# Patient Record
Sex: FEMALE | Race: WHITE | NOT HISPANIC OR LATINO | Employment: OTHER | URBAN - METROPOLITAN AREA
[De-identification: names, ages, dates, MRNs, and addresses within clinical notes are randomized per-mention and may not be internally consistent; named-entity substitution may affect disease eponyms.]

---

## 2017-03-02 ENCOUNTER — TRANSCRIBE ORDERS (OUTPATIENT)
Dept: ADMINISTRATIVE | Age: 67
End: 2017-03-02

## 2017-03-02 ENCOUNTER — HOSPITAL ENCOUNTER (OUTPATIENT)
Dept: RADIOLOGY | Age: 67
Discharge: HOME | End: 2017-03-02
Payer: COMMERCIAL

## 2017-03-02 DIAGNOSIS — N20.0 CALCULUS OF KIDNEY: ICD-10-CM

## 2017-03-02 PROCEDURE — 74000 HB X-RAY EXAM OF ABDOMEN (SINGLE ANTEROPOSTERIOR VIEW): CPT

## 2017-04-11 ENCOUNTER — ALLSCRIPTS OFFICE VISIT (OUTPATIENT)
Dept: OTHER | Facility: OTHER | Age: 67
End: 2017-04-11

## 2017-04-25 ENCOUNTER — ALLSCRIPTS OFFICE VISIT (OUTPATIENT)
Dept: OTHER | Facility: OTHER | Age: 67
End: 2017-04-25

## 2017-05-02 ENCOUNTER — GENERIC CONVERSION - ENCOUNTER (OUTPATIENT)
Dept: OTHER | Facility: OTHER | Age: 67
End: 2017-05-02

## 2017-06-08 ENCOUNTER — ALLSCRIPTS OFFICE VISIT (OUTPATIENT)
Dept: OTHER | Facility: OTHER | Age: 67
End: 2017-06-08

## 2017-06-08 ENCOUNTER — TRANSCRIBE ORDERS (OUTPATIENT)
Dept: ADMINISTRATIVE | Facility: HOSPITAL | Age: 67
End: 2017-06-08

## 2017-06-08 DIAGNOSIS — Z12.31 ENCOUNTER FOR SCREENING MAMMOGRAM FOR MALIGNANT NEOPLASM OF BREAST: Primary | ICD-10-CM

## 2017-07-17 ENCOUNTER — OFFICE VISIT (OUTPATIENT)
Dept: URGENT CARE | Age: 67
End: 2017-07-17
Payer: COMMERCIAL

## 2017-07-17 ENCOUNTER — APPOINTMENT (OUTPATIENT)
Dept: RADIOLOGY | Age: 67
End: 2017-07-17
Attending: FAMILY MEDICINE
Payer: COMMERCIAL

## 2017-07-17 ENCOUNTER — TRANSCRIBE ORDERS (OUTPATIENT)
Dept: URGENT CARE | Age: 67
End: 2017-07-17

## 2017-07-17 DIAGNOSIS — S89.91XA INJURY OF RIGHT LOWER LEG: ICD-10-CM

## 2017-07-17 DIAGNOSIS — S89.92XA INJURY OF LEFT LOWER LEG: ICD-10-CM

## 2017-07-17 DIAGNOSIS — M79.662 PAIN OF LEFT LOWER LEG: ICD-10-CM

## 2017-07-17 DIAGNOSIS — E87.6 HYPOKALEMIA: ICD-10-CM

## 2017-07-17 DIAGNOSIS — S86.912A STRAIN OF UNSPECIFIED MUSCLE(S) AND TENDON(S) AT LOWER LEG LEVEL, LEFT LEG, INITIAL ENCOUNTER: ICD-10-CM

## 2017-07-17 PROCEDURE — 73630 X-RAY EXAM OF FOOT: CPT

## 2017-07-17 PROCEDURE — 73610 X-RAY EXAM OF ANKLE: CPT

## 2017-07-17 PROCEDURE — 99203 OFFICE O/P NEW LOW 30 MIN: CPT | Performed by: FAMILY MEDICINE

## 2017-07-17 PROCEDURE — 73564 X-RAY EXAM KNEE 4 OR MORE: CPT

## 2017-07-17 PROCEDURE — 73590 X-RAY EXAM OF LOWER LEG: CPT

## 2017-07-25 ENCOUNTER — ALLSCRIPTS OFFICE VISIT (OUTPATIENT)
Dept: OTHER | Facility: OTHER | Age: 67
End: 2017-07-25

## 2017-07-25 ENCOUNTER — TRANSCRIBE ORDERS (OUTPATIENT)
Dept: ADMINISTRATIVE | Facility: HOSPITAL | Age: 67
End: 2017-07-25

## 2017-07-25 DIAGNOSIS — M25.562 LEFT KNEE PAIN, UNSPECIFIED CHRONICITY: Primary | ICD-10-CM

## 2017-07-25 DIAGNOSIS — M25.572 LEFT ANKLE PAIN, UNSPECIFIED CHRONICITY: ICD-10-CM

## 2017-07-26 ENCOUNTER — HOSPITAL ENCOUNTER (OUTPATIENT)
Dept: ULTRASOUND IMAGING | Facility: HOSPITAL | Age: 67
Discharge: HOME | End: 2017-07-26
Payer: COMMERCIAL

## 2017-07-26 DIAGNOSIS — M79.662 PAIN OF LEFT LOWER LEG: ICD-10-CM

## 2017-07-26 PROCEDURE — 93971 EXTREMITY STUDY: CPT

## 2017-07-29 ENCOUNTER — GENERIC CONVERSION - ENCOUNTER (OUTPATIENT)
Dept: OTHER | Facility: OTHER | Age: 67
End: 2017-07-29

## 2017-08-02 ENCOUNTER — ALLSCRIPTS OFFICE VISIT (OUTPATIENT)
Dept: OTHER | Facility: OTHER | Age: 67
End: 2017-08-02

## 2017-08-05 ENCOUNTER — APPOINTMENT (OUTPATIENT)
Dept: LAB | Age: 67
End: 2017-08-05
Payer: COMMERCIAL

## 2017-08-05 ENCOUNTER — TRANSCRIBE ORDERS (OUTPATIENT)
Dept: ADMINISTRATIVE | Age: 67
End: 2017-08-05

## 2017-08-05 DIAGNOSIS — L40.3 SAPHO SYNDROME (HCC): ICD-10-CM

## 2017-08-05 DIAGNOSIS — L70.9 SAPHO SYNDROME (HCC): Primary | ICD-10-CM

## 2017-08-05 DIAGNOSIS — M86.9 SAPHO SYNDROME (HCC): Primary | ICD-10-CM

## 2017-08-05 DIAGNOSIS — L40.3 SAPHO SYNDROME (HCC): Primary | ICD-10-CM

## 2017-08-05 DIAGNOSIS — M65.9 SAPHO SYNDROME (HCC): ICD-10-CM

## 2017-08-05 DIAGNOSIS — M85.80 SAPHO SYNDROME (HCC): ICD-10-CM

## 2017-08-05 DIAGNOSIS — M65.9 SAPHO SYNDROME (HCC): Primary | ICD-10-CM

## 2017-08-05 DIAGNOSIS — M86.9 SAPHO SYNDROME (HCC): ICD-10-CM

## 2017-08-05 DIAGNOSIS — M85.80 SAPHO SYNDROME (HCC): Primary | ICD-10-CM

## 2017-08-05 DIAGNOSIS — L70.9 SAPHO SYNDROME (HCC): ICD-10-CM

## 2017-08-05 LAB
25(OH)D3 SERPL-MCNC: 44.4 NG/ML (ref 30–100)
ALBUMIN SERPL BCP-MCNC: 3.7 G/DL (ref 3.5–5)
ALP SERPL-CCNC: 84 U/L (ref 46–116)
ALT SERPL W P-5'-P-CCNC: 31 U/L (ref 12–78)
ANION GAP SERPL CALCULATED.3IONS-SCNC: 5 MMOL/L (ref 4–13)
AST SERPL W P-5'-P-CCNC: 15 U/L (ref 5–45)
BASOPHILS # BLD AUTO: 0.05 THOUSANDS/ΜL (ref 0–0.1)
BASOPHILS NFR BLD AUTO: 1 % (ref 0–1)
BILIRUB SERPL-MCNC: 0.47 MG/DL (ref 0.2–1)
BUN SERPL-MCNC: 14 MG/DL (ref 5–25)
CALCIUM SERPL-MCNC: 8.9 MG/DL (ref 8.3–10.1)
CHLORIDE SERPL-SCNC: 110 MMOL/L (ref 100–108)
CO2 SERPL-SCNC: 25 MMOL/L (ref 21–32)
CREAT SERPL-MCNC: 0.89 MG/DL (ref 0.6–1.3)
EOSINOPHIL # BLD AUTO: 0.21 THOUSAND/ΜL (ref 0–0.61)
EOSINOPHIL NFR BLD AUTO: 4 % (ref 0–6)
ERYTHROCYTE [DISTWIDTH] IN BLOOD BY AUTOMATED COUNT: 13 % (ref 11.6–15.1)
GFR SERPL CREATININE-BSD FRML MDRD: 67 ML/MIN/1.73SQ M
GLUCOSE P FAST SERPL-MCNC: 84 MG/DL (ref 65–99)
HCT VFR BLD AUTO: 39.7 % (ref 34.8–46.1)
HGB BLD-MCNC: 12.9 G/DL (ref 11.5–15.4)
LYMPHOCYTES # BLD AUTO: 1.28 THOUSANDS/ΜL (ref 0.6–4.47)
LYMPHOCYTES NFR BLD AUTO: 23 % (ref 14–44)
MCH RBC QN AUTO: 30.4 PG (ref 26.8–34.3)
MCHC RBC AUTO-ENTMCNC: 32.5 G/DL (ref 31.4–37.4)
MCV RBC AUTO: 93 FL (ref 82–98)
MONOCYTES # BLD AUTO: 0.45 THOUSAND/ΜL (ref 0.17–1.22)
MONOCYTES NFR BLD AUTO: 8 % (ref 4–12)
NEUTROPHILS # BLD AUTO: 3.52 THOUSANDS/ΜL (ref 1.85–7.62)
NEUTS SEG NFR BLD AUTO: 64 % (ref 43–75)
NRBC BLD AUTO-RTO: 0 /100 WBCS
PLATELET # BLD AUTO: 240 THOUSANDS/UL (ref 149–390)
PMV BLD AUTO: 10.1 FL (ref 8.9–12.7)
POTASSIUM SERPL-SCNC: 4.1 MMOL/L (ref 3.5–5.3)
PROT SERPL-MCNC: 7.7 G/DL (ref 6.4–8.2)
RBC # BLD AUTO: 4.25 MILLION/UL (ref 3.81–5.12)
SODIUM SERPL-SCNC: 140 MMOL/L (ref 136–145)
WBC # BLD AUTO: 5.52 THOUSAND/UL (ref 4.31–10.16)

## 2017-08-05 PROCEDURE — 36415 COLL VENOUS BLD VENIPUNCTURE: CPT

## 2017-08-05 PROCEDURE — 82306 VITAMIN D 25 HYDROXY: CPT

## 2017-08-05 PROCEDURE — 85025 COMPLETE CBC W/AUTO DIFF WBC: CPT

## 2017-08-05 PROCEDURE — 80053 COMPREHEN METABOLIC PANEL: CPT

## 2017-08-08 ENCOUNTER — APPOINTMENT (OUTPATIENT)
Dept: PHYSICAL THERAPY | Facility: CLINIC | Age: 67
End: 2017-08-08
Payer: COMMERCIAL

## 2017-08-08 ENCOUNTER — HOSPITAL ENCOUNTER (OUTPATIENT)
Dept: MRI IMAGING | Facility: HOSPITAL | Age: 67
Discharge: HOME | End: 2017-08-08
Attending: FAMILY MEDICINE
Payer: COMMERCIAL

## 2017-08-08 DIAGNOSIS — M25.572 LEFT ANKLE PAIN, UNSPECIFIED CHRONICITY: ICD-10-CM

## 2017-08-08 DIAGNOSIS — S86.912A STRAIN OF UNSPECIFIED MUSCLE(S) AND TENDON(S) AT LOWER LEG LEVEL, LEFT LEG, INITIAL ENCOUNTER: ICD-10-CM

## 2017-08-08 DIAGNOSIS — E87.6 HYPOKALEMIA: ICD-10-CM

## 2017-08-08 DIAGNOSIS — M25.562 LEFT KNEE PAIN, UNSPECIFIED CHRONICITY: ICD-10-CM

## 2017-08-08 PROCEDURE — 73721 MRI JNT OF LWR EXTRE W/O DYE: CPT

## 2017-08-08 PROCEDURE — G8991 OTHER PT/OT GOAL STATUS: HCPCS

## 2017-08-08 PROCEDURE — 97110 THERAPEUTIC EXERCISES: CPT

## 2017-08-08 PROCEDURE — G8990 OTHER PT/OT CURRENT STATUS: HCPCS

## 2017-08-08 PROCEDURE — 97162 PT EVAL MOD COMPLEX 30 MIN: CPT

## 2017-08-09 ENCOUNTER — ALLSCRIPTS OFFICE VISIT (OUTPATIENT)
Dept: OTHER | Facility: OTHER | Age: 67
End: 2017-08-09

## 2017-08-10 ENCOUNTER — GENERIC CONVERSION - ENCOUNTER (OUTPATIENT)
Dept: OTHER | Facility: OTHER | Age: 67
End: 2017-08-10

## 2017-08-10 ENCOUNTER — APPOINTMENT (OUTPATIENT)
Dept: PHYSICAL THERAPY | Facility: CLINIC | Age: 67
End: 2017-08-10
Payer: COMMERCIAL

## 2017-08-10 PROCEDURE — 97010 HOT OR COLD PACKS THERAPY: CPT

## 2017-08-10 PROCEDURE — 97140 MANUAL THERAPY 1/> REGIONS: CPT

## 2017-08-10 PROCEDURE — 97110 THERAPEUTIC EXERCISES: CPT

## 2017-08-15 ENCOUNTER — APPOINTMENT (OUTPATIENT)
Dept: PHYSICAL THERAPY | Facility: CLINIC | Age: 67
End: 2017-08-15
Payer: COMMERCIAL

## 2017-08-15 PROCEDURE — 97110 THERAPEUTIC EXERCISES: CPT

## 2017-08-15 PROCEDURE — 97140 MANUAL THERAPY 1/> REGIONS: CPT

## 2017-08-17 ENCOUNTER — APPOINTMENT (OUTPATIENT)
Dept: PHYSICAL THERAPY | Facility: CLINIC | Age: 67
End: 2017-08-17
Payer: COMMERCIAL

## 2017-08-17 PROCEDURE — 97110 THERAPEUTIC EXERCISES: CPT

## 2017-08-17 PROCEDURE — 97140 MANUAL THERAPY 1/> REGIONS: CPT

## 2017-08-22 ENCOUNTER — APPOINTMENT (OUTPATIENT)
Dept: PHYSICAL THERAPY | Facility: CLINIC | Age: 67
End: 2017-08-22
Payer: COMMERCIAL

## 2017-08-22 PROCEDURE — 97140 MANUAL THERAPY 1/> REGIONS: CPT

## 2017-08-22 PROCEDURE — 97110 THERAPEUTIC EXERCISES: CPT

## 2017-08-24 ENCOUNTER — APPOINTMENT (OUTPATIENT)
Dept: PHYSICAL THERAPY | Facility: CLINIC | Age: 67
End: 2017-08-24
Payer: COMMERCIAL

## 2017-08-24 PROCEDURE — 97110 THERAPEUTIC EXERCISES: CPT

## 2017-08-24 PROCEDURE — 97140 MANUAL THERAPY 1/> REGIONS: CPT

## 2017-09-01 ENCOUNTER — APPOINTMENT (OUTPATIENT)
Dept: PHYSICAL THERAPY | Facility: CLINIC | Age: 67
End: 2017-09-01
Payer: COMMERCIAL

## 2017-09-01 PROCEDURE — 97110 THERAPEUTIC EXERCISES: CPT

## 2017-09-01 PROCEDURE — 97140 MANUAL THERAPY 1/> REGIONS: CPT

## 2017-10-03 ENCOUNTER — GENERIC CONVERSION - ENCOUNTER (OUTPATIENT)
Dept: OTHER | Facility: OTHER | Age: 67
End: 2017-10-03

## 2017-11-16 ENCOUNTER — HOSPITAL ENCOUNTER (OUTPATIENT)
Dept: MAMMOGRAPHY | Facility: HOSPITAL | Age: 67
Discharge: HOME | End: 2017-11-16
Attending: SURGERY
Payer: COMMERCIAL

## 2017-11-16 DIAGNOSIS — Z12.31 ENCOUNTER FOR SCREENING MAMMOGRAM FOR MALIGNANT NEOPLASM OF BREAST: ICD-10-CM

## 2017-11-16 PROCEDURE — G0202 SCR MAMMO BI INCL CAD: HCPCS

## 2017-12-01 ENCOUNTER — ALLSCRIPTS OFFICE VISIT (OUTPATIENT)
Dept: OTHER | Facility: OTHER | Age: 67
End: 2017-12-01

## 2017-12-01 LAB
CLARITY UR: NORMAL
COLOR UR: CLEAR
GLUCOSE (HISTORICAL): NORMAL
HGB UR QL STRIP.AUTO: NORMAL
KETONES UR STRIP-MCNC: NORMAL MG/DL
LEUKOCYTE ESTERASE UR QL STRIP: 1
NITRITE UR QL STRIP: NORMAL
PH UR STRIP.AUTO: 7.5 [PH]
PROT UR STRIP-MCNC: NORMAL MG/DL
SP GR UR STRIP.AUTO: 1

## 2017-12-04 ENCOUNTER — TRANSCRIBE ORDERS (OUTPATIENT)
Dept: LAB | Facility: CLINIC | Age: 67
End: 2017-12-04

## 2017-12-04 ENCOUNTER — APPOINTMENT (OUTPATIENT)
Dept: LAB | Facility: CLINIC | Age: 67
End: 2017-12-04
Payer: COMMERCIAL

## 2017-12-04 DIAGNOSIS — G35 MULTIPLE SCLEROSIS (HCC): ICD-10-CM

## 2017-12-04 DIAGNOSIS — M81.0 SENILE OSTEOPOROSIS: Primary | ICD-10-CM

## 2017-12-04 DIAGNOSIS — M81.0 SENILE OSTEOPOROSIS: ICD-10-CM

## 2017-12-04 DIAGNOSIS — G35 MULTIPLE SCLEROSIS (HCC): Primary | ICD-10-CM

## 2017-12-04 LAB
25(OH)D3 SERPL-MCNC: 52.7 NG/ML (ref 30–100)
ALBUMIN SERPL BCP-MCNC: 3.8 G/DL (ref 3.5–5)
ALP SERPL-CCNC: 73 U/L (ref 46–116)
ALT SERPL W P-5'-P-CCNC: 30 U/L (ref 12–78)
AST SERPL W P-5'-P-CCNC: 15 U/L (ref 5–45)
BASOPHILS # BLD AUTO: 0.04 THOUSANDS/ΜL (ref 0–0.1)
BASOPHILS NFR BLD AUTO: 1 % (ref 0–1)
BILIRUB DIRECT SERPL-MCNC: 0.13 MG/DL (ref 0–0.2)
BILIRUB SERPL-MCNC: 0.39 MG/DL (ref 0.2–1)
CALCIUM SERPL-MCNC: 8.7 MG/DL (ref 8.3–10.1)
CREAT SERPL-MCNC: 0.88 MG/DL (ref 0.6–1.3)
EOSINOPHIL # BLD AUTO: 0.25 THOUSAND/ΜL (ref 0–0.61)
EOSINOPHIL NFR BLD AUTO: 5 % (ref 0–6)
ERYTHROCYTE [DISTWIDTH] IN BLOOD BY AUTOMATED COUNT: 12.6 % (ref 11.6–15.1)
GFR SERPL CREATININE-BSD FRML MDRD: 68 ML/MIN/1.73SQ M
HCT VFR BLD AUTO: 40.5 % (ref 34.8–46.1)
HGB BLD-MCNC: 13.3 G/DL (ref 11.5–15.4)
LYMPHOCYTES # BLD AUTO: 1.9 THOUSANDS/ΜL (ref 0.6–4.47)
LYMPHOCYTES NFR BLD AUTO: 39 % (ref 14–44)
MCH RBC QN AUTO: 30.2 PG (ref 26.8–34.3)
MCHC RBC AUTO-ENTMCNC: 32.8 G/DL (ref 31.4–37.4)
MCV RBC AUTO: 92 FL (ref 82–98)
MONOCYTES # BLD AUTO: 0.3 THOUSAND/ΜL (ref 0.17–1.22)
MONOCYTES NFR BLD AUTO: 6 % (ref 4–12)
NEUTROPHILS # BLD AUTO: 2.41 THOUSANDS/ΜL (ref 1.85–7.62)
NEUTS SEG NFR BLD AUTO: 49 % (ref 43–75)
NRBC BLD AUTO-RTO: 0 /100 WBCS
PLATELET # BLD AUTO: 225 THOUSANDS/UL (ref 149–390)
PMV BLD AUTO: 9.8 FL (ref 8.9–12.7)
PROT SERPL-MCNC: 8.2 G/DL (ref 6.4–8.2)
RBC # BLD AUTO: 4.41 MILLION/UL (ref 3.81–5.12)
WBC # BLD AUTO: 4.91 THOUSAND/UL (ref 4.31–10.16)

## 2017-12-04 PROCEDURE — 85025 COMPLETE CBC W/AUTO DIFF WBC: CPT

## 2017-12-04 PROCEDURE — 36415 COLL VENOUS BLD VENIPUNCTURE: CPT

## 2017-12-04 PROCEDURE — 80076 HEPATIC FUNCTION PANEL: CPT

## 2017-12-04 PROCEDURE — 82565 ASSAY OF CREATININE: CPT

## 2017-12-04 PROCEDURE — 82306 VITAMIN D 25 HYDROXY: CPT

## 2017-12-04 PROCEDURE — 82310 ASSAY OF CALCIUM: CPT

## 2017-12-13 ENCOUNTER — GENERIC CONVERSION - ENCOUNTER (OUTPATIENT)
Dept: OTHER | Facility: OTHER | Age: 67
End: 2017-12-13

## 2017-12-14 ENCOUNTER — ALLSCRIPTS OFFICE VISIT (OUTPATIENT)
Dept: OTHER | Facility: OTHER | Age: 67
End: 2017-12-14

## 2017-12-15 NOTE — PROGRESS NOTES
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)   2  Encounter for screening mammogram for malignant neoplasm of breast (V76 12) (Z12 31)    Plan  Carcinoma in situ of left breast    · Follow-up visit in 1 year Evaluation and Treatment  Follow-up  Status: Hold For -Scheduling  Requested for: 82NBF0767   Ordered; For: Carcinoma in situ of left breast; Ordered By: Esteban Washington Performed:  Due: 80BPE5160  Encounter for screening mammogram for malignant neoplasm of breast    · MAMMO SCREENING RIGHT W 3D & CAD; Status:Hold For - Scheduling; Requestedfor:26Ffn0691;    Perform:[facility]; Due:11Frf1132; Ordered;For:Encounter for screening mammogram for malignant neoplasm of breast; Ordered By:Omayra Huynh; Discussion/Summary  76 yo female s/p left mastectomy and reconstruction for DCIS  ANGEL based on exam today or contralateral mammogram  She is now > 5 years out from her surgery  I will therefore see her again in one year after her mammogram or sooner should the need arise  Chief Complaint  Chief Complaint Free Text Note Form: Pt is here for her 6 month breast follow-up no new complaints at this time  breast imagin17 right scrn mammo (B1)  provider:Dr Will Mccauley      History of Present Illness  Diagnosis and Staging: DCIS left breast ER/FL +   Treatment History: 12 left mastectomy, SLNBexpander/implant-Mark; implant revision and right mastopexy-Ally   Current Therapy: none   Disease Status: angel   Interval History: reports progression of her MS      Review of Systems  Complete Female ROS SurgOnc:  Constitutional: The patient denies new or recent history of general fatigue, no recent weight loss, no change in appetite  Eyes: No complaints of visual problems, no scleral icterus  ENT: no complaints of ear pain, no hoarseness, no difficulty swallowing,-- no tinnitus-- and-- no new masses in head, oral cavity, or neck    Cardiovascular: No complaints of chest pain, no palpitations, no ankle edema  Respiratory: No complaints of shortness of breath, no cough  Gastrointestinal: No complaints of jaundice, no bloody stools, no pale stools  Genitourinary: No complaints of dysuria, no hematuria, no nocturia, no frequent urination, no urethral discharge  Musculoskeletal: arthralgias,-- myalgias-- and-- weakness  Integumentary: No complaints of rash, no new lesions  Neurological: numbness  Hematologic/Lymphatic: No complaints of easy bruising  Active Problems  1  Aftercare involving the use of plastic surgery (V51 8) (Z09)   2  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)   3  Depression (311) (F32 9)   4  Encounter for breast reconstruction following mastectomy (V51 0) (Z42 1)   5  Encounter for screening mammogram for malignant neoplasm of breast (V76 12) (Z12 31)   6  Fatigue (780 79) (R53 83)   7  Gait disturbance (781 2) (R26 9)   8  Headache (784 0) (R51)   9  Hip pain, unspecified laterality   10  Hypokalemia (276 8) (E87 6)   11  Injury of right lower extremity, initial encounter (959 7) (S89 91XA)   12  Insomnia (780 52) (G47 00)   13  Laceration of finger of right hand (883 0) (S61 219A)   14  Left ankle pain (719 47) (M25 572)   15  Left knee pain (719 46) (M25 562)   16  Multiple sclerosis (340) (G35)   17  Muscle strain of left lower leg (844 9) (S86 912A)   18  Nephrolithiasis (592 0) (N20 0)   19  Osteopenia (733 90) (M85 80)   20  Pain of left calf (729 5) (M79 662)   21  Pain of left lower leg (729 5) (M79 662)   22  Rash (782 1) (R21)   23  Screen for colon cancer (V76 51) (Z12 11)   24  Screening for viral disease (V73 99) (Z11 59)   25  Solitary pulmonary nodule (793 11) (R91 1)   26  Tingling (782 0) (R20 2)   27  Urgency of urination (788 63) (R39 15)   28  Urticaria (708 9) (L50 9)    Past Medical History  1  History of Age At First Period 14 Years Old (Menarche)   2  History of Age At First Pregnancy 24 Years Old   3  History of Bone Pain   4   History of Breast ptosis (611 81) (N64 81)   5  History of Hip Fracture   6  History Of 3  Previous Pregnancies (V61 5)   7  History of migraine (V12 49) (Z86 69)   8  Left ankle pain (719 47) (M25 572)   9  Left knee pain (719 46) (M25 562)   10  History of Nephrolithiasis (V13 01)   11  Pain of left calf (729 5) (M79 662)   12  Pain of left lower leg (729 5) (M79 662)   13  History of Previous Pregnancies Resulted In 2  Live Birth(S)   14  History of Supraventricular tachycardia (427 89) (I47 1)   15  History of Wrist fracture, left (814 00) (S62 102A)    Surgical History  1  History of Ankle Surgery   2  History of Biopsy Skin   3  History of Bladder Cystotomy With Basket Extraction Of Calculus   · x2   4  History of Breast Reconstruction With Implant Prosthesis   5  History of Breast Surgery Enlargement Procedure With Prosthetic Implant (V43 82)   6  History of Breast Surgery Mastectomy   7  History of Breast Surgery Reduction Procedure Right Breast   8  History of Exploratory Laparoscopy   9  History of Foot Surgery   10  History of Hip Surgery   11  History of Laparoscopic Appendectomy   · : Dr Dooley Labor   12  History of Leg Repair   13  History of [place] Lymph Node Biopsy   14  History of Tonsillectomy   15  History of Treatment Of Hip Fracture   · Sub-capital   16  History of Tubal Ligation   17  History of Wrist Surgery  Surgical History Reviewed: The surgical history was reviewed and updated today  Family History  Mother    1  Family history of Coronary Artery Disease (V17 49)   2  Family history of Mother  At Age 80  Father    3  Family history of Acute Myocardial Infarction (V17 3)   4  Family history of Father  At Age 39  Maternal Grandfather    5  Family history of Hodgkin Disease  Maternal Aunt    6  Family history of Bladder Cancer (V16 52)  Maternal Uncle    7  Family history of Colon Cancer (V16 0)   8  Family history of Hodgkin Disease  Family History Reviewed:    The family history was reviewed and updated today  Social History   · Denied: History of Alcohol Use (History)   · Denied: History of Caffeine Use   · Educational Level - Completed Bachelors Degree   · Former smoker (V15 82) (G42 975)   · Marital History - Currently    · Never a smoker   · Occupation: Retired  Social History Reviewed: The social history was reviewed and updated today  The social history was reviewed and is unchanged  Current Meds   1  Ampyra 10 MG Oral Tablet Extended Release 12 Hour; TAKE 1 TABLET BID  Requested for: 62VPL8907; Last Rx:26Apr2017 Ordered   2  Baclofen 10 MG Oral Tablet; TAKE 1 TABLET EVERY        MORNING, 1 TABLET EVERY    EVENING, AND TAKE 2 TABLETSAT BEDTIME; Therapy: 11MXV5606 to (Evaluate:30Jun2018)  Requested for: 69MSS5486; Last Rx:75Ysp6822 Ordered   3  Copaxone 40 MG/ML Subcutaneous Solution Prefilled Syringe; INJECT 40MG SUB-Q THREE TIMES WEEKLY; Therapy: 46Hik7435 to (Evaluate:27Jul2018)  Requested for: 64Yer4371; Last Rx:40Rpe8314 Ordered   4  Cranberry CAPS; Therapy: () to Recorded   5  Fish Oil CAPS; Therapy: () to Recorded   6  Gabapentin 300 MG Oral Capsule; TAKE 1 CAPSULE AT BEDTIME; Therapy: 60FDP3314 to (Evaluate:27Jan2018)  Requested for: 92Jrm3571; Last Rx:30Lpt5156 Ordered   7  Gabapentin 600 MG Oral Tablet; TAKE 2 TABLETS 3 TIMES A   DAY; Therapy: 25HUD4560 to (Evaluate:30Jun2018)  Requested for: 50CYD9010; Last Rx:66Whz9713 Ordered   8  HM Magnesium TABS; 500 mg qd; Therapy: () to Recorded   9  LORazepam 1 MG Oral Tablet; sig 1 hs; Therapy: 32AHE6277 to (Last Rx:02Jun2017) Ordered   10  Prolia 60 MG/ML Subcutaneous Solution; Therapy: (Recorded:02Mar2016) to Recorded   11  VESIcare 10 MG Oral Tablet; TAKE 1 TABLET DAILY; Therapy: 23IMI1547 to (Mayra Sibley)  Requested for: 93Ubu2168; Last  Rx:92Jdo8762; Status: ACTIVE - Retrospective By Protocol Authorization Ordered   12   Vitamin B-12 1000 MCG Oral Tablet; TAKE 1 TABLET DAILY AS DIRECTED; Therapy: 85BKS0167 to (Evaluate:03Nov2015) Recorded   13  Vitamin C 500 MG Oral Capsule; Therapy: ([phone]) to Recorded   14  Vitamin D3 CAPS; Therapy: ([phone]) to Recorded   15  Zonisamide 100 MG Oral Capsule; take 4 capsules at bedtime; Therapy: 84LDO1669 to (Evaluate:30Jun2018)  Requested for: 01ZXY2270; Last  Rx:00Eew9590 Ordered  Medication List Reviewed: The medication list was reviewed and updated today  Allergies  1  Contrast Media Ready-Box MISC   2  Cymbalta   3  Morphine Sulfate SOLN   4  Tylenol Extra Strength TABS   5  ZyrTEC Allergy TABS    Vitals  Vital Signs    Recorded: 85DEF3495 09:19AM   Temperature 97 8 F   Heart Rate 70   Respiration 15   Systolic 621   Diastolic 74   Height 5 ft 3 5 in   Weight 134 lb    BMI Calculated 23 36   BSA Calculated 1 64   O2 Saturation 99     Physical Exam   Constitutional: General appearance: The Patient is well-developed, well-nourished female who appears her stated age in no acute distress  She is pleasant and talkative  Chest: The lungs are clear to auscultation  Cardiac: Heart is regular rate  Abdomen: There is no evidence of hepatosplenomegaly  Neuro: Orientation to person, place and time: Normal  -- Mood and affect: Normal    Lymphatic: no evidence of cervical adenopathy bilaterally  -- no evidence of axillary adenopathy bilaterally  Skin: Examination of Breast: Abnormal   Breast: Examination of both breasts revealed implants  Examination of the right breast revealed a breast scar, but-- no erythema,-- no swelling-- and-- no tenderness  Examination of the left breast revealed a total mastectomy,-- a mastectomy scar-- and-- breast reconstruction, but-- no erythema,-- no swelling-- and-- no tenderness  The right nipple was not inverted  No discharge was noted from the right nipple  No breast masses were palpable  Axillary nodes: no enlarged nodes  Results/Data  Diagnostic Studies Reviewed Surg Onc:  X-ray Review 11/16/17 right screening mammogram benign  Health Management  Screen for colon cancer   COLONOSCOPY; every 10 years; Next Due: 40Jfu0604; Overdue  Health Maintenance   Medicare Annual Wellness Visit; every 1 year; Next Due: 14Apr2015; Overdue    Future Appointments    Date/Time Provider Specialty Site   12/19/2017 12:30 PM Jany Macdonald [facility] Neurology [facility]  [place]   10/12/2018 10:30 AM ROGER Frances  Plastic Surgery BODY EVOLUTION PLASTIC RECON RIVERS   03/07/2018 01:30 PM Yuri Adkins MD Obstetrics/Gynecology [facility] Sensing  [facility]   04/25/2018 10:15 AM Madhav Salinas MD Urology [facility] [place]     End of Encounter Meds  1  Ampyra 10 MG Oral Tablet Extended Release 12 Hour; TAKE 1 TABLET BID  Requested for: 61XBD1654; Last Rx:26Apr2017 Ordered  2  Cranberry CAPS; Therapy: (Recorded:89Xrq9769) to Recorded   3  Fish Oil CAPS; Therapy: () to Recorded   4  HM Magnesium TABS; 500 mg qd; Therapy: () to Recorded   5  Vitamin C 500 MG Oral Capsule; Therapy: () to Recorded   6  Vitamin D3 CAPS; Therapy: () to Recorded  7  LORazepam 1 MG Oral Tablet (Ativan); sig 1 hs; Therapy: 31NNF6927 to (Last Rx:02Jun2017) Ordered  8  Baclofen 10 MG Oral Tablet; TAKE 1 TABLET EVERY        MORNING, 1 TABLET EVERY    EVENING, AND TAKE 2 TABLETSAT BEDTIME; Therapy: 54YSS9126 to (Evaluate:30Jun2018)  Requested for: 89RQU6141; Last Rx:89Vjx1243 Ordered   9  Copaxone 40 MG/ML Subcutaneous Solution Prefilled Syringe (Glatiramer Acetate); INJECT 40MG SUB-Q THREE TIMES WEEKLY; Therapy: 62Cct7227 to (Evaluate:45Fov5609)  Requested for: 68Xby5712; Last Rx:31Ymz6959 Ordered   10  Vitamin B-12 1000 MCG Oral Tablet; TAKE 1 TABLET DAILY AS DIRECTED; Therapy: 31HXY6317 to (Evaluate:03Nov2015) Recorded   11   Zonisamide 100 MG Oral Capsule; take 4 capsules at bedtime; Therapy: 31XAP1798 to (Evaluate:30Jun2018)  Requested for: 16UHY6659; Last  Rx:42Cgo9402 Ordered  12  Gabapentin 300 MG Oral Capsule; TAKE 1 CAPSULE AT BEDTIME; Therapy: 77VFQ7636 to (Evaluate:27Jan2018)  Requested for: 32RJJ0156; Last  Rx:87Uov6711 Ordered   13  Gabapentin 600 MG Oral Tablet; TAKE 2 TABLETS 3 TIMES A   DAY; Therapy: 46ZTS0556 to (Evaluate:30Jun2018)  Requested for: 00YWV4855; Last  Rx:51Qjk8074 Ordered  14  VESIcare 10 MG Oral Tablet; TAKE 1 TABLET DAILY; Therapy: 75RRM2160 to (Kathia Lugo)  Requested for: 77Eed5900; Last  Rx:50Gbk7091; Status: ACTIVE - Retrospective By Protocol Authorization Ordered  15  Prolia 60 MG/ML Subcutaneous Solution;   Therapy: (Salomón Flores to Recorded    Signatures   Electronically signed by : ROGER Vu ; Dec 14 2017  9:38AM EST                       (Author)

## 2017-12-19 ENCOUNTER — ALLSCRIPTS OFFICE VISIT (OUTPATIENT)
Dept: OTHER | Facility: OTHER | Age: 67
End: 2017-12-19

## 2017-12-19 ENCOUNTER — TRANSCRIBE ORDERS (OUTPATIENT)
Dept: ADMINISTRATIVE | Facility: HOSPITAL | Age: 67
End: 2017-12-19

## 2017-12-19 DIAGNOSIS — G35 MULTIPLE SCLEROSIS (HCC): ICD-10-CM

## 2017-12-19 DIAGNOSIS — G35 MULTIPLE SCLEROSIS (HCC): Primary | ICD-10-CM

## 2017-12-21 NOTE — PROGRESS NOTES
Assessment   1  Multiple sclerosis (340) (G35)   2  Pain of left lower leg (729 5) (M79 662)   3  Gait disturbance (781 2) (R26 9)    Plan   Multiple sclerosis    · * MRI THORACIC SPINE W WO CONTRAST; Status:Need Information - Financial    Authorization; Requested for:[id number];    Perform:[facility] Radiology; Due:98Lxe3411; Last Updated By:Edwin Anne; 12/19/2017 1:36:52 PM;Ordered; For:Multiple sclerosis; Ordered By:Karla Rebolledo;   · Follow-up visit in 4 Months Evaluation and Treatment  Follow-up  Status: Complete     Done: 71GMW5687   Ordered; For: Multiple sclerosis; Ordered By: Lilly Hernandez Performed:  Due: 36EKO7952; Last Updated By: Christen Coffman; 12/19/2017 1:36:52 PM   · Follow-up Visit in 8 Months Evaluation and Treatment  Follow-up with Dr Pawan Jasso     Status: Complete  Done: 29UBY5434   Ordered; For: Multiple sclerosis; Ordered By: Lilly Hernandez Performed:  Due: 22PDW6587; Last Updated By: Christen Coffman; 12/19/2017 1:36:52 PM    Discussion/Summary   Discussion Summary:    Patient here for MS follow up remains on Copaxone and tolerating well  Also remains on Ampyra which has been beneficial for her gait is having increased neuropathic pain in the right leg, as well as pain over the left proximal tibia which started last night  She is getting a quick sharp, focal pain in the left lower leg over the tibia that subsides in seconds  Can happen at any time update MRI t-spine due to increased symptoms in the legs am also concerned about the left leg pain being ortho-related  As noted at last visit, she had a major fall prior to her last visit here and was seen by ortho  It looks like she had a MRI that showed a left patellar fracture  Patient was never notified of these results  Last week she had a minor fall to her knees  She did not have any pain at the time, but now having pain in the left leg  I offered to task PCP/ortho or order xray, but she wants to see how she feels in a few days   She feels this is nerve-related cont same dose of gabapentin 1200mg TID plus extra 300mg QHS also add alpha lipoic acid to her B12 to see if that would be helpful for neuropathy  She has not tolerated Lyrica or Cymbalta and is maxed out on gabapentin is stable today up in 4 months for any new symptoms  Counseling Documentation With Imm: The patient was counseled regarding diagnostic results,-- instructions for management,-- risk factor reductions,-- prognosis,-- patient and family education,-- impressions,-- risks and benefits of treatment options,-- importance of compliance with treatment  total time of encounter was 35 minutes-- and-- >50% minutes was spent counseling  Chief Complaint   Chief Complaint Free Text Note Form: Patient presents for f/u for Multiple sclerosis   History of Present Illness   HPI: 76 y/o female presents for MS follow up, last seen in August 2017  Patient with history of MS dating back to 2008, but likely with symptoms even in 1998  Patient also with PMH of breast cancer s/p left mastectomy in August 2012  Pt has been on Copaxone since 2008 and has now changed to the 40mg TIW dosing  She reports improvement in her injection sites with the dosing change; reports improvement in thigh atrophy  Patient has never had IV steroid therapy for her MS in the past  She has been on Neurontin for many years  Patient notes significant benefit for her walking and endurance with the use of Ampyra  On 9/29/14 she underwent open reduction internal fixation of the left hip and on 10/2/14 she underwent an open reduction internal fixation of a left radial fracture  Patient had updated imaging in April 2016  MRIs of brain, c-spine and t-spine were all stable, no new or enhancing lesions  At visit in Nov 2016, patient admitted to trouble sleeping, more depression  She did not feel the Zoloft has been doing anything for her   She had been on the med for close to 15 years and it has been increased over the years without much relief  Patient reports she does not want to see a counselor because she knows the reason she is depressed  Zoloft was stopped and she was started on Cymbalta  Unfortunately, she had side effects to Cymbalta and stopped the med after calling our office  We suggested she could either go back on Zoloft, or try a different med, but she declined  I suggest referral to psych, but also declined that referral  She actually admits to improved mood and energy levels since stopping the Zoloft  patient reports she has been having some increased neuropathy symptoms in the right leg, as well as pain in the left proximal tibia, which started just last night  At last visit, she reported she had a significant fall while at an [employer]  She had with ecchymosis down the majority of the left leg  This fall occurred related to rippling in carpet  Patient with extended recovery including physical therapy, working on mobility and range of motion   She was followed by ortho and her PCP  I reviewed MRIs she had done in August and it appears she had a patellar fracture  Patient reports she was not informed of this result and told that her MRI was normal  Patient reports she had very minor fall to her knees last week  She did not have any significant pain or swelling after  She did have a little bit of bruising  The pain she is experiencing on her left proximal tibia is intermittent  She says it will be fine and then all of a sudden, on very strong like a sharp pain in a focal area  This just started last night with no aggravating factor at that time  She brought a cane today  She denies any weakness  Her neuropathic pain seems to be worse in the right leg  She denies any bowel or bladder changes, just had VESIcare increased by Urology  Denies any bowel changes  Denies any change in vision  Denies any trouble with speech or swallowing  Patient recently had labs done 12/4/17 with normal CBC and LFTs  Vit D 52  Review of Systems   Neurological ROS:      Constitutional: recent weight gain-- and-- fatigue  HEENT: sinus problems-- and-- feeling congested  Cardiovascular:  no chest pain or pressure, no palpitations present, the heart rate was not rapid or irregular, no swelling in the arms or legs, no poor circulation  Respiratory:  no unusual or persistant cough, no shortness of breath with or without exertion  Gastrointestinal:  no nausea, no vomiting, no diarrhea, no abdominal pain, no changes in bowel habits, no melena, no loss of bowel control  Genitourinary: feelings of urinary urgency-- and-- increased frequency  Musculoskeletal: arthralgias,-- myalgias-- and-- pain while walking  Integumentary  no masses, no rash, no skin lesions, no livedo reticularis  Psychiatric: anxiety  Endocrine  no unusual weight loss or gain, no excessive urination, no excessive thirst, no hair loss or gain, no hot or cold intolerance, no menstrual period change or irregularity, no loss of sexual ability or drive, no erection difficulty, no nipple discharge  Hematologic/Lymphatic:  no unusual bleeding, no tendency for easy bruising, no clotting skin or lumps  Neurological General: snoring-- and-- waking up at night  Neurological Mental Status: memory problems  Neurological Cranial Nerves: slurred speech  Neurological Motor findings include: cramping(pre/post exercise)  Neurological Coordination: balance difficulties-- and-- clumsiness  Neurological Sensory: numbness-- and-- tingling  Neurological Gait: difficulty walking-- and-- has had falls  ROS Reviewed:    ROS reviewed  Active Problems   1  Aftercare involving the use of plastic surgery (V51 8) (Z09)   2  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)   3  Depression (311) (F32 9)   4  Encounter for breast reconstruction following mastectomy (V51 0) (Z42 1)   5   Encounter for screening mammogram for malignant neoplasm of breast (V76 12)     (Z12 31)   6  Fatigue (780 79) (R53 83)   7  Gait disturbance (781 2) (R26 9)   8  Headache (784 0) (R51)   9  Hip pain, unspecified laterality   10  Hypokalemia (276 8) (E87 6)   11  Injury of right lower extremity, initial encounter (959 7) (S89 91XA)   12  Insomnia (780 52) (G47 00)   13  Laceration of finger of right hand (883 0) (S61 219A)   14  Left ankle pain (719 47) (M25 572)   15  Left knee pain (719 46) (M25 562)   16  Multiple sclerosis (340) (G35)   17  Muscle strain of left lower leg (844 9) (S86 912A)   18  Nephrolithiasis (592 0) (N20 0)   19  Osteopenia (733 90) (M85 80)   20  Pain of left calf (729 5) (M79 662)   21  Pain of left lower leg (729 5) (M79 662)   22  Rash (782 1) (R21)   23  Screen for colon cancer (V76 51) (Z12 11)   24  Screening for viral disease (V73 99) (Z11 59)   25  Solitary pulmonary nodule (793 11) (R91 1)   26  Tingling (782 0) (R20 2)   27  Urgency of urination (788 63) (R39 15)   28  Urticaria (708 9) (L50 9)    Past Medical History   1  History of Age At First Period 14 Years Old (Menarche)   2  History of Age At First Pregnancy 24 Years Old   3  History of Bone Pain   4  History of Breast ptosis (611 81) (N64 81)   5  History of Hip Fracture   6  History Of 3  Previous Pregnancies (V61 5)   7  History of migraine (V12 49) (Z86 69)   8  Left ankle pain (719 47) (M25 572)   9  Left knee pain (719 46) (M25 562)   10  History of Nephrolithiasis (V13 01)   11  Pain of left calf (729 5) (M79 662)   12  Pain of left lower leg (729 5) (M79 662)   13  History of Previous Pregnancies Resulted In 2  Live Birth(S)   14  History of Supraventricular tachycardia (427 89) (I47 1)   15  History of Wrist fracture, left (814 00) (S60 737A)  Active Problems And Past Medical History Reviewed: The active problems and past medical history were reviewed and updated today  Surgical History   1  History of Ankle Surgery   2   History of Biopsy Skin   3  History of Bladder Cystotomy With Basket Extraction Of Calculus   4  History of Breast Reconstruction With Implant Prosthesis   5  History of Breast Surgery Enlargement Procedure With Prosthetic Implant (V43 82)   6  History of Breast Surgery Mastectomy   7  History of Breast Surgery Reduction Procedure Right Breast   8  History of Exploratory Laparoscopy   9  History of Foot Surgery   10  History of Hip Surgery   11  History of Laparoscopic Appendectomy   12  History of Leg Repair   13  History of [place] Lymph Node Biopsy   14  History of Tonsillectomy   15  History of Treatment Of Hip Fracture   16  History of Tubal Ligation   17  History of Wrist Surgery  Surgical History Reviewed: The surgical history was reviewed and updated today  Family History   Mother    1  Family history of Coronary Artery Disease (V17 49)   2  Family history of Mother  At Age 80  Father    3  Family history of Acute Myocardial Infarction (V17 3)   4  Family history of Father  At Age 39  Maternal Grandfather    5  Family history of Hodgkin Disease  Maternal Aunt    6  Family history of Bladder Cancer (V16 52)  Maternal Uncle    7  Family history of Colon Cancer (V16 0)   8  Family history of Hodgkin Disease  Family History Reviewed: The family history was reviewed and updated today  Social History    · Denied: History of Alcohol Use (History)   · Denied: History of Caffeine Use   · Educational Level - Completed Bachelors Degree   · Former smoker (V15 82) (L35 183)   · Marital History - Currently    · Never a smoker   · Occupation: Retired  Social History Reviewed: The social history was reviewed and updated today  Current Meds    1  Ampyra 10 MG Oral Tablet Extended Release 12 Hour; TAKE 1 TABLET BID  Requested     for: 48ADD0815; Last Rx:2017 Ordered   2   Baclofen 10 MG Oral Tablet; TAKE 1 TABLET EVERY        MORNING, 1 TABLET EVERY        EVENING, AND TAKE 2 TABLETSAT BEDTIME; Therapy: 72DSI9509 to (Evaluate:30Jun2018)  Requested for: 30ZHS4590; Last     Rx:15Oog2286 Ordered   3  Copaxone 40 MG/ML Subcutaneous Solution Prefilled Syringe; INJECT 40MG SUB-Q     THREE TIMES WEEKLY; Therapy: 62Qbh2795 to (Evaluate:27Jul2018)  Requested for: 48Wji9191; Last     Rx:57Cje1458 Ordered   4  Cranberry CAPS; Therapy: () to Recorded   5  Fish Oil CAPS; Therapy: () to Recorded   6  Gabapentin 300 MG Oral Capsule; TAKE 1 CAPSULE AT BEDTIME; Therapy: 70ESV6337 to (Evaluate:27Jan2018)  Requested for: 48Ngj8983; Last     Rx:[id number] Ordered   7  Gabapentin 600 MG Oral Tablet; TAKE 2 TABLETS 3 TIMES A   DAY; Therapy: 88VGT7099 to (Evaluate:30Jun2018)  Requested for: 00XTG8423; Last     Rx:04Eup4176 Ordered   8  HM Magnesium TABS; 500 mg qd; Therapy: () to Recorded   9  LORazepam 1 MG Oral Tablet; sig 1 hs;     Therapy: 18SUZ0671 to (Last Rx:02Jun2017) Ordered   10  Prolia 60 MG/ML Subcutaneous Solution; Therapy: (Recorded:02Mar2016) to Recorded   11  VESIcare 10 MG Oral Tablet; TAKE 1 TABLET DAILY; Therapy: 53PNA8448 to (Louann Hatfield)  Requested for: 66ADT9644; Last      Rx:[id number] Ordered   12  Vitamin B-12 1000 MCG Oral Tablet; TAKE 1 TABLET DAILY AS DIRECTED; Therapy: 90NZH7920 to (Evaluate:03Nov2015) Recorded   13  Vitamin C 500 MG Oral Capsule; Therapy: () to Recorded   14  Vitamin D3 CAPS; Therapy: () to Recorded   15  Zonisamide 100 MG Oral Capsule; take 4 capsules at bedtime; Therapy: 18DCD8115 to (Evaluate:30Jun2018)  Requested for: 36TDM2242; Last      Rx:28Tjm1174 Ordered  Medication List Reviewed: The medication list was reviewed and updated today  Allergies   1  Contrast Media Ready-Box MISC   2  Cymbalta   3  Morphine Sulfate SOLN   4   Tylenol Extra Strength TABS   5  ZyrTEC Allergy TABS    Vitals   Signs Recorded: 68Nzv4057 12:37PM   Heart Rate: 74  Systolic: 832, RUE, Sitting  Diastolic: 64, RUE, Sitting  Height: 5 ft 3 5 in  Weight: 135 lb 6 oz  BMI Calculated: 23 6  BSA Calculated: 1 65  O2 Saturation: 98    Physical Exam        Constitutional      General Appearance: Appears appropriate for age, healthy, well developed, appropriately groomed and appropriately dressed       Eyes      Ophthalmoscopic examination: Vision is grossly normal  Gross visual field testing by confrontation shows no abnormalities  EOMI in both eyes  Conjunctivae clear  Eyelids normal palpebral fissures equal  Orbits exhibit normal position  No discharge from the eyes  PERRL  Musculoskeletal      Gait and station: Abnormal  -- wide based, increased scissoring gait, occ dragging of the left leg  Muscle strength: Abnormal        Strength examination:      Biceps strength was 5/5 on the right side-- and-- 4+/5 on the left side  Triceps strength was 5/5 on the right side-- and-- 4+/5 on the left side  Shoulder abduction was 5/5 on the right side-- and-- 4+/5 on the left side  Foot Dorsiflexion: 5/5 on the right side and 4/5 on the left side  Knee Extension: 5/5 on the right side and 4+/5 on the left side  Hip Flexion: 5/5 on the right side and 4+/5 on the left side  Muscle tone: No atrophy, abnormal movements, flaccidity, cogwheeling or spasticity  Involuntary movements: None observed  Neurologic      Mental Status: Mood is normal  Affect is normal  Memory is intact  (Concentration) No apparent agnosia present  Thought process appears clear and appropriate  Reflexes: Abnormal  -- increased throughout  Coordination: Abnormal  -- slower PRANAY on left  Sensation: Abnormal   Sensory exam: pain and temperature sensation was not intact-- and-- vibration sense was not intact  Cranial Nerve Exam      II: Normal with no deficit         III,IV, VI:Normal with no deficit VTedi Yousif with no deficitl  VII: Normal with no deficit  VIII: Normal with no deficit  IV: Normal with no deficit  XI: Normal with no deficit  XII: Normal with no deficit  Constitutional      General appearance: No acute distress, well appearing and well nourished  Eyes      Conjunctiva and lids: No erythema, swelling or discharge  Recent and remote memory: Intact  Mood and affect: Normal        Results/Data   Diagnostic Studies Reviewed: I personally reviewed the films/images/results in the office today  My interpretation follows  (1) CBC/PLT/DIFF 70QCO2382 11:04AM Timothy Hernandez      Test Name Result Flag Reference   WBC COUNT 4 91 Thousand/uL  4 31-10 16   RBC COUNT 4 41 Million/uL  3 81-5 12   HEMOGLOBIN 13 3 g/dL  11 5-15 4   HEMATOCRIT 40 5 %  34 8-46  1   MCV 92 fL  82-98   MCH 30 2 pg  26 8-34 3   MCHC 32 8 g/dL  31 4-37 4   RDW 12 6 %  11 6-15 1   MPV 9 8 fL  8 9-12 7   PLATELET COUNT 129 Thousands/uL  149-390   nRBC AUTOMATED 0 /100 WBCs     NEUTROPHILS RELATIVE PERCENT 49 %  43-75   LYMPHOCYTES RELATIVE PERCENT 39 %  14-44   MONOCYTES RELATIVE PERCENT 6 %  4-12   EOSINOPHILS RELATIVE PERCENT 5 %  0-6   BASOPHILS RELATIVE PERCENT 1 %  0-1   NEUTROPHILS ABSOLUTE COUNT 2 41 Thousands/? ??L  1 85-7 62   LYMPHOCYTES ABSOLUTE COUNT 1 90 Thousands/? ??L  0 60-4 47   MONOCYTES ABSOLUTE COUNT 0 30 Thousand/? ??L  0 17-1 22   EOSINOPHILS ABSOLUTE COUNT 0 25 Thousand/? ??L  0 00-0 61   BASOPHILS ABSOLUTE COUNT 0 04 Thousands/? ??L  0 00-0 10   This is a patient instruction: This test is non-fasting  Please drink two glasses of water morning of bloodwork  (1) HEPATIC FUNCTION PANEL 53MAM2579 11:04AM Timothy Hernandez      Test Name Result Flag Reference   ALBUMIN 3 8 g/dL  3 5-5 0   This is a patient instruction: This test is non-fasting  Please drink two glasses of water morning of bloodwork     ALK PHOSPHATAS 73 U/L     ALT (SGPT) 30 U/L  12-78   Specimen collection should occur prior to Sulfasalazine and/or Sulfapyridine administration due to the potential for falsely depressed results  AST(SGOT) 15 U/L  5-45   Specimen collection should occur prior to Sulfasalazine and/or Sulfapyridine administration due to the potential for falsely depressed results  BILI, DIRECT 0 13 mg/dL  0 00-0 20   BILI, TOTAL 0 39 mg/dL  0 20-1 00   TOTAL PROTEIN 8 2 g/dL  6 4-8 2      Attending Note   Collaborating Physician Note: Collaborating Note: I agree with the Advanced Practitioner note  Future Appointments      Date/Time Provider Specialty Site   12/13/2018 10:00 AM ROGER Valentino   Surgical Oncology [facility] ONCOL[facility]   10/12/2018 10:30 AM ROGER Jaramillo  Plastic Surgery BODY EVOLUTION PLASTIC RECON RIVERS   03/07/2018 01:30 PM Isela Pike MD Obstetrics/Gynecology [facility] SURGICAL [facility]   04/25/2018 10:15 AM Ghanshyam Joyce MD Urology [address]     Signatures    Electronically signed by : Doreen Vargas, [facility]; Dec 19 2017  4:01PM EST                       (Author)     Electronically signed by : Levi Eisenmenger, M D ; Dec 20 2017  6:04AM EST                       (Co-author)

## 2017-12-27 ENCOUNTER — GENERIC CONVERSION - ENCOUNTER (OUTPATIENT)
Dept: FAMILY MEDICINE CLINIC | Facility: OTHER | Age: 67
End: 2017-12-27

## 2018-01-08 ENCOUNTER — HOSPITAL ENCOUNTER (OUTPATIENT)
Dept: MRI IMAGING | Facility: HOSPITAL | Age: 68
Discharge: HOME | End: 2018-01-08
Payer: COMMERCIAL

## 2018-01-08 DIAGNOSIS — G35 MULTIPLE SCLEROSIS (HCC): ICD-10-CM

## 2018-01-08 PROCEDURE — 72157 MRI CHEST SPINE W/O & W/DYE: CPT

## 2018-01-08 PROCEDURE — A9585 GADOBUTROL INJECTION: HCPCS | Performed by: RADIOLOGY

## 2018-01-08 RX ADMIN — GADOBUTROL 6 ML: 604.72 INJECTION INTRAVENOUS at 21:39

## 2018-01-09 NOTE — RESULT NOTES
Verified Results  * CT CHEST WO CONTRAST 28Apr2016 09:50AM Renetta Carrizales     Test Name Result Flag Reference   CT CHEST WO CONTRAST (Report)     CT CHEST WITHOUT IV CONTRAST     INDICATION: Pulmonary nodule follow-up  COMPARISON: 12/3/2015     TECHNIQUE: CT examination of the chest was performed without intravenous contrast  Axial, sagittal and coronal reformatted images were submitted for interpretation  Coronal thick section MIP (maximal intensity projection) images were also created  This examination, like all CT scans performed in the [facility], was performed utilizing techniques to minimize radiation dose exposure, including the use of iterative reconstruction and automated exposure control  FINDINGS:     LUNGS: Biapical pleural parenchymal scarring and pleural calcification redemonstrated  Mild centrilobular emphysematous changes noted mostly involving the lung apices  Previously characterized right lower lobe 4 mm nodule not seen on the current exam,   possibly due to plane of section  Region of right lower lobe reticulonodular densities again noted series 3 images 39 through 42 and similar to prior study  Findings likely reflect the sequelae of previous infectious or inflammatory process  Lung    parenchyma otherwise clear  No endobronchial lesion  No pneumothorax  PLEURA: As above  HEART/GREAT VESSELS: Unremarkable for patient's age  MEDIASTINUM AND MICH: Unremarkable  CHEST WALL AND LOWER NECK: Unremarkable  VISUALIZED STRUCTURES IN THE UPPER ABDOMEN: Punctate nonobstructive renal calculi noted  OSSEOUS STRUCTURES: Old rib fractures noted bilaterally  IMPRESSION:   1  Right lower lobe 4 mm nodule identified on previous study is not seen on the current exam, possibly due to plane of section  Again noted is a right lower lobe region of reticulonodular density concerning for sequelae of previous infectious or    inflammatory process, stable  2  Stable biapical pleural parenchymal scarring and emphysematous change  3  Punctate nonobstructive renal calculi noted bilaterally         Workstation performed: [id number]     Signed by:   Mary Kate Chery MD   4/28/16

## 2018-01-10 NOTE — RESULT NOTES
Verified Results  (Q) VARICELLA ZOSTER VIRUS AB (IGG) 93Dkd7426 10:25AM Wilmer Hager     Test Name Result Flag Reference   VARICELLA ZOSTER VIRUS$AB (IGG) 1 44 index     Index       Explanation  of Results       ---------     ----------------------      < or = 0 90    Negative - No VZV IgG Antibody detected      0 91 - 1 09    Equivocal      > or = 1 10    Positive - VZV IgG Antibody detected         A positive result indicates that the patient      has antibody to VZV but does not differentiate      between infection (active or past) and vaccination  The clinical diagnosis must be interpreted in       conjunction with the clinical signs and symptoms of       the patient  This assay reliably measures immunity      due to previous infection but may not always be       sensitive enough to detect antibodies induced by      vaccination  Thus, a negative result in a vaccinated      individual does not necessarily indicate      susceptibility to VZV infection  Discussion/Summary   Some time in the past you had exposure to chickenpox, so the shot you would need is the Zostavax  Check with your insurance to see where they want you to get it (pharmacy versus office)   Dr Pastor Matute

## 2018-01-12 VITALS
OXYGEN SATURATION: 94 % | RESPIRATION RATE: 14 BRPM | WEIGHT: 127 LBS | TEMPERATURE: 98.1 F | SYSTOLIC BLOOD PRESSURE: 138 MMHG | BODY MASS INDEX: 22.5 KG/M2 | DIASTOLIC BLOOD PRESSURE: 78 MMHG | HEART RATE: 64 BPM | HEIGHT: 63 IN

## 2018-01-12 NOTE — MISCELLANEOUS
Message   Recorded as Task   Date: 08/18/2016 02:18 PM, Created By: Ming De Anda   Task Name: Follow Up   Assigned To: Crow Schroeder   Regarding Patient: Kayley Tapia, Status: Active   Comment:    Ming De Anda - 18 Aug 2016 2:18 PM     TASK CREATED  Caller: Self; [phone] (Home)  Patient has been around kids that have or had the chicken pox  She never had it and wants to know if she should get the shingles vaccine  She can be reached at [phone]  thank you rufino Echevarria - 18 Aug 2016 4:12 PM     TASK EDITED            She needs to check with insurance- would need Varivax x 2 doses a month apart  She hasn't been around anyone with chickenpox  She doesn't think she ever had chickenpox  Will send her a [facility] slip to check varicella titer to determine if she needs Varivax or Zostavax  Plan  Screening for viral disease    · (Q) VARICELLA ZOSTER VIRUS AB (IGG); Status:Active;  Requested for:26Ciy0437;     Signatures   Electronically signed by : ROGER Ramirez ; Aug 18 2016  4:16PM EST                       (Author)

## 2018-01-12 NOTE — MISCELLANEOUS
Message   Recorded as Task   Date: 06/14/2016 11:40 AM, Created By: Jenifer Grey   Task Name: Follow Up   Assigned To: Az Seek   Regarding Patient: Danilo Schilling, Status: Active   CommentWorthy Hair - 14 Jun 2016 11:40 AM     TASK CREATED  Caller: Self; [phone] (Home)  Pt has isolated the cause of her rash    the new puppy likes to lick and she breaks out in a rash where he licks  She plans to finish the prednisone but would like a recommendation for a steroid cream to keep on hand  Fleta Seek - 14 Jun 2016 1:02 PM     TASK EDITED  The prednisone cleared up the rash but comes back when he licks  She can try Triamcinolone  Rx sent     Fleta Seek - 14 Jun 2016 1:04 PM     TASK EDITED        Plan  Rash    · Triamcinolone Acetonide 0 1 % External Cream; APPLY 2-3 TIMES DAILY TO  AFFECTED AREA(S) AS NEEDED    Signatures   Electronically signed by : ROGER Shields ; Jun 14 2016  1:04PM EST                       (Author)

## 2018-01-13 VITALS
BODY MASS INDEX: 22.86 KG/M2 | HEART RATE: 75 BPM | DIASTOLIC BLOOD PRESSURE: 73 MMHG | SYSTOLIC BLOOD PRESSURE: 150 MMHG | WEIGHT: 129 LBS | HEIGHT: 63 IN

## 2018-01-13 VITALS
HEIGHT: 63 IN | DIASTOLIC BLOOD PRESSURE: 60 MMHG | WEIGHT: 129 LBS | RESPIRATION RATE: 15 BRPM | BODY MASS INDEX: 22.86 KG/M2 | HEART RATE: 60 BPM | SYSTOLIC BLOOD PRESSURE: 112 MMHG

## 2018-01-13 VITALS
DIASTOLIC BLOOD PRESSURE: 70 MMHG | HEART RATE: 77 BPM | RESPIRATION RATE: 16 BRPM | WEIGHT: 128.75 LBS | SYSTOLIC BLOOD PRESSURE: 141 MMHG | BODY MASS INDEX: 22.81 KG/M2 | HEIGHT: 63 IN

## 2018-01-14 VITALS
HEIGHT: 63 IN | BODY MASS INDEX: 22.87 KG/M2 | WEIGHT: 129.06 LBS | TEMPERATURE: 97.9 F | OXYGEN SATURATION: 99 % | DIASTOLIC BLOOD PRESSURE: 80 MMHG | RESPIRATION RATE: 17 BRPM | HEART RATE: 84 BPM | SYSTOLIC BLOOD PRESSURE: 132 MMHG

## 2018-01-14 VITALS
WEIGHT: 130 LBS | BODY MASS INDEX: 23.04 KG/M2 | DIASTOLIC BLOOD PRESSURE: 78 MMHG | HEART RATE: 60 BPM | HEIGHT: 63 IN | SYSTOLIC BLOOD PRESSURE: 142 MMHG

## 2018-01-16 NOTE — MISCELLANEOUS
Message   Recorded as Task   Date: 06/01/2017 11:20 AM, Created By: Hiwot Portillo   Task Name: Med Renewal Request   Assigned To: Dylan Hernandez   Regarding Patient: Cruz Marie, Status: In Progress   Armin Macario - 01 Jun 2017 11:20 AM     TASK CREATED  Caller: Self; [phone] (Home)  pt needs Ativan 1 mg, ok to refill? 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)   2  Depression (311) (F32 9)   3  Encounter for breast reconstruction following mastectomy (V51 0) (Z42 1)   4  Encounter for screening mammogram for malignant neoplasm of breast (V76 12)   (Z12 31)   5  Fatigue (780 79) (R53 83)   6  Gait disturbance (781 2) (R26 9)   7  Headache (784 0) (R51)   8  Hip pain, unspecified laterality   9  Hypokalemia (276 8) (E87 6)   10  Insomnia (780 52) (G47 00)   11  Laceration of finger of right hand (883 0) (S61 219A)   12  Multiple sclerosis (340) (G35)   13  Nephrolithiasis (592 0) (N20 0)   14  Osteopenia (733 90) (M85 80)   15  Rash (782 1) (R21)   16  Screen for colon cancer (V76 51) (Z12 11)   17  Screening for viral disease (V73 99) (Z11 59)   18  Solitary pulmonary nodule (793 11) (R91 1)   19  Tingling (782 0) (R20 2)   20  Urgency of urination (788 63) (R39 15)   21  Urticaria (708 9) (L50 9)    Current Meds   1  Ampyra 10 MG Oral Tablet Extended Release 12 Hour; TAKE 1 TABLET BID  Requested   for: 86QLW5047; Last Rx:26Apr2017 Ordered   2  Baclofen 10 MG Oral Tablet; 1 TAB PO QAM, I TAB PO QPM AND 2 TAB PO HS;    Therapy: 13POK2172 to (Evaluate:10Nov2017)  Requested for: 97CHY7046; Last   Rx:15Nov2016 Ordered   3  Copaxone 40 MG/ML Subcutaneous Solution Prefilled Syringe; INJECT 40MG SUB-Q   THREE TIMES WEEKLY; Therapy: 04Uww1429 to (Evaluate:17Oct2017)  Requested for: 67NDR2914; Last   Rx:15Nov2016 Ordered   4  Cranberry CAPS; Therapy: (0487 23 46 71) to Recorded   5  Fish Oil CAPS; Therapy: (0487 23 46 71) to Recorded   6  Gabapentin 300 MG Oral Capsule; TAKE 1 CAPSULE AT BEDTIME; Therapy: 84ZFH8574 to [phone])  Requested for: 84WSN1667; Last   Rx:02May2017 Ordered   7  Gabapentin 600 MG Oral Tablet; Take 2 tablets three times a day; Therapy: 79YHM9962 to (Evaluate:10Nov2017)  Requested for: 68MRE4964; Last   Rx:15Nov2016 Ordered   8  HM Magnesium TABS; 500 mg qd; Therapy: (0487 23 46 71) to Recorded   9  Prolia 60 MG/ML Subcutaneous Solution; Therapy: (Recorded:02Mar2016) to Recorded   10  VESIcare 5 MG Oral Tablet; Take 1 tablet daily; Therapy: 24FAM4735 to (Evaluate:20Apr2018)  Requested for: 03Qkq1883; Last    Rx:25Apr2017 Ordered   11  Vitamin B-12 1000 MCG Oral Tablet; TAKE 1 TABLET DAILY AS DIRECTED; Therapy: 84GNG5863 to (Evaluate:03Nov2015) Recorded   12  Vitamin C 500 MG Oral Capsule; Therapy: (0487 23 46 71) to Recorded   13  Vitamin D3 CAPS; Therapy: (0487 23 46 71) to Recorded   14  Zonisamide 100 MG Oral Capsule; TAKE 4 CAPSULES AT BEDTIME; Therapy: 79KTV8438 to (Evaluate:10Nov2017)  Requested for: 47STV6771; Last    Rx:15Nov2016 Ordered    Allergies    1  Contrast Media Ready-Box MISC   2  Cymbalta   3  Morphine Sulfate SOLN   4   Tylenol Extra Strength TABS   5  ZyrTEC Allergy TABS    Plan  Insomnia    · LORazepam 1 MG Oral Tablet (Ativan); sig 1 hs    Signatures   Electronically signed by : Celina Chow, ; Jun 2 2017 10:46AM EST                       (Author)

## 2018-01-22 VITALS
TEMPERATURE: 97.8 F | BODY MASS INDEX: 22.88 KG/M2 | HEART RATE: 70 BPM | HEIGHT: 64 IN | SYSTOLIC BLOOD PRESSURE: 122 MMHG | OXYGEN SATURATION: 99 % | WEIGHT: 134 LBS | DIASTOLIC BLOOD PRESSURE: 74 MMHG | RESPIRATION RATE: 15 BRPM

## 2018-01-22 VITALS — BODY MASS INDEX: 22.81 KG/M2 | WEIGHT: 128.75 LBS | HEIGHT: 63 IN

## 2018-01-23 VITALS
SYSTOLIC BLOOD PRESSURE: 110 MMHG | HEIGHT: 64 IN | HEART RATE: 74 BPM | DIASTOLIC BLOOD PRESSURE: 64 MMHG | BODY MASS INDEX: 23.11 KG/M2 | OXYGEN SATURATION: 98 % | WEIGHT: 135.38 LBS

## 2018-01-23 VITALS
BODY MASS INDEX: 23.57 KG/M2 | HEIGHT: 63 IN | SYSTOLIC BLOOD PRESSURE: 120 MMHG | WEIGHT: 133 LBS | DIASTOLIC BLOOD PRESSURE: 70 MMHG

## 2018-01-23 NOTE — MISCELLANEOUS
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)   3  Depression (311) (F32 9)   4  Encounter for breast reconstruction following mastectomy (V51 0) (Z42 1)   5  Encounter for screening mammogram for malignant neoplasm of breast (V76 12)   (Z12 31)   6  Fatigue (780 79) (R53 83)   7  Gait disturbance (781 2) (R26 9)   8  Headache (784 0) (R51)   9  Hip pain, unspecified laterality   10  Hypokalemia (276 8) (E87 6)   11  Injury of right lower extremity, initial encounter (959 7) (S89 91XA)   12  Insomnia (780 52) (G47 00)   13  Laceration of finger of right hand (883 0) (S61 219A)   14  Left ankle pain (719 47) (M25 572)   15  Left knee pain (719 46) (M25 562)   16  Multiple sclerosis (340) (G35)   17  Muscle strain of left lower leg (844 9) (S86 912A)   18  Nephrolithiasis (592 0) (N20 0)   19  Osteopenia (733 90) (M85 80)   20  Pain of left calf (729 5) (M79 662)   21  Pain of left lower leg (729 5) (M79 662)   22  Rash (782 1) (R21)   23  Screen for colon cancer (V76 51) (Z12 11)   24  Screening for viral disease (V73 99) (Z11 59)   25  Solitary pulmonary nodule (793 11) (R91 1)   26  Tingling (782 0) (R20 2)   27  Urgency of urination (788 63) (R39 15)   28  Urticaria (708 9) (L50 9)    Current Meds   1  Ampyra 10 MG Oral Tablet Extended Release 12 Hour; TAKE 1 TABLET BID  Requested   for: 37NLO1367; Last Rx:99Dlw7686 Ordered   2  Baclofen 10 MG Oral Tablet; TAKE 1 TABLET EVERY        MORNING, 1 TABLET EVERY      EVENING, AND TAKE 2 TABLETSAT BEDTIME; Therapy: 86HJU0316 to (Evaluate:79Hqt1711)  Requested for: 00JRE1463; Last   Rx:63Jml8102 Ordered   3  Copaxone 40 MG/ML Subcutaneous Solution Prefilled Syringe (Glatiramer Acetate); INJECT 40MG SUB-Q THREE TIMES WEEKLY;    Therapy: 81Hrx8125 to (JXWGSOQF:12FCO7515)  Requested for: 42Sbw6398; Last   Rx:64Rvn9140 Ordered   4  Cranberry CAPS; Therapy: () to Recorded   5  Fish Oil CAPS; Therapy: () to Recorded   6  Gabapentin 300 MG Oral Capsule; TAKE 1 CAPSULE AT BEDTIME; Therapy: 87LSX0300 to (Evaluate:27Jan2018)  Requested for: 00Uzy2054; Last   Rx:34Peq6533 Ordered   7  Gabapentin 600 MG Oral Tablet; TAKE 2 TABLETS 3 TIMES A   DAY; Therapy: 40UUP1776 to (Evaluate:30Jun2018)  Requested for: 57PHN4387; Last   Rx:30Kgj2236 Ordered   8  HM Magnesium TABS; 500 mg qd; Therapy: () to Recorded   9  LORazepam 1 MG Oral Tablet (Ativan); sig 1 hs;   Therapy: 19PPB5208 to (Last Rx:02Jun2017) Ordered   10  Prolia 60 MG/ML Subcutaneous Solution; Therapy: (Recorded:02Mar2016) to Recorded   11  Vitamin B-12 1000 MCG Oral Tablet; TAKE 1 TABLET DAILY AS DIRECTED; Therapy: 06OLK0941 to (Evaluate:03Nov2015) Recorded   12  Vitamin C 500 MG Oral Capsule; Therapy: () to Recorded   13  Vitamin D3 CAPS; Therapy: () to Recorded   14  Zonisamide 100 MG Oral Capsule; take 4 capsules at bedtime; Therapy: 63BSK9213 to (Evaluate:30Jun2018)  Requested for: 53THJ5662; Last    Rx:56Zqo3316 Ordered    Allergies    1  Contrast Media Ready-Box MISC   2  Cymbalta   3  Morphine Sulfate SOLN   4  Tylenol Extra Strength TABS   5  ZyrTEC Allergy TABS    Plan  Urgency of urination    · VESIcare 10 MG Oral Tablet; TAKE 1 TABLET DAILY    Signatures   Electronically signed by :  Fannie Hodgson, ; Dec 13 2017  9:31AM EST                       (Author)

## 2018-01-31 DIAGNOSIS — R20.2 PARESTHESIA: Primary | ICD-10-CM

## 2018-01-31 RX ORDER — GABAPENTIN 300 MG/1
300 CAPSULE ORAL
Qty: 90 CAPSULE | Refills: 1 | Status: SHIPPED | OUTPATIENT
Start: 2018-01-31 | End: 2018-07-09 | Stop reason: SDUPTHER

## 2018-01-31 RX ORDER — GABAPENTIN 300 MG/1
1 CAPSULE ORAL
COMMUNITY
Start: 2015-05-12 | End: 2018-01-31 | Stop reason: SDUPTHER

## 2018-03-26 DIAGNOSIS — R26.9 GAIT DISTURBANCE: Primary | ICD-10-CM

## 2018-03-26 DIAGNOSIS — G35 MULTIPLE SCLEROSIS (HCC): ICD-10-CM

## 2018-03-26 RX ORDER — DALFAMPRIDINE 10 MG/1
1 TABLET, FILM COATED, EXTENDED RELEASE ORAL 2 TIMES DAILY
COMMUNITY
End: 2018-03-26 | Stop reason: SDUPTHER

## 2018-03-26 NOTE — TELEPHONE ENCOUNTER
Returned pt call  Pt requesting refill Ampyra 10 mg Er, 1 tab bid sent to [address]  Rx request entered  Pt has an appt in April 18, she wants to know if you want to order labs prior to her appt? Requesting another ENT referral  She states that she went to Dr Stormy Haji and had a bad experience       Thank you

## 2018-03-26 NOTE — TELEPHONE ENCOUNTER
----- Message from Rico Hernandez MA sent at 3/26/2018  2:54 PM EDT -----  Regarding: Saray Og a call back from an RN  Contact: [address] [facility],    Please call Jonasagus Watts  She said she was "on hold for 15 minutes" and she would like a nurse to call her back "on the Home number ASAP"  No more info      Thanks,  Darell Calvin - scheduling

## 2018-03-27 RX ORDER — DALFAMPRIDINE 10 MG/1
TABLET, FILM COATED, EXTENDED RELEASE ORAL
Qty: 180 TABLET | Refills: 3 | Status: SHIPPED | OUTPATIENT
Start: 2018-03-27 | End: 2019-03-08 | Stop reason: SDUPTHER

## 2018-03-27 NOTE — TELEPHONE ENCOUNTER
Called and advised pt of all of the below  She verbalized clear understanding of all instructions  Lab scripts mailed as requested

## 2018-03-27 NOTE — TELEPHONE ENCOUNTER
She could try [facility]  Usually [facility] is who we typically refer to, or [facility]    She could also ask her PCP who probably does a lot more referrals to ENT than we do here

## 2018-03-27 NOTE — TELEPHONE ENCOUNTER
Refilled Ampyra  Entered labs (CBC and CMP)    I do not see where we ever referred her to ENT? Looked back through the last few years of notes    She should ask her PCP for a referral

## 2018-03-27 NOTE — TELEPHONE ENCOUNTER
Spoke to pt who states you gave her a "verbal referral" to that MD  States her insurance does not require a referral  She is looking for other ENT's that you recommend  Pt will get labs done prior to appt

## 2018-04-03 ENCOUNTER — OFFICE VISIT (OUTPATIENT)
Dept: SURGICAL ONCOLOGY | Facility: CLINIC | Age: 68
End: 2018-04-03
Payer: COMMERCIAL

## 2018-04-03 ENCOUNTER — TELEPHONE (OUTPATIENT)
Dept: NEUROLOGY | Facility: CLINIC | Age: 68
End: 2018-04-03

## 2018-04-03 VITALS
HEART RATE: 61 BPM | SYSTOLIC BLOOD PRESSURE: 120 MMHG | TEMPERATURE: 99.3 F | RESPIRATION RATE: 16 BRPM | BODY MASS INDEX: 24.1 KG/M2 | HEIGHT: 63 IN | DIASTOLIC BLOOD PRESSURE: 80 MMHG | WEIGHT: 136 LBS

## 2018-04-03 DIAGNOSIS — N64.4 BREAST PAIN, RIGHT: ICD-10-CM

## 2018-04-03 DIAGNOSIS — D05.12 INTRADUCTAL CARCINOMA IN SITU OF LEFT BREAST: Primary | ICD-10-CM

## 2018-04-03 PROCEDURE — 99213 OFFICE O/P EST LOW 20 MIN: CPT | Performed by: SURGERY

## 2018-04-03 RX ORDER — BACLOFEN 10 MG/1
TABLET ORAL
COMMUNITY
Start: 2018-01-26 | End: 2018-07-15 | Stop reason: SDUPTHER

## 2018-04-03 RX ORDER — BIOTIN 1 MG
5000 TABLET ORAL
COMMUNITY

## 2018-04-03 RX ORDER — ZONISAMIDE 100 MG/1
CAPSULE ORAL
COMMUNITY
Start: 2011-01-24 | End: 2018-06-20 | Stop reason: SDUPTHER

## 2018-04-03 RX ORDER — GLATIRAMER 40 MG/ML
INJECTION, SOLUTION SUBCUTANEOUS
COMMUNITY
Start: 2015-02-21 | End: 2018-10-05 | Stop reason: SDUPTHER

## 2018-04-03 RX ORDER — SOLIFENACIN SUCCINATE 10 MG/1
TABLET, FILM COATED ORAL
COMMUNITY
Start: 2018-02-20 | End: 2018-11-30 | Stop reason: SDUPTHER

## 2018-04-03 NOTE — TELEPHONE ENCOUNTER
Pt states [facility] home delivery pharmacy states they have no refills on file    [employer] mail order @[phone]  Spoke w/basilio    Pt was using incorrect rx number for gabapentin 300mg  600mg tablets, in dispensing mode currently    Pt has been renewing baclofen [id number], last nathaniel was jan 30th, pt has no refills left for this rx but should have more than enough meds left unless taking inappropriately    All prescription issues have been clarified w/patient

## 2018-04-03 NOTE — PROGRESS NOTES
Surgical Oncology Follow Up       [facility]  [facility] [place]  [address] [address]    Mikayla Quiñonez  1950  [id number]  [address]  [facility] [place]  [address] [place] [id number]    Chief Complaint   Patient presents with    Breast Pain     Pt is here for pain in right breast       Assessment/Plan   Diagnoses and all orders for this visit:    Intraductal carcinoma in situ of left breast    Breast pain, right  -     Breast Ultrasound  -     Evening Primrose Oil 1000 MG CAPS; Take 1 capsule (1,000 mg total) by mouth 3 (three) times a day          Advance Care Planning/Advance Directives:  Did not discuss  with the patient  Oncology History:       Intraductal carcinoma in situ of left breast     Initial Diagnosis     Intraductal carcinoma in situ of left breast       8/16/2012 Surgery     Left breast mastectomy, SLNB  Left breast reconstruction with          4/7/2014 Surgery     Left breast implant replaced (X 3) right mastopexy  History of Present Illness: h/o left dcis    -Interval History:right breast pain    Review of Systems:  Review of Systems   Constitutional: Negative  Negative for appetite change and fever  Eyes: Negative  Respiratory: Negative for shortness of breath  Cardiovascular: Negative  Gastrointestinal: Negative  Endocrine: Negative  Genitourinary: Negative  Musculoskeletal: Positive for myalgias  Negative for arthralgias  Skin: Negative  Allergic/Immunologic: Negative  Neurological: Negative  Hematological: Negative  Negative for adenopathy  Does not bruise/bleed easily  Psychiatric/Behavioral: Negative          Patient Active Problem List   Diagnosis    Intraductal carcinoma in situ of left breast    Breast pain, right     Past Medical History:   Diagnosis Date    Bone pain     Breast ptosis     Depression     Fatigue     Gait disturbance     Headache     Hip fracture (HCC)     Hip pain     Hypokalemia     Insomnia     Laceration of finger     right hand    Left ankle pain     Left knee pain     Multiple sclerosis (HCC)     Muscle strain     left lower leg    Nephrolithiasis     Osteopenia     Pain of left calf     Rash     Solitary pulmonary nodule     SVT (supraventricular tachycardia) (HCC)     Tingling     Urgency of urination     Urticaria      Past Surgical History:   Procedure Laterality Date    ANKLE SURGERY      APPENDECTOMY  11/2012    BREAST RECONSTRUCTION      implant placement, implant revision, right mastopexy    CYSTOSTOMY      with basket extraction of calculus    EXPLORATORY LAPAROTOMY      FOOT SURGERY      HIP SURGERY      LEG SURGERY      MASTECTOMY Left 08/16/2012    SENTINEL LYMPH NODE BIOPSY Left 08/16/2012    SKIN BIOPSY      TONSILLECTOMY      TUBAL LIGATION      WRIST SURGERY       Family History   Problem Relation Age of Onset    Hodgkin's lymphoma Maternal Grandfather      age at dx unk    Cancer Maternal Aunt 79     bladder    Colon cancer Maternal Uncle 72    Hodgkin's lymphoma Maternal Uncle 79     Social History     Social History    Marital status: /Civil Union     Spouse name: N/A    Number of children: N/A    Years of education: N/A     Occupational History    Not on file       Social History Main Topics    Smoking status: Former Smoker    Smokeless tobacco: Never Used    Alcohol use No    Drug use: Unknown    Sexual activity: Not on file     Other Topics Concern    Not on file     Social History Narrative    No narrative on file       Current Outpatient Prescriptions:     AMPYRA 10 MG TB12, Ampyra 10 mg Er, Take 1 tab bid, Disp: 180 tablet, Rfl: 3    baclofen 10 mg tablet, , Disp: , Rfl:     Cholecalciferol (VITAMIN D3) 1000 units CAPS, Take by mouth, Disp: , Rfl:     Cranberry 1000 MG CAPS, Take by mouth, Disp: , Rfl:     denosumab (PROLIA) 60 mg/mL, Inject under the skin, Disp: , Rfl:     gabapentin (NEURONTIN) 300 mg capsule, Take 1 capsule (300 mg total) by mouth daily at bedtime, Disp: 90 capsule, Rfl: 1    Glatiramer Acetate (COPAXONE) 40 MG/ML SOSY, Inject under the skin, Disp: , Rfl:     Omega-3 Fatty Acids (FISH OIL) 645 MG CAPS, Take by mouth, Disp: , Rfl:     VESICARE 10 MG tablet, , Disp: , Rfl:     zonisamide (ZONEGRAN) 100 mg capsule, Take by mouth, Disp: , Rfl:   Allergies   Allergen Reactions    Acetaminophen     Cetirizine     Contrast Dye [Iodinated Diagnostic Agents]     Duloxetine Hcl     Morphine        The following portions of the patient's history were reviewed and updated as appropriate: allergies, current medications, past family history, past medical history, past social history, past surgical history and problem list         Vitals:    04/03/18 1349   BP: 120/80   Pulse: 61   Resp: 16   Temp: 99 3 °F (37 4 °C)       Physical Exam   Constitutional: She is oriented to person, place, and time  She appears well-developed and well-nourished  Pulmonary/Chest: Right breast exhibits tenderness  Right breast exhibits no inverted nipple, no mass, no nipple discharge and no skin change  Lymphadenopathy:        Right axillary: No pectoral and no lateral adenopathy present  Right: No supraclavicular adenopathy present  Neurological: She is alert and oriented to person, place, and time  Psychiatric: She has a normal mood and affect  Results:      Imaging       Breast Ultrasound     Date/Time 4/3/2018 2:13 PM     Performed by  Rosaura Willis by Maykel Boyce      Procedure Details   Procedure Notes: Right breast ultrasound was performed today in the office with attention to the upper outer aspect in the area of her focal pain  There is a band of dense glandular tissue in this location  There is an intact implant with several folds  There was one duct seen periareolar    There are no solid masses or areas of distortion  Discussion/Summary:  26-year-old female with a history of ductal carcinoma in situ of the left breast status post mastectomy and reconstruction  She also has an implant on the right side  She has had right-sided breast pain for approximately two weeks  She cannot report any inciting factors  She has been using Naprosyn for her hips but has not taken note if this has relieved the breast pain  There are no concerns on examination today or office ultrasound  I did make supportive recommendations to her  I also talked about the role of evening primrose oil for her discomfort; however, I advised her to get clearance from her neurologist before she would take this  She is otherwise scheduled for a mammogram in November and a follow-up exam with me at that time  I will see her as scheduled or sooner should the need arise

## 2018-04-03 NOTE — TELEPHONE ENCOUNTER
Received message from Arpit Souza at the  stating pt is asking for refills on gabapentin 300mg, 600mg and Baclofen 10mg  I see that pt was refilled gabapentin 300mg 1/31/18 90 days with 1 refill and both gabapentin 600mg and Baclofen 10mg refilled in July 2017 with 1 year of refills  She should have refills on file       Called and left a message for pt to call the office back on home and cell numbers to clarify

## 2018-04-03 NOTE — LETTER
April 3, 2018     [address]    Patient: Jhony Higgins   YOB: 1950   Date of Visit: 4/3/2018       Dear Dr Evangelist Martinez:    Thank you for referring Cary Madison to me for evaluation  Below are my notes for this consultation  If you have questions, please do not hesitate to call me  I look forward to following your patient along with you  Sincerely,        Jasmyne Churchill MD        CC: No Recipients  Jasmyne Churchill MD  4/3/2018  2:25 PM  Sign at close encounter     Surgical Oncology Follow Up       [address]  1950  [id number]  [facility]  [facility] [address] [address]    Chief Complaint   Patient presents with    Breast Pain     Pt is here for pain in right breast       Assessment/Plan   Diagnoses and all orders for this visit:    Intraductal carcinoma in situ of left breast    Breast pain, right  -     Breast Ultrasound  -     Evening Primrose Oil 1000 MG CAPS; Take 1 capsule (1,000 mg total) by mouth 3 (three) times a day          Advance Care Planning/Advance Directives:  Did not discuss  with the patient  Oncology History:       Intraductal carcinoma in situ of left breast     Initial Diagnosis     Intraductal carcinoma in situ of left breast       8/16/2012 Surgery     Left breast mastectomy, SLNB  Left breast reconstruction with          4/7/2014 Surgery     Left breast implant replaced (X 3) right mastopexy  History of Present Illness: h/o left dcis    -Interval History:right breast pain    Review of Systems:  Review of Systems   Constitutional: Negative  Negative for appetite change and fever  Eyes: Negative  Respiratory: Negative for shortness of breath  Cardiovascular: Negative  Gastrointestinal: Negative  Endocrine: Negative  Genitourinary: Negative  Musculoskeletal: Positive for myalgias  Negative for arthralgias  Skin: Negative  Allergic/Immunologic: Negative  Neurological: Negative  Hematological: Negative  Negative for adenopathy  Does not bruise/bleed easily  Psychiatric/Behavioral: Negative  Patient Active Problem List   Diagnosis    Intraductal carcinoma in situ of left breast    Breast pain, right     Past Medical History:   Diagnosis Date    Bone pain     Breast ptosis     Depression     Fatigue     Gait disturbance     Headache     Hip fracture (HCC)     Hip pain     Hypokalemia     Insomnia     Laceration of finger     right hand    Left ankle pain     Left knee pain     Multiple sclerosis (HCC)     Muscle strain     left lower leg    Nephrolithiasis     Osteopenia     Pain of left calf     Rash     Solitary pulmonary nodule     SVT (supraventricular tachycardia) (HCC)     Tingling     Urgency of urination     Urticaria      Past Surgical History:   Procedure Laterality Date    ANKLE SURGERY      APPENDECTOMY  11/2012    BREAST RECONSTRUCTION      implant placement, implant revision, right mastopexy    CYSTOSTOMY      with basket extraction of calculus    EXPLORATORY LAPAROTOMY      FOOT SURGERY      HIP SURGERY      LEG SURGERY      MASTECTOMY Left 08/16/2012    SENTINEL LYMPH NODE BIOPSY Left 08/16/2012    SKIN BIOPSY      TONSILLECTOMY      TUBAL LIGATION      WRIST SURGERY       Family History   Problem Relation Age of Onset    Hodgkin's lymphoma Maternal Grandfather      age at dx unk    Cancer Maternal Aunt 79     bladder    Colon cancer Maternal Uncle 72    Hodgkin's lymphoma Maternal Uncle 79     Social History     Social History    Marital status: /Civil Union     Spouse name: N/A    Number of children: N/A    Years of education: N/A     Occupational History    Not on file       Social History Main Topics    Smoking status: Former Smoker  Smokeless tobacco: Never Used    Alcohol use No    Drug use: Unknown    Sexual activity: Not on file     Other Topics Concern    Not on file     Social History Narrative    No narrative on file       Current Outpatient Prescriptions:     AMPYRA 10 MG TB12, Ampyra 10 mg Er, Take 1 tab bid, Disp: 180 tablet, Rfl: 3    baclofen 10 mg tablet, , Disp: , Rfl:     Cholecalciferol (VITAMIN D3) 1000 units CAPS, Take by mouth, Disp: , Rfl:     Cranberry 1000 MG CAPS, Take by mouth, Disp: , Rfl:     denosumab (PROLIA) 60 mg/mL, Inject under the skin, Disp: , Rfl:     gabapentin (NEURONTIN) 300 mg capsule, Take 1 capsule (300 mg total) by mouth daily at bedtime, Disp: 90 capsule, Rfl: 1    Glatiramer Acetate (COPAXONE) 40 MG/ML SOSY, Inject under the skin, Disp: , Rfl:     Omega-3 Fatty Acids (FISH OIL) 645 MG CAPS, Take by mouth, Disp: , Rfl:     VESICARE 10 MG tablet, , Disp: , Rfl:     zonisamide (ZONEGRAN) 100 mg capsule, Take by mouth, Disp: , Rfl:   Allergies   Allergen Reactions    Acetaminophen     Cetirizine     Contrast Dye [Iodinated Diagnostic Agents]     Duloxetine Hcl     Morphine        The following portions of the patient's history were reviewed and updated as appropriate: allergies, current medications, past family history, past medical history, past social history, past surgical history and problem list         Vitals:    04/03/18 1349   BP: 120/80   Pulse: 61   Resp: 16   Temp: 99 3 °F (37 4 °C)       Physical Exam   Constitutional: She is oriented to person, place, and time  She appears well-developed and well-nourished  Pulmonary/Chest: Right breast exhibits tenderness  Right breast exhibits no inverted nipple, no mass, no nipple discharge and no skin change  Lymphadenopathy:        Right axillary: No pectoral and no lateral adenopathy present  Right: No supraclavicular adenopathy present  Neurological: She is alert and oriented to person, place, and time  Psychiatric: She has a normal mood and affect  Results:      Imaging       Breast Ultrasound     Date/Time 4/3/2018 2:13 PM     Performed by  Barbara Marcos by Melissa Fatima      Procedure Details   Procedure Notes: Right breast ultrasound was performed today in the office with attention to the upper outer aspect in the area of her focal pain  There is a band of dense glandular tissue in this location  There is an intact implant with several folds  There was one duct seen periareolar  There are no solid masses or areas of distortion  Discussion/Summary:  65-year-old female with a history of ductal carcinoma in situ of the left breast status post mastectomy and reconstruction  She also has an implant on the right side  She has had right-sided breast pain for approximately two weeks  She cannot report any inciting factors  She has been using Naprosyn for her hips but has not taken note if this has relieved the breast pain  There are no concerns on examination today or office ultrasound  I did make supportive recommendations to her  I also talked about the role of evening primrose oil for her discomfort; however, I advised her to get clearance from her neurologist before she would take this  She is otherwise scheduled for a mammogram in November and a follow-up exam with me at that time  I will see her as scheduled or sooner should the need arise

## 2018-04-18 ENCOUNTER — OFFICE VISIT (OUTPATIENT)
Dept: NEUROLOGY | Facility: CLINIC | Age: 68
End: 2018-04-18
Payer: COMMERCIAL

## 2018-04-18 VITALS
DIASTOLIC BLOOD PRESSURE: 70 MMHG | SYSTOLIC BLOOD PRESSURE: 122 MMHG | HEART RATE: 67 BPM | HEIGHT: 63 IN | BODY MASS INDEX: 24.1 KG/M2 | RESPIRATION RATE: 14 BRPM | WEIGHT: 136 LBS

## 2018-04-18 DIAGNOSIS — G35 MULTIPLE SCLEROSIS (HCC): Primary | ICD-10-CM

## 2018-04-18 DIAGNOSIS — G62.9 PERIPHERAL POLYNEUROPATHY: ICD-10-CM

## 2018-04-18 PROCEDURE — 99214 OFFICE O/P EST MOD 30 MIN: CPT | Performed by: PHYSICIAN ASSISTANT

## 2018-04-18 RX ORDER — THIAMINE HCL 100 MG
500 TABLET ORAL ONCE
COMMUNITY

## 2018-04-18 RX ORDER — LIDOCAINE 50 MG/G
1 PATCH TOPICAL DAILY
Qty: 30 PATCH | Refills: 3 | Status: SHIPPED | OUTPATIENT
Start: 2018-04-18 | End: 2018-07-20 | Stop reason: ALTCHOICE

## 2018-04-18 RX ORDER — LANOLIN ALCOHOL/MO/W.PET/CERES
CREAM (GRAM) TOPICAL DAILY
COMMUNITY
End: 2021-04-27

## 2018-04-18 RX ORDER — FLUTICASONE PROPIONATE 50 MCG
1 SPRAY, SUSPENSION (ML) NASAL DAILY
COMMUNITY
End: 2018-07-20 | Stop reason: SINTOL

## 2018-04-18 RX ORDER — ASCORBIC ACID 500 MG
500 TABLET ORAL DAILY
COMMUNITY

## 2018-04-18 NOTE — PATIENT INSTRUCTIONS
Will try lidoderm patches for your hip  Continue copaxone and ampyra  Will have our  call you about any other berhane options  I entered a physical therapy referral for you as well  Follow up in August with Dr Kerman Fothergill as already scheduled with labs before that visit  Call with any questions or concerns, or any new symptoms

## 2018-04-18 NOTE — ASSESSMENT & PLAN NOTE
Remains on gabapentin 1200mg TID with an extra 300mg at bedtime  I will prescribe her a lidoderm patch to use on her right hip at night    I am also referring her to physical therapy to work on balance and strengthening

## 2018-04-18 NOTE — PROGRESS NOTES
Patient ID: Zeb Lowe is a 70 y o  female  Assessment/Plan:    Multiple sclerosis (HCC)  Currently on Copaxone TIW and tolerating well  Last labs are from Dec 2017, normal CBC, CMP, vit D  She had updated MRI t-spine in Jan 2018 due to increased neuropathic pain and 2 falls  MRI was stable, no new lesions  She continues on Ampyra for her walking, which she feels has been very helpful  She is now paying for the drug herself due to it went generic and unable to get patient assistance  Will have Angelo Berry reach out to her to see if there are any other berhane options  Exam is stable today  She will follow up in 4 months or sooner if needed with updated labs before that visit  Peripheral polyneuropathy  Remains on gabapentin 1200mg TID with an extra 300mg at bedtime  I will prescribe her a lidoderm patch to use on her right hip at night  I am also referring her to physical therapy to work on balance and strengthening        Diagnoses and all orders for this visit:    Multiple sclerosis [facility])    Peripheral polyneuropathy    Other orders           Subjective:    HPI    69 y/o female presents for MS follow up, last seen in December 2017  Patient with history of MS dating back to 2008, but likely with symptoms even in 1998  Patient also with PMH of breast cancer s/p left mastectomy in August 2012  Pt has been on Copaxone since 2008 and has now changed to the 40mg TIW dosing  She reports improvement in her injection sites with the dosing change; reports improvement in thigh atrophy  Patient has never had IV steroid therapy for her MS in the past  She has been on Neurontin for many years  Patient notes significant benefit for her walking and endurance with the use of Ampyra  On 9/29/14 she underwent open reduction internal fixation of the left hip and on 10/2/14 she underwent an open reduction internal fixation of a left radial fracture  Patient had updated imaging in April 2016   MRIs of brain, c-spine and t-spine were all stable, no new or enhancing lesions  At visit in Nov 2016, patient admitted to trouble sleeping, more depression  She did not feel the Zoloft has been doing anything for her  She had been on the med for close to 15 years and it has been increased over the years without much relief  Patient reports she does not want to see a counselor because she knows the reason she is depressed  Zoloft was stopped and she was started on Cymbalta  Unfortunately, she had side effects to Cymbalta and stopped the med after calling our office  We suggested she could either go back on Zoloft, or try a different med, but she declined  I suggest referral to psych, but also declined that referral  She actually admits to improved mood and energy levels since stopping the Zoloft  Patient had a significant fall in July 2017 while at a conference, injuring her left leg  She followed with orthopedics for this and completed PT  Today, patient reports she is overall the same as last visit  She remains on Copaxone 40mg TIW  She had updated MRI t-spine 1/8/18 due to increased neuropathic pain in the legs that was mentioned at her last visit  MRI was stable compared to 4/2016  Image quality was degraded by patient motion artifact  She notes she is not sleeping well  She thinks it is due to her mattress as well as residual hip pain from 2 right hip surgeries  She says there is a very tender spot on her hip near the incision  She is still taking Ampyra, but because it went generic she is no longer receiving the berhane money and paying for the drug herself  She did not want to change to generic, and even if she did, there was not much of a price difference  She denies any bladder changes, follows with Urology  Denies any bowel changes  Denies any change in vision  Denies any trouble with speech or swallowing       The following portions of the patient's history were reviewed and updated as appropriate: current medications, past family history, past medical history, past social history, past surgical history and problem list          Objective:    Blood pressure 122/70, pulse 67, resp  rate 14, height 5' 3" (1 6 m), weight 61 7 kg (136 lb)  Physical Exam   Constitutional: She appears well-developed and well-nourished  HENT:   Head: Normocephalic and atraumatic  Eyes: EOM are normal  Pupils are equal, round, and reactive to light  Cardiovascular: Intact distal pulses  Neurological: Coordination normal    Reflex Scores:       Patellar reflexes are 3+ on the right side and 3+ on the left side  Skin: Skin is warm and dry  Psychiatric: She has a normal mood and affect  Her speech is normal        Neurological Exam    Mental Status  The patient is alert and oriented to person, place, time, and situation  Her recent and remote memory are normal  Her speech is normal  Her language is fluent with no aphasia  She has normal attention span and concentration  She has a normal fund of knowledge  Cranial Nerves    CN II: The patient's visual acuity and visual fields are normal   CN III, IV, VI: The patient's pupils are equally round and reactive to light and ocular movements are normal   CN V: The patient has normal facial sensation  CN VII:  The patient has symmetric facial movement  CN VIII:  The patient's hearing is normal   CN IX, X: The patient has symmetric palate movement and normal gag reflex  CN XI: The patient's shoulder shrug strength is normal   CN XII: The patient's tongue is midline without atrophy or fasciculations  Motor  The patient has normal muscle bulk throughout  Her overall muscle tone is normal throughout                                                Right                   Left   Shoulder abduction                   5                         5-  Elbow flexion                            5                         5  Elbow extension                       5                         5  Hip flexion 5                         5-  Dorsiflexion                               5                         4  Slowed PRANAY on the left     Sensory    Decreased vibratory sensation in lower extremities bilaterally       Reflexes  Deep tendon reflexes are 2+ and symmetric except as noted  Right                     Left  Patellar                                 3+                         3+    Gait and Coordination   She has normal coordination bilaterally  Wide based, scissoring gait, occasionally dragging the left leg         ROS:    Review of Systems   Constitutional: Negative  HENT: Positive for sinus pain  Eyes: Negative  Respiratory: Negative  Cardiovascular: Negative  Gastrointestinal: Negative  Endocrine: Negative  Genitourinary: Negative  Musculoskeletal: Positive for gait problem  Joint pain  Muscle pain  Pain while walking   Skin:        Skin lesions   Allergic/Immunologic: Negative  Neurological: Positive for numbness  Waking up at night  Difficulty waling  Cramping  Tingling  Waking up at night  Trouble falling asleep   Psychiatric/Behavioral: The patient is nervous/anxious

## 2018-04-18 NOTE — ASSESSMENT & PLAN NOTE
Currently on Copaxone TIW and tolerating well  Last labs are from Dec 2017, normal CBC, CMP, vit D  She had updated MRI t-spine in Jan 2018 due to increased neuropathic pain and 2 falls  MRI was stable, no new lesions  She continues on Ampyra for her walking, which she feels has been very helpful  She is now paying for the drug herself due to it went generic and unable to get patient assistance  Will have Angelo Berry reach out to her to see if there are any other berhane options  Exam is stable today  She will follow up in 4 months or sooner if needed with updated labs before that visit

## 2018-04-24 ENCOUNTER — EVALUATION (OUTPATIENT)
Dept: PHYSICAL THERAPY | Facility: CLINIC | Age: 68
End: 2018-04-24
Payer: COMMERCIAL

## 2018-04-24 DIAGNOSIS — G35 MULTIPLE SCLEROSIS (HCC): ICD-10-CM

## 2018-04-24 PROCEDURE — 97110 THERAPEUTIC EXERCISES: CPT | Performed by: PHYSICAL THERAPIST

## 2018-04-24 PROCEDURE — 97162 PT EVAL MOD COMPLEX 30 MIN: CPT | Performed by: PHYSICAL THERAPIST

## 2018-04-24 PROCEDURE — G8978 MOBILITY CURRENT STATUS: HCPCS | Performed by: PHYSICAL THERAPIST

## 2018-04-24 PROCEDURE — G8979 MOBILITY GOAL STATUS: HCPCS | Performed by: PHYSICAL THERAPIST

## 2018-04-24 NOTE — PROGRESS NOTES
PT Evaluation     Today's date: 2018  Patient name: Lencho Espinoza  : 1950  MRN: [id number]  Referring provider: Mari Pena PA-C  Dx:   Encounter Diagnosis     ICD-10-CM    1  Multiple sclerosis [facility]) Marylen Confer Ambulatory referral to Physical Therapy                  Assessment    Assessment details: Lencho Espinoza is a pleasant 70 y o  presenting to physical therapy with MD referral for Multiple sclerosis ([facility] )  Problem list:  Decreased hip/core strength, poor balance, high fall risk with five time sit to stand testing, SLS, and TUG times  Treatment to include: Lower extremity/core strengthening, neuromuscular control exercises, balance/proprioception training,  instruction in a comprehensive HEP, and modalities as needed  This pt would benefit from skilled PT services to address their impairments and functional limitations to maximize functional outcome  Barriers to therapy: chronicity of symptoms, progressive disease, hx of hip replacement on R hip, matthew placement in L hip  Understanding of Dx/Px/POC: good   Prognosis: fair    Goals  ST  Pt will improve TUG time to average 18 seconds in 3 weeks  2  Pt will be able to perform 5 sit to stand repetitions without use of upper extremity assistance in 3 weeks  LT  Pt will be able to negotiate a flight of stairs with no more than mild fatigue in 6weeks  2  Pt will be independent in a comprehensive HEP in 6 weeks  Plan  Patient would benefit from: skilled PT  Frequency: 2x week  Duration in weeks: 6  Treatment plan discussed with: patient        Subjective Evaluation    History of Present Illness  Mechanism of injury: Patient reports she was diagnosed with relapse-remitting MS in   Pt states she returns to baseline between each episode  Patient reports since January, her legs " feel like bricks " Pt denies any changes in her medication  Pt reports recently her right side feels more week   Pt states in the past year she has experienced at least 3 falls with one resulting with a non-displaced patellar fracture  Pt reports her most prominent MS symptoms are tingling in her legs and fatigue  Pt states tingling has now progressed from knees and below bilaterally  Pt reports she has been experiencing increased "nerve shocks" at night and cramping  Pt state she is currently at the highest dose of Neurontin that is approved through her insurance      Premorbid status: (2 yrs ago)  - ADLs: Independent with no difficulty  - Work: Not a working individual- since age 53  - Recreation:none    Current status:  - ADLs/Functional activities:   - Stairs Step to pattern with severe fatigue and use of BUE support   - Sit to stand with moderate difficulty with use of arms    - Walking < 5 minutes with SC with moderate leg fatigue and shortness of breath   - Sleeping with 1 nightly sleep disturbances causing her to be awake for hours due to pain   - Transferring in/out of car with mild difficulty (SUV), moderate difficulty (car)   - Severe difficulty negotiating up bleachers to watch grandchildren's sporting events  - Work: Not a working individual  - Recreation: none  Pain  Current pain ratin  At best pain ratin  At worst pain rating: 10  Location: R leg only  Quality: "nerve shock that causes leg to jump"  Progression: worsening    Patient Goals  Patient goals for therapy: increased strength, independence with ADLs/IADLs and improved balance          Objective     Neurological Testing     Sensation     Lumbar   Left   Diminished: light touch    Right   Intact: light touch    Comments   Left light touch: Decreased sensation distal to knee on L    Reflexes   Left   Patellar (L4): trace (1+)  Achilles (S1): trace (1+)    Right   Patellar (L4): trace (1+)  Achilles (S1): trace (1+)    Additional Neurological Details  No spacticity presenting- pt state she just took her medication prior to appointment    Strength/Myotome Testing     Left Hip Planes of Motion   Flexion: 3+  External rotation: 3+    Right Hip   Planes of Motion   Flexion: 3+  External rotation: 3    Left Knee   Flexion: 4-  Extension: 4-    Right Knee   Flexion: 3+  Extension: 3+    Left Ankle/Foot   Dorsiflexion: 4-  Plantar flexion: 4+  Great toe extension: 4    Right Ankle/Foot   Dorsiflexion: 4+  Plantar flexion: 4+  Great toe extension: 5    Additional Strength Details  Gait: Pt ambulates over level surface with a scissoring pattern  Mild flexed trunk posture, with decreased arm swing on LUE  Balance: (goal: SLS 5 seconds to indicate no increase in fall risk)  SLS L: 1 second  SLS R: 2 seconds  Feet together eyes open: 30 seconds mild sway  Feet together eyes closed: 30 seconds, moderate sway    TUG: no AD (goal: < 14 seconds to indicate no increased fall risk)  23 "  25"  22"    Five time sit to stand: (goal < 13 6 seconds to indicate no increase in disability/morbidity)  - 2 repetitions in 44 seconds (unable to perform more than two reps with multiple attempts)              Precautions: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, decreased sensation in distal LLE, high fall risk, osteoporosis    Daily Treatment Diary     Manual  4-24 (IE)                                          Exercise Diary  4-24 (IE)            Recumbent bike 5 mins NV                         Standing:             - heel/toe raises 20x ea NV            - hip abduction             - hip extension             - HS curls             - mini squats             - lateral step up and overs             - front step ups             - SLS             - lateral cone walks             - rockerboard A/P, M/L                          TB              - rows             - pulldowns             - no money                                           Modalities  4-24 (IE)                             * Pt arrived late to initial evaluation, but was accommodated   Educated pt on exercise rationale and to utilize a SC due to being a high fall risk and with her history of multiple falls

## 2018-04-25 ENCOUNTER — OFFICE VISIT (OUTPATIENT)
Dept: PHYSICAL THERAPY | Facility: CLINIC | Age: 68
End: 2018-04-25
Payer: COMMERCIAL

## 2018-04-25 DIAGNOSIS — G35 MULTIPLE SCLEROSIS (HCC): Primary | ICD-10-CM

## 2018-04-25 PROCEDURE — 97112 NEUROMUSCULAR REEDUCATION: CPT | Performed by: PHYSICAL THERAPIST

## 2018-04-25 PROCEDURE — 97110 THERAPEUTIC EXERCISES: CPT | Performed by: PHYSICAL THERAPIST

## 2018-04-25 NOTE — PROGRESS NOTES
Daily Note     Today's date: 2018  Patient name: Tracy Berkowitz  : 1950  MRN: [id number]  Referring provider: Little San PA-C  Dx:   Encounter Diagnosis     ICD-10-CM    1  Multiple sclerosis ([facility] ) G35                   Subjective: Patient reports she felt fatigued following initial evaluation  Objective: See treatment diary below      Assessment: Initiated exercises this date to address impairments noted during initial evaluation  Pt tolerated treatment well  Patient demonstrated fatigue post treatment  Pt required moderate verbal cues to avoid knees moving anterior to toes during squats this date  Plan: Progress treatment as tolerated        Precautions: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, decreased sensation in distal LLE, high fall risk, osteoporosis     Daily Treatment Diary      Manual   (IE)                                                                           Exercise Diary   (IE)                     Recumbent bike 5 mins NV  5 mins                                           Standing:                       - heel/toe raises 20x ea NV  20 x ea                   - hip abduction (L leg on green foam)    2 x 10 ea                   - hip extension (L leg on green foam)    2 x 10 ea                   - HS curls                       - mini squats    2 x 10                   - lateral step up and overs                       - front step ups                       - SLS    3 x 15" ea                   - lateral cone walks    2 laps                   - rockerboard A/P, M/L                                               TB                        - rows    2 x 10 red                   - pulldowns    2 x 10 red                   - no money    2 x 10 red                                                                         Modalities   (IE)

## 2018-04-30 ENCOUNTER — OFFICE VISIT (OUTPATIENT)
Dept: PHYSICAL THERAPY | Facility: CLINIC | Age: 68
End: 2018-04-30
Payer: COMMERCIAL

## 2018-04-30 DIAGNOSIS — G35 MULTIPLE SCLEROSIS (HCC): Primary | ICD-10-CM

## 2018-04-30 PROCEDURE — 97110 THERAPEUTIC EXERCISES: CPT

## 2018-04-30 PROCEDURE — 97112 NEUROMUSCULAR REEDUCATION: CPT

## 2018-04-30 NOTE — PROGRESS NOTES
Daily Note     Today's date: 2018  Patient name: Lencho Espinoza  : 1950  MRN: [id number]  Referring provider: Mari Pena PA-C  Dx:   Encounter Diagnosis     ICD-10-CM    1  Multiple sclerosis ([facility] [address] ) G35                   Subjective: Pt states she has been suffering from leg cramps lately  Objective: See treatment diary below  Precautions: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, decreased sensation in distal LLE, high fall risk, osteoporosis     Daily Treatment Diary      Manual   (IE)                                                                           Exercise Diary   (IE)                   Recumbent bike 5 mins NV  5 mins  5'                                         Standing:                       - heel/toe raises 20x ea NV  20 x ea  20x ea                 - hip abduction (L leg on green foam)    2 x 10 ea  2x10 ea                 - hip extension (L leg on green foam)    2 x 10 ea  2x10 ea                 - HS curls                       - mini squats    2 x 10  np                 - lateral step up and overs                       - front step ups                       - SLS    3 x 15" ea np                 - lateral cone walks    2 laps  2 laps 6 cones                 - rockerboard A/P, M/L     20 ea                                         TB                        - rows    2 x 10 red  red 2x10 green nv               - pulldowns    2 x 10 red  red 2x10 green nv               - no money    2 x 10 red  red 2x10                                                                       Modalities   (IE)                                                   Assessment: Tolerated treatment well  Patient would benefit from continued PT  Pt needs close supervision lateral cones walks  Increase resistance w/ rows and lat pull downs nv  Plan: Progress treatment as tolerated

## 2018-05-02 ENCOUNTER — OFFICE VISIT (OUTPATIENT)
Dept: PHYSICAL THERAPY | Facility: CLINIC | Age: 68
End: 2018-05-02
Payer: COMMERCIAL

## 2018-05-02 DIAGNOSIS — G35 MULTIPLE SCLEROSIS (HCC): Primary | ICD-10-CM

## 2018-05-02 PROCEDURE — 97112 NEUROMUSCULAR REEDUCATION: CPT

## 2018-05-02 PROCEDURE — G8979 MOBILITY GOAL STATUS: HCPCS

## 2018-05-02 PROCEDURE — 97110 THERAPEUTIC EXERCISES: CPT

## 2018-05-02 PROCEDURE — G8978 MOBILITY CURRENT STATUS: HCPCS

## 2018-05-02 NOTE — PROGRESS NOTES
Daily Note     Today's date: 2018  Patient name: Kadie Garcia  : 1950  MRN: [id number]  Referring provider: Ossie Peabody, PA-C  Dx:   Encounter Diagnosis     ICD-10-CM    1  Multiple sclerosis ([facility] [address] ) G35                   Subjective: Pt states her gait has been challenged lately; suggested pt utilize [facility] when anticipating prolonged walking, standing  Objective: See treatment diary below  Precautions: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, decreased sensation in distal LLE, high fall risk, osteoporosis     Daily Treatment Diary      Manual   (IE)                                                                           Exercise Diary   (IE)                 Recumbent bike 5 mins NV  5 mins  5'  6'                                       Standing:                       - heel/toe raises 20x ea NV  20 x ea  20x ea  20 ea               - hip abduction (L leg on green foam)    2 x 10 ea  2x10 ea  2x10 ea               - hip extension (L leg on green foam)    2 x 10 ea  2x10 ea  2x10 ea               - HS curls                       - mini squats    2 x 10  np  2x10               - lateral step up and overs        NV               - front step ups        NV               - SLS    3 x 15" ea np                 - lateral cone walks    2 laps  2 laps 6 cones 2 laps, 6 cones               - rockerboard A/P, M/L     20 ea  20 ea                                       TB                        - rows    2 x 10 red  red 2x10 green 2x10               - pulldowns    2 x 10 red  red 2x10 green 2x10               - no money    2 x 10 red  red 2x10 Red 2x10                                                                     Modalities   (IE)                                                  Assessment: Tolerated treatment well  Patient exhibited good technique with therapeutic exercises  Pt complains of LE fatigue following tx today        Plan: Progress treatment as tolerated

## 2018-05-07 ENCOUNTER — OFFICE VISIT (OUTPATIENT)
Dept: PHYSICAL THERAPY | Facility: CLINIC | Age: 68
End: 2018-05-07
Payer: COMMERCIAL

## 2018-05-07 DIAGNOSIS — G35 MULTIPLE SCLEROSIS (HCC): Primary | ICD-10-CM

## 2018-05-07 PROCEDURE — 97110 THERAPEUTIC EXERCISES: CPT

## 2018-05-07 PROCEDURE — 97112 NEUROMUSCULAR REEDUCATION: CPT

## 2018-05-07 NOTE — PROGRESS NOTES
Daily Note     Today's date: 2018  Patient name: Richelle Robles  : 1950  MRN: [id number]  Referring provider: Bo Marti PA-C  Dx:   Encounter Diagnosis     ICD-10-CM    1  Multiple sclerosis ([facility] [address] ) G35                   Subjective: Pt states her "neuropathy is bad today"  Objective: See treatment diary below  Precautions: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, decreased sensation in distal LLE, high fall risk, osteoporosis     Daily Treatment Diary      Manual   (IE)                                                                           Exercise Diary   (IE)               Recumbent bike 5 mins NV  5 mins  5'  6'  5'                                     Standing:                       - heel/toe raises 20x ea NV  20 x ea  20x ea  20 ea  20 ea             - hip abduction (L leg on green foam)    2 x 10 ea  2x10 ea  2x10 ea  2x10 ea             - hip extension (L leg on green foam)    2 x 10 ea  2x10 ea  2x10 ea  2x10 ea             - HS curls          2x10 ea             - mini squats    2 x 10  np  2x10 2x10             - lateral step up and overs        NV 4" x10             - front step ups        NV 4"  nv             - SLS    3 x 15" ea np                 - lateral cone walks    2 laps  2 laps 6 cones 2 laps, 6 cones 2 laps, 6 cones             - rockerboard A/P, M/L     20 ea  20 ea  20 ea                                     TB                        - rows    2 x 10 red  red 2x10 green 2x10 Green 2x10             - pulldowns    2 x 10 red  red 2x10 green 2x10 Green  2x10             - no money    2 x 10 red  red 2x10 Red 2x10 Green  2x10                                                                    Modalities   (IE)                                                   Assessment: Tolerated treatment well  Patient would benefit from continued PT    Pt challenged by lateral cone walks today; note improved performance on george board today  Added standing HS curls and lateral step ups to program today w/ min difficulty  Plan: Progress treatment as tolerated

## 2018-05-09 ENCOUNTER — OFFICE VISIT (OUTPATIENT)
Dept: PHYSICAL THERAPY | Facility: CLINIC | Age: 68
End: 2018-05-09
Payer: COMMERCIAL

## 2018-05-09 DIAGNOSIS — G35 MULTIPLE SCLEROSIS (HCC): Primary | ICD-10-CM

## 2018-05-09 PROCEDURE — 97112 NEUROMUSCULAR REEDUCATION: CPT

## 2018-05-09 PROCEDURE — 97110 THERAPEUTIC EXERCISES: CPT

## 2018-05-09 NOTE — PROGRESS NOTES
Daily Note     Today's date: 2018  Patient name: Jesica Dennison  : 1950  MRN: [id number]  Referring provider: Sharlene Epps PA-C  Dx:   Encounter Diagnosis     ICD-10-CM    1  Multiple sclerosis ([facility] [address] ) G35                   Subjective: Pt states no sig change in sx  Objective: See treatment diary below  Precautions: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, decreased sensation in distal LLE, high fall risk, osteoporosis     Daily Treatment Diary      Manual   (IE)                                                                           Exercise Diary   (IE)    5           Recumbent bike 5 mins NV  5 mins  5'  6'  5'  5'                                   Standing:                       - heel/toe raises 20x ea NV  20 x ea  20x ea  20 ea  20 ea  20 ea           - hip abduction (L leg on green foam)    2 x 10 ea  2x10 ea  2x10 ea  2x10 ea  2x10 ea           - hip extension (L leg on green foam)    2 x 10 ea  2x10 ea  2x10 ea  2x10 ea  2x10 ea           - HS curls          2x10 ea  2x10 ea           - mini squats    2 x 10  np  2x10 2x10  np           - lateral step up and overs        NV 4" x10  4" x10           - front step ups        NV 4"  nv  4" x10 ea           - SLS    3 x 15" ea np                 - lateral cone walks    2 laps  2 laps 6 cones 2 laps, 6 cones 2 laps, 6 cones  np           - rockerboard A/P, M/L     20 ea  20 ea  20 ea  20 ea                                   TB                        - rows    2 x 10 red  red 2x10 green 2x10 Green 2x10 Green x25           - pulldowns    2 x 10 red  red 2x10 green 2x10 Green  2x10 Green x25           - no money    2 x 10 red  red 2x10 Red 2x10 Green  2x10  Green x25                                                                 Modalities   (IE)                                                    Assessment: Tolerated treatment well  Patient would benefit from continued PT    Note increased fatigue in L LE w/ lateral step ups  Pt challenged by addition of L LE step ups  Plan: Progress treatment as tolerated

## 2018-05-14 ENCOUNTER — OFFICE VISIT (OUTPATIENT)
Dept: PHYSICAL THERAPY | Facility: CLINIC | Age: 68
End: 2018-05-14
Payer: COMMERCIAL

## 2018-05-14 DIAGNOSIS — G35 MULTIPLE SCLEROSIS (HCC): Primary | ICD-10-CM

## 2018-05-14 PROCEDURE — 97110 THERAPEUTIC EXERCISES: CPT | Performed by: PHYSICAL THERAPIST

## 2018-05-14 PROCEDURE — 97112 NEUROMUSCULAR REEDUCATION: CPT | Performed by: PHYSICAL THERAPIST

## 2018-05-14 NOTE — PROGRESS NOTES
Daily Note     Today's date: 2018  Patient name: Jesica Dennison  : 1950  MRN: [id number]  Referring provider: Sharlene Epps PA-C  Dx:   Encounter Diagnosis     ICD-10-CM    1  Multiple sclerosis ([facility] [address] ) G35                   Subjective: Patient reports she was in a flare last week; however, this week she is feeling much better  Objective: See treatment diary below      Assessment: Tolerated treatment well  Patient demonstrated fatigue post treatment, exhibited good technique with therapeutic exercises and would benefit from continued PT  Pt requires rest breaks due to lower extremity fatigue  Pt demonstrated difficulty controlling knee hyperextension on LLE during lateral step up and overs this date  Plan: Progress treatment as tolerated        Precautions: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, decreased sensation in distal LLE, high fall risk, osteoporosis     Daily Treatment Diary         Exercise Diary   (IE)    5-14         Recumbent bike 5 mins NV  5 mins  5'  6'  5'  5'  5'                                 Standing:                       - heel/toe raises 20x ea NV  20 x ea  20x ea  20 ea  20 ea  20 ea  20 ea         - hip abduction (L leg on green foam)    2 x 10 ea  2x10 ea  2x10 ea  2x10 ea  2x10 ea  2x10 ea         - hip extension (L leg on green foam)    2 x 10 ea  2x10 ea  2x10 ea  2x10 ea  2x10 ea  2x10 ea         - HS curls          2x10 ea  2x10 ea  2x10 ea         - mini squats    2 x 10  np  2x10 2x10  np  np         - lateral step up and overs        NV 4" x10  4" x10   4" x10 ea         - front step ups        NV 4"  nv  4" x10 ea   4" x10 ea         - SLS    3 x 15" ea np                 - lateral cone walks    2 laps  2 laps 6 cones 2 laps, 6 cones 2 laps, 6 cones  np  2 laps, 6 cones         - rockerboard A/P, M/L     20 ea  20 ea  20 ea  20 ea  20 ea                                 TB                        - rows    2 x 10 red  red 2x10 green 2x10 Green 2x10 Green x25 Green 3 x 10         - pulldowns    2 x 10 red  red 2x10 green 2x10 Green  2x10 Green x25  Green 3 x 10         - no money    2 x 10 red  red 2x10 Red 2x10 Green  2x10  Green x25  Green 3 x 10          - TKE             NV

## 2018-05-16 ENCOUNTER — ANNUAL EXAM (OUTPATIENT)
Dept: OBGYN CLINIC | Facility: MEDICAL CENTER | Age: 68
End: 2018-05-16
Payer: COMMERCIAL

## 2018-05-16 VITALS
DIASTOLIC BLOOD PRESSURE: 64 MMHG | SYSTOLIC BLOOD PRESSURE: 122 MMHG | HEIGHT: 64 IN | WEIGHT: 136 LBS | BODY MASS INDEX: 23.22 KG/M2

## 2018-05-16 DIAGNOSIS — Z01.419 ENCOUNTER FOR GYNECOLOGICAL EXAMINATION (GENERAL) (ROUTINE) WITHOUT ABNORMAL FINDINGS: Primary | ICD-10-CM

## 2018-05-16 DIAGNOSIS — Z12.31 ENCOUNTER FOR SCREENING MAMMOGRAM FOR MALIGNANT NEOPLASM OF BREAST: ICD-10-CM

## 2018-05-16 PROCEDURE — G0145 SCR C/V CYTO,THINLAYER,RESCR: HCPCS | Performed by: OBSTETRICS & GYNECOLOGY

## 2018-05-16 PROCEDURE — 99386 PREV VISIT NEW AGE 40-64: CPT | Performed by: OBSTETRICS & GYNECOLOGY

## 2018-05-16 NOTE — PATIENT INSTRUCTIONS
The this 76-year-old patient was given a prescription for Ativan 1 mg to take 1 at bedtime number 90 with 1 refill for sleep  She will call if she sees any vaginal bleeding or episodes of vaginitis over the next 2 years  She will return in 2 years

## 2018-05-16 NOTE — PROGRESS NOTES
Assessment/Plan:  65-year-old patient is seen today for routine gyn evaluation  Diagnoses and all orders for this visit:    Encounter for gynecological examination (general) (routine) without abnormal findings  -     Liquid-based pap, screening    Encounter for screening mammogram for malignant neoplasm of breast  -     Mammo screening right w 3d & cad; Future          Subjective:     Patient ID: Marlene Todd is a 70 y o  female  HPI she has had no vaginal bleeding over the past year or episodes of vaginitis  She has had problems with vision and balance had fatigue due to her multiple sclerosis  She is treated with injectable an oral medication at this time  She does take Ativan mg at bedtime for sleep as needed  Review of Systems      Objective:     Physical Exam  pelvic exam reveals uterus to be anterior mobile normal size and well supported  No adnexal masses are identified  There is no blood or discharge in the vagina  The vulva is normal  Rectal exam shows no bladder masses in the rectum and no nodularity in the cul-de-sac

## 2018-05-17 ENCOUNTER — APPOINTMENT (OUTPATIENT)
Dept: PHYSICAL THERAPY | Facility: CLINIC | Age: 68
End: 2018-05-17
Payer: COMMERCIAL

## 2018-05-21 ENCOUNTER — OFFICE VISIT (OUTPATIENT)
Dept: PHYSICAL THERAPY | Facility: CLINIC | Age: 68
End: 2018-05-21
Payer: COMMERCIAL

## 2018-05-21 DIAGNOSIS — G35 MULTIPLE SCLEROSIS (HCC): Primary | ICD-10-CM

## 2018-05-21 LAB
LAB AP GYN PRIMARY INTERPRETATION: NORMAL
Lab: NORMAL

## 2018-05-21 PROCEDURE — 97112 NEUROMUSCULAR REEDUCATION: CPT

## 2018-05-21 PROCEDURE — 97110 THERAPEUTIC EXERCISES: CPT

## 2018-05-21 NOTE — PROGRESS NOTES
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, decreased sensation in distal LLE, high fall risk, osteoporosis     Daily Treatment Diary         Exercise Diary   (IE)  -       Recumbent bike 5 mins NV  5 mins  5'  6'  5'  5'  5'  6'                               Standing:                       - heel/toe raises 20x ea NV  20 x ea  20x ea  20 ea  20 ea  20 ea  20 ea  20 ea       - hip abduction (L leg on green foam)    2 x 10 ea  2x10 ea  2x10 ea  2x10 ea  2x10 ea  2x10 ea  2x15 ea       - hip extension (L leg on green foam)    2 x 10 ea  2x10 ea  2x10 ea  2x10 ea  2x10 ea  2x10 ea  2x15 ea       - HS curls          2x10 ea  2x10 ea  2x10 ea  2x15 ea       - mini squats    2 x 10  np  2x10 2x10  np  np         - lateral step up and overs        NV 4" x10  4" x10   4" x10 ea  4" x10 ea       - front step ups        NV 4"  nv  4" x10 ea   4" x10 ea  4" x10 ea       - SLS    3 x 15" ea np                 - lateral cone walks    2 laps  2 laps 6 cones 2 laps, 6 cones 2 laps, 6 cones  np  2 laps, 6 cones  2 laps,6 cones       - rockerboard A/P, M/L     20 ea  20 ea  20 ea  20 ea  20 ea  20 ea                               TB                        - rows    2 x 10 red  red 2x10 green 2x10 Green 2x10 Green x25 Green 3 x 10  blue 2x10       - pulldowns    2 x 10 red  red 2x10 green 2x10 Green  2x10 Green x25  Green 3 x 10  blue 2x10       - no money    2 x 10 red  red 2x10 Red 2x10 Green  2x10  Green x25  Green 3 x 10  green x30        - TKE             NV  NV                                   Assessment: Tolerated treatment well  Patient exhibited good technique with therapeutic exercises  Progressed to 30 reps ea w/ hip abd and ext and HS curls  Pt continues to be challenged by lateral cones walks  Group : [id number]      Plan: Progress treatment as tolerated

## 2018-05-23 ENCOUNTER — EVALUATION (OUTPATIENT)
Dept: PHYSICAL THERAPY | Facility: CLINIC | Age: 68
End: 2018-05-23
Payer: COMMERCIAL

## 2018-05-23 DIAGNOSIS — G35 MULTIPLE SCLEROSIS (HCC): Primary | ICD-10-CM

## 2018-05-23 PROCEDURE — G8978 MOBILITY CURRENT STATUS: HCPCS | Performed by: PHYSICAL THERAPIST

## 2018-05-23 PROCEDURE — G8979 MOBILITY GOAL STATUS: HCPCS | Performed by: PHYSICAL THERAPIST

## 2018-05-23 PROCEDURE — 97110 THERAPEUTIC EXERCISES: CPT | Performed by: PHYSICAL THERAPIST

## 2018-05-23 NOTE — PROGRESS NOTES
PT RE-EVALUATION    Today's date: 2018  Patient name: Lloyd Woods  : 1950  MRN: [id number]  Referring provider: Chidi Gaytan PA-C  Dx:   Encounter Diagnosis     ICD-10-CM    1  Multiple sclerosis [facility]) G35                   Assessment    Assessment details: Lloyd Woods is a pleasant 70 y o  presenting to physical therapy with MD referral for Multiple sclerosis ([facility] )  Since time of initial evaluation, pt has shown improvement in right lower extremity strength, walking speed, and balance  Although improvements have been made, pt continues to be at a high fall risk and presents with significant lower extremity weakness  Problem list:  Decreased hip/core strength, poor balance, high fall risk with five time sit to stand testing, SLS, and TUG times  Treatment to include: Lower extremity/core strengthening, neuromuscular control exercises, balance/proprioception training,  instruction in a comprehensive HEP, and modalities as needed  This pt would benefit from skilled PT services to address their impairments and functional limitations to maximize functional outcome  Barriers to therapy: chronicity of symptoms, progressive disease, hx of hip replacement on R hip, matthew placement in L hip  Understanding of Dx/Px/POC: good   Prognosis: fair    Goals  ST  Pt will improve TUG time to average 18 seconds in 2 weeks  PARTIALLY MET  2  Pt will be able to perform 5 sit to stand repetitions without use of upper extremity assistance in 2 weeks  NOT MET    LT  Pt will be able to negotiate a flight of stairs with no more than mild fatigue in 4 weeks  MET (first thing in the morning)  2  Pt will be independent in a comprehensive HEP in 4 weeks  3  Pt will be able to grocery shop for 45 minutes with no more than moderate leg fatigue after in 4 weeks      Plan  Patient would benefit from: skilled PT  Frequency: 2x week  Duration in weeks: 4  Treatment plan discussed with: patient        Subjective Evaluation    History of Present Illness  Mechanism of injury: Current status:  - ADLs/Functional activities:   - Stairs Step to pattern with mild fatigue and use of BUE support (Improved)   - Sit to stand with mild/moderate difficulty with use of arms  (improved)   - Walking < 5 minutes with SC with moderate leg fatigue and shortness of breath   - Sleeping with 1 nightly sleep disturbances causing her to be awake for hours due to pain   - Transferring in/out of car with mild difficulty (SUV), moderate difficulty (car)- same   - Severe difficulty negotiating up bleachers to watch grandchildren's sporting events (same)  - Work: Not a working individual  - Recreation: none    Since time of initial evaluation, functionally, pt reports she can now negotiate the stairs with mild fatigue and experiences less difficulty transferring sit to stand  Pt's pain ranges have also decreased to a 1-6/10 range  Although improvements have been made, this pt would continue to benefit from skilled PT services to maximize functional outcome    Pain  Current pain ratin  At best pain ratin  At worst pain ratin  Location: R leg only  Quality: "nerve shock that causes leg to jump"  Progression: improved    Patient Goals  Patient goals for therapy: increased strength, independence with ADLs/IADLs and improved balance          Objective     Neurological Testing     Sensation     Lumbar   Left   Diminished: light touch    Right   Intact: light touch    Comments   Left light touch: Decreased sensation distal to knee on L    Reflexes   Left   Patellar (L4): trace (1+)  Achilles (S1): trace (1+)    Right   Patellar (L4): trace (1+)  Achilles (S1): trace (1+)    Additional Neurological Details  No spacticity presenting- pt state she just took her medication prior to appointment    Strength/Myotome Testing     Left Hip   Planes of Motion   Flexion: 3+  External rotation: 3+    Right Hip   Planes of Motion Flexion: 4  External rotation: 3+    Left Knee   Flexion: 4-  Extension: 4-    Right Knee   Flexion: 4  Extension: 4+    Left Ankle/Foot   Dorsiflexion: 4+  Plantar flexion: 4+  Great toe extension: 4    Right Ankle/Foot   Dorsiflexion: 4+  Plantar flexion: 4+  Great toe extension: 5    Additional Strength Details  Gait: Pt ambulates over level surface with a scissoring pattern  Mild flexed trunk posture, with decreased arm swing on LUE       Balance: (goal: SLS 5 seconds to indicate no increase in fall risk)  SLS L: 2 second  SLS R: 4 seconds  Feet together eyes open: 30 seconds mild sway  Feet together eyes closed: 30 seconds, moderate sway    TUG: no AD (goal: < 14 seconds to indicate no increased fall risk)  23 "  25"  22"  Av"    Re-evaluation (18)  18"  19"  22"  Av 67"    Five time sit to stand: (goal < 13 6 seconds to indicate no increase in disability/morbidity)  - 2 repetitions in 44 seconds (unable to perform more than two reps with multiple attempts)- on initial evaluation  - 1 repetition in 5 seconds and multiple attempts for 33 seconds prior to giving up on RE           Precautions: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, decreased sensation in distal LLE, high fall risk, osteoporosis     Daily Treatment Diary         Exercise Diary   (IE)  -  5/  5  5-14    5-     Recumbent bike 5 mins NV  5 mins  5'  6'  5'  5'  5'  6'  6'                             Standing:                       - heel/toe raises 20x ea NV  20 x ea  20x ea  20 ea  20 ea  20 ea  20 ea  20 ea  30 x ea     - hip abduction (L leg on green foam)    2 x 10 ea  2x10 ea  2x10 ea  2x10 ea  2x10 ea  2x10 ea  2x15 ea  2x15 ea     - hip extension (L leg on green foam)    2 x 10 ea  2x10 ea  2x10 ea  2x10 ea  2x10 ea  2x10 ea  2x15 ea  2x15 ea     - HS curls          2x10 ea  2x10 ea  2x10 ea  2x15 ea  2x15 ea     - mini squats    2 x 10  np  2x10 2x10  np  np         - lateral step up and overs        NV 4" x10  4" x10   4" x10 ea  4" x10 ea  NV     - front step ups        NV 4"  nv  4" x10 ea   4" x10 ea  4" x10 ea  NV     - SLS    3 x 15" ea np                 - lateral cone walks    2 laps  2 laps 6 cones 2 laps, 6 cones 2 laps, 6 cones  np  2 laps, 6 cones  2 laps,6 cones  NV     - rockerboard A/P, M/L     20 ea  20 ea  20 ea  20 ea  20 ea  20 ea  NV                             TB                        - rows    2 x 10 red  red 2x10 green 2x10 Green 2x10 Green x25 Green 3 x 10  blue 2x10 Blue 3 x10     - pulldowns    2 x 10 red  red 2x10 green 2x10 Green  2x10 Green x25  Green 3 x 10  blue 2x10 Blue 3 x 10     - no money    2 x 10 red  red 2x10 Red 2x10 Green  2x10  Green x25  Green 3 x 10  green x30 Green 3 x 10      - TKE             NV  NV                                 * Only 50 minutes of time 1:1 this date  Pt declined performing all exercises stating she was too tired

## 2018-05-29 ENCOUNTER — TELEPHONE (OUTPATIENT)
Dept: NEUROLOGY | Facility: CLINIC | Age: 68
End: 2018-05-29

## 2018-05-29 DIAGNOSIS — G62.9 POLYNEUROPATHY: Primary | ICD-10-CM

## 2018-05-29 NOTE — TELEPHONE ENCOUNTER
Looks like she has been getting PT and had eval last week  Looks like from their notes she has been progressing, but still has some deficits to work on  Recommend continuing with PT since she has been making progress  We can increase her baclofen a little if she would like to see if that would help  We can increase the nighttime dose to start with  Can try 20mg at bedtime and keep 10mg the rest of the doses  Is she sleeping ok?   She told me last month she was not sleeping well, which can lead to increased fatigue

## 2018-05-29 NOTE — TELEPHONE ENCOUNTER
Pt states arms/legs feels very heavy, has muscle cramps  Issues w/balance & weakness are increasing as well as fatigue, "I can't do shit"  Pt is very aggravated that she is feeling this way  Pt did get some relief in a hot tub last night    Current meds:  Gabapentin 1200mg TID, extra 300mg Qhs  Copaxone: 3 x week 40mg SQ  ampyra 10mg ER, bid  Baclofen 10mg QID    States lido patches have not been approved, I do not see a PA on CMM, I did speak w/[facility] &was told medicare part D dose not cover lido patches  I did call [facility] [phone], transferred to pharmacy line  Lido patches are only FDA approved for cancer, diabetic, or post herpetic neuropathy per pharmacist Alcira Allred; PA is denied  Pt recommended to get OTC patches    ID# [id number]  GRP# [id number]  BIN# [id number]  PCN# [id number]    Please advise

## 2018-05-30 ENCOUNTER — OFFICE VISIT (OUTPATIENT)
Dept: PHYSICAL THERAPY | Facility: CLINIC | Age: 68
End: 2018-05-30
Payer: COMMERCIAL

## 2018-05-30 DIAGNOSIS — G35 MULTIPLE SCLEROSIS (HCC): Primary | ICD-10-CM

## 2018-05-30 PROCEDURE — 97110 THERAPEUTIC EXERCISES: CPT

## 2018-05-30 PROCEDURE — 97112 NEUROMUSCULAR REEDUCATION: CPT

## 2018-05-30 NOTE — TELEPHONE ENCOUNTER
She is already maxed out on gabapentin  We could try adding something at night  She has already tried Cymbalta, but we could try Amitriptyline at bedtime to see if that will help with her nerve pain and sleep

## 2018-05-30 NOTE — TELEPHONE ENCOUNTER
Called and advised pt of all of the below  She  verbalized clear understanding  Pt currently taking   Baclofen 10 mg, 1 tab tid (0700, 1400) 2 tabs at bedtime  Agreeable increasing the dose  Complaining poor sleep, sleeps < 4 hours d/t nerve pain  She states that Lidoderm patch not covered by her insurance  Pt states that her pain has gotten worse, "so much harder to do PT  I don't know what's going on with my legs, it's like a brick"    Lidoderm patch not covered by her insurance  Pt requesting alt med                [phone]

## 2018-05-30 NOTE — PROGRESS NOTES
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, decreased sensation in distal LLE, high fall risk, osteoporosis     Daily Treatment Diary         Exercise Diary   (IE)  -   Recumbent bike 5 mins NV  5 mins  5'  6'  5'  5'  5'  6'  6'  6'   Standing:                       - hip flexion (march)                    2x10 ea   - heel/toe raises 20x ea NV  20 x ea  20x ea  20 ea  20 ea  20 ea  20 ea  20 ea  30 x ea  30x ea   - hip abduction (L leg on green foam)    2 x 10 ea  2x10 ea  2x10 ea  2x10 ea  2x10 ea  2x10 ea  2x15 ea  2x15 ea  2x15 ea   - hip extension (L leg on green foam)    2 x 10 ea  2x10 ea  2x10 ea  2x10 ea  2x10 ea  2x10 ea  2x15 ea  2x15 ea  2x15 ea   - HS curls          2x10 ea  2x10 ea  2x10 ea  2x15 ea  2x15 ea  np   - mini squats    2 x 10  np  2x10 2x10  np  np         - lateral step up and overs        NV 4" x10  4" x10   4" x10 ea  4" x10 ea  NV  6" x10   - front step ups        NV 4"  nv  4" x10 ea   4" x10 ea  4" x10 ea  NV  6" x10 ea   - SLS    3 x 15" ea np                 - lateral cone walks    2 laps  2 laps 6 cones 2 laps, 6 cones 2 laps, 6 cones  np  2 laps, 6 cones  2 laps,6 cones  NV  NV   - rockerboard A/P, M/L     20 ea  20 ea  20 ea  20 ea  20 ea  20 ea  NV  20 ea                           TB                        - rows    2 x 10 red  red 2x10 green 2x10 Green 2x10 Green x25 Green 3 x 10  blue 2x10 Blue 3 x10  blue 3x10   - pulldowns    2 x 10 red  red 2x10 green 2x10 Green  2x10 Green x25  Green 3 x 10  blue 2x10 Blue 3 x 10 Blue 3x10   - no money    2 x 10 red  red 2x10 Red 2x10 Green  2x10  Green x25  Green 3 x 10  green x30 Green 3 x 10 Green 3x10    - TKE             NV  NV                                   Assessment: Tolerated treatment fair  Patient would benefit from continued PT  Pt notes increased fatigue by the end of tx, jesus in L LE   Plan: Progress treatment as tolerated

## 2018-05-31 RX ORDER — AMITRIPTYLINE HYDROCHLORIDE 25 MG/1
25 TABLET, FILM COATED ORAL
Qty: 30 TABLET | Refills: 2 | Status: SHIPPED | OUTPATIENT
Start: 2018-05-31 | End: 2018-07-20 | Stop reason: ALTCHOICE

## 2018-06-01 ENCOUNTER — OFFICE VISIT (OUTPATIENT)
Dept: PHYSICAL THERAPY | Facility: CLINIC | Age: 68
End: 2018-06-01
Payer: COMMERCIAL

## 2018-06-01 DIAGNOSIS — G35 MULTIPLE SCLEROSIS (HCC): Primary | ICD-10-CM

## 2018-06-01 PROCEDURE — G8979 MOBILITY GOAL STATUS: HCPCS

## 2018-06-01 PROCEDURE — 97110 THERAPEUTIC EXERCISES: CPT

## 2018-06-01 PROCEDURE — G8978 MOBILITY CURRENT STATUS: HCPCS

## 2018-06-01 PROCEDURE — 97112 NEUROMUSCULAR REEDUCATION: CPT

## 2018-06-01 NOTE — PROGRESS NOTES
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, decreased sensation in distal LLE, high fall risk, osteoporosis     Daily Treatment Diary         Exercise Diary              Recumbent bike 6'            Standing:                       - hip flexion (march) 20 ea                    - heel/toe raises 30 ea            - hip abduction (L leg on green foam) 2x15 ea            - hip extension (L leg on green foam) 2x15 ea            - HS curls                 - mini squats                 - lateral step up and overs 6" 10 x              - front step ups 6" 10 ea              - SLS                     - lateral cone walks 4 cones, 2 laps            - rockerboard A/P, M/L 20 ea                                     TB                        - rows Blue x30            - pulldowns Blue x30            - no money Green x30             - TKE                                                   Assessment: Tolerated treatment fair  Patient would benefit from continued PT  Pt most challenged by L LE march as well as step ups, lateral step ups leading w/ the L  Group: [id number]      Plan: Progress treatment as tolerated

## 2018-06-04 ENCOUNTER — OFFICE VISIT (OUTPATIENT)
Dept: PHYSICAL THERAPY | Facility: CLINIC | Age: 68
End: 2018-06-04
Payer: COMMERCIAL

## 2018-06-04 DIAGNOSIS — G35 MULTIPLE SCLEROSIS (HCC): Primary | ICD-10-CM

## 2018-06-04 PROCEDURE — 97112 NEUROMUSCULAR REEDUCATION: CPT

## 2018-06-04 PROCEDURE — 97110 THERAPEUTIC EXERCISES: CPT

## 2018-06-04 NOTE — PROGRESS NOTES
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, decreased sensation in distal LLE, high fall risk, osteoporosis     Daily Treatment Diary         Exercise Diary                     Recumbent bike '  6'                   Standing:                       - hip flexion (march) 20 ea  20 ea                   - heel/toe raises 30 ea 30 ea                   - hip abduction (L leg on green foam) 2x15 ea 2x15 ea                   - hip extension (L leg on green foam) 2x15 ea 2x15 ea                   - HS curls                       - mini squats                       - lateral step up and overs 6" 10 x 6" x15                   - front step ups 6" 10 ea 6" x10 ea                   - SLS                       - lateral cone walks 4 cones, 2 laps 6 cones, 2 laps                   - rockerboard A/P, M/L 20 ea  20 ea                                           TB                        - rows Blue x30 Blue x30                   - pulldowns Blue x30 Blue x30                   - no money Green x30 Blue x30                    - TKE                                                    Assessment: Tolerated treatment well  Patient exhibited good technique with therapeutic exercises  Note greater ease w/ L LE hip abduction and extension today  Stepping over cones continues to be challenging for pt  Plan: Progress treatment as tolerated

## 2018-06-07 ENCOUNTER — OFFICE VISIT (OUTPATIENT)
Dept: PHYSICAL THERAPY | Facility: CLINIC | Age: 68
End: 2018-06-07
Payer: COMMERCIAL

## 2018-06-07 DIAGNOSIS — G35 MULTIPLE SCLEROSIS (HCC): Primary | ICD-10-CM

## 2018-06-07 PROCEDURE — 97112 NEUROMUSCULAR REEDUCATION: CPT

## 2018-06-07 PROCEDURE — 97110 THERAPEUTIC EXERCISES: CPT

## 2018-06-07 NOTE — PROGRESS NOTES
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, decreased sensation in distal LLE, high fall risk, osteoporosis     Daily Treatment Diary         Exercise Diary                   Recumbent bike                  Standing:                       - hip flexion (march) 20 ea  20 ea  np                 - heel/toe raises 30 ea 30 ea  30 ea                 - hip abduction (L leg on green foam) 2x15 ea 2x15 ea  2x15 ea                 - hip extension (L leg on green foam) 2x15 ea 2x15 ea  2x15 ea                 - HS curls                       - mini squats                       - lateral step up and overs 6" 10 x 6" x15 6" x15                 - front step ups 6" 10 ea 6" x10 ea 6" x15                 - SLS                       - lateral cone walks 4 cones, 2 laps 6 cones, 2 laps  6 cone, 2 laps                 - rockerboard A/P, M/L 20 ea  20 ea  20 ea                                         TB                        - rows Blue x30 Blue x30 Blue x30                 - pulldowns Blue x30 Blue x30 Blue x30                 - no money Green x30 Blue x30 Blue x30                  - TKE                                                     Assessment: Tolerated treatment well  Patient exhibited good technique with therapeutic exercises  Pt notes increased discomfort w/ L WB today as well as decreased sensation in L foot  Plan: Progress treatment as tolerated

## 2018-06-11 ENCOUNTER — OFFICE VISIT (OUTPATIENT)
Dept: PHYSICAL THERAPY | Facility: CLINIC | Age: 68
End: 2018-06-11
Payer: COMMERCIAL

## 2018-06-11 DIAGNOSIS — G35 MULTIPLE SCLEROSIS (HCC): Primary | ICD-10-CM

## 2018-06-11 PROCEDURE — 97110 THERAPEUTIC EXERCISES: CPT

## 2018-06-11 PROCEDURE — 97112 NEUROMUSCULAR REEDUCATION: CPT

## 2018-06-11 NOTE — PROGRESS NOTES
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, decreased sensation in distal LLE, high fall risk, osteoporosis     Daily Treatment Diary         Exercise Diary                 Recumbent bike 6'  6'  6' 6'               Standing:                       - hip flexion (march) 20 ea  20 ea  np  np               - heel/toe raises 30 ea 30 ea  30 ea  30 ea               - hip abduction (L leg on green foam) 2x15 ea 2x15 ea  2x15 ea 1# 2x15 ea               - hip extension (L leg on green foam) 2x15 ea 2x15 ea  2x15 ea 1# 2x15 ea               - HS curls                       - mini squats                       - lateral step up and overs 6" 10 x 6" x15 6" x15 6" x15               - front step ups 6" 10 ea 6" x10 ea 6" x15 6" x20               - SLS        10"x3               - lateral cone walks 4 cones, 2 laps 6 cones, 2 laps  6 cone, 2 laps  6 cones, 2 laps               - rockerboard A/P, M/L 20 ea  20 ea  20 ea  20 ea                                       TB                        - rows Blue x30 Blue x30 Blue x30 Blue x30               - pulldowns Blue x30 Blue x30 Blue x30 Blue x30               - no money Green x30 Blue x30 Blue x30 Blue x30                - TKE                                                      Assessment: Tolerated treatment well  Patient exhibited good technique with therapeutic exercises  Pt challenged by addition of SLS; perform in the beginning of session nv  Pt tolerates addition of 1lb to hip exercises well  Plan: Progress treatment as tolerated

## 2018-06-14 ENCOUNTER — TRANSCRIBE ORDERS (OUTPATIENT)
Dept: LAB | Facility: CLINIC | Age: 68
End: 2018-06-14

## 2018-06-14 ENCOUNTER — OFFICE VISIT (OUTPATIENT)
Dept: PHYSICAL THERAPY | Facility: CLINIC | Age: 68
End: 2018-06-14
Payer: COMMERCIAL

## 2018-06-14 ENCOUNTER — APPOINTMENT (OUTPATIENT)
Dept: LAB | Facility: CLINIC | Age: 68
End: 2018-06-14
Payer: COMMERCIAL

## 2018-06-14 DIAGNOSIS — G35 MULTIPLE SCLEROSIS (HCC): Primary | ICD-10-CM

## 2018-06-14 DIAGNOSIS — Z79.83 LONG TERM (CURRENT) USE OF BISPHOSPHONATES: ICD-10-CM

## 2018-06-14 DIAGNOSIS — M81.0 SENILE OSTEOPOROSIS: ICD-10-CM

## 2018-06-14 DIAGNOSIS — M81.0 SENILE OSTEOPOROSIS: Primary | ICD-10-CM

## 2018-06-14 LAB
25(OH)D3 SERPL-MCNC: 64.5 NG/ML (ref 30–100)
CALCIUM SERPL-MCNC: 9 MG/DL (ref 8.3–10.1)
CREAT SERPL-MCNC: 1.05 MG/DL (ref 0.6–1.3)
GFR SERPL CREATININE-BSD FRML MDRD: 54 ML/MIN/1.73SQ M

## 2018-06-14 PROCEDURE — 97110 THERAPEUTIC EXERCISES: CPT

## 2018-06-14 PROCEDURE — 82310 ASSAY OF CALCIUM: CPT

## 2018-06-14 PROCEDURE — 97112 NEUROMUSCULAR REEDUCATION: CPT

## 2018-06-14 PROCEDURE — 36415 COLL VENOUS BLD VENIPUNCTURE: CPT

## 2018-06-14 PROCEDURE — 82306 VITAMIN D 25 HYDROXY: CPT

## 2018-06-14 PROCEDURE — 82565 ASSAY OF CREATININE: CPT

## 2018-06-14 NOTE — PROGRESS NOTES
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, decreased sensation in distal LLE, high fall risk, osteoporosis     Daily Treatment Diary         Exercise Diary               Recumbent bike 6'  6'  6' 6'  7'             Standing:                       - hip flexion (march) 20 ea  20 ea  np  np 8" alt tap step 2x10             - heel/toe raises 30 ea 30 ea  30 ea  30 ea  30 ea             - hip abduction (L leg on green foam) 2x15 ea 2x15 ea  2x15 ea 1# 2x15 ea  2x15 ea  1#           - hip extension (L leg on green foam) 2x15 ea 2x15 ea  2x15 ea 1# 2x15 ea  2x15 ea  1#           - HS curls                       - mini squats                       - lateral step up and overs 6" 10 x 6" x15 6" x15 6" x15 6" 10x             - front step ups 6" 10 ea 6" x10 ea 6" x15 6" x20 6" x17 L,x20 R             - SLS        10"x3  10"x3 ea             - lateral cone walks 4 cones, 2 laps 6 cones, 2 laps  6 cone, 2 laps  6 cones, 2 laps resume nv             - rockerboard A/P, M/L 20 ea  20 ea  20 ea  20 ea  20 ea                                     TB                        - rows Blue x30 Blue x30 Blue x30 Blue x30 Blue x30             - pulldowns Blue x30 Blue x30 Blue x30 Blue x30 Blue x30             - no money Green x30 Blue x30 Blue x30 Blue x30 Blue x30              - TKE                                                      Assessment: Tolerated treatment well  Patient would benefit from continued PT  Pt challenged by step ups and lateral steps today due to L LE weakness  Pt notes increased fatigue by the end of tx  Pt most challenged by SLS  Plan: Progress treatment as tolerated

## 2018-06-18 ENCOUNTER — OFFICE VISIT (OUTPATIENT)
Dept: PHYSICAL THERAPY | Facility: CLINIC | Age: 68
End: 2018-06-18
Payer: COMMERCIAL

## 2018-06-18 DIAGNOSIS — G35 MULTIPLE SCLEROSIS (HCC): Primary | ICD-10-CM

## 2018-06-18 PROCEDURE — 97110 THERAPEUTIC EXERCISES: CPT

## 2018-06-18 PROCEDURE — 97112 NEUROMUSCULAR REEDUCATION: CPT

## 2018-06-18 NOTE — PROGRESS NOTES
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, decreased sensation in distal LLE, high fall risk, osteoporosis     Daily Treatment Diary         Exercise Diary             Recumbent bike 6'  6'  6' 6'  7'  7'           Standing:                       - hip flexion (march) 20 ea  20 ea  np  np 8" alt tap step 2x10  8" alt tap step           - heel/toe raises 30 ea 30 ea  30 ea  30 ea  30 ea  30 ea           - hip abduction (L leg on green foam) 2x15 ea 2x15 ea  2x15 ea 1# 2x15 ea  2x15 ea  1# 2x15 ea           - hip extension (L leg on green foam) 2x15 ea 2x15 ea  2x15 ea 1# 2x15 ea  2x15 ea  1# 2x15 ea           - HS curls                       - mini squats                       - lateral step up and overs 6" 10 x 6" x15 6" x15 6" x15 6" 10x 6"  NV           - front step ups 6" 10 ea 6" x10 ea 6" x15 6" x20 6" x17 L,x20 R 6" 2x10 ea           - SLS        10"x3  10"x3 ea  NV           - lateral cone walks 4 cones, 2 laps 6 cones, 2 laps  6 cone, 2 laps  6 cones, 2 laps resume nv  6 cones, 2 laps           - rockerboard A/P, M/L 20 ea  20 ea  20 ea  20 ea  20 ea  20 ea                                   TB                        - rows Blue x30 Blue x30 Blue x30 Blue x30 Blue x30 Black  x30            - pulldowns Blue x30 Blue x30 Blue x30 Blue x30 Blue x30 Black x20           - no money Green x30 Blue x30 Blue x30 Blue x30 Blue x30 Blue (loop) x20            - TKE                                                     Assessment: Tolerated treatment well   Patient exhibited good technique with therapeutic exercises  Pt defers lateral step ups, alt heel taps and SLS due to LE fatigue  Pt challenged by progression to black TB w/ pull downs  Plan: Progress treatment as tolerated

## 2018-06-20 ENCOUNTER — TELEPHONE (OUTPATIENT)
Dept: NEUROLOGY | Facility: HOSPITAL | Age: 68
End: 2018-06-20

## 2018-06-20 DIAGNOSIS — G35 MULTIPLE SCLEROSIS (HCC): Primary | ICD-10-CM

## 2018-06-21 ENCOUNTER — OFFICE VISIT (OUTPATIENT)
Dept: PHYSICAL THERAPY | Facility: CLINIC | Age: 68
End: 2018-06-21
Payer: COMMERCIAL

## 2018-06-21 DIAGNOSIS — G35 MULTIPLE SCLEROSIS (HCC): Primary | ICD-10-CM

## 2018-06-21 PROCEDURE — 97112 NEUROMUSCULAR REEDUCATION: CPT

## 2018-06-21 PROCEDURE — 97110 THERAPEUTIC EXERCISES: CPT

## 2018-06-21 RX ORDER — ZONISAMIDE 100 MG/1
CAPSULE ORAL
Qty: 360 CAPSULE | Refills: 3 | Status: SHIPPED | OUTPATIENT
Start: 2018-06-21 | End: 2019-04-15 | Stop reason: SDUPTHER

## 2018-06-21 NOTE — PROGRESS NOTES
Daily Note     Today's date: 2018  Patient name: Kirill Tran  : 1950  MRN: [id number]  Referring provider: Jany Macdonald PA-C  Dx:   Encounter Diagnosis     ICD-10-CM    1  Multiple sclerosis ([facility] [address] ) G35                   Subjective: Patient reports she has been having more pain in her R hip this week as a result changing medication  Objective: See treatment diary below      Assessment: Despite significant fatigue as patient has only slept 2/3 hours a night this week, patient was able to push through exercises well but did require breaks throughout  No complaints other then fatigue  Ongoing hip pain that patient reports has not going away  Plan: Continue per plan of care       Precautions: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, decreased sensation in distal LLE, high fall risk, osteoporosis     Daily Treatment Diary         Exercise Diary           Recumbent bike 6'  6'  6' 6'  7'  7'  np         Standing:                       - hip flexion (march) 20 ea  20 ea  np  np 8" alt tap step 2x10  8" alt tap step  8" alt tap step         - heel/toe raises 30 ea 30 ea  30 ea  30 ea  30 ea  30 ea  30 ea         - hip abduction (L leg on green foam) 2x15 ea 2x15 ea  2x15 ea 1# 2x15 ea  2x15 ea  1# 2x15 ea  2x15 ea         - hip extension (L leg on green foam) 2x15 ea 2x15 ea  2x15 ea 1# 2x15 ea  2x15 ea  1# 2x15 ea  2x15 ea         - HS curls                       - mini squats                       - lateral step up and overs 6" 10 x 6" x15 6" x15 6" x15 6" 10x 6"  NV  6" 15x ea         - front step ups 6" 10 ea 6" x10 ea 6" x15 6" x20 6" x17 L,x20 R 6" 2x10 ea  6" 2x10         - SLS        10"x3  10"x3 ea  NV           - lateral cone walks 4 cones, 2 laps 6 cones, 2 laps  6 cone, 2 laps  6 cones, 2 laps resume nv  6 cones, 2 laps  6 cones, 2 laps         - rockerboard A/P, M/L 20 ea  20 ea  20 ea  20 ea  20 ea  20 ea  20x ea                                 TB                        - rows Blue x30 Blue x30 Blue x30 Blue x30 Blue x30 Black  x30   Black  x30          - pulldowns Blue x30 Blue x30 Blue x30 Blue x30 Blue x30 Black x20 Christohper x20         - no money Green x30 Blue x30 Blue x30 Blue x30 Blue x30 Blue (loop) x20  Blue (loop) x20          - TKE

## 2018-06-25 ENCOUNTER — TELEPHONE (OUTPATIENT)
Dept: NEUROLOGY | Facility: CLINIC | Age: 68
End: 2018-06-25

## 2018-06-25 ENCOUNTER — EVALUATION (OUTPATIENT)
Dept: PHYSICAL THERAPY | Facility: CLINIC | Age: 68
End: 2018-06-25
Payer: COMMERCIAL

## 2018-06-25 DIAGNOSIS — G35 MULTIPLE SCLEROSIS (HCC): Primary | ICD-10-CM

## 2018-06-25 PROCEDURE — 97112 NEUROMUSCULAR REEDUCATION: CPT | Performed by: PHYSICAL THERAPIST

## 2018-06-25 PROCEDURE — G8978 MOBILITY CURRENT STATUS: HCPCS | Performed by: PHYSICAL THERAPIST

## 2018-06-25 PROCEDURE — 97110 THERAPEUTIC EXERCISES: CPT | Performed by: PHYSICAL THERAPIST

## 2018-06-25 PROCEDURE — G8979 MOBILITY GOAL STATUS: HCPCS | Performed by: PHYSICAL THERAPIST

## 2018-06-25 NOTE — TELEPHONE ENCOUNTER
Pt states that she was taking amitriptyline for 5 days, she was getting no sleep and her nerve shocks were worse, states she was taking 25mg at bedtime, so she decreased this to 1/2 tablet for the last 2 days and she plans to stop

## 2018-06-25 NOTE — PROGRESS NOTES
PT RE-EVALUATION    Today's date: 2018  Patient name: Mara Barreto  : 1950  MRN: [id number]  Referring provider: Sylvie Melton PA-C  Dx:   Encounter Diagnosis     ICD-10-CM    1  Multiple sclerosis [facility]) G35                   Assessment    Assessment details: Mara Barreto is a pleasant 70 y o  presenting to physical therapy with MD referral for Multiple sclerosis ([facility] )  Since time of previous re- evaluation, pt has shown improvement in bilateral lower extremity strength and walking speed  Pt continues to demonstrate decreased functional balance and strength and would continue to benefit from skilled PT services  Although improvements have been made, pt continues to be at a high fall risk and presents with significant lower extremity weakness  Problem list:  Decreased hip/core strength, poor balance, high fall risk with five time sit to stand testing, SLS, and TUG times  Treatment to include: Lower extremity/core strengthening, neuromuscular control exercises, balance/proprioception training,  instruction in a comprehensive HEP, and modalities as needed  This pt would benefit from skilled PT services to address their impairments and functional limitations to maximize functional outcome  Barriers to therapy: chronicity of symptoms, progressive disease, hx of hip replacement on R hip, matthew placement in L hip  Understanding of Dx/Px/POC: good   Prognosis: fair    Goals  ST  Pt will improve TUG time to average 18 seconds in 2 weeks  MET  2  Pt will be able to perform 5 sit to stand repetitions without use of upper extremity assistance in 2 weeks  NOT MET  3  Pt will improve SLS to at least 5 seconds in 2 weeks  LT  Pt will be able to negotiate a flight of stairs with no more than mild fatigue in 4 weeks  PARTIALLY MET  2  Pt will be independent in a comprehensive HEP in 4 weeks  PARTIALLY MET  3   Pt will be able to grocery shop for 45 minutes with no more than moderate leg fatigue after in 4 weeks  NOT MET    Plan  Patient would benefit from: skilled PT  Frequency: 2x week  Duration in weeks: 4  Treatment plan discussed with: patient  Plan details: Discussed transitioning to [address] [place] [address] after 4 weeks  Subjective Evaluation    History of Present Illness  Mechanism of injury: Current status:  - ADLs/Functional activities:   - Stairs Step to pattern with moderate fatigue and use of BUE support (worse)   - Sit to stand with mild/moderate difficulty with use of arms  (same)   - Walking < 5 minutes with SC with moderate leg fatigue and shortness of breath (same)   - Sleeping with severe difficulty since medication change (worse)   - Transferring in/out of vehicle with mild difficulty (SUV), moderate difficulty (car)- same   - Severe difficulty negotiating up bleachers to watch grandchildren's sporting events (same)   - Transferring in/out of shower with moderate difficulty setting over lip of shower  - Mild difficulty with bed mobility (improved)  - Work: Not a working individual  - Recreation: none    Since time of previous re-evaluation, pt reports increased leg shocks which MD changed medications to control  Pt states she is experiencing significant difficulty sleeping and is self weaning from medication because it is ineffective for nerve shocks and causing her increased difficulty sleeping  Pt plans to call MD today to discuss  Functionally, pt reports improvement in bed mobility skills; however, otherwise no significant change which she attributes to the increased leg shocks  This pt would continue to benefit from skilled PT services to maximize functional outcome    Pain  Current pain ratin  At best pain ratin  At worst pain ratin  Location: R leg only  Quality: "nerve shock that causes leg to jump"  Progression: improved    Patient Goals  Patient goals for therapy: increased strength, independence with ADLs/IADLs and improved balance  Patient goal: Pt will be able to walk outside without total fear of falling        Objective     Neurological Testing     Sensation     Lumbar   Left   Diminished: light touch    Right   Intact: light touch    Comments   Left light touch: Decreased sensation distal to knee on L    Reflexes   Left   Patellar (L4): trace (1+)  Achilles (S1): trace (1+)    Right   Patellar (L4): trace (1+)  Achilles (S1): trace (1+)    Additional Neurological Details  No spacticity presenting- pt state she just took her medication prior to appointment    Strength/Myotome Testing     Left Hip   Planes of Motion   Flexion: 4  External rotation: 3+    Right Hip   Planes of Motion   Flexion: 4+  External rotation: 3+    Left Knee   Flexion: 4  Extension: 4    Right Knee   Flexion: 4+  Extension: 4+    Left Ankle/Foot   Dorsiflexion: 4+  Plantar flexion: 4+  Great toe extension: 4    Right Ankle/Foot   Dorsiflexion: 4+  Plantar flexion: 4+  Great toe extension: 5    Additional Strength Details  Gait: Pt ambulates over level surface with a scissoring pattern  Mild flexed trunk posture, with decreased arm swing on LUE       Balance: (goal: SLS 5 seconds to indicate no increase in fall risk)  SLS L: 2 second  SLS R: 2 seconds  Feet together eyes open: 30 seconds mild sway  Feet together eyes closed: 30 seconds, moderate sway    TUG: no AD (goal: < 14 seconds to indicate no increased fall risk)  23 "  25"  22"  Av"    Re-evaluation (18)  18"  19"  22"  Av 67"    Re-evaluation (18)  16"  16"  16"  Average: 16"    Five time sit to stand: (goal < 13 6 seconds to indicate no increase in disability/morbidity)  - 2 repetitions in 44 seconds (unable to perform more than two reps with multiple attempts)- on initial evaluation  - 1 repetition in 5 seconds and multiple attempts for 33 seconds prior to giving up on RE  - 1 rep in 6 seconds and multiple attempts for 27 seconds prior to give up on RE # 2          Precautions: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, decreased sensation in distal LLE, high fall risk, osteoporosis     Daily Treatment Diary         Exercise Diary  6/1 6/4 6/7 6/11 6/14 6/18 6/21 6-25 (RE)       Recumbent bike 6'  6'  6' 6'  7'  7'  np  7'       Standing:                       - hip flexion (march) 20 ea  20 ea  np  np 8" alt tap step 2x10  8" alt tap step  8" alt tap step  NV       - heel/toe raises 30 ea 30 ea  30 ea  30 ea  30 ea  30 ea  30 ea   30 ea       - hip abduction (L leg on green foam) 2x15 ea 2x15 ea  2x15 ea 1# 2x15 ea  2x15 ea  1# 2x15 ea  2x15 ea  1# 2x15 ea       - hip extension (L leg on green foam) 2x15 ea 2x15 ea  2x15 ea 1# 2x15 ea  2x15 ea  1# 2x15 ea  2x15 ea  1# 2x15 ea       - HS curls                       - mini squats from 145 degrees knee flexion               2 x 5       - lateral step up and overs 6" 10 x 6" x15 6" x15 6" x15 6" 10x 6"  NV  6" 15x ea 6" x15        - front step ups 6" 10 ea 6" x10 ea 6" x15 6" x20 6" x17 L,x20 R 6" 2x10 ea  6" 2x10 6" 2x10 ea       - SLS        10"x3  10"x3 ea  NV    NV       - lateral cone walks 4 cones, 2 laps 6 cones, 2 laps  6 cone, 2 laps  6 cones, 2 laps resume nv  6 cones, 2 laps  6 cones, 2 laps  NV       - rockerboard A/P, M/L 20 ea  20 ea  20 ea  20 ea  20 ea  20 ea  20x ea  20x ea                               TB                        - rows Blue x30 Blue x30 Blue x30 Blue x30 Blue x30 Black  x30   Black  x30   Black 30x       - pulldowns Blue x30 Blue x30 Blue x30 Blue x30 Blue x30 Black x20  Black x20  Black 30x       - no money Green x30 Blue x30 Blue x30 Blue x30 Blue x30 Blue (loop) x20  Blue (loop) x20  Blue x 30        - TKE

## 2018-06-26 NOTE — TELEPHONE ENCOUNTER
I do not see on the Rx that it says "do not substitute"  Generic is ok if patient has been taking generic  Ok to give verbal for generic    Again, I do not see anything on the script that was sent that it says not to substitute or EITAN

## 2018-06-26 NOTE — TELEPHONE ENCOUNTER
[facility] called to clarify zonisamide script, written for generic but states do not substitute  she states that pt has been receiving generic since 12/30/16    please advise  ref # [id number]  [phone]

## 2018-06-28 ENCOUNTER — OFFICE VISIT (OUTPATIENT)
Dept: PHYSICAL THERAPY | Facility: CLINIC | Age: 68
End: 2018-06-28
Payer: COMMERCIAL

## 2018-06-28 DIAGNOSIS — G35 MULTIPLE SCLEROSIS (HCC): Primary | ICD-10-CM

## 2018-06-28 PROCEDURE — 97112 NEUROMUSCULAR REEDUCATION: CPT

## 2018-06-28 PROCEDURE — 97110 THERAPEUTIC EXERCISES: CPT

## 2018-06-28 NOTE — PROGRESS NOTES
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, decreased sensation in distal LLE, high fall risk, osteoporosis     Daily Treatment Diary         Exercise Diary   (RE)       Recumbent bike 6'  6'  6' 6'  7'  7'  np  7'  8'     Standing:                       - hip flexion (march) 20 ea  20 ea  np  np 8" alt tap step 2x10  8" alt tap step  8" alt tap step  NV       - heel/toe raises 30 ea 30 ea  30 ea  30 ea  30 ea  30 ea  30 ea   30 ea  30 ea     - hip abduction (L leg on green foam) 2x15 ea 2x15 ea  2x15 ea 1# 2x15 ea  2x15 ea  1# 2x15 ea  2x15 ea  1# 2x15 ea 1# 2x15 ea     - hip extension (L leg on green foam) 2x15 ea 2x15 ea  2x15 ea 1# 2x15 ea  2x15 ea  1# 2x15 ea  2x15 ea  1# 2x15 ea 1# 2x15  ea     - HS curls                       - mini squats from 145 degrees knee flexion               2 x 5  5x5 (adjustable table, 5 levels)     - lateral step up and overs 6" 10 x 6" x15 6" x15 6" x15 6" 10x 6"  NV  6" 15x ea 6" x15  6" x15     - front step ups 6" 10 ea 6" x10 ea 6" x15 6" x20 6" x17 L,x20 R 6" 2x10 ea  6" 2x10 6" 2x10 ea 6"  2x10 ea     - SLS        10"x3  10"x3 ea  NV    NV  NV     - lateral cone walks 4 cones, 2 laps 6 cones, 2 laps  6 cone, 2 laps  6 cones, 2 laps resume nv  6 cones, 2 laps  6 cones, 2 laps  NV  6 cones, 2 laps     - rockerboard A/P, M/L 20 ea  20 ea  20 ea  20 ea  20 ea  20 ea  20x ea  20x ea  20x ea                             TB                        - rows Blue x30 Blue x30 Blue x30 Blue x30 Blue x30 Black  x30   Black  x30   Black 30x  np     - pulldowns Blue x30 Blue x30 Blue x30 Blue x30 Blue x30 Black x20  Black x20  Black 30x  np     - no money Green x30 Blue x30 Blue x30 Blue x30 Blue x30 Blue (loop) x20  Blue (loop) x20  Blue x 30 Blue x30      - TKE                                                    Assessment: Tolerated treatment well  Patient would benefit from continued PT  Pt able to perform sit to stands from adjustable table at 5 different levels, 5 ea; however, pt notes increased LE fatigue following and as a result defers TB rows, pull downs  Pt continues to be challenged by L step ups  Group : [id number]      Plan: Progress treatment as tolerated

## 2018-07-02 ENCOUNTER — OFFICE VISIT (OUTPATIENT)
Dept: PHYSICAL THERAPY | Facility: CLINIC | Age: 68
End: 2018-07-02
Payer: COMMERCIAL

## 2018-07-02 DIAGNOSIS — G35 MULTIPLE SCLEROSIS (HCC): Primary | ICD-10-CM

## 2018-07-02 PROCEDURE — 97110 THERAPEUTIC EXERCISES: CPT

## 2018-07-02 PROCEDURE — 97112 NEUROMUSCULAR REEDUCATION: CPT

## 2018-07-02 NOTE — PROGRESS NOTES
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, decreased sensation in distal LLE, high fall risk, osteoporosis     Daily Treatment Diary         Exercise Diary   (RE)     Recumbent bike 6'  6'  6' 6'  7'  7'  np  7'  8'  8'   Standing:                       - hip flexion (march) 20 ea  20 ea  np  np 8" alt tap step 2x10  8" alt tap step  8" alt tap step  NV       - heel/toe raises 30 ea 30 ea  30 ea  30 ea  30 ea  30 ea  30 ea   30 ea  30 ea 30 ea   - hip abduction (L leg on green foam) 2x15 ea 2x15 ea  2x15 ea 1# 2x15 ea  2x15 ea  1# 2x15 ea  2x15 ea  1# 2x15 ea 1# 2x15 ea 1# 2x15 ea   - hip extension (L leg on green foam) 2x15 ea 2x15 ea  2x15 ea 1# 2x15 ea  2x15 ea  1# 2x15 ea  2x15 ea  1# 2x15 ea 1# 2x15  ea 1# 2x15 ea   - HS curls                       - mini squats from 145 degrees knee flexion               2 x 5  5x5 (adjustable table, 5 levels)  3x5   - lateral step up and overs 6" 10 x 6" x15 6" x15 6" x15 6" 10x 6"  NV  6" 15x ea 6" x15  6" x15 6" x10   - front step ups 6" 10 ea 6" x10 ea 6" x15 6" x20 6" x17 L,x20 R 6" 2x10 ea  6" 2x10 6" 2x10 ea 6"  2x10 ea 6" 2x10 ea   - SLS        10"x3  10"x3 ea  NV    NV  NV  NV                - front cone walks          6 cones, 2 laps   - lateral cone walks 4 cones, 2 laps 6 cones, 2 laps  6 cone, 2 laps  6 cones, 2 laps resume nv  6 cones, 2 laps  6 cones, 2 laps  NV  6 cones, 2 laps 6 cones, 2 laps   - rockerboard A/P, M/L 20 ea  20 ea  20 ea  20 ea  20 ea  20 ea  20x ea  20x ea  20x ea  20 ea                           TB                        - rows Blue x30 Blue x30 Blue x30 Blue x30 Blue x30 Black  x30   Black  x30   Black 30x  np Black x30    - pulldowns Blue x30 Blue x30 Blue x30 Blue x30 Blue x30 Black x20  Black x20  Black 30x  np Black x30   - no money Green x30 Blue x30 Blue x30 Blue x30 Blue x30 Blue (loop) x20  Blue (loop) x20  Blue x 30 Blue x30 Blue loop x30    - TKE                                                    Assessment: Tolerated treatment well  Patient would benefit from continued PT  Pt continues to have difficulty w/ sit to stand from a soft chair w/out UE assist  Added front cone walks to encourage proper gait , L hip flexion  Plan: Progress treatment as tolerated

## 2018-07-05 ENCOUNTER — OFFICE VISIT (OUTPATIENT)
Dept: PHYSICAL THERAPY | Facility: CLINIC | Age: 68
End: 2018-07-05
Payer: COMMERCIAL

## 2018-07-05 DIAGNOSIS — G35 MULTIPLE SCLEROSIS (HCC): Primary | ICD-10-CM

## 2018-07-05 PROCEDURE — 97112 NEUROMUSCULAR REEDUCATION: CPT

## 2018-07-05 PROCEDURE — 97110 THERAPEUTIC EXERCISES: CPT

## 2018-07-05 NOTE — PROGRESS NOTES
Daily Note     Today's date: 2018  Patient name: Magaly Chery  : 1950  MRN: [id number]  Referring provider: Tanisha Danielle PA-C  Dx:   Encounter Diagnosis     ICD-10-CM    1  Multiple sclerosis ([address] ) G35                   Subjective: Patient reports she slept through the night which is a first in a while  Objective: See treatment diary below      Assessment: Tolerated treatment well  Patient exhibited good technique with therapeutic exercises  Patient is improving strength and mobility overall as she is able to progress with repetitions and is able to lift her LLE much easier when walking  Per patient she is feeling the best she has been however was fatigued after treatment  Plan: Continue per plan of care       Precautions: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, decreased sensation in distal LLE, high fall risk, osteoporosis     Daily Treatment Diary         Exercise Diary    (RE)     Recumbent bike  6'  6' 6'  7'  7'  np  7'  8'  8' 8'   Standing:                      - hip flexion (march)  20 ea  np  np 8" alt tap step 2x10  8" alt tap step  8" alt tap step  NV        - heel/toe raises 30 ea  30 ea  30 ea  30 ea  30 ea  30 ea   30 ea  30 ea 30 ea 30 ea   - hip abduction (L leg on green foam) 2x15 ea  2x15 ea 1# 2x15 ea  2x15 ea  1# 2x15 ea  2x15 ea  1# 2x15 ea 1# 2x15 ea 1# 2x15 ea 2# 2x15   - hip extension (L leg on green foam) 2x15 ea  2x15 ea 1# 2x15 ea  2x15 ea  1# 2x15 ea  2x15 ea  1# 2x15 ea 1# 2x15  ea 1# 2x15 ea 2# 2x15   - HS curls                      - mini squats from 145 degrees knee flexion             2 x 5  5x5 (adjustable table, 5 levels)  3x5 2x10   - lateral step up and overs 6" x15 6" x15 6" x15 6" 10x 6"  NV  6" 15x ea 6" x15  6" x15 6" x10 6" 2x10   - front step ups 6" x10 ea 6" x15 6" x20 6" x17 L,x20 R 6" 2x10 ea  6" 2x10 6" 2x10 ea 6"  2x10 ea 6" 2x10 ea 6" 2x10   - SLS      10"x3  10"x3 ea  NV    NV  NV  NV                           - front cone walks                 6 cones, 2 laps 6 cones, 2 laps   - lateral cone walks 6 cones, 2 laps  6 cone, 2 laps  6 cones, 2 laps resume nv  6 cones, 2 laps  6 cones, 2 laps  NV  6 cones, 2 laps 6 cones, 2 laps 6 cones, 2 laps   - rockerboard A/P, M/L  20 ea  20 ea  20 ea  20 ea  20 ea  20x ea  20x ea  20x ea  20 ea 20 ea                          TB                       - rows Blue x30 Blue x30 Blue x30 Blue x30 Black  x30   Black  x30   Black 30x  np Black x30  Black x30    - pulldowns Blue x30 Blue x30 Blue x30 Blue x30 Black x20  Black x20  Black 30x  np Black x30 Black x30    - no money Blue x30 Blue x30 Blue x30 Blue x30 Blue (loop) x20  Blue (loop) x20  Blue x 30 Blue x30 Blue loop x30 Blue loop x30    - TKE                       Tricep Ext                   Green 20x

## 2018-07-09 ENCOUNTER — OFFICE VISIT (OUTPATIENT)
Dept: PHYSICAL THERAPY | Facility: CLINIC | Age: 68
End: 2018-07-09
Payer: COMMERCIAL

## 2018-07-09 DIAGNOSIS — R20.2 PARESTHESIA: ICD-10-CM

## 2018-07-09 DIAGNOSIS — G35 MULTIPLE SCLEROSIS (HCC): Primary | ICD-10-CM

## 2018-07-09 PROCEDURE — 97110 THERAPEUTIC EXERCISES: CPT

## 2018-07-09 PROCEDURE — 97112 NEUROMUSCULAR REEDUCATION: CPT

## 2018-07-09 PROCEDURE — G8979 MOBILITY GOAL STATUS: HCPCS

## 2018-07-09 PROCEDURE — G8978 MOBILITY CURRENT STATUS: HCPCS

## 2018-07-09 NOTE — PROGRESS NOTES
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, decreased sensation in distal LLE, high fall risk, osteoporosis     Daily Treatment Diary         Exercise Diary               Recumbent bike  8'            Standing:                       - hip flexion (march)  8" alt tap- np                            - hip abduction (L leg on green foam) 2# 2x15 ea            - hip extension (L leg on green foam) 2# 2x15 ea            - HS curls                       - mini squats from 145 degrees knee flexion (sit to stands) adjustable table 3x5 (3 lowest levels)                 - lateral step up and overs 6" x15            - front step ups 6" 2x10 ea            - SLS                                        - front cone walks 6 cones, 2 laps            - lateral cone walks 6 cones, 2 laps            - rockerboard A/P, M/L  20 ea                                    TB                        - rows Black x30            - pulldowns Black x30            - no money Blue loop x30             - TKE  nv                      Tricep Ext  nv                         Assessment: Tolerated treatment well  Patient exhibited good technique with therapeutic exercises  Pt fatigued by the end of tx session  Continues to be most challenged by L step ups and standing hip abd/ext w/ 2lbs  Plan: Progress treatment as tolerated

## 2018-07-10 RX ORDER — GABAPENTIN 300 MG/1
300 CAPSULE ORAL
Qty: 90 CAPSULE | Refills: 3 | Status: SHIPPED | OUTPATIENT
Start: 2018-07-10 | End: 2018-10-29 | Stop reason: DRUGHIGH

## 2018-07-10 NOTE — TELEPHONE ENCOUNTER
From: Flor Guaman  Sent: 7/9/2018 6:57 PM EDT  Subject: Medication Renewal Request    Flor Guaman would like a refill of the following medications:     gabapentin (NEURONTIN) 300 mg capsule Valentino Keyes PA-C]    Preferred pharmacy: [address]

## 2018-07-15 DIAGNOSIS — G35 MULTIPLE SCLEROSIS (HCC): Primary | ICD-10-CM

## 2018-07-16 ENCOUNTER — OFFICE VISIT (OUTPATIENT)
Dept: PHYSICAL THERAPY | Facility: CLINIC | Age: 68
End: 2018-07-16
Payer: COMMERCIAL

## 2018-07-16 DIAGNOSIS — G35 MULTIPLE SCLEROSIS (HCC): Primary | ICD-10-CM

## 2018-07-16 PROCEDURE — 97110 THERAPEUTIC EXERCISES: CPT

## 2018-07-16 PROCEDURE — 97112 NEUROMUSCULAR REEDUCATION: CPT

## 2018-07-16 RX ORDER — BACLOFEN 10 MG/1
TABLET ORAL
Qty: 360 TABLET | Refills: 3 | Status: SHIPPED | OUTPATIENT
Start: 2018-07-16 | End: 2019-04-15 | Stop reason: SDUPTHER

## 2018-07-16 NOTE — PROGRESS NOTES
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, decreased sensation in distal LLE, high fall risk, osteoporosis     Daily Treatment Diary         Exercise Diary                      Recumbent bike-seat 13  8'  8'                   Standing:                       - hip flexion (march)  8" alt tap- np  np                                           - hip abduction (L leg on green foam) 2# 2x15 ea 2# 2x15 ea                   - hip extension (L leg on green foam) 2# 2x15 ea 2# 2x15 ea                   - HS curls                       - mini squats from 145 degrees knee flexion (sit to stands) adjustable table 3x5 (3 lowest levels) lowest setting x15                   - lateral step up and overs 6" x15 6" x15                   - front step ups 6" 2x10 ea 6" 2x10 ea                   - SLS                                               - front cone walks 6 cones, 2 laps 6 cones, 2 laps                   - lateral cone walks 6 cones, 2 laps 6 cones, 2 laps                   - rockerboard A/P, M/L  20 ea  20 ea                                           TB                        - rows Black x30 blk x30                   - pulldowns Black x30 blk x30                   - no money Blue loop x30 Blue loop x30                    - TKE  nv  NV                    Tricep Ext  nv  NV                          Assessment: Tolerated treatment well  Patient exhibited good technique with therapeutic exercises  Pt continues to be challenged by step ups and cone walks  Plan: Progress treatment as tolerated

## 2018-07-19 ENCOUNTER — OFFICE VISIT (OUTPATIENT)
Dept: PHYSICAL THERAPY | Facility: CLINIC | Age: 68
End: 2018-07-19
Payer: COMMERCIAL

## 2018-07-19 DIAGNOSIS — G35 MULTIPLE SCLEROSIS (HCC): Primary | ICD-10-CM

## 2018-07-19 PROCEDURE — 97112 NEUROMUSCULAR REEDUCATION: CPT

## 2018-07-19 PROCEDURE — 97110 THERAPEUTIC EXERCISES: CPT

## 2018-07-19 NOTE — PROGRESS NOTES
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, decreased sensation in distal LLE, high fall risk, osteoporosis     Daily Treatment Diary         Exercise Diary                    Recumbent bike-seat 13  8'  8'  8'                 Standing:                       - hip flexion (march)  8" alt tap- np  np  np                                         - hip abduction (L leg on green foam) 2# 2x15 ea 2# 2x15 ea 2# 2x15 ea                 - hip extension (L leg on green foam) 2# 2x15 ea 2# 2x15 ea 2# 2x15 ea                 - HS curls                       - mini squats from 145 degrees knee flexion (sit to stands) adjustable table 3x5 (3 lowest levels) lowest setting x15  15x (no UE assist)                 - lateral step up and overs 6" x15 6" x15 6" x10                 - front step ups 6" 2x10 ea 6" 2x10 ea 6" 2x10                 - SLS                                               - front cone walks 6 cones, 2 laps 6 cones, 2 laps  np                 - lateral cone walks 6 cones, 2 laps 6 cones, 2 laps 6 cones, 1 laps                 - rockerboard A/P, M/L  20 ea  20 ea  20 ea                                         TB                        - rows Black x30 blk x30 blk x30                 - pulldowns Black x30 blk x30 blk x30                 - no money Blue loop x30 Blue loop x30 Blue loop x30                  - TKE  nv  NV                    Tricep Ext  nv  NV Blue x30                      Assessment: Tolerated treatment well  Patient would benefit from continued PT    Pt complains of dizziness while looking down to perform lateral cone walks; only one lap performed  Pt able to perform 2 sit to stands from regular clinic chair w/out UE assist       Plan: Progress treatment as tolerated

## 2018-07-20 ENCOUNTER — OFFICE VISIT (OUTPATIENT)
Dept: FAMILY MEDICINE CLINIC | Facility: OTHER | Age: 68
End: 2018-07-20
Payer: COMMERCIAL

## 2018-07-20 VITALS
WEIGHT: 135.4 LBS | SYSTOLIC BLOOD PRESSURE: 128 MMHG | DIASTOLIC BLOOD PRESSURE: 72 MMHG | BODY MASS INDEX: 23.61 KG/M2 | HEART RATE: 76 BPM | OXYGEN SATURATION: 98 % | TEMPERATURE: 98.8 F

## 2018-07-20 DIAGNOSIS — R04.0 EPISTAXIS: Primary | ICD-10-CM

## 2018-07-20 PROBLEM — M79.662 PAIN OF LEFT LOWER LEG: Status: ACTIVE | Noted: 2017-07-25

## 2018-07-20 PROBLEM — M25.562 LEFT KNEE PAIN: Status: ACTIVE | Noted: 2017-07-25

## 2018-07-20 PROBLEM — M79.662 PAIN OF LEFT CALF: Status: ACTIVE | Noted: 2017-07-25

## 2018-07-20 PROBLEM — S89.91XA INJURY OF RIGHT LEG: Status: ACTIVE | Noted: 2017-07-17

## 2018-07-20 PROBLEM — S86.912A MUSCLE STRAIN OF LEFT LOWER LEG: Status: ACTIVE | Noted: 2017-07-17

## 2018-07-20 PROBLEM — M25.572 LEFT ANKLE PAIN: Status: ACTIVE | Noted: 2017-07-25

## 2018-07-20 PROCEDURE — 99214 OFFICE O/P EST MOD 30 MIN: CPT | Performed by: FAMILY MEDICINE

## 2018-07-20 RX ORDER — AZELASTINE 1 MG/ML
1 SPRAY, METERED NASAL 2 TIMES DAILY
Qty: 30 ML | Refills: 0 | Status: SHIPPED | OUTPATIENT
Start: 2018-07-20 | End: 2018-11-15

## 2018-07-20 RX ORDER — LORAZEPAM 1 MG/1
TABLET ORAL
COMMUNITY
Start: 2018-05-19 | End: 2018-10-10 | Stop reason: SDUPTHER

## 2018-07-20 NOTE — PROGRESS NOTES
Assessment/Plan:  Advised to use the new nasal spray instead of the fluticasone for allergies  Use saline spray of the nose to keep it moist   Use humidifier at home   Referral to ENT if still persists despite the above therapy recommendations  Problem List Items Addressed This Visit     None      Visit Diagnoses     Epistaxis    -  Primary    Relevant Medications    azelastine (ASTELIN) 0 1 % nasal spray    Other Relevant Orders    Ambulatory Referral to Otolaryngology            Subjective:      Patient ID: Marlene Todd is a 70 y o  female  Patient is here for eval of nose bleed few time in the past week  Last Wednesday she was shopping and noted a warm sensation by the nasal skin and when she reached out for it was fresh blood that kept on running from the nose and stained her shirt and the floor  She applied pressure and it stopped and she didn't have any aggravating factor and it was completely asymptomatic  Nose Bleed    The bleeding has been from both nares  This is a new problem  The current episode started more than 1 month ago  Episode frequency: twice it happened over the past 4 months  both times a significant amount of bleeding from the nose with no aggravation  The problem has been waxing and waning  Associated with: no trauma or blood thinners or recent URI or sinus congestion  She has tried pressure for the symptoms  The treatment provided moderate relief  Her past medical history is significant for allergies  There is no history of a bleeding disorder or frequent nosebleeds  The following portions of the patient's history were reviewed and updated as appropriate: allergies, current medications, past family history, past medical history, past social history, past surgical history and problem list     Review of Systems   Constitutional: Negative for appetite change, chills, fatigue, fever and unexpected weight change  HENT: Positive for nosebleeds   Negative for congestion, ear pain, rhinorrhea and sore throat  Eyes: Negative for visual disturbance  Respiratory: Negative for cough, chest tightness and shortness of breath  Cardiovascular: Negative for chest pain, palpitations and leg swelling  Gastrointestinal: Negative for abdominal pain, blood in stool, constipation and diarrhea  Endocrine: Negative for cold intolerance, heat intolerance, polydipsia, polyphagia and polyuria  Genitourinary: Negative for dysuria, flank pain, menstrual problem and vaginal discharge  Musculoskeletal: Positive for gait problem and myalgias  Negative for arthralgias and back pain  Skin: Negative for rash  Allergic/Immunologic: Negative for environmental allergies  Neurological: Positive for tremors and weakness  Negative for dizziness and headaches  Hematological: Negative for adenopathy  Psychiatric/Behavioral: Negative for behavioral problems, decreased concentration and sleep disturbance  The patient is not nervous/anxious and is not hyperactive  Objective:      /72 (BP Location: Right arm, Patient Position: Sitting, Cuff Size: Standard)   Pulse 76   Temp 98 8 °F (37 1 °C) (Tympanic)   Wt 61 4 kg (135 lb 6 4 oz)   SpO2 98%   BMI 23 61 kg/m²          Physical Exam   Constitutional: She is oriented to person, place, and time  She appears well-developed and well-nourished  No distress  HENT:   Head: Normocephalic and atraumatic  Right Ear: External ear normal    Left Ear: External ear normal    Nose: Nose normal    Mouth/Throat: Oropharynx is clear and moist  No oropharyngeal exudate  No active or old bleeding noted in the nose  Eyes: Conjunctivae are normal  Right eye exhibits no discharge  Left eye exhibits no discharge  No scleral icterus  Neck: Normal range of motion  Neck supple  No thyromegaly present  Cardiovascular: Normal rate, regular rhythm and normal heart sounds  No murmur heard    Pulmonary/Chest: Effort normal and breath sounds normal  No respiratory distress  She has no wheezes  Abdominal: Soft  Bowel sounds are normal  She exhibits no distension  There is no tenderness  Lymphadenopathy:     She has no cervical adenopathy  Neurological: She is alert and oriented to person, place, and time  No cranial nerve deficit  Skin: Skin is warm and dry  No rash noted  She is not diaphoretic  Psychiatric: She has a normal mood and affect  Her behavior is normal    Nursing note and vitals reviewed        Lab Results   Component Value Date    WBC 4 91 12/04/2017    HGB 13 3 12/04/2017    HCT 40 5 12/04/2017     12/04/2017    CHOL 232 (H) 09/05/2013    TRIG 82 09/05/2013    HDL 70 09/05/2013    ALT 30 12/04/2017    AST 15 12/04/2017     08/05/2017    K 4 1 08/05/2017     (H) 08/05/2017    CREATININE 1 05 06/14/2018    BUN 14 08/05/2017    CO2 25 08/05/2017    INR 1 17 (H) 10/01/2014    GLUF 84 08/05/2017

## 2018-07-20 NOTE — PATIENT INSTRUCTIONS
Epistaxis   WHAT YOU SHOULD KNOW:   Epistaxis is a nosebleed  A nosebleed occurs when the blood vessels near the surface of the nasal cavity are injured or damaged  AFTER YOU LEAVE:   Medicines:   · Nasal sprays:  Vasoconstrictor nasal spray is a medicine that helps make nasal blood vessels narrower  This limits the blood flow and stops the bleeding  This medicine also decreases the swelling inside your nose and helps you breathe easier  You may also be directed to use saline or other nasal sprays to add moisture to your nose  · Antibiotics: This medicine is given to help treat or prevent an infection caused by bacteria  · Take your medicine as directed  Call your healthcare provider if you think your medicine is not helping or if you have side effects  Tell him if you are allergic to any medicine  Keep a list of the medicines, vitamins, and herbs you take  Include the amounts, and when and why you take them  Bring the list or the pill bottles to follow-up visits  Carry your medicine list with you in case of an emergency  Follow up with your primary healthcare provider or otolaryngologist within 2 to 3 days or as directed: Any packing in your nose should be removed within 2 to 3 days  Write down your questions so you remember to ask them during your visits  First aid:   · Sit up and lean forward: This will help prevent you from swallowing blood  Spit blood and saliva into a bowl  · Apply pressure to your nose:  Use 2 fingers to pinch your nose shut for 10 minutes  This will help stop the bleeding  Breathe through your mouth  · Apply ice:  Use a cold pack or put crushed ice in a bag, cover with a towel, and place on the bridge of your nose  · Nasal packing:  Pack your nose with a cotton ball, tissue, tampon, or gauze bandage to stop the bleeding  Prevent epistaxis:  · Avoid nose picking and blowing your nose too hard: You can irritate or damage your nose if you pick it   Blowing your nose too hard may cause the bleeding to start again  · Avoid irritants:  Substances that can irritate your nose should be avoided  These include tobacco smoke and chemical sprays such as  that contain ammonia  · Use a cool mist humidifier in your home: This will add the moisture to the air and help keep your nose moist      · Put a small amount of petroleum jelly inside your nostrils: You may apply a small amount of petroleum jelly if you do not have a nasal packing  This will help keep your nose from drying out or getting irritated  Do not put anything else inside your nose unless your primary healthcare provider tells you to do so  Contact your primary healthcare provider or otolaryngologist if:   · You have a fever and are vomiting  · You have pain in and around your nose that is getting worse even after you take pain medicines  · Your nasal pack is loose  · You have questions or concerns about your condition or care  Seek care immediately if:   · Your nasal packing is soaked with blood  · Your nose is still bleeding after 20 minutes, even after you pinch it  · You have a foul-smelling discharge coming out of your nose  · You feel so weak and dizzy that you have trouble standing up  · You have trouble breathing or talking  © 2014 [employer] is for End User's use only and may not be sold, redistributed or otherwise used for commercial purposes  All illustrations and images included in CareNotes® are the copyrighted property of [employer]  or [employer] Oter[employer]  The above information is an  only  It is not intended as medical advice for individual conditions or treatments  Talk to your doctor, nurse or pharmacist before following any medical regimen to see if it is safe and effective for you

## 2018-07-23 ENCOUNTER — APPOINTMENT (OUTPATIENT)
Dept: PHYSICAL THERAPY | Facility: CLINIC | Age: 68
End: 2018-07-23
Payer: COMMERCIAL

## 2018-07-24 ENCOUNTER — OFFICE VISIT (OUTPATIENT)
Dept: PHYSICAL THERAPY | Facility: CLINIC | Age: 68
End: 2018-07-24
Payer: COMMERCIAL

## 2018-07-24 DIAGNOSIS — G35 MULTIPLE SCLEROSIS (HCC): Primary | ICD-10-CM

## 2018-07-24 PROCEDURE — G8980 MOBILITY D/C STATUS: HCPCS | Performed by: PHYSICAL THERAPIST

## 2018-07-24 PROCEDURE — G8979 MOBILITY GOAL STATUS: HCPCS | Performed by: PHYSICAL THERAPIST

## 2018-07-24 PROCEDURE — 97112 NEUROMUSCULAR REEDUCATION: CPT | Performed by: PHYSICAL THERAPIST

## 2018-07-24 PROCEDURE — 97110 THERAPEUTIC EXERCISES: CPT | Performed by: PHYSICAL THERAPIST

## 2018-07-24 NOTE — PROGRESS NOTES
PT RE-EVALUATION and Discharge    Today's date: 2018  Patient name: Kojo Arzate  : 1950  MRN: [id number]  Referring provider: Jillian Bueno PA-C  Dx:   Encounter Diagnosis     ICD-10-CM    1  Multiple sclerosis [facility]) G35                   Assessment    Assessment details: Patient has regressed slightly since last progress note  She continues to complain of "zapping" pain down right leg and into right arm  She has decreased balance and instability, decreased LE strength and decreased function  Patient has plateaued with  Physical therapy  And will be discharged at this time to fitness and wellness program    Barriers to therapy: chronicity of symptoms, progressive disease, hx of hip replacement on R hip, matthew placement in L hip  Understanding of Dx/Px/POC: good   Prognosis: fair    Goals  ST  Pt will improve TUG time to average 18 seconds in 2 weeks  MET  2  Pt will be able to perform 5 sit to stand repetitions without use of upper extremity assistance in 2 weeks  NOT MET  3  Pt will improve SLS to at least 5 seconds in 2 weeks  -partially met     LT  Pt will be able to negotiate a flight of stairs with no more than mild fatigue in 4 weeks  PARTIALLY MET  2  Pt will be independent in a comprehensive HEP in 4 weeks  PARTIALLY MET  3  Pt will be able to grocery shop for 45 minutes with no more than moderate leg fatigue after in 4 weeks  NOT MET    Plan  Patient would benefit from: skilled PT  Treatment plan discussed with: patient  Plan details: Patient will be transitioning to Wellness Program  With patient with d c at this time           Subjective Evaluation    History of Present Illness  Mechanism of injury: Patient reports she gets pain in hips L>R with occasional buckling leading to near falls  Patient repots nerve shocks occasionally down right leg and for the first time last night got them into her right arm      Current status:  - ADLs/Functional activities:   - Stairs Step to pattern with moderate fatigue and use of BUE support (worse)   - Sit to stand with mild/moderate difficulty with use of arms  (same)   - Walking < 5 minutes with SC with moderate leg fatigue and shortness of breath (same)   - Sleeping with severe difficulty since medication change (worse)- improved since she has gotten off medication though still at baseline with difficulty    - Transferring in/out of vehicle with mild difficulty (SUV), moderate difficulty (car)- same needs UE assistance with legs    - Severe difficulty negotiating up bleachers to watch grandchildren's sporting events (same)-   - Transferring in/out of shower with moderate difficulty setting over lip of shower   (same)   - Mild difficulty with bed mobility (improved due to increased strength of UE use)   - Work: Not a working individual  - Recreation: none      Pain  Current pain ratin  At best pain ratin  At worst pain ratin  Location: R leg only, left arm/hand   Quality: "nerve shock that causes leg to jump"  Progression: improved    Patient Goals  Patient goals for therapy: increased strength, independence with ADLs/IADLs and improved balance  Patient goal: Pt will be able to walk outside without total fear of falling        Objective     Neurological Testing     Sensation     Lumbar   Left   Diminished: light touch    Right   Intact: light touch    Comments   Left light touch: Decreased sensation distal to knee on L    Reflexes   Left   Patellar (L4): trace (1+)  Achilles (S1): trace (1+)    Right   Patellar (L4): trace (1+)  Achilles (S1): trace (1+)    Additional Neurological Details  No spacticity presenting- pt state she just took her medication prior to appointment    Strength/Myotome Testing     Left Hip   Planes of Motion   Flexion: 2+  External rotation: 3-    Right Hip   Planes of Motion   Flexion: 4-  External rotation: 3-    Left Knee   Flexion: 4-  Extension: 4    Right Knee   Flexion: 4  Extension: 4+    Left Ankle/Foot   Dorsiflexion: 4  Plantar flexion: 4+  Great toe extension: 4    Right Ankle/Foot   Dorsiflexion: 4+  Plantar flexion: 4+  Great toe extension: 5    Additional Strength Details  Gait: Pt ambulates over level surface with a scissoring pattern  Mild flexed trunk posture, with decreased arm swing on LUE       Balance: (goal: SLS 5 seconds to indicate no increase in fall risk)  SLS L: 2 second  5 02 18  SLS R: 2 seconds 4 61 R 18  Feet together eyes open: 30 seconds mild sway 18-same  Feet together eyes closed: 30 seconds, moderate sway 18 -same     TUG: no AD (goal: < 14 seconds to indicate no increased fall risk)  23 "  25"  22"  Av"    Re-evaluation (18)  18"  19"  22"  Av 67"    Re-evaluation (18)  16"  16"  16"  Average: 16"    Five time sit to stand: (goal < 13 6 seconds to indicate no increase in disability/morbidity) -    5TSTS- unable to perform without use of UE today 18 15 05sec    TUG 18 18 90sec    - 2 repetitions in 44 seconds (unable to perform more than two reps with multiple attempts)- on initial evaluation  - 1 repetition in 5 seconds and multiple attempts for 33 seconds prior to giving up on RE  - 1 rep in 6 seconds and multiple attempts for 27 seconds prior to give up on RE # 2          Precautions: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, decreased sensation in distal LLE, high fall risk, osteoporosis     Daily Treatment Diary       Exercise Diary                  Recumbent bike-seat 13  8'  8'  8'  np               Standing:                       - hip flexion (march)  8" alt tap- np  np  np                                         - hip abduction (L leg on green foam) 2# 2x15 ea 2# 2x15 ea 2# 2x15 ea  2# 2x10               - hip extension (L leg on green foam) 2# 2x15 ea 2# 2x15 ea 2# 2x15 ea 2# 2x10               - HS curls                       - mini squats from 145 degrees knee flexion (sit to stands) adjustable table 3x5 (3 lowest levels) lowest setting x15  15x (no UE assist)  NP               - lateral step up and overs 6" x15 6" x15 6" x10  np               - front step ups 6" 2x10 ea 6" 2x10 ea 6" 2x10  np               - SLS                                               - front cone walks 6 cones, 2 laps 6 cones, 2 laps  np  6 cones x 2 laps                - lateral cone walks 6 cones, 2 laps 6 cones, 2 laps 6 cones, 1 laps  6 cones x 1 lap  6               - rockerboard A/P, M/L  20 ea  20 ea  20 ea  NP                                        TB                        - rows Black x30 blk x30 blk x30  np               - pulldowns Black x30 blk x30 blk x30 np               - no money Blue loop x30 Blue loop x30 Blue loop x30  np                - TKE  nv  NV                    Tricep Ext  nv  NV Blue x30  np

## 2018-08-08 ENCOUNTER — APPOINTMENT (OUTPATIENT)
Dept: LAB | Facility: CLINIC | Age: 68
End: 2018-08-08
Payer: COMMERCIAL

## 2018-08-08 DIAGNOSIS — G35 MULTIPLE SCLEROSIS (HCC): ICD-10-CM

## 2018-08-08 LAB
ALBUMIN SERPL BCP-MCNC: 4 G/DL (ref 3.5–5)
ALP SERPL-CCNC: 74 U/L (ref 46–116)
ALT SERPL W P-5'-P-CCNC: 40 U/L (ref 12–78)
ANION GAP SERPL CALCULATED.3IONS-SCNC: 7 MMOL/L (ref 4–13)
AST SERPL W P-5'-P-CCNC: 20 U/L (ref 5–45)
BASOPHILS # BLD AUTO: 0.06 THOUSANDS/ΜL (ref 0–0.1)
BASOPHILS NFR BLD AUTO: 1 % (ref 0–1)
BILIRUB SERPL-MCNC: 0.43 MG/DL (ref 0.2–1)
BUN SERPL-MCNC: 19 MG/DL (ref 5–25)
CALCIUM SERPL-MCNC: 9.3 MG/DL (ref 8.3–10.1)
CHLORIDE SERPL-SCNC: 107 MMOL/L (ref 100–108)
CO2 SERPL-SCNC: 28 MMOL/L (ref 21–32)
CREAT SERPL-MCNC: 1.02 MG/DL (ref 0.6–1.3)
EOSINOPHIL # BLD AUTO: 0.34 THOUSAND/ΜL (ref 0–0.61)
EOSINOPHIL NFR BLD AUTO: 7 % (ref 0–6)
ERYTHROCYTE [DISTWIDTH] IN BLOOD BY AUTOMATED COUNT: 12.7 % (ref 11.6–15.1)
GFR SERPL CREATININE-BSD FRML MDRD: 56 ML/MIN/1.73SQ M
GLUCOSE P FAST SERPL-MCNC: 88 MG/DL (ref 65–99)
HCT VFR BLD AUTO: 42.1 % (ref 34.8–46.1)
HGB BLD-MCNC: 13.2 G/DL (ref 11.5–15.4)
IMM GRANULOCYTES # BLD AUTO: 0.01 THOUSAND/UL (ref 0–0.2)
IMM GRANULOCYTES NFR BLD AUTO: 0 % (ref 0–2)
LYMPHOCYTES # BLD AUTO: 1.72 THOUSANDS/ΜL (ref 0.6–4.47)
LYMPHOCYTES NFR BLD AUTO: 37 % (ref 14–44)
MCH RBC QN AUTO: 30.6 PG (ref 26.8–34.3)
MCHC RBC AUTO-ENTMCNC: 31.4 G/DL (ref 31.4–37.4)
MCV RBC AUTO: 98 FL (ref 82–98)
MONOCYTES # BLD AUTO: 0.38 THOUSAND/ΜL (ref 0.17–1.22)
MONOCYTES NFR BLD AUTO: 8 % (ref 4–12)
NEUTROPHILS # BLD AUTO: 2.11 THOUSANDS/ΜL (ref 1.85–7.62)
NEUTS SEG NFR BLD AUTO: 47 % (ref 43–75)
NRBC BLD AUTO-RTO: 0 /100 WBCS
PLATELET # BLD AUTO: 225 THOUSANDS/UL (ref 149–390)
PMV BLD AUTO: 10.4 FL (ref 8.9–12.7)
POTASSIUM SERPL-SCNC: 4.2 MMOL/L (ref 3.5–5.3)
PROT SERPL-MCNC: 7.6 G/DL (ref 6.4–8.2)
RBC # BLD AUTO: 4.32 MILLION/UL (ref 3.81–5.12)
SODIUM SERPL-SCNC: 142 MMOL/L (ref 136–145)
WBC # BLD AUTO: 4.62 THOUSAND/UL (ref 4.31–10.16)

## 2018-08-08 PROCEDURE — 85025 COMPLETE CBC W/AUTO DIFF WBC: CPT

## 2018-08-08 PROCEDURE — 36415 COLL VENOUS BLD VENIPUNCTURE: CPT

## 2018-08-08 PROCEDURE — 80053 COMPREHEN METABOLIC PANEL: CPT

## 2018-08-16 ENCOUNTER — TELEPHONE (OUTPATIENT)
Dept: FAMILY MEDICINE CLINIC | Facility: OTHER | Age: 68
End: 2018-08-16

## 2018-08-16 NOTE — TELEPHONE ENCOUNTER
Please advise      ----- Message from Wendy Hull sent at 8/16/2018 12:11 PM EDT -----  Regarding: Prescription Question  Contact: [phone]  Dear Doctor,  I have tried the new nasal gtts  that you ordered and they have not been effective at all  They do not keep my nasal passages open so that I can breathe to sleep and so I am up all night because I can't breathe  Once I wake up I can't get back to sleep  It is wreaking havoc with my MS  I have again lost my sense of smell and they make me nauseous  I did go back on Flonase for a week and my symptoms resolved, but, I had another nosebleed  Can we try another Rx or should I just stay on saline spray till my f/u? Please advise    I'm exhausted!!  Thank You  Jennifer Chacko

## 2018-08-16 NOTE — TELEPHONE ENCOUNTER
Please advise her that the new nasal spray was a trial to see if it will help her with her allergy and congestion while low risk for nose bleed  If its not working then we don't need to continue  For now I suggest take the allergy pill like claritin and use the saline spray  There is not many options for the nasal spray and the other option is the steroid nasal spray that causes nose bleed for you  Thanks

## 2018-08-20 ENCOUNTER — OFFICE VISIT (OUTPATIENT)
Dept: NEUROLOGY | Facility: CLINIC | Age: 68
End: 2018-08-20
Payer: COMMERCIAL

## 2018-08-20 VITALS
WEIGHT: 136 LBS | SYSTOLIC BLOOD PRESSURE: 149 MMHG | RESPIRATION RATE: 15 BRPM | HEART RATE: 70 BPM | BODY MASS INDEX: 23.71 KG/M2 | DIASTOLIC BLOOD PRESSURE: 67 MMHG

## 2018-08-20 DIAGNOSIS — G35 MULTIPLE SCLEROSIS (HCC): Primary | ICD-10-CM

## 2018-08-20 PROCEDURE — 99214 OFFICE O/P EST MOD 30 MIN: CPT | Performed by: PSYCHIATRY & NEUROLOGY

## 2018-08-20 NOTE — PATIENT INSTRUCTIONS
Multiple sclerosis ([facility] [address] )  Pt here for neuro follow up  Pt did have flare of her sxs in may and into June with increased fatigue  And increased diff with walking  Pt still going to PT, now on fitness program at [facility]  Pt was also had diff sleeping and a paradoxical effect with elavil and pt tapered off after being on about 5 days  Pt is already maxed on her neurontin at 3900mg  Pt with lower gfr of 54 in June and 56 in aug  rec pt to discuss with her pcp  Will reduce the gabapentin to 3600 mg and may cont to decrease but pt had been having active nerve shocks down the right arm   Pt had in the past in the legs  Pt remains on brand copaxone and padmini med well  rec updated mri c and t spine without contrast to comp to prior imaging

## 2018-08-20 NOTE — PROGRESS NOTES
Patient ID: Jhony Higgins is a 70 y o  female  Assessment/Plan:    Multiple sclerosis (HCC)  Pt here for neuro follow up  Pt did have flare of her sxs in may and into June with increased fatigue  And increased diff with walking  Pt still going to PT, now on fitness program at [facility]  Pt was also had diff sleeping and a paradoxical effect with elavil and pt tapered off after being on about 5 days  Pt is already maxed on her neurontin at 3900mg  Pt with lower gfr of 54 in June and 56 in aug  rec pt to discuss with her pcp  Will reduce the gabapentin to 3600 mg and may cont to decrease but pt had been having active nerve shocks down the right arm  Pt had in the past in the legs  Pt remains on brand copaxone and padmini med well  rec updated mri c and t spine without contrast to comp to prior imaging         Diagnoses and all orders for this visit:    Multiple sclerosis ([facility] [address] )  -     MRI cervical spine wo contrast; Future  -     MRI thoracic spine wo contrast; Future           Subjective:    HPI    71 y/o female presents for MS follow up, last seen in April 2018 by onelia jean  Per onelia last note from April 18 2018"Patient with history of MS dating back to 2008, but likely with symptoms even in 1998  Patient also with PMH of breast cancer s/p left mastectomy in August 2012  Pt has been on Copaxone since 2008 and changed to the 40mg TIW dosing  "  Since last visit ,pt did have flare of her sxs in may and into June with increased fatigue  Pt has also noted some increased diff with walking  Pt still going to PT, now on fitness program at [facility]  Pt feels she has currently plateaued  Pt was also had diff sleeping and a paradoxical effect with elavil and pt tapered off after being on about 5 days  Pt is not on med presently  Pt notes no recent infections  No fever or chills/  Pt notes she is using her 3 prong cane more often    Pt notes no zingling down the spine but has has had nerve shocks down her extremeties  Pt is already maxed on her neurontin at 3900mg  Pt with lower gfr of 54 in June and 56 in aug   rec pt to discuss with her pcp  Will reduce the gabapentin to 3600 mg and may cont to decrease but pt had been having active nerve shocks down the right arm  No dm history  Pt has pcc shocks in her legs in the past as well as present  Pt remains on brand copaxone and padmini med well  Pt notes bowel issues new with constipation  Pt notes this is new since July  Pt notes she changed the brand of her magnesium but not likely a big factor  Pt also with recent urology appts cancelled about 3 times  Pt upset about change in her appt  Will ask sw to see if they can help with move up appt  Pt now unable to use flonase as well due to recurrent nose bleeds  Patient still notes benefit for her walking and endurance with the use of Ampyra  No change in speech or swallowing  No vertigo or n or v   No ha  No loc  No sz  No recent falls  No los of vision; No diplopia  No pain with eye movement    Total time spent today 30  minutes  Greater than 50% of total time was spent with the patient and / or family counseling and / or coordination of care         The following portions of the patient's history were reviewed and updated as appropriate: allergies, current medications, past family history, past medical history, past social history, past surgical history and problem list          Objective:    Blood pressure 149/67, pulse 70, resp  rate 15, weight 61 7 kg (136 lb)  Physical Exam   Constitutional: She appears well-developed and well-nourished  Eyes: EOM are normal  Pupils are equal, round, and reactive to light  Cardiovascular: Intact distal pulses  Neurological:   Reflex Scores:       Tricep reflexes are 2+ on the right side and 2+ on the left side  Bicep reflexes are 2+ on the right side and 2+ on the left side         Brachioradialis reflexes are 2+ on the right side and 2+ on the left side        Patellar reflexes are 3+ on the right side and 3+ on the left side  Achilles reflexes are 2+ on the right side and 2+ on the left side  Psychiatric: Her speech is normal        Neurological Exam    Mental Status  The patient is alert and oriented to person, place, time, and situation  Her recent and remote memory are normal  Her speech is normal  Her language is fluent with no aphasia  She has normal attention span and concentration  She follows multi-step commands  She has a normal fund of knowledge  Cranial Nerves    CN II: The patient's visual acuity and visual fields are normal   CN III, IV, VI: The patient's pupils are equally round and reactive to light and ocular movements are normal   CN V: The patient has normal facial sensation  CN VII:  The patient has symmetric facial movement  CN VIII:  The patient's hearing is normal   CN IX, X: The patient has symmetric palate movement and normal gag reflex  CN XI: The patient's shoulder shrug strength is normal   CN XII: The patient's tongue is midline without atrophy or fasciculations  Motor  The patient has normal muscle bulk throughout  Her overall muscle tone is normal throughout  Her strength is 5/5 in all four extremities except as noted  Mild left hemiparesis, diff with fine motor left greater than right     Sensory    Dec vib chanelle lowers     Reflexes  Deep tendon reflexes are 2+ and symmetric except as noted                                               Right                     Left  Brachioradialis                      2+                         2+  Biceps                                   2+                         2+  Triceps                                  2+                         2+  Finger flex                             2+                         2+  Hamstring                             2+                         2+  Patellar                                 3+                         3+  Achilles 2+                         2+  Plantar                               Downgoing            Downgoing    Gait and Coordination   She has a wide stance  She has a shortened and shuffling stride  She has normal right finger to nose and normal left finger to nose coordination  ROS:    Review of Systems   Constitutional: Positive for fatigue and unexpected weight change (weight gain)  Negative for appetite change and fever  HENT: Positive for congestion and trouble swallowing  Negative for hearing loss, tinnitus and voice change  Eyes: Positive for visual disturbance (blurred vision)  Negative for photophobia and pain  Respiratory: Negative  Negative for shortness of breath  Cardiovascular: Negative  Negative for palpitations  Gastrointestinal: Negative  Negative for nausea and vomiting  Endocrine: Negative  Negative for cold intolerance and heat intolerance  Genitourinary: Negative  Negative for dysuria, frequency and urgency  Musculoskeletal: Positive for arthralgias, gait problem (pain while walking, difficulty walking, balance problems, and falls) and myalgias  Negative for neck pain  Skin: Negative  Negative for rash  Neurological: Positive for dizziness, speech difficulty (slurred speech), light-headedness and numbness (cramping and tingling)  Negative for tremors, seizures, syncope, facial asymmetry, weakness and headaches  Hematological: Negative  Does not bruise/bleed easily  Psychiatric/Behavioral: Negative for confusion (memory problems), hallucinations and sleep disturbance (trouble falling asleep and waking up at night)  The patient is nervous/anxious (depression)

## 2018-08-20 NOTE — ASSESSMENT & PLAN NOTE
Pt here for neuro follow up  Pt did have flare of her sxs in may and into June with increased fatigue  And increased diff with walking  Pt still going to PT, now on fitness program at [facility]  Pt was also had diff sleeping and a paradoxical effect with elavil and pt tapered off after being on about 5 days  Pt is already maxed on her neurontin at 3900mg  Pt with lower gfr of 54 in June and 56 in aug  rec pt to discuss with her pcp  Will reduce the gabapentin to 3600 mg and may cont to decrease but pt had been having active nerve shocks down the right arm   Pt had in the past in the legs  Pt remains on brand copaxone and padmini med well  rec updated mri c and t spine without contrast to comp to prior imaging

## 2018-08-23 ENCOUNTER — TELEPHONE (OUTPATIENT)
Dept: UROLOGY | Facility: CLINIC | Age: 68
End: 2018-08-23

## 2018-08-23 DIAGNOSIS — N20.0 NEPHROLITHIASIS: Primary | ICD-10-CM

## 2018-08-23 NOTE — TELEPHONE ENCOUNTER
Call to patient and informed her of the instructions given and she verbalized understanding to me  Patient requested repeat KUB order be mailed to her home address

## 2018-08-23 NOTE — TELEPHONE ENCOUNTER
Reviewed recent labwork, Mesha Romano for patient to keep upcoming appointment with Dr Julianne Finley on 1/9/19  Instruct patient to call if she experiences any symptoms of stone recurrence

## 2018-08-23 NOTE — TELEPHONE ENCOUNTER
Patient managed by Dr Dayton Alvarado, seen in [place] office  Patient last seen in April 2017 for nephrolithiasis and urgency  At that time patient was to follow up in 1 year, but has not been seen since  Repeat KUB was planned for 2 years  Please advise on any necessary orders

## 2018-08-23 NOTE — TELEPHONE ENCOUNTER
----- Message from Hailee Silva sent at 8/23/2018  1:09 PM EDT -----  Regarding: Test Results Question  Contact: [phone]  After being seen by Dr Naila De La Rosa, Neurology, she wanted me to make sure that you were aware that my GFR was low at 54 and 56  I am aware that I really get busy during the day and do not drink enough  I am trying to make sure that I increase my hydration level  I do not drink carbonated or caffeinated beverages, but, unfortunately hate water  I'll try, but, at 68 not able to promise!     Thanks

## 2018-08-24 ENCOUNTER — TELEPHONE (OUTPATIENT)
Dept: FAMILY MEDICINE CLINIC | Facility: OTHER | Age: 68
End: 2018-08-24

## 2018-08-24 DIAGNOSIS — G35 MS (MULTIPLE SCLEROSIS) (HCC): Primary | ICD-10-CM

## 2018-08-24 NOTE — TELEPHONE ENCOUNTER
Pt notified  Physical made and she will get b/ w done one week before her next appointment     ----- Message from Sabiha Ramirez MD sent at 8/24/2018 10:14 AM EDT -----  Regarding: FW: Test Results Question  Contact: [phone]  Please call patient and inform that I have noted the change in her renal function and will monitor it routine every 3 months  She can keep hydrated with several options besides water like coconut water , sports drinks and even add some fruit juice mixed with water to give it flavor  I will order labs for her and will see her routine in the next 3-6 months  Thanks  She is also due to have a physical       Thanks     ----- Message -----  From: Alma Birch MA  Sent: 8/23/2018   1:30 PM  To: Sabiha Ramirez MD  Subject: FW: Test Results Question                            ----- Message -----  From: Aggie Sanders  Sent: 8/23/2018   1:09 PM  To: [facility]  Subject: Test Results Question                            After being seen by Dr Molly Rodas, Neurology, she wanted me to make sure that you were aware that my GFR was low at 54 and 56  I am aware that I really get busy during the day and do not drink enough  I am trying to make sure that I increase my hydration level  I do not drink carbonated or caffeinated beverages, but, unfortunately hate water  I'll try, but, at 68 not able to promise!     Thanks

## 2018-08-29 ENCOUNTER — TELEPHONE (OUTPATIENT)
Dept: NEUROLOGY | Facility: CLINIC | Age: 68
End: 2018-08-29

## 2018-08-29 NOTE — TELEPHONE ENCOUNTER
I am assuming this is in regards to what Dr Cirilo Brar told her to do at last OV  If she feels better on 3900mg daily and PCP not concerned and GFR remains stable, would not see an issue

## 2018-08-29 NOTE — TELEPHONE ENCOUNTER
Spoke with patient  States she had cut out 300mg of Gabapentin at night for 1 week  Was up at 3am every morning with "nerve shocks "  Increased her hydration  Starting yesterday went back to the extra 300mg at night- 3900mg total daily  She spoke with her urologist and PCP and states they were not concerned with her GFR as long as she increases her hydration which she has been doing  Current Gabapentin dose:  1200mg am  1200afternoon  1500 at night      ----- Message from Sara Saeed sent at 8/29/2018  2:05 PM EDT -----  Regarding: [address]: [facility],    Please call "Margo Camp" when you get a chance  She is a [facility] pt  She has NOT spoken to you previously today, and the reason for the call is "too confusing"      Thanks,  Amado Wills - scheduling

## 2018-09-07 ENCOUNTER — HOSPITAL ENCOUNTER (OUTPATIENT)
Dept: MRI IMAGING | Facility: HOSPITAL | Age: 68
Discharge: HOME | End: 2018-09-07
Attending: PSYCHIATRY & NEUROLOGY
Payer: COMMERCIAL

## 2018-09-07 DIAGNOSIS — G35 MULTIPLE SCLEROSIS (HCC): ICD-10-CM

## 2018-09-07 PROCEDURE — 72146 MRI CHEST SPINE W/O DYE: CPT

## 2018-09-07 PROCEDURE — 72141 MRI NECK SPINE W/O DYE: CPT

## 2018-10-02 ENCOUNTER — TELEPHONE (OUTPATIENT)
Dept: FAMILY MEDICINE CLINIC | Facility: OTHER | Age: 68
End: 2018-10-02

## 2018-10-02 NOTE — TELEPHONE ENCOUNTER
I spoke to the patient  She said that she is better now but still has headache daily  He neurologist only is for MS  She said she does not have any other symptoms of concussion  She just wanted to let you know what was happening  She feels she can wait a week and see what happens  She will call back in 1 week with an update  Did you want to see her?

## 2018-10-02 NOTE — TELEPHONE ENCOUNTER
I left message for pt to call back   ----- Message from Arvin Wolf MD sent at 10/2/2018 11:15 AM EDT -----  Regarding: FW: Non-Urgent Medical Question  Contact: [phone]  Please advise the patient she probably has been hypotensive from not eating and drinking well when she was sick to have the fall episodes along with possible side effect of certain medication she may be on like gabapentin  But having the fall and impacting head against hard surface can result in concussion or head injury that can result in having headaches as a result  I would recommend to see your neurologist if he covers concussion and head trauma issues  If not then I can refer you to concussion doctor  Please let me know  Thanks  For now please stay hydrated and take fall precautions  If you still don't feel good let me know and I can see you in office  Thank You!    ----- Message -----  From: Jena Salazar  Sent: 10/2/2018   8:51 AM  To: Arvin Wolf MD  Subject: FW: Non-Urgent Medical Question                      ----- Message -----  From: Jg Sims  Sent: 10/2/2018   8:49 AM  To: [facility]  Subject: Non-Urgent Medical Question                      Doctor, I had a viral infection that came on suddenly on 9/16/18 with nausea, weakness,  extreme fatigue, and balance issues  Although I had a lack of appetite, I never had any vomiting or bowel   I passes out with short LOC after getting up during the night to go to the bathroom and fell and had a large contusion with hematoma on my right hip and had some back pain from the fall  A week later on 9/25/18 I was still not eating much and fell again with no LOC and this time had a contusion with large approx  4 inch hematoma on left hip and hit my head in the parietal area  Since then I have been having daily headaches that come on suddenly and are very localized to the area that I hit on the ceramic floor    The pain is searing and even with APC's it takes 30-40 minutes to have them take effect  I have no vision problem with the headaches nor fatigue  Do I need to be seen or have any other testing done? ? Thank You  Didn't want to bother to take your time unnecessarily if okay      Inocencio Alan    [phone]

## 2018-10-05 DIAGNOSIS — G35 MULTIPLE SCLEROSIS (HCC): Primary | ICD-10-CM

## 2018-10-05 RX ORDER — GLATIRAMER ACETATE 40 MG/ML
INJECTION, SOLUTION SUBCUTANEOUS
Qty: 36 ML | Refills: 3 | Status: SHIPPED | OUTPATIENT
Start: 2018-10-05 | End: 2019-03-08 | Stop reason: SDUPTHER

## 2018-10-05 NOTE — TELEPHONE ENCOUNTER
Patient on brand Copaxone     ----- Message -----   From: Pedro Pablo Kramer   Sent: 10/5/2018   9:10 AM   To: [facility]   Subject: Prescription Question                             Dr Tyrel Negron, I just became aware that I have no further refills on my Copaxone 40mg  Erickson Coronel never notified me or I would have asked for a refill on my last visit  Aneudy Pedroza call in refills at [phone] or fax at [phone]  Kareen Berg would really appreciate your assistance with this   This med wasn't listed on the RX list to request there  Thank you so much     Sarina Limon   [phone]

## 2018-10-10 ENCOUNTER — TELEPHONE (OUTPATIENT)
Dept: OBGYN CLINIC | Facility: CLINIC | Age: 68
End: 2018-10-10

## 2018-10-10 DIAGNOSIS — F51.01 PRIMARY INSOMNIA: Primary | ICD-10-CM

## 2018-10-10 RX ORDER — LORAZEPAM 1 MG/1
1 TABLET ORAL
Qty: 90 TABLET | Refills: 3 | Status: SHIPPED | OUTPATIENT
Start: 2018-10-10 | End: 2019-04-15 | Stop reason: SDUPTHER

## 2018-10-10 NOTE — TELEPHONE ENCOUNTER
----- Message from Briana Melton sent at 10/10/2018 10:30 AM EDT -----  Regarding: Prescription Question  Contact: [phone]  I had only filled one rx of the lorazepam 1mg after being seen  on my last visit  There was still another refill available, but, only 4 month time frame was given for a refill which ended on 9/15/18  Can you please refill the lorazepam at [address] at [phone]    Thank you, Ramírez Granger

## 2018-10-28 DIAGNOSIS — G35 MULTIPLE SCLEROSIS (HCC): Primary | ICD-10-CM

## 2018-10-29 RX ORDER — GABAPENTIN 600 MG/1
TABLET ORAL
Qty: 540 TABLET | Refills: 3 | Status: SHIPPED | OUTPATIENT
Start: 2018-10-29 | End: 2019-11-06 | Stop reason: SDUPTHER

## 2018-10-31 DIAGNOSIS — G35 MULTIPLE SCLEROSIS (HCC): ICD-10-CM

## 2018-10-31 RX ORDER — GABAPENTIN 600 MG/1
1200 TABLET ORAL 3 TIMES DAILY
Qty: 540 TABLET | Refills: 0 | Status: CANCELLED | OUTPATIENT
Start: 2018-10-31

## 2018-10-31 NOTE — TELEPHONE ENCOUNTER
Pt sent a [employer] message requesting a refill of her gabapentin to be sent to mail order, this was already taken care of 10/28/18 by Dr Toby Nathan  I responded to pt's request via my chart

## 2018-11-02 ENCOUNTER — OFFICE VISIT (OUTPATIENT)
Dept: PLASTIC SURGERY | Facility: CLINIC | Age: 68
End: 2018-11-02
Payer: COMMERCIAL

## 2018-11-02 DIAGNOSIS — D05.12 INTRADUCTAL CARCINOMA IN SITU OF LEFT BREAST: Primary | ICD-10-CM

## 2018-11-02 PROCEDURE — 99213 OFFICE O/P EST LOW 20 MIN: CPT | Performed by: PHYSICIAN ASSISTANT

## 2018-11-02 NOTE — PROGRESS NOTES
Assessment/Plan:   Bryon Holstein is a pleasant 70year old female who had a left mastectomy for breast cancer with reconstruction with another plastic surgeon in 2012  She also had a right mastopexy and has bilateral silicone implants  Please see HPI  She is a patient of Dr Akash Barrett  We will obtain a bilateral breast MRI and then see her in follow up to discuss options for possible further reconstruction  We discussed possible elevation of the right fold and possible fat grafting however, we need to assess the implants first  She understands and agrees to this plan  Sen in conjunction with Dr Jerry Brooks  Diagnoses and all orders for this visit:    Intraductal carcinoma in situ of left breast  -     MRI breast bilateral w wo contrast; Future          Subjective:     Patient ID: Jg Sims is a 70 y o  female  HPI   Bryon Holstein is a pleasant 70year old female who had a left mastectomy for breast cancer with reconstruction with another plastic surgeon in 2012  She also had a right mastopexy and has bilateral silicone implants  She now reports pain under her implants that feels like tightness in her chest      Review of Systems   HENT: Negative for hearing loss  Eyes: Negative for visual disturbance  Respiratory: Negative for shortness of breath  Cardiovascular: Negative for chest pain  Gastrointestinal: Negative for abdominal pain, blood in stool, constipation, diarrhea, nausea and vomiting  Genitourinary: Negative for hematuria  Musculoskeletal: Negative for gait problem  Skin:        As per HPI  Neurological: Negative for seizures and headaches  Hematological: Does not bruise/bleed easily  Psychiatric/Behavioral: The patient is not nervous/anxious  Objective:     Physical Exam   Skin:   Breasts are soft bilaterally  Capsular contracture of the left side noted  Right fold lower than left  Photos taken

## 2018-11-02 NOTE — LETTER
November 2, 2018     [facility], [address]    Patient: Don Bey   YOB: 1950   Date of Visit: 11/2/2018       Dear Dr Mary Craft: Thank you for referring Estuardo Ramirez to me for evaluation  Below are my notes for this consultation  If you have questions, please do not hesitate to call me  I look forward to following your patient along with you  Sincerely,        Malini Alonzo PA-C        CC: MD Malini Moser PA-C  11/2/2018  2:22 PM  Sign at close encounter  Assessment/Plan:   Jessica Rodarte is a pleasant 70year old female who had a left mastectomy for breast cancer with reconstruction with another plastic surgeon in 2012  She also had a right mastopexy and has bilateral silicone implants  Please see HPI  She is a patient of Dr [facility]  We will obtain a bilateral breast MRI and then see her in follow up to discuss options for possible further reconstruction  We discussed possible elevation of the right fold and possible fat grafting however, we need to assess the implants first  She understands and agrees to this plan  Sen in conjunction with Dr Adrian Kerr  Diagnoses and all orders for this visit:    Intraductal carcinoma in situ of left breast  -     MRI breast bilateral w wo contrast; Future          Subjective:     Patient ID: Don Bey is a 70 y o  female  HPI   Jessica Rodarte is a pleasant 70year old female who had a left mastectomy for breast cancer with reconstruction with another plastic surgeon in 2012  She also had a right mastopexy and has bilateral silicone implants  She now reports pain under her implants that feels like tightness in her chest      Review of Systems   HENT: Negative for hearing loss  Eyes: Negative for visual disturbance  Respiratory: Negative for shortness of breath  Cardiovascular: Negative for chest pain     Gastrointestinal: Negative for abdominal pain, blood in stool, constipation, diarrhea, nausea and vomiting  Genitourinary: Negative for hematuria  Musculoskeletal: Negative for gait problem  Skin:        As per HPI  Neurological: Negative for seizures and headaches  Hematological: Does not bruise/bleed easily  Psychiatric/Behavioral: The patient is not nervous/anxious  Objective:     Physical Exam   Skin:   Breasts are soft bilaterally  Capsular contracture of the left side noted  Right fold lower than left  Photos taken

## 2018-11-05 ENCOUNTER — TELEPHONE (OUTPATIENT)
Dept: NEUROLOGY | Facility: CLINIC | Age: 68
End: 2018-11-05

## 2018-11-05 NOTE — TELEPHONE ENCOUNTER
Patient is asking if she needs any bloodwork prior to her appointment with you on Nov  15, nothing noted in her last office visit with you from, has outstanding labs ordered by PCP  Patient on Copaxone     ----- Message from Valentino Pry sent at 11/5/2018  7:49 AM EST -----  Regarding: FW: Non-Urgent Medical Question  Contact: [phone]      ----- Message -----  From: Bong Schwarz  Sent: 11/2/2018   4:36 PM  To: [facility]  Subject: Non-Urgent Medical Question                      I have a follow up appointment on 11/15/18  No lab work has been ordered  Do you want any lab work done prior to the visit?

## 2018-11-07 ENCOUNTER — APPOINTMENT (OUTPATIENT)
Dept: LAB | Facility: CLINIC | Age: 68
End: 2018-11-07
Payer: COMMERCIAL

## 2018-11-07 DIAGNOSIS — G35 MS (MULTIPLE SCLEROSIS) (HCC): ICD-10-CM

## 2018-11-07 LAB
ALBUMIN SERPL BCP-MCNC: 3.6 G/DL (ref 3.5–5)
ALP SERPL-CCNC: 72 U/L (ref 46–116)
ALT SERPL W P-5'-P-CCNC: 33 U/L (ref 12–78)
ANION GAP SERPL CALCULATED.3IONS-SCNC: 4 MMOL/L (ref 4–13)
AST SERPL W P-5'-P-CCNC: 15 U/L (ref 5–45)
BASOPHILS # BLD AUTO: 0.07 THOUSANDS/ΜL (ref 0–0.1)
BASOPHILS NFR BLD AUTO: 2 % (ref 0–1)
BILIRUB SERPL-MCNC: 0.41 MG/DL (ref 0.2–1)
BUN SERPL-MCNC: 15 MG/DL (ref 5–25)
CALCIUM SERPL-MCNC: 8.8 MG/DL (ref 8.3–10.1)
CHLORIDE SERPL-SCNC: 108 MMOL/L (ref 100–108)
CO2 SERPL-SCNC: 29 MMOL/L (ref 21–32)
CREAT SERPL-MCNC: 0.96 MG/DL (ref 0.6–1.3)
EOSINOPHIL # BLD AUTO: 0.35 THOUSAND/ΜL (ref 0–0.61)
EOSINOPHIL NFR BLD AUTO: 7 % (ref 0–6)
ERYTHROCYTE [DISTWIDTH] IN BLOOD BY AUTOMATED COUNT: 12.7 % (ref 11.6–15.1)
GFR SERPL CREATININE-BSD FRML MDRD: 61 ML/MIN/1.73SQ M
GLUCOSE P FAST SERPL-MCNC: 82 MG/DL (ref 65–99)
HCT VFR BLD AUTO: 41.3 % (ref 34.8–46.1)
HGB BLD-MCNC: 12.9 G/DL (ref 11.5–15.4)
IMM GRANULOCYTES # BLD AUTO: 0.02 THOUSAND/UL (ref 0–0.2)
IMM GRANULOCYTES NFR BLD AUTO: 0 % (ref 0–2)
LYMPHOCYTES # BLD AUTO: 1.57 THOUSANDS/ΜL (ref 0.6–4.47)
LYMPHOCYTES NFR BLD AUTO: 33 % (ref 14–44)
MCH RBC QN AUTO: 30.5 PG (ref 26.8–34.3)
MCHC RBC AUTO-ENTMCNC: 31.2 G/DL (ref 31.4–37.4)
MCV RBC AUTO: 98 FL (ref 82–98)
MONOCYTES # BLD AUTO: 0.38 THOUSAND/ΜL (ref 0.17–1.22)
MONOCYTES NFR BLD AUTO: 8 % (ref 4–12)
NEUTROPHILS # BLD AUTO: 2.41 THOUSANDS/ΜL (ref 1.85–7.62)
NEUTS SEG NFR BLD AUTO: 50 % (ref 43–75)
NRBC BLD AUTO-RTO: 0 /100 WBCS
PLATELET # BLD AUTO: 243 THOUSANDS/UL (ref 149–390)
PMV BLD AUTO: 9.6 FL (ref 8.9–12.7)
POTASSIUM SERPL-SCNC: 3.7 MMOL/L (ref 3.5–5.3)
PROT SERPL-MCNC: 7.1 G/DL (ref 6.4–8.2)
RBC # BLD AUTO: 4.23 MILLION/UL (ref 3.81–5.12)
SODIUM SERPL-SCNC: 141 MMOL/L (ref 136–145)
WBC # BLD AUTO: 4.8 THOUSAND/UL (ref 4.31–10.16)

## 2018-11-07 PROCEDURE — 85025 COMPLETE CBC W/AUTO DIFF WBC: CPT

## 2018-11-07 PROCEDURE — 80053 COMPREHEN METABOLIC PANEL: CPT

## 2018-11-07 PROCEDURE — 36415 COLL VENOUS BLD VENIPUNCTURE: CPT

## 2018-11-09 ENCOUNTER — TELEPHONE (OUTPATIENT)
Dept: SURGICAL ONCOLOGY | Facility: CLINIC | Age: 68
End: 2018-11-09

## 2018-11-09 NOTE — TELEPHONE ENCOUNTER
----- Message from Norman Buckley sent at 11/9/2018 11:40 AM EST -----  Regarding: Non-Urgent Medical Question  Contact: [phone]  I have a MRI of the Breast scheduled for 11/12/18 at [address]  Do you still want me to have the 3 D mammogram done on 11/19/18?

## 2018-11-09 NOTE — TELEPHONE ENCOUNTER
Spoke with Dr Maaem Gardner regarding patient's question about having mammogram  Dr Maame Gardner would like pt to have mammogram as scheduled  Attempted calling pt, no answer via phone, left detailed message for pt  Encouraged her to call office with any questions

## 2018-11-12 ENCOUNTER — HOSPITAL ENCOUNTER (OUTPATIENT)
Dept: RADIOLOGY | Facility: HOSPITAL | Age: 68
Discharge: HOME | End: 2018-11-12
Payer: COMMERCIAL

## 2018-11-12 DIAGNOSIS — D05.12 INTRADUCTAL CARCINOMA IN SITU OF LEFT BREAST: ICD-10-CM

## 2018-11-12 PROCEDURE — C8908 MRI W/O FOL W/CONT, BREAST,: HCPCS

## 2018-11-12 PROCEDURE — 0159T HB CAD BREAST MRI: CPT

## 2018-11-15 ENCOUNTER — OFFICE VISIT (OUTPATIENT)
Dept: NEUROLOGY | Facility: CLINIC | Age: 68
End: 2018-11-15
Payer: COMMERCIAL

## 2018-11-15 VITALS
RESPIRATION RATE: 15 BRPM | SYSTOLIC BLOOD PRESSURE: 132 MMHG | HEART RATE: 80 BPM | WEIGHT: 136.2 LBS | BODY MASS INDEX: 23.38 KG/M2 | DIASTOLIC BLOOD PRESSURE: 76 MMHG

## 2018-11-15 DIAGNOSIS — G35 MULTIPLE SCLEROSIS (HCC): Primary | ICD-10-CM

## 2018-11-15 PROCEDURE — 99214 OFFICE O/P EST MOD 30 MIN: CPT | Performed by: PSYCHIATRY & NEUROLOGY

## 2018-11-15 NOTE — ASSESSMENT & PLAN NOTE
Pt here for neuro follow up  Last seen in aug 20 2018 by me  Overall pt doing well  Pt did have a significant virus in sept with illness for about 10 days  Pt had contacted her pcp over that interval of time  Pt had nausea, decreased appetite and extreme fatigue  Pt has subsequent weakness jesus of the lower ext using walker at time  Pt also had 2 syncopal episodes and one hitting head  Pt thinks she had a concussion at that time  Pt notes sxs were 2 months ago and pt was back to baseline after illness cleared  No ha or n or v today  No other falls or trips  Pt able to provide all history  No sz history  Pt on neurontin and zonegran for neuropathic pain  Pt did not have success with lyrica or cymbalta in past  Pt remains on her brand copaxone tiw and padmini med well  Pt remains on brand ampyra as well  Pt had udpated mri c and t spine on sept 7 2018 and both cord imaging stable  Pt also had updated labs on nov 7 2018 and cmp normal including gfr and cbc diff ok as well  Pt to call for any new sxs

## 2018-11-15 NOTE — PROGRESS NOTES
Patient ID: Nain Reddy is a 70 y o  female  Assessment/Plan:    Multiple sclerosis (HCC)  Pt here for neuro follow up  Last seen in aug 20 2018 by me  Overall pt doing well  Pt did have a significant virus in sept with illness for about 10 days  Pt had contacted her pcp over that interval of time  Pt had nausea, decreased appetite and extreme fatigue  Pt has subsequent weakness jesus of the lower ext using walker at time  Pt also had 2 syncopal episodes and one hitting head  Pt thinks she had a concussion at that time  Pt notes sxs were 2 months ago and pt was back to baseline after illness cleared  No ha or n or v today  No other falls or trips  Pt able to provide all history  No sz history  Pt on neurontin and zonegran for neuropathic pain  Pt did not have success with lyrica or cymbalta in past  Pt remains on her brand copaxone tiw and padmini med well  Pt remains on brand ampyra as well  Pt had udpated mri c and t spine on sept 7 2018 and both cord imaging stable  Pt also had updated labs on nov 7 2018 and cmp normal including gfr and cbc diff ok as well  Pt to call for any new sxs       Diagnoses and all orders for this visit:    Multiple sclerosis ([address] )  -     CBC and differential; Future  -     Hepatic function panel; Future           Subjective:    HPI    69 y/o female presents for MS follow up, last seen in April 2018 by onelia jean  Per onelia last note from Aug 20 2018  Per last note by me"Patient with history of MS dating back to 2008, but likely with symptoms even in 1998  Patient also with PMH of breast cancer s/p left mastectomy in August 2012  Pt has been on Copaxone since 2008 and changed to the 40mg TIW dosing  "    Overall pt doing ok since last visit  Pt did have a significant virus in sept with illness for about 10 days  Pt was not seen during her acute phase of illness  No er visit  No urgi center eval  Pt had contacted her pcp over that interval of time    Pt had nausea, decreased appetite and extreme fatigue  Pt had subsequent weakness jesus of the lower ext using walker at that time  Pt also had 2 syncopal episodes and one hitting head  Pt notes her spouse was home with her  Pt thinks she had a concussion at that time  Pt notes sxs were 2 months ago and pt was back to baseline after illness cleared  Pt only had head soreness at site of lump  No persistent ha or n o v   No sz  No confusion  Pt notes sxs cleared very quickly  I offered ct head today and pt adamant that she has been back to her baseline ever since it happened 2 months ago  No ha or n or v today  No other falls or trips  Pt able to provide all history  No sz history  Pt on neurontin and zonegran for neuropathic pain  Pt did not have success with lyrica or cymbalta in past for pain  Pt remains on her brand copaxone tiw and padmini med well  Pt remains on brand ampyra as well  Pt had udpated mri c and t spine on sept 7 2018 and both cord imaging stable  Pt also had updated labs on nov 7 2018 and cmp normal including gfr and cbc diff ok as well  Pt notes she was only eating soup and scrambled eggs when she was ill  Pt notes this was for about 1 5 weeks and then back to her normal diet ever since  Pt will be making turkey for her family for thanksgiving  Pt looking forward to being with her grandchildren over the holidays  No fever or chills  No current changes in bowel or bladder  No changes in speech or swallowing  No vertigo  Pt  Notes no new issues with her copaxone  padmini med and injections well  Total time spent today 30  minutes   Greater than 50% of total time was spent with the patient and / or family counseling and / or coordination of care      The following portions of the patient's history were reviewed and updated as appropriate: allergies, current medications, past family history, past medical history, past social history, past surgical history and problem list          Objective:    Blood pressure 132/76, pulse 80, resp  rate 15, weight 61 8 kg (136 lb 3 2 oz)  Physical Exam   Constitutional: She appears well-developed and well-nourished  Eyes: Pupils are equal, round, and reactive to light  Lids are normal    Cardiovascular: Intact distal pulses  Neurological: She has normal reflexes  Coordination normal    Psychiatric: Her speech is normal        Neurological Exam  Mental Status  Awake, alert and oriented to person, place and time  Speech is normal  Language is fluent with no aphasia  Attention and concentration are normal  Fund of knowledge is appropriate for level of education  Cranial Nerves  CN II: Visual acuity is normal  Visual fields full to confrontation  CN III, IV, VI: Extraocular movements intact bilaterally  Normal lids and orbits bilaterally  Pupils equal round and reactive to light bilaterally  CN V: Facial sensation is normal   CN VII: Full and symmetric facial movement  CN VIII: Hearing is normal   CN IX, X: Palate elevates symmetrically  Normal gag reflex  CN XI: Shoulder shrug strength is normal   CN XII: Tongue midline without atrophy or fasciculations  Motor  Normal muscle bulk throughout  Increased muscle tone  Strength is 5/5 in all four extremities except as noted  Left pronator drift  Left hemiparesis  Increased tone left lower ext   Left hemiparesis with diff with fine motor on the left  Sensory  Dec vib chanelle lowers  Reflexes  Deep tendon reflexes are 2+ and symmetric in all four extremities with downgoing toes bilaterally  Coordination  Finger-to-nose, rapid alternating movements and heel-to-shin normal bilaterally without dysmetria  Gait  Casual gait: Hesitant gait  ROS:    Review of Systems   Constitutional: Positive for fatigue  Negative for appetite change and fever  HENT: Positive for sinus pain, sinus pressure, trouble swallowing and voice change  Negative for hearing loss and tinnitus  Eyes: Negative    Negative for photophobia and pain  Respiratory: Negative  Negative for shortness of breath  Cardiovascular: Negative  Negative for palpitations  Gastrointestinal: Negative  Negative for nausea and vomiting  Endocrine: Negative  Negative for cold intolerance and heat intolerance  Genitourinary: Negative  Negative for dysuria, frequency and urgency  Musculoskeletal: Positive for arthralgias, back pain and gait problem  Negative for myalgias and neck pain  Skin: Negative  Negative for rash  Neurological: Positive for syncope, light-headedness and numbness  Negative for dizziness, tremors, seizures, facial asymmetry, speech difficulty, weakness and headaches  Hematological: Bruises/bleeds easily  Psychiatric/Behavioral: Positive for sleep disturbance  Negative for confusion and hallucinations  The patient is nervous/anxious

## 2018-11-15 NOTE — PATIENT INSTRUCTIONS
Multiple sclerosis (HCC)  Pt here for neuro follow up  Last seen in aug 20 2018 by me  Overall pt doing well  Pt did have a significant virus in sept with illness for about 10 days  Pt had contacted her pcp over that interval of time  Pt had nausea, decreased appetite and extreme fatigue  Pt has subsequent weakness jesus of the lower ext using walker at time  Pt also had 2 syncopal episodes and one hitting head  Pt thinks she had a concussion at that time  Pt notes sxs were 2 months ago and pt was back to baseline after illness cleared  No ha or n or v today  No other falls or trips  Pt able to provide all history  No sz history  Pt on neurontin and zonegran for neuropathic pain  Pt did not have success with lyrica or cymbalta in past  Pt remains on her brand copaxone tiw and padmini med well  Pt remains on brand ampyra as well  Pt had udpated mri c and t spine on sept 7 2018 and both cord imaging stable  Pt also had updated labs on nov 7 2018 and cmp normal including gfr and cbc diff ok as well  Pt to call for any new sxs

## 2018-11-19 ENCOUNTER — HOSPITAL ENCOUNTER (OUTPATIENT)
Dept: MAMMOGRAPHY | Facility: HOSPITAL | Age: 68
Discharge: HOME | End: 2018-11-19
Attending: SURGERY
Payer: COMMERCIAL

## 2018-11-19 VITALS — HEIGHT: 64 IN | WEIGHT: 136 LBS | BODY MASS INDEX: 23.22 KG/M2

## 2018-11-19 DIAGNOSIS — Z12.31 ENCOUNTER FOR SCREENING MAMMOGRAM FOR MALIGNANT NEOPLASM OF BREAST: ICD-10-CM

## 2018-11-19 PROCEDURE — 77067 SCR MAMMO BI INCL CAD: CPT

## 2018-11-19 PROCEDURE — 77063 BREAST TOMOSYNTHESIS BI: CPT

## 2018-11-29 RX ORDER — SOLIFENACIN SUCCINATE 10 MG/1
TABLET, FILM COATED ORAL
Qty: 90 TABLET | Refills: 3 | Status: CANCELLED | OUTPATIENT
Start: 2018-11-29

## 2018-11-30 ENCOUNTER — PATIENT MESSAGE (OUTPATIENT)
Dept: UROLOGY | Facility: AMBULATORY SURGERY CENTER | Age: 68
End: 2018-11-30

## 2018-11-30 DIAGNOSIS — R39.15 URGENCY OF URINATION: Primary | ICD-10-CM

## 2018-11-30 RX ORDER — SOLIFENACIN SUCCINATE 10 MG/1
10 TABLET, FILM COATED ORAL DAILY
Qty: 90 TABLET | Refills: 3 | Status: SHIPPED | OUTPATIENT
Start: 2018-11-30 | End: 2019-01-09 | Stop reason: SDUPTHER

## 2018-11-30 NOTE — TELEPHONE ENCOUNTER
From: Ovidio Remy  To: Alex Gutierrez MD  Sent: 11/30/2018 8:33 AM EST  Subject: Prescription Question    Kay Pineda has apparently been attempting to get a renewal of my Vesicare 10mg daily prescription  Can you please renew this med for me  It is very effective for me  Thank you so much  It would be for 90 day and would come from [facility] delivery    Shawn Lawson

## 2018-12-13 ENCOUNTER — OFFICE VISIT (OUTPATIENT)
Dept: SURGICAL ONCOLOGY | Facility: CLINIC | Age: 68
End: 2018-12-13
Payer: COMMERCIAL

## 2018-12-13 VITALS
WEIGHT: 136 LBS | HEART RATE: 81 BPM | BODY MASS INDEX: 23.22 KG/M2 | RESPIRATION RATE: 14 BRPM | HEIGHT: 64 IN | TEMPERATURE: 97.5 F | SYSTOLIC BLOOD PRESSURE: 130 MMHG | DIASTOLIC BLOOD PRESSURE: 68 MMHG

## 2018-12-13 DIAGNOSIS — D05.12 INTRADUCTAL CARCINOMA IN SITU OF LEFT BREAST: ICD-10-CM

## 2018-12-13 DIAGNOSIS — Z12.31 SCREENING MAMMOGRAM, ENCOUNTER FOR: Primary | ICD-10-CM

## 2018-12-13 PROCEDURE — 99214 OFFICE O/P EST MOD 30 MIN: CPT | Performed by: SURGERY

## 2018-12-13 PROCEDURE — 4040F PNEUMOC VAC/ADMIN/RCVD: CPT | Performed by: SURGERY

## 2018-12-13 NOTE — PROGRESS NOTES
Surgical Oncology Follow Up       [address] [facility]  [facility] [address]  1950  [id number]  [address] [facility] [facility]  [facility] [address] [address]    Chief Complaint   Patient presents with    Follow-up     1 year       Assessment/Plan   Diagnoses and all orders for this visit:    Screening mammogram, encounter for  -     Mammo screening right w 3d & cad; Future    Intraductal carcinoma in situ of left breast        Advance Care Planning/Advance Directives:  Did not discuss  with the patient  Oncology History:       Intraductal carcinoma in situ of left breast     Initial Diagnosis     Intraductal carcinoma in situ of left breast       8/16/2012 Surgery     Left breast mastectomy, SLNB  Left breast reconstruction with          4/7/2014 Surgery     Left breast implant replaced (X 3) right mastopexy  History of Present Illness: breast cancer follow up   -Interval History: none    Review of Systems:  Review of Systems   Constitutional: Negative  Negative for appetite change and fever  Eyes: Negative  Respiratory: Negative for shortness of breath  Cardiovascular: Negative  Gastrointestinal: Negative  Endocrine: Negative  Genitourinary: Negative  Musculoskeletal: Negative  Negative for arthralgias and myalgias  Skin: Negative  Allergic/Immunologic: Negative  Neurological: Negative  Hematological: Negative  Negative for adenopathy  Does not bruise/bleed easily  Psychiatric/Behavioral: Negative          Patient Active Problem List   Diagnosis    Intraductal carcinoma in situ of left breast    Multiple sclerosis (HCC)    Peripheral polyneuropathy    Depression    Fatigue    Gait disturbance    Hip pain, right    Headache    History of bilateral hip replacements    Hypokalemia    Injury of right leg    Insomnia    Laceration of finger of right hand    Solitary pulmonary nodule    Rash    Pain of left lower leg    Pain of left calf    Osteopenia    Nephrolithiasis    Muscle strain of left lower leg    Left knee pain    Left ankle pain    Urticaria    Urinary urgency    Tingling    Screening mammogram, encounter for     Past Medical History:   Diagnosis Date    Bone pain     Breast ptosis     Depression     Fatigue     Gait disturbance     Headache     Hip fracture (HCC)     Hip pain     Hypokalemia     Insomnia     Laceration of finger     right hand    Left ankle pain     Left knee pain     Multiple sclerosis (HCC)     Muscle strain     left lower leg    Nephrolithiasis     Osteopenia     Osteoporosis     Pain of left calf     Rash     Solitary pulmonary nodule     SVT (supraventricular tachycardia) (HCC)     Tingling     Urgency of urination     Urticaria     Wrist fracture, left      Past Surgical History:   Procedure Laterality Date    ANKLE SURGERY      APPENDECTOMY  11/2012    Dr Lin Nations      Enlargement procedure with prosthetic implant bilateral    BREAST RECONSTRUCTION      implant placement, implant revision, right mastopexy    CYSTOSTOMY      with basket extraction of calculus; x2    EXPLORATORY LAPAROTOMY      FOOT SURGERY      HIP FRACTURE SURGERY Right     Subcapital    HIP SURGERY Left     LEG SURGERY      Repair    MASTECTOMY Left 08/16/2012    REDUCTION MAMMAPLASTY Right     SENTINEL LYMPH NODE BIOPSY Left 08/16/2012    SKIN BIOPSY      TONSILLECTOMY      TUBAL LIGATION      WRIST SURGERY Left      Family History   Problem Relation Age of Onset    Hodgkin's lymphoma Maternal Grandfather         age at dx unk    Cancer Maternal Aunt 79        bladder    Heart disease Maternal Aunt     Colon cancer Maternal Uncle 72    Hodgkin's lymphoma Maternal Uncle 79    Coronary artery disease Mother     Other Father Acute myocardial infarction     Social History     Social History    Marital status: /Civil Union     Spouse name: N/A    Number of children: 2    Years of education: Completed bachelor's degree     Occupational History    RN      Retired     Social History Main Topics    Smoking status: Former Smoker     Packs/day: 1 00     Years: 35 00     Quit date: 2002    Smokeless tobacco: Former User      Comment: Per [employer]:  Never a smoker    Alcohol use No    Drug use: No    Sexual activity: Not on file     Other Topics Concern    Not on file     Social History Narrative    Denied:  History of caffeine use       Current Outpatient Prescriptions:     ALPHA LIPOIC ACID PO, Take by mouth, Disp: , Rfl:     AMPYRA 10 MG TB12, Ampyra 10 mg Er, Take 1 tab bid, Disp: 180 tablet, Rfl: 3    ascorbic acid (VITAMIN C) 500 mg tablet, Take 500 mg by mouth daily, Disp: , Rfl:     baclofen 10 mg tablet, TAKE 1 TABLET EVERY        MORNING, 1 TABLET EVERY    EVENING, AND TAKE 2 TABLETSAT BEDTIME (Patient taking differently: take 10 mg TID), Disp: 360 tablet, Rfl: 3    Cholecalciferol (VITAMIN D3) 1000 units CAPS, Take 5,000 Units by mouth  , Disp: , Rfl:     COPAXONE 40 MG/ML SOSY, Inject 40mg under the skin three times weekly  , Disp: 36 mL, Rfl: 3    Cranberry 1000 MG CAPS, Take 300 mg by mouth  , Disp: , Rfl:     cyanocobalamin (VITAMIN B-12) 1,000 mcg tablet, Take by mouth daily, Disp: , Rfl:     denosumab (PROLIA) 60 mg/mL, Inject under the skin, Disp: , Rfl:     gabapentin (NEURONTIN) 600 MG tablet, TAKE 2 TABLETS 3 TIMES A   DAY, Disp: 540 tablet, Rfl: 3    LORazepam (ATIVAN) 1 mg tablet, Take 1 tablet (1 mg total) by mouth daily at bedtime for 90 days, Disp: 90 tablet, Rfl: 3    Magnesium 500 MG TABS, Take 500 tablets by mouth once, Disp: , Rfl:     solifenacin (VESICARE) 10 MG tablet, Take 1 tablet (10 mg total) by mouth daily, Disp: 90 tablet, Rfl: 3    zonisamide (ZONEGRAN) 100 mg capsule, TAKE 4 CAPSULES AT BEDTIME, Disp: 360 capsule, Rfl: 3  Allergies   Allergen Reactions    Acetaminophen     Cetirizine     Contrast Dye [Iodinated Diagnostic Agents]     Duloxetine Hcl     Morphine        The following portions of the patient's history were reviewed and updated as appropriate: allergies, current medications, past family history, past medical history, past social history, past surgical history and problem list         Vitals:    12/13/18 0951   BP: 130/68   Pulse: 81   Resp: 14   Temp: 97 5 °F (36 4 °C)       Physical Exam   Constitutional: She is oriented to person, place, and time  She appears well-developed and well-nourished  HENT:   Head: Normocephalic and atraumatic  Cardiovascular: Normal heart sounds  Pulmonary/Chest: Breath sounds normal  Right breast exhibits tenderness  Right breast exhibits no inverted nipple, no mass, no nipple discharge and no skin change  Left breast exhibits skin change (mastectomy scar and implant)  Left breast exhibits no mass and no tenderness  Breasts are asymmetrical        Right sided implant   Abdominal: Soft  Lymphadenopathy:        Right axillary: No pectoral and no lateral adenopathy present  Left axillary: No pectoral and no lateral adenopathy present  Right: No supraclavicular adenopathy present  Left: No supraclavicular adenopathy present  Neurological: She is alert and oriented to person, place, and time  Psychiatric: She has a normal mood and affect  Results:      Imaging  11/19/2018 right screening 3D mammogram is benign BI-RADS two with a density of three  11/12/2018 breast MRI is benign    I reviewed the above  imaging data  Discussion/Summary:  58-year-old female with a history of ductal carcinoma in situ of the left breast   She is status post left mastectomy and reconstruction  There are no concerns on examination today    She continues to have intermittent pain on the right breast   Her recent right-sided mammogram is benign  Her recent breast MRI that was ordered by Dr Jessi Hidalgo is also benign  I will make arrangements for the right mammogram for next year and see her again at that time or sooner should the need arise

## 2018-12-28 ENCOUNTER — TRANSCRIBE ORDERS (OUTPATIENT)
Dept: ADMINISTRATIVE | Age: 68
End: 2018-12-28

## 2018-12-28 ENCOUNTER — APPOINTMENT (OUTPATIENT)
Dept: RADIOLOGY | Age: 68
End: 2018-12-28
Payer: COMMERCIAL

## 2018-12-28 ENCOUNTER — APPOINTMENT (OUTPATIENT)
Dept: LAB | Age: 68
End: 2018-12-28
Payer: COMMERCIAL

## 2018-12-28 DIAGNOSIS — M81.0 SENILE OSTEOPOROSIS: ICD-10-CM

## 2018-12-28 DIAGNOSIS — M81.0 SENILE OSTEOPOROSIS: Primary | ICD-10-CM

## 2018-12-28 DIAGNOSIS — N20.0 NEPHROLITHIASIS: ICD-10-CM

## 2018-12-28 LAB — CALCIUM SERPL-MCNC: 8.8 MG/DL (ref 8.3–10.1)

## 2018-12-28 PROCEDURE — 36415 COLL VENOUS BLD VENIPUNCTURE: CPT

## 2018-12-28 PROCEDURE — 74018 RADEX ABDOMEN 1 VIEW: CPT

## 2018-12-28 PROCEDURE — 82310 ASSAY OF CALCIUM: CPT

## 2019-01-09 ENCOUNTER — OFFICE VISIT (OUTPATIENT)
Dept: UROLOGY | Facility: AMBULATORY SURGERY CENTER | Age: 69
End: 2019-01-09
Payer: MEDICARE

## 2019-01-09 VITALS
SYSTOLIC BLOOD PRESSURE: 122 MMHG | WEIGHT: 135.2 LBS | BODY MASS INDEX: 23.08 KG/M2 | DIASTOLIC BLOOD PRESSURE: 64 MMHG | HEIGHT: 64 IN

## 2019-01-09 DIAGNOSIS — N20.0 CALCULUS OF KIDNEY: ICD-10-CM

## 2019-01-09 DIAGNOSIS — R39.15 URGENCY OF URINATION: Primary | ICD-10-CM

## 2019-01-09 LAB
POST-VOID RESIDUAL VOLUME, ML POC: 13 ML
SL AMB  POCT GLUCOSE, UA: NORMAL
SL AMB LEUKOCYTE ESTERASE,UA: NORMAL
SL AMB POCT BILIRUBIN,UA: NORMAL
SL AMB POCT BLOOD,UA: NORMAL
SL AMB POCT CLARITY,UA: NORMAL
SL AMB POCT COLOR,UA: YELLOW
SL AMB POCT KETONES,UA: 1.02
SL AMB POCT NITRITE,UA: NORMAL
SL AMB POCT PH,UA: 6
SL AMB POCT SPECIFIC GRAVITY,UA: 1
SL AMB POCT URINE PROTEIN: NORMAL
SL AMB POCT UROBILINOGEN: NORMAL

## 2019-01-09 PROCEDURE — 99213 OFFICE O/P EST LOW 20 MIN: CPT | Performed by: UROLOGY

## 2019-01-09 PROCEDURE — 51798 US URINE CAPACITY MEASURE: CPT | Performed by: UROLOGY

## 2019-01-09 PROCEDURE — 81002 URINALYSIS NONAUTO W/O SCOPE: CPT | Performed by: UROLOGY

## 2019-01-09 RX ORDER — SOLIFENACIN SUCCINATE 10 MG/1
10 TABLET, FILM COATED ORAL DAILY
Qty: 90 TABLET | Refills: 3 | Status: SHIPPED | OUTPATIENT
Start: 2019-01-09 | End: 2019-12-30 | Stop reason: SDUPTHER

## 2019-01-09 NOTE — PROGRESS NOTES
1/9/2019    Ovidio Remy  1950  [id number]        Assessment  Multiple sclerosis, urgency of urination, history of nephrolithiasis      Discussion  Her urinary symptoms are well controlled on VESIcare 10 mg daily  A refill was provided  Follow-up will be in 1 year with a repeat KUB  I stressed the importance of hydration  The patient was instructed to call sooner if she has worsening of her lower urinary tract symptoms or if she were to develop flank pain  History of Present Illness  70 y o  female with a history of multiple sclerosis and urgency of urination  She had been treated with VESIcare 5 mg daily  The VESIcare was recently increased to 10 mg daily and she feels this has improved her lower urinary tract symptoms  Unfortunately she states that her MS symptoms continue to progress  The she continues to follow with Dr Peyton Gibbons from Neurology  A recent KUB is stable and unchanged showing only small nonobstructing renal calculi  She denies hematuria or flank pain at this time  AUA Symptom Score      Review of Systems  Review of Systems   Constitutional: Negative  HENT: Negative  Eyes: Negative  Respiratory: Negative  Cardiovascular: Negative  Gastrointestinal: Negative  Endocrine: Negative  Genitourinary: Negative  Musculoskeletal: Negative  Skin: Negative  Allergic/Immunologic: Negative  Neurological: Negative  Hematological: Negative  Psychiatric/Behavioral: Negative            Past Medical History  Past Medical History:   Diagnosis Date    Bone pain     Breast ptosis     Depression     Fatigue     Gait disturbance     Headache     Hip fracture (HCC)     Hip pain     Hypokalemia     Insomnia     Laceration of finger     right hand    Left ankle pain     Left knee pain     Multiple sclerosis (HCC)     Muscle strain     left lower leg    Nephrolithiasis     Osteopenia     Osteoporosis     Pain of left calf     Rash     Solitary pulmonary nodule     SVT (supraventricular tachycardia) (HCC)     Tingling     Urgency of urination     Urticaria     Wrist fracture, left        Past Social History  Past Surgical History:   Procedure Laterality Date    ANKLE SURGERY      APPENDECTOMY  11/2012    Dr Posey Lobe      Enlargement procedure with prosthetic implant bilateral    BREAST RECONSTRUCTION      implant placement, implant revision, right mastopexy    CYSTOSTOMY      with basket extraction of calculus; x2    EXPLORATORY LAPAROTOMY      FOOT SURGERY      HIP FRACTURE SURGERY Right     Subcapital    HIP SURGERY Left     LEG SURGERY      Repair    MASTECTOMY Left 08/16/2012    REDUCTION MAMMAPLASTY Right     SENTINEL LYMPH NODE BIOPSY Left 08/16/2012    SKIN BIOPSY      TONSILLECTOMY      TUBAL LIGATION      WRIST SURGERY Left        Past Family History  Family History   Problem Relation Age of Onset    Hodgkin's lymphoma Maternal Grandfather         age at dx unk    Cancer Maternal Aunt 79        bladder    Heart disease Maternal Aunt     Colon cancer Maternal Uncle 72    Hodgkin's lymphoma Maternal Uncle 79    Coronary artery disease Mother     Other Father         Acute myocardial infarction       Past Social history  Social History     Social History    Marital status: /Civil Union     Spouse name: N/A    Number of children: 2    Years of education: Completed bachelor's degree     Occupational History    RN      Retired     Social History Main Topics    Smoking status: Former Smoker     Packs/day: 1 00     Years: 35 00     Quit date: 2002    Smokeless tobacco: Former User      Comment: Per [employer]:  Never a smoker    Alcohol use No    Drug use: No    Sexual activity: Not on file     Other Topics Concern    Not on file     Social History Narrative    Denied:  History of caffeine use       Current Medications  Current Outpatient Prescriptions   Medication Sig Dispense Refill    ALPHA LIPOIC ACID PO Take by mouth      AMPYRA 10 MG TB12 Ampyra 10 mg Er, Take 1 tab bid 180 tablet 3    ascorbic acid (VITAMIN C) 500 mg tablet Take 500 mg by mouth daily      baclofen 10 mg tablet TAKE 1 TABLET EVERY        MORNING, 1 TABLET EVERY    EVENING, AND TAKE 2 TABLETSAT BEDTIME (Patient taking differently: take 10 mg TID) 360 tablet 3    Cholecalciferol (VITAMIN D3) 1000 units CAPS Take 5,000 Units by mouth        COPAXONE 40 MG/ML SOSY Inject 40mg under the skin three times weekly  36 mL 3    Cranberry 1000 MG CAPS Take 300 mg by mouth        cyanocobalamin (VITAMIN B-12) 1,000 mcg tablet Take by mouth daily      denosumab (PROLIA) 60 mg/mL Inject under the skin      gabapentin (NEURONTIN) 600 MG tablet TAKE 2 TABLETS 3 TIMES A    tablet 3    Magnesium 500 MG TABS Take 500 tablets by mouth once      solifenacin (VESICARE) 10 MG tablet Take 1 tablet (10 mg total) by mouth daily 90 tablet 3    zonisamide (ZONEGRAN) 100 mg capsule TAKE 4 CAPSULES AT BEDTIME 360 capsule 3    LORazepam (ATIVAN) 1 mg tablet Take 1 tablet (1 mg total) by mouth daily at bedtime for 90 days 90 tablet 3     No current facility-administered medications for this visit  Allergies  Allergies   Allergen Reactions    Acetaminophen     Cetirizine     Contrast Dye [Iodinated Diagnostic Agents]     Duloxetine Hcl     Morphine        Past Medical History, Social History, Family History, medications and allergies were reviewed  Vitals  Vitals:    01/09/19 0825   BP: 122/64   BP Location: Left arm   Patient Position: Sitting   Cuff Size: Adult   Weight: 61 3 kg (135 lb 3 2 oz)   Height: 5' 4" (1 626 m)       Physical Exam  Physical Exam  On examination she is in no acute distress  Her abdomen is soft nontender nondistended   examination reveals no CVA tenderness  Her skin is warm  Extremities without edema  Neurologic reveals slow gait which is slightly unsteady    Affect normal       Results  No results found for: PSA  Lab Results   Component Value Date    GLUCOSE 95 01/08/2015    CALCIUM 8 8 12/28/2018     04/04/2017    K 3 7 11/07/2018    CO2 29 11/07/2018     11/07/2018    BUN 15 11/07/2018    CREATININE 0 96 11/07/2018     Lab Results   Component Value Date    WBC 4 80 11/07/2018    HGB 12 9 11/07/2018    HCT 41 3 11/07/2018    MCV 98 11/07/2018     11/07/2018         Office Urine Dip  Recent Results (from the past 1 hour(s))   POCT urine dip    Collection Time: 01/09/19  8:40 AM   Result Value Ref Range    LEUKOCYTE ESTERASE,UA +++     NITRITE,UA -     SL AMB POCT UROBILINOGEN -     POCT URINE PROTEIN -      PH,UA 6 0     BLOOD,UA trace     SPECIFIC GRAVITY,UA 1     KETONES,UA 1 020     BILIRUBIN,UA -     GLUCOSE, UA -      COLOR,UA yellow     CLARITY,UA transparent    POCT Measure PVR    Collection Time: 01/09/19  8:40 AM   Result Value Ref Range    POST-VOID RESIDUAL VOLUME, ML POC 13 mL   ]

## 2019-01-10 ENCOUNTER — TELEPHONE (OUTPATIENT)
Dept: OBGYN CLINIC | Facility: CLINIC | Age: 69
End: 2019-01-10

## 2019-01-23 ENCOUNTER — OFFICE VISIT (OUTPATIENT)
Dept: PLASTIC SURGERY | Facility: HOSPITAL | Age: 69
End: 2019-01-23
Payer: MEDICARE

## 2019-01-23 DIAGNOSIS — Z42.1 AFTERCARE POSTMASTECTOMY FOR BREAST RECONSTRUCTION: Primary | ICD-10-CM

## 2019-01-23 PROCEDURE — 99214 OFFICE O/P EST MOD 30 MIN: CPT | Performed by: SURGERY

## 2019-01-23 NOTE — PROGRESS NOTES
Assessment/Plan:  Please see HPI  She is upset with the history of her breast surgery starting from the mastectomy through reconstruction, and is asking for a larger implant to be placed on the left  Examination reveals a well-positioned left breast implant with hollowing of the upper pole, the right breast is ptotic with dependency of the implant  The right breast is larger than left  My recommendation to her would be to undergo implant exchange on the right for a smaller implant, combined with mastopexy, and autologous fat grafting to the left breast   It would not be wise to place a larger implant on the left due to the paucity skin  I did discuss with her the option of tissue expander reconstruction  She is not interested  After contemplating options, she is interested in proceeding with autologous fat grafting to the left breast, realizes this will not provide symmetry, but would be performed to address the hollowing of the upper pole of the left breast   She is content with this approach  I discussed the procedure as well as potential risks, complications and limitations including, but not limited to infection, bleeding, scarring, asymmetry( certain) , contour deformity, cyst formation, lumps, bumps after fat injection, need for multiple procedures, etc etc   She understands, and wishes to proceed  Her questions were answered to her satisfaction and consent was obtained  She is aware that Medicare will not preauthorize the procedure, and that she may be financially responsible  There are no diagnoses linked to this encounter  Subjective: Follow-up     Patient ID: Thor Krabbe is a 70 y o  female  HPI status post left mastectomy, reconstruction elsewhere initially, right mastopexy and implant, left implant exchange       She is interested in addressing the hollowing of the upper pole of the left breast and complains of right-sided breast discomfort, tightness, and heaviness secondary to capsular contracture  The following portions of the patient's history were reviewed and updated as appropriate: allergies, current medications, past family history, past medical history, past social history, past surgical history and problem list     Review of Systems   Constitutional: Negative for chills and fever  HENT: Negative for hearing loss  Eyes: Positive for visual disturbance  Negative for discharge  Wears contact lenses   Respiratory: Negative for chest tightness and shortness of breath  Cardiovascular: Negative for chest pain and leg swelling  Gastrointestinal: Negative for blood in stool, constipation, diarrhea and nausea  Endocrine: Negative for cold intolerance and heat intolerance  Genitourinary: Negative for dysuria  Musculoskeletal: Negative for gait problem  Skin: Negative for rash  Allergic/Immunologic: Negative for immunocompromised state  Neurological: Negative for seizures and headaches  Hematological: Does not bruise/bleed easily  Psychiatric/Behavioral: Negative for dysphoric mood  The patient is not nervous/anxious  Objective: There were no vitals taken for this visit  Physical Exam   Constitutional: She is oriented to person, place, and time  She appears well-developed  HENT:   Head: Normocephalic  Eyes: Pupils are equal, round, and reactive to light  Neck: Normal range of motion  Cardiovascular: Normal rate and regular rhythm  Pulmonary/Chest: Effort normal    Abdominal: Soft  Musculoskeletal: Normal range of motion  Neurological: She is alert and oriented to person, place, and time  Skin: Skin is warm  Breast asymmetry with ptosis, dependency of right breast, and capsular contracture  Left breast implant situated higher on chest, hollowing of upper pole, no signs of unusual   Psychiatric: She has a normal mood and affect

## 2019-03-08 DIAGNOSIS — R26.9 GAIT DISTURBANCE: ICD-10-CM

## 2019-03-08 DIAGNOSIS — G35 MULTIPLE SCLEROSIS (HCC): ICD-10-CM

## 2019-03-08 RX ORDER — DALFAMPRIDINE 10 MG/1
TABLET, FILM COATED, EXTENDED RELEASE ORAL
Qty: 180 TABLET | Refills: 0 | Status: SHIPPED | OUTPATIENT
Start: 2019-03-08 | End: 2019-06-18 | Stop reason: SDUPTHER

## 2019-03-08 RX ORDER — GLATIRAMER ACETATE 40 MG/ML
INJECTION, SOLUTION SUBCUTANEOUS
Qty: 36 ML | Refills: 0 | Status: SHIPPED | OUTPATIENT
Start: 2019-03-08 | End: 2019-05-10 | Stop reason: SDUPTHER

## 2019-03-12 ENCOUNTER — DOCUMENTATION (OUTPATIENT)
Dept: NEUROLOGY | Facility: CLINIC | Age: 69
End: 2019-03-12

## 2019-03-12 NOTE — PROGRESS NOTES
Received fax from [address] [place] [address] requesting PA for Copaxone  Completed on CMM  Awaiting determination      [employer]  ID: [id number]

## 2019-04-15 ENCOUNTER — PATIENT MESSAGE (OUTPATIENT)
Dept: NEUROLOGY | Facility: CLINIC | Age: 69
End: 2019-04-15

## 2019-04-15 DIAGNOSIS — G62.9 PERIPHERAL POLYNEUROPATHY: ICD-10-CM

## 2019-04-15 DIAGNOSIS — G35 MULTIPLE SCLEROSIS (HCC): ICD-10-CM

## 2019-04-15 DIAGNOSIS — R26.9 GAIT DISTURBANCE: ICD-10-CM

## 2019-04-15 DIAGNOSIS — F51.01 PRIMARY INSOMNIA: ICD-10-CM

## 2019-04-15 DIAGNOSIS — R21 RASH: Primary | ICD-10-CM

## 2019-04-15 RX ORDER — ZONISAMIDE 100 MG/1
400 CAPSULE ORAL
Qty: 360 CAPSULE | Refills: 3 | Status: SHIPPED | OUTPATIENT
Start: 2019-04-15 | End: 2020-01-13 | Stop reason: SDUPTHER

## 2019-04-15 RX ORDER — GABAPENTIN 300 MG/1
CAPSULE ORAL
COMMUNITY
Start: 2019-02-21 | End: 2019-04-16 | Stop reason: SDUPTHER

## 2019-04-15 RX ORDER — LORAZEPAM 1 MG/1
1 TABLET ORAL
Qty: 90 TABLET | Refills: 0 | Status: SHIPPED | OUTPATIENT
Start: 2019-04-15 | End: 2019-07-29 | Stop reason: SDUPTHER

## 2019-04-15 RX ORDER — DALFAMPRIDINE 10 MG/1
TABLET, FILM COATED, EXTENDED RELEASE ORAL
Qty: 180 TABLET | Refills: 0 | Status: CANCELLED | OUTPATIENT
Start: 2019-04-15

## 2019-04-15 RX ORDER — BACLOFEN 10 MG/1
TABLET ORAL
Qty: 360 TABLET | Refills: 3 | Status: SHIPPED | OUTPATIENT
Start: 2019-04-15 | End: 2019-07-30 | Stop reason: SDUPTHER

## 2019-04-16 PROBLEM — Z42.1 ENCOUNTER FOR BREAST RECONSTRUCTION FOLLOWING MASTECTOMY: Status: ACTIVE | Noted: 2019-04-16

## 2019-04-16 RX ORDER — GABAPENTIN 300 MG/1
300 CAPSULE ORAL
Qty: 90 CAPSULE | Refills: 3 | Status: SHIPPED | OUTPATIENT
Start: 2019-04-16 | End: 2020-01-13 | Stop reason: SDUPTHER

## 2019-04-17 ENCOUNTER — OFFICE VISIT (OUTPATIENT)
Dept: FAMILY MEDICINE CLINIC | Facility: OTHER | Age: 69
End: 2019-04-17
Payer: MEDICARE

## 2019-04-17 VITALS
HEIGHT: 64 IN | WEIGHT: 136.19 LBS | BODY MASS INDEX: 23.25 KG/M2 | OXYGEN SATURATION: 98 % | HEART RATE: 91 BPM | SYSTOLIC BLOOD PRESSURE: 126 MMHG | DIASTOLIC BLOOD PRESSURE: 70 MMHG | TEMPERATURE: 98.3 F

## 2019-04-17 DIAGNOSIS — Z12.11 SCREENING FOR COLON CANCER: Primary | ICD-10-CM

## 2019-04-17 DIAGNOSIS — G35 MULTIPLE SCLEROSIS (HCC): ICD-10-CM

## 2019-04-17 DIAGNOSIS — Z01.818 PREOP EXAMINATION: ICD-10-CM

## 2019-04-17 DIAGNOSIS — H25.9 AGE-RELATED CATARACT OF BOTH EYES, UNSPECIFIED AGE-RELATED CATARACT TYPE: ICD-10-CM

## 2019-04-17 DIAGNOSIS — M85.80 OSTEOPENIA, UNSPECIFIED LOCATION: ICD-10-CM

## 2019-04-17 DIAGNOSIS — G62.9 PERIPHERAL POLYNEUROPATHY: ICD-10-CM

## 2019-04-17 DIAGNOSIS — R91.1 PULMONARY NODULE SEEN ON IMAGING STUDY: ICD-10-CM

## 2019-04-17 PROCEDURE — 99214 OFFICE O/P EST MOD 30 MIN: CPT | Performed by: FAMILY MEDICINE

## 2019-04-17 RX ORDER — CYCLOSPORINE 0.5 MG/ML
EMULSION OPHTHALMIC
Refills: 0 | COMMUNITY
Start: 2019-02-28 | End: 2022-05-09

## 2019-04-19 ENCOUNTER — TELEPHONE (OUTPATIENT)
Dept: FAMILY MEDICINE CLINIC | Facility: OTHER | Age: 69
End: 2019-04-19

## 2019-05-02 PROBLEM — Z85.3 PERSONAL HISTORY OF MALIGNANT NEOPLASM OF BREAST: Status: ACTIVE | Noted: 2019-04-16

## 2019-05-07 ENCOUNTER — APPOINTMENT (OUTPATIENT)
Dept: LAB | Facility: CLINIC | Age: 69
End: 2019-05-07
Payer: MEDICARE

## 2019-05-07 DIAGNOSIS — G35 MULTIPLE SCLEROSIS (HCC): ICD-10-CM

## 2019-05-07 LAB
ALBUMIN SERPL BCP-MCNC: 4.1 G/DL (ref 3.5–5)
ALP SERPL-CCNC: 80 U/L (ref 46–116)
ALT SERPL W P-5'-P-CCNC: 38 U/L (ref 12–78)
AST SERPL W P-5'-P-CCNC: 17 U/L (ref 5–45)
BASOPHILS # BLD AUTO: 0.08 THOUSANDS/ΜL (ref 0–0.1)
BASOPHILS NFR BLD AUTO: 1 % (ref 0–1)
BILIRUB DIRECT SERPL-MCNC: 0.13 MG/DL (ref 0–0.2)
BILIRUB SERPL-MCNC: 0.36 MG/DL (ref 0.2–1)
EOSINOPHIL # BLD AUTO: 0.2 THOUSAND/ΜL (ref 0–0.61)
EOSINOPHIL NFR BLD AUTO: 4 % (ref 0–6)
ERYTHROCYTE [DISTWIDTH] IN BLOOD BY AUTOMATED COUNT: 12.4 % (ref 11.6–15.1)
HCT VFR BLD AUTO: 40.7 % (ref 34.8–46.1)
HGB BLD-MCNC: 13.2 G/DL (ref 11.5–15.4)
IMM GRANULOCYTES # BLD AUTO: 0.01 THOUSAND/UL (ref 0–0.2)
IMM GRANULOCYTES NFR BLD AUTO: 0 % (ref 0–2)
LYMPHOCYTES # BLD AUTO: 1.44 THOUSANDS/ΜL (ref 0.6–4.47)
LYMPHOCYTES NFR BLD AUTO: 26 % (ref 14–44)
MCH RBC QN AUTO: 31.3 PG (ref 26.8–34.3)
MCHC RBC AUTO-ENTMCNC: 32.4 G/DL (ref 31.4–37.4)
MCV RBC AUTO: 96 FL (ref 82–98)
MONOCYTES # BLD AUTO: 0.4 THOUSAND/ΜL (ref 0.17–1.22)
MONOCYTES NFR BLD AUTO: 7 % (ref 4–12)
NEUTROPHILS # BLD AUTO: 3.48 THOUSANDS/ΜL (ref 1.85–7.62)
NEUTS SEG NFR BLD AUTO: 62 % (ref 43–75)
NRBC BLD AUTO-RTO: 0 /100 WBCS
PLATELET # BLD AUTO: 222 THOUSANDS/UL (ref 149–390)
PMV BLD AUTO: 10 FL (ref 8.9–12.7)
PROT SERPL-MCNC: 7.6 G/DL (ref 6.4–8.2)
RBC # BLD AUTO: 4.22 MILLION/UL (ref 3.81–5.12)
WBC # BLD AUTO: 5.61 THOUSAND/UL (ref 4.31–10.16)

## 2019-05-07 PROCEDURE — 85025 COMPLETE CBC W/AUTO DIFF WBC: CPT

## 2019-05-07 PROCEDURE — 36415 COLL VENOUS BLD VENIPUNCTURE: CPT

## 2019-05-07 PROCEDURE — 80076 HEPATIC FUNCTION PANEL: CPT

## 2019-05-10 DIAGNOSIS — G35 MULTIPLE SCLEROSIS (HCC): ICD-10-CM

## 2019-05-10 RX ORDER — GLATIRAMER ACETATE 40 MG/ML
INJECTION, SOLUTION SUBCUTANEOUS
Qty: 36 ML | Refills: 0 | Status: SHIPPED | OUTPATIENT
Start: 2019-05-10 | End: 2019-06-14 | Stop reason: SDUPTHER

## 2019-05-17 ENCOUNTER — TELEPHONE (OUTPATIENT)
Dept: FAMILY MEDICINE CLINIC | Facility: OTHER | Age: 69
End: 2019-05-17

## 2019-05-17 DIAGNOSIS — R19.5 POSITIVE COLORECTAL CANCER SCREENING USING COLOGUARD TEST: Primary | ICD-10-CM

## 2019-05-22 ENCOUNTER — HOSPITAL ENCOUNTER (OUTPATIENT)
Dept: CT IMAGING | Facility: HOSPITAL | Age: 69
Discharge: HOME | End: 2019-05-22
Attending: FAMILY MEDICINE
Payer: MEDICARE

## 2019-05-22 DIAGNOSIS — R91.1 PULMONARY NODULE SEEN ON IMAGING STUDY: ICD-10-CM

## 2019-05-22 PROCEDURE — 71250 CT THORAX DX C-: CPT

## 2019-05-23 ENCOUNTER — TELEPHONE (OUTPATIENT)
Dept: NEUROLOGY | Facility: CLINIC | Age: 69
End: 2019-05-23

## 2019-05-23 ENCOUNTER — OFFICE VISIT (OUTPATIENT)
Dept: NEUROLOGY | Facility: CLINIC | Age: 69
End: 2019-05-23
Payer: MEDICARE

## 2019-05-23 VITALS — SYSTOLIC BLOOD PRESSURE: 136 MMHG | BODY MASS INDEX: 23.34 KG/M2 | WEIGHT: 136 LBS | DIASTOLIC BLOOD PRESSURE: 82 MMHG

## 2019-05-23 DIAGNOSIS — G35 MULTIPLE SCLEROSIS (HCC): Primary | ICD-10-CM

## 2019-05-23 PROCEDURE — 99214 OFFICE O/P EST MOD 30 MIN: CPT | Performed by: PSYCHIATRY & NEUROLOGY

## 2019-05-29 ENCOUNTER — TELEPHONE (OUTPATIENT)
Dept: OTHER | Facility: HOSPITAL | Age: 69
End: 2019-05-29

## 2019-05-29 ENCOUNTER — TELEPHONE (OUTPATIENT)
Dept: FAMILY MEDICINE CLINIC | Facility: OTHER | Age: 69
End: 2019-05-29

## 2019-05-29 ENCOUNTER — CONSULT (OUTPATIENT)
Dept: GASTROENTEROLOGY | Facility: AMBULARY SURGERY CENTER | Age: 69
End: 2019-05-29
Payer: MEDICARE

## 2019-05-29 VITALS
HEART RATE: 76 BPM | BODY MASS INDEX: 23.63 KG/M2 | WEIGHT: 138.4 LBS | SYSTOLIC BLOOD PRESSURE: 140 MMHG | DIASTOLIC BLOOD PRESSURE: 70 MMHG | TEMPERATURE: 97.9 F | HEIGHT: 64 IN | RESPIRATION RATE: 14 BRPM

## 2019-05-29 DIAGNOSIS — R19.5 ABNORMAL STOOL TEST: Primary | ICD-10-CM

## 2019-05-29 DIAGNOSIS — R19.5 POSITIVE COLORECTAL CANCER SCREENING USING COLOGUARD TEST: ICD-10-CM

## 2019-05-29 PROCEDURE — 99204 OFFICE O/P NEW MOD 45 MIN: CPT | Performed by: INTERNAL MEDICINE

## 2019-05-29 RX ORDER — NEPAFENAC 0.3 %
SUSPENSION, DROPS(FINAL DOSAGE FORM)(ML) OPHTHALMIC (EYE)
COMMUNITY
Start: 2019-05-29 | End: 2019-07-19

## 2019-06-14 DIAGNOSIS — G35 MULTIPLE SCLEROSIS (HCC): ICD-10-CM

## 2019-06-14 RX ORDER — GLATIRAMER ACETATE 40 MG/ML
INJECTION, SOLUTION SUBCUTANEOUS
Qty: 36 ML | Refills: 2 | Status: SHIPPED | OUTPATIENT
Start: 2019-06-14 | End: 2020-01-13 | Stop reason: SDUPTHER

## 2019-06-18 DIAGNOSIS — R26.9 GAIT DISTURBANCE: ICD-10-CM

## 2019-06-18 DIAGNOSIS — G35 MULTIPLE SCLEROSIS (HCC): ICD-10-CM

## 2019-06-18 RX ORDER — DALFAMPRIDINE 10 MG/1
TABLET, FILM COATED, EXTENDED RELEASE ORAL
Qty: 180 TABLET | Refills: 0 | Status: SHIPPED | OUTPATIENT
Start: 2019-06-18 | End: 2019-09-15 | Stop reason: SDUPTHER

## 2019-07-08 ENCOUNTER — APPOINTMENT (OUTPATIENT)
Dept: LAB | Facility: CLINIC | Age: 69
End: 2019-07-08
Payer: MEDICARE

## 2019-07-08 ENCOUNTER — TRANSCRIBE ORDERS (OUTPATIENT)
Dept: LAB | Facility: CLINIC | Age: 69
End: 2019-07-08

## 2019-07-08 ENCOUNTER — TELEPHONE (OUTPATIENT)
Dept: FAMILY MEDICINE CLINIC | Facility: OTHER | Age: 69
End: 2019-07-08

## 2019-07-08 DIAGNOSIS — M81.0 SENILE OSTEOPOROSIS: Primary | ICD-10-CM

## 2019-07-08 DIAGNOSIS — M81.0 SENILE OSTEOPOROSIS: ICD-10-CM

## 2019-07-08 DIAGNOSIS — Z79.83 PERSONAL HISTORY OF ONGOING TREATMENT WITH ALENDRONATE (FOSAMAX): ICD-10-CM

## 2019-07-08 DIAGNOSIS — G35 MS (MULTIPLE SCLEROSIS) (HCC): ICD-10-CM

## 2019-07-08 DIAGNOSIS — E55.9 AVITAMINOSIS D: ICD-10-CM

## 2019-07-08 LAB
25(OH)D3 SERPL-MCNC: 55.9 NG/ML (ref 30–100)
ALBUMIN SERPL BCP-MCNC: 3.9 G/DL (ref 3.5–5)
CALCIUM SERPL-MCNC: 8.9 MG/DL (ref 8.3–10.1)
CREAT SERPL-MCNC: 0.92 MG/DL (ref 0.6–1.3)
EST. AVERAGE GLUCOSE BLD GHB EST-MCNC: 94 MG/DL
GFR SERPL CREATININE-BSD FRML MDRD: 63 ML/MIN/1.73SQ M
HBA1C MFR BLD: 4.9 % (ref 4.2–6.3)

## 2019-07-08 PROCEDURE — 82310 ASSAY OF CALCIUM: CPT

## 2019-07-08 PROCEDURE — 36415 COLL VENOUS BLD VENIPUNCTURE: CPT

## 2019-07-08 PROCEDURE — 82306 VITAMIN D 25 HYDROXY: CPT

## 2019-07-08 PROCEDURE — 83036 HEMOGLOBIN GLYCOSYLATED A1C: CPT

## 2019-07-08 PROCEDURE — 82040 ASSAY OF SERUM ALBUMIN: CPT

## 2019-07-08 PROCEDURE — 82565 ASSAY OF CREATININE: CPT

## 2019-07-08 NOTE — TELEPHONE ENCOUNTER
Patient went for blood work today  She is very upset because they teetee blood work today and she is not sure why they are checking for Diabetes  She said she is not Diabetic but yet they teetee a A1C    Please advise

## 2019-07-09 ENCOUNTER — TELEPHONE (OUTPATIENT)
Dept: FAMILY MEDICINE CLINIC | Facility: OTHER | Age: 69
End: 2019-07-09

## 2019-07-09 ENCOUNTER — OFFICE VISIT (OUTPATIENT)
Dept: PLASTIC SURGERY | Facility: CLINIC | Age: 69
End: 2019-07-09
Payer: MEDICARE

## 2019-07-09 VITALS — WEIGHT: 138.38 LBS | BODY MASS INDEX: 23.63 KG/M2 | HEIGHT: 64 IN

## 2019-07-09 DIAGNOSIS — Z42.1 AFTERCARE POSTMASTECTOMY FOR BREAST RECONSTRUCTION: Primary | ICD-10-CM

## 2019-07-09 PROCEDURE — 1124F ACP DISCUSS-NO DSCNMKR DOCD: CPT | Performed by: SURGERY

## 2019-07-09 PROCEDURE — 99214 OFFICE O/P EST MOD 30 MIN: CPT | Performed by: SURGERY

## 2019-07-09 RX ORDER — SODIUM, POTASSIUM,MAG SULFATES 17.5-3.13G
SOLUTION, RECONSTITUTED, ORAL ORAL
Refills: 0 | COMMUNITY
Start: 2019-05-29 | End: 2019-07-19

## 2019-07-09 NOTE — PROGRESS NOTES
Assessment/Plan:  Please see HPI  We had a lengthy discussion regarding the history of her dissatisfaction with breast appearance, initially such that she underwent breast augmentation elsewhere many years ago, she was happy with the results following the augmentation  She then developed breast cancer necessitating mastectomy and reconstruction  This was performed by another practice locally  She was quite displeased with the reconstruction results, and we have performed revisional procedure with implant exchange, and mastopexy which have made some improvement but asymmetry remains  We did again discuss potential options, including no surgical intervention, we ruled out the possibility of placing a larger implant on the left, as the skin envelope would not accommodate a larger implant without tissue expansion  She is potentially interested in autologous fat grafting to the left breast to minimize the hollowing of the upper pole, as well as implant exchange on the right for a smaller implant combined with a formal mastopexy  We agreed that this would be a reasonable approach, and that it would be cobian for us to meet again to spend some time discussing the procedure in more detail, as well as review implant sizing, review prior records etc etc   She will make an appointment so that we may proceed  There are no diagnoses linked to this encounter  Subjective: Follow-up visit     Patient ID: Jhony Higgins is a 78 y o  female  HPI Marbin Charlotteks is seen today in follow-up, she had initially undergone postmastectomy reconstruction elsewhere, has been dissatisfied with the result, she is seen today to discuss options for improving breast symmetry      The following portions of the patient's history were reviewed and updated as appropriate: allergies, current medications, past family history, past medical history, past social history, past surgical history and problem list     Review of Systems Constitutional: Negative for chills and fever  HENT: Negative for hearing loss  Eyes: Negative for discharge and visual disturbance  Respiratory: Negative for chest tightness and shortness of breath  Cardiovascular: Negative for chest pain and leg swelling  Gastrointestinal: Negative for blood in stool, constipation, diarrhea and nausea  Genitourinary: Negative for dysuria  Musculoskeletal: Negative for gait problem  Skin: Negative for rash  Allergic/Immunologic: Negative for immunocompromised state  Neurological: Negative for seizures and headaches  Hematological: Does not bruise/bleed easily  Psychiatric/Behavioral: Negative for dysphoric mood  The patient is not nervous/anxious  Objective:      Ht 5' 4" (1 626 m)   Wt 62 8 kg (138 lb 6 oz)   BMI 23 75 kg/m²          Physical Exam   Constitutional: She is oriented to person, place, and time  She appears well-developed and well-nourished  HENT:   Head: Normocephalic  Eyes: Pupils are equal, round, and reactive to light  Neck: Normal range of motion  Pulmonary/Chest: Effort normal    Abdominal: Soft  Musculoskeletal: Normal range of motion  Neurological: She is alert and oriented to person, place, and time  Skin: Skin is warm  Breast examination reveals breast asymmetry, the left breast is situated nicely on the chest wall, there is the anticipated hollowing of the upper pole there is no evidence of unusual inflammation, infection or capsular contracture    The right breast is more dependent and larger than the left, the breast is slightly firmer than the opposite breast

## 2019-07-09 NOTE — TELEPHONE ENCOUNTER
Pt questioning why she was given A1C ordr  Left message for pt to return call  Per PCP, back in August 2018 pt was to come in for physical so routine blood work was ordered   A1C was part of that panel

## 2019-07-10 NOTE — TELEPHONE ENCOUNTER
Please advise the patient that the labs were ordered last year as part of her screening prior to a physical exam or office visit  She wasn't diagnosed with diabetes

## 2019-07-12 ENCOUNTER — ANESTHESIA EVENT (OUTPATIENT)
Dept: GASTROENTEROLOGY | Facility: AMBULARY SURGERY CENTER | Age: 69
End: 2019-07-12

## 2019-07-14 DIAGNOSIS — G35 MULTIPLE SCLEROSIS (HCC): ICD-10-CM

## 2019-07-14 RX ORDER — BACLOFEN 10 MG/1
TABLET ORAL
Qty: 360 TABLET | Refills: 0 | Status: CANCELLED | OUTPATIENT
Start: 2019-07-14

## 2019-07-19 ENCOUNTER — HOSPITAL ENCOUNTER (OUTPATIENT)
Dept: GASTROENTEROLOGY | Facility: AMBULARY SURGERY CENTER | Age: 69
Setting detail: OUTPATIENT SURGERY
Discharge: HOME | End: 2019-07-19
Attending: INTERNAL MEDICINE | Admitting: INTERNAL MEDICINE
Payer: MEDICARE

## 2019-07-19 ENCOUNTER — ANESTHESIA (OUTPATIENT)
Dept: GASTROENTEROLOGY | Facility: AMBULARY SURGERY CENTER | Age: 69
End: 2019-07-19

## 2019-07-19 VITALS
HEIGHT: 64 IN | SYSTOLIC BLOOD PRESSURE: 103 MMHG | BODY MASS INDEX: 23.22 KG/M2 | WEIGHT: 136 LBS | OXYGEN SATURATION: 99 % | HEART RATE: 68 BPM | RESPIRATION RATE: 16 BRPM | DIASTOLIC BLOOD PRESSURE: 58 MMHG

## 2019-07-19 DIAGNOSIS — R19.5 ABNORMAL STOOL TEST: ICD-10-CM

## 2019-07-19 PROCEDURE — 1123F ACP DISCUSS/DSCN MKR DOCD: CPT | Performed by: INTERNAL MEDICINE

## 2019-07-19 PROCEDURE — G0121 COLON CA SCRN NOT HI RSK IND: HCPCS | Performed by: INTERNAL MEDICINE

## 2019-07-19 RX ORDER — LIDOCAINE HYDROCHLORIDE 10 MG/ML
INJECTION, SOLUTION INFILTRATION; PERINEURAL AS NEEDED
Status: DISCONTINUED | OUTPATIENT
Start: 2019-07-19 | End: 2019-07-19 | Stop reason: SURG

## 2019-07-19 RX ORDER — PROPOFOL 10 MG/ML
INJECTION, EMULSION INTRAVENOUS AS NEEDED
Status: DISCONTINUED | OUTPATIENT
Start: 2019-07-19 | End: 2019-07-19 | Stop reason: SURG

## 2019-07-19 RX ORDER — SODIUM CHLORIDE, SODIUM LACTATE, POTASSIUM CHLORIDE, CALCIUM CHLORIDE 600; 310; 30; 20 MG/100ML; MG/100ML; MG/100ML; MG/100ML
125 INJECTION, SOLUTION INTRAVENOUS CONTINUOUS
Status: DISCONTINUED | OUTPATIENT
Start: 2019-07-19 | End: 2019-07-23 | Stop reason: HOSPADM

## 2019-07-19 RX ORDER — LORAZEPAM 1 MG/1
1 TABLET ORAL DAILY
COMMUNITY
End: 2019-07-29 | Stop reason: SDUPTHER

## 2019-07-19 RX ORDER — LIDOCAINE HYDROCHLORIDE 10 MG/ML
0.5 INJECTION, SOLUTION EPIDURAL; INFILTRATION; INTRACAUDAL; PERINEURAL ONCE AS NEEDED
Status: DISCONTINUED | OUTPATIENT
Start: 2019-07-19 | End: 2019-07-23 | Stop reason: HOSPADM

## 2019-07-19 RX ADMIN — PROPOFOL 50 MG: 10 INJECTION, EMULSION INTRAVENOUS at 10:18

## 2019-07-19 RX ADMIN — LIDOCAINE HYDROCHLORIDE ANHYDROUS 40 MG: 10 INJECTION, SOLUTION INFILTRATION at 10:03

## 2019-07-19 RX ADMIN — PROPOFOL 50 MG: 10 INJECTION, EMULSION INTRAVENOUS at 10:09

## 2019-07-19 RX ADMIN — PROPOFOL 80 MG: 10 INJECTION, EMULSION INTRAVENOUS at 10:03

## 2019-07-19 RX ADMIN — PROPOFOL 50 MG: 10 INJECTION, EMULSION INTRAVENOUS at 10:16

## 2019-07-19 RX ADMIN — SODIUM CHLORIDE, SODIUM LACTATE, POTASSIUM CHLORIDE, AND CALCIUM CHLORIDE 125 ML/HR: .6; .31; .03; .02 INJECTION, SOLUTION INTRAVENOUS at 09:55

## 2019-07-19 RX ADMIN — PROPOFOL 50 MG: 10 INJECTION, EMULSION INTRAVENOUS at 10:07

## 2019-07-19 RX ADMIN — PROPOFOL 50 MG: 10 INJECTION, EMULSION INTRAVENOUS at 10:13

## 2019-07-19 RX ADMIN — PROPOFOL 50 MG: 10 INJECTION, EMULSION INTRAVENOUS at 10:11

## 2019-07-19 RX ADMIN — SODIUM CHLORIDE, SODIUM LACTATE, POTASSIUM CHLORIDE, AND CALCIUM CHLORIDE: .6; .31; .03; .02 INJECTION, SOLUTION INTRAVENOUS at 09:53

## 2019-07-19 RX ADMIN — PROPOFOL 20 MG: 10 INJECTION, EMULSION INTRAVENOUS at 10:05

## 2019-07-19 NOTE — ANESTHESIA POSTPROCEDURE EVALUATION
Post-Op Assessment Note    CV Status:  Stable  Pain Score: 0    Pain management: adequate     Mental Status:  Alert and awake   Hydration Status:  Euvolemic   PONV Controlled:  Controlled   Airway Patency:  Patent   Post Op Vitals Reviewed: Yes      Staff: CRNA           /56 (07/19/19 1024)    Temp      Pulse 76 (07/19/19 1024)   Resp 14 (07/19/19 1024)    SpO2 98 % (07/19/19 1024)

## 2019-07-19 NOTE — ANESTHESIA PREPROCEDURE EVALUATION
Review of Systems/Medical History  Patient summary reviewed  Chart reviewed  No history of anesthetic complications     Cardiovascular  EKG reviewed,    Pulmonary  Negative pulmonary ROS        GI/Hepatic    Bowel prep       Kidney stones,        Endo/Other     GYN       Hematology   Musculoskeletal    Comment: S/P left breast cancer      Neurology    Neuromuscular disease , multiple sclerosis, Headaches,   Comment: Recent progression of MS  Has gait difficulty Psychology   Depression ,              Physical Exam    Airway    Mallampati score: II  TM Distance: >3 FB  Neck ROM: full     Dental   No notable dental hx     Cardiovascular      Pulmonary      Other Findings        Anesthesia Plan  ASA Score- 3     Anesthesia Type- IV sedation with anesthesia with ASA Monitors  Additional Monitors:   Airway Plan:         Plan Factors-  Patient did not smoke on day of surgery  Induction-     Postoperative Plan-     Informed Consent- Anesthetic plan and risks discussed with patient  I personally reviewed this patient with the CRNA  Discussed and agreed on the Anesthesia Plan with the CRNA  Olivia Montero

## 2019-07-19 NOTE — H&P
History and Physical - [facility]  Richelle Robles 78 y o  female MRN: [id number]    HPI: Richelle Robles is a 78y o  year old female who presents with abnormal stool DNA         Review of Systems    Historical Information   Past Medical History:   Diagnosis Date    Bone pain     Breast ptosis     Cancer ([address] )     h/o lt ductal ca    Depression     Fatigue     Gait disturbance     Headache     Hip fracture (HCC)     Hip pain     Hypokalemia     Insomnia     Laceration of finger     right hand    Left ankle pain     Left knee pain     Multiple sclerosis (HCC)     Muscle strain     left lower leg    Nephrolithiasis     Osteopenia     Osteoporosis     Pain of left calf     Rash     Solitary pulmonary nodule     SVT (supraventricular tachycardia) (HCC)     Tingling     Urgency of urination     Urticaria     Wrist fracture, left      Past Surgical History:   Procedure Laterality Date    ANKLE SURGERY      APPENDECTOMY  2012    Dr Inman Naas      Enlargement procedure with prosthetic implant bilateral    BREAST RECONSTRUCTION      implant placement, implant revision, right mastopexy    CYSTOSTOMY      with basket extraction of calculus; x2    EXPLORATORY LAPAROTOMY      FOOT SURGERY      HIP FRACTURE SURGERY Right     Subcapital    HIP SURGERY Left     LEG SURGERY      Repair    MASTECTOMY Left 2012    REDUCTION MAMMAPLASTY Right     SENTINEL LYMPH NODE BIOPSY Left 2012    SKIN BIOPSY      TONSILLECTOMY      TUBAL LIGATION      WRIST SURGERY Left      Social History   Social History     Substance and Sexual Activity   Alcohol Use Yes    Comment: very little     Social History     Substance and Sexual Activity   Drug Use No     Social History     Tobacco Use   Smoking Status Former Smoker    Packs/day: 1 00    Years: 35 00    Pack years: 35 00    Last attempt to quit:     Years since quittin 5   Smokeless Tobacco Never Used     Family History   Problem Relation Age of Onset    Hodgkin's lymphoma Maternal Grandfather         age at dx unk    Cancer Maternal Aunt 79        bladder    Heart disease Maternal Aunt     Colon cancer Maternal Uncle 72    Hodgkin's lymphoma Maternal Uncle 79    Coronary artery disease Mother     Other Father         Acute myocardial infarction       Meds/Allergies       (Not in a hospital admission)    Allergies   Allergen Reactions    Contrast Dye [Iodinated Diagnostic Agents] Anaphylaxis    Acetaminophen     Cetirizine     Duloxetine Hcl     Morphine        Objective     /79   Pulse 66 Comment: NSR  Resp 20   Ht 5' 4" (1 626 m)   Wt 61 7 kg (136 lb)   SpO2 100%   BMI 23 34 kg/m²       PHYSICAL EXAM    Gen: NAD  CV: RRR  CHEST: Clear  ABD: soft, NT/ND  EXT: no edema  Neuro: AAO      ASSESSMENT/PLAN:  This is a 78y o  year old female here for abnormal stool DNA         PLAN:   Procedure: colonoscopy

## 2019-07-22 ENCOUNTER — TELEPHONE (OUTPATIENT)
Dept: FAMILY MEDICINE CLINIC | Facility: OTHER | Age: 69
End: 2019-07-22

## 2019-07-22 NOTE — TELEPHONE ENCOUNTER
Patient informed       ----- Message from Tari Zuniga MD sent at 7/22/2019 10:25 AM EDT -----  Please inform the patient her colonoscopy result is back and shows normal other than internal hemorrhoids  For now 10 year follow up is recommended  For more details please contact your GI doctor  Thanks

## 2019-07-29 ENCOUNTER — PATIENT MESSAGE (OUTPATIENT)
Dept: PLASTIC SURGERY | Facility: CLINIC | Age: 69
End: 2019-07-29

## 2019-07-29 DIAGNOSIS — F51.01 PRIMARY INSOMNIA: Primary | ICD-10-CM

## 2019-07-29 DIAGNOSIS — F51.01 PRIMARY INSOMNIA: ICD-10-CM

## 2019-07-29 RX ORDER — LORAZEPAM 1 MG/1
1 TABLET ORAL DAILY
Qty: 90 TABLET | Refills: 2 | Status: SHIPPED | OUTPATIENT
Start: 2019-07-29 | End: 2019-11-07

## 2019-07-29 RX ORDER — LORAZEPAM 1 MG/1
1 TABLET ORAL
Qty: 90 TABLET | Refills: 0 | Status: SHIPPED | OUTPATIENT
Start: 2019-07-29 | End: 2020-05-04 | Stop reason: SDUPTHER

## 2019-07-29 NOTE — TELEPHONE ENCOUNTER
From: Mara Barreto  To: Evelyne Nesbitt MD  Sent: 7/29/2019 8:41 AM EDT  Subject: Non-Urgent Medical Question    I received a letter from the office of Everton Juarez MD regarding the recall of the [employer]er[employer] implants  Apparently , this recall affects me and may have been some of the issue behind the pain in my breast as well as the other issues with the reconstruction he chose to do  Just wanted to inform you of the receipt of the letter    Thank you  Marvel AWAN - cleared of any colon cancer issues so am a go for any repair you may find would be helpful in breast issues

## 2019-07-29 NOTE — TELEPHONE ENCOUNTER
Called pt & advised to call pharmacy prior to picking up r/f on ativan    -dr Jean Claude Nunez aware of pts' request & reordered medication   Routed to dr Jean Claude Nunez

## 2019-07-30 DIAGNOSIS — G35 MULTIPLE SCLEROSIS (HCC): ICD-10-CM

## 2019-07-30 RX ORDER — BACLOFEN 10 MG/1
TABLET ORAL
Qty: 360 TABLET | Refills: 3 | Status: SHIPPED | OUTPATIENT
Start: 2019-07-30 | End: 2020-01-13 | Stop reason: SDUPTHER

## 2019-09-15 DIAGNOSIS — G35 MULTIPLE SCLEROSIS (HCC): ICD-10-CM

## 2019-09-15 DIAGNOSIS — R26.9 GAIT DISTURBANCE: ICD-10-CM

## 2019-09-16 RX ORDER — DALFAMPRIDINE 10 MG/1
TABLET, FILM COATED, EXTENDED RELEASE ORAL
Qty: 180 TABLET | Refills: 0 | Status: SHIPPED | OUTPATIENT
Start: 2019-09-16 | End: 2019-12-13 | Stop reason: SDUPTHER

## 2019-09-26 ENCOUNTER — TELEPHONE (OUTPATIENT)
Dept: FAMILY MEDICINE CLINIC | Facility: OTHER | Age: 69
End: 2019-09-26

## 2019-09-26 DIAGNOSIS — R53.83 OTHER FATIGUE: Primary | ICD-10-CM

## 2019-09-26 NOTE — TELEPHONE ENCOUNTER
Patient is asking for an order to check her thyroid levels    I did advise her she is due for a wellness visit but she is waiting til after she sees her surgeon due to breast surgery  Please call patient if order is in system  Thank you

## 2019-09-27 ENCOUNTER — OFFICE VISIT (OUTPATIENT)
Dept: PLASTIC SURGERY | Facility: CLINIC | Age: 69
End: 2019-09-27
Payer: MEDICARE

## 2019-09-27 ENCOUNTER — APPOINTMENT (OUTPATIENT)
Dept: LAB | Facility: CLINIC | Age: 69
End: 2019-09-27
Payer: MEDICARE

## 2019-09-27 DIAGNOSIS — R53.83 OTHER FATIGUE: ICD-10-CM

## 2019-09-27 DIAGNOSIS — Z42.1 AFTERCARE POSTMASTECTOMY FOR BREAST RECONSTRUCTION: Primary | ICD-10-CM

## 2019-09-27 DIAGNOSIS — Z12.11 SCREENING FOR COLON CANCER: ICD-10-CM

## 2019-09-27 LAB — TSH SERPL DL<=0.05 MIU/L-ACNC: 2.18 UIU/ML (ref 0.36–3.74)

## 2019-09-27 PROCEDURE — 99215 OFFICE O/P EST HI 40 MIN: CPT | Performed by: SURGERY

## 2019-09-27 PROCEDURE — 84443 ASSAY THYROID STIM HORMONE: CPT

## 2019-09-27 PROCEDURE — 36415 COLL VENOUS BLD VENIPUNCTURE: CPT

## 2019-09-27 NOTE — PROGRESS NOTES
Assessment/Plan:  Please see HPI  We mutually agreed to leave the left breast as it is, for now, and to perform a right breast implant exchange for a smaller implant into poor form mastopexy as well  She is aware that the nipple is likely to be removed, she has no issues with that  We discussed the procedure in detail, how it is performed, as well as potential risks, complications and limitations including, but not limited to infection, bleeding, scarring, asymmetry, contour deformity, rippling, migration, deflation, capsular contracture, need for multiple revisional procedures, likely fat grafting, etc etc   She understands, questions were answered to her satisfaction and consent was obtained  She will work with our surgical coordinator to schedule a date for the procedure  Diagnoses and all orders for this visit:    Aftercare postmastectomy for breast reconstruction          Subjective:      Patient ID: Tedoora Corona is a 78 y o  female  HPI    The following portions of the patient's history were reviewed and updated as appropriate: allergies, current medications, past family history, past medical history, past social history, past surgical history and problem list     Review of Systems   Constitutional: Negative for chills and fever  HENT: Negative for hearing loss  Eyes: Negative for discharge and visual disturbance  Respiratory: Negative for chest tightness and shortness of breath  Cardiovascular: Negative for chest pain and leg swelling  Gastrointestinal: Negative for blood in stool, constipation, diarrhea and nausea  Genitourinary: Negative for dysuria  Musculoskeletal: Positive for gait problem  Ambulates with cane/MS   Skin: Negative for rash  Allergic/Immunologic: Negative for immunocompromised state  Neurological: Negative for seizures and headaches  Hematological: Does not bruise/bleed easily  Psychiatric/Behavioral: Positive for dysphoric mood   The patient is not nervous/anxious  Objective: There were no vitals taken for this visit  Physical Exam   Constitutional: She appears well-developed and well-nourished  HENT:   Head: Normocephalic  Eyes: Pupils are equal, round, and reactive to light  Cardiovascular: Normal rate  Pulmonary/Chest: Effort normal    Abdominal: Soft  Musculoskeletal: She exhibits no edema or deformity  Neurological: She is alert  Skin: Skin is warm  Left breast implant in position, higher on chest wall than right breast which is more dependent, ptosis and laxity   Psychiatric: She has a normal mood and affect

## 2019-09-30 ENCOUNTER — TELEPHONE (OUTPATIENT)
Dept: FAMILY MEDICINE CLINIC | Facility: OTHER | Age: 69
End: 2019-09-30

## 2019-09-30 NOTE — TELEPHONE ENCOUNTER
Left a detailed message on machine informing patient       ----- Message from Leim Baumgarten, MD sent at 9/27/2019  4:28 PM EDT -----  Thyroid level is normal   Thanks

## 2019-10-16 ENCOUNTER — TELEPHONE (OUTPATIENT)
Dept: NEUROLOGY | Facility: CLINIC | Age: 69
End: 2019-10-16

## 2019-10-16 NOTE — TELEPHONE ENCOUNTER
----- Message from Audra Manuel MA sent at 10/14/2019  8:27 AM EDT -----  Regarding: Non-Urgent Medical Question  Contact: [phone]  Terry Barnes - do you have the MS checklist that the patient is requesting? Thank you    ----- Message -----  From: Monse Guzman  Sent: 10/11/2019   4:49 PM EDT  To: [facility]  Subject: Non-Urgent Medical Question                      I had requested a pre visit checklist sent to me so that I could have it completed prior to my November visit with Dr Nicole Lombardo  I received a checklist for the [facility] in the mail today  This is unfortunately is not what would be of use to the physician since I am being treated for MS  Would it be possible to have the MS checklist sent to me? Thank you so much      Iqra Estrada  [address]

## 2019-11-01 ENCOUNTER — APPOINTMENT (OUTPATIENT)
Dept: LAB | Facility: CLINIC | Age: 69
End: 2019-11-01
Payer: MEDICARE

## 2019-11-01 DIAGNOSIS — Z85.3 PERSONAL HISTORY OF MALIGNANT NEOPLASM OF BREAST: ICD-10-CM

## 2019-11-01 DIAGNOSIS — G35 MULTIPLE SCLEROSIS (HCC): ICD-10-CM

## 2019-11-01 LAB
ALBUMIN SERPL BCP-MCNC: 4 G/DL (ref 3.5–5)
ALP SERPL-CCNC: 87 U/L (ref 46–116)
ALT SERPL W P-5'-P-CCNC: 58 U/L (ref 12–78)
ANION GAP SERPL CALCULATED.3IONS-SCNC: 4 MMOL/L (ref 4–13)
AST SERPL W P-5'-P-CCNC: 28 U/L (ref 5–45)
BASOPHILS # BLD AUTO: 0.07 THOUSANDS/ΜL (ref 0–0.1)
BASOPHILS NFR BLD AUTO: 1 % (ref 0–1)
BILIRUB DIRECT SERPL-MCNC: 0.12 MG/DL (ref 0–0.2)
BILIRUB SERPL-MCNC: 0.48 MG/DL (ref 0.2–1)
BUN SERPL-MCNC: 14 MG/DL (ref 5–25)
CALCIUM SERPL-MCNC: 9.3 MG/DL (ref 8.3–10.1)
CHLORIDE SERPL-SCNC: 109 MMOL/L (ref 100–108)
CO2 SERPL-SCNC: 26 MMOL/L (ref 21–32)
CREAT SERPL-MCNC: 0.95 MG/DL (ref 0.6–1.3)
EOSINOPHIL # BLD AUTO: 0.4 THOUSAND/ΜL (ref 0–0.61)
EOSINOPHIL NFR BLD AUTO: 8 % (ref 0–6)
ERYTHROCYTE [DISTWIDTH] IN BLOOD BY AUTOMATED COUNT: 12.4 % (ref 11.6–15.1)
GFR SERPL CREATININE-BSD FRML MDRD: 61 ML/MIN/1.73SQ M
GLUCOSE P FAST SERPL-MCNC: 90 MG/DL (ref 65–99)
HCT VFR BLD AUTO: 43.6 % (ref 34.8–46.1)
HGB BLD-MCNC: 14 G/DL (ref 11.5–15.4)
IMM GRANULOCYTES # BLD AUTO: 0.01 THOUSAND/UL (ref 0–0.2)
IMM GRANULOCYTES NFR BLD AUTO: 0 % (ref 0–2)
LYMPHOCYTES # BLD AUTO: 1.7 THOUSANDS/ΜL (ref 0.6–4.47)
LYMPHOCYTES NFR BLD AUTO: 32 % (ref 14–44)
MCH RBC QN AUTO: 31.3 PG (ref 26.8–34.3)
MCHC RBC AUTO-ENTMCNC: 32.1 G/DL (ref 31.4–37.4)
MCV RBC AUTO: 97 FL (ref 82–98)
MONOCYTES # BLD AUTO: 0.43 THOUSAND/ΜL (ref 0.17–1.22)
MONOCYTES NFR BLD AUTO: 8 % (ref 4–12)
NEUTROPHILS # BLD AUTO: 2.73 THOUSANDS/ΜL (ref 1.85–7.62)
NEUTS SEG NFR BLD AUTO: 51 % (ref 43–75)
NRBC BLD AUTO-RTO: 0 /100 WBCS
PLATELET # BLD AUTO: 238 THOUSANDS/UL (ref 149–390)
PMV BLD AUTO: 10.2 FL (ref 8.9–12.7)
POTASSIUM SERPL-SCNC: 3.9 MMOL/L (ref 3.5–5.3)
PROT SERPL-MCNC: 7.8 G/DL (ref 6.4–8.2)
RBC # BLD AUTO: 4.48 MILLION/UL (ref 3.81–5.12)
SODIUM SERPL-SCNC: 139 MMOL/L (ref 136–145)
WBC # BLD AUTO: 5.34 THOUSAND/UL (ref 4.31–10.16)

## 2019-11-01 PROCEDURE — 85025 COMPLETE CBC W/AUTO DIFF WBC: CPT

## 2019-11-01 PROCEDURE — 80076 HEPATIC FUNCTION PANEL: CPT

## 2019-11-01 PROCEDURE — 80048 BASIC METABOLIC PNL TOTAL CA: CPT

## 2019-11-01 PROCEDURE — 36415 COLL VENOUS BLD VENIPUNCTURE: CPT

## 2019-11-04 ENCOUNTER — OFFICE VISIT (OUTPATIENT)
Dept: LAB | Age: 69
End: 2019-11-04
Payer: MEDICARE

## 2019-11-04 DIAGNOSIS — Z85.3 PERSONAL HISTORY OF MALIGNANT NEOPLASM OF BREAST: ICD-10-CM

## 2019-11-04 PROCEDURE — 93005 ELECTROCARDIOGRAM TRACING: CPT

## 2019-11-05 LAB
ATRIAL RATE: 66 BPM
P AXIS: 66 DEGREES
PR INTERVAL: 152 MS
QRS AXIS: 35 DEGREES
QRSD INTERVAL: 70 MS
QT INTERVAL: 418 MS
QTC INTERVAL: 438 MS
T WAVE AXIS: 50 DEGREES
VENTRICULAR RATE: 66 BPM

## 2019-11-05 PROCEDURE — 93010 ELECTROCARDIOGRAM REPORT: CPT | Performed by: INTERNAL MEDICINE

## 2019-11-06 DIAGNOSIS — G35 MULTIPLE SCLEROSIS (HCC): ICD-10-CM

## 2019-11-06 RX ORDER — GABAPENTIN 600 MG/1
1200 TABLET ORAL 3 TIMES DAILY
Qty: 540 TABLET | Refills: 1 | Status: SHIPPED | OUTPATIENT
Start: 2019-11-06 | End: 2020-01-13 | Stop reason: SDUPTHER

## 2019-11-07 ENCOUNTER — CONSULT (OUTPATIENT)
Dept: FAMILY MEDICINE CLINIC | Facility: OTHER | Age: 69
End: 2019-11-07
Payer: MEDICARE

## 2019-11-07 VITALS
TEMPERATURE: 98 F | DIASTOLIC BLOOD PRESSURE: 68 MMHG | WEIGHT: 140.13 LBS | SYSTOLIC BLOOD PRESSURE: 116 MMHG | HEIGHT: 64 IN | OXYGEN SATURATION: 98 % | BODY MASS INDEX: 23.92 KG/M2 | HEART RATE: 70 BPM

## 2019-11-07 DIAGNOSIS — Z85.3 PERSONAL HISTORY OF MALIGNANT NEOPLASM OF BREAST: Primary | ICD-10-CM

## 2019-11-07 DIAGNOSIS — Z01.818 PREOP GENERAL PHYSICAL EXAM: ICD-10-CM

## 2019-11-07 PROCEDURE — 99214 OFFICE O/P EST MOD 30 MIN: CPT | Performed by: FAMILY MEDICINE

## 2019-11-07 NOTE — PROGRESS NOTES
INTERNAL MEDICINE PRE-OPERATIVE EVALUATION  [facility]YS[facility]    NAME: Mariposa Robles  AGE: 78 y o  SEX: female  : 1950     DATE: 2019     Internal Medicine Pre-Operative Evaluation:     Chief Complaint: Pre-operative Evaluation     Surgery: Right breast capsulectomy implant exchange and mastoplexy  Anticipated Date of Surgery: 19  Referring Provider: La Pastor MD        History of Present Illness:     Mariposa Robles is a 78 y o  female who presents to the office today for a preoperative consultation at the request of surgeon, La Pastor MD, who plans on performing Right breast capsulectomy implant exchange and mastoplexy  on 19  Planned anesthesia is general  Patient has a bleeding risk of: no recent abnormal bleeding and no remote history of abnormal bleeding  Patient does not have objections to receiving blood products if needed  Current anti-platelet/anti-coagulation medications that the patient is prescribed includes: none   Assessment of Chronic Conditions:     Multiple sclerosis/ neuropathy:  Stable on medication        Assessment of Cardiac Risk:  · Denies unstable or severe angina or MI in the last 6 weeks or history of stent placement in the last year   · Denies decompensated heart failure (e g  New onset heart failure, NYHA functional class IV heart failure, or worsening existing heart failure)  · Denies significant arrhythmias such as high grade AV block, symptomatic ventricular arrhythmia, newly recognized ventricular tachycardia, supraventricular tachycardia with resting heart rate >100, or symptomatic bradycardia  · Denies severe heart valve disease including aortic stenosis or symptomatic mitral stenosis     Exercise Capacity:  · Able to walk 4 blocks without symptoms?: No, due to MS and muscle weakness    No CP or SOB though   · Able to walk 2 flights without symptoms?: No, unable due to weakness and balance issues  Left leg gives out and patient unable to walk for long  Prior Anesthesia Reactions: No     Personal history of venous thromboembolic disease? Yes, but due to immobility in the 80's  Nothing since then  History of steroid use for >2 weeks within last year? No    STOP-BANG Sleep Apnea Screening Questionnaire:      Do you SNORE loudly (louder than talking or loud enough to be heard through closed doors)? No = 0 point   Do you often feel TIRED, fatigued, or sleepy during daytime? No = 0 point   Has anyone OBSERVED you stop breathing during your sleep? No = 0 point   Do you have or are you being treated for high blood pressure? No = 0 point   BMI more than 35 kg/m2? No = 0 point   AGE over 48years old? Yes = 1 point   NECK circumference > 16 inches (40 cm)? No = 0 point   Male GENDER? No = 0 point   TOTAL SCORE 1 = LOW risk of MAGDI       Review of Systems:     Review of Systems     Problem List:     Patient Active Problem List   Diagnosis    Intraductal carcinoma in situ of left breast    Multiple sclerosis ([facility] [address] )    Peripheral polyneuropathy    Depression    Fatigue    Gait disturbance    Hip pain, right    Headache    History of bilateral hip replacements    Hypokalemia    Injury of right leg    Insomnia    Laceration of finger of right hand    Solitary pulmonary nodule    Rash    Pain of left lower leg    Pain of left calf    Osteopenia    Nephrolithiasis    Muscle strain of left lower leg    Left knee pain    Left ankle pain    Urticaria    Tingling    Screening mammogram, encounter for    Encounter for breast reconstruction following mastectomy    Personal history of malignant neoplasm of breast    Abnormal stool test        Allergies:      Allergies   Allergen Reactions    Contrast Dye [Iodinated Diagnostic Agents] Anaphylaxis    Acetaminophen     Cetirizine     Duloxetine Hcl     Morphine         Current Medications:       Current Outpatient Medications:    ALPHA LIPOIC ACID PO, Take by mouth, Disp: , Rfl:     AMPYRA 10 MG TB12, Ampyra 10 mg Er, Take 1 tab bid, Disp: 180 tablet, Rfl: 0    ascorbic acid (VITAMIN C) 500 mg tablet, Take 500 mg by mouth daily, Disp: , Rfl:     baclofen 10 mg tablet, TAKE 1 TABLET EVERY        MORNING, 1 TABLET EVERY    EVENING, AND TAKE 2 TABLETSAT BEDTIME, Disp: 360 tablet, Rfl: 3    Cholecalciferol (VITAMIN D3) 1000 units CAPS, Take 5,000 Units by mouth  , Disp: , Rfl:     COPAXONE 40 MG/ML SOSY, Inject 40mg under the skin three times weekly  , Disp: 36 mL, Rfl: 2    Cranberry 1000 MG CAPS, Take 300 mg by mouth  , Disp: , Rfl:     cyanocobalamin (VITAMIN B-12) 1,000 mcg tablet, Take by mouth daily, Disp: , Rfl:     denosumab (PROLIA) 60 mg/mL, Inject under the skin, Disp: , Rfl:     gabapentin (NEURONTIN) 300 mg capsule, Take 1 capsule (300 mg total) by mouth daily at bedtime, Disp: 90 capsule, Rfl: 3    gabapentin (NEURONTIN) 600 MG tablet, Take 2 tablets (1,200 mg total) by mouth 3 (three) times a day, Disp: 540 tablet, Rfl: 1    LORazepam (ATIVAN) 1 mg tablet, Take 1 tablet (1 mg total) by mouth daily at bedtime for 90 days, Disp: 90 tablet, Rfl: 0    Magnesium 500 MG TABS, Take 500 tablets by mouth once, Disp: , Rfl:     RESTASIS 0 05 % ophthalmic emulsion, , Disp: , Rfl: 0    solifenacin (VESICARE) 10 MG tablet, Take 1 tablet (10 mg total) by mouth daily, Disp: 90 tablet, Rfl: 3    zonisamide (ZONEGRAN) 100 mg capsule, Take 4 capsules (400 mg total) by mouth daily at bedtime, Disp: 360 capsule, Rfl: 3     Past History:     Past Medical History:   Diagnosis Date    Bone pain     Breast ptosis     Cancer (HCC)     h/o lt ductal ca    Depression     Fatigue     Gait disturbance     Headache     Hip fracture (HCC)     Hip pain     Hypokalemia     Insomnia     Laceration of finger     right hand    Left ankle pain     Left knee pain     Multiple sclerosis (HCC)     Muscle strain     left lower leg    Nephrolithiasis     Osteopenia     Osteoporosis     Pain of left calf     Rash     Solitary pulmonary nodule     SVT (supraventricular tachycardia) (HCC)     Tingling     Urgency of urination     Urticaria     Wrist fracture, left         Past Surgical History:   Procedure Laterality Date    ANKLE SURGERY      APPENDECTOMY  11/2012    Dr Higgins Markus      Enlargement procedure with prosthetic implant bilateral    BREAST RECONSTRUCTION      implant placement, implant revision, right mastopexy    CYSTOSTOMY      with basket extraction of calculus; x2    EXPLORATORY LAPAROTOMY      FOOT SURGERY      HIP FRACTURE SURGERY Right     Subcapital    HIP SURGERY Left     LEG SURGERY      Repair    MASTECTOMY Left 08/16/2012    REDUCTION MAMMAPLASTY Right     SENTINEL LYMPH NODE BIOPSY Left 08/16/2012    SKIN BIOPSY      TONSILLECTOMY      TUBAL LIGATION      WRIST SURGERY Left         Family History   Problem Relation Age of Onset    Hodgkin's lymphoma Maternal Grandfather         age at dx unk    Cancer Maternal Aunt 79        bladder    Heart disease Maternal Aunt     Colon cancer Maternal Uncle 72    Hodgkin's lymphoma Maternal Uncle 79    Coronary artery disease Mother     Other Father         Acute myocardial infarction        Social History     Socioeconomic History    Marital status: /Civil Union     Spouse name: Not on file    Number of children: 2    Years of education: Completed bachelor's degree    Highest education level: Not on file   Occupational History    Occupation: RN     Comment: Retired   Social Needs    Financial resource strain: Not on file    Food insecurity:     Worry: Not on file     Inability: Not on file    Transportation needs:     Medical: Not on file     Non-medical: Not on file   Tobacco Use    Smoking status: Former Smoker     Packs/day: 1 00     Years: 35 00     Pack years: 35 00     Last attempt to quit: 2002     Years since quittin 8    Smokeless tobacco: Never Used   Substance and Sexual Activity    Alcohol use: Not Currently    Drug use: No    Sexual activity: Not on file   Lifestyle    Physical activity:     Days per week: Not on file     Minutes per session: Not on file    Stress: Not on file   Relationships    Social connections:     Talks on phone: Not on file     Gets together: Not on file     Attends [employer] service: Not on file     Active member of club or organization: Not on file     Attends meetings of clubs or organizations: Not on file     Relationship status: Not on file    Intimate partner violence:     Fear of current or ex partner: Not on file     Emotionally abused: Not on file     Physically abused: Not on file     Forced sexual activity: Not on file   Other Topics Concern    Not on file   Social History Narrative    Denied:  History of caffeine use        Physical Exam:      /68 (BP Location: Right arm, Patient Position: Sitting, Cuff Size: Adult)   Pulse 70   Temp 98 °F (36 7 °C) (Tympanic)   Ht 5' 4" (1 626 m)   Wt 63 6 kg (140 lb 2 oz)   SpO2 98%   BMI 24 05 kg/m²     Physical Exam      Data:     Pre-operative work-up    Laboratory Results: I have personally reviewed the pertinent laboratory results/reports     EKG: I have personally reviewed pertinent reports  Chest x-ray: N/A    Previous cardiopulmonary studies within the past year:  · Echocardiogram: none  · Cardiac Catheterization: none  · Stress Test: none  · Pulmonary Function Testing: none       Assessment:     1  Personal history of malignant neoplasm of breast     2  Preop general physical exam          Plan:     78 y o  female with planned surgery: Right breast capsulectomy implant exchange and mastoplexy        Cardiac Risk Estimation: per the Revised Cardiac Risk Index (Circ  [id number], 1999), the patient's risk factors for cardiac complications include none , putting her in: RCI RISK CLASS I (0 risk factors, risk of major cardiac compl  appr  0 5%)  1  Further preoperative workup as follows:   - None; no further preoperative work-up is required    2  Medication Management/Recommendations:   - Patient has been instructed to avoid herbs or non-directed vitamins the week prior to surgery to ensure no drug interactions with perioperative surgical and anesthetic medications  - Patient has been instructed to avoid aspirin containing medications or non-steroidal anti-inflammatory drugs for the week preceding surgery  3  Prophylaxis for cardiac events with perioperative beta-blockers: not indicated  4  Patient requires further consultation with: None    Clearance  Patient is CLEARED for surgery without any additional cardiac testing       Kenny Degroot MD  [address] [address]  Phone#  [phone]  Fax#  [phone]

## 2019-11-07 NOTE — PATIENT INSTRUCTIONS
Medicine Management Before Surgery   AMBULATORY CARE:   Medicine management before surgery  means creating a plan with your healthcare providers to stop, start, or change your medicines  The plan can help prevent complications during and after surgery  The plan may also prevent your surgery from getting canceled or delayed  How to create a medicine management plan:   · Tell all of your healthcare providers about your surgery  Schedule appointments to see them before your surgery  You may need blood work or tests before surgery to help your healthcare provider make changes to your medicines  Some of your medicines may need to be stopped several days to weeks before surgery  Other medicines may be stopped the night before, or need to be taken the day of surgery  Do not stop taking your medicine until you talk to your healthcare providers  · Bring a list of all of your medicines, vitamins, and dietary supplements to your preoperative appointment and on the day of surgery  You may also bring your pill bottles  This will help your anesthesiologist plan your anesthesia and keep you safe during and after surgery  · If your healthcare provider tells you to take medicine on the day of surgery, take it in the morning with a sip of water  On the day of your surgery, tell healthcare providers what medicines you did or did not take that day  Contact your healthcare provider if:   · You have questions about when to stop taking your medicine before surgery  · You have questions about what medicines to take or not take on the day of your surgery  · You need a prescription refilled before surgery  Blood thinner and antiplatelet medicine: If you take blood thinners or antiplatelet medicines, you may be at risk for heavy bleeding during or after surgery  Ask your healthcare provider if you need to stop taking your medicine, and when to stop   After you have stopped your medicine for several days, you may need blood tests  Your surgery may be canceled or rescheduled if your blood tests are abnormal   · You may need to stop taking your blood thinner, such as warfarin, 5 to 7 days before your surgery  While you are not taking your blood thinner, you may need daily injections of heparin  Heparin may decrease your risk for a blood clot while you are not taking your blood thinner  You may need heparin injections once per day, starting the day that you stop your blood thinner  You may continue heparin injections until the day of your surgery  Talk to your healthcare provider about heparin injections  You may need a family member or friend to give you the heparin injection  · New oral anticoagulants (NOAC), such as rivaroxaban, may be stopped closer to the day of your surgery  Your healthcare provider may tell you to stop your NOAC 1 to 3 days before surgery  You will not need heparin injections or other medicines while you are not taking your NOAC  Talk to your healthcare provider before you stop taking your NOAC  · Your healthcare provider may tell you to stop taking antiplatelet medicine, such as aspirin, 5 to 7 days before surgery  You may instead need to continue taking your medicine until the day of your surgery  Talk to your healthcare provider about when to stop these medicines  Heart medicine: Take your heart medicine on the day of your surgery unless your healthcare provider tells you not to  Heart medicines may decrease your risk for complications during or after surgery  If you have had a heart attack or chest pain during exercise, you may need to see a cardiologist before surgery  You may also need to see a cardiologist if you have coronary artery disease  The cardiologist may change your medicines or start you on a heart medicine called a beta-blocker  A beta-blocker controls your heart rate, and may decrease your risk for heart problems during or after surgery     Blood pressure medicine:   · Take your blood pressure medicine on the day of surgery unless your healthcare provider tells you not to  Ask if you should take your diuretic, angiotensin-converting enzyme (ACE) inhibitor, or angiotensin receptor blocker (ARB) on the day of surgery  These medicines may need to be stopped 1 to 2 days before surgery  Diuretic medicines may cause your blood pressure to go too low during surgery  If you take more than one blood pressure medicine, ask which medicine to take on the day of your surgery  · You may need your blood pressure checked a few days before your surgery  This may help your healthcare provider make changes to your medicine and get you ready for surgery  If your blood pressure is not controlled before your surgery, the surgery may be canceled or delayed  Diabetes medicine:   · Keep a diary of your blood sugar levels for 2 weeks before your surgery  Bring your diary to your preoperative appointments  This will help your healthcare providers plan how to change, or when to stop, your diabetes medicine before surgery  · Talk to your healthcare provider about managing your diabetes medicine before surgery  You may be told to lower your dose of long-acting or intermediate-acting insulin the night before, or the morning of, your surgery  You may also be told to skip your dose of fast-acting insulin on the morning of your surgery  Do not take your oral diabetes medicine on the day of your surgery unless your healthcare provider says it is okay  Oral diabetes medicine may cause your blood sugar to drop too low during or after surgery  · If you use an insulin pump, ask what you should do the night before, and morning of, your surgery  Your healthcare provider may tell you to continue or lower your basal dose, and skip your bolus doses, on the day of surgery  Bring extra supplies for you insulin pump, such as cartridges, tubing, and needles   When you arrive for surgery, tell all healthcare providers that you use an insulin pump  They will need to know how your pump works, what your basal rate is, and how to make changes to your basal rate  Your anesthesiologist may disconnect your insulin pump and use IV insulin instead to control your blood sugars during surgery  · Check your blood sugar level on the morning before your surgery  Fasting may cause your blood sugar to be low  The stress of surgery may instead cause your blood sugar to be high  At your preoperative appointment ask your healthcare provider what to do on the morning of surgery if your blood sugar is high or low  Your blood sugar will be monitored closely before, during, and after surgery  This will prevent complications such as poor wound healing and infection  Antiseizure medicine: Take your antiseizure medicine on the morning of your surgery unless your healthcare provider tells you not to  Your healthcare provider may give you antiseizure medicine in your IV before or after surgery  Nonopioid pain medicine:  Stop taking your nonopioid pain medicine 2 days before surgery  Nonopioid pain medicine, such as NSAIDs, may increase your risk for bleeding during surgery  Other nonopioid medicine, such as gabapentin, may interfere with other medicines you may get during surgery  Steroids: Take your steroid medicine on the day of your surgery unless your healthcare provider tells you not to  Your healthcare provider may give you an extra, larger dose of IV steroids before or during your surgery to prevent low blood pressure  Hormones:  Take your thyroid medicine on the morning of your surgery unless your healthcare provider tells you not to  Ask your healthcare provider if you should take other hormone medicines on the day of surgery  Immunosuppressants: You may need to stop taking your immunosuppressants several days before, or the night before, your surgery  Immunosuppressants may increase your risk for wound infection and prevent wound healing  Talk to your healthcare provider about when to stop your immunosuppressants  Diet medicines:  Ask your healthcare provider when to stop taking diet medicine  Some diet medicines need to be stopped several days to weeks before surgery  Diet medicine may prevent other medicines from working or cause complications during surgery  Vitamins and herbal supplements:  Stop taking herbal supplements 1 week before surgery  Most vitamins can be taken up to the day before surgery  Ask your healthcare provider if you need to stop taking any vitamins before surgery  Some vitamins and herbal supplements may increase your risk for bleeding during surgery  They can also prevent anesthesia medicines from working right or increase your risk for a heart attack or stroke during surgery  Asthma or medicine for COPD:  Take your inhalers and asthma or COPD medicine on the morning of surgery  Bring your inhalers with you to your surgery  Anxiety, depression, or psychiatric medicine: Take your anxiety, depression, or psychiatric medicine on the morning of surgery, unless your healthcare provider tells you not to  Tell your anesthesiologist if you take a monoamine oxidase inhibitor (MAOI), such as phenelzine sulfate  To prevent complications, your anesthesiologist may need to change the type of anesthesia that you will get during surgery  Other medicines:  Ask your healthcare provider if and when you should stop take any other type of medicine before surgery  Follow up with your healthcare provider as directed:  Write down your questions so you remember to ask them during your visits  © 2017 [employer] Information is for End User's use only and may not be sold, redistributed or otherwise used for commercial purposes  All illustrations and images included in CareNotes® are the copyrighted property of [employer]  or [employer]  The above information is an  only   It is not intended as medical advice for individual conditions or treatments  Talk to your doctor, nurse or pharmacist before following any medical regimen to see if it is safe and effective for you  Medicine Management Before Surgery   AMBULATORY CARE:   Medicine management before surgery  means creating a plan with your healthcare providers to stop, start, or change your medicines  The plan can help prevent complications during and after surgery  The plan may also prevent your surgery from getting canceled or delayed  How to create a medicine management plan:   · Tell all of your healthcare providers about your surgery  Schedule appointments to see them before your surgery  You may need blood work or tests before surgery to help your healthcare provider make changes to your medicines  Some of your medicines may need to be stopped several days to weeks before surgery  Other medicines may be stopped the night before, or need to be taken the day of surgery  Do not stop taking your medicine until you talk to your healthcare providers  · Bring a list of all of your medicines, vitamins, and dietary supplements to your preoperative appointment and on the day of surgery  You may also bring your pill bottles  This will help your anesthesiologist plan your anesthesia and keep you safe during and after surgery  · If your healthcare provider tells you to take medicine on the day of surgery, take it in the morning with a sip of water  On the day of your surgery, tell healthcare providers what medicines you did or did not take that day  Contact your healthcare provider if:   · You have questions about when to stop taking your medicine before surgery  · You have questions about what medicines to take or not take on the day of your surgery  · You need a prescription refilled before surgery  Blood thinner and antiplatelet medicine:   If you take blood thinners or antiplatelet medicines, you may be at risk for heavy bleeding during or after surgery  Ask your healthcare provider if you need to stop taking your medicine, and when to stop  After you have stopped your medicine for several days, you may need blood tests  Your surgery may be canceled or rescheduled if your blood tests are abnormal   · You may need to stop taking your blood thinner, such as warfarin, 5 to 7 days before your surgery  While you are not taking your blood thinner, you may need daily injections of heparin  Heparin may decrease your risk for a blood clot while you are not taking your blood thinner  You may need heparin injections once per day, starting the day that you stop your blood thinner  You may continue heparin injections until the day of your surgery  Talk to your healthcare provider about heparin injections  You may need a family member or friend to give you the heparin injection  · New oral anticoagulants (NOAC), such as rivaroxaban, may be stopped closer to the day of your surgery  Your healthcare provider may tell you to stop your NOAC 1 to 3 days before surgery  You will not need heparin injections or other medicines while you are not taking your NOAC  Talk to your healthcare provider before you stop taking your NOAC  · Your healthcare provider may tell you to stop taking antiplatelet medicine, such as aspirin, 5 to 7 days before surgery  You may instead need to continue taking your medicine until the day of your surgery  Talk to your healthcare provider about when to stop these medicines  Heart medicine: Take your heart medicine on the day of your surgery unless your healthcare provider tells you not to  Heart medicines may decrease your risk for complications during or after surgery  If you have had a heart attack or chest pain during exercise, you may need to see a cardiologist before surgery  You may also need to see a cardiologist if you have coronary artery disease   The cardiologist may change your medicines or start you on a heart medicine called a beta-blocker  A beta-blocker controls your heart rate, and may decrease your risk for heart problems during or after surgery  Blood pressure medicine:   · Take your blood pressure medicine on the day of surgery unless your healthcare provider tells you not to  Ask if you should take your diuretic, angiotensin-converting enzyme (ACE) inhibitor, or angiotensin receptor blocker (ARB) on the day of surgery  These medicines may need to be stopped 1 to 2 days before surgery  Diuretic medicines may cause your blood pressure to go too low during surgery  If you take more than one blood pressure medicine, ask which medicine to take on the day of your surgery  · You may need your blood pressure checked a few days before your surgery  This may help your healthcare provider make changes to your medicine and get you ready for surgery  If your blood pressure is not controlled before your surgery, the surgery may be canceled or delayed  Diabetes medicine:   · Keep a diary of your blood sugar levels for 2 weeks before your surgery  Bring your diary to your preoperative appointments  This will help your healthcare providers plan how to change, or when to stop, your diabetes medicine before surgery  · Talk to your healthcare provider about managing your diabetes medicine before surgery  You may be told to lower your dose of long-acting or intermediate-acting insulin the night before, or the morning of, your surgery  You may also be told to skip your dose of fast-acting insulin on the morning of your surgery  Do not take your oral diabetes medicine on the day of your surgery unless your healthcare provider says it is okay  Oral diabetes medicine may cause your blood sugar to drop too low during or after surgery  · If you use an insulin pump, ask what you should do the night before, and morning of, your surgery   Your healthcare provider may tell you to continue or lower your basal dose, and skip your bolus doses, on the day of surgery  Bring extra supplies for you insulin pump, such as cartridges, tubing, and needles  When you arrive for surgery, tell all healthcare providers that you use an insulin pump  They will need to know how your pump works, what your basal rate is, and how to make changes to your basal rate  Your anesthesiologist may disconnect your insulin pump and use IV insulin instead to control your blood sugars during surgery  · Check your blood sugar level on the morning before your surgery  Fasting may cause your blood sugar to be low  The stress of surgery may instead cause your blood sugar to be high  At your preoperative appointment ask your healthcare provider what to do on the morning of surgery if your blood sugar is high or low  Your blood sugar will be monitored closely before, during, and after surgery  This will prevent complications such as poor wound healing and infection  Antiseizure medicine: Take your antiseizure medicine on the morning of your surgery unless your healthcare provider tells you not to  Your healthcare provider may give you antiseizure medicine in your IV before or after surgery  Nonopioid pain medicine:  Stop taking your nonopioid pain medicine 2 days before surgery  Nonopioid pain medicine, such as NSAIDs, may increase your risk for bleeding during surgery  Other nonopioid medicine, such as gabapentin, may interfere with other medicines you may get during surgery  Steroids: Take your steroid medicine on the day of your surgery unless your healthcare provider tells you not to  Your healthcare provider may give you an extra, larger dose of IV steroids before or during your surgery to prevent low blood pressure  Hormones:  Take your thyroid medicine on the morning of your surgery unless your healthcare provider tells you not to  Ask your healthcare provider if you should take other hormone medicines on the day of surgery  Immunosuppressants:   You may need to stop taking your immunosuppressants several days before, or the night before, your surgery  Immunosuppressants may increase your risk for wound infection and prevent wound healing  Talk to your healthcare provider about when to stop your immunosuppressants  Diet medicines:  Ask your healthcare provider when to stop taking diet medicine  Some diet medicines need to be stopped several days to weeks before surgery  Diet medicine may prevent other medicines from working or cause complications during surgery  Vitamins and herbal supplements:  Stop taking herbal supplements 1 week before surgery  Most vitamins can be taken up to the day before surgery  Ask your healthcare provider if you need to stop taking any vitamins before surgery  Some vitamins and herbal supplements may increase your risk for bleeding during surgery  They can also prevent anesthesia medicines from working right or increase your risk for a heart attack or stroke during surgery  Asthma or medicine for COPD:  Take your inhalers and asthma or COPD medicine on the morning of surgery  Bring your inhalers with you to your surgery  Anxiety, depression, or psychiatric medicine: Take your anxiety, depression, or psychiatric medicine on the morning of surgery, unless your healthcare provider tells you not to  Tell your anesthesiologist if you take a monoamine oxidase inhibitor (MAOI), such as phenelzine sulfate  To prevent complications, your anesthesiologist may need to change the type of anesthesia that you will get during surgery  Other medicines:  Ask your healthcare provider if and when you should stop take any other type of medicine before surgery  Follow up with your healthcare provider as directed:  Write down your questions so you remember to ask them during your visits  © 2017 [employer] Information is for End User's use only and may not be sold, redistributed or otherwise used for commercial purposes   All illustrations and images included in CareNotes® are the copyrighted property of [employer]  or [employer]  The above information is an  only  It is not intended as medical advice for individual conditions or treatments  Talk to your doctor, nurse or pharmacist before following any medical regimen to see if it is safe and effective for you

## 2019-11-11 ENCOUNTER — TELEPHONE (OUTPATIENT)
Dept: PLASTIC SURGERY | Facility: CLINIC | Age: 69
End: 2019-11-11

## 2019-11-11 ENCOUNTER — TELEPHONE (OUTPATIENT)
Dept: SURGICAL ONCOLOGY | Facility: CLINIC | Age: 69
End: 2019-11-11

## 2019-11-11 NOTE — TELEPHONE ENCOUNTER
----- Message from Ashleigh Major MD sent at 11/11/2019  4:25 PM EST -----  Regarding: FW: Non-Urgent Medical Question  Contact: [phone]  She should still have her mammogram    ----- Message -----  From: Ashwini Marshall RN  Sent: 11/11/2019   4:23 PM EST  To: Ashleigh Major MD  Subject: FW: Non-Urgent Medical Question                  Please advise  ----- Message -----  From: Adam Salguero RN  Sent: 11/11/2019   8:45 AM EST  To: Ashwini Marshall RN  Subject: FW: Non-Urgent Medical Question                      ----- Message -----  From: Kierra Shepard  Sent: 11/8/2019   7:53 PM EST  To: Surgical Oncology [facility]  Subject: Non-Urgent Medical Question                      Dr Talia Pack have to have the right breast implant removed and replaced with a smaller smooth implant due to the implant by Dr Sharee Wilson being recalled  The surgery is scheduled for 12/9/19  I have a right mammogram scheduled for the third week of November  Do you still want me to have the mammo or should I cancel  because of the impending surgery? Please let me know  Thanks    Ray Gurrola

## 2019-11-14 ENCOUNTER — OFFICE VISIT (OUTPATIENT)
Dept: NEUROLOGY | Facility: CLINIC | Age: 69
End: 2019-11-14
Payer: MEDICARE

## 2019-11-14 VITALS
HEART RATE: 76 BPM | WEIGHT: 139 LBS | HEIGHT: 64 IN | BODY MASS INDEX: 23.73 KG/M2 | DIASTOLIC BLOOD PRESSURE: 70 MMHG | SYSTOLIC BLOOD PRESSURE: 122 MMHG

## 2019-11-14 DIAGNOSIS — G35 MULTIPLE SCLEROSIS (HCC): Primary | ICD-10-CM

## 2019-11-14 PROCEDURE — 99214 OFFICE O/P EST MOD 30 MIN: CPT | Performed by: PSYCHIATRY & NEUROLOGY

## 2019-11-14 NOTE — ASSESSMENT & PLAN NOTE
Pt here for neuro follow up  Last seen in May  Pt did have chanelle cataract surgery in interim which was successful  Pt will also be going for breast surgery due to recall of implant on the right side   This was postponed from June and now scheduled for dec 9 th  Pt had updated labs on 11/1 and nl cbc and lfts  Bun and cr also good from that day  Pt with increased pain in the occiput particualarly noted in the lew only  Also some increased issues with word finding  Last cord imaging 2018  Last brain imaging 2016  Will update mri head and pt to call me with 2 days of study  Cont with copaxone brand   Cont with ampyra

## 2019-11-14 NOTE — PROGRESS NOTES
Patient ID: Janett Whitlock is a 78 y o  female  Assessment/Plan:    Multiple sclerosis (HCC)  Pt here for neuro follow up  Last seen in May  Pt did have chanelle cataract surgery in interim which was successful  Pt will also be going for breast surgery due to recall of implant on the right side  This was postponed from June and now scheduled for dec 9 th  Pt had updated labs on 11/1 and nl cbc and lfts  Bun and cr also good from that day  Pt with increased pain in the occiput particualarly noted in the lew only  Also some increased issues with word finding  Last cord imaging 2018  Last brain imaging 2016  Will update mri head and pt to call me with 2 days of study  Cont with copaxone brand   Cont with ampyra       Diagnoses and all orders for this visit:    Multiple sclerosis ([facility] [address] )  -     MRI brain with and without contrast; Future  -     BUN; Future  -     Creatinine, serum; Future           Subjective:    HPI    77 y/o female presents for MS follow up, last seen in 5/23/19 by me   Per my last note "Patient with history of MS dating back to 2008, but likely with symptoms even in 1998  Patient also with PMH of breast cancer s/p left mastectomy in August 2012  Pt has been on Copaxone since 2008 and changed to the 40mg TIW dosing  Pt notes she has to go for colonoscopy due to abn stool screening testing  Pt notes she is also being considered for some breast reconstruction with autologous fat transfer at mastectomy site  Pt is following up with her doctors "  Pt last seen in May  Since last visit pt had chanelle cataract surgery which was very successful  Pt notes she also completed her colonsocopy and all negative , no further studies for 10 yrs  Pt notes her biggest issue is due to recall of her right breast implant  Pt notes she now has to go for surgery to remove the implant    Pt already has issues and other surgeries on her left breast in the past [facility]  Pt is going to deal with the right breast recall and then work on the left breast at a later date  Overall pt is frustrated due to this newest issue  Pt also had her original surgery postponed from June and now being done in dec  Pt notes some int issues with word finding issues  Pt notes occ chanelle occipital pain as well described as burning at times  No new visual sxs  No loc  No sz  No diplopia  no falls or trips  Pt is careful and uses cane jesus with any distance  pt notes allodynea of the right breast with pain occ with anything touching the area  Pt has addressed this with her breast surgeon as well  Pt remains on brand copaxone and padmini med well  Pt also remains on brand ampyra which is very helpful for her stability and endurance with walking  Pt has visited her grandson in [facility]  Pt notes she continues to push herself on a daily basis  Pt remains very active  Pt not interested in PT at this time as she has done multiple times in the past   Pt notes no changes presently in bowel or bladder  No recent infections  No recent hospitalizations  Pt will keep us posted on her upcoming breast surgeries as well  Pt remains on her baclofen as well as her gabapentin            The following portions of the patient's history were reviewed and updated as appropriate: allergies, current medications, past family history, past medical history, past social history, past surgical history and problem list and ros rev  Objective:    Blood pressure 122/70, pulse 76, height 5' 4" (1 626 m), weight 63 kg (139 lb)  Physical Exam   Constitutional: She appears well-developed and well-nourished  Eyes: Pupils are equal, round, and reactive to light  Lids are normal    Cardiovascular: Intact distal pulses  Neurological: She has normal reflexes  Psychiatric: Her speech is normal        Neurological Exam  Mental Status  Awake, alert and oriented to person, place and time   Recent and remote memory are intact  Speech is normal  Language is fluent with no aphasia  Attention and concentration are normal  Fund of knowledge is appropriate for level of education  Cranial Nerves  CN II: Visual acuity is normal  Visual fields full to confrontation  Right funduscopic exam: disc intact  Left funduscopic exam: disc intact  CN III, IV, VI: Extraocular movements intact bilaterally  Normal lids and orbits bilaterally  Pupils equal round and reactive to light bilaterally  CN V: Facial sensation is normal   CN VII: Full and symmetric facial movement  CN VIII: Hearing is normal   CN IX, X: Palate elevates symmetrically  Normal gag reflex  CN XI: Shoulder shrug strength is normal   CN XII: Tongue midline without atrophy or fasciculations  Motor  Normal muscle bulk throughout  Normal muscle tone  Strength is 5/5 in all four extremities except as noted  Left pronator drift  Diff with fine motor left  Left lower ext 4/5  Sensory  Dec vib chanelle lowers  Reflexes  Deep tendon reflexes are 2+ and symmetric in all four extremities with downgoing toes bilaterally  Coordination  Right: Finger-to-nose normal  Rapid alternating movement normal   Left: Finger-to-nose normal  Rapid alternating movement normal     Gait  Casual gait: Wide stance  Patient did the 25 ft walk in 10 47 seconds  Using cane   ROS:    Review of Systems   Constitutional: Positive for fatigue and unexpected weight change  Negative for appetite change and fever  HENT: Positive for trouble swallowing  Negative for hearing loss, tinnitus and voice change  Sinus problems  Hoarseness     Eyes: Negative  Negative for photophobia and pain  Dry eyes   Respiratory: Negative  Negative for shortness of breath  Cardiovascular: Negative  Negative for palpitations  Gastrointestinal: Negative  Negative for nausea and vomiting  Bowel habit changes   Endocrine: Negative    Negative for cold intolerance and heat intolerance  Genitourinary: Negative  Negative for dysuria, frequency and urgency  Musculoskeletal: Positive for arthralgias and gait problem  Negative for myalgias and neck pain  Pain while walking  Cramping     Skin: Negative  Negative for rash  Neurological: Positive for numbness and headaches  Negative for dizziness, tremors, seizures, syncope, facial asymmetry, speech difficulty, weakness and light-headedness  Memory problems  Tingling     Hematological: Negative  Does not bruise/bleed easily  Psychiatric/Behavioral: Positive for sleep disturbance  Negative for confusion and hallucinations          Depression

## 2019-11-20 ENCOUNTER — TELEPHONE (OUTPATIENT)
Dept: SURGICAL ONCOLOGY | Facility: CLINIC | Age: 69
End: 2019-11-20

## 2019-11-20 NOTE — TELEPHONE ENCOUNTER
Contacted scheduling for pt to try and arrange an appt time for her mammogram  Given an appt for 11/27/19 at 10:30 AM  Pt informed and is agreeable

## 2019-11-20 NOTE — TELEPHONE ENCOUNTER
----- Message from Spencer Hallman sent at 11/20/2019  1:53 PM EST -----  Regarding: Cancelled mammo  Contact: [phone]  Fabio Guzman cancelled her mammo for today due to being ill  She's concerned because central scheduling is saying they are scheduling out into next year for a mammo  Can you please call her and address this?

## 2019-11-27 ENCOUNTER — HOSPITAL ENCOUNTER (OUTPATIENT)
Dept: MAMMOGRAPHY | Facility: HOSPITAL | Age: 69
Discharge: HOME | End: 2019-11-27
Attending: SURGERY
Payer: MEDICARE

## 2019-11-27 VITALS — WEIGHT: 139 LBS | BODY MASS INDEX: 23.73 KG/M2 | HEIGHT: 64 IN

## 2019-11-27 DIAGNOSIS — Z12.31 SCREENING MAMMOGRAM, ENCOUNTER FOR: ICD-10-CM

## 2019-11-27 PROCEDURE — 77067 SCR MAMMO BI INCL CAD: CPT

## 2019-12-05 ENCOUNTER — HOSPITAL ENCOUNTER (OUTPATIENT)
Dept: MRI IMAGING | Facility: HOSPITAL | Age: 69
Discharge: HOME | End: 2019-12-05
Attending: PSYCHIATRY & NEUROLOGY
Payer: MEDICARE

## 2019-12-05 DIAGNOSIS — G35 MULTIPLE SCLEROSIS (HCC): ICD-10-CM

## 2019-12-05 PROCEDURE — A9585 GADOBUTROL INJECTION: HCPCS | Performed by: PSYCHIATRY & NEUROLOGY

## 2019-12-05 PROCEDURE — 70553 MRI BRAIN STEM W/O & W/DYE: CPT

## 2019-12-05 RX ADMIN — GADOBUTROL 6 ML: 604.72 INJECTION INTRAVENOUS at 21:48

## 2019-12-09 ENCOUNTER — TELEPHONE (OUTPATIENT)
Dept: NEUROLOGY | Facility: CLINIC | Age: 69
End: 2019-12-09

## 2019-12-09 ENCOUNTER — OFFICE VISIT (OUTPATIENT)
Dept: FAMILY MEDICINE CLINIC | Facility: OTHER | Age: 69
End: 2019-12-09
Payer: MEDICARE

## 2019-12-09 VITALS
BODY MASS INDEX: 23.25 KG/M2 | HEIGHT: 64 IN | HEART RATE: 88 BPM | OXYGEN SATURATION: 93 % | SYSTOLIC BLOOD PRESSURE: 126 MMHG | TEMPERATURE: 100.2 F | WEIGHT: 136.2 LBS | DIASTOLIC BLOOD PRESSURE: 78 MMHG

## 2019-12-09 DIAGNOSIS — R05.8 COUGH WITH SPUTUM: Primary | ICD-10-CM

## 2019-12-09 PROCEDURE — 99214 OFFICE O/P EST MOD 30 MIN: CPT | Performed by: FAMILY MEDICINE

## 2019-12-09 RX ORDER — BENZONATATE 200 MG/1
200 CAPSULE ORAL 3 TIMES DAILY PRN
Qty: 20 CAPSULE | Refills: 0 | Status: SHIPPED | OUTPATIENT
Start: 2019-12-09 | End: 2020-02-10

## 2019-12-09 RX ORDER — PROMETHAZINE HYDROCHLORIDE AND CODEINE PHOSPHATE 6.25; 1 MG/5ML; MG/5ML
5 SYRUP ORAL EVERY 4 HOURS PRN
Qty: 120 ML | Refills: 0 | Status: SHIPPED | OUTPATIENT
Start: 2019-12-09 | End: 2020-02-10

## 2019-12-09 RX ORDER — ALBUTEROL SULFATE 90 UG/1
2 AEROSOL, METERED RESPIRATORY (INHALATION) EVERY 6 HOURS PRN
Qty: 3 INHALER | Refills: 0 | Status: SHIPPED | OUTPATIENT
Start: 2019-12-09 | End: 2020-02-10

## 2019-12-09 RX ORDER — AZITHROMYCIN 250 MG/1
TABLET, FILM COATED ORAL
Qty: 6 TABLET | Refills: 0 | Status: SHIPPED | OUTPATIENT
Start: 2019-12-09 | End: 2019-12-13

## 2019-12-09 NOTE — PATIENT INSTRUCTIONS
Acute Cough   WHAT YOU NEED TO KNOW:   An acute cough can last up to 3 weeks  Common causes of an acute cough include a cold, allergies, or a lung infection  DISCHARGE INSTRUCTIONS:   Return to the emergency department if:   · You have trouble breathing or feel short of breath  · You cough up blood, or you see blood in your mucus  · You faint or feel weak or dizzy  · You have chest pain when you cough or take a deep breath  · You have new wheezing  Contact your healthcare provider if:   · You have a fever  · Your cough lasts longer than 4 weeks  · Your symptoms do not improve with treatment  · You have questions or concerns about your condition or care  Medicines:   · Medicines  may be needed to stop the cough, decrease swelling in your airways, or help open your airways  Medicine may also be given to help you cough up mucus  Ask your healthcare provider what over-the-counter medicines you can take  If you have an infection caused by bacteria, you may need antibiotics  · Take your medicine as directed  Contact your healthcare provider if you think your medicine is not helping or if you have side effects  Tell him or her if you are allergic to any medicine  Keep a list of the medicines, vitamins, and herbs you take  Include the amounts, and when and why you take them  Bring the list or the pill bottles to follow-up visits  Carry your medicine list with you in case of an emergency  Manage your symptoms:   · Do not smoke and stay away from others who smoke  Nicotine and other chemicals in cigarettes and cigars can cause lung damage and make your cough worse  Ask your healthcare provider for information if you currently smoke and need help to quit  E-cigarettes or smokeless tobacco still contain nicotine  Talk to your healthcare provider before you use these products  · Drink extra liquids as directed  Liquids will help thin and loosen mucus so you can cough it up   Liquids will also help prevent dehydration  Examples of good liquids to drink include water, fruit juice, and broth  Do not drink liquids that contain caffeine  Caffeine can increase your risk for dehydration  Ask your healthcare provider how much liquid to drink each day  · Rest as directed  Do not do activities that make your cough worse, such as exercise  · Use a humidifier or vaporizer  Use a cool mist humidifier or a vaporizer to increase air moisture in your home  This may make it easier for you to breathe and help decrease your cough  · Eat 2 to 5 mL of honey 2 times each day  Honey can help thin mucus and decrease your cough  · Use cough drops or lozenges  These can help decrease throat irritation and your cough  Follow up with your healthcare provider as directed:  Write down your questions so you remember to ask them during your visits  © 2017 [employer] Information is for End User's use only and may not be sold, redistributed or otherwise used for commercial purposes  All illustrations and images included in CareNotes® are the copyrighted property of [employer]  or [employer]  The above information is an  only  It is not intended as medical advice for individual conditions or treatments  Talk to your doctor, nurse or pharmacist before following any medical regimen to see if it is safe and effective for you

## 2019-12-09 NOTE — TELEPHONE ENCOUNTER
----- Message from Del Huizar MD sent at 12/8/2019  6:33 AM EST -----  Let pt know mri head stable comp to April 2016

## 2019-12-09 NOTE — PROGRESS NOTES
Assessment/Plan:  Patient is advised to keep hydrated and rest  Take the following medication as directed  FU if any worsening of condition  Problem List Items Addressed This Visit     None      Visit Diagnoses     Cough with sputum    -  Primary    Relevant Medications    azithromycin (ZITHROMAX) 250 mg tablet    promethazine-codeine (PHENERGAN WITH CODEINE) 6 25-10 mg/5 mL syrup    albuterol (PROVENTIL HFA,VENTOLIN HFA) 90 mcg/act inhaler    benzonatate (TESSALON) 200 MG capsule          Falls Plan of Care: balance, strength, and gait training instructions were provided  Medications that increase falls were reviewed  Subjective:      Patient ID: Bianca Brito is a 78 y o  female  Patient is here for eval of cough that is worse over the past 2 days  She feels slightly warm but no fevers in the past year  She has had worsening of cough and congestion in the chest   Not able to take deep breath due to worsening of cough  She feels run down from the cough as well  She had surgery scheduled for today that she had to cancel today and postpone to a later date due to the cough  Cough   This is a new problem  The current episode started in the past 7 days (worse in the past 2 days with deep cough and chest pressure as well as chest pain upon coughing  )  The problem has been gradually worsening  The cough is productive of sputum  Associated symptoms include chills and a fever  Pertinent negatives include no headaches, rash, sore throat or shortness of breath  She has tried OTC cough suppressant and rest for the symptoms  The treatment provided no relief         The following portions of the patient's history were reviewed and updated as appropriate:   She  has a past medical history of Allergic (1967), Bone pain, Breast ptosis, Cancer ([facility] [address] ), Coronary artery disease (1986), Depression, Fatigue, Gait disturbance, Headache, Hip fracture ([facility] [address] ), Hip pain, History of transfusion, Hypokalemia, Insomnia, Laceration of finger, Left ankle pain, Left knee pain, Multiple sclerosis (HCC), Muscle strain, Nephrolithiasis, Osteopenia, Osteoporosis, Pain of left calf, Rash, Scoliosis (birth), Solitary pulmonary nodule, SVT (supraventricular tachycardia) (HCC), Tingling, Urgency of urination, Urticaria, and Wrist fracture, left  She   Patient Active Problem List    Diagnosis Date Noted    Abnormal stool test 05/29/2019    Encounter for breast reconstruction following mastectomy 04/16/2019    Personal history of malignant neoplasm of breast 04/16/2019    Screening mammogram, encounter for 12/13/2018    Multiple sclerosis ([facility] [address] ) 04/18/2018    Peripheral polyneuropathy 04/18/2018    Intraductal carcinoma in situ of left breast     Pain of left lower leg 07/25/2017    Pain of left calf 07/25/2017    Left knee pain 07/25/2017    Left ankle pain 07/25/2017    Injury of right leg 07/17/2017    Muscle strain of left lower leg 07/17/2017    Rash 06/14/2016    Urticaria 06/07/2016    Solitary pulmonary nodule 11/10/2015    Laceration of finger of right hand 08/05/2015    Hypokalemia 12/30/2014    Insomnia 12/30/2014    Fatigue 12/10/2014    Headache 12/10/2014    Tingling 02/24/2014    Gait disturbance 09/25/2013    Nephrolithiasis 01/23/2013    Hip pain, right 09/18/2012    Depression 09/12/2012    Osteopenia 09/12/2012    History of bilateral hip replacements 04/01/2012     She  has a past surgical history that includes Hol[place] lymph node biopsy (Left, 08/16/2012); Breast reconstruction; Ankle surgery; Skin biopsy; Cystostomy; Exploratory laparotomy; Foot surgery; Hip surgery (Left); Appendectomy (11/2012); Leg Surgery; Tonsillectomy; Tubal ligation; Wrist surgery (Left); AUGMENTATION BREAST; Reduction mammaplasty (Right); Hip fracture surgery (Right); Mastectomy (Left, 08/16/2012); Breast biopsy (Left, 07/23/2012);  Fracture surgery (Lt wrist 9/2014 - Lt matthew femur 9/14); and Joint replacement (Rt hip 10/2012)  Her family history includes Cancer (age of onset: 79) in her maternal aunt; Colon cancer (age of onset: 72) in her maternal uncle; Coronary artery disease in her mother; Heart disease in her maternal aunt; Hodgkin's lymphoma in her maternal grandfather; Hodgkin's lymphoma (age of onset: 79) in her maternal uncle; Hyperlipidemia in her mother; No Known Problems in her maternal grandmother, paternal grandfather, paternal grandmother, son, and son; Other in her father; Rheum arthritis in her mother; Stroke in her maternal aunt  She  reports that she quit smoking about 17 years ago  She has a 35 00 pack-year smoking history  She has never used smokeless tobacco  She reports that she does not drink alcohol or use drugs  Current Outpatient Medications   Medication Sig Dispense Refill    ALPHA LIPOIC ACID PO Take by mouth      AMPYRA 10 MG TB12 Ampyra 10 mg Er, Take 1 tab bid 180 tablet 0    ascorbic acid (VITAMIN C) 500 mg tablet Take 500 mg by mouth daily      baclofen 10 mg tablet TAKE 1 TABLET EVERY        MORNING, 1 TABLET EVERY    EVENING, AND TAKE 2 TABLETSAT BEDTIME 360 tablet 3    Cholecalciferol (VITAMIN D3) 1000 units CAPS Take 5,000 Units by mouth        COPAXONE 40 MG/ML SOSY Inject 40mg under the skin three times weekly   36 mL 2    Cranberry 1000 MG CAPS Take 300 mg by mouth        cyanocobalamin (VITAMIN B-12) 1,000 mcg tablet Take by mouth daily      denosumab (PROLIA) 60 mg/mL Inject under the skin      gabapentin (NEURONTIN) 300 mg capsule Take 1 capsule (300 mg total) by mouth daily at bedtime 90 capsule 3    gabapentin (NEURONTIN) 600 MG tablet Take 2 tablets (1,200 mg total) by mouth 3 (three) times a day 540 tablet 1    Magnesium 500 MG TABS Take 500 tablets by mouth once      RESTASIS 0 05 % ophthalmic emulsion   0    solifenacin (VESICARE) 10 MG tablet Take 1 tablet (10 mg total) by mouth daily 90 tablet 3    zonisamide (ZONEGRAN) 100 mg capsule Take 4 capsules (400 mg total) by mouth daily at bedtime 360 capsule 3    albuterol (PROVENTIL HFA,VENTOLIN HFA) 90 mcg/act inhaler Inhale 2 puffs every 6 (six) hours as needed for wheezing or shortness of breath 3 Inhaler 0    azithromycin (ZITHROMAX) 250 mg tablet Take 2 tablets today then 1 tablet daily x 4 days 6 tablet 0    benzonatate (TESSALON) 200 MG capsule Take 1 capsule (200 mg total) by mouth 3 (three) times a day as needed for cough 20 capsule 0    LORazepam (ATIVAN) 1 mg tablet Take 1 tablet (1 mg total) by mouth daily at bedtime for 90 days 90 tablet 0    promethazine-codeine (PHENERGAN WITH CODEINE) 6 25-10 mg/5 mL syrup Take 5 mL by mouth every 4 (four) hours as needed for cough 120 mL 0     No current facility-administered medications for this visit  Facility-Administered Medications Ordered in Other Visits   Medication Dose Route Frequency Provider Last Rate Last Dose    bacitracin 50,000 Units, gentamicin (GARAMYCIN) 40 mg/mL 80 mg, ceFAZolin (ANCEF) 1,000 mg in sodium chloride 0 9 % 1,000 mL irrigation bottle   Irrigation Once Sirena Weldon MD         Current Outpatient Medications on File Prior to Visit   Medication Sig    ALPHA LIPOIC ACID PO Take by mouth    AMPYRA 10 MG TB12 Ampyra 10 mg Er, Take 1 tab bid    ascorbic acid (VITAMIN C) 500 mg tablet Take 500 mg by mouth daily    baclofen 10 mg tablet TAKE 1 TABLET EVERY        MORNING, 1 TABLET EVERY    EVENING, AND TAKE 2 TABLETSAT BEDTIME    Cholecalciferol (VITAMIN D3) 1000 units CAPS Take 5,000 Units by mouth      COPAXONE 40 MG/ML SOSY Inject 40mg under the skin three times weekly      Cranberry 1000 MG CAPS Take 300 mg by mouth      cyanocobalamin (VITAMIN B-12) 1,000 mcg tablet Take by mouth daily    denosumab (PROLIA) 60 mg/mL Inject under the skin    gabapentin (NEURONTIN) 300 mg capsule Take 1 capsule (300 mg total) by mouth daily at bedtime    gabapentin (NEURONTIN) 600 MG tablet Take 2 tablets (1,200 mg total) by mouth 3 (three) times a day    Magnesium 500 MG TABS Take 500 tablets by mouth once    RESTASIS 0 05 % ophthalmic emulsion     solifenacin (VESICARE) 10 MG tablet Take 1 tablet (10 mg total) by mouth daily    zonisamide (ZONEGRAN) 100 mg capsule Take 4 capsules (400 mg total) by mouth daily at bedtime    LORazepam (ATIVAN) 1 mg tablet Take 1 tablet (1 mg total) by mouth daily at bedtime for 90 days     Current Facility-Administered Medications on File Prior to Visit   Medication    bacitracin 50,000 Units, gentamicin (GARAMYCIN) 40 mg/mL 80 mg, ceFAZolin (ANCEF) 1,000 mg in sodium chloride 0 9 % 1,000 mL irrigation bottle     She is allergic to contrast dye [iodinated diagnostic agents]; acetaminophen; cetirizine; duloxetine hcl; and morphine       Review of Systems   Constitutional: Positive for chills, fatigue and fever  Negative for appetite change  HENT: Positive for voice change  Negative for sore throat  Eyes: Negative for visual disturbance  Respiratory: Positive for cough and chest tightness  Negative for shortness of breath  Gastrointestinal: Negative for abdominal pain, nausea and vomiting  Skin: Negative for pallor and rash  Neurological: Negative for light-headedness and headaches  Objective:      /78 (BP Location: Left arm, Patient Position: Sitting, Cuff Size: Adult)   Pulse 88   Temp 100 2 °F (37 9 °C) (Tympanic)   Ht 5' 4" (1 626 m)   Wt 61 8 kg (136 lb 3 2 oz)   SpO2 93%   BMI 23 38 kg/m²          Physical Exam   Constitutional: She is oriented to person, place, and time  She appears well-developed and well-nourished  No distress  HENT:   Head: Normocephalic and atraumatic  Neck: Normal range of motion  Neck supple  Cardiovascular: Normal rate, regular rhythm and normal heart sounds  No murmur heard  Pulmonary/Chest: Effort normal  No respiratory distress  She has no wheezes  Decreased breath sounds noted in all fields    No wheezing noted on exam   Coarse bronchial sounds present  Abdominal: Soft  Bowel sounds are normal  She exhibits no distension  There is no tenderness  Neurological: She is alert and oriented to person, place, and time  Skin: Skin is warm and dry  No rash noted  She is not diaphoretic  Nursing note and vitals reviewed

## 2019-12-13 DIAGNOSIS — G35 MULTIPLE SCLEROSIS (HCC): ICD-10-CM

## 2019-12-13 DIAGNOSIS — R26.9 GAIT DISTURBANCE: ICD-10-CM

## 2019-12-15 RX ORDER — DALFAMPRIDINE 10 MG/1
TABLET, FILM COATED, EXTENDED RELEASE ORAL
Qty: 180 TABLET | Refills: 1 | Status: SHIPPED | OUTPATIENT
Start: 2019-12-15 | End: 2020-01-13 | Stop reason: SDUPTHER

## 2019-12-20 ENCOUNTER — OFFICE VISIT (OUTPATIENT)
Dept: FAMILY MEDICINE CLINIC | Facility: OTHER | Age: 69
End: 2019-12-20
Payer: MEDICARE

## 2019-12-20 ENCOUNTER — APPOINTMENT (OUTPATIENT)
Dept: RADIOLOGY | Age: 69
End: 2019-12-20
Payer: MEDICARE

## 2019-12-20 VITALS
SYSTOLIC BLOOD PRESSURE: 126 MMHG | DIASTOLIC BLOOD PRESSURE: 82 MMHG | HEIGHT: 64 IN | TEMPERATURE: 98.5 F | OXYGEN SATURATION: 93 % | BODY MASS INDEX: 23.46 KG/M2 | HEART RATE: 84 BPM | WEIGHT: 137.4 LBS

## 2019-12-20 DIAGNOSIS — M65.9 SAPHO SYNDROME (HCC): ICD-10-CM

## 2019-12-20 DIAGNOSIS — J20.9 ACUTE BRONCHITIS, UNSPECIFIED ORGANISM: ICD-10-CM

## 2019-12-20 DIAGNOSIS — L40.3 SAPHO SYNDROME (HCC): ICD-10-CM

## 2019-12-20 DIAGNOSIS — L70.9 SAPHO SYNDROME (HCC): ICD-10-CM

## 2019-12-20 DIAGNOSIS — M86.9 SAPHO SYNDROME (HCC): ICD-10-CM

## 2019-12-20 DIAGNOSIS — M85.80 SAPHO SYNDROME (HCC): ICD-10-CM

## 2019-12-20 DIAGNOSIS — J20.9 ACUTE BRONCHITIS, UNSPECIFIED ORGANISM: Primary | ICD-10-CM

## 2019-12-20 PROBLEM — M65.90 SAPHO SYNDROME (HCC): Status: ACTIVE | Noted: 2019-12-20

## 2019-12-20 PROCEDURE — 71046 X-RAY EXAM CHEST 2 VIEWS: CPT

## 2019-12-20 PROCEDURE — 99214 OFFICE O/P EST MOD 30 MIN: CPT | Performed by: NURSE PRACTITIONER

## 2019-12-20 NOTE — PROGRESS NOTES
Assessment/Plan:         Problem List Items Addressed This Visit          Other Visit Diagnoses     Acute bronchitis, unspecified organism    -  Primary  --Given her worsening cough, faint crackles noted on exam, and co-morbidities/immunosuppressant use, will send her for stat CXR  Antibiotic, other measures pending results  --OTC expectorant    --UTD with immunizations  --ER for worsening  Relevant Orders    XR chest pa & lateral            Subjective:      Patient ID: Jerry Coates is a 78 y o  female  Patient of Dr Tasha Jung  Seen here 12/9 for cough, scratchy throat starting two days prior  Initially non-productive  Was prescribed Z-pack which she completed  Also inhaler, which didn't help  Rx cough suppressants which made her sleepy and didn't help much  Since then, cough has increased in frequency and become productive of yellow sputum  Day and night  Nasal congestion, scant rhinorrhea, some postnasal drip the past few days only  Initial low grade fever, but not since  No chills, sweats  NO wheezing, chest tightness, chest pain, shortness of breath  Appetite decreased  No N/V/D, abdominal pain  Has tried OTC Robitussin DM also with minimal improvement  Notes history of MS with immunosuppressant use  Stopped her "vitamins" 12/9 for surgery that was subsequently postponed until the end of January  Has since restarted  No obvious sick contacts, but daughter-in-law is a   Denies history of asthma, respiratory conditions  No known environmental triggers  UTD with Prevnar, PVX, Tdap (2015)  Had flu shot  Former smoker  Quit 20 years ago                The following portions of the patient's history were reviewed and updated as appropriate: current medications, past family history, past medical history, past social history, past surgical history and problem list     Review of Systems   Constitutional:        Per HPI   HENT:        Per HPI Eyes: Negative for discharge  Respiratory: Positive for cough  Negative for shortness of breath and wheezing  Cardiovascular: Negative for chest pain  Gastrointestinal: Negative for abdominal pain, nausea and vomiting  Neurological: Negative for headaches  Psychiatric/Behavioral: Negative for confusion  Objective:      /82 (BP Location: Right arm, Patient Position: Sitting, Cuff Size: Adult)   Pulse 84   Temp 98 5 °F (36 9 °C) (Tympanic)   Ht 5' 4" (1 626 m)   Wt 62 3 kg (137 lb 6 4 oz)   SpO2 93%   BMI 23 58 kg/m²          Physical Exam   Constitutional: She is oriented to person, place, and time  She appears well-developed and well-nourished  HENT:   Head: Normocephalic  Right Ear: External ear normal    Left Ear: External ear normal    Mouth/Throat: Oropharynx is clear and moist    Turbinates mildly swollen, erythematous  No sinus tenderness  Eyes: Pupils are equal, round, and reactive to light  Conjunctivae are normal    Neck: Neck supple  Cardiovascular: Normal rate, regular rhythm and normal heart sounds  Pulmonary/Chest: Effort normal and breath sounds normal  No stridor  No respiratory distress  She has no wheezes  She exhibits no tenderness  Breathing non-labored  RR=16  Infrequent cough noted      Faint RLL crackles  Abdominal: Soft  Bowel sounds are normal  There is no tenderness  Lymphadenopathy:     She has no cervical adenopathy  Neurological: She is alert and oriented to person, place, and time  She has normal reflexes  Skin: Skin is warm and dry  Psychiatric: She has a normal mood and affect

## 2019-12-30 DIAGNOSIS — R39.15 URGENCY OF URINATION: ICD-10-CM

## 2019-12-31 RX ORDER — SOLIFENACIN SUCCINATE 10 MG/1
TABLET, FILM COATED ORAL
Qty: 90 TABLET | Refills: 3 | Status: SHIPPED | OUTPATIENT
Start: 2019-12-31 | End: 2020-01-23 | Stop reason: SDUPTHER

## 2020-01-02 ENCOUNTER — TRANSCRIBE ORDERS (OUTPATIENT)
Dept: ADMINISTRATIVE | Age: 70
End: 2020-01-02

## 2020-01-02 ENCOUNTER — APPOINTMENT (OUTPATIENT)
Dept: LAB | Age: 70
End: 2020-01-02
Payer: MEDICARE

## 2020-01-02 ENCOUNTER — APPOINTMENT (OUTPATIENT)
Dept: RADIOLOGY | Age: 70
End: 2020-01-02
Payer: MEDICARE

## 2020-01-02 DIAGNOSIS — M81.0 SENILE OSTEOPOROSIS: Primary | ICD-10-CM

## 2020-01-02 DIAGNOSIS — N20.0 CALCULUS OF KIDNEY: ICD-10-CM

## 2020-01-02 DIAGNOSIS — M81.0 SENILE OSTEOPOROSIS: ICD-10-CM

## 2020-01-02 DIAGNOSIS — Z85.3 PERSONAL HISTORY OF MALIGNANT NEOPLASM OF BREAST: ICD-10-CM

## 2020-01-02 LAB
ANION GAP SERPL CALCULATED.3IONS-SCNC: 5 MMOL/L (ref 4–13)
BASOPHILS # BLD AUTO: 0.06 THOUSANDS/ΜL (ref 0–0.1)
BASOPHILS NFR BLD AUTO: 1 % (ref 0–1)
BUN SERPL-MCNC: 19 MG/DL (ref 5–25)
CALCIUM SERPL-MCNC: 9.8 MG/DL (ref 8.3–10.1)
CHLORIDE SERPL-SCNC: 112 MMOL/L (ref 100–108)
CO2 SERPL-SCNC: 26 MMOL/L (ref 21–32)
CREAT SERPL-MCNC: 0.92 MG/DL (ref 0.6–1.3)
EOSINOPHIL # BLD AUTO: 0.24 THOUSAND/ΜL (ref 0–0.61)
EOSINOPHIL NFR BLD AUTO: 4 % (ref 0–6)
ERYTHROCYTE [DISTWIDTH] IN BLOOD BY AUTOMATED COUNT: 12.8 % (ref 11.6–15.1)
GFR SERPL CREATININE-BSD FRML MDRD: 63 ML/MIN/1.73SQ M
GLUCOSE P FAST SERPL-MCNC: 86 MG/DL (ref 65–99)
HCT VFR BLD AUTO: 43.1 % (ref 34.8–46.1)
HGB BLD-MCNC: 13.6 G/DL (ref 11.5–15.4)
IMM GRANULOCYTES # BLD AUTO: 0.02 THOUSAND/UL (ref 0–0.2)
IMM GRANULOCYTES NFR BLD AUTO: 0 % (ref 0–2)
LYMPHOCYTES # BLD AUTO: 1.56 THOUSANDS/ΜL (ref 0.6–4.47)
LYMPHOCYTES NFR BLD AUTO: 28 % (ref 14–44)
MCH RBC QN AUTO: 30.6 PG (ref 26.8–34.3)
MCHC RBC AUTO-ENTMCNC: 31.6 G/DL (ref 31.4–37.4)
MCV RBC AUTO: 97 FL (ref 82–98)
MONOCYTES # BLD AUTO: 0.34 THOUSAND/ΜL (ref 0.17–1.22)
MONOCYTES NFR BLD AUTO: 6 % (ref 4–12)
NEUTROPHILS # BLD AUTO: 3.41 THOUSANDS/ΜL (ref 1.85–7.62)
NEUTS SEG NFR BLD AUTO: 61 % (ref 43–75)
NRBC BLD AUTO-RTO: 0 /100 WBCS
PLATELET # BLD AUTO: 234 THOUSANDS/UL (ref 149–390)
PMV BLD AUTO: 10.4 FL (ref 8.9–12.7)
POTASSIUM SERPL-SCNC: 3.4 MMOL/L (ref 3.5–5.3)
RBC # BLD AUTO: 4.45 MILLION/UL (ref 3.81–5.12)
SODIUM SERPL-SCNC: 143 MMOL/L (ref 136–145)
WBC # BLD AUTO: 5.63 THOUSAND/UL (ref 4.31–10.16)

## 2020-01-02 PROCEDURE — 74018 RADEX ABDOMEN 1 VIEW: CPT

## 2020-01-02 PROCEDURE — 85025 COMPLETE CBC W/AUTO DIFF WBC: CPT

## 2020-01-02 PROCEDURE — 36415 COLL VENOUS BLD VENIPUNCTURE: CPT

## 2020-01-02 PROCEDURE — 80048 BASIC METABOLIC PNL TOTAL CA: CPT

## 2020-01-06 NOTE — PRE-PROCEDURE INSTRUCTIONS
Pre-Surgery Instructions:   Medication Instructions    ALPHA LIPOIC ACID PO Instructed patient per Anesthesia Guidelines   AMPYRA 10 MG TB12 Instructed patient per Anesthesia Guidelines   ascorbic acid (VITAMIN C) 500 mg tablet Patient was instructed by Physician and understands   baclofen 10 mg tablet Instructed patient per Anesthesia Guidelines   Cholecalciferol (VITAMIN D3) 1000 units CAPS Patient was instructed by Physician and understands   COPAXONE 40 MG/ML SOSY Instructed patient per Anesthesia Guidelines   Cranberry 1000 MG CAPS Patient was instructed by Physician and understands   cyanocobalamin (VITAMIN B-12) 1,000 mcg tablet Patient was instructed by Physician and understands   denosumab (PROLIA) 60 mg/mL Instructed patient per Anesthesia Guidelines   gabapentin (NEURONTIN) 300 mg capsule Instructed patient per Anesthesia Guidelines   gabapentin (NEURONTIN) 600 MG tablet Instructed patient per Anesthesia Guidelines   LORazepam (ATIVAN) 1 mg tablet Instructed patient per Anesthesia Guidelines   Magnesium 500 MG TABS Patient was instructed by Physician and understands   RESTASIS 0 05 % ophthalmic emulsion Instructed patient per Anesthesia Guidelines   VESICARE 10 MG tablet Instructed patient per Anesthesia Guidelines   zonisamide (ZONEGRAN) 100 mg capsule Instructed patient per Anesthesia Guidelines  Pre-op and bathing instructions given  Patient has hibiclens

## 2020-01-13 DIAGNOSIS — R26.9 GAIT DISTURBANCE: ICD-10-CM

## 2020-01-13 DIAGNOSIS — G62.9 PERIPHERAL POLYNEUROPATHY: ICD-10-CM

## 2020-01-13 DIAGNOSIS — G35 MULTIPLE SCLEROSIS (HCC): ICD-10-CM

## 2020-01-13 RX ORDER — ZONISAMIDE 100 MG/1
400 CAPSULE ORAL
Qty: 360 CAPSULE | Refills: 0 | Status: SHIPPED | OUTPATIENT
Start: 2020-01-13 | End: 2020-01-16 | Stop reason: SDUPTHER

## 2020-01-13 RX ORDER — BACLOFEN 10 MG/1
TABLET ORAL
Qty: 360 TABLET | Refills: 0 | Status: SHIPPED | OUTPATIENT
Start: 2020-01-13 | End: 2020-01-13 | Stop reason: SDUPTHER

## 2020-01-13 RX ORDER — GLATIRAMER ACETATE 40 MG/ML
INJECTION, SOLUTION SUBCUTANEOUS
Qty: 36 ML | Refills: 0 | Status: SHIPPED | OUTPATIENT
Start: 2020-01-13 | End: 2020-12-03 | Stop reason: SDUPTHER

## 2020-01-13 RX ORDER — GABAPENTIN 600 MG/1
1200 TABLET ORAL 3 TIMES DAILY
Qty: 540 TABLET | Refills: 0 | Status: SHIPPED | OUTPATIENT
Start: 2020-01-13 | End: 2020-01-13 | Stop reason: SDUPTHER

## 2020-01-13 RX ORDER — ZONISAMIDE 100 MG/1
400 CAPSULE ORAL
Qty: 360 CAPSULE | Refills: 0 | Status: SHIPPED | OUTPATIENT
Start: 2020-01-13 | End: 2020-01-13 | Stop reason: SDUPTHER

## 2020-01-13 RX ORDER — GABAPENTIN 300 MG/1
300 CAPSULE ORAL
Qty: 90 CAPSULE | Refills: 0 | Status: SHIPPED | OUTPATIENT
Start: 2020-01-13 | End: 2020-01-16 | Stop reason: SDUPTHER

## 2020-01-13 RX ORDER — DALFAMPRIDINE 10 MG/1
TABLET, FILM COATED, EXTENDED RELEASE ORAL
Qty: 180 TABLET | Refills: 0 | Status: SHIPPED | OUTPATIENT
Start: 2020-01-13 | End: 2020-11-25 | Stop reason: SDUPTHER

## 2020-01-13 RX ORDER — GABAPENTIN 300 MG/1
300 CAPSULE ORAL
Qty: 90 CAPSULE | Refills: 0 | Status: SHIPPED | OUTPATIENT
Start: 2020-01-13 | End: 2020-01-13 | Stop reason: SDUPTHER

## 2020-01-13 RX ORDER — BACLOFEN 10 MG/1
TABLET ORAL
Qty: 360 TABLET | Refills: 0 | Status: SHIPPED | OUTPATIENT
Start: 2020-01-13 | End: 2020-01-16 | Stop reason: SDUPTHER

## 2020-01-13 RX ORDER — GABAPENTIN 600 MG/1
1200 TABLET ORAL 3 TIMES DAILY
Qty: 540 TABLET | Refills: 0 | Status: SHIPPED | OUTPATIENT
Start: 2020-01-13 | End: 2020-01-16 | Stop reason: SDUPTHER

## 2020-01-13 RX ORDER — GLATIRAMER ACETATE 40 MG/ML
INJECTION, SOLUTION SUBCUTANEOUS
Qty: 36 ML | Refills: 0 | Status: SHIPPED | OUTPATIENT
Start: 2020-01-13 | End: 2020-01-13 | Stop reason: SDUPTHER

## 2020-01-13 NOTE — TELEPHONE ENCOUNTER
Regarding: Prescription Question  ----- Message from Mathew Polanco RN sent at 1/13/2020  1:53 PM EST -----       ----- Message from Lisette Loernz to Lashonda Jones MD sent at 1/13/2020  8:57 AM -----   Dr Billie Coates has merged AGAIN which requires me to have all of my medications transfered to the new systems and all the berhane info also referred  With each merger, the quality of the staff comes with even more frustration and less cooperation  Would it be possible for your staff to verify for them that I actually have a berhane for copay assistane for my Copaxone with [employer]  It has been renewed until 12/31/2020  Also that I am unable to tolerate generic Ampyra  Any questions or problems, please do not hesitate to have them contact me  Thank you so much      Babak Velazquez

## 2020-01-13 NOTE — TELEPHONE ENCOUNTER
Patient requested refills of her meds and put a note that they be faxed to: [phone]  I approved all scripts, had them printed and signed  Please fax  Thanks!

## 2020-01-13 NOTE — TELEPHONE ENCOUNTER
MSW contacted patient via telephone call to obtain more information about her request  However patient shared that she will be calling herself the [address]   She explained that they are very helpful and easy to work with and if she needs any issues and needs further assistance she will call [address] [place] [address] at [phone]

## 2020-01-13 NOTE — TELEPHONE ENCOUNTER
Spoke with Scott Price  At [facility]- She noted in patients chart to CANCEL AMPYRA 10mg once it falls into their computer system  Patient no longer uses [facility]

## 2020-01-13 NOTE — TELEPHONE ENCOUNTER
Per Dakota Mane MA, patient only needs refill of Copaxone, not all the others  Copaxone only will be faxed to [phone]

## 2020-01-14 ENCOUNTER — ANESTHESIA EVENT (OUTPATIENT)
Dept: PERIOP | Facility: AMBULARY SURGERY CENTER | Age: 70
End: 2020-01-14
Payer: MEDICARE

## 2020-01-16 DIAGNOSIS — G35 MULTIPLE SCLEROSIS (HCC): ICD-10-CM

## 2020-01-16 DIAGNOSIS — G62.9 PERIPHERAL POLYNEUROPATHY: ICD-10-CM

## 2020-01-16 RX ORDER — GABAPENTIN 600 MG/1
1200 TABLET ORAL 3 TIMES DAILY
Qty: 540 TABLET | Refills: 0 | Status: SHIPPED | OUTPATIENT
Start: 2020-01-16 | End: 2020-04-16 | Stop reason: SDUPTHER

## 2020-01-16 RX ORDER — ZONISAMIDE 100 MG/1
400 CAPSULE ORAL
Qty: 360 CAPSULE | Refills: 0 | Status: SHIPPED | OUTPATIENT
Start: 2020-01-16 | End: 2020-04-16 | Stop reason: SDUPTHER

## 2020-01-16 RX ORDER — BACLOFEN 10 MG/1
TABLET ORAL
Qty: 360 TABLET | Refills: 0 | Status: SHIPPED | OUTPATIENT
Start: 2020-01-16 | End: 2020-04-16 | Stop reason: SDUPTHER

## 2020-01-16 RX ORDER — GABAPENTIN 300 MG/1
300 CAPSULE ORAL
Qty: 90 CAPSULE | Refills: 0 | Status: SHIPPED | OUTPATIENT
Start: 2020-01-16 | End: 2020-04-16 | Stop reason: SDUPTHER

## 2020-01-16 NOTE — TELEPHONE ENCOUNTER
I got a message that she only needed the Copaxone filled and not all the others  I didn't send any of these to [facility]    Will approve to [facility]

## 2020-01-16 NOTE — TELEPHONE ENCOUNTER
Spoke to patient and all scripts from 1/13/20 should have going to [address] [place] [address]  Please re-send scripts patient refused the script that were sent to [facility]

## 2020-01-22 ENCOUNTER — TELEPHONE (OUTPATIENT)
Dept: FAMILY MEDICINE CLINIC | Facility: OTHER | Age: 70
End: 2020-01-22

## 2020-01-22 PROCEDURE — 99024 POSTOP FOLLOW-UP VISIT: CPT | Performed by: PHYSICIAN ASSISTANT

## 2020-01-22 NOTE — H&P
Assessment/Plan:  Please see HPI   We mutually agreed to leave the left breast as it is, for now, and to perform a right breast implant exchange for a smaller implant into poor form mastopexy as well   She is aware that the nipple is likely to be removed, she has no issues with that   We discussed the procedure in detail, how it is performed, as well as potential risks, complications and limitations including, but not limited to infection, bleeding, scarring, asymmetry, contour deformity, rippling, migration, deflation, capsular contracture, need for multiple revisional procedures, likely fat grafting, etc etc   She understands, questions were answered to her satisfaction and consent was obtained  Negin Seaman will work with our surgical coordinator to schedule a date for the procedure             Diagnoses and all orders for this visit:     Aftercare postmastectomy for breast reconstruction           Subjective:       Patient ID: Jacy Rodrigez is a 78 y o  female      HPI     The following portions of the patient's history were reviewed and updated as appropriate: allergies, current medications, past family history, past medical history, past social history, past surgical history and problem list      Review of Systems   Constitutional: Negative for chills and fever  HENT: Negative for hearing loss     Eyes: Negative for discharge and visual disturbance  Respiratory: Negative for chest tightness and shortness of breath     Cardiovascular: Negative for chest pain and leg swelling  Gastrointestinal: Negative for blood in stool, constipation, diarrhea and nausea  Genitourinary: Negative for dysuria  Musculoskeletal: Positive for gait problem         Ambulates with cane/MS   Skin: Negative for rash  Allergic/Immunologic: Negative for immunocompromised state  Neurological: Negative for seizures and headaches  Hematological: Does not bruise/bleed easily  Psychiatric/Behavioral: Positive for dysphoric mood  The patient is not nervous/anxious           Objective:        There were no vitals taken for this visit             Physical Exam   Constitutional: She appears well-developed and well-nourished  HENT:   Head: Normocephalic  Eyes: Pupils are equal, round, and reactive to light  Cardiovascular: Normal rate  Pulmonary/Chest: Effort normal    Abdominal: Soft  Musculoskeletal: She exhibits no edema or deformity  Neurological: She is alert     Skin: Skin is warm    Left breast implant in position, higher on chest wall than right breast which is more dependent, ptosis and laxity   Psychiatric: She has a normal mood and affect

## 2020-01-22 NOTE — TELEPHONE ENCOUNTER
----- Message from Sara Flores sent at 1/22/2020  2:46 PM EST -----      ----- Message -----  From: Leighann Tinoco MD  Sent: 1/22/2020   2:34 PM EST  To: [facility]    Patient is due to have a AWV  Please schedule  Dr Deepika Salazar

## 2020-01-22 NOTE — TELEPHONE ENCOUNTER
Patient called today and looked in mychart and wants to know who put inSAPHO syndrome [facility]) because she feels she does not have it   Please advise

## 2020-01-22 NOTE — PROGRESS NOTES
1/23/2020    Janett Whitlock  1950  [id number]        Assessment  -Nephrolithiasis  -Urinary urgency    Discussion/Plan  Valentin Barney is a 78 y o  female being managed by Dr Soumya Cohen       -We reviewed results of recent KUB which shows stable and unchanged bilateral nonobstructing stones  These measure 1-2 mm  She remains asymptomatic  We discussed dietary recommendations      -Patient will continue VESIcare 10mg daily  Prescription refill electronically sent to her pharmacy     -Follow up in 1 year for re-evaluation of her urinary pattern, and KUB  She was instructed to call with any issues  -All questions answered, patients agree with plan     History of Present Illness  78 y o  female with a history of nephrolithiasis in urinary urgency presents today for follow up  Patient continues to take VESIcare 10 mg daily for episodes of urinary urgency  She is pleased with her current urinary pattern on medication  Patient denies any recent stone episodes, flank pain, or gross hematuria  Previous KUB from December 2018 identified bilateral, tiny, nonobstructing renal calculi  Additional history includes multiple sclerosis, which she continues to follow with neurology  She is scheduled for upcoming breast reconstructive surgery on 1/28/2020  Review of Systems  Review of Systems   Constitutional: Negative  HENT: Negative  Respiratory: Negative  Cardiovascular: Negative  Gastrointestinal: Negative  Genitourinary: Negative for decreased urine volume, difficulty urinating, dysuria, flank pain, frequency, hematuria and urgency  Musculoskeletal: Negative  Skin: Negative  Neurological: Negative  Psychiatric/Behavioral: Negative          Past Medical History  Past Medical History:   Diagnosis Date    Allergic 1967    seasonal    Bone pain     Breast ptosis     Cancer ([address] )     h/o lt ductal ca    Depression     Fatigue     Gait disturbance     uses cane at times    Headache     Hip fracture [facility] [address] )     Hip pain     History of transfusion 1975    placenta previa s/p work accident, no rx    Hypokalemia     Insomnia     Laceration of finger     right hand    Left ankle pain     Left knee pain     Multiple sclerosis (HCC)     Muscle strain     left lower leg    Nephrolithiasis     Osteopenia     Osteoporosis     Pain of left calf     Pneumonia     Rash     Scoliosis birth    scoliosis    Solitary pulmonary nodule     SVT (supraventricular tachycardia) (HCC)     Tingling     Urgency of urination     Urticaria     Wrist fracture, left        Past Social History  Past Surgical History:   Procedure Laterality Date    ANKLE SURGERY      APPENDECTOMY  11/2012    Dr Wilma Villalta      Enlargement procedure with prosthetic implant bilateral    BREAST BIOPSY Left 07/23/2012    BREAST RECONSTRUCTION      implant placement, implant revision, right mastopexy    CYSTOSTOMY      with basket extraction of calculus; x2    EXPLORATORY LAPAROTOMY      FOOT SURGERY      FRACTURE SURGERY  Lt wrist 9/2014 - Lt matthew femur 9/14    HIP FRACTURE SURGERY Right     [employer]    HIP SURGERY Left     JOINT REPLACEMENT  Rt hip 10/2012    LEG SURGERY      Repair    MASTECTOMY Left 08/16/2012    REDUCTION MAMMAPLASTY Right     SENTINEL LYMPH NODE BIOPSY Left 08/16/2012    SKIN BIOPSY      TONSILLECTOMY      TUBAL LIGATION      WRIST SURGERY Left        Past Family History  Family History   Problem Relation Age of Onset    Hodgkin's lymphoma Maternal Grandfather         age at dx unk    Cancer Maternal Aunt 79        bladder    Heart disease Maternal Aunt     Colon cancer Maternal Uncle 72    Hodgkin's lymphoma Maternal Uncle 79    Coronary artery disease Mother     Hyperlipidemia Mother     Rheum arthritis Mother     Other Father         Acute myocardial infarction    No Known Problems Maternal Grandmother     No Known Problems Paternal Grandmother     No Known Problems Paternal Grandfather     No Known Problems Son     No Known Problems Son     Stroke Maternal Aunt         6 CVA before death       Past Social history  Social History     Socioeconomic History    Marital status: /Civil Union     Spouse name: Not on file    Number of children: 2    Years of education: Completed bachelor's degree    Highest education level: Not on file   Occupational History    Occupation: RN     Comment: Retired   Social Needs    Financial resource strain: Not on file    Food insecurity:     Worry: Not on file     Inability: Not on file   Evolver needs:     Medical: Not on file     Non-medical: Not on file   Tobacco Use    Smoking status: Former Smoker     Packs/day: 1 00     Years: 35 00     Pack years: 35 00     Last attempt to quit:      Years since quittin 0    Smokeless tobacco: Never Used   Substance and Sexual Activity    Alcohol use: No    Drug use: No    Sexual activity: Yes     Partners: Male     Birth control/protection: Post-menopausal   Lifestyle    Physical activity:     Days per week: Not on file     Minutes per session: Not on file    Stress: Not on file   Relationships    Social connections:     Talks on phone: Not on file     Gets together: Not on file     Attends Evangelical service: Not on file     Active member of club or organization: Not on file     Attends meetings of clubs or organizations: Not on file     Relationship status: Not on file    Intimate partner violence:     Fear of current or ex partner: Not on file     Emotionally abused: Not on file     Physically abused: Not on file     Forced sexual activity: Not on file   Other Topics Concern    Not on file   Social History Narrative    Denied:  History of caffeine use       Current Medications  Current Outpatient Medications   Medication Sig Dispense Refill    albuterol (PROVENTIL HFA,VENTOLIN HFA) 90 mcg/act inhaler Inhale 2 puffs every 6 (six) hours as needed for wheezing or shortness of breath 3 Inhaler 0    ALPHA LIPOIC ACID PO Take by mouth      AMPYRA 10 MG TB12 Ampyra 10 mg Er, Take 1 tab bid 180 tablet 0    ascorbic acid (VITAMIN C) 500 mg tablet Take 500 mg by mouth daily      baclofen 10 mg tablet TAKE 1 TABLET EVERY        MORNING, 1 TABLET EVERY    EVENING, AND TAKE 2 TABLETSAT BEDTIME 360 tablet 0    benzonatate (TESSALON) 200 MG capsule Take 1 capsule (200 mg total) by mouth 3 (three) times a day as needed for cough (Patient not taking: Reported on 12/20/2019) 20 capsule 0    Cholecalciferol (VITAMIN D3) 1000 units CAPS Take 5,000 Units by mouth        COPAXONE 40 MG/ML SOSY Inject 40mg under the skin three times weekly  36 mL 0    Cranberry 1000 MG CAPS Take 300 mg by mouth        cyanocobalamin (VITAMIN B-12) 1,000 mcg tablet Take by mouth daily      denosumab (PROLIA) 60 mg/mL Inject under the skin      gabapentin (NEURONTIN) 300 mg capsule Take 1 capsule (300 mg total) by mouth daily at bedtime 90 capsule 0    gabapentin (NEURONTIN) 600 MG tablet Take 2 tablets (1,200 mg total) by mouth 3 (three) times a day 540 tablet 0    LORazepam (ATIVAN) 1 mg tablet Take 1 tablet (1 mg total) by mouth daily at bedtime for 90 days 90 tablet 0    Magnesium 500 MG TABS Take 500 tablets by mouth once      promethazine-codeine (PHENERGAN WITH CODEINE) 6 25-10 mg/5 mL syrup Take 5 mL by mouth every 4 (four) hours as needed for cough (Patient not taking: Reported on 12/20/2019) 120 mL 0    RESTASIS 0 05 % ophthalmic emulsion   0    VESICARE 10 MG tablet TAKE 1 TABLET DAILY 90 tablet 3    zonisamide (ZONEGRAN) 100 mg capsule Take 4 capsules (400 mg total) by mouth daily at bedtime 360 capsule 0     No current facility-administered medications for this visit        Facility-Administered Medications Ordered in Other Visits   Medication Dose Route Frequency Provider Last Rate Last Dose    bacitracin 50,000 Units, gentamicin (GARAMYCIN) 40 mg/mL 80 mg, ceFAZolin (ANCEF) 1,000 mg in sodium chloride 0 9 % 1,000 mL irrigation bottle   Irrigation Once Kylee Kaplan MD           Allergies  Allergies   Allergen Reactions    Contrast Dye [Iodinated Diagnostic Agents] Anaphylaxis    Duloxetine Hcl      Rapid Heart rate      Acetaminophen Other (See Comments)     Heart palpitations    Cetirizine      Only occurs with generic, weakness in legs, off balance and couldn't walk   Morphine Other (See Comments)     Migraine       Past medical history, social history, family history, medications and allergies were reviewed  Vitals  There were no vitals filed for this visit  Physical Exam  Physical Exam   Constitutional: She is oriented to person, place, and time  She appears well-developed and well-nourished  HENT:   Head: Normocephalic  Eyes: Pupils are equal, round, and reactive to light  Neck: Normal range of motion  Cardiovascular: Normal rate and regular rhythm  Pulmonary/Chest: Effort normal    Abdominal: Soft  Normal appearance  There is no CVA tenderness  Genitourinary:   Genitourinary Comments: Negative CVA tenderness   Musculoskeletal: Normal range of motion  Neurological: She is alert and oriented to person, place, and time  Skin: Skin is warm and dry  Psychiatric: She has a normal mood and affect  Her behavior is normal  Judgment and thought content normal        Results    I have personally reviewed all pertinent lab results and reviewed with patient  Lab Results   Component Value Date    GLUCOSE 95 01/08/2015    CALCIUM 9 8 01/02/2020     04/04/2017    K 3 4 (L) 01/02/2020    CO2 26 01/02/2020     (H) 01/02/2020    BUN 19 01/02/2020    CREATININE 0 92 01/02/2020     Lab Results   Component Value Date    WBC 5 63 01/02/2020    HGB 13 6 01/02/2020    HCT 43 1 01/02/2020    MCV 97 01/02/2020     01/02/2020     No results found for this or any previous visit (from the past 1 hour(s))

## 2020-01-23 ENCOUNTER — OFFICE VISIT (OUTPATIENT)
Dept: UROLOGY | Facility: AMBULATORY SURGERY CENTER | Age: 70
End: 2020-01-23
Payer: MEDICARE

## 2020-01-23 ENCOUNTER — OFFICE VISIT (OUTPATIENT)
Dept: SURGICAL ONCOLOGY | Facility: CLINIC | Age: 70
End: 2020-01-23
Payer: MEDICARE

## 2020-01-23 VITALS
SYSTOLIC BLOOD PRESSURE: 122 MMHG | HEIGHT: 64 IN | HEART RATE: 82 BPM | BODY MASS INDEX: 23.39 KG/M2 | WEIGHT: 137 LBS | DIASTOLIC BLOOD PRESSURE: 72 MMHG

## 2020-01-23 VITALS
HEART RATE: 64 BPM | RESPIRATION RATE: 18 BRPM | BODY MASS INDEX: 22.88 KG/M2 | HEIGHT: 64 IN | DIASTOLIC BLOOD PRESSURE: 80 MMHG | SYSTOLIC BLOOD PRESSURE: 130 MMHG | WEIGHT: 134 LBS | TEMPERATURE: 97.8 F

## 2020-01-23 DIAGNOSIS — R39.15 URGENCY OF URINATION: ICD-10-CM

## 2020-01-23 DIAGNOSIS — Z08 ENCOUNTER FOR FOLLOW-UP SURVEILLANCE OF BREAST CANCER: Primary | ICD-10-CM

## 2020-01-23 DIAGNOSIS — N20.0 NEPHROLITHIASIS: Primary | ICD-10-CM

## 2020-01-23 DIAGNOSIS — Z85.3 ENCOUNTER FOR FOLLOW-UP SURVEILLANCE OF BREAST CANCER: Primary | ICD-10-CM

## 2020-01-23 DIAGNOSIS — R39.15 URINARY URGENCY: ICD-10-CM

## 2020-01-23 DIAGNOSIS — Z86.000 PERSONAL HISTORY OF IN-SITU NEOPLASM OF BREAST: ICD-10-CM

## 2020-01-23 DIAGNOSIS — Z12.31 SCREENING MAMMOGRAM, ENCOUNTER FOR: ICD-10-CM

## 2020-01-23 PROCEDURE — 99214 OFFICE O/P EST MOD 30 MIN: CPT | Performed by: NURSE PRACTITIONER

## 2020-01-23 PROCEDURE — 99213 OFFICE O/P EST LOW 20 MIN: CPT | Performed by: SURGERY

## 2020-01-23 RX ORDER — SOLIFENACIN SUCCINATE 10 MG/1
10 TABLET, FILM COATED ORAL DAILY
Qty: 90 TABLET | Refills: 3 | Status: SHIPPED | OUTPATIENT
Start: 2020-01-23 | End: 2020-11-22

## 2020-01-23 NOTE — PROGRESS NOTES
Surgical Oncology Follow Up       [address] [facility] SURGICAL ONCOLOGY [place]  [address] [facility] [address]  [place] [address]    Aissatou Elders  1950  [id number]  [address] [facility] SURGICAL ONCOLOGY [place]  [address] [place] [address]    Chief Complaint   Patient presents with    Follow-up       Assessment/Plan   Diagnoses and all orders for this visit:    Encounter for follow-up surveillance of breast cancer    Screening mammogram, encounter for  -     Mammo screening right w 3d & cad; Future    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        Advance Care Planning/Advance Directives:  Discussed disease status, cancer treatment plans and/or cancer treatment goals with the patient  Oncology History:       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     Initial Diagnosis     Intraductal carcinoma in situ of left breast      8/16/2012 Surgery     Left breast mastectomy, SLNB  Left breast reconstruction with       4/7/2014 Surgery     Left breast implant replaced (X 3) right mastopexy  History of Present Illness:  Breast cancer follow-up, no concerns, recent right-sided mammogram, will be having additional surgery with Dr Winnie Valladares  -Interval History:  As noted    Review of Systems:  Review of Systems   Constitutional: Negative  Negative for appetite change and fever  Eyes: Negative  Respiratory: Negative  Negative for shortness of breath  Recent pneumonia   Cardiovascular: Negative  Gastrointestinal: Negative  Endocrine: Negative  Genitourinary: Negative  Musculoskeletal: Negative  Negative for arthralgias and myalgias  Skin: Negative  Allergic/Immunologic: Negative  Neurological: Negative  Hematological: Negative  Negative for adenopathy  Does not bruise/bleed easily  Psychiatric/Behavioral: Negative          Patient Active Problem List   Diagnosis    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    Multiple sclerosis (HCC)    Peripheral polyneuropathy    Depression    Fatigue    Gait disturbance    Hip pain, right    Headache    History of bilateral hip replacements    Hypokalemia    Injury of right leg    Insomnia    Laceration of finger of right hand    Solitary pulmonary nodule    Rash    Pain of left lower leg    Pain of left calf    Osteopenia    Nephrolithiasis    Muscle strain of left lower leg    Left knee pain    Left ankle pain    Urticaria    Tingling    Screening mammogram, encounter for    Encounter for breast reconstruction following mastectomy    Abnormal stool test    [facility]    Encounter for follow-up surveillance of breast cancer     Past Medical History:   Diagnosis Date    Allergic 1967    seasonal    Bone pain     Breast ptosis     Cancer [facility] )     h/o lt ductal ca    Depression     Fatigue     Gait disturbance     uses cane at times    Headache     Hip fracture ([facility] )     Hip pain     History of transfusion 1975    placenta previa s/p work accident, no rx    Hypokalemia     Insomnia     Laceration of finger     right hand    Left ankle pain     Left knee pain     Multiple sclerosis (HCC)     Muscle strain     left lower leg    Nephrolithiasis     Osteopenia     Osteoporosis     Pain of left calf     Pneumonia     Rash     Scoliosis birth    scoliosis    Solitary pulmonary nodule     SVT (supraventricular tachycardia) (HCC)     Tingling     Urgency of urination     Urticaria     Wrist fracture, left      Past Surgical History:   Procedure Laterality Date    ANKLE SURGERY      APPENDECTOMY  11/2012    Dr Nicci Shaikh      Enlargement procedure with prosthetic implant bilateral    BREAST BIOPSY Left 07/23/2012    BREAST RECONSTRUCTION      implant placement, implant revision, right mastopexy    CYSTOSTOMY      with basket extraction of calculus; x2    EXPLORATORY LAPAROTOMY  FOOT SURGERY      FRACTURE SURGERY  Lt wrist 2014 - Lt matthew femur     HIP FRACTURE SURGERY Right     Subcapital    HIP SURGERY Left     JOINT REPLACEMENT  Rt hip 10/2012    LEG SURGERY      Repair    MASTECTOMY Left 2012    REDUCTION MAMMAPLASTY Right     SENTINEL LYMPH NODE BIOPSY Left 2012    SKIN BIOPSY      TONSILLECTOMY      TUBAL LIGATION      WRIST SURGERY Left      Family History   Problem Relation Age of Onset    Hodgkin's lymphoma Maternal Grandfather         age at dx unk    Cancer Maternal Aunt 79        bladder    Heart disease Maternal Aunt     Colon cancer Maternal Uncle 72    Hodgkin's lymphoma Maternal Uncle 79    Coronary artery disease Mother     Hyperlipidemia Mother    Valaria Reil Rheum arthritis Mother     Other Father         Acute myocardial infarction    No Known Problems Maternal Grandmother     No Known Problems Paternal Grandmother     No Known Problems Paternal Grandfather     No Known Problems Son     No Known Problems Son     Stroke Maternal Aunt         6 CVA before death     Social History     Socioeconomic History    Marital status: /Civil Union     Spouse name: Not on file    Number of children: 2    Years of education: Completed bachelor's degree    Highest education level: Not on file   Occupational History    Occupation: RN     Comment: Retired   Social Needs    Financial resource strain: Not on file    Food insecurity:     Worry: Not on file     Inability: Not on file    Transportation needs:     Medical: Not on file     Non-medical: Not on file   Tobacco Use    Smoking status: Former Smoker     Packs/day: 1 00     Years: 35 00     Pack years: 35 00     Last attempt to quit:      Years since quittin 0    Smokeless tobacco: Never Used   Substance and Sexual Activity    Alcohol use: No    Drug use: No    Sexual activity: Yes     Partners: Male     Birth control/protection: Post-menopausal   Lifestyle    Physical activity:     Days per week: Not on file     Minutes per session: Not on file    Stress: Not on file   Relationships    Social connections:     Talks on phone: Not on file     Gets together: Not on file     Attends [employer] service: Not on file     Active member of club or organization: Not on file     Attends meetings of clubs or organizations: Not on file     Relationship status: Not on file    Intimate partner violence:     Fear of current or ex partner: Not on file     Emotionally abused: Not on file     Physically abused: Not on file     Forced sexual activity: Not on file   Other Topics Concern    Not on file   Social History Narrative    Denied:  History of caffeine use       Current Outpatient Medications:     ALPHA LIPOIC ACID PO, Take by mouth, Disp: , Rfl:     AMPYRA 10 MG TB12, Ampyra 10 mg Er, Take 1 tab bid, Disp: 180 tablet, Rfl: 0    ascorbic acid (VITAMIN C) 500 mg tablet, Take 500 mg by mouth daily, Disp: , Rfl:     baclofen 10 mg tablet, TAKE 1 TABLET EVERY        MORNING, 1 TABLET EVERY    EVENING, AND TAKE 2 TABLETSAT BEDTIME, Disp: 360 tablet, Rfl: 0    benzonatate (TESSALON) 200 MG capsule, Take 1 capsule (200 mg total) by mouth 3 (three) times a day as needed for cough, Disp: 20 capsule, Rfl: 0    Cholecalciferol (VITAMIN D3) 1000 units CAPS, Take 5,000 Units by mouth  , Disp: , Rfl:     COPAXONE 40 MG/ML SOSY, Inject 40mg under the skin three times weekly  , Disp: 36 mL, Rfl: 0    Cranberry 1000 MG CAPS, Take 300 mg by mouth  , Disp: , Rfl:     cyanocobalamin (VITAMIN B-12) 1,000 mcg tablet, Take by mouth daily, Disp: , Rfl:     denosumab (PROLIA) 60 mg/mL, Inject under the skin, Disp: , Rfl:     gabapentin (NEURONTIN) 300 mg capsule, Take 1 capsule (300 mg total) by mouth daily at bedtime, Disp: 90 capsule, Rfl: 0    gabapentin (NEURONTIN) 600 MG tablet, Take 2 tablets (1,200 mg total) by mouth 3 (three) times a day, Disp: 540 tablet, Rfl: 0    Magnesium 500 MG TABS, Take 500 tablets by mouth once, Disp: , Rfl:     promethazine-codeine (PHENERGAN WITH CODEINE) 6 25-10 mg/5 mL syrup, Take 5 mL by mouth every 4 (four) hours as needed for cough, Disp: 120 mL, Rfl: 0    RESTASIS 0 05 % ophthalmic emulsion, , Disp: , Rfl: 0    solifenacin (VESICARE) 10 MG tablet, Take 1 tablet (10 mg total) by mouth daily, Disp: 90 tablet, Rfl: 3    zonisamide (ZONEGRAN) 100 mg capsule, Take 4 capsules (400 mg total) by mouth daily at bedtime, Disp: 360 capsule, Rfl: 0    albuterol (PROVENTIL HFA,VENTOLIN HFA) 90 mcg/act inhaler, Inhale 2 puffs every 6 (six) hours as needed for wheezing or shortness of breath (Patient not taking: Reported on 1/23/2020), Disp: 3 Inhaler, Rfl: 0    LORazepam (ATIVAN) 1 mg tablet, Take 1 tablet (1 mg total) by mouth daily at bedtime for 90 days, Disp: 90 tablet, Rfl: 0  No current facility-administered medications for this visit  Facility-Administered Medications Ordered in Other Visits:     bacitracin 50,000 Units, gentamicin (GARAMYCIN) 40 mg/mL 80 mg, ceFAZolin (ANCEF) 1,000 mg in sodium chloride 0 9 % 1,000 mL irrigation bottle, , Irrigation, Once, Mary Samayoa MD  Allergies   Allergen Reactions    Contrast Dye [Iodinated Diagnostic Agents] Anaphylaxis    Duloxetine Hcl      Rapid Heart rate      Acetaminophen Other (See Comments)     Heart palpitations    Cetirizine      Only occurs with generic, weakness in legs, off balance and couldn't walk   Morphine Other (See Comments)     Migraine       The following portions of the patient's history were reviewed and updated as appropriate: allergies, current medications, past family history, past medical history, past social history, past surgical history and problem list         Vitals:    01/23/20 1035   BP: 130/80   Pulse: 64   Resp: 18   Temp: 97 8 °F (36 6 °C)       Physical Exam   Constitutional: She is oriented to person, place, and time  She appears well-developed and well-nourished     HENT: Head: Normocephalic and atraumatic  Cardiovascular: Normal heart sounds  Pulmonary/Chest: Breath sounds normal  Right breast exhibits no inverted nipple, no mass, no nipple discharge, no skin change and no tenderness  Left breast exhibits skin change (Mastectomy scar and implant)  Left breast exhibits no mass and no tenderness  Abdominal: Soft  Lymphadenopathy:        Right axillary: No pectoral and no lateral adenopathy present  Left axillary: No pectoral and no lateral adenopathy present  Right: No supraclavicular adenopathy present  Left: No supraclavicular adenopathy present  Neurological: She is alert and oriented to person, place, and time  Psychiatric: She has a normal mood and affect  Results:  Labs:      Imaging  11/27/2019 right screening mammogram is benign    I reviewed the above imaging data  Discussion/Summary:  39-year-old female status post left mastectomy and reconstruction for ductal carcinoma in situ  There is no evidence of disease based on examination today or recent contralateral mammogram   She will be having her implant exchanged on the right side next week  I will make arrangements for her mammogram of the right breast due in November  I will see her again in one year or sooner should the need arise

## 2020-01-27 NOTE — TELEPHONE ENCOUNTER
Please advise patient that the diagnosis was added by Elizabeth Gamez on 12/20/19 (she had seen him on that day)  Please have him address patient's concerns  If its mistake please have him remove from the chart  Thanks

## 2020-01-28 ENCOUNTER — HOSPITAL ENCOUNTER (OUTPATIENT)
Facility: AMBULARY SURGERY CENTER | Age: 70
Setting detail: OUTPATIENT SURGERY
Discharge: HOME | End: 2020-01-28
Attending: SURGERY | Admitting: SURGERY
Payer: MEDICARE

## 2020-01-28 ENCOUNTER — ANESTHESIA (OUTPATIENT)
Dept: PERIOP | Facility: AMBULARY SURGERY CENTER | Age: 70
End: 2020-01-28
Payer: MEDICARE

## 2020-01-28 VITALS
HEART RATE: 88 BPM | DIASTOLIC BLOOD PRESSURE: 59 MMHG | SYSTOLIC BLOOD PRESSURE: 116 MMHG | TEMPERATURE: 97.5 F | HEIGHT: 64 IN | OXYGEN SATURATION: 98 % | BODY MASS INDEX: 23.39 KG/M2 | WEIGHT: 137 LBS | RESPIRATION RATE: 20 BRPM

## 2020-01-28 DIAGNOSIS — Z86.000 PERSONAL HISTORY OF IN-SITU NEOPLASM OF BREAST: Primary | ICD-10-CM

## 2020-01-28 PROCEDURE — C1789 PROSTHESIS, BREAST, IMP: HCPCS | Performed by: SURGERY

## 2020-01-28 PROCEDURE — 19316 MASTOPEXY: CPT | Performed by: SURGERY

## 2020-01-28 PROCEDURE — 19340 INSJ BREAST IMPLT SM D MAST: CPT | Performed by: SURGERY

## 2020-01-28 PROCEDURE — 19371 PERI-IMPLT CAPSLC BRST COMPL: CPT | Performed by: SURGERY

## 2020-01-28 DEVICE — SMOOTH ROUND MODERATE PLUS PROFILE GEL-FILLED BREAST IMPLANT, 300CC  SMOOTH ROUND SILICONE
Type: IMPLANTABLE DEVICE | Site: BREAST | Status: FUNCTIONAL
Brand: MENTOR MEMORYGEL BREAST IMPLANT

## 2020-01-28 RX ORDER — FENTANYL CITRATE 50 UG/ML
INJECTION, SOLUTION INTRAMUSCULAR; INTRAVENOUS AS NEEDED
Status: DISCONTINUED | OUTPATIENT
Start: 2020-01-28 | End: 2020-01-28

## 2020-01-28 RX ORDER — ONDANSETRON 2 MG/ML
4 INJECTION INTRAMUSCULAR; INTRAVENOUS ONCE AS NEEDED
Status: DISCONTINUED | OUTPATIENT
Start: 2020-01-28 | End: 2020-01-28 | Stop reason: HOSPADM

## 2020-01-28 RX ORDER — ONDANSETRON 2 MG/ML
INJECTION INTRAMUSCULAR; INTRAVENOUS AS NEEDED
Status: DISCONTINUED | OUTPATIENT
Start: 2020-01-28 | End: 2020-01-28 | Stop reason: SURG

## 2020-01-28 RX ORDER — SODIUM CHLORIDE, SODIUM LACTATE, POTASSIUM CHLORIDE, CALCIUM CHLORIDE 600; 310; 30; 20 MG/100ML; MG/100ML; MG/100ML; MG/100ML
125 INJECTION, SOLUTION INTRAVENOUS CONTINUOUS
Status: DISCONTINUED | OUTPATIENT
Start: 2020-01-28 | End: 2020-01-28 | Stop reason: HOSPADM

## 2020-01-28 RX ORDER — HYDROMORPHONE HCL/PF 1 MG/ML
SYRINGE (ML) INJECTION AS NEEDED
Status: DISCONTINUED | OUTPATIENT
Start: 2020-01-28 | End: 2020-01-28

## 2020-01-28 RX ORDER — CEPHALEXIN 500 MG/1
500 CAPSULE ORAL EVERY 6 HOURS SCHEDULED
Qty: 20 CAPSULE | Refills: 0 | Status: SHIPPED | OUTPATIENT
Start: 2020-01-28 | End: 2020-02-02

## 2020-01-28 RX ORDER — FENTANYL CITRATE/PF 50 MCG/ML
25 SYRINGE (ML) INJECTION
Status: DISCONTINUED | OUTPATIENT
Start: 2020-01-28 | End: 2020-01-28 | Stop reason: HOSPADM

## 2020-01-28 RX ORDER — OXYCODONE HYDROCHLORIDE 5 MG/1
5 TABLET ORAL EVERY 4 HOURS PRN
Status: DISCONTINUED | OUTPATIENT
Start: 2020-01-28 | End: 2020-01-28 | Stop reason: HOSPADM

## 2020-01-28 RX ORDER — OXYCODONE HYDROCHLORIDE 5 MG/1
5 TABLET ORAL EVERY 4 HOURS PRN
Qty: 20 TABLET | Refills: 0 | Status: SHIPPED | OUTPATIENT
Start: 2020-01-28 | End: 2020-02-07

## 2020-01-28 RX ORDER — DEXAMETHASONE SODIUM PHOSPHATE 4 MG/ML
INJECTION, SOLUTION INTRA-ARTICULAR; INTRALESIONAL; INTRAMUSCULAR; INTRAVENOUS; SOFT TISSUE AS NEEDED
Status: DISCONTINUED | OUTPATIENT
Start: 2020-01-28 | End: 2020-01-28 | Stop reason: SURG

## 2020-01-28 RX ORDER — SODIUM CHLORIDE, SODIUM LACTATE, POTASSIUM CHLORIDE, CALCIUM CHLORIDE 600; 310; 30; 20 MG/100ML; MG/100ML; MG/100ML; MG/100ML
50 INJECTION, SOLUTION INTRAVENOUS CONTINUOUS
Status: DISCONTINUED | OUTPATIENT
Start: 2020-01-28 | End: 2020-01-28 | Stop reason: HOSPADM

## 2020-01-28 RX ORDER — LIDOCAINE HYDROCHLORIDE 10 MG/ML
INJECTION, SOLUTION EPIDURAL; INFILTRATION; INTRACAUDAL; PERINEURAL AS NEEDED
Status: DISCONTINUED | OUTPATIENT
Start: 2020-01-28 | End: 2020-01-28 | Stop reason: SURG

## 2020-01-28 RX ORDER — PROPOFOL 10 MG/ML
INJECTION, EMULSION INTRAVENOUS AS NEEDED
Status: DISCONTINUED | OUTPATIENT
Start: 2020-01-28 | End: 2020-01-28 | Stop reason: SURG

## 2020-01-28 RX ORDER — EPHEDRINE SULFATE 50 MG/ML
INJECTION INTRAVENOUS AS NEEDED
Status: DISCONTINUED | OUTPATIENT
Start: 2020-01-28 | End: 2020-01-28 | Stop reason: SURG

## 2020-01-28 RX ORDER — HYDROMORPHONE HCL/PF 1 MG/ML
0.2 SYRINGE (ML) INJECTION
Status: DISCONTINUED | OUTPATIENT
Start: 2020-01-28 | End: 2020-01-28 | Stop reason: HOSPADM

## 2020-01-28 RX ORDER — ROCURONIUM BROMIDE 10 MG/ML
INJECTION, SOLUTION INTRAVENOUS AS NEEDED
Status: DISCONTINUED | OUTPATIENT
Start: 2020-01-28 | End: 2020-01-28 | Stop reason: SURG

## 2020-01-28 RX ORDER — CEFAZOLIN SODIUM 1 G/50ML
SOLUTION INTRAVENOUS AS NEEDED
Status: DISCONTINUED | OUTPATIENT
Start: 2020-01-28 | End: 2020-01-28 | Stop reason: SURG

## 2020-01-28 RX ORDER — CEFAZOLIN SODIUM 1 G/50ML
1000 SOLUTION INTRAVENOUS ONCE
Status: DISCONTINUED | OUTPATIENT
Start: 2020-01-28 | End: 2020-01-28 | Stop reason: HOSPADM

## 2020-01-28 RX ORDER — MAGNESIUM HYDROXIDE 1200 MG/15ML
LIQUID ORAL AS NEEDED
Status: DISCONTINUED | OUTPATIENT
Start: 2020-01-28 | End: 2020-01-28 | Stop reason: HOSPADM

## 2020-01-28 RX ADMIN — SODIUM CHLORIDE, SODIUM LACTATE, POTASSIUM CHLORIDE, AND CALCIUM CHLORIDE: .6; .31; .03; .02 INJECTION, SOLUTION INTRAVENOUS at 11:28

## 2020-01-28 RX ADMIN — HYDROMORPHONE HYDROCHLORIDE 0.5 MG: 1 INJECTION, SOLUTION INTRAMUSCULAR; INTRAVENOUS; SUBCUTANEOUS at 13:50

## 2020-01-28 RX ADMIN — ONDANSETRON 4 MG: 2 INJECTION INTRAMUSCULAR; INTRAVENOUS at 11:51

## 2020-01-28 RX ADMIN — DEXAMETHASONE SODIUM PHOSPHATE 4 MG: 4 INJECTION, SOLUTION INTRAMUSCULAR; INTRAVENOUS at 11:51

## 2020-01-28 RX ADMIN — EPHEDRINE SULFATE 5 MG: 50 INJECTION, SOLUTION INTRAVENOUS at 13:01

## 2020-01-28 RX ADMIN — ROCURONIUM BROMIDE 10 MG: 10 SOLUTION INTRAVENOUS at 11:54

## 2020-01-28 RX ADMIN — ROCURONIUM BROMIDE 20 MG: 10 SOLUTION INTRAVENOUS at 11:44

## 2020-01-28 RX ADMIN — FENTANYL CITRATE 50 MCG: 50 INJECTION, SOLUTION INTRAMUSCULAR; INTRAVENOUS at 12:33

## 2020-01-28 RX ADMIN — EPHEDRINE SULFATE 5 MG: 50 INJECTION, SOLUTION INTRAVENOUS at 11:38

## 2020-01-28 RX ADMIN — FENTANYL CITRATE 50 MCG: 50 INJECTION, SOLUTION INTRAMUSCULAR; INTRAVENOUS at 11:32

## 2020-01-28 RX ADMIN — HYDROMORPHONE HYDROCHLORIDE 0.5 MG: 1 INJECTION, SOLUTION INTRAMUSCULAR; INTRAVENOUS; SUBCUTANEOUS at 13:55

## 2020-01-28 RX ADMIN — CEFAZOLIN SODIUM 1000 MG: 1 SOLUTION INTRAVENOUS at 11:25

## 2020-01-28 RX ADMIN — EPHEDRINE SULFATE 5 MG: 50 INJECTION, SOLUTION INTRAVENOUS at 12:44

## 2020-01-28 RX ADMIN — SUGAMMADEX 50 MG: 100 INJECTION, SOLUTION INTRAVENOUS at 13:54

## 2020-01-28 RX ADMIN — PROPOFOL 150 MG: 10 INJECTION, EMULSION INTRAVENOUS at 11:31

## 2020-01-28 RX ADMIN — LIDOCAINE HYDROCHLORIDE 50 MG: 10 INJECTION, SOLUTION EPIDURAL; INFILTRATION; INTRACAUDAL; PERINEURAL at 11:31

## 2020-01-28 RX ADMIN — PROPOFOL 50 MG: 10 INJECTION, EMULSION INTRAVENOUS at 11:32

## 2020-01-28 RX ADMIN — SUGAMMADEX 150 MG: 100 INJECTION, SOLUTION INTRAVENOUS at 13:48

## 2020-01-28 NOTE — OP NOTE
OPERATIVE REPORT  PATIENT NAME: Di Shankar    :  1950  MRN: [id number]  Pt Location: [facility] OR ROOM 04    SURGERY DATE: 2020    Surgeon(s) and Role:     Uriel Mancuso MD - Primary    Preop Diagnosis:  Personal history of malignant neoplasm of breast [Z85 3]    Post-Op Diagnosis Codes:     * Personal history of malignant neoplasm of breast [Z85 3]    Procedure:  1  Removal intact breast implant right breast 2  Right breast capsulectomy 3  Elevation right breast inframammary fold/mound revision 4  Right breast reconstruction with breast implant 5  Right breast mastopexy   Specimen(s):  * No specimens in log *    Estimated Blood Loss:   Minimal    Drains:  * No LDAs found *    Anesthesia Type:   General    Operative Indications:  Personal history of malignant neoplasm of breast [Z85 3]  Breast asymmetry    Operative Findings:  Breast asymmetry    Complications:   None    Procedure and Technique:  Roxi Narayan was seen in the holding area preoperatively and the surgical site was marked with her participation  A standard Lowe pattern was utilized, and we discussed the procedure in detail, including the potential for nipple loss, removal of the nipple, nipple necrosis, wound healing problems, asymmetry etc   She was then taken to the operating room and underwent induction of anesthesia by the anesthesia personnel  The operative field was prepped and draped in sterile fashion a proper time-out was performed  The nipple-areolar complex was then circumscribed with a 38 mm cookie cutter, and the area within the Wise pattern was then de-epithelialized with 15 blade  Initially the lateral most aspect of the pedicle was detached from the right lateral breast flap utilizing the Bovie cautery, the existing implant was removed without difficulty  The pocket was thoroughl irrigated with antibiotic solution    A lighted mammary retractor was used to examine the pocket, given considerable constriction/contraction of the pocket capsulotomy was performed along the superior and medial poles in order to enable superior volume with the new implant  Following this, due to the need to elevate the right inframammary fold to a more symmetrical level with the opposite side, complete capsulectomy was performed along the inframammary fold, a capsulorrhaphy with advancement of the inframammary crease was then performed  This was sutured through the capsule to the rib periosteum utilizing multiple 2-0 Vicryl sutures initially and this was then reinforced with a running 3-0 PDS suture  At this point, the wound was again irrigated with antibiotic solution and a 250 cc moderate plus profile implant Sizer was placed within the pocket and the skin was temporarily closed with skin staples  The breast volume was still less than the opposite of, left breast, and the Sizer was removed and replaced with a 300 cc moderate plus profile implant Sizer  This gave a better symmetry it is regard to volume, and more aggressive mastopexy was then performed in order to shape the breast adequately  The medial flap was then dissected from the nipple-areolar complex pedicle utilizing Bovie cautery hemostasis was assured the Bovie  Following this, again the skin edges were temporarily closed with skin staples, the new location of the nipple-areolar complex was determined with the patient in upright, seated position  This was carefully marked, initially the planned position of the left nipple-areolar complex was marked and this was then transferred to the right breast in a symmetric fashion  The skin was de-epithelialized after marking the area with a 38 mm cookie cutter  The superior portion of the pedicle, still attached as a bucket-handle was then detached from the superior breast flap utilizing Bovie cautery, this enabled transfer of the nipple-areolar complex to the desired position    The skin edges were temporally closed with skin staples and the patient was assessed from the foot of the bed both the supine and upright positions  This gave a much improved appearance of the breast in regard to its position on the chest wall, as well as the location of the nipple-areolar complex  Following this, the patient was returned to the supine position, the skin staples were removed after patch marks were carefully placed to aid in realignment of the skin edges  The Sizer was removed, the pocket was inspected for hemostasis and thoroughly irrigated with antibiotic solution  The skin was then re-prepped and draped in sterile fashion, the instruments were wiped down with antibiotic solution and the 300 cc Sizer was then placed back within the pocket  Following this the deep dermal closure was accomplished utilizing 2-0 and 3-0 Vicryl sutures, the wound was left open along the lateral component in order to allow for the final implant placement  The Sizer was then remove, the the skin was then re-prepped and draped in sterile fashion, the surgical team's gloves were changed and a mentor or 300 cc moderate plus profile implant was stored in antibiotic solution  It was then delivered into the implant pocket E utilizing a no-touch technique with a Chiang funnel  The capsule was closed over the implant under direct vision utilizing 2-0 Vicryl sutures  The remainder of the deep dermal closure was completed with multiple 3-0 Vicryl sutures and this was followed by running subcuticular strata fix  The nipple-areolar complex and flap edges appeared to be viable  The wounds were dressed with Xeroform and dry sterile dressings    A surgical bra was applied the patient was transferred   I was present for the entire procedure    Patient Disposition:  PACU     SIGNATURE: Dotty Blevins MD  DATE: January 28, 2020  TIME: 3:41 PM

## 2020-01-28 NOTE — DISCHARGE INSTRUCTIONS
[facility]  Dr Michael Parr   [address] [facility], [address]  Phone: [phone]     Postoperative Instructions for Outpatient Surgery     These instructions are being provided by your doctor to give you basic guidelines during your post-op recovery  Please let our office know if your contact information has changed       Please call the office today for an appointment in 2 days for postoperative care     Dressings: Leave dressings on until seen in office  Do not remove       Activity Restrictions: No strenuous activity or heavy lifting     Bathing:  No showering  May sponge bath  Do not get chest/breasts wet      Medications:    Resume pre-op medications     You may take tylenol, aleve, or ibuprofen for pain control              Oxycodone as per script (e-script)              Keflex as per script (e-script)    Other:   1) Wear surgibra at all times  2) Apply ice to breast for 5-10 minutes at a time 2-3 times per day

## 2020-01-28 NOTE — INTERVAL H&P NOTE
H&P reviewed  After examining the patient I find no changes in the patients condition since the H&P had been written      Vitals:    01/28/20 1022   BP: 125/65   Pulse: 78   Resp: 18   Temp: 98 6 °F (37 °C)   SpO2: 100%

## 2020-01-28 NOTE — ANESTHESIA POSTPROCEDURE EVALUATION
Post-Op Assessment Note    CV Status:  Stable  Pain Score: 0    Pain management: adequate     Mental Status:  Alert and awake   Hydration Status:  Euvolemic   PONV Controlled:  Controlled   Airway Patency:  Patent   Post Op Vitals Reviewed: Yes      Staff: AnesthesiologistROSIBEL           BP (P) 129/60 (01/28/20 1405)    Temp (!) (P) 97 2 °F (36 2 °C) (01/28/20 1405)    Pulse (P) 81 (01/28/20 1405)   Resp (P) 20 (01/28/20 1405)    SpO2 (P) 97 % (01/28/20 1405)

## 2020-01-28 NOTE — INTERIM OP NOTE
BREAST IMPLANT EXCHANGE WITH CAPSULECTOMY, BREAST MASTOPEXY  Postoperative Note  PATIENT NAME: Mariposa Robles  : 1950  MRN: [id number]  [facility] OR ROOM 04    Surgery Date: 2020    Preop Diagnosis:  Personal history of malignant neoplasm of breast [Z85 3]    Post-Op Diagnosis Codes:     * Personal history of malignant neoplasm of breast [Z85 3]    Procedure(s) (LRB):  BREAST IMPLANT EXCHANGE WITH CAPSULECTOMY (Right)  BREAST MASTOPEXY (Right)    Surgeon(s) and Role:     Sam Becerra MD - Primary    Specimens:  * No specimens in log *    Estimated Blood Loss:   Minimal    Anesthesia Type:   General     Findings:    see op note   Complications:   None    SIGNATURE: Toño Molina MD   DATE: 2020   TIME: 1:57 PM

## 2020-01-28 NOTE — ANESTHESIA PREPROCEDURE EVALUATION
Review of Systems/Medical History  Patient summary reviewed  Chart reviewed  No history of anesthetic complications     Cardiovascular  EKG reviewed,    Pulmonary  Not a smoker , No asthma , No shortness of breath,        GI/Hepatic  Negative GI/hepatic ROS          Kidney stones,        Endo/Other  Negative endo/other ROS      GYN  Negative gynecology ROS          Hematology  Negative hematology ROS      Musculoskeletal    Comment: S/P left breast cancer      Neurology    Neuromuscular disease , multiple sclerosis, Headaches,   Comment: Recent progression of MS  Has gait difficulty Psychology   Depression ,              Physical Exam    Airway    Mallampati score: II  TM Distance: >3 FB  Neck ROM: full     Dental   No notable dental hx     Cardiovascular  Cardiovascular exam normal    Pulmonary  Pulmonary exam normal     Other Findings        Anesthesia Plan  ASA Score- 3     Anesthesia Type- general with ASA Monitors  Additional Monitors:   Airway Plan: LMA  Plan Factors-    Induction- intravenous  Postoperative Plan- Plan for postoperative opioid use  Planned trial extubation    Informed Consent- Anesthetic plan and risks discussed with patient  I personally reviewed this patient with the CRNA  Discussed and agreed on the Anesthesia Plan with the CRNA  Mike Philippe

## 2020-01-28 NOTE — TELEPHONE ENCOUNTER
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

## 2020-01-30 ENCOUNTER — OFFICE VISIT (OUTPATIENT)
Dept: PLASTIC SURGERY | Facility: CLINIC | Age: 70
End: 2020-01-30

## 2020-01-30 DIAGNOSIS — Z98.890 POST-OPERATIVE STATE: Primary | ICD-10-CM

## 2020-01-30 PROCEDURE — 99024 POSTOP FOLLOW-UP VISIT: CPT

## 2020-01-30 NOTE — PROGRESS NOTES
Patient was seen today for 1st po dressing change after implant exchange/mastopexy of the right breast on 1/27/2020  All dressings were removed without difficulty  The incisions appeared clean, dry and intact with some bruising to the area  A new xeroform was placed over the nipple per Dr Fercho Dickey request and the patient was informed she may remove it on Saturday  Also placed ABD pads over the incisions  Per Caden Laws the patient is able to shower, and I informed Elda Schaefer not to stand directly in the shower stream and to let the water cascade down over that side and very gently pat dry  She will then replace the xeroform and keep on until Saturday  The patient was given extra supplies  Per Dr Elissa Santiago we will see her back next week to check the area and then again in 2 weeks for suture removal   All questions were answered for the patient and she was encouraged to call with any problems  No photos were taken at this appointment

## 2020-02-07 ENCOUNTER — OFFICE VISIT (OUTPATIENT)
Dept: PLASTIC SURGERY | Facility: CLINIC | Age: 70
End: 2020-02-07

## 2020-02-07 DIAGNOSIS — Z86.000 PERSONAL HISTORY OF IN-SITU NEOPLASM OF BREAST: Primary | ICD-10-CM

## 2020-02-07 PROCEDURE — 4040F PNEUMOC VAC/ADMIN/RCVD: CPT | Performed by: PHYSICIAN ASSISTANT

## 2020-02-07 PROCEDURE — 99024 POSTOP FOLLOW-UP VISIT: CPT | Performed by: PHYSICIAN ASSISTANT

## 2020-02-07 NOTE — PROGRESS NOTES
Assessment/Plan:   Elna Bloch is a 78year old female who is status post implant exchange/mastopexy of the right breast on 1/27/2020  Please see HPI  She can now shower daily pat the area dry  We will see her back next week for suture removal      Diagnoses and all orders for this visit:    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          Subjective:     Patient ID: Florence Johnson is a 78 y o  female  HPI   She denies any complaints regarding her right breast     Review of Systems   Skin:        As per HPI  Objective:     Physical Exam   Skin:   Incisions are all clean dry and intact  The nipple is mildly dusky  She denies nipple sensation  Please see photo

## 2020-02-10 ENCOUNTER — OFFICE VISIT (OUTPATIENT)
Dept: FAMILY MEDICINE CLINIC | Facility: OTHER | Age: 70
End: 2020-02-10
Payer: MEDICARE

## 2020-02-10 VITALS
SYSTOLIC BLOOD PRESSURE: 132 MMHG | WEIGHT: 134.38 LBS | HEART RATE: 74 BPM | OXYGEN SATURATION: 98 % | DIASTOLIC BLOOD PRESSURE: 70 MMHG | TEMPERATURE: 99 F | BODY MASS INDEX: 22.94 KG/M2 | HEIGHT: 64 IN

## 2020-02-10 DIAGNOSIS — Z91.81 PERSONAL HISTORY OF FALL: ICD-10-CM

## 2020-02-10 DIAGNOSIS — Z00.00 MEDICARE ANNUAL WELLNESS VISIT, SUBSEQUENT: Primary | ICD-10-CM

## 2020-02-10 DIAGNOSIS — Z13.220 ENCOUNTER FOR LIPID SCREENING FOR CARDIOVASCULAR DISEASE: ICD-10-CM

## 2020-02-10 DIAGNOSIS — M25.552 PAIN OF BOTH HIP JOINTS: ICD-10-CM

## 2020-02-10 DIAGNOSIS — Z13.6 ENCOUNTER FOR LIPID SCREENING FOR CARDIOVASCULAR DISEASE: ICD-10-CM

## 2020-02-10 DIAGNOSIS — M25.551 PAIN OF BOTH HIP JOINTS: ICD-10-CM

## 2020-02-10 PROCEDURE — 4040F PNEUMOC VAC/ADMIN/RCVD: CPT | Performed by: FAMILY MEDICINE

## 2020-02-10 PROCEDURE — 1123F ACP DISCUSS/DSCN MKR DOCD: CPT | Performed by: FAMILY MEDICINE

## 2020-02-10 PROCEDURE — G0439 PPPS, SUBSEQ VISIT: HCPCS | Performed by: FAMILY MEDICINE

## 2020-02-10 PROCEDURE — 1036F TOBACCO NON-USER: CPT | Performed by: FAMILY MEDICINE

## 2020-02-10 PROCEDURE — 1160F RVW MEDS BY RX/DR IN RCRD: CPT | Performed by: FAMILY MEDICINE

## 2020-02-10 PROCEDURE — 1125F AMNT PAIN NOTED PAIN PRSNT: CPT | Performed by: FAMILY MEDICINE

## 2020-02-10 PROCEDURE — 3008F BODY MASS INDEX DOCD: CPT | Performed by: FAMILY MEDICINE

## 2020-02-10 PROCEDURE — 1170F FXNL STATUS ASSESSED: CPT | Performed by: FAMILY MEDICINE

## 2020-02-10 NOTE — PROGRESS NOTES
Assessment and Plan:     Problem List Items Addressed This Visit     None      Visit Diagnoses     Medicare annual wellness visit, subsequent    -  Primary    Encounter for lipid screening for cardiovascular disease        Relevant Orders    Lipid panel    Personal history of fall        Relevant Orders    Ambulatory referral to Physical Therapy    Pain of both hip joints        Relevant Orders    Ambulatory referral to Physical Therapy        BMI Counseling: Body mass index is 23 07 kg/m²  The BMI is above normal  Nutrition recommendations include decreasing portion sizes, encouraging healthy choices of fruits and vegetables, decreasing fast food intake and increasing intake of lean protein  Depression Screening and Follow-up Plan: Patient's depression screening was positive with a PHQ-2 score of 1  Their PHQ-9 score was 7  Clincally patient does not have depression  No treatment is required  Patient doesn't have depression and most of symptoms are due to fatigue and long standing MS as well as sleep is affected due to her recent breast reconstruction surgery  Symptoms more so situational than depression  Will recheck routine  Falls Plan of Care: referral to physical therapy  Recommended assistive device to help with gait and balance  Medications that increase falls were reviewed  Vitamin D supplementation was recommended  Preventive health issues were discussed with patient, and age appropriate screening tests were ordered as noted in patient's After Visit Summary  Personalized health advice and appropriate referrals for health education or preventive services given if needed, as noted in patient's After Visit Summary       History of Present Illness:     Patient presents for Medicare Annual Wellness visit    Patient Care Team:  Patrick Stanton MD as PCP - General (Family Medicine)  MD Neetu Pablo MD Gwendolyn Knight, MD Estevan Pilot, PA-C Rabon Motley, DO Mela Oakland MD Rebecca Negron MD Erman Ade, MD Donn Barrio, MD (Gastroenterology)     Problem List:     Patient Active Problem List   Diagnosis    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    Multiple sclerosis ([address] )    Peripheral polyneuropathy    Depression    Fatigue    Gait disturbance    Hip pain, right    Headache    History of bilateral hip replacements    Hypokalemia    Injury of right leg    Insomnia    Laceration of finger of right hand    Solitary pulmonary nodule    Rash    Pain of left lower leg    Pain of left calf    Osteopenia    Nephrolithiasis    Muscle strain of left lower leg    Left knee pain    Left ankle pain    Urticaria    Tingling    Screening mammogram, encounter for    Encounter for breast reconstruction following mastectomy    Abnormal stool test    Encounter for follow-up surveillance of breast cancer      Past Medical and Surgical History:     Past Medical History:   Diagnosis Date    Allergic 1967    seasonal    Bone pain     Breast ptosis     Cancer ([address] )     h/o lt ductal ca    Depression     Fatigue     Gait disturbance     uses cane at times    Headache     Hip fracture ([address] )     Hip pain     History of transfusion 1975    placenta previa s/p work accident, no rx    Hypokalemia     Insomnia     Laceration of finger     right hand    Left ankle pain     Left knee pain     Multiple sclerosis ([address] )     Muscle strain     left lower leg    Nephrolithiasis     Osteopenia     Osteoporosis     Pain of left calf     Pneumonia     Rash     Scoliosis birth    scoliosis    Solitary pulmonary nodule     SVT (supraventricular tachycardia) ([address] )     Tingling     Urgency of urination     Urticaria     Wrist fracture, left      Past Surgical History:   Procedure Laterality Date    ANKLE SURGERY      APPENDECTOMY  11/2012    Dr Mc White      Enlargement procedure with prosthetic implant bilateral    BREAST BIOPSY Left 07/23/2012    BREAST IMPLANT Right 1/28/2020    Procedure: BREAST IMPLANT EXCHANGE WITH CAPSULECTOMY;  Surgeon: Roger Blackwell MD;  Location: [facility];  Service: Plastics    BREAST RECONSTRUCTION      implant placement, implant revision, right mastopexy    CYSTOSTOMY      with basket extraction of calculus; x2    EXPLORATORY LAPAROTOMY      FOOT SURGERY      FRACTURE SURGERY  Lt wrist 9/2014 - Lt matthew femur 9/14    HIP FRACTURE SURGERY Right     Subcapital    HIP SURGERY Left     JOINT REPLACEMENT  Rt hip 10/2012    LEG SURGERY      Repair    MASTECTOMY Left 08/16/2012    CA SUSPENSION OF BREAST Right 1/28/2020    Procedure: BREAST MASTOPEXY;  Surgeon: Roger Blackwell MD;  Location: [facility];  Service: Plastics    REDUCTION MAMMAPLASTY Right     SENTINEL LYMPH NODE BIOPSY Left 08/16/2012    SKIN BIOPSY      TONSILLECTOMY      TUBAL LIGATION      WRIST SURGERY Left       Family History:     Family History   Problem Relation Age of Onset    Hodgkin's lymphoma Maternal Grandfather         age at dx unk    Cancer Maternal Aunt 79        bladder    Heart disease Maternal Aunt     Colon cancer Maternal Uncle 72    Hodgkin's lymphoma Maternal Uncle 79    Coronary artery disease Mother     Hyperlipidemia Mother    Patel Moose Rheum arthritis Mother     Other Father         Acute myocardial infarction    No Known Problems Maternal Grandmother     No Known Problems Paternal Grandmother     No Known Problems Paternal Grandfather     No Known Problems Son     No Known Problems Son     Stroke Maternal Aunt         6 CVA before death      Social History:     Social History     Socioeconomic History    Marital status: /Civil Union     Spouse name: None    Number of children: 2    Years of education: Completed bachelor's degree    Highest education level: None   Occupational History    Occupation: RN     Comment: Retired   Social Needs    Financial resource strain: None  Food insecurity:     Worry: None     Inability: None    Transportation needs:     Medical: None     Non-medical: None   Tobacco Use    Smoking status: Former Smoker     Packs/day: 1 00     Years: 35 00     Pack years: 35 00     Last attempt to quit:      Years since quittin 1    Smokeless tobacco: Never Used   Substance and Sexual Activity    Alcohol use: No    Drug use: No    Sexual activity: Yes     Partners: Male     Birth control/protection: Post-menopausal   Lifestyle    Physical activity:     Days per week: None     Minutes per session: None    Stress: None   Relationships    Social connections:     Talks on phone: None     Gets together: None     Attends [employer] service: None     Active member of club or organization: None     Attends meetings of clubs or organizations: None     Relationship status: None    Intimate partner violence:     Fear of current or ex partner: None     Emotionally abused: None     Physically abused: None     Forced sexual activity: None   Other Topics Concern    None   Social History Narrative    Denied:  History of caffeine use       Medications and Allergies:     Current Outpatient Medications   Medication Sig Dispense Refill    ALPHA LIPOIC ACID PO Take by mouth      AMPYRA 10 MG TB12 Ampyra 10 mg Er, Take 1 tab bid 180 tablet 0    ascorbic acid (VITAMIN C) 500 mg tablet Take 500 mg by mouth daily      baclofen 10 mg tablet TAKE 1 TABLET EVERY        MORNING, 1 TABLET EVERY    EVENING, AND TAKE 2 TABLETSAT BEDTIME 360 tablet 0    Cholecalciferol (VITAMIN D3) 1000 units CAPS Take 5,000 Units by mouth        COPAXONE 40 MG/ML SOSY Inject 40mg under the skin three times weekly   36 mL 0    Cranberry 1000 MG CAPS Take 300 mg by mouth        cyanocobalamin (VITAMIN B-12) 1,000 mcg tablet Take by mouth daily      denosumab (PROLIA) 60 mg/mL Inject under the skin      gabapentin (NEURONTIN) 300 mg capsule Take 1 capsule (300 mg total) by mouth daily at bedtime 90 capsule 0    gabapentin (NEURONTIN) 600 MG tablet Take 2 tablets (1,200 mg total) by mouth 3 (three) times a day 540 tablet 0    LORazepam (ATIVAN) 1 mg tablet Take 1 tablet (1 mg total) by mouth daily at bedtime for 90 days 90 tablet 0    Magnesium 500 MG TABS Take 500 tablets by mouth once      RESTASIS 0 05 % ophthalmic emulsion   0    solifenacin (VESICARE) 10 MG tablet Take 1 tablet (10 mg total) by mouth daily 90 tablet 3    zonisamide (ZONEGRAN) 100 mg capsule Take 4 capsules (400 mg total) by mouth daily at bedtime 360 capsule 0     No current facility-administered medications for this visit  Facility-Administered Medications Ordered in Other Visits   Medication Dose Route Frequency Provider Last Rate Last Dose    bacitracin 50,000 Units, gentamicin (GARAMYCIN) 40 mg/mL 80 mg, ceFAZolin (ANCEF) 1,000 mg in sodium chloride 0 9 % 1,000 mL irrigation bottle   Irrigation Once Carleen MD Trace         Allergies   Allergen Reactions    Contrast Dye [Iodinated Diagnostic Agents] Anaphylaxis    Duloxetine Hcl      Rapid Heart rate      Acetaminophen Other (See Comments)     Heart palpitations    Cetirizine      Only occurs with generic, weakness in legs, off balance and couldn't walk   Morphine Other (See Comments)     Migraine      Immunizations:     Immunization History   Administered Date(s) Administered    INFLUENZA 11/04/2015, 10/17/2016, 10/18/2017, 10/26/2018    Influenza TIV (IM) 1950, 09/18/2012, 11/04/2015    Pneumococcal Conjugate 13-Valent 10/17/2016    Pneumococcal Polysaccharide PPV23 10/26/2018    Tdap 08/05/2015    Zoster 10/18/2017    influenza, trivalent, adjuvanted 10/30/2019      Health Maintenance:         Topic Date Due    Hepatitis C Screening  1950    MAMMOGRAM  11/27/2021    CRC Screening: Cologuard  05/06/2022    CRC Screening: Colonoscopy  07/19/2029     There are no preventive care reminders to display for this patient     Medicare Health Risk Assessment:     /70 (BP Location: Left arm, Patient Position: Sitting, Cuff Size: Adult)   Pulse 74   Temp 99 °F (37 2 °C) (Tympanic)   Ht 5' 4" (1 626 m)   Wt 61 kg (134 lb 6 oz)   SpO2 98%   BMI 23 07 kg/m²      Malone Bumpers is here for her Subsequent Wellness visit  Health Risk Assessment:   Patient rates overall health as good  Patient feels that their physical health rating is slightly worse  Eyesight was rated as same  Hearing was rated as same  Patient feels that their emotional and mental health rating is same  Pain experienced in the last 7 days has been a lot  Patient's pain rating has been 6/10  Depression Screening:   PHQ-2 Score: 1  PHQ-9 Score: 7      Fall Risk Screening: In the past year, patient has experienced: history of falling in past year    Number of falls: 2 or more    Urinary Incontinence Screening:   Patient has not leaked urine accidently in the last six months  Home Safety:  Patient has trouble with stairs inside or outside of their home  Patient has working smoke alarms and has working carbon monoxide detector  Home safety hazards include: none  Nutrition:   Current diet is Regular  Medications:   Patient is currently taking over-the-counter supplements  OTC medications include: see medication list  Patient is able to manage medications  Activities of Daily Living (ADLs)/Instrumental Activities of Daily Living (IADLs):   Walk and transfer into and out of bed and chair?: Yes  Dress and groom yourself?: Yes    Bathe or shower yourself?: Yes    Feed yourself?  Yes  Do your laundry/housekeeping?: Yes  Manage your money, pay your bills and track your expenses?: Yes  Make your own meals?: Yes    Do your own shopping?: Yes    Previous Hospitalizations:   Any hospitalizations or ED visits within the last 12 months?: Yes    How many hospitalizations have you had in the last year?: 1-2    Advance Care Planning:   Living will: Yes    Durable POA for healthcare: Yes    Advanced directive: Yes      Cognitive Screening:   Provider or family/friend/caregiver concerned regarding cognition?: No    PREVENTIVE SCREENINGS        Diabetes Screening:     General: Screening Current      Colorectal Cancer Screening:     General: Screening Current      Breast Cancer Screening:     General: Screening Current      Cervical Cancer Screening:    General: Screening Not Indicated      Osteoporosis Screening:    General: Screening Not Indicated and History Osteoporosis      Abdominal Aortic Aneurysm (AAA) Screening:        General: Screening Not Indicated      Lung Cancer Screening:     General: Screening Current      Hepatitis C Screening:    General: Patient Declines    Hep C Screening Accepted: No     Physical Exam   Constitutional: she  is oriented to person, place, and time  she   appears well-developed and well-nourished  No distress  HENT:   Head: Normocephalic and atraumatic  Neck: Normal range of motion  Neck supple  Cardiovascular: Normal rate, regular rhythm and normal heart sounds  No murmur heard  Pulmonary/Chest: Effort normal and breath sounds normal  No respiratory distress  She has no wheezes  Abdominal: Soft  Bowel sounds are normal  she  exhibits no distension  There is no tenderness  Musculoskeletal: Normal range of motion  she exhibits no edema or tenderness  Neurological: she   is alert and oriented to person, place, and time  she  has normal reflexes  Skin: Skin is warm and dry  No rash noted  she  is not diaphoretic  No pallor  Psychiatric: she  has a normal mood and affect  her  behavior is normal    Nursing note and vitals reviewed      Lab Results   Component Value Date    WBC 5 63 01/02/2020    HGB 13 6 01/02/2020    HCT 43 1 01/02/2020     01/02/2020    CHOL 232 (H) 09/05/2013    TRIG 82 09/05/2013    HDL 70 09/05/2013    ALT 58 11/01/2019    AST 28 11/01/2019     04/04/2017    K 3 4 (L) 01/02/2020     (H) 01/02/2020 CREATININE 0 92 01/02/2020    BUN 19 01/02/2020    CO2 26 01/02/2020    INR 1 17 (H) 10/01/2014    GLUF 86 01/02/2020    HGBA1C 4 9 07/08/2019     Lab Results   Component Value Date    KPR6MSQSSXSJ 2 180 09/27/2019         Melina Kirby MD

## 2020-02-10 NOTE — PATIENT INSTRUCTIONS
Medicare Preventive Visit Patient Instructions  Thank you for completing your Welcome to Medicare Visit or Medicare Annual Wellness Visit today  Your next wellness visit will be due in one year (2/10/2021)  The screening/preventive services that you may require over the next 5-10 years are detailed below  Some tests may not apply to you based off risk factors and/or age  Screening tests ordered at today's visit but not completed yet may show as past due  Also, please note that scanned in results may not display below  Preventive Screenings:  Service Recommendations Previous Testing/Comments   Colorectal Cancer Screening  * Colonoscopy    * Fecal Occult Blood Test (FOBT)/Fecal Immunochemical Test (FIT)  * Fecal DNA/Cologuard Test  * Flexible Sigmoidoscopy Age: 54-65 years old   Colonoscopy: every 10 years (may be performed more frequently if at higher risk)  OR  FOBT/FIT: every 1 year  OR  Cologuard: every 3 years  OR  Sigmoidoscopy: every 5 years  Screening may be recommended earlier than age 48 if at higher risk for colorectal cancer  Also, an individualized decision between you and your healthcare provider will decide whether screening between the ages of 74-80 would be appropriate  Colonoscopy: 07/19/2019  FOBT/FIT: Not on file  Cologuard: 05/06/2019  Sigmoidoscopy: Not on file    Screening Current     Breast Cancer Screening Age: 36 years old  Frequency: every 1-2 years  Not required if history of left and right mastectomy Mammogram: 11/27/2019    Screening Current   Cervical Cancer Screening Between the ages of 21-29, pap smear recommended once every 3 years  Between the ages of 33-67, can perform pap smear with HPV co-testing every 5 years     Recommendations may differ for women with a history of total hysterectomy, cervical cancer, or abnormal pap smears in past  Pap Smear: 05/16/2018    Screening Not Indicated   Hepatitis C Screening Once for adults born between 1945 and 1965  More frequently in patients at high risk for Hepatitis C Hep C Antibody: Not on file       Diabetes Screening 1-2 times per year if you're at risk for diabetes or have pre-diabetes Fasting glucose: 86 mg/dL   A1C: 4 9 %    Screening Current   Cholesterol Screening Once every 5 years if you don't have a lipid disorder  May order more often based on risk factors  Lipid panel: Not on file         Other Preventive Screenings Covered by Medicare:  1  Abdominal Aortic Aneurysm (AAA) Screening: covered once if your at risk  You're considered to be at risk if you have a family history of AAA  2  Lung Cancer Screening: covers low dose CT scan once per year if you meet all of the following conditions: (1) Age 50-69; (2) No signs or symptoms of lung cancer; (3) Current smoker or have quit smoking within the last 15 years; (4) You have a tobacco smoking history of at least 30 pack years (packs per day multiplied by number of years you smoked); (5) You get a written order from a healthcare provider  3  Glaucoma Screening: covered annually if you're considered high risk: (1) You have diabetes OR (2) Family history of glaucoma OR (3)  aged 48 and older OR (3)  American aged 72 and older  3  Osteoporosis Screening: covered every 2 years if you meet one of the following conditions: (1) You're estrogen deficient and at risk for osteoporosis based off medical history and other findings; (2) Have a vertebral abnormality; (3) On glucocorticoid therapy for more than 3 months; (4) Have primary hyperparathyroidism; (5) On osteoporosis medications and need to assess response to drug therapy  · Last bone density test (DXA Scan): 07/02/2013  5  HIV Screening: covered annually if you're between the age of 12-76  Also covered annually if you are younger than 13 and older than 72 with risk factors for HIV infection  For pregnant patients, it is covered up to 3 times per pregnancy      Immunizations:  Immunization Recommendations Influenza Vaccine Annual influenza vaccination during flu season is recommended for all persons aged >= 6 months who do not have contraindications   Pneumococcal Vaccine (Prevnar and Pneumovax)  * Prevnar = PCV13  * Pneumovax = PPSV23   Adults 25-60 years old: 1-3 doses may be recommended based on certain risk factors  Adults 72 years old: Prevnar (PCV13) vaccine recommended followed by Pneumovax (PPSV23) vaccine  If already received PPSV23 since turning 65, then PCV13 recommended at least one year after PPSV23 dose  Hepatitis B Vaccine 3 dose series if at intermediate or high risk (ex: diabetes, end stage renal disease, liver disease)   Tetanus (Td) Vaccine - COST NOT COVERED BY MEDICARE PART B Following completion of primary series, a booster dose should be given every 10 years to maintain immunity against tetanus  Td may also be given as tetanus wound prophylaxis  Tdap Vaccine - COST NOT COVERED BY MEDICARE PART B Recommended at least once for all adults  For pregnant patients, recommended with each pregnancy  Shingles Vaccine (Shingrix) - COST NOT COVERED BY MEDICARE PART B  2 shot series recommended in those aged 48 and above     Health Maintenance Due:      Topic Date Due    Hepatitis C Screening  1950    MAMMOGRAM  11/27/2021    CRC Screening: Cologuard  05/06/2022    CRC Screening: Colonoscopy  07/19/2029     Immunizations Due:  There are no preventive care reminders to display for this patient  Advance Directives   What are advance directives? Advance directives are legal documents that state your wishes and plans for medical care  These plans are made ahead of time in case you lose your ability to make decisions for yourself  Advance directives can apply to any medical decision, such as the treatments you want, and if you want to donate organs  What are the types of advance directives? There are many types of advance directives, and each state has rules about how to use them   You may choose a combination of any of the following:  · Living will: This is a written record of the treatment you want  You can also choose which treatments you do not want, which to limit, and which to stop at a certain time  This includes surgery, medicine, IV fluid, and tube feedings  · Durable power of  for healthcare [facility]): This is a written record that states who you want to make healthcare choices for you when you are unable to make them for yourself  This person, called a proxy, is usually a family member or a friend  You may choose more than 1 proxy  · Do not resuscitate (DNR) order:  A DNR order is used in case your heart stops beating or you stop breathing  It is a request not to have certain forms of treatment, such as CPR  A DNR order may be included in other types of advance directives  · Medical directive: This covers the care that you want if you are in a coma, near death, or unable to make decisions for yourself  You can list the treatments you want for each condition  Treatment may include pain medicine, surgery, blood transfusions, dialysis, IV or tube feedings, and a ventilator (breathing machine)  · Values history: This document has questions about your views, beliefs, and how you feel and think about life  This information can help others choose the care that you would choose  Why are advance directives important? An advance directive helps you control your care  Although spoken wishes may be used, it is better to have your wishes written down  Spoken wishes can be misunderstood, or not followed  Treatments may be given even if you do not want them  An advance directive may make it easier for your family to make difficult choices about your care  Fall Prevention    Fall prevention  includes ways to make your home and other areas safer  It also includes ways you can move more carefully to prevent a fall   Health conditions that cause changes in your blood pressure, vision, or muscle strength and coordination may increase your risk for falls  Medicines may also increase your risk for falls if they make you dizzy, weak, or sleepy  Fall prevention tips:   · Stand or sit up slowly  · Use assistive devices as directed  · Wear shoes that fit well and have soles that   · Wear a personal alarm  · Stay active  · Manage your medical conditions  Home Safety Tips:  · Add items to prevent falls in the bathroom  · Keep paths clear  · Install bright lights in your home  · Keep items you use often on shelves within reach  · Paint or place reflective tape on the edges of your stairs  © Copyright [employer] 2018 Information is for End User's use only and may not be sold, redistributed or otherwise used for commercial purposes   All illustrations and images included in CareNotes® are the copyrighted property of A D A M , Inc  or [address] [place] [address] [facility]

## 2020-02-11 ENCOUNTER — OFFICE VISIT (OUTPATIENT)
Dept: PLASTIC SURGERY | Facility: CLINIC | Age: 70
End: 2020-02-11

## 2020-02-11 DIAGNOSIS — Z98.890 POST-OPERATIVE STATE: Primary | ICD-10-CM

## 2020-02-11 PROCEDURE — 1160F RVW MEDS BY RX/DR IN RCRD: CPT | Performed by: SURGERY

## 2020-02-11 PROCEDURE — 1170F FXNL STATUS ASSESSED: CPT | Performed by: SURGERY

## 2020-02-11 PROCEDURE — 4040F PNEUMOC VAC/ADMIN/RCVD: CPT | Performed by: SURGERY

## 2020-02-11 PROCEDURE — 99024 POSTOP FOLLOW-UP VISIT: CPT | Performed by: SURGERY

## 2020-02-11 PROCEDURE — 1125F AMNT PAIN NOTED PAIN PRSNT: CPT | Performed by: SURGERY

## 2020-02-11 NOTE — PROGRESS NOTES
Maame Cost is a 78 y o  female status post 1  Removal intact breast implant right breast 2  Right breast capsulectomy 3  Elevation right breast inframammary fold/mound revision 4  Right breast reconstruction with breast implant 5  Right breast mastopexy done 1/28/2020  Patient in the office for suture  Removal Sutures removed  Nipple is dark in color, appears to be scabbed  Patient state that the sensation comes and goes  Dr Yolette Little in to see the patient  Nipple care may wash daily aquaphor and xeroform daily, patient to follow up in a week

## 2020-02-11 NOTE — PATIENT INSTRUCTIONS
Patient to change dressing daily  Wash with soap and water  Pat dry  apply a thin layer of aquaphor, followed by xeroform

## 2020-02-18 ENCOUNTER — OFFICE VISIT (OUTPATIENT)
Dept: PLASTIC SURGERY | Facility: CLINIC | Age: 70
End: 2020-02-18

## 2020-02-18 DIAGNOSIS — Z98.890 POST-OPERATIVE STATE: Primary | ICD-10-CM

## 2020-02-18 PROCEDURE — 4040F PNEUMOC VAC/ADMIN/RCVD: CPT | Performed by: SURGERY

## 2020-02-18 PROCEDURE — 99024 POSTOP FOLLOW-UP VISIT: CPT | Performed by: SURGERY

## 2020-02-18 PROCEDURE — 1160F RVW MEDS BY RX/DR IN RCRD: CPT | Performed by: SURGERY

## 2020-02-18 PROCEDURE — 1170F FXNL STATUS ASSESSED: CPT | Performed by: SURGERY

## 2020-02-18 NOTE — PATIENT INSTRUCTIONS
Continue to wash daily and apply aquaphor and xeroform daily  follow up in two week and call with any other concerns

## 2020-02-18 NOTE — PROGRESS NOTES
Cristopher Webber is a 78 y o  Female status post 1  Removal intact breast implant right breast 2  Right breast capsulectomy 3  Elevation right breast inframammary fold/mound revision 4  Right breast reconstruction with breast implant 5  Right breast mastopexy done 1/28/2020  Patient following up from visit last week when nipple appeared dark in color, since last week, the scab has fallen off  There is some fibrinous tissue noted where scab was previously  Area cleansed with NSS and patted dry  Continue with aquaphor an xeroform  All other incisions healing appropriately, discussed silicone scar gel  The patient to follow up in two weeks

## 2020-02-24 ENCOUNTER — TELEPHONE (OUTPATIENT)
Dept: PLASTIC SURGERY | Facility: CLINIC | Age: 70
End: 2020-02-24

## 2020-02-26 ENCOUNTER — OFFICE VISIT (OUTPATIENT)
Dept: PLASTIC SURGERY | Facility: CLINIC | Age: 70
End: 2020-02-26

## 2020-02-26 VITALS — BODY MASS INDEX: 22.88 KG/M2 | HEIGHT: 64 IN | WEIGHT: 134 LBS

## 2020-02-26 DIAGNOSIS — Z98.890 POST-OPERATIVE STATE: Primary | ICD-10-CM

## 2020-02-26 PROCEDURE — 99024 POSTOP FOLLOW-UP VISIT: CPT | Performed by: SURGERY

## 2020-02-26 PROCEDURE — 1160F RVW MEDS BY RX/DR IN RCRD: CPT | Performed by: SURGERY

## 2020-02-26 PROCEDURE — 4040F PNEUMOC VAC/ADMIN/RCVD: CPT | Performed by: SURGERY

## 2020-02-26 PROCEDURE — 1170F FXNL STATUS ASSESSED: CPT | Performed by: SURGERY

## 2020-02-26 NOTE — PROGRESS NOTES
Vidhi Read is a 78 y o  Female status post 1  Removal intact breast implant right breast 2  Right breast capsulectomy 3  Elevation right breast inframammary fold/mound revision 4  Right breast reconstruction with breast implant 5  Right breast mastopexy done 1/28/2020  Patient called Monday with concerns about her nipple  Sp Carmen, who I forwarded a photo  Stated that it is healing as expected  No evidence of infection  Patient is to continue with aquaphor and xeroform  Patient asked about laying on her right side, per Abel Mode informed patient in about 2 week when areola was healed  The patient to follow up in one week  Photo obtained

## 2020-03-03 ENCOUNTER — OFFICE VISIT (OUTPATIENT)
Dept: PLASTIC SURGERY | Facility: CLINIC | Age: 70
End: 2020-03-03

## 2020-03-03 DIAGNOSIS — Z98.890 POST-OPERATIVE STATE: Primary | ICD-10-CM

## 2020-03-03 PROCEDURE — 99024 POSTOP FOLLOW-UP VISIT: CPT | Performed by: SURGERY

## 2020-03-03 PROCEDURE — 1170F FXNL STATUS ASSESSED: CPT | Performed by: SURGERY

## 2020-03-03 PROCEDURE — 4040F PNEUMOC VAC/ADMIN/RCVD: CPT | Performed by: SURGERY

## 2020-03-03 PROCEDURE — 1160F RVW MEDS BY RX/DR IN RCRD: CPT | Performed by: SURGERY

## 2020-03-03 NOTE — PROGRESS NOTES
Chio Richardson is a 78 y o  Female status post 1  Removal intact breast implant right breast 2  Right breast capsulectomy 3  Elevation right breast inframammary fold/mound revision 4  Right breast reconstruction with breast implant 5  Right breast mastopexy done 1/28/2020  Patient in the office for wound check of the right nipple  patient is concerned with how slow she is healing and would like to see Valentin Mcgrath or Dr Bea Tolentino next visit  The wound is at the superior portion of the nipple with yellow/white slough present  Are appears to have decreased in size weekly but very slowly  The area was cleansed with NSS and patted dry  A thin layer of aquaphor and xeroform placed and covered with a 4x4   Patient will follow up on March 11th with Lula Valderrama

## 2020-03-11 ENCOUNTER — OFFICE VISIT (OUTPATIENT)
Dept: PLASTIC SURGERY | Facility: CLINIC | Age: 70
End: 2020-03-11

## 2020-03-11 DIAGNOSIS — Z86.000 PERSONAL HISTORY OF IN-SITU NEOPLASM OF BREAST: Primary | ICD-10-CM

## 2020-03-11 DIAGNOSIS — Z98.890 STATUS POST RIGHT BREAST RECONSTRUCTION: ICD-10-CM

## 2020-03-11 PROCEDURE — 99024 POSTOP FOLLOW-UP VISIT: CPT | Performed by: PHYSICIAN ASSISTANT

## 2020-03-11 PROCEDURE — 4040F PNEUMOC VAC/ADMIN/RCVD: CPT | Performed by: PHYSICIAN ASSISTANT

## 2020-03-11 NOTE — PROGRESS NOTES
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

## 2020-03-24 ENCOUNTER — EVALUATION (OUTPATIENT)
Dept: PHYSICAL THERAPY | Facility: CLINIC | Age: 70
End: 2020-03-24
Payer: MEDICARE

## 2020-03-24 DIAGNOSIS — R26.9 NEUROLOGIC GAIT DYSFUNCTION: Primary | ICD-10-CM

## 2020-03-24 DIAGNOSIS — G35 MULTIPLE SCLEROSIS (HCC): ICD-10-CM

## 2020-03-24 DIAGNOSIS — M25.552 PAIN OF BOTH HIP JOINTS: ICD-10-CM

## 2020-03-24 DIAGNOSIS — M25.551 PAIN OF BOTH HIP JOINTS: ICD-10-CM

## 2020-03-24 PROCEDURE — 97110 THERAPEUTIC EXERCISES: CPT | Performed by: PHYSICAL THERAPIST

## 2020-03-24 PROCEDURE — 97163 PT EVAL HIGH COMPLEX 45 MIN: CPT | Performed by: PHYSICAL THERAPIST

## 2020-03-24 NOTE — PROGRESS NOTES
PT Evaluation     Today's date: 3/24/2020  Patient name: Xuan Sánchez  : 1950  MRN: [id number]  Referring provider: Itz Orellana MD  Dx:   Encounter Diagnosis     ICD-10-CM    1  Neurologic gait dysfunction R26 9    2  Multiple sclerosis ([facility] [address] ) G35    3  Pain of both hip joints M25 551     M25 552        Start Time: 1100  Stop Time: 1210  Total time in clinic (min): 70 minutes    Assessment  Assessment details: Xuan Sánchez is a 78 y o  female who presents with signs and symptoms consistent of poor balance, limited function, gait abnormality, and chronic pain, particularly at hips and distal LE  Patient presents with significant weakness in LLE compared to RLE, balance deficits, pain in bilateral hips, and burning in BLE from the knees to the feet  All of her pain is chronic in nature and she notes she is on several medications to manage her symptoms  Due to these impairments, Patient has difficulty ambulating, performing sit to stand transfers, and is at an increased risk for falls  Please read all of history to understand the co-morbidities and current functional status of patient  Patient would benefit from skilled physical therapy to address the impairments, improve their level of function, and to improve their overall quality of life  Impairments: abnormal coordination, abnormal gait, abnormal or restricted ROM, impaired balance, impaired physical strength, lacks appropriate home exercise program, pain with function, safety issue and poor posture   Understanding of Dx/Px/POC: good   Prognosis: good    Goals  Short Term Goals: to be achieved by 4 weeks  1) Patient to be independent with basic HEP  2) Improve L passive SLR x 10 degrees  3) Increase B/L hip ROM ROM by 5-10 degrees   4) Increase LE strength by 1/2 MMT grade in all deficient planes  Long Term Goals: to be achieved by discharge  1) FOTO equal to or greater than expected    2) Ambulation to improve to maximal level of function  3) Improve 5x sit to stand to 20 seconds  4) Improve TUG to 20 seconds       Plan  Patient would benefit from: PT eval and skilled physical therapy  Planned therapy interventions: manual therapy, neuromuscular re-education, patient education, therapeutic activities, therapeutic exercise and home exercise program  Frequency: 2x per week for 8-12 weeks  Treatment plan discussed with: patient        Subjective Evaluation    History of Present Illness  Mechanism of injury: History of Current Injury: Patient was referred to PT for multiple issues; however, she is most concerned with her symptoms of MS (balance difficulties) and bilateral chronic hip pain  Patient was diagnosed with MS in 2007  She states that her balance has progressively worsened since December when she was diagnosed with pneumonia and hospitalized  She was states she has been weak since  Pt is currently ambulating with a SPC which she has been using seen December  She denies using this prior to Dec  Pt also has a history of breast cancer and had her implant replaced in January which hasn't totally healed yet, according to patient  Pt is also complaining of chronic bilateral hip pain  She states that she has had multiple surgeries on the R hip (pinning and replacement) in 2012  Pt also has an IM Franco in femur in 2014  Pt has a history of osteoporosis which has caused multiple fractures  Pt also does have a history of peripheral polyneuropathy  Pain location/Descriptors: Anterior bilateral hip pain which seems to be exacerbated with stress, anxiety, and weather (cold)  Pt states that she has severe "nerve shocks" in BLE  She states it feels like her LE from her knees down are "on fire " She describes it as a"sunburn from hell " She states that his occurs while sitting at down at night  Aggravating factors: No clear AGGS and EASES  Eases: Neurontin, baclofen, and other medications to ease symptoms  24 HR pattern: Worse at night time  Symptoms seems to increase with stress, anxiety, and weather changes  Imaging: Brain MRI  No imaging at bilateral hips  Patient goals:  Improve balance and strength in LE  Hobbies/Interest: Cooking, baking, taking care of house  Patient lives at home with her   She states that it was built perfectly for a disabilied person  She has 4 inch step to get into the house  2 LINH in the back door with hand rail  [address]  She does have a basement which is not essential for her to go down there  She has safety bars throughout the house installed     Pain  Current pain ratin  At worst pain ratin      Diagnostic Tests  No diagnostic tests performed  Treatments  Current treatment: medication and physical therapy  Patient Goals  Patient goals for therapy: decreased pain, increased motion, improved balance, increased strength and independence with ADLs/IADLs          Objective     Passive Range of Motion   Left Hip   Flexion: 105 degrees   External rotation (90/90): 30 degrees   Internal rotation (90/90): 30 degrees     Right Hip   Flexion: 105 degrees   External rotation (90/90): 30 degrees   Internal rotation (90/90): 30 degrees     Additional Passive Range of Motion Details  Assessed supine: pain at end range motion    Pt unable to perform active hip flexion d/t weakness    SLR: positive L at 45- increases with ankle DF (increase symptoms in distal LE- burning)  R= tight hamstrings     Strength/Myotome Testing     Left Hip   Planes of Motion   Flexion: 3-  Adduction: 3    Right Hip   Planes of Motion   Flexion: 3  Adduction: 3    Left Knee   Flexion: 3  Extension: 3+    Right Knee   Flexion: 3  Extension: 3+    Left Ankle/Foot   Plantar flexion: 3  Inversion: 3  Eversion: 3    Right Ankle/Foot   Plantar flexion: 3+  Inversion: 3+  Eversion: 3+    Additional Strength Details  Performed MMT in sitting position    Ambulation     Observational Gait   Gait: antalgic and asymmetric   Decreased walking speed and stride length  Additional Observational Gait Details  Gait: Pt is utilizing a SPC with small step length, small stride length, increase MAYCOL, and a mild scissoring gait pattern  Functional Assessment        Comments  Timed Up and go: 25 seconds, no AD, small step length, ataxic  5x sit to stand: 24 seconds with bilateral use of hands   (heavy use of UE)     Sit to stand without UE assistance: unable to perform     DLS firm EO (feet together): 30 seconds mild postural sway  DLS firm EC (feet together): 30 sec mod-max postural sway  Tandem (close): 13 seconds prior to LOB          Flowsheet Rows      Most Recent Value   PT/OT G-Codes   Current Score  48   Projected Score  50             Precautions: MS, Depression, Fatigue, Gait dysfunction, Insomnia, Osteoporosis, Peripheral polyneuropathy, Hx of Breast CA      Manual              PROM of B/L (hip ROM)             Sciatic N glides - gentle B/L                                                        Exercise Diary              NuStep             Seated marches             Sit to stand transfers             Lateral walks             HR/TR standing              Hip extension- standing             Biodex LOS             Biodex Fall risk              Leeann step overs             Tandem balance              DLS on foam                                                                                                                                       Modalities                                                      Pt was given a HEP with verbal and written instruction x 10 minutes

## 2020-03-27 ENCOUNTER — OFFICE VISIT (OUTPATIENT)
Dept: PHYSICAL THERAPY | Facility: CLINIC | Age: 70
End: 2020-03-27
Payer: MEDICARE

## 2020-03-27 DIAGNOSIS — M25.552 PAIN OF BOTH HIP JOINTS: ICD-10-CM

## 2020-03-27 DIAGNOSIS — M25.551 PAIN OF BOTH HIP JOINTS: ICD-10-CM

## 2020-03-27 DIAGNOSIS — R26.9 NEUROLOGIC GAIT DYSFUNCTION: Primary | ICD-10-CM

## 2020-03-27 DIAGNOSIS — G35 MULTIPLE SCLEROSIS (HCC): ICD-10-CM

## 2020-03-27 PROCEDURE — 97110 THERAPEUTIC EXERCISES: CPT | Performed by: PHYSICAL THERAPIST

## 2020-03-27 PROCEDURE — 97112 NEUROMUSCULAR REEDUCATION: CPT | Performed by: PHYSICAL THERAPIST

## 2020-03-27 PROCEDURE — 97140 MANUAL THERAPY 1/> REGIONS: CPT | Performed by: PHYSICAL THERAPIST

## 2020-03-27 NOTE — PROGRESS NOTES
Daily Note     Today's date: 3/27/2020  Patient name: Aravind Ortega  : 1950  MRN: [id number]  Referring provider: Lashawn Perea MD  Dx:   Encounter Diagnosis     ICD-10-CM    1  Neurologic gait dysfunction R26 9    2  Multiple sclerosis ([address] ) G35    3  Pain of both hip joints M25 551     M25 552        Start Time: 1115  Stop Time: 1200  Total time in clinic (min): 45 minutes    Subjective: Pt c/o of intermittent muscle cramps in the BLE  She states "I want to rip my legs off "       Objective: See treatment diary below      Assessment: Pt has low tolerance to exercise and requires intermittent rest due to MS and other medical co-morbidities  Pt has significant soft tissue restrictions and bilateral hips and hamstring  However, patient did tolerate exercises well and is optimistic about improvements  Contact guard required through treatment, especially after strengthening  Plan: Continue per plan of care        Precautions: MS, Depression, Fatigue, Gait dysfunction, Insomnia, Osteoporosis, Peripheral polyneuropathy, Hx of Breast CA      Manual  3/27            PROM of B/L (hip ROM) 5'             Sciatic N glides - gentle B/L 3'                                                        Exercise Diary  3/27            NuStep 5' no resistance UE+LE             Seated marches 2x10 on R and 1x10 L             Sit to stand transfers 5x            Lateral walks             HR/TR standing  2x10            Hip extension- standing 10x            Biodex LOS             Biodex Fall risk              Leeann step overs             Tandem balance              DLS on foam              Seated hip adduction 20x5"             Standing hip abduction 2x10                                                                                                           Modalities                                                     1:1 with PT from 1115-12PM

## 2020-03-30 ENCOUNTER — OFFICE VISIT (OUTPATIENT)
Dept: PHYSICAL THERAPY | Facility: CLINIC | Age: 70
End: 2020-03-30
Payer: MEDICARE

## 2020-03-30 DIAGNOSIS — R26.9 NEUROLOGIC GAIT DYSFUNCTION: Primary | ICD-10-CM

## 2020-03-30 DIAGNOSIS — M25.552 PAIN OF BOTH HIP JOINTS: ICD-10-CM

## 2020-03-30 DIAGNOSIS — M25.551 PAIN OF BOTH HIP JOINTS: ICD-10-CM

## 2020-03-30 DIAGNOSIS — G35 MULTIPLE SCLEROSIS (HCC): ICD-10-CM

## 2020-03-30 PROCEDURE — 97110 THERAPEUTIC EXERCISES: CPT | Performed by: PHYSICAL THERAPIST

## 2020-03-30 PROCEDURE — 97140 MANUAL THERAPY 1/> REGIONS: CPT | Performed by: PHYSICAL THERAPIST

## 2020-03-30 PROCEDURE — 97112 NEUROMUSCULAR REEDUCATION: CPT | Performed by: PHYSICAL THERAPIST

## 2020-03-30 NOTE — PROGRESS NOTES
Daily Note     Today's date: 3/30/2020  Patient name: oCurtney Romero  : 1950  MRN: [id number]  Referring provider: Freddy Roque MD  Dx:   Encounter Diagnosis     ICD-10-CM    1  Neurologic gait dysfunction R26 9    2  Multiple sclerosis ([address] ) G35    3  Pain of both hip joints M25 551     M25 552               Subjective: patient reports general fatigue as afternoon appointments tend to be harded  Objective: See treatment diary below      Assessment: Tolerated treatment well  Patient exhibited good technique with therapeutic exercises and would benefit from continued PT,  Occasional rest breaks required but was able to increase reps on a few exercises to 30  Plan: Progress treatment as tolerated         Precautions: MS, Depression, Fatigue, Gait dysfunction, Insomnia, Osteoporosis, Peripheral polyneuropathy, Hx of Breast CA      Manual  3/27 3/30           PROM of B/L (hip ROM) 5'  SY           Sciatic N glides - gentle B/L 3'                                                        Exercise Diary  3/27 3/30           NuStep 5' no resistance UE+LE  6' lvl 3           Seated marches 2x10 on R and 1x10 L  3x10ea           Sit to stand transfers 5x 2x5           Lateral walks  90s           HR/TR standing  2x10 20ea           Hip extension- standing 10x 2x15           Biodex LOS             Biodex Fall risk              Leeann step overs             Tandem balance              DLS on foam              Seated hip adduction 20x5"  3x10           Standing hip abduction 2x10 2x15                                                                                                          Modalities                                                     1:1 with PT from 1115-12PM

## 2020-04-02 ENCOUNTER — OFFICE VISIT (OUTPATIENT)
Dept: PHYSICAL THERAPY | Facility: CLINIC | Age: 70
End: 2020-04-02
Payer: MEDICARE

## 2020-04-02 DIAGNOSIS — M25.552 PAIN OF BOTH HIP JOINTS: ICD-10-CM

## 2020-04-02 DIAGNOSIS — G35 MULTIPLE SCLEROSIS (HCC): ICD-10-CM

## 2020-04-02 DIAGNOSIS — R26.9 NEUROLOGIC GAIT DYSFUNCTION: Primary | ICD-10-CM

## 2020-04-02 DIAGNOSIS — M25.551 PAIN OF BOTH HIP JOINTS: ICD-10-CM

## 2020-04-02 PROCEDURE — 97110 THERAPEUTIC EXERCISES: CPT | Performed by: PHYSICAL THERAPIST

## 2020-04-02 PROCEDURE — 97140 MANUAL THERAPY 1/> REGIONS: CPT | Performed by: PHYSICAL THERAPIST

## 2020-04-02 PROCEDURE — 97112 NEUROMUSCULAR REEDUCATION: CPT | Performed by: PHYSICAL THERAPIST

## 2020-04-06 ENCOUNTER — APPOINTMENT (OUTPATIENT)
Dept: PHYSICAL THERAPY | Facility: CLINIC | Age: 70
End: 2020-04-06
Payer: MEDICARE

## 2020-04-07 ENCOUNTER — APPOINTMENT (OUTPATIENT)
Dept: LAB | Age: 70
End: 2020-04-07
Payer: MEDICARE

## 2020-04-07 ENCOUNTER — OFFICE VISIT (OUTPATIENT)
Dept: PLASTIC SURGERY | Facility: CLINIC | Age: 70
End: 2020-04-07

## 2020-04-07 VITALS — TEMPERATURE: 96.3 F

## 2020-04-07 DIAGNOSIS — Z86.000 PERSONAL HISTORY OF IN-SITU NEOPLASM OF BREAST: Primary | ICD-10-CM

## 2020-04-07 DIAGNOSIS — G35 MULTIPLE SCLEROSIS (HCC): ICD-10-CM

## 2020-04-07 LAB
BUN SERPL-MCNC: 19 MG/DL (ref 5–25)
CREAT SERPL-MCNC: 0.91 MG/DL (ref 0.6–1.3)
GFR SERPL CREATININE-BSD FRML MDRD: 64 ML/MIN/1.73SQ M

## 2020-04-07 PROCEDURE — 82565 ASSAY OF CREATININE: CPT

## 2020-04-07 PROCEDURE — 36415 COLL VENOUS BLD VENIPUNCTURE: CPT

## 2020-04-07 PROCEDURE — 84520 ASSAY OF UREA NITROGEN: CPT

## 2020-04-07 PROCEDURE — 99024 POSTOP FOLLOW-UP VISIT: CPT | Performed by: PHYSICIAN ASSISTANT

## 2020-04-07 PROCEDURE — 1160F RVW MEDS BY RX/DR IN RCRD: CPT | Performed by: PHYSICIAN ASSISTANT

## 2020-04-07 PROCEDURE — 4040F PNEUMOC VAC/ADMIN/RCVD: CPT | Performed by: PHYSICIAN ASSISTANT

## 2020-04-08 ENCOUNTER — OFFICE VISIT (OUTPATIENT)
Dept: PHYSICAL THERAPY | Facility: CLINIC | Age: 70
End: 2020-04-08
Payer: MEDICARE

## 2020-04-08 DIAGNOSIS — M25.551 PAIN OF BOTH HIP JOINTS: ICD-10-CM

## 2020-04-08 DIAGNOSIS — G35 MULTIPLE SCLEROSIS (HCC): ICD-10-CM

## 2020-04-08 DIAGNOSIS — M25.552 PAIN OF BOTH HIP JOINTS: ICD-10-CM

## 2020-04-08 DIAGNOSIS — R26.9 NEUROLOGIC GAIT DYSFUNCTION: Primary | ICD-10-CM

## 2020-04-08 PROCEDURE — 97140 MANUAL THERAPY 1/> REGIONS: CPT | Performed by: PHYSICAL THERAPIST

## 2020-04-08 PROCEDURE — 97112 NEUROMUSCULAR REEDUCATION: CPT | Performed by: PHYSICAL THERAPIST

## 2020-04-08 PROCEDURE — 97110 THERAPEUTIC EXERCISES: CPT | Performed by: PHYSICAL THERAPIST

## 2020-04-10 ENCOUNTER — TELEPHONE (OUTPATIENT)
Dept: NEUROLOGY | Facility: CLINIC | Age: 70
End: 2020-04-10

## 2020-04-13 ENCOUNTER — OFFICE VISIT (OUTPATIENT)
Dept: PHYSICAL THERAPY | Facility: CLINIC | Age: 70
End: 2020-04-13
Payer: MEDICARE

## 2020-04-13 DIAGNOSIS — G35 MULTIPLE SCLEROSIS (HCC): ICD-10-CM

## 2020-04-13 DIAGNOSIS — R26.9 NEUROLOGIC GAIT DYSFUNCTION: Primary | ICD-10-CM

## 2020-04-13 DIAGNOSIS — M25.552 PAIN OF BOTH HIP JOINTS: ICD-10-CM

## 2020-04-13 DIAGNOSIS — M25.551 PAIN OF BOTH HIP JOINTS: ICD-10-CM

## 2020-04-13 PROCEDURE — 97110 THERAPEUTIC EXERCISES: CPT | Performed by: PHYSICAL THERAPIST

## 2020-04-13 PROCEDURE — 97112 NEUROMUSCULAR REEDUCATION: CPT | Performed by: PHYSICAL THERAPIST

## 2020-04-13 PROCEDURE — 97140 MANUAL THERAPY 1/> REGIONS: CPT | Performed by: PHYSICAL THERAPIST

## 2020-04-15 ENCOUNTER — APPOINTMENT (OUTPATIENT)
Dept: PHYSICAL THERAPY | Facility: CLINIC | Age: 70
End: 2020-04-15
Payer: MEDICARE

## 2020-04-16 ENCOUNTER — TELEPHONE (OUTPATIENT)
Dept: NEUROLOGY | Facility: CLINIC | Age: 70
End: 2020-04-16

## 2020-04-16 ENCOUNTER — TELEMEDICINE (OUTPATIENT)
Dept: NEUROLOGY | Facility: CLINIC | Age: 70
End: 2020-04-16
Payer: MEDICARE

## 2020-04-16 DIAGNOSIS — G62.9 PERIPHERAL POLYNEUROPATHY: ICD-10-CM

## 2020-04-16 DIAGNOSIS — G35 MULTIPLE SCLEROSIS (HCC): Primary | ICD-10-CM

## 2020-04-16 PROCEDURE — 99213 OFFICE O/P EST LOW 20 MIN: CPT | Performed by: PSYCHIATRY & NEUROLOGY

## 2020-04-16 RX ORDER — GABAPENTIN 300 MG/1
300 CAPSULE ORAL
Qty: 90 CAPSULE | Refills: 3 | Status: SHIPPED | OUTPATIENT
Start: 2020-04-16 | End: 2020-11-19 | Stop reason: SDUPTHER

## 2020-04-16 RX ORDER — GABAPENTIN 600 MG/1
1200 TABLET ORAL 3 TIMES DAILY
Qty: 540 TABLET | Refills: 3 | Status: SHIPPED | OUTPATIENT
Start: 2020-04-16 | End: 2020-11-19 | Stop reason: SDUPTHER

## 2020-04-16 RX ORDER — ZONISAMIDE 100 MG/1
400 CAPSULE ORAL
Qty: 360 CAPSULE | Refills: 3 | Status: SHIPPED | OUTPATIENT
Start: 2020-04-16 | End: 2020-11-19 | Stop reason: SDUPTHER

## 2020-04-16 RX ORDER — BACLOFEN 10 MG/1
TABLET ORAL
Qty: 360 TABLET | Refills: 0 | Status: SHIPPED | OUTPATIENT
Start: 2020-04-16 | End: 2020-06-30

## 2020-04-17 ENCOUNTER — OFFICE VISIT (OUTPATIENT)
Dept: PHYSICAL THERAPY | Facility: CLINIC | Age: 70
End: 2020-04-17
Payer: MEDICARE

## 2020-04-17 DIAGNOSIS — M25.552 PAIN OF BOTH HIP JOINTS: ICD-10-CM

## 2020-04-17 DIAGNOSIS — M25.551 PAIN OF BOTH HIP JOINTS: ICD-10-CM

## 2020-04-17 DIAGNOSIS — G35 MULTIPLE SCLEROSIS (HCC): ICD-10-CM

## 2020-04-17 DIAGNOSIS — R26.9 NEUROLOGIC GAIT DYSFUNCTION: Primary | ICD-10-CM

## 2020-04-17 PROCEDURE — 97112 NEUROMUSCULAR REEDUCATION: CPT | Performed by: PHYSICAL THERAPIST

## 2020-04-17 PROCEDURE — 97140 MANUAL THERAPY 1/> REGIONS: CPT | Performed by: PHYSICAL THERAPIST

## 2020-04-17 PROCEDURE — 97110 THERAPEUTIC EXERCISES: CPT | Performed by: PHYSICAL THERAPIST

## 2020-04-20 ENCOUNTER — TELEPHONE (OUTPATIENT)
Dept: NEUROLOGY | Facility: CLINIC | Age: 70
End: 2020-04-20

## 2020-04-20 ENCOUNTER — OFFICE VISIT (OUTPATIENT)
Dept: PLASTIC SURGERY | Facility: CLINIC | Age: 70
End: 2020-04-20

## 2020-04-20 ENCOUNTER — OFFICE VISIT (OUTPATIENT)
Dept: PHYSICAL THERAPY | Facility: CLINIC | Age: 70
End: 2020-04-20
Payer: MEDICARE

## 2020-04-20 VITALS — TEMPERATURE: 99.3 F

## 2020-04-20 DIAGNOSIS — M25.551 PAIN OF BOTH HIP JOINTS: ICD-10-CM

## 2020-04-20 DIAGNOSIS — M25.552 PAIN OF BOTH HIP JOINTS: ICD-10-CM

## 2020-04-20 DIAGNOSIS — Z86.000 PERSONAL HISTORY OF IN-SITU NEOPLASM OF BREAST: Primary | ICD-10-CM

## 2020-04-20 DIAGNOSIS — R26.9 NEUROLOGIC GAIT DYSFUNCTION: Primary | ICD-10-CM

## 2020-04-20 DIAGNOSIS — G35 MULTIPLE SCLEROSIS (HCC): ICD-10-CM

## 2020-04-20 PROCEDURE — 97110 THERAPEUTIC EXERCISES: CPT | Performed by: PHYSICAL THERAPIST

## 2020-04-20 PROCEDURE — 97140 MANUAL THERAPY 1/> REGIONS: CPT | Performed by: PHYSICAL THERAPIST

## 2020-04-20 PROCEDURE — 97530 THERAPEUTIC ACTIVITIES: CPT | Performed by: PHYSICAL THERAPIST

## 2020-04-20 PROCEDURE — 99024 POSTOP FOLLOW-UP VISIT: CPT | Performed by: PHYSICIAN ASSISTANT

## 2020-04-20 PROCEDURE — 97112 NEUROMUSCULAR REEDUCATION: CPT | Performed by: PHYSICAL THERAPIST

## 2020-04-20 PROCEDURE — 4040F PNEUMOC VAC/ADMIN/RCVD: CPT | Performed by: PHYSICIAN ASSISTANT

## 2020-04-22 ENCOUNTER — OFFICE VISIT (OUTPATIENT)
Dept: PHYSICAL THERAPY | Facility: CLINIC | Age: 70
End: 2020-04-22
Payer: MEDICARE

## 2020-04-22 DIAGNOSIS — G35 MULTIPLE SCLEROSIS (HCC): ICD-10-CM

## 2020-04-22 DIAGNOSIS — R26.9 NEUROLOGIC GAIT DYSFUNCTION: Primary | ICD-10-CM

## 2020-04-22 DIAGNOSIS — M25.551 PAIN OF BOTH HIP JOINTS: ICD-10-CM

## 2020-04-22 DIAGNOSIS — M25.552 PAIN OF BOTH HIP JOINTS: ICD-10-CM

## 2020-04-22 PROCEDURE — 97530 THERAPEUTIC ACTIVITIES: CPT | Performed by: PHYSICAL THERAPIST

## 2020-04-22 PROCEDURE — 97110 THERAPEUTIC EXERCISES: CPT | Performed by: PHYSICAL THERAPIST

## 2020-04-22 PROCEDURE — 97140 MANUAL THERAPY 1/> REGIONS: CPT | Performed by: PHYSICAL THERAPIST

## 2020-04-22 PROCEDURE — 97112 NEUROMUSCULAR REEDUCATION: CPT | Performed by: PHYSICAL THERAPIST

## 2020-04-27 ENCOUNTER — EVALUATION (OUTPATIENT)
Dept: PHYSICAL THERAPY | Facility: CLINIC | Age: 70
End: 2020-04-27
Payer: MEDICARE

## 2020-04-27 DIAGNOSIS — R26.9 NEUROLOGIC GAIT DYSFUNCTION: Primary | ICD-10-CM

## 2020-04-27 DIAGNOSIS — M25.551 PAIN OF BOTH HIP JOINTS: ICD-10-CM

## 2020-04-27 DIAGNOSIS — M25.552 PAIN OF BOTH HIP JOINTS: ICD-10-CM

## 2020-04-27 DIAGNOSIS — G35 MULTIPLE SCLEROSIS (HCC): ICD-10-CM

## 2020-04-27 PROCEDURE — 97530 THERAPEUTIC ACTIVITIES: CPT | Performed by: PHYSICAL THERAPIST

## 2020-04-27 PROCEDURE — 97110 THERAPEUTIC EXERCISES: CPT | Performed by: PHYSICAL THERAPIST

## 2020-04-29 ENCOUNTER — OFFICE VISIT (OUTPATIENT)
Dept: PHYSICAL THERAPY | Facility: CLINIC | Age: 70
End: 2020-04-29
Payer: MEDICARE

## 2020-04-29 DIAGNOSIS — M25.552 PAIN OF BOTH HIP JOINTS: ICD-10-CM

## 2020-04-29 DIAGNOSIS — G35 MULTIPLE SCLEROSIS (HCC): ICD-10-CM

## 2020-04-29 DIAGNOSIS — M54.12 CERVICAL RADICULOPATHY: ICD-10-CM

## 2020-04-29 DIAGNOSIS — R26.9 NEUROLOGIC GAIT DYSFUNCTION: Primary | ICD-10-CM

## 2020-04-29 DIAGNOSIS — M25.551 PAIN OF BOTH HIP JOINTS: ICD-10-CM

## 2020-04-29 PROCEDURE — 97112 NEUROMUSCULAR REEDUCATION: CPT | Performed by: PHYSICAL THERAPIST

## 2020-04-29 PROCEDURE — 97110 THERAPEUTIC EXERCISES: CPT | Performed by: PHYSICAL THERAPIST

## 2020-04-29 PROCEDURE — 97140 MANUAL THERAPY 1/> REGIONS: CPT | Performed by: PHYSICAL THERAPIST

## 2020-04-30 ENCOUNTER — TELEPHONE (OUTPATIENT)
Dept: NEUROLOGY | Facility: CLINIC | Age: 70
End: 2020-04-30

## 2020-04-30 DIAGNOSIS — F51.01 PRIMARY INSOMNIA: ICD-10-CM

## 2020-05-01 ENCOUNTER — OFFICE VISIT (OUTPATIENT)
Dept: PLASTIC SURGERY | Facility: CLINIC | Age: 70
End: 2020-05-01
Payer: MEDICARE

## 2020-05-01 DIAGNOSIS — Z98.890: Primary | ICD-10-CM

## 2020-05-01 PROCEDURE — 4040F PNEUMOC VAC/ADMIN/RCVD: CPT | Performed by: SURGERY

## 2020-05-01 PROCEDURE — 99214 OFFICE O/P EST MOD 30 MIN: CPT | Performed by: SURGERY

## 2020-05-01 PROCEDURE — 1036F TOBACCO NON-USER: CPT | Performed by: SURGERY

## 2020-05-01 RX ORDER — LORAZEPAM 1 MG/1
1 TABLET ORAL
Qty: 90 TABLET | Refills: 0 | Status: CANCELLED | OUTPATIENT
Start: 2020-05-01 | End: 2020-07-30

## 2020-05-02 ENCOUNTER — APPOINTMENT (OUTPATIENT)
Dept: LAB | Facility: CLINIC | Age: 70
End: 2020-05-02
Payer: MEDICARE

## 2020-05-02 ENCOUNTER — TRANSCRIBE ORDERS (OUTPATIENT)
Dept: LAB | Facility: CLINIC | Age: 70
End: 2020-05-02

## 2020-05-02 DIAGNOSIS — Z85.3 PERSONAL HISTORY OF MALIGNANT NEOPLASM OF BREAST: ICD-10-CM

## 2020-05-02 DIAGNOSIS — G35 MULTIPLE SCLEROSIS (HCC): ICD-10-CM

## 2020-05-02 DIAGNOSIS — Z13.6 ENCOUNTER FOR LIPID SCREENING FOR CARDIOVASCULAR DISEASE: ICD-10-CM

## 2020-05-02 DIAGNOSIS — Z13.220 ENCOUNTER FOR LIPID SCREENING FOR CARDIOVASCULAR DISEASE: ICD-10-CM

## 2020-05-02 LAB
ALBUMIN SERPL BCP-MCNC: 3.8 G/DL (ref 3.5–5)
ALP SERPL-CCNC: 73 U/L (ref 46–116)
ALT SERPL W P-5'-P-CCNC: 35 U/L (ref 12–78)
ANION GAP SERPL CALCULATED.3IONS-SCNC: 8 MMOL/L (ref 4–13)
AST SERPL W P-5'-P-CCNC: 16 U/L (ref 5–45)
BASOPHILS # BLD AUTO: 0.06 THOUSANDS/ΜL (ref 0–0.1)
BASOPHILS NFR BLD AUTO: 1 % (ref 0–1)
BILIRUB DIRECT SERPL-MCNC: 0.14 MG/DL (ref 0–0.2)
BILIRUB SERPL-MCNC: 0.48 MG/DL (ref 0.2–1)
BUN SERPL-MCNC: 16 MG/DL (ref 5–25)
CALCIUM SERPL-MCNC: 8.7 MG/DL (ref 8.3–10.1)
CHLORIDE SERPL-SCNC: 109 MMOL/L (ref 100–108)
CHOLEST SERPL-MCNC: 213 MG/DL (ref 50–200)
CO2 SERPL-SCNC: 28 MMOL/L (ref 21–32)
CREAT SERPL-MCNC: 0.97 MG/DL (ref 0.6–1.3)
EOSINOPHIL # BLD AUTO: 0.2 THOUSAND/ΜL (ref 0–0.61)
EOSINOPHIL NFR BLD AUTO: 5 % (ref 0–6)
ERYTHROCYTE [DISTWIDTH] IN BLOOD BY AUTOMATED COUNT: 12.6 % (ref 11.6–15.1)
GFR SERPL CREATININE-BSD FRML MDRD: 59 ML/MIN/1.73SQ M
GLUCOSE P FAST SERPL-MCNC: 85 MG/DL (ref 65–99)
HCT VFR BLD AUTO: 41.3 % (ref 34.8–46.1)
HDLC SERPL-MCNC: 67 MG/DL
HGB BLD-MCNC: 13.2 G/DL (ref 11.5–15.4)
IMM GRANULOCYTES # BLD AUTO: 0.01 THOUSAND/UL (ref 0–0.2)
IMM GRANULOCYTES NFR BLD AUTO: 0 % (ref 0–2)
LDLC SERPL CALC-MCNC: 131 MG/DL (ref 0–100)
LYMPHOCYTES # BLD AUTO: 1.25 THOUSANDS/ΜL (ref 0.6–4.47)
LYMPHOCYTES NFR BLD AUTO: 29 % (ref 14–44)
MCH RBC QN AUTO: 31.1 PG (ref 26.8–34.3)
MCHC RBC AUTO-ENTMCNC: 32 G/DL (ref 31.4–37.4)
MCV RBC AUTO: 97 FL (ref 82–98)
MONOCYTES # BLD AUTO: 0.33 THOUSAND/ΜL (ref 0.17–1.22)
MONOCYTES NFR BLD AUTO: 8 % (ref 4–12)
NEUTROPHILS # BLD AUTO: 2.45 THOUSANDS/ΜL (ref 1.85–7.62)
NEUTS SEG NFR BLD AUTO: 57 % (ref 43–75)
NONHDLC SERPL-MCNC: 146 MG/DL
NRBC BLD AUTO-RTO: 0 /100 WBCS
PLATELET # BLD AUTO: 232 THOUSANDS/UL (ref 149–390)
PMV BLD AUTO: 9.5 FL (ref 8.9–12.7)
POTASSIUM SERPL-SCNC: 4 MMOL/L (ref 3.5–5.3)
PROT SERPL-MCNC: 7.3 G/DL (ref 6.4–8.2)
RBC # BLD AUTO: 4.25 MILLION/UL (ref 3.81–5.12)
SODIUM SERPL-SCNC: 145 MMOL/L (ref 136–145)
TRIGL SERPL-MCNC: 73 MG/DL
WBC # BLD AUTO: 4.3 THOUSAND/UL (ref 4.31–10.16)

## 2020-05-02 PROCEDURE — 85025 COMPLETE CBC W/AUTO DIFF WBC: CPT

## 2020-05-02 PROCEDURE — 36415 COLL VENOUS BLD VENIPUNCTURE: CPT

## 2020-05-02 PROCEDURE — 80076 HEPATIC FUNCTION PANEL: CPT

## 2020-05-02 PROCEDURE — 80061 LIPID PANEL: CPT

## 2020-05-02 PROCEDURE — 80048 BASIC METABOLIC PNL TOTAL CA: CPT

## 2020-05-03 ENCOUNTER — ANESTHESIA EVENT (OUTPATIENT)
Dept: PERIOP | Facility: HOSPITAL | Age: 70
End: 2020-05-03
Payer: MEDICARE

## 2020-05-04 ENCOUNTER — APPOINTMENT (OUTPATIENT)
Dept: PHYSICAL THERAPY | Facility: CLINIC | Age: 70
End: 2020-05-04
Payer: MEDICARE

## 2020-05-04 ENCOUNTER — TELEPHONE (OUTPATIENT)
Dept: NEUROLOGY | Facility: CLINIC | Age: 70
End: 2020-05-04

## 2020-05-04 ENCOUNTER — ANESTHESIA (OUTPATIENT)
Dept: PERIOP | Facility: HOSPITAL | Age: 70
End: 2020-05-04
Payer: MEDICARE

## 2020-05-04 ENCOUNTER — HOSPITAL ENCOUNTER (OUTPATIENT)
Facility: HOSPITAL | Age: 70
Setting detail: OUTPATIENT SURGERY
Discharge: HOME | End: 2020-05-04
Attending: SURGERY | Admitting: SURGERY
Payer: MEDICARE

## 2020-05-04 VITALS
OXYGEN SATURATION: 99 % | DIASTOLIC BLOOD PRESSURE: 62 MMHG | SYSTOLIC BLOOD PRESSURE: 128 MMHG | WEIGHT: 136 LBS | BODY MASS INDEX: 23.22 KG/M2 | RESPIRATION RATE: 16 BRPM | HEIGHT: 64 IN | TEMPERATURE: 97.4 F | HEART RATE: 66 BPM

## 2020-05-04 DIAGNOSIS — F51.01 PRIMARY INSOMNIA: ICD-10-CM

## 2020-05-04 DIAGNOSIS — Z86.000 PERSONAL HISTORY OF IN-SITU NEOPLASM OF BREAST: Primary | ICD-10-CM

## 2020-05-04 PROCEDURE — 19380 REVJ RECONSTRUCTED BREAST: CPT | Performed by: SURGERY

## 2020-05-04 PROCEDURE — 19380 REVJ RECONSTRUCTED BREAST: CPT | Performed by: PHYSICIAN ASSISTANT

## 2020-05-04 PROCEDURE — G9197 ORDER FOR CEPH: HCPCS | Performed by: SURGERY

## 2020-05-04 RX ORDER — FENTANYL CITRATE/PF 50 MCG/ML
25 SYRINGE (ML) INJECTION
Status: DISCONTINUED | OUTPATIENT
Start: 2020-05-04 | End: 2020-05-04 | Stop reason: HOSPADM

## 2020-05-04 RX ORDER — ONDANSETRON 2 MG/ML
INJECTION INTRAMUSCULAR; INTRAVENOUS AS NEEDED
Status: DISCONTINUED | OUTPATIENT
Start: 2020-05-04 | End: 2020-05-04 | Stop reason: SURG

## 2020-05-04 RX ORDER — LIDOCAINE HYDROCHLORIDE 10 MG/ML
INJECTION, SOLUTION EPIDURAL; INFILTRATION; INTRACAUDAL; PERINEURAL AS NEEDED
Status: DISCONTINUED | OUTPATIENT
Start: 2020-05-04 | End: 2020-05-04 | Stop reason: SURG

## 2020-05-04 RX ORDER — METOCLOPRAMIDE HYDROCHLORIDE 5 MG/ML
5 INJECTION INTRAMUSCULAR; INTRAVENOUS ONCE AS NEEDED
Status: DISCONTINUED | OUTPATIENT
Start: 2020-05-04 | End: 2020-05-04 | Stop reason: HOSPADM

## 2020-05-04 RX ORDER — PROPOFOL 10 MG/ML
INJECTION, EMULSION INTRAVENOUS AS NEEDED
Status: DISCONTINUED | OUTPATIENT
Start: 2020-05-04 | End: 2020-05-04 | Stop reason: SURG

## 2020-05-04 RX ORDER — ROCURONIUM BROMIDE 10 MG/ML
INJECTION, SOLUTION INTRAVENOUS AS NEEDED
Status: DISCONTINUED | OUTPATIENT
Start: 2020-05-04 | End: 2020-05-04 | Stop reason: SURG

## 2020-05-04 RX ORDER — CEFAZOLIN SODIUM 1 G/50ML
1000 SOLUTION INTRAVENOUS ONCE
Status: DISCONTINUED | OUTPATIENT
Start: 2020-05-04 | End: 2020-05-04 | Stop reason: HOSPADM

## 2020-05-04 RX ORDER — SODIUM CHLORIDE, SODIUM LACTATE, POTASSIUM CHLORIDE, CALCIUM CHLORIDE 600; 310; 30; 20 MG/100ML; MG/100ML; MG/100ML; MG/100ML
125 INJECTION, SOLUTION INTRAVENOUS CONTINUOUS
Status: DISCONTINUED | OUTPATIENT
Start: 2020-05-04 | End: 2020-05-04 | Stop reason: HOSPADM

## 2020-05-04 RX ORDER — DEXAMETHASONE SODIUM PHOSPHATE 10 MG/ML
INJECTION, SOLUTION INTRAMUSCULAR; INTRAVENOUS AS NEEDED
Status: DISCONTINUED | OUTPATIENT
Start: 2020-05-04 | End: 2020-05-04 | Stop reason: SURG

## 2020-05-04 RX ORDER — CEPHALEXIN 500 MG/1
500 CAPSULE ORAL EVERY 6 HOURS SCHEDULED
Qty: 28 CAPSULE | Refills: 0 | Status: SHIPPED | OUTPATIENT
Start: 2020-05-04 | End: 2020-05-11

## 2020-05-04 RX ORDER — CEFAZOLIN SODIUM 1 G/50ML
SOLUTION INTRAVENOUS AS NEEDED
Status: DISCONTINUED | OUTPATIENT
Start: 2020-05-04 | End: 2020-05-04 | Stop reason: SURG

## 2020-05-04 RX ORDER — FENTANYL CITRATE 50 UG/ML
INJECTION, SOLUTION INTRAMUSCULAR; INTRAVENOUS AS NEEDED
Status: DISCONTINUED | OUTPATIENT
Start: 2020-05-04 | End: 2020-05-04 | Stop reason: SURG

## 2020-05-04 RX ORDER — ONDANSETRON 2 MG/ML
4 INJECTION INTRAMUSCULAR; INTRAVENOUS ONCE AS NEEDED
Status: DISCONTINUED | OUTPATIENT
Start: 2020-05-04 | End: 2020-05-04 | Stop reason: HOSPADM

## 2020-05-04 RX ADMIN — PROPOFOL 150 MG: 10 INJECTION, EMULSION INTRAVENOUS at 08:35

## 2020-05-04 RX ADMIN — ROCURONIUM BROMIDE 20 MG: 10 SOLUTION INTRAVENOUS at 08:35

## 2020-05-04 RX ADMIN — SUGAMMADEX 150 MG: 100 INJECTION, SOLUTION INTRAVENOUS at 09:00

## 2020-05-04 RX ADMIN — PHENYLEPHRINE HYDROCHLORIDE 100 MCG: 10 INJECTION INTRAVENOUS at 09:02

## 2020-05-04 RX ADMIN — PHENYLEPHRINE HYDROCHLORIDE 100 MCG: 10 INJECTION INTRAVENOUS at 08:51

## 2020-05-04 RX ADMIN — ONDANSETRON 4 MG: 2 INJECTION INTRAMUSCULAR; INTRAVENOUS at 08:43

## 2020-05-04 RX ADMIN — FENTANYL CITRATE 100 MCG: 50 INJECTION INTRAMUSCULAR; INTRAVENOUS at 08:35

## 2020-05-04 RX ADMIN — CEFAZOLIN SODIUM 1000 MG: 1 SOLUTION INTRAVENOUS at 08:28

## 2020-05-04 RX ADMIN — LIDOCAINE HYDROCHLORIDE 50 MG: 10 INJECTION, SOLUTION EPIDURAL; INFILTRATION; INTRACAUDAL; PERINEURAL at 08:35

## 2020-05-04 RX ADMIN — DEXAMETHASONE SODIUM PHOSPHATE 4 MG: 10 INJECTION, SOLUTION INTRAMUSCULAR; INTRAVENOUS at 08:43

## 2020-05-04 RX ADMIN — SODIUM CHLORIDE, SODIUM LACTATE, POTASSIUM CHLORIDE, AND CALCIUM CHLORIDE: .6; .31; .03; .02 INJECTION, SOLUTION INTRAVENOUS at 08:31

## 2020-05-04 RX ADMIN — PHENYLEPHRINE HYDROCHLORIDE 100 MCG: 10 INJECTION INTRAVENOUS at 08:43

## 2020-05-04 RX ADMIN — PHENYLEPHRINE HYDROCHLORIDE 100 MCG: 10 INJECTION INTRAVENOUS at 08:55

## 2020-05-06 ENCOUNTER — OFFICE VISIT (OUTPATIENT)
Dept: PHYSICAL THERAPY | Facility: CLINIC | Age: 70
End: 2020-05-06
Payer: MEDICARE

## 2020-05-06 DIAGNOSIS — M25.551 PAIN OF BOTH HIP JOINTS: ICD-10-CM

## 2020-05-06 DIAGNOSIS — M25.552 PAIN OF BOTH HIP JOINTS: ICD-10-CM

## 2020-05-06 DIAGNOSIS — M54.12 CERVICAL RADICULOPATHY: ICD-10-CM

## 2020-05-06 DIAGNOSIS — R26.9 NEUROLOGIC GAIT DYSFUNCTION: Primary | ICD-10-CM

## 2020-05-06 DIAGNOSIS — G35 MULTIPLE SCLEROSIS (HCC): ICD-10-CM

## 2020-05-06 PROCEDURE — 97110 THERAPEUTIC EXERCISES: CPT | Performed by: PHYSICAL THERAPIST

## 2020-05-06 PROCEDURE — 97530 THERAPEUTIC ACTIVITIES: CPT | Performed by: PHYSICAL THERAPIST

## 2020-05-06 PROCEDURE — 97140 MANUAL THERAPY 1/> REGIONS: CPT | Performed by: PHYSICAL THERAPIST

## 2020-05-06 PROCEDURE — 97112 NEUROMUSCULAR REEDUCATION: CPT | Performed by: PHYSICAL THERAPIST

## 2020-05-06 RX ORDER — LORAZEPAM 1 MG/1
1 TABLET ORAL
Qty: 30 TABLET | Refills: 0 | Status: SHIPPED | OUTPATIENT
Start: 2020-05-06 | End: 2020-07-14 | Stop reason: ALTCHOICE

## 2020-05-11 ENCOUNTER — OFFICE VISIT (OUTPATIENT)
Dept: PLASTIC SURGERY | Facility: CLINIC | Age: 70
End: 2020-05-11

## 2020-05-11 ENCOUNTER — OFFICE VISIT (OUTPATIENT)
Dept: PHYSICAL THERAPY | Facility: CLINIC | Age: 70
End: 2020-05-11
Payer: MEDICARE

## 2020-05-11 VITALS — TEMPERATURE: 97.6 F

## 2020-05-11 DIAGNOSIS — M54.12 CERVICAL RADICULOPATHY: ICD-10-CM

## 2020-05-11 DIAGNOSIS — M25.551 PAIN OF BOTH HIP JOINTS: ICD-10-CM

## 2020-05-11 DIAGNOSIS — Z86.000 PERSONAL HISTORY OF IN-SITU NEOPLASM OF BREAST: Primary | ICD-10-CM

## 2020-05-11 DIAGNOSIS — G35 MULTIPLE SCLEROSIS (HCC): ICD-10-CM

## 2020-05-11 DIAGNOSIS — M25.552 PAIN OF BOTH HIP JOINTS: ICD-10-CM

## 2020-05-11 DIAGNOSIS — R26.9 NEUROLOGIC GAIT DYSFUNCTION: Primary | ICD-10-CM

## 2020-05-11 PROCEDURE — 97530 THERAPEUTIC ACTIVITIES: CPT | Performed by: PHYSICAL THERAPIST

## 2020-05-11 PROCEDURE — 1160F RVW MEDS BY RX/DR IN RCRD: CPT | Performed by: PHYSICIAN ASSISTANT

## 2020-05-11 PROCEDURE — 99024 POSTOP FOLLOW-UP VISIT: CPT | Performed by: PHYSICIAN ASSISTANT

## 2020-05-11 PROCEDURE — 97112 NEUROMUSCULAR REEDUCATION: CPT | Performed by: PHYSICAL THERAPIST

## 2020-05-11 PROCEDURE — 4040F PNEUMOC VAC/ADMIN/RCVD: CPT | Performed by: PHYSICIAN ASSISTANT

## 2020-05-11 PROCEDURE — 97110 THERAPEUTIC EXERCISES: CPT | Performed by: PHYSICAL THERAPIST

## 2020-05-11 PROCEDURE — 97140 MANUAL THERAPY 1/> REGIONS: CPT | Performed by: PHYSICAL THERAPIST

## 2020-05-13 ENCOUNTER — OFFICE VISIT (OUTPATIENT)
Dept: PHYSICAL THERAPY | Facility: CLINIC | Age: 70
End: 2020-05-13
Payer: MEDICARE

## 2020-05-13 DIAGNOSIS — G35 MULTIPLE SCLEROSIS (HCC): ICD-10-CM

## 2020-05-13 DIAGNOSIS — M54.12 CERVICAL RADICULOPATHY: ICD-10-CM

## 2020-05-13 DIAGNOSIS — M25.551 PAIN OF BOTH HIP JOINTS: ICD-10-CM

## 2020-05-13 DIAGNOSIS — M25.552 PAIN OF BOTH HIP JOINTS: ICD-10-CM

## 2020-05-13 DIAGNOSIS — R26.9 NEUROLOGIC GAIT DYSFUNCTION: Primary | ICD-10-CM

## 2020-05-13 PROCEDURE — 97110 THERAPEUTIC EXERCISES: CPT | Performed by: PHYSICAL THERAPIST

## 2020-05-13 PROCEDURE — 97112 NEUROMUSCULAR REEDUCATION: CPT | Performed by: PHYSICAL THERAPIST

## 2020-05-13 PROCEDURE — 97140 MANUAL THERAPY 1/> REGIONS: CPT | Performed by: PHYSICAL THERAPIST

## 2020-05-18 ENCOUNTER — OFFICE VISIT (OUTPATIENT)
Dept: PLASTIC SURGERY | Facility: CLINIC | Age: 70
End: 2020-05-18

## 2020-05-18 ENCOUNTER — OFFICE VISIT (OUTPATIENT)
Dept: PHYSICAL THERAPY | Facility: CLINIC | Age: 70
End: 2020-05-18
Payer: MEDICARE

## 2020-05-18 VITALS — TEMPERATURE: 98.8 F

## 2020-05-18 DIAGNOSIS — Z86.000 PERSONAL HISTORY OF IN-SITU NEOPLASM OF BREAST: Primary | ICD-10-CM

## 2020-05-18 DIAGNOSIS — M54.12 CERVICAL RADICULOPATHY: ICD-10-CM

## 2020-05-18 DIAGNOSIS — R26.9 NEUROLOGIC GAIT DYSFUNCTION: Primary | ICD-10-CM

## 2020-05-18 DIAGNOSIS — Z98.890 STATUS POST BREAST RECONSTRUCTION: ICD-10-CM

## 2020-05-18 DIAGNOSIS — M25.552 PAIN OF BOTH HIP JOINTS: ICD-10-CM

## 2020-05-18 DIAGNOSIS — G35 MULTIPLE SCLEROSIS (HCC): ICD-10-CM

## 2020-05-18 DIAGNOSIS — M25.551 PAIN OF BOTH HIP JOINTS: ICD-10-CM

## 2020-05-18 PROCEDURE — 4040F PNEUMOC VAC/ADMIN/RCVD: CPT | Performed by: PHYSICIAN ASSISTANT

## 2020-05-18 PROCEDURE — 97112 NEUROMUSCULAR REEDUCATION: CPT | Performed by: PHYSICAL THERAPIST

## 2020-05-18 PROCEDURE — 97530 THERAPEUTIC ACTIVITIES: CPT | Performed by: PHYSICAL THERAPIST

## 2020-05-18 PROCEDURE — 1160F RVW MEDS BY RX/DR IN RCRD: CPT | Performed by: PHYSICIAN ASSISTANT

## 2020-05-18 PROCEDURE — 97140 MANUAL THERAPY 1/> REGIONS: CPT | Performed by: PHYSICAL THERAPIST

## 2020-05-18 PROCEDURE — 99024 POSTOP FOLLOW-UP VISIT: CPT | Performed by: PHYSICIAN ASSISTANT

## 2020-05-18 PROCEDURE — 97110 THERAPEUTIC EXERCISES: CPT | Performed by: PHYSICAL THERAPIST

## 2020-05-20 ENCOUNTER — OFFICE VISIT (OUTPATIENT)
Dept: PHYSICAL THERAPY | Facility: CLINIC | Age: 70
End: 2020-05-20
Payer: MEDICARE

## 2020-05-20 DIAGNOSIS — R26.9 NEUROLOGIC GAIT DYSFUNCTION: Primary | ICD-10-CM

## 2020-05-20 DIAGNOSIS — M25.552 PAIN OF BOTH HIP JOINTS: ICD-10-CM

## 2020-05-20 DIAGNOSIS — M54.12 CERVICAL RADICULOPATHY: ICD-10-CM

## 2020-05-20 DIAGNOSIS — G35 MULTIPLE SCLEROSIS (HCC): ICD-10-CM

## 2020-05-20 DIAGNOSIS — M25.551 PAIN OF BOTH HIP JOINTS: ICD-10-CM

## 2020-05-20 PROCEDURE — 97112 NEUROMUSCULAR REEDUCATION: CPT | Performed by: PHYSICAL THERAPIST

## 2020-05-20 PROCEDURE — 97110 THERAPEUTIC EXERCISES: CPT | Performed by: PHYSICAL THERAPIST

## 2020-05-20 PROCEDURE — 97140 MANUAL THERAPY 1/> REGIONS: CPT | Performed by: PHYSICAL THERAPIST

## 2020-05-25 ENCOUNTER — APPOINTMENT (OUTPATIENT)
Dept: PHYSICAL THERAPY | Facility: CLINIC | Age: 70
End: 2020-05-25
Payer: MEDICARE

## 2020-05-27 ENCOUNTER — OFFICE VISIT (OUTPATIENT)
Dept: PHYSICAL THERAPY | Facility: CLINIC | Age: 70
End: 2020-05-27
Payer: MEDICARE

## 2020-05-27 DIAGNOSIS — G35 MULTIPLE SCLEROSIS (HCC): ICD-10-CM

## 2020-05-27 DIAGNOSIS — M25.552 PAIN OF BOTH HIP JOINTS: ICD-10-CM

## 2020-05-27 DIAGNOSIS — R26.9 NEUROLOGIC GAIT DYSFUNCTION: Primary | ICD-10-CM

## 2020-05-27 DIAGNOSIS — M25.551 PAIN OF BOTH HIP JOINTS: ICD-10-CM

## 2020-05-27 DIAGNOSIS — M54.12 CERVICAL RADICULOPATHY: ICD-10-CM

## 2020-05-27 PROCEDURE — 97112 NEUROMUSCULAR REEDUCATION: CPT | Performed by: PHYSICAL THERAPIST

## 2020-05-27 PROCEDURE — 97110 THERAPEUTIC EXERCISES: CPT | Performed by: PHYSICAL THERAPIST

## 2020-05-27 PROCEDURE — 97140 MANUAL THERAPY 1/> REGIONS: CPT | Performed by: PHYSICAL THERAPIST

## 2020-05-29 ENCOUNTER — OFFICE VISIT (OUTPATIENT)
Dept: PHYSICAL THERAPY | Facility: CLINIC | Age: 70
End: 2020-05-29
Payer: MEDICARE

## 2020-05-29 DIAGNOSIS — M25.551 PAIN OF BOTH HIP JOINTS: ICD-10-CM

## 2020-05-29 DIAGNOSIS — M25.552 PAIN OF BOTH HIP JOINTS: ICD-10-CM

## 2020-05-29 DIAGNOSIS — R26.9 NEUROLOGIC GAIT DYSFUNCTION: Primary | ICD-10-CM

## 2020-05-29 DIAGNOSIS — G35 MULTIPLE SCLEROSIS (HCC): ICD-10-CM

## 2020-05-29 PROCEDURE — 97112 NEUROMUSCULAR REEDUCATION: CPT | Performed by: PHYSICAL THERAPIST

## 2020-05-29 PROCEDURE — 97140 MANUAL THERAPY 1/> REGIONS: CPT | Performed by: PHYSICAL THERAPIST

## 2020-05-29 PROCEDURE — 97110 THERAPEUTIC EXERCISES: CPT | Performed by: PHYSICAL THERAPIST

## 2020-06-01 ENCOUNTER — OFFICE VISIT (OUTPATIENT)
Dept: PHYSICAL THERAPY | Facility: CLINIC | Age: 70
End: 2020-06-01
Payer: MEDICARE

## 2020-06-01 DIAGNOSIS — M25.552 PAIN OF BOTH HIP JOINTS: ICD-10-CM

## 2020-06-01 DIAGNOSIS — R26.9 NEUROLOGIC GAIT DYSFUNCTION: Primary | ICD-10-CM

## 2020-06-01 DIAGNOSIS — G35 MULTIPLE SCLEROSIS (HCC): ICD-10-CM

## 2020-06-01 DIAGNOSIS — M25.551 PAIN OF BOTH HIP JOINTS: ICD-10-CM

## 2020-06-01 PROCEDURE — 97140 MANUAL THERAPY 1/> REGIONS: CPT | Performed by: PHYSICAL THERAPIST

## 2020-06-01 PROCEDURE — 97112 NEUROMUSCULAR REEDUCATION: CPT | Performed by: PHYSICAL THERAPIST

## 2020-06-01 PROCEDURE — 97110 THERAPEUTIC EXERCISES: CPT | Performed by: PHYSICAL THERAPIST

## 2020-06-03 ENCOUNTER — EVALUATION (OUTPATIENT)
Dept: PHYSICAL THERAPY | Facility: CLINIC | Age: 70
End: 2020-06-03
Payer: MEDICARE

## 2020-06-03 DIAGNOSIS — M25.552 PAIN OF BOTH HIP JOINTS: ICD-10-CM

## 2020-06-03 DIAGNOSIS — R26.9 NEUROLOGIC GAIT DYSFUNCTION: Primary | ICD-10-CM

## 2020-06-03 DIAGNOSIS — M25.551 PAIN OF BOTH HIP JOINTS: ICD-10-CM

## 2020-06-03 DIAGNOSIS — G35 MULTIPLE SCLEROSIS (HCC): ICD-10-CM

## 2020-06-03 PROCEDURE — 97140 MANUAL THERAPY 1/> REGIONS: CPT | Performed by: PHYSICAL THERAPIST

## 2020-06-03 PROCEDURE — 97112 NEUROMUSCULAR REEDUCATION: CPT | Performed by: PHYSICAL THERAPIST

## 2020-06-03 PROCEDURE — 97110 THERAPEUTIC EXERCISES: CPT | Performed by: PHYSICAL THERAPIST

## 2020-06-08 ENCOUNTER — OFFICE VISIT (OUTPATIENT)
Dept: PHYSICAL THERAPY | Facility: CLINIC | Age: 70
End: 2020-06-08
Payer: MEDICARE

## 2020-06-08 DIAGNOSIS — M25.551 PAIN OF BOTH HIP JOINTS: ICD-10-CM

## 2020-06-08 DIAGNOSIS — R26.9 NEUROLOGIC GAIT DYSFUNCTION: Primary | ICD-10-CM

## 2020-06-08 DIAGNOSIS — G35 MULTIPLE SCLEROSIS (HCC): ICD-10-CM

## 2020-06-08 DIAGNOSIS — M25.552 PAIN OF BOTH HIP JOINTS: ICD-10-CM

## 2020-06-08 PROCEDURE — 97112 NEUROMUSCULAR REEDUCATION: CPT | Performed by: PHYSICAL THERAPIST

## 2020-06-08 PROCEDURE — 97110 THERAPEUTIC EXERCISES: CPT | Performed by: PHYSICAL THERAPIST

## 2020-06-08 PROCEDURE — 97140 MANUAL THERAPY 1/> REGIONS: CPT | Performed by: PHYSICAL THERAPIST

## 2020-06-10 ENCOUNTER — OFFICE VISIT (OUTPATIENT)
Dept: PHYSICAL THERAPY | Facility: CLINIC | Age: 70
End: 2020-06-10
Payer: MEDICARE

## 2020-06-10 DIAGNOSIS — R26.9 NEUROLOGIC GAIT DYSFUNCTION: Primary | ICD-10-CM

## 2020-06-10 DIAGNOSIS — M25.551 PAIN OF BOTH HIP JOINTS: ICD-10-CM

## 2020-06-10 DIAGNOSIS — G35 MULTIPLE SCLEROSIS (HCC): ICD-10-CM

## 2020-06-10 DIAGNOSIS — M25.552 PAIN OF BOTH HIP JOINTS: ICD-10-CM

## 2020-06-10 PROCEDURE — 97110 THERAPEUTIC EXERCISES: CPT | Performed by: PHYSICAL THERAPIST

## 2020-06-10 PROCEDURE — 97112 NEUROMUSCULAR REEDUCATION: CPT | Performed by: PHYSICAL THERAPIST

## 2020-06-15 ENCOUNTER — OFFICE VISIT (OUTPATIENT)
Dept: PHYSICAL THERAPY | Facility: CLINIC | Age: 70
End: 2020-06-15
Payer: MEDICARE

## 2020-06-15 DIAGNOSIS — M25.552 PAIN OF BOTH HIP JOINTS: ICD-10-CM

## 2020-06-15 DIAGNOSIS — R26.9 NEUROLOGIC GAIT DYSFUNCTION: Primary | ICD-10-CM

## 2020-06-15 DIAGNOSIS — M25.551 PAIN OF BOTH HIP JOINTS: ICD-10-CM

## 2020-06-15 DIAGNOSIS — G35 MULTIPLE SCLEROSIS (HCC): ICD-10-CM

## 2020-06-15 PROCEDURE — 97140 MANUAL THERAPY 1/> REGIONS: CPT | Performed by: PHYSICAL THERAPIST

## 2020-06-15 PROCEDURE — 97110 THERAPEUTIC EXERCISES: CPT | Performed by: PHYSICAL THERAPIST

## 2020-06-15 PROCEDURE — 97112 NEUROMUSCULAR REEDUCATION: CPT | Performed by: PHYSICAL THERAPIST

## 2020-06-16 DIAGNOSIS — G35 MULTIPLE SCLEROSIS (HCC): Primary | ICD-10-CM

## 2020-06-17 ENCOUNTER — OFFICE VISIT (OUTPATIENT)
Dept: PHYSICAL THERAPY | Facility: CLINIC | Age: 70
End: 2020-06-17
Payer: MEDICARE

## 2020-06-17 DIAGNOSIS — G35 MULTIPLE SCLEROSIS (HCC): ICD-10-CM

## 2020-06-17 DIAGNOSIS — M25.552 PAIN OF BOTH HIP JOINTS: ICD-10-CM

## 2020-06-17 DIAGNOSIS — R26.9 NEUROLOGIC GAIT DYSFUNCTION: Primary | ICD-10-CM

## 2020-06-17 DIAGNOSIS — M25.551 PAIN OF BOTH HIP JOINTS: ICD-10-CM

## 2020-06-17 PROCEDURE — 97112 NEUROMUSCULAR REEDUCATION: CPT | Performed by: PHYSICAL THERAPIST

## 2020-06-17 PROCEDURE — 97140 MANUAL THERAPY 1/> REGIONS: CPT | Performed by: PHYSICAL THERAPIST

## 2020-06-17 PROCEDURE — 97110 THERAPEUTIC EXERCISES: CPT | Performed by: PHYSICAL THERAPIST

## 2020-06-22 ENCOUNTER — OFFICE VISIT (OUTPATIENT)
Dept: PHYSICAL THERAPY | Facility: CLINIC | Age: 70
End: 2020-06-22
Payer: MEDICARE

## 2020-06-22 ENCOUNTER — OFFICE VISIT (OUTPATIENT)
Dept: PLASTIC SURGERY | Facility: CLINIC | Age: 70
End: 2020-06-22

## 2020-06-22 VITALS — HEIGHT: 64 IN | WEIGHT: 137 LBS | BODY MASS INDEX: 23.39 KG/M2

## 2020-06-22 DIAGNOSIS — Z98.890 STATUS POST BREAST RECONSTRUCTION, UNSPECIFIED LATERALITY: ICD-10-CM

## 2020-06-22 DIAGNOSIS — Z86.000 PERSONAL HISTORY OF IN-SITU NEOPLASM OF BREAST: Primary | ICD-10-CM

## 2020-06-22 DIAGNOSIS — R26.9 NEUROLOGIC GAIT DYSFUNCTION: Primary | ICD-10-CM

## 2020-06-22 DIAGNOSIS — G35 MULTIPLE SCLEROSIS (HCC): ICD-10-CM

## 2020-06-22 PROCEDURE — 4040F PNEUMOC VAC/ADMIN/RCVD: CPT | Performed by: PHYSICIAN ASSISTANT

## 2020-06-22 PROCEDURE — 97112 NEUROMUSCULAR REEDUCATION: CPT | Performed by: PHYSICAL THERAPIST

## 2020-06-22 PROCEDURE — 97110 THERAPEUTIC EXERCISES: CPT | Performed by: PHYSICAL THERAPIST

## 2020-06-22 PROCEDURE — 1160F RVW MEDS BY RX/DR IN RCRD: CPT | Performed by: PHYSICIAN ASSISTANT

## 2020-06-22 PROCEDURE — 3008F BODY MASS INDEX DOCD: CPT | Performed by: PHYSICIAN ASSISTANT

## 2020-06-22 PROCEDURE — 97140 MANUAL THERAPY 1/> REGIONS: CPT | Performed by: PHYSICAL THERAPIST

## 2020-06-22 PROCEDURE — 99024 POSTOP FOLLOW-UP VISIT: CPT | Performed by: PHYSICIAN ASSISTANT

## 2020-06-24 ENCOUNTER — OFFICE VISIT (OUTPATIENT)
Dept: PHYSICAL THERAPY | Facility: CLINIC | Age: 70
End: 2020-06-24
Payer: MEDICARE

## 2020-06-24 DIAGNOSIS — R26.9 NEUROLOGIC GAIT DYSFUNCTION: Primary | ICD-10-CM

## 2020-06-24 DIAGNOSIS — G35 MULTIPLE SCLEROSIS (HCC): ICD-10-CM

## 2020-06-24 PROCEDURE — 97112 NEUROMUSCULAR REEDUCATION: CPT | Performed by: PHYSICAL THERAPIST

## 2020-06-24 PROCEDURE — 97110 THERAPEUTIC EXERCISES: CPT | Performed by: PHYSICAL THERAPIST

## 2020-06-24 PROCEDURE — 97140 MANUAL THERAPY 1/> REGIONS: CPT | Performed by: PHYSICAL THERAPIST

## 2020-06-29 ENCOUNTER — OFFICE VISIT (OUTPATIENT)
Dept: PHYSICAL THERAPY | Facility: CLINIC | Age: 70
End: 2020-06-29
Payer: MEDICARE

## 2020-06-29 DIAGNOSIS — M25.551 PAIN OF BOTH HIP JOINTS: ICD-10-CM

## 2020-06-29 DIAGNOSIS — G35 MULTIPLE SCLEROSIS (HCC): ICD-10-CM

## 2020-06-29 DIAGNOSIS — M25.552 PAIN OF BOTH HIP JOINTS: ICD-10-CM

## 2020-06-29 DIAGNOSIS — R26.9 NEUROLOGIC GAIT DYSFUNCTION: Primary | ICD-10-CM

## 2020-06-29 PROCEDURE — 97140 MANUAL THERAPY 1/> REGIONS: CPT | Performed by: PHYSICAL THERAPIST

## 2020-06-29 PROCEDURE — 97112 NEUROMUSCULAR REEDUCATION: CPT | Performed by: PHYSICAL THERAPIST

## 2020-06-29 PROCEDURE — 97110 THERAPEUTIC EXERCISES: CPT | Performed by: PHYSICAL THERAPIST

## 2020-06-30 DIAGNOSIS — G35 MULTIPLE SCLEROSIS (HCC): ICD-10-CM

## 2020-06-30 RX ORDER — BACLOFEN 10 MG/1
TABLET ORAL
Qty: 360 TABLET | Refills: 0 | Status: SHIPPED | OUTPATIENT
Start: 2020-06-30 | End: 2020-09-10

## 2020-07-01 ENCOUNTER — OFFICE VISIT (OUTPATIENT)
Dept: PHYSICAL THERAPY | Facility: CLINIC | Age: 70
End: 2020-07-01
Payer: MEDICARE

## 2020-07-01 DIAGNOSIS — M25.552 PAIN OF BOTH HIP JOINTS: ICD-10-CM

## 2020-07-01 DIAGNOSIS — G35 MULTIPLE SCLEROSIS (HCC): ICD-10-CM

## 2020-07-01 DIAGNOSIS — M25.551 PAIN OF BOTH HIP JOINTS: ICD-10-CM

## 2020-07-01 DIAGNOSIS — R26.9 NEUROLOGIC GAIT DYSFUNCTION: Primary | ICD-10-CM

## 2020-07-01 PROCEDURE — 97110 THERAPEUTIC EXERCISES: CPT | Performed by: PHYSICAL THERAPIST

## 2020-07-01 PROCEDURE — 97112 NEUROMUSCULAR REEDUCATION: CPT | Performed by: PHYSICAL THERAPIST

## 2020-07-01 PROCEDURE — 97140 MANUAL THERAPY 1/> REGIONS: CPT | Performed by: PHYSICAL THERAPIST

## 2020-07-01 NOTE — PROGRESS NOTES
Daily Note     Today's date: 2020  Patient name: Hayden Thomson  : 1950  MRN: [id number]  Referring provider: Gege Arreola MD  Dx:   Encounter Diagnosis     ICD-10-CM    1  Neurologic gait dysfunction R26 9    2  Multiple sclerosis ([facility] [address] ) G35    3  Pain of both hip joints M25 551     M25 552        Start Time: 0930  Stop Time: 1015  Total time in clinic (min): 45 minutes    Subjective: Improving legs symptoms over the past few days, as per patient  Pt was able to go to the grocery store after last treatment and was on feet for 2+ hours without buckling or giving way  Objective: See treatment diary below      Assessment: Improved functional tolerance to treatment and improved endurance  Pt has more control of LE during ambulation and WB activities compared to last week  Pt has minor difficulties with dynamic balance; however, was able to self correct LOB episodes without much external support  PT contact guard still used throughout treatment  Less intermittent rest breaks  Tolerated treatment well  Patient would benefit from continued PT      Plan: Continue per plan of care        Precautions: MS, Depression, Fatigue, Gait dysfunction, Insomnia, Osteoporosis, Peripheral polyneuropathy, Hx of Breast CA  Manual            PROM of B/L (hip ROM) 6' B 6' B 6' B 6" B          Sciatic N glides - gentle B/L 3' B 3' B 3' B 3" B          STM to L UT              Left scapular mobilizations              Radial N glides                  Exercise Diary  6/15 6/17 6/22 6/24 6/29 7/1      NuStep 5' lvl 5 5' lvl 5 5' lvl 5  5' lvl 5  7' lvl 5      Standing marches     On Biodex foam  15x on each On Biodex foam  15x on each      Sit to stand transfers 5x   5x        Lateral walks Blue foam x 10 Blue foam x20 Blue foam 2x10 blue foam 2x10 blue foam 2x10 blue foam x10      Forward/backward walking      Blue foam x10      HR/TR standing             Hip extension- standing            Biodex LOS    x4 static        Biodex Fall risk             Leeann step overs            Tandem balance             DLS on foam             Seated hip adduction            Standing hip abduction            Stairs             [employer] board F/b, s/s 3' each F/B, S/S 3' each F/B, S/S 3' each F/B, S/S 3' ea F/B, S/S 3' ea F/B, S/S 3' ea- less FT support      Seated hamstring stretch            Standing with perturbations   posterior, anterior, lateral 1x1' w RTB around waist CGx1 HOLD         Obstacle course 6" step up, ladders, foam, and cones - [id number] Foam, cones, lateral hurdles, black tband william discs 3x30 ft   Foam, cones, lateral hurdles, black tband william discs 4x30 ft  Foam, cones, lateral hurdles, 6" steps  4x30 ft  Foam, cones, lateral hurdles, 6" steps  4x30 f      BIODEX Maze control            Leg curl             Leg extension            Leg press     3x10 30#       Step ups             --------------            UT/LS stretch            Pulley            C/S retraction             Nerve glides- radial             UBE            No money with scap retraction            TB Rows                 Modalities  4/27 5/11           MH to L UT 10' 10'                                         1:1 with PT from [phone]

## 2020-07-02 ENCOUNTER — APPOINTMENT (OUTPATIENT)
Dept: LAB | Facility: CLINIC | Age: 70
End: 2020-07-02
Payer: MEDICARE

## 2020-07-02 DIAGNOSIS — G35 MULTIPLE SCLEROSIS (HCC): ICD-10-CM

## 2020-07-02 LAB
ALBUMIN SERPL BCP-MCNC: 4 G/DL (ref 3.5–5)
ALP SERPL-CCNC: 74 U/L (ref 46–116)
ALT SERPL W P-5'-P-CCNC: 45 U/L (ref 12–78)
ANION GAP SERPL CALCULATED.3IONS-SCNC: 4 MMOL/L (ref 4–13)
AST SERPL W P-5'-P-CCNC: 22 U/L (ref 5–45)
BASOPHILS # BLD AUTO: 0.09 THOUSANDS/ΜL (ref 0–0.1)
BASOPHILS NFR BLD AUTO: 2 % (ref 0–1)
BILIRUB DIRECT SERPL-MCNC: 0.14 MG/DL (ref 0–0.2)
BILIRUB SERPL-MCNC: 0.51 MG/DL (ref 0.2–1)
BUN SERPL-MCNC: 19 MG/DL (ref 5–25)
CALCIUM SERPL-MCNC: 9.1 MG/DL (ref 8.3–10.1)
CHLORIDE SERPL-SCNC: 112 MMOL/L (ref 100–108)
CO2 SERPL-SCNC: 26 MMOL/L (ref 21–32)
CREAT SERPL-MCNC: 0.96 MG/DL (ref 0.6–1.3)
EOSINOPHIL # BLD AUTO: 0.24 THOUSAND/ΜL (ref 0–0.61)
EOSINOPHIL NFR BLD AUTO: 4 % (ref 0–6)
ERYTHROCYTE [DISTWIDTH] IN BLOOD BY AUTOMATED COUNT: 12.4 % (ref 11.6–15.1)
GFR SERPL CREATININE-BSD FRML MDRD: 60 ML/MIN/1.73SQ M
GLUCOSE P FAST SERPL-MCNC: 78 MG/DL (ref 65–99)
HCT VFR BLD AUTO: 42.1 % (ref 34.8–46.1)
HGB BLD-MCNC: 13.3 G/DL (ref 11.5–15.4)
IMM GRANULOCYTES # BLD AUTO: 0.01 THOUSAND/UL (ref 0–0.2)
IMM GRANULOCYTES NFR BLD AUTO: 0 % (ref 0–2)
LYMPHOCYTES # BLD AUTO: 1.68 THOUSANDS/ΜL (ref 0.6–4.47)
LYMPHOCYTES NFR BLD AUTO: 29 % (ref 14–44)
MCH RBC QN AUTO: 30.6 PG (ref 26.8–34.3)
MCHC RBC AUTO-ENTMCNC: 31.6 G/DL (ref 31.4–37.4)
MCV RBC AUTO: 97 FL (ref 82–98)
MONOCYTES # BLD AUTO: 0.41 THOUSAND/ΜL (ref 0.17–1.22)
MONOCYTES NFR BLD AUTO: 7 % (ref 4–12)
NEUTROPHILS # BLD AUTO: 3.28 THOUSANDS/ΜL (ref 1.85–7.62)
NEUTS SEG NFR BLD AUTO: 58 % (ref 43–75)
NRBC BLD AUTO-RTO: 0 /100 WBCS
PLATELET # BLD AUTO: 199 THOUSANDS/UL (ref 149–390)
PMV BLD AUTO: 10.6 FL (ref 8.9–12.7)
POTASSIUM SERPL-SCNC: 4.4 MMOL/L (ref 3.5–5.3)
PROT SERPL-MCNC: 7.3 G/DL (ref 6.4–8.2)
RBC # BLD AUTO: 4.34 MILLION/UL (ref 3.81–5.12)
SODIUM SERPL-SCNC: 142 MMOL/L (ref 136–145)
WBC # BLD AUTO: 5.71 THOUSAND/UL (ref 4.31–10.16)

## 2020-07-02 PROCEDURE — 85025 COMPLETE CBC W/AUTO DIFF WBC: CPT

## 2020-07-02 PROCEDURE — 80076 HEPATIC FUNCTION PANEL: CPT

## 2020-07-02 PROCEDURE — 80048 BASIC METABOLIC PNL TOTAL CA: CPT

## 2020-07-02 PROCEDURE — 36415 COLL VENOUS BLD VENIPUNCTURE: CPT

## 2020-07-06 ENCOUNTER — OFFICE VISIT (OUTPATIENT)
Dept: PHYSICAL THERAPY | Facility: CLINIC | Age: 70
End: 2020-07-06
Payer: MEDICARE

## 2020-07-06 DIAGNOSIS — M25.551 PAIN OF BOTH HIP JOINTS: ICD-10-CM

## 2020-07-06 DIAGNOSIS — G35 MULTIPLE SCLEROSIS (HCC): ICD-10-CM

## 2020-07-06 DIAGNOSIS — M25.552 PAIN OF BOTH HIP JOINTS: ICD-10-CM

## 2020-07-06 DIAGNOSIS — R26.9 NEUROLOGIC GAIT DYSFUNCTION: Primary | ICD-10-CM

## 2020-07-06 PROCEDURE — 97110 THERAPEUTIC EXERCISES: CPT | Performed by: PHYSICAL THERAPIST

## 2020-07-06 PROCEDURE — 97140 MANUAL THERAPY 1/> REGIONS: CPT | Performed by: PHYSICAL THERAPIST

## 2020-07-06 NOTE — PROGRESS NOTES
Daily Note     Today's date: 2020  Patient name: Tony Walters  : 1950  MRN: [id number]  Referring provider: Gustavo Del Rosario MD  Dx:   Encounter Diagnosis     ICD-10-CM    1  Neurologic gait dysfunction R26 9    2  Multiple sclerosis ([address] ) G35    3  Pain of both hip joints M25 551     M25 552        Start Time: 1015  Stop Time: 1100  Total time in clinic (min): 45 minutes    Subjective: Pt states that her right hip has been very bothersome over the past several days  She denies any position of comfort and notes difficulty with sleeping  Objective: See treatment diary below      Assessment: Although no mechanism of injury, patient c/o increasing right hip pain  She has had a IDALIA in   She is perseverant on her hip today and believe it is likely due to the scar and the way the muscle tissue healed  Performed mostly mat exercises today due to low tolerance to movement  Pt tolerated R hip stretches well without significant increase in symptoms  R long axis distraction reduces symptoms  Ended treatment with MH  Resume WB and balance exercises as tolerated  Plan: Continue per plan of care  Pt will be re-evaluated next session        Precautions: MS, Depression, Fatigue, Gait dysfunction, Insomnia, Osteoporosis, Peripheral polyneuropathy, Hx of Breast CA  Manual           PROM of B/L (hip ROM) 6' B 6' B 6' B 6" B R hip only 8'          Sciatic N glides - gentle B/L 3' B 3' B 3' B 3" B          R LAD     5' gr II         Left scapular mobilizations              Radial N glides                  Exercise Diary  6/15 6/17 6/22 6/24 6/29 7/1 7/6     NuStep 5' lvl 5 5' lvl 5 5' lvl 5  5' lvl 5  7' lvl 5 5' lvl 5     Standing marches     On Biodex foam  15x on each On Biodex foam  15x on each      Sit to stand transfers 5x   5x        Lateral walks Blue foam x 10 Blue foam x20 Blue foam 2x10 blue foam 2x10 blue foam 2x10 blue foam x10      Forward/backward walking      Blue foam x10      HR/TR standing             Hip extension- standing            Biodex LOS    x4 static        Biodex Fall risk             Leeann step overs            Tandem balance             DLS on foam             Seated hip adduction            Standing hip abduction            ISBX F/b, s/s 3' each F/B, S/S 3' each F/B, S/S 3' each F/B, S/S 3' ea F/B, S/S 3' ea F/B, S/S 3' ea- less FT support      Seated hamstring stretch            Standing with perturbations   posterior, anterior, lateral 1x1' w RTB around waist CGx1 HOLD         Obstacle course 6" step up, ladders, foam, and cones - [id number] Foam, cones, lateral hurdles, black tband william discs 3x30 ft   Foam, cones, lateral hurdles, black tband william discs 4x30 ft  Foam, cones, lateral hurdles, 6" steps  4x30 ft  Foam, cones, lateral hurdles, 6" steps  4x30 f      BIODEX Maze control            Leg curl             Leg extension            Leg press     3x10 30#       Step ups        10x5"     LTR       20x     Gait training       100 feet with SPC      Clam shell in hooklying       30x5"     Seated hip IR with bolster       30x on each     Hooklying R hip ER stretch       15x5"     Supine Piriformis stretch       15x5"                 --------------            UT/LS stretch            Pulley            C/S retraction             Nerve glides- radial             UBE            No money with scap retraction            TB Rows                             Modalities  4/27 5/11 7/6          MH to L UT 10' 10'           MH to R hip   10'                           1:1 with PT from 1015-11a

## 2020-07-08 ENCOUNTER — EVALUATION (OUTPATIENT)
Dept: PHYSICAL THERAPY | Facility: CLINIC | Age: 70
End: 2020-07-08
Payer: MEDICARE

## 2020-07-08 DIAGNOSIS — M25.552 PAIN OF BOTH HIP JOINTS: ICD-10-CM

## 2020-07-08 DIAGNOSIS — R26.9 NEUROLOGIC GAIT DYSFUNCTION: Primary | ICD-10-CM

## 2020-07-08 DIAGNOSIS — G35 MULTIPLE SCLEROSIS (HCC): ICD-10-CM

## 2020-07-08 DIAGNOSIS — M25.551 PAIN OF BOTH HIP JOINTS: ICD-10-CM

## 2020-07-08 PROCEDURE — 97112 NEUROMUSCULAR REEDUCATION: CPT | Performed by: PHYSICAL THERAPIST

## 2020-07-08 PROCEDURE — 97110 THERAPEUTIC EXERCISES: CPT | Performed by: PHYSICAL THERAPIST

## 2020-07-08 PROCEDURE — 97530 THERAPEUTIC ACTIVITIES: CPT | Performed by: PHYSICAL THERAPIST

## 2020-07-08 NOTE — PROGRESS NOTES
[id number] ([facility])      Today's date: 2020  Patient name: Onelia Bergman  : 1950  MRN: [id number]  Referring provider: Jennifer Jeffers MD  Dx:   Encounter Diagnosis     ICD-10-CM    1  Neurologic gait dysfunction R26 9    2  Multiple sclerosis ([facility] [address] ) G35    3  Pain of both hip joints M25 551     M25 552        Start Time: 1020  Stop Time: 1100  Total time in clinic (min): 40 minutes    Assessment  Assessment details: Magdalena Landis reports a recent increase in R hip pain which has delayed progression in treatment  Beth medical conditions are multifactorial and complex which has hindered treatment progression and function  Patient's current chief complaint is stiffness and pain in her right hip, although there has be no mechanism of injury, trauma, or fall  Her balance, function tests, hip ROM, strength in LE, and muscle performance have been gradually improving  Timed up at go has improved to 18 seconds, 5x sit to stand to 15 seconds, and DLS on foam with EC to 12 seconds  We also tested patient's 6 minute walk test at 550 feet in 6 minutes with SPC  Overall, patient has made steady progress toward goals and would benefit from additional PT at this time  Impairments: abnormal coordination, abnormal gait, abnormal or restricted ROM, impaired balance, impaired physical strength, lacks appropriate home exercise program, pain with function, safety issue and poor posture   Understanding of Dx/Px/POC: good   Prognosis: good    Goals  Short Term Goals: to be achieved by 4 weeks  1) Patient to be independent with basic HEP - MET  2) Improve L passive SLR x 10 degrees  - Partially met  3) Increase B/L hip ROM ROM by 5-10 degrees - Partially met  4) Increase LE strength by 1/2 MMT grade in all deficient planes   Partially met  5) Improve cervical spine ROM by 10 degrees in all planes-MET  6) Improve L shoulder ROM by 10 degrees in all planes-MET    Long Term Goals: to be achieved by discharge  1) FOTO equal to or greater than expected  - Partially met  2) Ambulation to improve to maximal level of function- Partially met  3) Improve 5x sit to stand to 20 seconds-MET   4) Improve TUG to 20 seconds -MET  5) FOTO of cervical spine equal to or greater than expected-MET  6) Improve 6 minute walk test to >600 feet using SPC  Plan  Patient would benefit from: skilled physical therapy  Planned therapy interventions: manual therapy, neuromuscular re-education, patient education, therapeutic activities, therapeutic exercise and home exercise program  Frequency: 2x per week for 8-12 weeks  Treatment plan discussed with: patient        Subjective Evaluation    History of Present Illness  Mechanism of injury: History of Current Injury: Patient was referred to PT for multiple issues; however, she is most concerned with her symptoms of MS (balance difficulties) and bilateral chronic hip pain  Patient was diagnosed with MS in 2007  She states that her balance has progressively worsened since December when she was diagnosed with pneumonia and hospitalized  She was states she has been weak since  Pt is currently ambulating with a SPC which she has been using seen December  She denies using this prior to Dec  Pt also has a history of breast cancer and had her implant replaced in January which hasn't totally healed yet, according to patient  Pt is also complaining of chronic bilateral hip pain  She states that she has had multiple surgeries on the R hip (pinning and replacement) in 2012  Pt also has an IM Franco in femur in 2014  Pt has a history of osteoporosis which has caused multiple fractures  Pt also does have a history of peripheral polyneuropathy  Pain location/Descriptors: Anterior bilateral hip pain which seems to be exacerbated with stress, anxiety, and weather (cold)  Pt states that she has severe "nerve shocks" in BLE   She states it feels like her LE from her knees down are "on fire " She describes it as a"sunburn from hell " She states that his occurs while sitting at down at night  Aggravating factors: No clear AGGS and EASES  Eases: Neurontin, baclofen, and other medications to ease symptoms  24 HR pattern: Worse at night time  Symptoms seems to increase with stress, anxiety, and weather changes  Imaging: Brain MRI  No imaging at bilateral hips  Patient goals:  Improve balance and strength in LE  Hobbies/Interest: Cooking, baking, taking care of house  Patient lives at home with her   She states that it was built perfectly for a disabilied person  She has 4 inch step to get into the house  2 LINH in the back door with hand rail  [address]  She does have a basement which is not essential for her to go down there  She has safety bars throughout the house installed  Pain  Current pain ratin  At worst pain ratin      Diagnostic Tests  No diagnostic tests performed  Treatments  Current treatment: medication and physical therapy  Patient Goals  Patient goals for therapy: decreased pain, increased motion, improved balance, increased strength and independence with ADLs/IADLs          Objective     Tenderness     Additional Tenderness Details  Tenderness and Spasm at Left upper trap  Moderate tenderness present at posterior FA at extensor group       Neurological Testing     Sensation   Cervical/Thoracic   Left   Intact: light touch    Right   Intact: light touch    Reflexes   Left   Biceps (C5/C6): normal (2+)  Brachioradialis (C6): normal (2+)  Triceps (C7): normal (2+)  Ortiz's reflex: negative    Right   Biceps (C5/C6): normal (2+)  Brachioradialis (C6): normal (2+)  Triceps (C7): normal (2+)  Ortiz's reflex: negative    Active Range of Motion   Cervical/Thoracic Spine       Cervical    Flexion: 60 degrees  with pain  Extension: 30 degrees     with pain  Left lateral flexion: 15 degrees      Right lateral flexion: 20 degrees      Left rotation: 45 degrees with pain  Right rotation: 60 degrees    with pain  Left Shoulder   Flexion: 100 degrees with pain  External rotation 0°: 50 degrees with pain    Right Shoulder   Flexion: 140 degrees   External rotation 0°: 75 degrees     Passive Range of Motion   Left Hip   Flexion: 120 degrees   External rotation (90/90): 48 degrees   Internal rotation (90/90): 65 degrees     Right Hip   Flexion: 125 degrees   External rotation (90/90): 45 degrees   Internal rotation (90/90): 50 degrees     Additional Passive Range of Motion Details  Assessed supine: pain at end range motion    Pt unable to perform active hip flexion d/t weakness    SLR: positive L at 45- increases with ankle DF (increase symptoms in distal LE- burning)  RE: 50 degrees     R= tight hamstrings     Strength/Myotome Testing     Left Hip   Planes of Motion   Flexion: 3+  Extension: 4-  Abduction: 3+  Adduction: 4    Right Hip   Planes of Motion   Flexion: 4-  Extension: 4-  Abduction: 3+  Adduction: 4    Left Knee   Flexion: 3+  Extension: 4-    Right Knee   Flexion: 4-  Extension: 4-    Left Ankle/Foot   Dorsiflexion: 4-  Plantar flexion: 3+  Inversion: 3+  Eversion: 3+    Right Ankle/Foot   Dorsiflexion: 4  Plantar flexion: 4  Inversion: 4  Eversion: 4    Additional Strength Details  Performed MMT in sitting position    Tests   Cervical   Negative alar ligament test and Sharp-Rona test      Additional Tests Details  Equivocal ULTT-A, B, C on the left d/t arm pain with movement  Ambulation     Observational Gait   Gait: antalgic and asymmetric   Decreased walking speed and stride length  Additional Observational Gait Details  Gait: Pt is utilizing a SPC with small step length, small stride length, increase MAYCOL, and a mild scissoring gait pattern  Functional Assessment        Comments  Timed Up and go: 25 seconds, no AD, small step length, ataxic  RE: 19 seconds  Challenged with turning  Use of SPC     RE2: 22 seconds without SPC, 15 seconds with SPC   RE3: 18 seconds, 15 seconds with SPC    5x sit to stand: 24 seconds with bilateral use of hands  (heavy use of UE)   RE: 19 with moderate use of UE  RE2: 17 seconds with moderate use of UE  RE3: 15 seconds with moderate use of UE    Sit to stand without UE assistance: unable to perform   RE: able to perform half way  RE: able to perform with finger tip support and staggered stance   RE3: same as above    DLS firm EO (feet together): 30 seconds mild postural sway  RE: min sway 30 seconds  RE: no sway 30 seconds    DLS firm EC (feet together): 30 sec mod-max postural sway  RE: mod sway 30 seconds  RE2: mod sway 30+ seconds    DLS foam EO (feet together yellow foam): 30 seconds min sway  RE: 30 seconds min sway      DLS foam EC (feet together yellow foam): 6 second (best of 2) prior to LOB  RE: 12 seconds prior to LOB    Tandem (close): 13 seconds prior to LOB    6 minute walk test: 550 feet with SPC  Precautions: MS, Depression, Fatigue, Gait dysfunction, Insomnia, Osteoporosis, Peripheral polyneuropathy, Hx of Breast CA       Manual  5/13 5/18 5/20 5/27 5/29 6/1 6/3  4/29 5/11   PROM of B/L (hip ROM) 4' B 4' B 5' B 5' B 5' B 6' B   2 5' 3'   Sciatic N glides - gentle B/L 4' B 4'B 3' 3' B 3' B 3' B   2 5' 3'   STM to L UT See below   DC     8' seating and supine with L UT pin and stretch 5'   Left scapular mobilizations Gr III 5' with L STM  Gr III 5' with L STM  Gr III 5' with L STM  DC      4' grIII   Radial N glides 30x   DC             Exercise Diary  5/11 5/13 5/18 5/20 5/27 5/29 6/1     NuStep             Standing marches   On BD foam 20x on each  On BD foam 20x on each On BD foam 20x on each  On BD foam 20x on each     Sit to stand transfers       nv      Lateral walks   4x20ft   4x15 ft   With TB      HR/TR standing  30x each 30x each   2x15 ea 2x15 ea DC      Hip extension- standing 2x15  ea 2x15 ea   2x15 ea 2x15 ea DC      Biodex LOS             Biodex Fall risk              Leeann step overs 4x15 SPC  4x15 SPC 4x15 SPC 4x15 SPC 4x15 SPC       Tandem balance              DLS on foam              Seated hip adduction             Standing hip abduction 2x15 ea    2x15 ea 2x15 ea DC      Stairs              Wobble board     S/S, F/B 2' each S/S, F/B 2' each       Seated hamstring stretch      4x20"       Obstacle course       nv 6" step up, ladders, foam, and bolsert - [id number] 6" step up, ladders, foam, and bolsert - [id number]    i-driveEX Maze control       nv x2 level 10     Leg press       nv      Step ups  6" 10x on each  6" 10x on each 6" 10x on each  6" 10x on each  6" x10      -------------- ------ -------- ------ ----- ------ ------- -------- ------- ------- ------   UT/LS stretch 5x10" L side  5x10" L side 5x10" L side  DC        Pulley Hold             C/S retraction              Nerve glides- radial  10x5" 10x5" 20x5" with PT assistance 20x5" with PT assistance DC        UBE Hold  2 5'/2 5' 2 5'/2 5' 2 5'/2 5' DC        No money with scap retraction  2x10 ytb 2x10 ytb 2x10 ytb DC        TB Rows   3x10 btb 3x10 btb 3x10 btb DC            Modalities  4/27 5/11           MH to L UT 10' 10'                                       Pt was re-evaluated today x 40 minutes

## 2020-07-13 ENCOUNTER — TELEPHONE (OUTPATIENT)
Dept: NEUROLOGY | Facility: CLINIC | Age: 70
End: 2020-07-13

## 2020-07-13 ENCOUNTER — OFFICE VISIT (OUTPATIENT)
Dept: NEUROLOGY | Facility: CLINIC | Age: 70
End: 2020-07-13
Payer: MEDICARE

## 2020-07-13 ENCOUNTER — OFFICE VISIT (OUTPATIENT)
Dept: PHYSICAL THERAPY | Facility: CLINIC | Age: 70
End: 2020-07-13
Payer: MEDICARE

## 2020-07-13 VITALS
SYSTOLIC BLOOD PRESSURE: 189 MMHG | DIASTOLIC BLOOD PRESSURE: 79 MMHG | TEMPERATURE: 97 F | WEIGHT: 137.2 LBS | HEART RATE: 70 BPM | BODY MASS INDEX: 23.55 KG/M2

## 2020-07-13 DIAGNOSIS — M25.552 PAIN OF BOTH HIP JOINTS: ICD-10-CM

## 2020-07-13 DIAGNOSIS — G62.9 PERIPHERAL POLYNEUROPATHY: ICD-10-CM

## 2020-07-13 DIAGNOSIS — G35 MULTIPLE SCLEROSIS (HCC): ICD-10-CM

## 2020-07-13 DIAGNOSIS — G47.00 INSOMNIA, UNSPECIFIED TYPE: ICD-10-CM

## 2020-07-13 DIAGNOSIS — G35 MULTIPLE SCLEROSIS (HCC): Primary | ICD-10-CM

## 2020-07-13 DIAGNOSIS — M25.551 PAIN OF BOTH HIP JOINTS: ICD-10-CM

## 2020-07-13 DIAGNOSIS — R26.9 NEUROLOGIC GAIT DYSFUNCTION: Primary | ICD-10-CM

## 2020-07-13 PROCEDURE — 99214 OFFICE O/P EST MOD 30 MIN: CPT | Performed by: PSYCHIATRY & NEUROLOGY

## 2020-07-13 PROCEDURE — 4040F PNEUMOC VAC/ADMIN/RCVD: CPT | Performed by: PSYCHIATRY & NEUROLOGY

## 2020-07-13 PROCEDURE — 97112 NEUROMUSCULAR REEDUCATION: CPT | Performed by: PHYSICAL THERAPIST

## 2020-07-13 PROCEDURE — 1160F RVW MEDS BY RX/DR IN RCRD: CPT | Performed by: PSYCHIATRY & NEUROLOGY

## 2020-07-13 PROCEDURE — 97140 MANUAL THERAPY 1/> REGIONS: CPT | Performed by: PHYSICAL THERAPIST

## 2020-07-13 PROCEDURE — 1036F TOBACCO NON-USER: CPT | Performed by: PSYCHIATRY & NEUROLOGY

## 2020-07-13 NOTE — TELEPHONE ENCOUNTER
Let pt know her bp was elevated at office today at 189/79    rec pt to follow up with pcp and also take some home measurements if possible and record those to pcp as well

## 2020-07-13 NOTE — PROGRESS NOTES
Daily Note     Today's date: 2020  Patient name: Li Patterson  : 1950  MRN: [id number]  Referring provider: Lucero Salazar MD  Dx:   Encounter Diagnosis     ICD-10-CM    1  Neurologic gait dysfunction R26 9    2  Multiple sclerosis ([facility] [address] ) G35    3  Pain of both hip joints M25 551     M25 552        Start Time: 1515  Stop Time: 1615  Total time in clinic (min): 60 minutes    Subjective: No new complaints today  The patient reports worsening in symptoms since previous session, particularly since she didn't sleep well over the weekend  The patient was seen this AM by her neurologist who referred her for a sleep study, an EMG, and another MRI  The patient expressed significant frustration following her neurology visit, reports that she needed to stop her neurologist from leaving the room without assessing her recent concerns  The patient also expressed frustration at how hard it was to schedule an EMG  Objective: See treatment diary below      Assessment: The PT continued the patient's current intervention program during today's treatment  Current exercises were maintained or progressed to promote consistency of care in line with the primary therapist's established plan  The patient tolerated manual and active treatment well today  Due to the patient's initial report of increased fatigue and symptom as well as her complaints relating to her neurology visit not going well, today's clinical session emphasized conversation regarding the patient's concerns  Emphasis on stress reduction to help calm the patient's sensitized CNS today  Plan: Continue per plan of care          Precautions: MS, Depression, Fatigue, Gait dysfunction, Insomnia, Osteoporosis, Peripheral polyneuropathy, Hx of Breast CA  Manual         PROM of B/L (hip ROM) 6' B 6' B 6' B 6" B R hip only 8'   R hip only 8'       Sciatic N glides - gentle B/L 3' B 3' B 3' B 3" B   3" B       R LAD 5' gr II         Left scapular mobilizations              Radial N glides                  Exercise Diary  6/15 6/17 6/22 6/24 6/29 7/1 7/6  7/13   NuStep 5' lvl 5 5' lvl 5 5' lvl 5  5' lvl 5  7' lvl 5 5' lvl 5  5 min, lvl 5   Standing marches     On Biodex foam  15x on each On Biodex foam  15x on each      Sit to stand transfers 5x   5x     2x5   Lateral walks Blue foam x 10 Blue foam x20 Blue foam 2x10 blue foam 2x10 blue foam 2x10 blue foam x10   blue foam x10   Forward/backward walking      Blue foam x10      HR/TR standing             Hip extension- standing            Biodex LOS    x4 static        Biodex Fall risk             Leeann step overs            Tandem balance             DLS on foam             Seated hip adduction            Standing hip abduction            [place] F/b, s/s 3' each F/B, S/S 3' each F/B, S/S 3' each F/B, S/S 3' ea F/B, S/S 3' ea F/B, S/S 3' ea- less FT support   F/B, S/S 3' ea- less FT support   Seated hamstring stretch            Standing with perturbations   posterior, anterior, lateral 1x1' w RTB around waist CGx1 HOLD         Obstacle course 6" step up, ladders, foam, and cones - [id number] Foam, cones, lateral hurdles, black tband william discs 3x30 ft   Foam, cones, lateral hurdles, black tband william discs 4x30 ft  Foam, cones, lateral hurdles, 6" steps  4x30 ft  Foam, cones, lateral hurdles, 6" steps  4x30 f      BIODEX Maze control            Leg curl             Leg extension            Leg press     3x10 30#       Step ups        10x5"     LTR       20x     Gait training       100 feet with SPC   100 feet with SPC    Clam shell in hooklying       30x5"  30x5"   Seated hip IR with bolster       30x on each     Hooklying R hip ER stretch       15x5"     Supine Piriformis stretch       15x5"                 --------------            UT/LS stretch            Pulley            C/S retraction             Nerve glides- radial             UBE            No money with scap retraction            TB Rows                             Modalities  4/27 5/11 7/6          MH to L UT 10' 10'           MH to R hip   10'

## 2020-07-13 NOTE — ASSESSMENT & PLAN NOTE
Pt here for neuro follow up  Pt last seen as telemed visit on 4/16/20      Pt had one hospitalization since last visit due to need for reconstruction of right breast surgical area  Pt notes she now has a closed incision and no need for abx at this time  No fever or chills  Pt aware of cdc recommendations in setting of covid  Pt remains on copaxone and padmini med well  Pt had one fall while getting something from storage area  Pt notes this was not an ms fall but rather an accident and injured her left trapezius  Pt has been going to PT for her MS as well as for injury and feels this has helped  Pt had updated labs on 7/2/20 and stable   lfts normal cbc diff ok  Pt to call for any new sxs

## 2020-07-13 NOTE — ASSESSMENT & PLAN NOTE
Pt complaining of increased lower ext paresthesias and tingling at times  rec updated mri t spine as last done in 2018 to ensure no new pathology  Also rec pt to have emg of the lowers for any superimposed neuropathic pain component  Pt with overall poor sleep  Pt felt best with regular lew ativan rxed by her gyn post menopausal  Pt no longer on medication and feels she cannot sleep well without something  Pt recommended to see sleep medicine for her insomnia

## 2020-07-13 NOTE — PROGRESS NOTES
Patient ID: Pedro Pablo Kramer is a 69 y o  female  Assessment/Plan:    Multiple sclerosis ([facility] [address] )  Pt here for neuro follow up  Pt last seen as telemed visit on 4/16/20  Pt had one hospitalization since last visit due to need for reconstruction of right breast surgical area  Pt notes she now has a closed incision and no need for abx at this time  No fever or chills  Pt aware of cdc recommendations in setting of covid  Pt remains on copaxone and padmini med well  Pt had one fall while getting something from storage area  Pt notes this was not an ms fall but rather an accident and injured her left trapezius  Pt has been going to PT for her MS as well as for injury and feels this has helped  Pt had updated labs on 7/2/20 and stable   lfts normal cbc diff ok  Pt to call for any new sxs    Peripheral polyneuropathy  Pt complaining of increased lower ext paresthesias and tingling at times  rec updated mri t spine as last done in 2018 to ensure no new pathology  Also rec pt to have emg of the lowers for any superimposed neuropathic pain component  Pt with overall poor sleep  Pt felt best with regular lew ativan rxed by her gyn post menopausal  Pt no longer on medication and feels she cannot sleep well without something  Pt recommended to see sleep medicine for her insomnia         Diagnoses and all orders for this visit:    Multiple sclerosis ([facility] [address] )  -     MRI thoracic spine with and without contrast; Future  -     Comprehensive metabolic panel; Future  -     EMG 2 limb lower extremity; Future    Peripheral polyneuropathy  -     EMG 2 limb lower extremity; Future    Insomnia, unspecified type  -     Ambulatory referral to Sleep Medicine; Future           Subjective:    HPI    Pedro Pablo Kramer is a 69 y o  female  presents for MS follow up, last seen in 4/16/20 by me   Per my last note "Patient with history of MS dating back to 2008, but likely with symptoms even in [address]   Patient also with PMH of breast cancer s/p left mastectomy in August 2012  Pt has been on Copaxone since 2008 and changed to the 40mg TIW dosing    Pt notes she has to go for colonoscopy due to abn stool screening testing   Pt notes she is also being considered for some breast reconstruction with autologous fat transfer at mastectomy site   Pt is following up with her doctors  Pt notes overall still dealing with her walking and trying to help her left leg and her balance  Pt has rx from pcp for PT  Pt feels PT has been very helpful  Pt also had reconstructive surgery on breast in jan 28th and still working on healing with follow up visits with her surgeon   "    Kenny Koehler above paragraph due to complexities of pt case  Pt last seen in April  Since last visit pt had same day procedure with right breast reconstruction  Pt notes she had open area that was poorly healing  Now area is closed and no abx  Pt denies any fever or chills  No cp or sob or cough  Pt is well brothers of cdc recommendations in setting of covid  Pt notes she is doing better with the help of PT for her endurance and balance , able to go longer distances over longer time frame example 2 hours on feet and grocery shopping aft PT session  Pt notes her biggest issue at this time is poor sleep related to not staying asleep as well as neurpathic pain in the legs  Pt cannot take lyrica or cymbalta as tried in past   Pt still on her baclofen, gabapentin and zonegran  Pt had updated labs in July and nl cbc diff and lfts  Rev in depth with pt  Pt notes she had one fall while in storage/ sewing area and fell trying to lift something and hurt left trapezius area  This is now improved since PT  Pt remains on brand copaxone and padmini med well  Pt is still following closely with her breast surgeon as well  Pt rec to see sleep med as well due to insomnia and ongoing issues with sleep   Pt notes she did have this issue until ever since OB office will not rx the ativan that her gyn office prescribed for her for many yrs  Pt notes she was a pt at that practice for over 40 yrs  Pt made aware that long standing ativan not recommended at this time as maintaince sleep med  Referral to sleep med made today for pt  Pt recommeded to have emg done as well to see if any superimposed issues with neuropathic pain  No change in vision  No diplopia  No loss of vision  No change in speech or swallowing  No loc  No sz  The following portions of the patient's history were reviewed and updated as appropriate: allergies, current medications, past family history, past medical history, past social history, past surgical history and problem list and ros and med rec reviewed  Objective:    Blood pressure (!) 189/79, pulse 70, temperature (!) 97 °F (36 1 °C), weight 62 2 kg (137 lb 3 2 oz)  Physical Exam   Constitutional: She appears well-developed and well-nourished  Eyes: Pupils are equal, round, and reactive to light  Lids are normal    Cardiovascular: Intact distal pulses  Neurological: She has normal reflexes  Psychiatric: Her speech is normal        Neurological Exam  Mental Status  Awake, alert and oriented to person, place and time  Recent and remote memory are intact  Speech is normal  Language is fluent with no aphasia  Attention and concentration are normal  Fund of knowledge is appropriate for level of education  Cranial Nerves  CN II: Visual acuity is normal  Visual fields full to confrontation  Right funduscopic exam: disc intact  Left funduscopic exam: disc intact  CN III, IV, VI: Extraocular movements intact bilaterally  Normal lids and orbits bilaterally  Pupils equal round and reactive to light bilaterally  CN V: Facial sensation is normal   CN VII: Full and symmetric facial movement  CN VIII: Hearing is normal   CN IX, X: Palate elevates symmetrically  Normal gag reflex    CN XI: Shoulder shrug strength is normal   CN XII: Tongue midline without atrophy or fasciculations  Motor  Normal muscle bulk throughout  Normal muscle tone  No abnormal involuntary movements  Strength is 5/5 in all four extremities except as noted  Left pronator drift  Left lower ext 4+/5  Left pronator drift  Sensory  Dec vib chanelle lowers  Reflexes  Deep tendon reflexes are 2+ and symmetric in all four extremities with downgoing toes bilaterally  Coordination  Right: Finger-to-nose normal  Rapid alternating movement normal   Left: Finger-to-nose normal  Rapid alternating movement normal     Gait  Casual gait: Wide stance  Ataxic gait  ROS:    Review of Systems   Constitutional: Positive for fatigue and unexpected weight change  Negative for appetite change and fever  HENT: Positive for congestion  Negative for hearing loss, tinnitus, trouble swallowing and voice change  Sinus problems   Eyes: Positive for visual disturbance  Negative for photophobia and pain  Respiratory: Negative  Negative for shortness of breath  Cardiovascular: Negative  Negative for palpitations  Gastrointestinal: Negative  Negative for nausea and vomiting  Endocrine: Negative  Negative for cold intolerance  Genitourinary: Negative  Negative for dysuria, frequency and urgency  Musculoskeletal: Positive for arthralgias, gait problem and myalgias  Negative for neck pain  Pain while walking  cramping   Skin: Negative  Negative for rash  Neurological: Positive for numbness  Negative for dizziness, tremors, seizures, syncope, facial asymmetry, speech difficulty, weakness, light-headedness and headaches  Tingling     Hematological: Negative  Does not bruise/bleed easily  Psychiatric/Behavioral: Positive for sleep disturbance  Negative for confusion and hallucinations  The patient is nervous/anxious

## 2020-07-14 ENCOUNTER — TELEPHONE (OUTPATIENT)
Dept: NEUROLOGY | Facility: CLINIC | Age: 70
End: 2020-07-14

## 2020-07-14 DIAGNOSIS — G35 MULTIPLE SCLEROSIS (HCC): ICD-10-CM

## 2020-07-14 DIAGNOSIS — G62.9 PERIPHERAL POLYNEUROPATHY: Primary | ICD-10-CM

## 2020-07-14 RX ORDER — LORAZEPAM 0.5 MG/1
TABLET ORAL
Qty: 1 TABLET | Refills: 0 | Status: SHIPPED | OUTPATIENT
Start: 2020-07-14 | End: 2020-08-07

## 2020-07-14 NOTE — TELEPHONE ENCOUNTER
Dr Deangelo Doss, patient stated that she is extremely claustrophobic and is requesting a prescription for "avitan" to be sent to her pharmacy for her MRI thoracic spine w w/o contrast scheduled on 8/3/2020 6:45PM at [email or web address]  [facility] mal[facility]ha [facility] [phone]      Patient also inquired about "sleep medicine" referral - informed her that Sleep Medicine will contact her to schedule her appt and offered the contact # - she declined and stated that she will wait for their call

## 2020-07-14 NOTE — TELEPHONE ENCOUNTER
Let pt know rx sent  No contraindication as viewed on papmed    Remind pt she will need  to take her for mri if she uses the ativan

## 2020-07-14 NOTE — TELEPHONE ENCOUNTER
Patient aware  She will have PT take it tomorrow  She said that is usually is not high, but she thinks it could be from walking upstairs and she did say she was not feeling so great yesterday

## 2020-07-15 ENCOUNTER — OFFICE VISIT (OUTPATIENT)
Dept: PHYSICAL THERAPY | Facility: CLINIC | Age: 70
End: 2020-07-15
Payer: MEDICARE

## 2020-07-15 DIAGNOSIS — R26.9 NEUROLOGIC GAIT DYSFUNCTION: Primary | ICD-10-CM

## 2020-07-15 DIAGNOSIS — M25.552 PAIN OF BOTH HIP JOINTS: ICD-10-CM

## 2020-07-15 DIAGNOSIS — G35 MULTIPLE SCLEROSIS (HCC): ICD-10-CM

## 2020-07-15 DIAGNOSIS — M25.551 PAIN OF BOTH HIP JOINTS: ICD-10-CM

## 2020-07-15 PROCEDURE — 97140 MANUAL THERAPY 1/> REGIONS: CPT | Performed by: PHYSICAL THERAPIST

## 2020-07-15 PROCEDURE — 97110 THERAPEUTIC EXERCISES: CPT | Performed by: PHYSICAL THERAPIST

## 2020-07-15 PROCEDURE — 97112 NEUROMUSCULAR REEDUCATION: CPT | Performed by: PHYSICAL THERAPIST

## 2020-07-15 NOTE — PROGRESS NOTES
Daily Note     Today's date: 7/15/2020  Patient name: Samina Juarez  : 1950  MRN: [id number]  Referring provider: Cathi Cochran MD  Dx:   Encounter Diagnosis     ICD-10-CM    1  Neurologic gait dysfunction R26 9    2  Multiple sclerosis ([facility] [address] ) G35    3  Pain of both hip joints M25 551     M25 552        Start Time: 1015  Stop Time: 1100  Total time in clinic (min): 45 minutes  The patient was treated by RODOLFO Myers under direct supervision of King Maciel DPT    Subjective: Pt reported that she is having increased difficulty sleeping due to neurogenic pain in her legs and that she is getting an MRI soon to see if it is due to an exacerbation of her MS  Objective: See treatment diary below      Assessment: Tolerated treatment well demonstrating adequate balance with all activities  Pt was given verbal cueing to slow her eccentric descent during sit to stands which improved following cueing even though patient didn't see value in it/didn't like to do it  Pt's L hip seemed to be stiffer than the R specifically with extension and abduction  Pt reported all stretching as comfortable with no pain and reported gentle sciatic nerve glides making her feel much better  Pt was informed to not push past her limits with any intervention and left the session feeling good  Patient demonstrated fatigue post treatment, exhibited good technique with therapeutic exercises and would benefit from continued PT      Plan: Continue per plan of care        Precautions: MS, Depression, Fatigue, Gait dysfunction, Insomnia, Osteoporosis, Peripheral polyneuropathy, Hx of Breast CA  Manual  6/22 6/24 6/29 7/1 7/6  7/13 7/15      PROM of B/L (hip ROM) 6' B 6' B 6' B 6" B R hip only 8'   R hip only 8' B/L 8'      Sciatic N glides - gentle B/L 3' B 3' B 3' B 3" B   3" B 3" B      R LAD     5' gr II         Left scapular mobilizations              Radial N glides                  Exercise Diary  6/15 6/17 6/22 6/24 6/29 7/1 7/6  7/13 7/15   NuStep 5' lvl 5 5' lvl 5 5' lvl 5  5' lvl 5  7' lvl 5 5' lvl 5  5 min, lvl 5 6 min, lvl 5   Standing marches     On Biodex foam  15x on each On Biodex foam  15x on each       Sit to stand transfers 5x   5x     2x5 2x7   Lateral walks Blue foam x 10 Blue foam x20 Blue foam 2x10 blue foam 2x10 blue foam 2x10 blue foam x10   blue foam x10 Blue foam x10   Forward/backward walking      Blue foam x10       HR/TR standing              Hip extension- standing             Biodex LOS    x4 static         Biodex Fall risk              Leeann step overs             Tandem balance              DLS on foam              Seated hip adduction             Standing hip abduction             USG RatherGather F/b, s/s 3' each F/B, S/S 3' each F/B, S/S 3' each F/B, S/S 3' ea F/B, S/S 3' ea F/B, S/S 3' ea- less FT support   F/B, S/S 3' ea- less FT support F/B, S/S 3' ea- less FT support   Seated hamstring stretch             Standing with perturbations   posterior, anterior, lateral 1x1' w RTB around waist CGx1 HOLD          Obstacle course 6" step up, ladders, foam, and cones - [id number] Foam, cones, lateral hurdles, black tband william discs 3x30 ft   Foam, cones, lateral hurdles, black tband william discs 4x30 ft  Foam, cones, lateral hurdles, 6" steps  4x30 ft  Foam, cones, lateral hurdles, 6" steps  4x30 f       BIODEX Maze control             Leg curl              Leg extension             Leg press     3x10 30#        Step ups        10x5"      LTR       20x      Gait training       100 feet with SPC   100 feet with SPC  100 feet with SPC    Clam shell in hooklying       30x5"  30x5" Seated w YTB 1x15   Seated hip IR with bolster       30x on each      Hooklying R hip ER stretch       15x5"      Supine Piriformis stretch       15x5"      Seated adduction w ball          1x10, 5" hold   --------------             UT/LS stretch             Pulley             C/S retraction              Nerve glides- radial              UBE             No money with scap retraction             TB Rows                               Modalities  4/27 5/11 7/6          MH to L UT 10' 10'           MH to R hip   10'                       PT 1:1 from [id number]

## 2020-07-21 ENCOUNTER — OFFICE VISIT (OUTPATIENT)
Dept: PHYSICAL THERAPY | Facility: CLINIC | Age: 70
End: 2020-07-21
Payer: MEDICARE

## 2020-07-21 DIAGNOSIS — M25.551 PAIN OF BOTH HIP JOINTS: ICD-10-CM

## 2020-07-21 DIAGNOSIS — R26.9 NEUROLOGIC GAIT DYSFUNCTION: Primary | ICD-10-CM

## 2020-07-21 DIAGNOSIS — G35 MULTIPLE SCLEROSIS (HCC): ICD-10-CM

## 2020-07-21 DIAGNOSIS — M25.552 PAIN OF BOTH HIP JOINTS: ICD-10-CM

## 2020-07-21 PROCEDURE — 97110 THERAPEUTIC EXERCISES: CPT | Performed by: PHYSICAL THERAPIST

## 2020-07-21 PROCEDURE — 97140 MANUAL THERAPY 1/> REGIONS: CPT | Performed by: PHYSICAL THERAPIST

## 2020-07-21 PROCEDURE — 97112 NEUROMUSCULAR REEDUCATION: CPT | Performed by: PHYSICAL THERAPIST

## 2020-07-21 NOTE — PROGRESS NOTES
Daily Note     Today's date: 2020  Patient name: Sanjiv Liu  : 1950  MRN: [id number]  Referring provider: Vanessa Mtz MD  Dx:   Encounter Diagnosis     ICD-10-CM    1  Neurologic gait dysfunction R26 9    2  Multiple sclerosis ([address] ) G35    3  Pain of both hip joints M25 551     M25 552        Start Time: 1015  Stop Time: 1100  Total time in clinic (min): 45 minutes    Subjective: Pt states that she followed up with neurologist and is going for an EMG and MRI of T/S to re-assess lesions  Objective: See treatment diary below  BP: 140/90  HR: 70  O2: 99%    Assessment: Resumed obstacle course to enhance dynamic balance  Pt performed 465 feet in 6' (6' walk test) at the end of treatment  Pt was able to tolerate and perform sit to stand transfers without UE support; however, required a 2" lift in seat to perform this  R hip stiffness and pain resolving  Tolerated treatment well  Patient would benefit from continued PT  Plan: Continue per plan of care        Precautions: MS, Depression, Fatigue, Gait dysfunction, Insomnia, Osteoporosis, Peripheral polyneuropathy, Hx of Breast CA  Manual  6/22 6/24 6/29 7/1 7/6  7/13 7/15 7/21     PROM of B/L (hip ROM) 6' B 6' B 6' B 6" B R hip only 8'   R hip only 8' B/L 8' B/L 8'     Sciatic N glides - gentle B/L 3' B 3' B 3' B 3" B   3" B 3" B      R LAD     5' gr II         Left scapular mobilizations              Radial N glides                  Exercise Diary  7/21    6/29 7/1 7/6  7/13 7/15   NuStep      7' lvl 5 5' lvl 5  5 min, lvl 5 6 min, lvl 5   Standing marches     On Biodex foam  15x on each On Biodex foam  15x on each       Sit to stand transfers 2" lift underchair- no UE support, 1x5,1x10        2x5 2x7   Lateral walks Blue foam 10x    blue foam 2x10 blue foam x10   blue foam x10 Blue foam x10   Forward/backward walking Blue foam 10x     Blue foam x10       HR/TR standing              Hip extension- standing             Biodex LOS Biodex Fall risk              Leeann step overs             Tandem balance              DLS on foam              Seated hip adduction             Standing hip abduction             Stairs              [employer] board     F/B, S/S 3' ea F/B, S/S 3' ea- less FT support   F/B, S/S 3' ea- less FT support F/B, S/S 3' ea- less FT support   Seated hamstring stretch             Standing with perturbations              Obstacle course hurdles, 6" step up, foam 4x20 feet     Foam, cones, lateral hurdles, 6" steps  4x30 ft  Foam, cones, lateral hurdles, 6" steps  4x30 f       BIODEX Maze control             Leg curl              Leg extension             Leg press     3x10 30#        Step ups        10x5"      LTR       20x      Gait training       100 feet with SPC   100 feet with SPC  100 feet with SPC    Clam shell in hooklying       30x5"  30x5" Seated w YTB 1x15   Seated hip IR with bolster       30x on each      Hooklying R hip ER stretch       15x5"      Supine Piriformis stretch       15x5"      Seated adduction w ball          1x10, 5" hold   6 MWT  6' 465 feet                 Modalities  4/27 5/11 7/6          MH to L UT 10' 10'           MH to R hip   10'                       PT 1:1 from [id number]

## 2020-07-22 ENCOUNTER — OFFICE VISIT (OUTPATIENT)
Dept: SLEEP CENTER | Facility: CLINIC | Age: 70
End: 2020-07-22
Payer: MEDICARE

## 2020-07-22 ENCOUNTER — TELEPHONE (OUTPATIENT)
Dept: NEUROLOGY | Facility: CLINIC | Age: 70
End: 2020-07-22

## 2020-07-22 VITALS
WEIGHT: 136.6 LBS | DIASTOLIC BLOOD PRESSURE: 70 MMHG | TEMPERATURE: 98.7 F | HEIGHT: 64 IN | SYSTOLIC BLOOD PRESSURE: 110 MMHG | BODY MASS INDEX: 23.32 KG/M2

## 2020-07-22 DIAGNOSIS — G47.00 INSOMNIA, UNSPECIFIED TYPE: ICD-10-CM

## 2020-07-22 DIAGNOSIS — G25.81 RESTLESS LEG SYNDROME: Primary | ICD-10-CM

## 2020-07-22 PROCEDURE — 99204 OFFICE O/P NEW MOD 45 MIN: CPT | Performed by: INTERNAL MEDICINE

## 2020-07-22 RX ORDER — PRAMIPEXOLE DIHYDROCHLORIDE 0.25 MG/1
TABLET ORAL
Qty: 90 TABLET | Refills: 1 | Status: SHIPPED | OUTPATIENT
Start: 2020-07-22 | End: 2020-08-11

## 2020-07-22 NOTE — PROGRESS NOTES
Consultation -  [facility] [address] : 1950  MRN: [id number]      Assessment:  The patient has sleep initiation and sleep maintenance insomnia which may be a result of restless legs  She identifies a feeling of electricity in her legs, which she attributes to her multiple sclerosis  She also has an irresistible feeling of discomfort  I will start her on pramipexole in titrating dose  I will see her in follow-up in two weeks  If the pramipexole is ineffective, I will start her on clonazepam   She is already on high dosages of gabapentin as well as baclofen  Plan:  Start pramipexole  Try clonazepam if the pramipexole is ineffective  Follow up: Two weeks    History of Present Illness:   69 y  o female with a longstanding history of sleep initiation and sleep maintenance insomnia  She was diagnosed with multiple sclerosis several years ago  She has unremarkable sleep hygiene, but takes 2 to 3 hours to fall asleep  When she awakens during the night she cannot fall back to sleep  She denies daytime sleepiness, but some a times has more pain as result of her lack of sleep  She has mild snoring but no choking or gasping      She used to bed at 10:00 p m , falls asleep at 11:45 p m , then sleeps until 1:30 a m , when she awakens and cannot fall back to sleep      Review of Systems      Genitourinary post menopausal (no peroids)   Cardiology ankle/leg swelling   Gastrointestinal none   Neurology need to move extremities, muscle weakness, numbness/tingling of an extremity and balance problems   Constitutional claustrophobia and fatigue   Integumentary none   Psychiatry anxiety   Musculoskeletal joint pain, legs twitching/jerking and leg cramps   Pulmonary difficulty breathing when lying flat    ENT none   Endocrine none   Hematological none         I have reviewed and updated the review of systems as necessary    Historical Information    Past Medical History:  MS, neuropathy    Family History: non-contributory    Social History     Socioeconomic History    Marital status: /Civil Union     Spouse name: None    Number of children: 2    Years of education: Completed bachelor's degree    Highest education level: None   Occupational History    Occupation: RN     Comment: Retired   Social Needs    Financial resource strain: None    Food insecurity:     Worry: None     Inability: None    Transportation needs:     Medical: None     Non-medical: None   Tobacco Use    Smoking status: Former Smoker     Packs/day: 1 00     Years: 35 00     Pack years: 35 00     Last attempt to quit:      Years since quittin 5    Smokeless tobacco: Never Used   Substance and Sexual Activity    Alcohol use: No    Drug use: No    Sexual activity: Yes     Partners: Male     Birth control/protection: Post-menopausal   Lifestyle    Physical activity:     Days per week: None     Minutes per session: None    Stress: None   Relationships    Social connections:     Talks on phone: None     Gets together: None     Attends [employer]ism service: None     Active member of club or organization: None     Attends meetings of clubs or organizations: None     Relationship status: None    Intimate partner violence:     Fear of current or ex partner: None     Emotionally abused: None     Physically abused: None     Forced sexual activity: None   Other Topics Concern    None   Social History Narrative    Denied:  History of caffeine use         Sleep Schedule: unremarkable    Snoring:  Mild    Witnessed Apnea:  No    Medications/Allergies:    Current Outpatient Medications:     ALPHA LIPOIC ACID PO, Take by mouth, Disp: , Rfl:     AMPYRA 10 MG TB12, Ampyra 10 mg Er, Take 1 tab bid, Disp: 180 tablet, Rfl: 0    ascorbic acid (VITAMIN C) 500 mg tablet, Take 500 mg by mouth daily, Disp: , Rfl:     baclofen 10 mg tablet, TAKE 1 TABLET EVERY        MORNING, 1 TABLET EVERY    EVENING, AND TAKE 2 TABLETSAT BEDTIME, Disp: 360 tablet, Rfl: 0    Cholecalciferol (VITAMIN D3) 1000 units CAPS, Take 5,000 Units by mouth  , Disp: , Rfl:     COPAXONE 40 MG/ML SOSY, Inject 40mg under the skin three times weekly  , Disp: 36 mL, Rfl: 0    Cranberry 1000 MG CAPS, Take 300 mg by mouth  , Disp: , Rfl:     cyanocobalamin (VITAMIN B-12) 1,000 mcg tablet, Take by mouth daily, Disp: , Rfl:     denosumab (PROLIA) 60 mg/mL, Inject under the skin, Disp: , Rfl:     gabapentin (NEURONTIN) 300 mg capsule, Take 1 capsule (300 mg total) by mouth daily at bedtime, Disp: 90 capsule, Rfl: 3    gabapentin (NEURONTIN) 600 MG tablet, Take 2 tablets (1,200 mg total) by mouth 3 (three) times a day, Disp: 540 tablet, Rfl: 3    LORazepam (ATIVAN) 0 5 mg tablet, 1 tab po 30 min prior to mri, Disp: 1 tablet, Rfl: 0    Magnesium 500 MG TABS, Take 500 tablets by mouth once, Disp: , Rfl:     RESTASIS 0 05 % ophthalmic emulsion, , Disp: , Rfl: 0    solifenacin (VESICARE) 10 MG tablet, Take 1 tablet (10 mg total) by mouth daily, Disp: 90 tablet, Rfl: 3    zonisamide (ZONEGRAN) 100 mg capsule, Take 4 capsules (400 mg total) by mouth daily at bedtime, Disp: 360 capsule, Rfl: 3    pramipexole (MIRAPEX) 0 25 mg tablet, 1-3 po qhs, Disp: 90 tablet, Rfl: 1  No current facility-administered medications for this visit  Facility-Administered Medications Ordered in Other Visits:     bacitracin 50,000 Units, gentamicin (GARAMYCIN) 40 mg/mL 80 mg, ceFAZolin (ANCEF) 1,000 mg in sodium chloride 0 9 % 1,000 mL irrigation bottle, , Irrigation, Once, Shellie Martinez MD        No notes on file                  Objective:    Vital Signs:   Vitals:    07/22/20 1400   BP: 110/70   Temp: 98 7 °F (37 1 °C)   Weight: 62 kg (136 lb 9 6 oz)   Height: 5' 4" (1 626 m)     Neck Circumference: 12      [place] Sleepiness Scale:  Total score: 9    Physical Exam:    General: Alert, appropriate, cooperative, overweight    Head: NC/AT, no retrognathia    Nose: No septal deviation    Throat: Airway slightly diminished, tongue base thickened, no tonsils visualized    Neck: Normal, no thyromegaly or lymphadenopathy, no JVD    Heart: RR, normal S1 and S2, no murmurs    Chest: Clear bilaterally    Extremity: No clubbing, cyanosis, no edema    Skin: Warm, dry    Neuro: No motor abnormalities, cranial nerves appear intact        Counseling / Coordination of Care  A description of the counseling / coordination of care: We discussed the differential diagnosis for her insomnia      Board Certified Sleep Specialist    Portions of the record may have been created with voice recognition software  Occasional wrong word or "sound a like" substitutions may have occurred due to the inherent limitations of voice recognition software  Read the chart carefully and recognize, using context, where substitutions have occurred

## 2020-07-22 NOTE — TELEPHONE ENCOUNTER
Please tell pt to please check in with her pcp with her bp numbers and make a follow up appt with them as well            ----- Message from [facility], RN sent at 7/22/2020  8:11 AM EDT -----  Regarding: FW: Visit Follow-Up Question  Contact: [phone] [address]    ----- Message -----  From: Seven Riggins MA  Sent: 7/22/2020   7:51 AM EDT  To: Neurology [place] [facility]  Subject: FW: Visit Follow-Up Question                         ----- Message -----  From: Don Bey  Sent: 7/21/2020   8:38 PM EDT  To: Neurology [place] [facility]  Subject: Visit Follow-Up Question                         I had my blood pressure checked today by my physical therapist  I waited till now because I believe I know what caused the elevation  I am a sugar addict and was eating a whole lot of Good and Plenty  Black licorice in large amounts causes high blood pressure  I have only been off it since Friday and my pressure was 140/90  While the diastolic is still higher than I would like I had just walked in from 94 degree heat to start PT  I am on vacation next week and will recheck on returning  I also see sleep specialist tomorrow     Thanks   Divina Taveras

## 2020-07-24 ENCOUNTER — APPOINTMENT (OUTPATIENT)
Dept: LAB | Facility: CLINIC | Age: 70
End: 2020-07-24
Payer: MEDICARE

## 2020-07-24 ENCOUNTER — TRANSCRIBE ORDERS (OUTPATIENT)
Dept: LAB | Facility: CLINIC | Age: 70
End: 2020-07-24

## 2020-07-24 ENCOUNTER — OFFICE VISIT (OUTPATIENT)
Dept: PHYSICAL THERAPY | Facility: CLINIC | Age: 70
End: 2020-07-24
Payer: MEDICARE

## 2020-07-24 DIAGNOSIS — R26.9 NEUROLOGIC GAIT DYSFUNCTION: Primary | ICD-10-CM

## 2020-07-24 DIAGNOSIS — M81.0 SENILE OSTEOPOROSIS: Primary | ICD-10-CM

## 2020-07-24 DIAGNOSIS — M25.551 PAIN OF BOTH HIP JOINTS: ICD-10-CM

## 2020-07-24 DIAGNOSIS — M25.552 PAIN OF BOTH HIP JOINTS: ICD-10-CM

## 2020-07-24 DIAGNOSIS — G35 MULTIPLE SCLEROSIS (HCC): ICD-10-CM

## 2020-07-24 LAB
25(OH)D3 SERPL-MCNC: 55.5 NG/ML (ref 30–100)
ALBUMIN SERPL BCP-MCNC: 4.2 G/DL (ref 3.5–5)
ALP SERPL-CCNC: 85 U/L (ref 46–116)
ALT SERPL W P-5'-P-CCNC: 40 U/L (ref 12–78)
ANION GAP SERPL CALCULATED.3IONS-SCNC: 3 MMOL/L (ref 4–13)
AST SERPL W P-5'-P-CCNC: 18 U/L (ref 5–45)
BILIRUB SERPL-MCNC: 0.64 MG/DL (ref 0.2–1)
BUN SERPL-MCNC: 14 MG/DL (ref 5–25)
CALCIUM SERPL-MCNC: 9.2 MG/DL (ref 8.3–10.1)
CHLORIDE SERPL-SCNC: 112 MMOL/L (ref 100–108)
CO2 SERPL-SCNC: 26 MMOL/L (ref 21–32)
CREAT SERPL-MCNC: 0.83 MG/DL (ref 0.6–1.3)
GFR SERPL CREATININE-BSD FRML MDRD: 71 ML/MIN/1.73SQ M
GLUCOSE P FAST SERPL-MCNC: 96 MG/DL (ref 65–99)
POTASSIUM SERPL-SCNC: 4.1 MMOL/L (ref 3.5–5.3)
PROT SERPL-MCNC: 8 G/DL (ref 6.4–8.2)
SODIUM SERPL-SCNC: 141 MMOL/L (ref 136–145)

## 2020-07-24 PROCEDURE — 97140 MANUAL THERAPY 1/> REGIONS: CPT | Performed by: PHYSICAL THERAPIST

## 2020-07-24 PROCEDURE — 82306 VITAMIN D 25 HYDROXY: CPT

## 2020-07-24 PROCEDURE — 97530 THERAPEUTIC ACTIVITIES: CPT | Performed by: PHYSICAL THERAPIST

## 2020-07-24 PROCEDURE — 97110 THERAPEUTIC EXERCISES: CPT | Performed by: PHYSICAL THERAPIST

## 2020-07-24 PROCEDURE — 36415 COLL VENOUS BLD VENIPUNCTURE: CPT

## 2020-07-24 PROCEDURE — 80053 COMPREHEN METABOLIC PANEL: CPT

## 2020-07-24 NOTE — PROGRESS NOTES
Daily Note     Today's date: 2020  Patient name: Tony Walters  : 1950  MRN: [id number]  Referring provider: Gustavo Del Rosario MD  Dx:   Encounter Diagnosis     ICD-10-CM    1  Neurologic gait dysfunction R26 9    2  Multiple sclerosis ([address] ) G35    3  Pain of both hip joints M25 551     M25 552        Start Time: 1030  Stop Time: 1110  Total time in clinic (min): 40 minutes    Subjective: Pt had to fast for blood work this AM and didn't get into the lab for 2 hours  She was able to eat a pack of crackers prior to PT  Pt still doesn't feel the best, according to patient  However, patient also attributes this as a side effect of her new medication  Pt was put on a sleeping pill which caused nausea and headache over the past 2 days  Pt c/o imbalance yesterday likely from the medication  Objective: See treatment diary below  /97 seated  BP standin/91    HR: 77 BPM seated  HR: 85 BPM seated     Supine: BP: 130/88    Assessment: Assessed for postural orthostatic hypotension today  Deferred most treatment secondary to elevated BP  Pt encouraged to call PCP if BP stays elevated  Pt states that her BP was low over the past couple of days and is not on any BP medication  Pt tolerated supine stretching well  BP was 130/88 after stretching in supine  Pt is going to [place] next week on vacation and will return to PT when she returns  Resume functional activities as tolerated and as appropriate next session  Plan: Continue per plan of care        Precautions: MS, Depression, Fatigue, Gait dysfunction, Insomnia, Osteoporosis, Peripheral polyneuropathy, Hx of Breast CA  Manual  6/22 6/24 6/29 7/1 7/6  7/13 7/15 7/21 7/24    PROM of B/L (hip ROM) 6' B 6' B 6' B 6" B R hip only 8'   R hip only 8' B/L 8' B/L 8' BL 12'    Sciatic N glides - gentle B/L 3' B 3' B 3' B 3" B   3" B 3" B      R LAD     5' gr II         Left scapular mobilizations              Radial N glides Exercise Diary  7/21 7/24 6/29 7/1 7/6  7/13 7/15   NuStep      7' lvl 5 5' lvl 5  5 min, lvl 5 6 min, lvl 5   Standing marches     On Biodex foam  15x on each On Biodex foam  15x on each       Sit to stand transfers 2" lift underchair- no UE support, 1x5,1x10 2x10 with UE support       2x5 2x7   Lateral walks Blue foam 10x    blue foam 2x10 blue foam x10   blue foam x10 Blue foam x10   Forward/backward walking Blue foam 10x     Blue foam x10       HR/TR standing              Hip extension- standing             Biodex LOS             Biodex Fall risk              Leeann step overs             Tandem balance              DLS on foam              Seated hip adduction             Standing hip abduction             Kiyon     F/B, S/S 3' ea F/B, S/S 3' ea- less FT support   F/B, S/S 3' ea- less FT support F/B, S/S 3' ea- less FT support   Seated hamstring stretch             Standing with perturbations              Obstacle course hurdles, 6" step up, foam 4x20 feet  hurdles, 6" step up, foam 4x20 feet   Foam, cones, lateral hurdles, 6" steps  4x30 ft  Foam, cones, lateral hurdles, 6" steps  4x30 f       BIODEX Maze control             Leg curl              Leg extension             Leg press     3x10 30#        Step ups        10x5"      LTR       20x      Gait training       100 feet with SPC   100 feet with SPC  100 feet with SPC    Clam shell in hooklying       30x5"  30x5" Seated w YTB 1x15   Seated hip IR with bolster       30x on each      Hooklying R hip ER stretch       15x5"      Supine Piriformis stretch       15x5"      Seated adduction w ball          1x10, 5" hold   6 MWT  6' 465 feet                 Modalities  4/27 5/11 7/6          MH to L UT 10' 10'           MH to R hip   10'                       PT 1:1 from [id number]

## 2020-07-26 ENCOUNTER — TELEPHONE (OUTPATIENT)
Dept: NEUROLOGY | Facility: CLINIC | Age: 70
End: 2020-07-26

## 2020-07-26 NOTE — TELEPHONE ENCOUNTER
Please have pt let us know if any changes in her medications  Also rec checking her bp at home in meanwhile and report any issues to pcp even if on vacation        ----- Message from Sara Porter sent at 7/24/2020  1:16 PM EDT -----  Regarding: FW: Visit Follow-Up Question  Contact: [phone]      ----- Message -----  From: Francisco Campoverde  Sent: 7/24/2020  12:52 PM EDT  To: [facility]  Subject: Visit Follow-Up Question                         Just wanted to let you know that I have made an appt  for f/u of HBP check with [facility] with another MD other than Dr Dalila De La Rosa for 8/7/20   I am on vacation from 7/27 - 8/2  Pressure up again today in PT    Thanks  Liss Acosta

## 2020-08-03 ENCOUNTER — HOSPITAL ENCOUNTER (OUTPATIENT)
Dept: MRI IMAGING | Facility: HOSPITAL | Age: 70
Discharge: HOME | End: 2020-08-03
Attending: PSYCHIATRY & NEUROLOGY
Payer: MEDICARE

## 2020-08-03 ENCOUNTER — OFFICE VISIT (OUTPATIENT)
Dept: PHYSICAL THERAPY | Facility: CLINIC | Age: 70
End: 2020-08-03
Payer: MEDICARE

## 2020-08-03 DIAGNOSIS — G35 MULTIPLE SCLEROSIS (HCC): ICD-10-CM

## 2020-08-03 DIAGNOSIS — M25.552 PAIN OF BOTH HIP JOINTS: ICD-10-CM

## 2020-08-03 DIAGNOSIS — R26.9 NEUROLOGIC GAIT DYSFUNCTION: Primary | ICD-10-CM

## 2020-08-03 DIAGNOSIS — M25.551 PAIN OF BOTH HIP JOINTS: ICD-10-CM

## 2020-08-03 PROCEDURE — 72157 MRI CHEST SPINE W/O & W/DYE: CPT

## 2020-08-03 PROCEDURE — 97140 MANUAL THERAPY 1/> REGIONS: CPT | Performed by: PHYSICAL THERAPIST

## 2020-08-03 PROCEDURE — A9585 GADOBUTROL INJECTION: HCPCS | Performed by: PSYCHIATRY & NEUROLOGY

## 2020-08-03 PROCEDURE — 97530 THERAPEUTIC ACTIVITIES: CPT | Performed by: PHYSICAL THERAPIST

## 2020-08-03 PROCEDURE — 97112 NEUROMUSCULAR REEDUCATION: CPT | Performed by: PHYSICAL THERAPIST

## 2020-08-03 RX ADMIN — GADOBUTROL 6 ML: 604.72 INJECTION INTRAVENOUS at 20:49

## 2020-08-03 NOTE — PROGRESS NOTES
Daily Note     Today's date: 8/3/2020  Patient name: Dana Briseno  : 1950  MRN: [id number]  Referring provider: Genaro Guzman MD  Dx:   Encounter Diagnosis     ICD-10-CM    1  Neurologic gait dysfunction  R26 9    2  Multiple sclerosis ([address] )  G35    3  Pain of both hip joints  M25 551     M25 552        Start Time: 1015  Stop Time: 1100  Total time in clinic (min): 45 minutes    Subjective: [place] the beach  She notes that ability to perform her exercises over the week and walked in the sand  Pt admits to walking 1 flight of stairs 6x per day  Pt also reports that her sleep is very good over the past few nights  Objective: See treatment diary below  BP: 140/88     Assessment: Mild to moderate pitting edema in bilateral ankles present today  Pt does have a an appt with PCP regarding hypertension  Good dynamic balance present today  Pt able to complete 6 mwt today and demonstrated greater distance than previous week  Pt denies any hip discomfort and is pleased with her flexibility  No hip restriction present today  Plan: Continue per plan of care        Precautions: MS, Depression, Fatigue, Gait dysfunction, Insomnia, Osteoporosis, Peripheral polyneuropathy, Hx of Breast CA  Manual  6/22 6/24 6/29 7/1 7/6  7/13 7/15 7/21 7/24 8/3   PROM of B/L (hip ROM) 6' B 6' B 6' B 6" B R hip only 8'   R hip only 8' B/L 8' B/L 8' BL 12' BL 8'   Sciatic N glides - gentle B/L 3' B 3' B 3' B 3" B   3" B 3" B      R LAD     5' gr II         Left scapular mobilizations              Radial N glides                  Exercise Diary   83          NuStep             Standing marches             Sit to stand transfers 2" lift underchair- no UE support, 1x5,1x10 2x10 with UE support 2x10 with UE support          Lateral walks Blue foam 10x            Forward/backward walking Blue foam 10x            HR/TR standing    30x          Hip extension- standing             Biodex LOS             Biodex Fall risk              Leeann step overs             Tandem balance              DLS on foam              Seated hip adduction             Standing hip abduction             Stairs              Wobble board             Seated hamstring stretch             Standing with perturbations              Obstacle course hurdles, 6" step up, foam 4x20 feet  hurdles, 6" step up, foam 4x20 feet hurdles, 6" step up, foam 4x20 feet          BIODEX Maze control             Leg curl              Leg extension             Leg press             Step ups              LTR             Gait training             Clam shell in hooklying             Seated hip IR with bolster             Hooklying R hip ER stretch             Supine Piriformis stretch             Seated adduction w ball             6 MWT  6' 465 feet   553 ft in 6'               Modalities  4/27 5/11 7/6          MH to L UT 10' 10'           MH to R hip   10'                       PT 1:1 from 1015-11

## 2020-08-06 ENCOUNTER — OFFICE VISIT (OUTPATIENT)
Dept: PHYSICAL THERAPY | Facility: CLINIC | Age: 70
End: 2020-08-06
Payer: MEDICARE

## 2020-08-06 DIAGNOSIS — M25.551 PAIN OF BOTH HIP JOINTS: ICD-10-CM

## 2020-08-06 DIAGNOSIS — G35 MULTIPLE SCLEROSIS (HCC): ICD-10-CM

## 2020-08-06 DIAGNOSIS — M25.552 PAIN OF BOTH HIP JOINTS: ICD-10-CM

## 2020-08-06 DIAGNOSIS — R26.9 NEUROLOGIC GAIT DYSFUNCTION: Primary | ICD-10-CM

## 2020-08-06 PROCEDURE — 97530 THERAPEUTIC ACTIVITIES: CPT | Performed by: PHYSICAL THERAPIST

## 2020-08-06 PROCEDURE — 97112 NEUROMUSCULAR REEDUCATION: CPT | Performed by: PHYSICAL THERAPIST

## 2020-08-06 PROCEDURE — 97140 MANUAL THERAPY 1/> REGIONS: CPT | Performed by: PHYSICAL THERAPIST

## 2020-08-06 NOTE — PROGRESS NOTES
Daily Note     Today's date: 2020  Patient name: Sanjiv Liu  : 1950  MRN: [id number]  Referring provider: Vanessa Mtz MD  Dx:   Encounter Diagnosis     ICD-10-CM    1  Neurologic gait dysfunction  R26 9    2  Multiple sclerosis ([address] )  G35    3  Pain of both hip joints  M25 551     M25 552        Start Time: 1015  Stop Time: 1100  Total time in clinic (min): 45 minutes    Subjective: Pt went for MRI and did not have a good experience  She states that her LEs have been doing well since the addition of the new medication  Pt does have an appointment with PCP to discuss recent onset HTN and mild LOYD  /89 today  Pt denies much hip discomfort today  Objective: See treatment diary below  BP: 140/89    Assessment: No distal pitting edema today compared to previous session  Tolerated treatment fair  Pt stopped short of 6 minutes today during 6 MWT  She ambulated 450 feet in 5 minutes and then required rest  Hip ROM and mobility demonstrated nearly full ROM, especially hip extension  Pt challenged with idris walks today as pt had difficulty clearing LLE secondary to hip flexor weakness  Encouraged patient to continue hip strengthening at home  Plan: Pt will be re-evaluated next session and likely discharged to [facility]        Precautions: MS, Depression, Fatigue, Gait dysfunction, Insomnia, Osteoporosis, Peripheral polyneuropathy, Hx of Breast CA  Manual  8/6      7/13 7/15 7/21 7/24 8/3   PROM of B/L (hip ROM) 6' B      R hip only 8' B/L 8' B/L 8' BL 12' BL 8'   Sciatic N glides - gentle B/L 3' B      3" B 3" B      R LAD              Left scapular mobilizations              Radial N glides                  Exercise Diary   8/3 8/6         NuStep    5' lvl 3          Standing marches             Sit to stand transfers 2" lift underchair- no UE support, 1x5,1x10 2x10 with UE support 2x10 with UE support 2x10 s/ support of UE- blk foam to increase seat height         Lateral walks Blue foam 10x            Forward/backward walking Blue foam 10x            HR/TR standing    30x          Hip extension- standing             Biodex LOS             Biodex Fall risk              Leeann step overs             Tandem balance              DLS on foam              Seated hip adduction             Standing hip abduction             Stairs              Wobble board             Seated hamstring stretch             Standing with perturbations              Obstacle course hurdles, 6" step up, foam 4x20 feet  hurdles, 6" step up, foam 4x20 feet hurdles, 6" step up, foam 4x20 feet hurdles,4x20 feet         BIODEX Maze control             Leg curl              Leg extension             Leg press             Step ups              LTR             Gait training             Clam shell in hooklying             Seated hip IR with bolster             Hooklying R hip ER stretch             Supine Piriformis stretch             Seated adduction w ball             6 MWT  6' 465 feet   553 ft in 6'  450 ft in 5'              Modalities  4/27 5/11 7/6          MH to L UT 10' 10'           MH to R hip   10'                       PT 1:1 from 1015-11

## 2020-08-07 ENCOUNTER — APPOINTMENT (OUTPATIENT)
Dept: LAB | Facility: CLINIC | Age: 70
End: 2020-08-07
Payer: MEDICARE

## 2020-08-07 ENCOUNTER — OFFICE VISIT (OUTPATIENT)
Dept: FAMILY MEDICINE CLINIC | Facility: OTHER | Age: 70
End: 2020-08-07
Payer: MEDICARE

## 2020-08-07 VITALS
OXYGEN SATURATION: 98 % | HEART RATE: 79 BPM | WEIGHT: 136.13 LBS | HEIGHT: 64 IN | SYSTOLIC BLOOD PRESSURE: 128 MMHG | TEMPERATURE: 98.9 F | DIASTOLIC BLOOD PRESSURE: 84 MMHG | BODY MASS INDEX: 23.24 KG/M2

## 2020-08-07 DIAGNOSIS — R06.00 DYSPNEA ON EXERTION: ICD-10-CM

## 2020-08-07 DIAGNOSIS — I10 HYPERTENSION, UNSPECIFIED TYPE: Primary | ICD-10-CM

## 2020-08-07 LAB — NT-PROBNP SERPL-MCNC: 81 PG/ML

## 2020-08-07 PROCEDURE — 1036F TOBACCO NON-USER: CPT | Performed by: FAMILY MEDICINE

## 2020-08-07 PROCEDURE — 83880 ASSAY OF NATRIURETIC PEPTIDE: CPT

## 2020-08-07 PROCEDURE — 4040F PNEUMOC VAC/ADMIN/RCVD: CPT | Performed by: FAMILY MEDICINE

## 2020-08-07 PROCEDURE — 99213 OFFICE O/P EST LOW 20 MIN: CPT | Performed by: FAMILY MEDICINE

## 2020-08-07 PROCEDURE — 36415 COLL VENOUS BLD VENIPUNCTURE: CPT

## 2020-08-07 PROCEDURE — 1160F RVW MEDS BY RX/DR IN RCRD: CPT | Performed by: FAMILY MEDICINE

## 2020-08-07 RX ORDER — LISINOPRIL 20 MG/1
20 TABLET ORAL DAILY
Qty: 90 TABLET | Refills: 1 | Status: SHIPPED | OUTPATIENT
Start: 2020-08-07 | End: 2021-01-27 | Stop reason: SDUPTHER

## 2020-08-07 NOTE — PROGRESS NOTES
Assessment/Plan:     Diagnoses and all orders for this visit:    Hypertension, unspecified type  -     lisinopril (ZESTRIL) 20 mg tablet; Take 1 tablet (20 mg total) by mouth daily    Dyspnea on exertion  -     Echo complete with contrast if indicated; Future  -     NT-BNP PRO; Future          Patient is a well appearing 69year old female with newly diagnosed hypertension  Will start patient on Lisinopril 20mg once daily  She was advised to continue keeping blood pressure log  BNP/Echocardogram ordered to rule out CHF  Though no lower extremity edema was appreciated at this visit, patient was advised to elevate legs when seated  Return in about 2 weeks (around 8/21/2020) for Recheck BP  The patient indicates understanding of these issues and agrees with the plan  Subjective:      Patient ID: Dana Briseno is a 69 y o  female  HPI  Patient with past medical history significant for Multiple Sclerosis and Breast CA s/p mastectomy reports recent episodes of high blood pressure  She reports she had a blood pressure of 189/79 at Neurologist's office on July 13th, 2020  Since then, she has been checking her blood pressure daily at home and reports that blood pressure usually runs about 140s/80s  Patient has no known history of hypertension  Patient also reports "4+ pitting edema" for the last month  She reports her diet is low in sodium  She also reports elevating her legs as much as possible, but does not wear compression stockings consistently due to them being uncomfortable  She reports dyspnea on exertion for the past one month, but denies orthopnea, cough, chest pain and chest tightness  She does report recent weight gain, but is unsure of the amount  Initial blood pressure in office today was 198/90 and at the end of visit, blood pressure was within normal limits at 128/84        The following portions of the patient's history were reviewed and updated as appropriate: allergies, current medications, past family history, past medical history, past social history, past surgical history and problem list     Review of Systems   Constitutional: Positive for activity change, fatigue and unexpected weight change  Negative for appetite change, chills and fever  Respiratory: Positive for shortness of breath  Negative for cough, choking, chest tightness and wheezing  Cardiovascular: Positive for leg swelling  Negative for chest pain and palpitations  Gastrointestinal: Negative for abdominal distention, abdominal pain, blood in stool, constipation, diarrhea, nausea, rectal pain and vomiting  Endocrine: Positive for heat intolerance  Negative for polyuria  Genitourinary: Negative for difficulty urinating and dysuria  Musculoskeletal: Positive for gait problem (  chronic issue)  Skin: Negative for color change, pallor, rash and wound  Neurological: Negative for dizziness, syncope, weakness, light-headedness and headaches  Psychiatric/Behavioral: Negative for sleep disturbance  The patient is nervous/anxious  Objective:  /84 (BP Location: Right arm, Patient Position: Sitting, Cuff Size: Adult)   Pulse 79   Temp 98 9 °F (37 2 °C) (Temporal)   Ht 5' 4" (1 626 m)   Wt 61 7 kg (136 lb 2 oz)   SpO2 98%   BMI 23 37 kg/m²        Physical Exam  Vitals signs reviewed  Constitutional:       General: She is not in acute distress  Appearance: Normal appearance  She is well-developed and normal weight  She is not ill-appearing, toxic-appearing or diaphoretic  HENT:      Head: Normocephalic and atraumatic  Eyes:      General: No scleral icterus  Right eye: No discharge  Left eye: No discharge  Extraocular Movements: Extraocular movements intact  Conjunctiva/sclera: Conjunctivae normal    Neck:      Musculoskeletal: Normal range of motion and neck supple  Cardiovascular:      Rate and Rhythm: Normal rate and regular rhythm  Pulses: Normal pulses        Heart sounds: Normal heart sounds  Pulmonary:      Effort: Pulmonary effort is normal  No respiratory distress  Breath sounds: Normal breath sounds  No stridor  No wheezing or rales  Musculoskeletal: Normal range of motion  General: No swelling, tenderness, deformity or signs of injury  Right lower leg: No edema  Left lower leg: No edema  Skin:     General: Skin is warm and dry  Neurological:      Mental Status: She is alert and oriented to person, place, and time  Motor: No abnormal muscle tone  Psychiatric:         Mood and Affect: Mood normal          Behavior: Behavior normal          Thought Content:  Thought content normal          Judgment: Judgment normal

## 2020-08-10 ENCOUNTER — EVALUATION (OUTPATIENT)
Dept: PHYSICAL THERAPY | Facility: CLINIC | Age: 70
End: 2020-08-10
Payer: MEDICARE

## 2020-08-10 DIAGNOSIS — M25.551 PAIN OF BOTH HIP JOINTS: ICD-10-CM

## 2020-08-10 DIAGNOSIS — M25.552 PAIN OF BOTH HIP JOINTS: ICD-10-CM

## 2020-08-10 DIAGNOSIS — G35 MULTIPLE SCLEROSIS (HCC): ICD-10-CM

## 2020-08-10 DIAGNOSIS — R26.9 NEUROLOGIC GAIT DYSFUNCTION: Primary | ICD-10-CM

## 2020-08-10 PROCEDURE — 97530 THERAPEUTIC ACTIVITIES: CPT | Performed by: PHYSICAL THERAPIST

## 2020-08-10 PROCEDURE — 97110 THERAPEUTIC EXERCISES: CPT | Performed by: PHYSICAL THERAPIST

## 2020-08-11 ENCOUNTER — OFFICE VISIT (OUTPATIENT)
Dept: SLEEP CENTER | Facility: CLINIC | Age: 70
End: 2020-08-11
Payer: MEDICARE

## 2020-08-11 VITALS
HEIGHT: 64 IN | SYSTOLIC BLOOD PRESSURE: 118 MMHG | DIASTOLIC BLOOD PRESSURE: 78 MMHG | BODY MASS INDEX: 23.39 KG/M2 | WEIGHT: 137 LBS | TEMPERATURE: 97.8 F

## 2020-08-11 DIAGNOSIS — G25.81 RESTLESS LEG SYNDROME: ICD-10-CM

## 2020-08-11 DIAGNOSIS — G47.00 INSOMNIA, UNSPECIFIED TYPE: ICD-10-CM

## 2020-08-11 PROCEDURE — 4040F PNEUMOC VAC/ADMIN/RCVD: CPT | Performed by: INTERNAL MEDICINE

## 2020-08-11 PROCEDURE — 99214 OFFICE O/P EST MOD 30 MIN: CPT | Performed by: INTERNAL MEDICINE

## 2020-08-11 PROCEDURE — 1160F RVW MEDS BY RX/DR IN RCRD: CPT | Performed by: INTERNAL MEDICINE

## 2020-08-11 PROCEDURE — 1036F TOBACCO NON-USER: CPT | Performed by: INTERNAL MEDICINE

## 2020-08-11 PROCEDURE — 3008F BODY MASS INDEX DOCD: CPT | Performed by: INTERNAL MEDICINE

## 2020-08-11 RX ORDER — PRAMIPEXOLE DIHYDROCHLORIDE 0.25 MG/1
TABLET ORAL
Qty: 270 TABLET | Refills: 3 | Status: SHIPPED | OUTPATIENT
Start: 2020-08-11 | End: 2020-10-01 | Stop reason: SDUPTHER

## 2020-08-11 NOTE — PROGRESS NOTES
Progress Note - Sleep Center   Ovidio Remy RGP:[id number] MRN: [id number]      Reason for Visit:    69 y  o female with RLS and PLMD    Assessment:  The patient's symptoms are much improved on Mirapex  Her sleep quality is much improved  She feels as if she may be coming resistant to the current dose and took a higher dose last evening (0 5 mg)  Plan:  Continue same    Follow up: One year    History of Present Illness:  History of chronic restless legs and periodic limb movements secondary to multiple sclerosis  She feels markedly improved since starting Mirapex, both with regards to sleep as well as ambulation        Review of Systems      Genitourinary none   Cardiology ankle/leg swelling   Gastrointestinal none   Neurology need to move extremities, muscle weakness, numbness/tingling of an extremity and balance problems   Constitutional claustrophobia and fatigue   Integumentary none   Psychiatry anxiety   Musculoskeletal joint pain, muscle aches, legs twitching/jerking and leg cramps   Pulmonary shortness of breath with activity   ENT none   Endocrine none   Hematological none         I have reviewed and updated the review of systems as necessary     Historical Information    Past Medical History:   Diagnosis Date    Allergic 1967    seasonal    Bone pain     Breast ptosis     [address] [address]     h/o lt ductal ca    Depression     Fatigue     Gait disturbance     uses cane at times    Headache     Hip fracture (HCC)     Hip pain     History of transfusion 1975    placenta previa s/p work accident, no rx    Hypokalemia     Insomnia     Laceration of finger     right hand    Left ankle pain     Left knee pain     Multiple sclerosis (HCC)     Muscle strain     left lower leg    Nephrolithiasis     Osteopenia     Osteoporosis     Pain of left calf     Pneumonia     Rash     Scoliosis birth    scoliosis    Solitary pulmonary nodule     SVT (supraventricular tachycardia) (HCC)     Tingling     Urgency of urination     Urticaria     Wrist fracture, left          Past Surgical History:   Procedure Laterality Date    ANKLE SURGERY      APPENDECTOMY  11/2012    Dr Riley Hyde      Enlargement procedure with prosthetic implant bilateral    BREAST BIOPSY Left 07/23/2012    BREAST IMPLANT Right 1/28/2020    Procedure: BREAST IMPLANT EXCHANGE WITH CAPSULECTOMY;  Surgeon: Elli Wilson MD;  [facility];  Service: Plastics    BREAST RECONSTRUCTION      implant placement, implant revision, right mastopexy    CYSTOSTOMY      with basket extraction of calculus; x2    EXPLORATORY LAPAROTOMY      FOOT SURGERY      FRACTURE SURGERY  Lt wrist 9/2014 - Lt matthew femur 9/14    HIP FRACTURE SURGERY Right     Subcapital    HIP SURGERY Left     JOINT REPLACEMENT  Rt hip 10/2012    LEG SURGERY      Repair    MASTECTOMY Left 08/16/2012    ND REVISE BREAST RECONSTRUCTION Right 5/4/2020    Procedure: REVISION RIGHT BREAST MOUND;  Surgeon: Elli Wilson MD;  [facility];  Service: Plastics    ND SUSPENSION OF BREAST Right 1/28/2020    Procedure: BREAST MASTOPEXY;  Surgeon: Elli Wilson MD;  [facility];  Service: Plastics    REDUCTION MAMMAPLASTY Right     SENTINEL LYMPH NODE BIOPSY Left 08/16/2012    SKIN BIOPSY      TONSILLECTOMY      TUBAL LIGATION      WRIST SURGERY Left          Social History     Socioeconomic History    Marital status: /Civil Union     Spouse name: None    Number of children: 2    Years of education: Completed bachelor's degree    Highest education level: None   Occupational History    Occupation: RN     Comment: Retired   Social Needs    Financial resource strain: None    Food insecurity     Worry: None     Inability: None    Transportation needs     Medical: None     Non-medical: None   Tobacco Use    Smoking status: Former Smoker     Packs/day: 1 00     Years: 35 00     Pack years: 35 00 Last attempt to quit:      Years since quittin 6    Smokeless tobacco: Never Used   Substance and Sexual Activity    Alcohol use: No    Drug use: No    Sexual activity: Yes     Partners: Male     Birth control/protection: Post-menopausal   Lifestyle    Physical activity     Days per week: None     Minutes per session: None    Stress: None   Relationships    Social connections     Talks on phone: None     Gets together: None     Attends Sikh service: None     Active member of club or organization: None     Attends meetings of clubs or organizations: None     Relationship status: None    Intimate partner violence     Fear of current or ex partner: None     Emotionally abused: None     Physically abused: None     Forced sexual activity: None   Other Topics Concern    None   Social History Narrative    Denied:  History of caffeine use           History   Alcohol use: Not on file       History   Smoking Status    Not on file   Smokeless Tobacco    Not on file       Family History:   Family History   Problem Relation Age of Onset    Hodgkin's lymphoma Maternal Grandfather         age at dx unk    Cancer Maternal Aunt 79        bladder    Heart disease Maternal Aunt     Colon cancer Maternal Uncle 72    Hodgkin's lymphoma Maternal Uncle 79    Coronary artery disease Mother     Hyperlipidemia Mother     Rheum arthritis Mother     Other Father         Acute myocardial infarction    No Known Problems Maternal Grandmother     No Known Problems Paternal Grandmother     No Known Problems Paternal Grandfather     No Known Problems Son     No Known Problems Son     Stroke Maternal Aunt         6 CVA before death       Medications/Allergies:      Current Outpatient Medications:     ALPHA LIPOIC ACID PO, Take by mouth, Disp: , Rfl:     AMPYRA 10 MG TB12, Ampyra 10 mg Er, Take 1 tab bid, Disp: 180 tablet, Rfl: 0    ascorbic acid (VITAMIN C) 500 mg tablet, Take 500 mg by mouth daily, Disp: , Rfl:     baclofen 10 mg tablet, TAKE 1 TABLET EVERY        MORNING, 1 TABLET EVERY    EVENING, AND TAKE 2 TABLETSAT BEDTIME, Disp: 360 tablet, Rfl: 0    Cholecalciferol (VITAMIN D3) 1000 units CAPS, Take 5,000 Units by mouth  , Disp: , Rfl:     COPAXONE 40 MG/ML SOSY, Inject 40mg under the skin three times weekly  , Disp: 36 mL, Rfl: 0    Cranberry 1000 MG CAPS, Take 300 mg by mouth  , Disp: , Rfl:     cyanocobalamin (VITAMIN B-12) 1,000 mcg tablet, Take by mouth daily, Disp: , Rfl:     denosumab (PROLIA) 60 mg/mL, Inject under the skin, Disp: , Rfl:     gabapentin (NEURONTIN) 300 mg capsule, Take 1 capsule (300 mg total) by mouth daily at bedtime, Disp: 90 capsule, Rfl: 3    gabapentin (NEURONTIN) 600 MG tablet, Take 2 tablets (1,200 mg total) by mouth 3 (three) times a day, Disp: 540 tablet, Rfl: 3    lisinopril (ZESTRIL) 20 mg tablet, Take 1 tablet (20 mg total) by mouth daily, Disp: 90 tablet, Rfl: 1    Magnesium 500 MG TABS, Take 500 tablets by mouth once, Disp: , Rfl:     pramipexole (MIRAPEX) 0 25 mg tablet, 1-3 po qhs, Disp: 270 tablet, Rfl: 3    RESTASIS 0 05 % ophthalmic emulsion, , Disp: , Rfl: 0    solifenacin (VESICARE) 10 MG tablet, Take 1 tablet (10 mg total) by mouth daily, Disp: 90 tablet, Rfl: 3    zonisamide (ZONEGRAN) 100 mg capsule, Take 4 capsules (400 mg total) by mouth daily at bedtime, Disp: 360 capsule, Rfl: 3  No current facility-administered medications for this visit       Facility-Administered Medications Ordered in Other Visits:     bacitracin 50,000 Units, gentamicin (GARAMYCIN) 40 mg/mL 80 mg, ceFAZolin (ANCEF) 1,000 mg in sodium chloride 0 9 % 1,000 mL irrigation bottle, , Irrigation, Once, Devon Holm MD      Objective    Vital Signs:   Vitals:    08/11/20 1500   BP: 118/78   BP Location: Right arm   Patient Position: Sitting   Cuff Size: Standard   Temp: 97 8 °F (36 6 °C)   TempSrc: Temporal   Weight: 62 1 kg (137 lb)   Height: 5' 4" (1 626 m) [place] Sleepiness Scale: Total score: 7    Physical Exam:    General: Alert, appropriate, cooperative, overweight    Head: NC/AT    Skin: Warm, dry    Neuro: No motor abnormalities, cranial nerves appear intact    Psych: Normal affect            ROGER Brown    Board Certified Sleep Specialist

## 2020-08-19 ENCOUNTER — TELEPHONE (OUTPATIENT)
Dept: SLEEP CENTER | Facility: CLINIC | Age: 70
End: 2020-08-19

## 2020-08-19 ENCOUNTER — PATIENT MESSAGE (OUTPATIENT)
Dept: SLEEP CENTER | Facility: CLINIC | Age: 70
End: 2020-08-19

## 2020-08-19 ENCOUNTER — TELEPHONE (OUTPATIENT)
Dept: NEUROLOGY | Facility: CLINIC | Age: 70
End: 2020-08-19

## 2020-08-19 DIAGNOSIS — G47.61 PLMD (PERIODIC LIMB MOVEMENT DISORDER): Primary | ICD-10-CM

## 2020-08-19 NOTE — TELEPHONE ENCOUNTER
EMPERATRIZ  Called [employer] at [phone] and spoke with [facility]  She attempted to transfer call to the pharmacy dept, however, the call disconnected  Called back and transferred to pharmacy where I spoke with Laurel Perales  She transferred call to pharmacist Terrence Esteves  Called in verbal Rx for Autoject autoinjector, quantity #1 with PRN refills  Pharmacy ([facility]) [phone]    Message sent to patient on MyChart making her aware

## 2020-08-19 NOTE — TELEPHONE ENCOUNTER
----- Message from [facility], RN sent at 8/19/2020 11:09 AM EDT -----  Regarding: FW: Non-Urgent Medical Question  Contact: [phone]    ----- Message -----  From: Nila Irwin  Sent: 8/19/2020  10:59 AM EDT  To: [facility] [place] [facility]  Subject: FW: Non-Urgent Medical Question                    ----- Message -----  From: Keily De Leon  Sent: 8/19/2020  10:44 AM EDT  To: [facility] [place] [facility]  Subject: Non-Urgent Medical Question                      Dr Jatin Caballero,  Can you have someone call [employer] Copaxone[employer] at [phone] and give a verbal order for a replacement autojet for the administration of my Copaxone  I would appreciate it  Thank you    Sabrina Graf

## 2020-08-19 NOTE — TELEPHONE ENCOUNTER
----- Message from Danny Dickson sent at 8/19/2020 10:51 AM EDT -----  Regarding: Visit Follow-Up Question  Contact: [phone]  Dr Edward Erickson,  Unfortunately the Mirapex is not giving me the sleep that it had the first 10 days  I am now taking 2 tabs a night and at most I sleep from 10:30 pm till 1:30 am   From then I may dose for short periods at best   For most I anxiously toss and turn  Last night it occurred again that within a few minutes of attempting to fall asleep I felt as if I had palpitations and then I was wide awake  I just got OOB  I do not nap during the day! Any suggestions or do I make a f/u appointment?   Thank you  Vijay Rios

## 2020-08-20 RX ORDER — CLONAZEPAM 0.5 MG/1
TABLET ORAL
Qty: 1 TABLET | Refills: 1 | Status: SHIPPED | OUTPATIENT
Start: 2020-08-20 | End: 2020-08-21 | Stop reason: SDUPTHER

## 2020-08-21 DIAGNOSIS — G47.61 PLMD (PERIODIC LIMB MOVEMENT DISORDER): ICD-10-CM

## 2020-08-21 RX ORDER — CLONAZEPAM 0.5 MG/1
TABLET ORAL
Qty: 30 TABLET | Refills: 1 | Status: SHIPPED | OUTPATIENT
Start: 2020-08-21 | End: 2020-10-01

## 2020-08-21 NOTE — TELEPHONE ENCOUNTER
----- Message from Igor Solomon sent at 8/20/2020  8:21 PM EDT -----  Regarding: Visit Follow-Up Question  Contact: [phone]  Dr Julian Mendez,  I just picked up the rx for the Klonopin and there  was only one tablet ordered with one refill till February of next year  Since my f/u is to be requested for one month it appears that there must have been an error by the pharmacy as to the number of pills  Can you verify for me if the rx was to be for one tablet only? ? Thank you    Britney Cooper

## 2020-08-25 ENCOUNTER — TELEMEDICINE (OUTPATIENT)
Dept: FAMILY MEDICINE CLINIC | Facility: OTHER | Age: 70
End: 2020-08-25
Payer: MEDICARE

## 2020-08-25 VITALS — SYSTOLIC BLOOD PRESSURE: 118 MMHG | DIASTOLIC BLOOD PRESSURE: 75 MMHG | HEART RATE: 93 BPM

## 2020-08-25 DIAGNOSIS — I10 HYPERTENSION, UNSPECIFIED TYPE: Primary | ICD-10-CM

## 2020-08-25 PROCEDURE — 99442 PR PHYS/QHP TELEPHONE EVALUATION 11-20 MIN: CPT | Performed by: FAMILY MEDICINE

## 2020-08-25 NOTE — PROGRESS NOTES
Virtual Regular Visit      Assessment/Plan:    Problem List Items Addressed This Visit     None      Visit Diagnoses     Hypertension, unspecified type    -  Primary          Patient is a 69year old woman with recently diagnosed Hypertension started on Lisniopril 20mg once daily  Per patient's report, blood pressures are now well controlled  She will continue on current antihypertensive regimen  She was advised to continue monitoring BP regularly at home  Return in about 3 months (around 11/25/2020) for Follow up of Hypertension   The patient indicates understanding of these issues and agrees with the plan  Reason for visit is   Chief Complaint   Patient presents with    Virtual Regular Visit        Encounter provider Jonathan Moore MD    Provider located at [address] [address]      Recent Visits  No visits were found meeting these conditions  Showing recent visits within past 7 days and meeting all other requirements     Today's Visits  Date Type Provider Dept   08/25/20 Telemedicine Jonathan Moore MD  Kavita Lee   Showing today's visits and meeting all other requirements     Future Appointments  No visits were found meeting these conditions  Showing future appointments within next 150 days and meeting all other requirements        The patient was identified by name and date of birth  Marie Ritchie was informed that this is a telemedicine visit and that the visit is being conducted through telephone  My office door was closed  No one else was in the room  She acknowledged consent and understanding of privacy and security of the video platform  The patient has agreed to participate and understands they can discontinue the visit at any time  Patient is aware this is a billable service  Subjective  Marie Ricthie is a 69 y o  female who presents for follow up of Hypertension   At our last visit 2 weeks ago, patient was started on Lisinopril 20mg once daily for Hypertension  She was asked to monitor BP at home and keep a BP log  She reports that she has not had any elevated blood pressure readings at home  SBP has been between 107-115 and DBP has been between 65-82 over the past week weeks  She denies any hypotensive episodes and denies lightheadedness and dizziness  Of note, patient was started on Clonazepam for PLMD and Mirapex for RLS by Sleep Medicine  She reports she has been sleeping better as a result and denies any adverse drug reactions  At last visit patient complained of swelling of bilateral lower extremity and also reported dyspnea on exertion  BNP was ordered at that time and was normal  Echocardiogram will be done in 2 months  Patient denies that lower extremity swelling and dyspnea on exertion have spontaneuously resolved          Past Medical History:   Diagnosis Date    Allergic 1967    seasonal    Bone pain     Breast ptosis     Cancer ([address] )     h/o lt ductal ca    Depression     Fatigue     Gait disturbance     uses cane at times    Headache     Hip fracture (HCC)     Hip pain     History of transfusion 1975    placenta previa s/p work accident, no rx    Hypokalemia     Insomnia     Laceration of finger     right hand    Left ankle pain     Left knee pain     Multiple sclerosis (HCC)     Muscle strain     left lower leg    Nephrolithiasis     Osteopenia     Osteoporosis     Pain of left calf     Pneumonia     Rash     Scoliosis birth    scoliosis    Solitary pulmonary nodule     SVT (supraventricular tachycardia) (HCC)     Tingling     Urgency of urination     Urticaria     Wrist fracture, left        Past Surgical History:   Procedure Laterality Date    ANKLE SURGERY      APPENDECTOMY  11/2012    Dr Shalini Baldwin      Enlargement procedure with prosthetic implant bilateral    BREAST BIOPSY Left 07/23/2012    BREAST IMPLANT Right 1/28/2020 Procedure: BREAST IMPLANT EXCHANGE WITH CAPSULECTOMY;  Surgeon: Nan Hill MD;  [facility];  Service: Plastics    BREAST RECONSTRUCTION      implant placement, implant revision, right mastopexy    CYSTOSTOMY      with basket extraction of calculus; x2    EXPLORATORY LAPAROTOMY      FOOT SURGERY      FRACTURE SURGERY  Lt wrist 9/2014 - Lt matthew femur 9/14    HIP FRACTURE SURGERY Right     Subcapital    HIP SURGERY Left     JOINT REPLACEMENT  Rt hip 10/2012    LEG SURGERY      Repair    MASTECTOMY Left 08/16/2012    MN REVISE BREAST RECONSTRUCTION Right 5/4/2020    Procedure: REVISION RIGHT BREAST MOUND;  Surgeon: Nan Hill MD;  [facility];  Service: Plastics    MN SUSPENSION OF BREAST Right 1/28/2020    Procedure: BREAST MASTOPEXY;  Surgeon: Nan Hill MD;  [facility];  Service: Plastics    REDUCTION MAMMAPLASTY Right     SENTINEL LYMPH NODE BIOPSY Left 08/16/2012    SKIN BIOPSY      TONSILLECTOMY      TUBAL LIGATION      WRIST SURGERY Left        Current Outpatient Medications   Medication Sig Dispense Refill    ALPHA LIPOIC ACID PO Take by mouth      AMPYRA 10 MG TB12 Ampyra 10 mg Er, Take 1 tab bid 180 tablet 0    ascorbic acid (VITAMIN C) 500 mg tablet Take 500 mg by mouth daily      baclofen 10 mg tablet TAKE 1 TABLET EVERY        MORNING, 1 TABLET EVERY    EVENING, AND TAKE 2 TABLETSAT BEDTIME 360 tablet 0    Cholecalciferol (VITAMIN D3) 1000 units CAPS Take 5,000 Units by mouth        clonazePAM (KlonoPIN) 0 5 mg tablet 1 by mouth at bedtime  30 tablet 1    COPAXONE 40 MG/ML SOSY Inject 40mg under the skin three times weekly   36 mL 0    Cranberry 1000 MG CAPS Take 300 mg by mouth        cyanocobalamin (VITAMIN B-12) 1,000 mcg tablet Take by mouth daily      denosumab (PROLIA) 60 mg/mL Inject under the skin      gabapentin (NEURONTIN) 300 mg capsule Take 1 capsule (300 mg total) by mouth daily at bedtime 90 capsule 3    gabapentin (NEURONTIN) 600 MG tablet Take 2 tablets (1,200 mg total) by mouth 3 (three) times a day 540 tablet 3    lisinopril (ZESTRIL) 20 mg tablet Take 1 tablet (20 mg total) by mouth daily 90 tablet 1    Magnesium 500 MG TABS Take 500 tablets by mouth once      pramipexole (MIRAPEX) 0 25 mg tablet 1-3 po qhs 270 tablet 3    RESTASIS 0 05 % ophthalmic emulsion   0    solifenacin (VESICARE) 10 MG tablet Take 1 tablet (10 mg total) by mouth daily 90 tablet 3    zonisamide (ZONEGRAN) 100 mg capsule Take 4 capsules (400 mg total) by mouth daily at bedtime 360 capsule 3     No current facility-administered medications for this visit  Facility-Administered Medications Ordered in Other Visits   Medication Dose Route Frequency Provider Last Rate Last Dose    bacitracin 50,000 Units, gentamicin (GARAMYCIN) 40 mg/mL 80 mg, ceFAZolin (ANCEF) 1,000 mg in sodium chloride 0 9 % 1,000 mL irrigation bottle   Irrigation Once Marco Antonio Osorio MD            Allergies   Allergen Reactions    Contrast Dye [Iodinated Diagnostic Agents] Anaphylaxis    Duloxetine Hcl      Rapid Heart rate      Acetaminophen Other (See Comments)     Heart palpitations    Cetirizine      Only occurs with generic, weakness in legs, off balance and couldn't walk   Morphine Other (See Comments)     Migraine       Review of Systems   Constitutional: Negative for activity change, appetite change, chills, diaphoresis and fever  HENT: Negative for congestion, rhinorrhea, sneezing and sore throat  Respiratory: Negative for cough, shortness of breath and wheezing  Cardiovascular: Negative for chest pain, palpitations and leg swelling  Gastrointestinal: Negative for abdominal distention, abdominal pain, constipation, diarrhea, nausea and vomiting  Genitourinary: Negative for difficulty urinating and dysuria  Musculoskeletal: Positive for gait problem (  2/2 MS )  Negative for arthralgias and myalgias     Skin: Negative for color change and rash  Neurological: Negative for dizziness, weakness and light-headedness  Psychiatric/Behavioral: Negative for sleep disturbance  The patient is not nervous/anxious  Video Exam    Vitals:    08/25/20 1054   BP: 118/75   Pulse: 93       Physical Exam   It was my intent to perform this visit via video technology but the patient was not able to do a video connection so the visit was completed via audio telephone only  I spent 15 minutes directly with the patient during this visit      VIRTUAL VISIT DISCLAIMER    Marie Ritchie acknowledges that she has consented to an online visit or consultation  She understands that the online visit is based solely on information provided by her, and that, in the absence of a face-to-face physical evaluation by the physician, the diagnosis she receives is both limited and provisional in terms of accuracy and completeness  This is not intended to replace a full medical face-to-face evaluation by the physician  Marie Ritchie understands and accepts these terms

## 2020-09-10 DIAGNOSIS — G35 MULTIPLE SCLEROSIS (HCC): ICD-10-CM

## 2020-09-10 RX ORDER — BACLOFEN 10 MG/1
TABLET ORAL
Qty: 360 TABLET | Refills: 0 | Status: SHIPPED | OUTPATIENT
Start: 2020-09-10 | End: 2020-11-19 | Stop reason: SDUPTHER

## 2020-09-28 ENCOUNTER — OFFICE VISIT (OUTPATIENT)
Dept: PLASTIC SURGERY | Facility: CLINIC | Age: 70
End: 2020-09-28
Payer: MEDICARE

## 2020-09-28 VITALS — BODY MASS INDEX: 23.52 KG/M2 | HEIGHT: 64 IN | TEMPERATURE: 98.5 F

## 2020-09-28 DIAGNOSIS — Z86.000 PERSONAL HISTORY OF IN-SITU NEOPLASM OF BREAST: Primary | ICD-10-CM

## 2020-09-28 DIAGNOSIS — Z98.890 STATUS POST BILATERAL BREAST RECONSTRUCTION: ICD-10-CM

## 2020-09-28 PROCEDURE — 99212 OFFICE O/P EST SF 10 MIN: CPT | Performed by: PHYSICIAN ASSISTANT

## 2020-09-28 NOTE — LETTER
September 28, 2020     Marcio Barnes, [address]  [address]    Patient: Linda Padilla   YOB: 1950   Date of Visit: 9/28/2020       Dear Dr Cat Coronel: Thank you for referring Gisela Marquez to me for evaluation  Below are my notes for this consultation  If you have questions, please do not hesitate to call me  I look forward to following your patient along with you  Sincerely,        Parul Hills PA-C        CC: MD Parul Alvares PA-C  9/28/2020  3:53 PM  Sign when Signing Visit  Assessment/Plan:   Fanny Kate is a 69year old female who is 4 months status post revision of right breast mound and right breast implant exchange on 1/28/20  Please see HPI   She is doing well and pleased with her results  She will continue with implant displacement exercises  We will see her back in 3-6 months or sooner with any concerns  Diagnoses and all orders for this visit:    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

## 2020-09-28 NOTE — PROGRESS NOTES
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

## 2020-10-01 ENCOUNTER — OFFICE VISIT (OUTPATIENT)
Dept: SLEEP CENTER | Facility: CLINIC | Age: 70
End: 2020-10-01
Payer: MEDICARE

## 2020-10-01 VITALS
SYSTOLIC BLOOD PRESSURE: 128 MMHG | DIASTOLIC BLOOD PRESSURE: 78 MMHG | HEART RATE: 81 BPM | TEMPERATURE: 97.2 F | HEIGHT: 64 IN | BODY MASS INDEX: 23.63 KG/M2 | WEIGHT: 138.4 LBS

## 2020-10-01 DIAGNOSIS — G25.81 RESTLESS LEG SYNDROME: ICD-10-CM

## 2020-10-01 DIAGNOSIS — G47.00 INSOMNIA, UNSPECIFIED TYPE: ICD-10-CM

## 2020-10-01 DIAGNOSIS — G47.61 PLMD (PERIODIC LIMB MOVEMENT DISORDER): ICD-10-CM

## 2020-10-01 PROCEDURE — 99213 OFFICE O/P EST LOW 20 MIN: CPT | Performed by: INTERNAL MEDICINE

## 2020-10-01 RX ORDER — PRAMIPEXOLE DIHYDROCHLORIDE 0.25 MG/1
TABLET ORAL
Qty: 90 TABLET | Refills: 3 | Status: SHIPPED | OUTPATIENT
Start: 2020-10-01 | End: 2021-04-27

## 2020-10-01 RX ORDER — CLONAZEPAM 0.5 MG/1
TABLET ORAL
Qty: 90 TABLET | Refills: 1 | Status: SHIPPED | OUTPATIENT
Start: 2020-10-01 | End: 2021-04-14 | Stop reason: SDUPTHER

## 2020-10-05 ENCOUNTER — HOSPITAL ENCOUNTER (OUTPATIENT)
Dept: NON INVASIVE DIAGNOSTICS | Facility: CLINIC | Age: 70
Discharge: HOME | End: 2020-10-05
Payer: MEDICARE

## 2020-10-05 DIAGNOSIS — R06.00 DYSPNEA ON EXERTION: ICD-10-CM

## 2020-10-05 PROCEDURE — 93306 TTE W/DOPPLER COMPLETE: CPT

## 2020-10-05 PROCEDURE — 93306 TTE W/DOPPLER COMPLETE: CPT | Performed by: INTERNAL MEDICINE

## 2020-11-06 ENCOUNTER — TELEPHONE (OUTPATIENT)
Dept: NEUROLOGY | Facility: CLINIC | Age: 70
End: 2020-11-06

## 2020-11-09 ENCOUNTER — HOSPITAL ENCOUNTER (OUTPATIENT)
Dept: NEUROLOGY | Facility: CLINIC | Age: 70
Discharge: HOME | End: 2020-11-09
Payer: MEDICARE

## 2020-11-09 DIAGNOSIS — G62.9 PERIPHERAL POLYNEUROPATHY: ICD-10-CM

## 2020-11-09 DIAGNOSIS — G35 MULTIPLE SCLEROSIS (HCC): ICD-10-CM

## 2020-11-09 PROCEDURE — 95886 MUSC TEST DONE W/N TEST COMP: CPT | Performed by: PSYCHIATRY & NEUROLOGY

## 2020-11-09 PROCEDURE — 95911 NRV CNDJ TEST 9-10 STUDIES: CPT | Performed by: PSYCHIATRY & NEUROLOGY

## 2020-11-11 ENCOUNTER — TELEPHONE (OUTPATIENT)
Dept: NEUROLOGY | Facility: CLINIC | Age: 70
End: 2020-11-11

## 2020-11-17 ENCOUNTER — TELEMEDICINE (OUTPATIENT)
Dept: FAMILY MEDICINE CLINIC | Facility: OTHER | Age: 70
End: 2020-11-17
Payer: MEDICARE

## 2020-11-17 DIAGNOSIS — I10 ESSENTIAL HYPERTENSION: Primary | ICD-10-CM

## 2020-11-17 PROCEDURE — 99213 OFFICE O/P EST LOW 20 MIN: CPT | Performed by: FAMILY MEDICINE

## 2020-11-18 VITALS — SYSTOLIC BLOOD PRESSURE: 134 MMHG | HEART RATE: 75 BPM | DIASTOLIC BLOOD PRESSURE: 74 MMHG

## 2020-11-19 ENCOUNTER — TELEPHONE (OUTPATIENT)
Dept: NEUROLOGY | Facility: CLINIC | Age: 70
End: 2020-11-19

## 2020-11-19 ENCOUNTER — TELEMEDICINE (OUTPATIENT)
Dept: NEUROLOGY | Facility: CLINIC | Age: 70
End: 2020-11-19
Payer: MEDICARE

## 2020-11-19 VITALS
TEMPERATURE: 98.8 F | WEIGHT: 136.2 LBS | SYSTOLIC BLOOD PRESSURE: 118 MMHG | HEART RATE: 73 BPM | BODY MASS INDEX: 23.38 KG/M2 | DIASTOLIC BLOOD PRESSURE: 75 MMHG

## 2020-11-19 DIAGNOSIS — G62.9 PERIPHERAL POLYNEUROPATHY: ICD-10-CM

## 2020-11-19 DIAGNOSIS — R26.9 GAIT DISTURBANCE: ICD-10-CM

## 2020-11-19 DIAGNOSIS — G47.00 INSOMNIA, UNSPECIFIED TYPE: Primary | ICD-10-CM

## 2020-11-19 DIAGNOSIS — G35 MULTIPLE SCLEROSIS (HCC): ICD-10-CM

## 2020-11-19 PROCEDURE — 99443 PR PHYS/QHP TELEPHONE EVALUATION 21-30 MIN: CPT | Performed by: PSYCHIATRY & NEUROLOGY

## 2020-11-19 RX ORDER — GABAPENTIN 600 MG/1
1200 TABLET ORAL 3 TIMES DAILY
Qty: 540 TABLET | Refills: 3 | Status: SHIPPED | OUTPATIENT
Start: 2020-11-19 | End: 2021-09-13

## 2020-11-19 RX ORDER — GABAPENTIN 300 MG/1
300 CAPSULE ORAL
Qty: 90 CAPSULE | Refills: 3 | Status: SHIPPED | OUTPATIENT
Start: 2020-11-19 | End: 2021-11-23

## 2020-11-19 RX ORDER — ZONISAMIDE 100 MG/1
400 CAPSULE ORAL
Qty: 360 CAPSULE | Refills: 3 | Status: SHIPPED | OUTPATIENT
Start: 2020-11-19 | End: 2021-05-29

## 2020-11-19 RX ORDER — BACLOFEN 10 MG/1
TABLET ORAL
Qty: 360 TABLET | Refills: 3 | Status: SHIPPED | OUTPATIENT
Start: 2020-11-19 | End: 2021-01-11

## 2020-11-19 RX ORDER — DOCUSATE SODIUM 100 MG/1
1 CAPSULE, LIQUID FILLED ORAL EVERY OTHER DAY
COMMUNITY
Start: 2020-09-01

## 2020-11-21 DIAGNOSIS — R39.15 URGENCY OF URINATION: ICD-10-CM

## 2020-11-22 RX ORDER — SOLIFENACIN SUCCINATE 10 MG/1
TABLET, FILM COATED ORAL
Qty: 90 TABLET | Refills: 3 | Status: SHIPPED | OUTPATIENT
Start: 2020-11-22 | End: 2021-01-25 | Stop reason: SDUPTHER

## 2020-11-23 ENCOUNTER — TELEPHONE (OUTPATIENT)
Dept: NEUROLOGY | Facility: CLINIC | Age: 70
End: 2020-11-23

## 2020-11-29 RX ORDER — DALFAMPRIDINE 10 MG/1
10 TABLET, FILM COATED, EXTENDED RELEASE ORAL 2 TIMES DAILY
Qty: 180 TABLET | Refills: 3 | Status: SHIPPED | OUTPATIENT
Start: 2020-11-29 | End: 2021-01-11 | Stop reason: SDUPTHER

## 2020-11-30 ENCOUNTER — HOSPITAL ENCOUNTER (OUTPATIENT)
Dept: MAMMOGRAPHY | Facility: HOSPITAL | Age: 70
Discharge: HOME | End: 2020-11-30
Attending: SURGERY
Payer: MEDICARE

## 2020-11-30 VITALS — HEIGHT: 64 IN | BODY MASS INDEX: 23.22 KG/M2 | WEIGHT: 136 LBS

## 2020-11-30 DIAGNOSIS — Z12.31 SCREENING MAMMOGRAM, ENCOUNTER FOR: ICD-10-CM

## 2020-11-30 DIAGNOSIS — R92.8 ABNORMAL MAMMOGRAM OF RIGHT BREAST: Primary | ICD-10-CM

## 2020-11-30 PROCEDURE — 77063 BREAST TOMOSYNTHESIS BI: CPT

## 2020-11-30 PROCEDURE — 77067 SCR MAMMO BI INCL CAD: CPT

## 2020-12-03 ENCOUNTER — TELEPHONE (OUTPATIENT)
Dept: NEUROLOGY | Facility: CLINIC | Age: 70
End: 2020-12-03

## 2020-12-03 DIAGNOSIS — G35 MULTIPLE SCLEROSIS (HCC): ICD-10-CM

## 2020-12-03 RX ORDER — GLATIRAMER ACETATE 40 MG/ML
INJECTION, SOLUTION SUBCUTANEOUS
Qty: 36 ML | Refills: 0 | Status: SHIPPED | OUTPATIENT
Start: 2020-12-03 | End: 2021-01-11 | Stop reason: SDUPTHER

## 2020-12-07 ENCOUNTER — TELEPHONE (OUTPATIENT)
Dept: NEUROLOGY | Facility: CLINIC | Age: 70
End: 2020-12-07

## 2020-12-08 ENCOUNTER — HOSPITAL ENCOUNTER (OUTPATIENT)
Dept: MAMMOGRAPHY | Facility: CLINIC | Age: 70
Discharge: HOME | End: 2020-12-08
Payer: MEDICARE

## 2020-12-08 ENCOUNTER — HOSPITAL ENCOUNTER (OUTPATIENT)
Dept: ULTRASOUND IMAGING | Facility: CLINIC | Age: 70
Discharge: HOME | End: 2020-12-08
Payer: MEDICARE

## 2020-12-08 VITALS — BODY MASS INDEX: 23.22 KG/M2 | WEIGHT: 136 LBS | HEIGHT: 64 IN

## 2020-12-08 DIAGNOSIS — R92.8 ABNORMAL MAMMOGRAM OF RIGHT BREAST: ICD-10-CM

## 2020-12-08 PROCEDURE — 76642 ULTRASOUND BREAST LIMITED: CPT

## 2020-12-08 PROCEDURE — 77065 DX MAMMO INCL CAD UNI: CPT

## 2020-12-08 PROCEDURE — G0279 TOMOSYNTHESIS, MAMMO: HCPCS

## 2020-12-10 ENCOUNTER — TRANSCRIBE ORDERS (OUTPATIENT)
Dept: ADMINISTRATIVE | Facility: HOSPITAL | Age: 70
End: 2020-12-10

## 2020-12-10 ENCOUNTER — TELEPHONE (OUTPATIENT)
Dept: FAMILY MEDICINE CLINIC | Facility: OTHER | Age: 70
End: 2020-12-10

## 2020-12-10 DIAGNOSIS — R53.83 OTHER FATIGUE: Primary | ICD-10-CM

## 2020-12-10 DIAGNOSIS — G35 MULTIPLE SCLEROSIS (HCC): ICD-10-CM

## 2020-12-10 DIAGNOSIS — G62.9 PERIPHERAL POLYNEUROPATHY: ICD-10-CM

## 2020-12-10 DIAGNOSIS — Z09 FOLLOW-UP EXAM, 3-6 MONTHS SINCE PREVIOUS EXAM: Primary | ICD-10-CM

## 2020-12-11 DIAGNOSIS — G35 MULTIPLE SCLEROSIS (HCC): Primary | ICD-10-CM

## 2020-12-11 DIAGNOSIS — R53.83 FATIGUE, UNSPECIFIED TYPE: ICD-10-CM

## 2020-12-11 DIAGNOSIS — E46 PROTEIN-CALORIE MALNUTRITION, UNSPECIFIED SEVERITY (HCC): ICD-10-CM

## 2020-12-11 DIAGNOSIS — R53.1 GENERALIZED WEAKNESS: ICD-10-CM

## 2020-12-11 PROBLEM — E03.8 TSH (THYROID-STIMULATING HORMONE DEFICIENCY): Status: ACTIVE | Noted: 2020-12-11

## 2020-12-11 PROBLEM — E53.8 B12 DEFICIENCY: Status: ACTIVE | Noted: 2020-12-11

## 2020-12-24 ENCOUNTER — TELEPHONE (OUTPATIENT)
Dept: NEUROLOGY | Facility: CLINIC | Age: 70
End: 2020-12-24

## 2020-12-24 NOTE — TELEPHONE ENCOUNTER
Pt reports she received her final refill of brand Copaxone (90 day supply)  She was unable to secure funding to continue this  Pt would like to proceed with start process for generic glatiramer acetate  She would like start form mailed to her so that she can complete her section  Start form mailed  Will f/u

## 2020-12-28 ENCOUNTER — LAB (OUTPATIENT)
Dept: LAB | Facility: CLINIC | Age: 70
End: 2020-12-28
Payer: MEDICARE

## 2020-12-28 DIAGNOSIS — R53.1 GENERALIZED WEAKNESS: ICD-10-CM

## 2020-12-28 DIAGNOSIS — E46 PROTEIN-CALORIE MALNUTRITION, UNSPECIFIED SEVERITY (HCC): ICD-10-CM

## 2020-12-28 DIAGNOSIS — G35 MULTIPLE SCLEROSIS (HCC): ICD-10-CM

## 2020-12-28 DIAGNOSIS — R53.83 FATIGUE, UNSPECIFIED TYPE: ICD-10-CM

## 2020-12-28 LAB
T4 FREE SERPL-MCNC: 1.05 NG/DL (ref 0.76–1.46)
TSH SERPL DL<=0.05 MIU/L-ACNC: 1.87 UIU/ML (ref 0.36–3.74)
VIT B12 SERPL-MCNC: 1468 PG/ML (ref 100–900)

## 2020-12-28 PROCEDURE — 82607 VITAMIN B-12: CPT

## 2020-12-28 PROCEDURE — 36415 COLL VENOUS BLD VENIPUNCTURE: CPT

## 2020-12-28 PROCEDURE — 84443 ASSAY THYROID STIM HORMONE: CPT

## 2020-12-28 PROCEDURE — 84439 ASSAY OF FREE THYROXINE: CPT

## 2020-12-29 ENCOUNTER — TELEPHONE (OUTPATIENT)
Dept: FAMILY MEDICINE CLINIC | Facility: OTHER | Age: 70
End: 2020-12-29

## 2021-01-05 ENCOUNTER — TELEPHONE (OUTPATIENT)
Dept: NEUROLOGY | Facility: CLINIC | Age: 71
End: 2021-01-05

## 2021-01-05 NOTE — TELEPHONE ENCOUNTER
MSW spoke with the patient  She stated that she wanted to e-mail MSW all of the information that the [employer] sent to her so that everyone can be on the same page when they reach out to confirm her diagnosis and script information  This writer provider her e-mail address by sending her an e-mail to the address we have on file for her  The patient stated she will e-mail MSW back with the documents that were sent to her  Once they are received, MSW will send a copy to SHELL Shaffer (per the patient's request) and will also scan a copy to her chart

## 2021-01-05 NOTE — TELEPHONE ENCOUNTER
----- Message from Barby Citizen sent at 1/5/2021  3:41 PM EST -----  Regarding: Non-Urgent Medical Question  Contact: [phone]  Dr Patricia Gonzalez   I was fortunate to get approved for a $7000 00 berhane last evening to cover the copays for all my medications  This is a foundation that I have never been covered by in the past    Can you please send me an email address that I can send the information to so that you are aware of all the particulars  They will be reaching out to you to verify my Dx and rx  If not confirmed they will rescind the berhane   It is through the [facility]    Thank you   Hannah Alan

## 2021-01-06 NOTE — TELEPHONE ENCOUNTER
E-mail with information received  MSW saved it as a pdf and imported it in the patient's chart for our records as requested by the patient  Dr Justo Norwood: FYI patient was accepted by the patient [employer] foundation for co-pay assistance relief [facility]  Miya: patient requested that I FYI you and also e-mail you a copy of her confirmation e-mail  I have forwarded this e-mail to you as well

## 2021-01-06 NOTE — TELEPHONE ENCOUNTER
Received faxed physician form from the [employer] co-pay relief program  This must be completed and submitted within 30 days of pt's approval      Form completed and emailed to Luis silva for Dr Len Machado  Please fax signed form to [phone]  Thanks! Uvaldo Abreu

## 2021-01-08 DIAGNOSIS — G35 MULTIPLE SCLEROSIS (HCC): ICD-10-CM

## 2021-01-08 DIAGNOSIS — R26.9 GAIT DISTURBANCE: ICD-10-CM

## 2021-01-08 NOTE — TELEPHONE ENCOUNTER
Akin King for pt to remain on brand for her copaxone and ampyra    Thanks so much for all your help with these new year stevie

## 2021-01-08 NOTE — TELEPHONE ENCOUNTER
----- Message from Neelam Martínez RN sent at 1/8/2021  1:28 PM EST -----  Regarding: FW: Prescription Question  Contact: [phone]    ----- Message -----  From: Yaya Rowland MA  Sent: 1/8/2021   1:11 PM EST  To: [facility]  Subject: FW: Prescription Question                          ----- Message -----  From: Frantz Card  Sent: 1/8/2021   9:23 AM EST  To: Neurology [place] [facility]  Subject: Prescription Question                            Dr Sudheer Perry   Since I now have been successful in receiving $7000 00 berhane for copay assistance I don't think I have to change my Copaxone to the generic version  If you agree please let me know and then I will not complete the forms I received in the mail yesterday  This will mean that both the medications ( Copaxone and Ampyra ) from [facility] can remain in "brand" form as they are now  All my other meds from the regular mail in pharmacy should remain generic since I am on generic and they are effective    Sorry this is so confusing but it is because of having to find a new berhane due to poor donations secondary to Katharine     Any questions please call me [phone]  Thanks  Dennis Gates

## 2021-01-08 NOTE — TELEPHONE ENCOUNTER
Please advise if agreeable to pt remaining on brand Copaxone and Ampyra  If agreeable, will pend new scripts for you to sign and send to pt's pharmacy  Thank you!

## 2021-01-10 DIAGNOSIS — G35 MULTIPLE SCLEROSIS (HCC): ICD-10-CM

## 2021-01-11 ENCOUNTER — APPOINTMENT (OUTPATIENT)
Dept: RADIOLOGY | Age: 71
End: 2021-01-11
Payer: MEDICARE

## 2021-01-11 DIAGNOSIS — N20.0 NEPHROLITHIASIS: ICD-10-CM

## 2021-01-11 PROCEDURE — 74018 RADEX ABDOMEN 1 VIEW: CPT

## 2021-01-11 RX ORDER — DALFAMPRIDINE 10 MG/1
10 TABLET, FILM COATED, EXTENDED RELEASE ORAL 2 TIMES DAILY
Qty: 180 TABLET | Refills: 3 | Status: SHIPPED | OUTPATIENT
Start: 2021-01-11 | End: 2021-10-06 | Stop reason: SDUPTHER

## 2021-01-11 RX ORDER — GLATIRAMER ACETATE 40 MG/ML
INJECTION, SOLUTION SUBCUTANEOUS
Qty: 36 ML | Refills: 3 | Status: SHIPPED | OUTPATIENT
Start: 2021-01-11 | End: 2021-12-20 | Stop reason: SDUPTHER

## 2021-01-11 RX ORDER — BACLOFEN 10 MG/1
TABLET ORAL
Qty: 360 TABLET | Refills: 0 | Status: SHIPPED | OUTPATIENT
Start: 2021-01-11 | End: 2021-04-14

## 2021-01-11 NOTE — TELEPHONE ENCOUNTER
Response sent to patient on MyChart  Generic Rx for Ampyra was already sent  Will need to send new brand Rx  Pt also requires new Rx for Copaxone as last script did not have any refills  Scripts pended in this encounter  Please review and sign if agreeable  Thanks!

## 2021-01-25 ENCOUNTER — OFFICE VISIT (OUTPATIENT)
Dept: UROLOGY | Facility: AMBULATORY SURGERY CENTER | Age: 71
End: 2021-01-25
Payer: MEDICARE

## 2021-01-25 VITALS
WEIGHT: 138 LBS | DIASTOLIC BLOOD PRESSURE: 78 MMHG | HEART RATE: 81 BPM | HEIGHT: 64 IN | SYSTOLIC BLOOD PRESSURE: 122 MMHG | BODY MASS INDEX: 23.56 KG/M2

## 2021-01-25 DIAGNOSIS — N20.0 NEPHROLITHIASIS: Primary | ICD-10-CM

## 2021-01-25 DIAGNOSIS — R39.15 URGENCY OF URINATION: ICD-10-CM

## 2021-01-25 DIAGNOSIS — R39.15 URINARY URGENCY: ICD-10-CM

## 2021-01-25 PROCEDURE — 99214 OFFICE O/P EST MOD 30 MIN: CPT | Performed by: NURSE PRACTITIONER

## 2021-01-25 RX ORDER — SOLIFENACIN SUCCINATE 10 MG/1
10 TABLET, FILM COATED ORAL DAILY
Qty: 90 TABLET | Refills: 3 | Status: SHIPPED | OUTPATIENT
Start: 2021-01-25 | End: 2021-11-19 | Stop reason: SDUPTHER

## 2021-01-25 RX ORDER — ANTIARTHRITIC COMBINATION NO.2 900 MG
TABLET ORAL
COMMUNITY
Start: 2018-10-21

## 2021-01-25 NOTE — PROGRESS NOTES
1/25/2021    Akhil Villegas  1950  [id number]        Assessment  -Urinary urgency  -Nephrolithiasis     Discussion/Plan  Naren Savage is a 69 y o  female being managed by Dr Zoila Chapman  1  Urinary urgency- patient will continue to take VESIcare 10 mg daily for management of her urinary symptoms  Prescription refill was electronically sent to her pharmacy  2  Nephrolithiasis- reviewed the results of her recent KUB which identified unchanged tiny left nonobstructing renal calculi  She remains asymptomatic  Reviewed dietary recommendations  Discussed obtaining imaging every other year, or sooner should she become symptomatic  Follow-up in 1 year  She was instructed to call with any issues     -All questions answered, patients agree with plan     History of Present Illness  69 y o  female with a history of urinary urgency and nephrolithiasis presents today for follow up  She remains on VESIcare 10 mg daily for management of urinary urgency  Patient has no complaints at this time  She denies any gross hematuria or dysuria  Patient denies any recent stone episodes or flank pain  Her last ureteroscopy was performed in 2015  Patient denies any additional changes to her overall health  Review of Systems  Review of Systems   Constitutional: Negative  HENT: Negative  Respiratory: Negative  Cardiovascular: Negative  Gastrointestinal: Negative  Genitourinary: Negative for decreased urine volume, difficulty urinating, dysuria, flank pain, frequency, hematuria and urgency  Musculoskeletal: Negative  Skin: Negative  Neurological: Negative  Psychiatric/Behavioral: Negative          Past Medical History  Past Medical History:   Diagnosis Date    Allergic 1967    seasonal    Bone pain     [facility] cancer ([facility] [address] [facility]     Breast ptosis     Cancer ([facility] [address] [facility]     h/o lt ductal ca    Depression     Fatigue     Gait disturbance     uses cane at times    Headache     Hip fracture (HCC)     Hip pain     History of transfusion 1975    placenta previa s/p work accident, no rx    Hypokalemia     Insomnia     Laceration of finger     right hand    Left ankle pain     Left knee pain     Multiple sclerosis ([address] )     Muscle strain     left lower leg    Nephrolithiasis     Osteopenia     Osteoporosis     Pain of left calf     Pneumonia     Rash     Scoliosis birth    scoliosis    Solitary pulmonary nodule     SVT (supraventricular tachycardia) (HCC)     Tingling     Urgency of urination     Urticaria     Wrist fracture, left        Past Social History  Past Surgical History:   Procedure Laterality Date    ANKLE SURGERY      APPENDECTOMY  11/2012    Dr Petersen Bullion      Enlargement procedure with prosthetic implant bilateral    AUGMENTATION MAMMAPLASTY Right 01/28/2020    implant replaced because of recall    AUGMENTATION MAMMAPLASTY Bilateral 2012    BREAST BIOPSY Left 07/23/2012    BREAST IMPLANT Right 1/28/2020    Procedure: BREAST IMPLANT EXCHANGE WITH CAPSULECTOMY;  Surgeon: Kevin Ingram MD;  [facility];  Service: Plastics    BREAST IMPLANT REMOVAL Right 01/28/2020    implant placement, implant revision, right mastopexy    CYSTOSTOMY      with basket extraction of calculus; x2    EXCISION / BIOPSY BREAST / NIPPLE / DUCT Right 05/04/2020    scar revision to resuture non healing surgical wound    EXPLORATORY LAPAROTOMY      FOOT SURGERY      FRACTURE SURGERY  Lt wrist 9/2014 - Lt matthew femur 9/14    HIP FRACTURE SURGERY Right     Subcapital    HIP SURGERY Left     JOINT REPLACEMENT  Rt hip 10/2012    LEG SURGERY      Repair    MASTECTOMY Left 08/16/2012    DC REVISION OF RECONSTRUCTED BREAST Right 5/4/2020    Procedure: REVISION RIGHT BREAST MOUND;  Surgeon: Kevin Ingram MD;  [facility];  Service: Plastics    DC SUSPENSION OF BREAST Right 1/28/2020    Procedure: BREAST MASTOPEXY;  Surgeon: Kevin Ingram MD; [facility];  Service: Plastics    REDUCTION MAMMAPLASTY Right 2020    reduced to match left side    SENTINEL LYMPH NODE BIOPSY Left 2012    SKIN BIOPSY      TONSILLECTOMY      TUBAL LIGATION      WRIST SURGERY Left        Past Family History  Family History   Problem Relation Age of Onset    Hodgkin's lymphoma Maternal Grandfather         age at dx unk    Cancer Maternal Aunt 79        bladder    Heart disease Maternal Aunt     Colon cancer Maternal Uncle 72    Hodgkin's lymphoma Maternal Uncle 79    Coronary artery disease Mother     Hyperlipidemia Mother    Kishan Pila Rheum arthritis Mother     Other Father         Acute myocardial infarction    No Known Problems Maternal Grandmother     No Known Problems Paternal Grandmother     No Known Problems Paternal Grandfather     No Known Problems Son     No Known Problems Son     Stroke Maternal Aunt         6 CVA before death    Breast cancer Maternal Aunt     No Known Problems Paternal Aunt     No Known Problems Paternal Aunt     No Known Problems Paternal Aunt        Past Social history  Social History     Socioeconomic History    Marital status: /Civil Union     Spouse name: Not on file    Number of children: 2    Years of education: Completed bachelor's degree    Highest education level: Not on file   Occupational History    Occupation: RN     Comment: Retired   Social Needs    Financial resource strain: Not on file    Food insecurity     Worry: Not on file     Inability: Not on file   [employer] needs     Medical: Not on file     Non-medical: Not on file   Tobacco Use    Smoking status: Former Smoker     Packs/day: 1 00     Years: 35 00     Pack years: 35 00     Quit date:      Years since quittin 0    Smokeless tobacco: Never Used   Substance and Sexual Activity    Alcohol use: No    Drug use: No    Sexual activity: Yes     Partners: Male     Birth control/protection: Post-menopausal Lifestyle    Physical activity     Days per week: Not on file     Minutes per session: Not on file    Stress: Not on file   Relationships    Social connections     Talks on phone: Not on file     Gets together: Not on file     Attends Worship service: Not on file     Active member of club or organization: Not on file     Attends meetings of clubs or organizations: Not on file     Relationship status: Not on file    Intimate partner violence     Fear of current or ex partner: Not on file     Emotionally abused: Not on file     Physically abused: Not on file     Forced sexual activity: Not on file   Other Topics Concern    Not on file   Social History Narrative    Denied:  History of caffeine use       Current Medications  Current Outpatient Medications   Medication Sig Dispense Refill    ALPHA LIPOIC ACID PO Take by mouth      Ampyra 10 MG TB12 Take 1 tablet (10 mg total) by mouth 2 (two) times a day 180 tablet 3    ascorbic acid (VITAMIN C) 500 mg tablet Take 500 mg by mouth daily      baclofen 10 mg tablet TAKE 1 TABLET EVERY        MORNING, 1 TABLET EVERY    EVENING, AND TAKE 2 TABLETSAT BEDTIME 360 tablet 0    Biotin 5000 MCG TABS       Cholecalciferol (VITAMIN D3) 1000 units CAPS Take 5,000 Units by mouth        clonazePAM (KlonoPIN) 0 5 mg tablet 1 by mouth at bedtime  90 tablet 1    Copaxone 40 MG/ML SOSY Inject 40mg under the skin three times weekly   36 mL 3    Cranberry 1000 MG CAPS Take 300 mg by mouth        cyanocobalamin (VITAMIN B-12) 1,000 mcg tablet Take by mouth daily      denosumab (PROLIA) 60 mg/mL Inject under the skin      docusate sodium (Colace) 100 mg capsule Take 1 capsule by mouth Daily      gabapentin (NEURONTIN) 300 mg capsule Take 1 capsule (300 mg total) by mouth daily at bedtime 90 capsule 3    gabapentin (NEURONTIN) 600 MG tablet Take 2 tablets (1,200 mg total) by mouth 3 (three) times a day 540 tablet 3    lisinopril (ZESTRIL) 20 mg tablet Take 1 tablet (20 mg total) by mouth daily 90 tablet 1    Magnesium 500 MG TABS Take 500 tablets by mouth once      pramipexole (MIRAPEX) 0 25 mg tablet 1 po qhs 90 tablet 3    RESTASIS 0 05 % ophthalmic emulsion   0    solifenacin (VESICARE) 10 MG tablet Take 1 tablet (10 mg total) by mouth daily 90 tablet 3    zonisamide (ZONEGRAN) 100 mg capsule Take 4 capsules (400 mg total) by mouth daily at bedtime 360 capsule 3     No current facility-administered medications for this visit  Facility-Administered Medications Ordered in Other Visits   Medication Dose Route Frequency Provider Last Rate Last Admin    bacitracin 50,000 Units, gentamicin (GARAMYCIN) 40 mg/mL 80 mg, ceFAZolin (ANCEF) 1,000 mg in sodium chloride 0 9 % 1,000 mL irrigation bottle   Irrigation Once Mary Samayoa MD           Allergies  Allergies   Allergen Reactions    Contrast Dye [Iodinated Diagnostic Agents] Anaphylaxis    Duloxetine Hcl      Rapid Heart rate      Acetaminophen Other (See Comments)     Heart palpitations    Cetirizine      Only occurs with generic, weakness in legs, off balance and couldn't walk   Morphine Other (See Comments)     Migraine       Past medical history, social history, family history, medications and allergies were reviewed  Vitals  Vitals:    01/25/21 0853   BP: 122/78   BP Location: Left arm   Patient Position: Sitting   Cuff Size: Adult   Pulse: 81   Weight: 62 6 kg (138 lb)   Height: 5' 4" (1 626 m)       Physical Exam  Physical Exam  Constitutional:       Appearance: Normal appearance  She is well-developed  HENT:      Head: Normocephalic  Eyes:      Pupils: Pupils are equal, round, and reactive to light  Neck:      Musculoskeletal: Normal range of motion  Pulmonary:      Effort: Pulmonary effort is normal    Abdominal:      Palpations: Abdomen is soft  Musculoskeletal: Normal range of motion  Comments: Ambulates with cane   Skin:     General: Skin is warm and dry     Neurological:      General: No focal deficit present  Mental Status: She is alert and oriented to person, place, and time  Psychiatric:         Mood and Affect: Mood normal          Behavior: Behavior normal          Thought Content: Thought content normal          Judgment: Judgment normal          Results    I have personally reviewed all pertinent lab results and reviewed with patient  Lab Results   Component Value Date    GLUCOSE 95 01/08/2015    CALCIUM 9 2 07/24/2020     04/04/2017    K 4 1 07/24/2020    CO2 26 07/24/2020     (H) 07/24/2020    BUN 14 07/24/2020    CREATININE 0 83 07/24/2020     Lab Results   Component Value Date    WBC 5 71 07/02/2020    HGB 13 3 07/02/2020    HCT 42 1 07/02/2020    MCV 97 07/02/2020     07/02/2020     No results found for this or any previous visit (from the past 1 hour(s))

## 2021-01-27 ENCOUNTER — PATIENT MESSAGE (OUTPATIENT)
Dept: FAMILY MEDICINE CLINIC | Facility: OTHER | Age: 71
End: 2021-01-27

## 2021-01-27 DIAGNOSIS — I10 HYPERTENSION, UNSPECIFIED TYPE: ICD-10-CM

## 2021-01-27 RX ORDER — LISINOPRIL 20 MG/1
20 TABLET ORAL DAILY
Qty: 90 TABLET | Refills: 1 | Status: SHIPPED | OUTPATIENT
Start: 2021-01-27 | End: 2021-07-30 | Stop reason: SDUPTHER

## 2021-01-27 NOTE — TELEPHONE ENCOUNTER
From: Brian Saldivar  To: Cori Rosenbegr MD  Sent: 1/27/2021 8:33 AM EST  Subject: Prescription Question    Dear Dr Larisa Estrada  Would you please call in a refill rcn for me to [facility] on [address] phone [phone] for my Lisinopril  I get a 90 day prescription  My blood pressure has been controlled in the 188-280 systolic and 66 to 76 diastolic range with it  No side effects  Any questions please call me on my home phone [phone]     Thank you   Edward Chou

## 2021-02-02 ENCOUNTER — TELEPHONE (OUTPATIENT)
Dept: SURGICAL ONCOLOGY | Facility: CLINIC | Age: 71
End: 2021-02-02

## 2021-02-04 ENCOUNTER — OFFICE VISIT (OUTPATIENT)
Dept: SURGICAL ONCOLOGY | Facility: CLINIC | Age: 71
End: 2021-02-04
Payer: MEDICARE

## 2021-02-04 ENCOUNTER — TELEPHONE (OUTPATIENT)
Dept: NEUROLOGY | Facility: CLINIC | Age: 71
End: 2021-02-04

## 2021-02-04 VITALS
HEIGHT: 64 IN | SYSTOLIC BLOOD PRESSURE: 132 MMHG | WEIGHT: 140 LBS | DIASTOLIC BLOOD PRESSURE: 80 MMHG | TEMPERATURE: 97.9 F | HEART RATE: 71 BPM | BODY MASS INDEX: 23.9 KG/M2

## 2021-02-04 DIAGNOSIS — Z85.3 ENCOUNTER FOR FOLLOW-UP SURVEILLANCE OF BREAST CANCER: Primary | ICD-10-CM

## 2021-02-04 DIAGNOSIS — Z86.000 PERSONAL HISTORY OF IN-SITU NEOPLASM OF BREAST: ICD-10-CM

## 2021-02-04 DIAGNOSIS — Z12.31 SCREENING MAMMOGRAM, ENCOUNTER FOR: ICD-10-CM

## 2021-02-04 DIAGNOSIS — Z08 ENCOUNTER FOR FOLLOW-UP SURVEILLANCE OF BREAST CANCER: Primary | ICD-10-CM

## 2021-02-04 PROBLEM — R92.8 ABNORMAL MAMMOGRAM OF RIGHT BREAST: Status: RESOLVED | Noted: 2020-11-30 | Resolved: 2021-02-04

## 2021-02-04 PROCEDURE — 99213 OFFICE O/P EST LOW 20 MIN: CPT | Performed by: SURGERY

## 2021-02-04 NOTE — TELEPHONE ENCOUNTER
Ampyra PA submitted on M, Stevens: [id number]  Debra Nagy is approved from 1/1/21 to 12/31/21  Norstel message sent to patient making her aware

## 2021-02-04 NOTE — TELEPHONE ENCOUNTER
----- Message from [facility], RN sent at 2/2/2021  5:03 PM EST -----  Regarding: FW: Prescription Question  Contact: [phone]    ----- Message -----  From: Jaime Rosales  Sent: 2/2/2021   8:25 AM EST  To: [facility]  Subject: Prescription Question                            Dr Jessica Vaughn,  I just attempted to reorder my Ampyra and on rechecking with [facility] to verify that they have the info correct on my berhane they told my that the order has been rejected because it needs a new preauthorization  I am not sure if they would have notified me unless I would have phoned when I did not receive my meds  Is it possible to obtain a new pre-auth so that I can have the Ampyra renewed  I would appreciate it  Thank you in advance    Iván Vernon

## 2021-02-04 NOTE — PROGRESS NOTES
Surgical Oncology Follow Up       [address] [facility]  CANCER [facility] SURGICAL ONCOLOGY [place]  [address]   [place] [address]    Arankur Pacer  1950  [id number]  [address] [facility] SURGICAL ONCOLOGY [place]  [address] [id number]    Chief Complaint   Patient presents with    Follow-up       Assessment/Plan   Diagnoses and all orders for this visit:    Encounter for follow-up surveillance of breast cancer    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    Screening mammogram, encounter for  -     Mammo screening right w 3d & cad; Future        Advance Care Planning/Advance Directives:  Discussed disease status, cancer treatment plans and/or cancer treatment goals with the patient  Oncology History:    Oncology History   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    Initial Diagnosis    Intraductal carcinoma in situ of left breast     8/16/2012 Surgery    Left breast mastectomy, SLNB  Left breast reconstruction with      4/7/2014 Surgery    Left breast implant replaced (X 3) right mastopexy  History of Present Illness: breast cancer follow up, no concerns  -Interval History: recent mammogram    Review of Systems:  Review of Systems   Constitutional: Negative  Negative for appetite change and fever  Eyes: Negative  Respiratory: Negative for shortness of breath  Cardiovascular: Negative  Gastrointestinal: Negative  Endocrine: Negative  Genitourinary: Negative  Musculoskeletal: Negative  Negative for arthralgias and myalgias  Skin: Negative  Allergic/Immunologic: Negative  Neurological: Negative  Hematological: Negative  Negative for adenopathy  Does not bruise/bleed easily  Psychiatric/Behavioral: Negative          Patient Active Problem List   Diagnosis    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    Multiple sclerosis ([address] )    Peripheral polyneuropathy    Depression    Fatigue    Gait disturbance    Hip pain, right    Headache    History of bilateral hip replacements    Hypokalemia    Injury of right leg    Insomnia    Laceration of finger of right hand    Solitary pulmonary nodule    Rash    Pain of left lower leg    Pain of left calf    Osteopenia    Nephrolithiasis    Muscle strain of left lower leg    Left knee pain    Left ankle pain    Urticaria    Tingling    Screening mammogram, encounter for    Encounter for breast reconstruction following mastectomy    Abnormal stool test    Encounter for follow-up surveillance of breast cancer    Essential hypertension    TSH (thyroid-stimulating hormone deficiency)    B12 deficiency     Past Medical History:   Diagnosis Date    Allergic 1967    seasonal    Bone pain     Breast ptosis     Depression     Fatigue     Gait disturbance     uses cane at times    Headache     Hip fracture (HCC)     Hip pain     History of transfusion 1975    placenta previa s/p work accident, no rx    Hypokalemia     Insomnia     Laceration of finger     right hand    Left ankle pain     Left knee pain     Multiple sclerosis (HCC)     Muscle strain     left lower leg    Nephrolithiasis     Osteopenia     Osteoporosis     Pain of left calf     Pneumonia     Rash     Scoliosis birth    scoliosis    Solitary pulmonary nodule     SVT (supraventricular tachycardia) (HCC)     Tingling     Urgency of urination     Urticaria     Wrist fracture, left      Past Surgical History:   Procedure Laterality Date    ANKLE SURGERY      APPENDECTOMY  11/2012    Dr Anna Arvizu      Enlargement procedure with prosthetic implant bilateral    AUGMENTATION MAMMAPLASTY Right 01/28/2020    implant replaced because of recall    AUGMENTATION MAMMAPLASTY Bilateral 2012    BREAST BIOPSY Left 07/23/2012    BREAST IMPLANT Right 1/28/2020    Procedure: BREAST IMPLANT EXCHANGE WITH CAPSULECTOMY;  Surgeon: Kavya Chappell MD;  [facility];  Service: Plastics    BREAST IMPLANT REMOVAL Right 01/28/2020    implant placement, implant revision, right mastopexy    CYSTOSTOMY      with basket extraction of calculus; x2    EXCISION / BIOPSY BREAST / NIPPLE / DUCT Right 05/04/2020    scar revision to resuture non healing surgical wound    EXPLORATORY LAPAROTOMY      FOOT SURGERY      FRACTURE SURGERY  Lt wrist 9/2014 - Lt matthew femur 9/14    HIP FRACTURE SURGERY Right     Subcapital    HIP SURGERY Left     JOINT REPLACEMENT  Rt hip 10/2012    LEG SURGERY      Repair    MASTECTOMY Left 08/16/2012    NE REVISION OF RECONSTRUCTED BREAST Right 5/4/2020    Procedure: REVISION RIGHT BREAST MOUND;  Surgeon: Kavya Chappell MD;  [facility];  Service: Plastics    NE SUSPENSION OF BREAST Right 1/28/2020    Procedure: BREAST MASTOPEXY;  Surgeon: Kavya Chappell MD;  [facility];  Service: Plastics    REDUCTION MAMMAPLASTY Right 01/28/2020    reduced to match left side    SENTINEL LYMPH NODE BIOPSY Left 08/16/2012    SKIN BIOPSY      TONSILLECTOMY      TUBAL LIGATION      WRIST SURGERY Left      Family History   Problem Relation Age of Onset    Hodgkin's lymphoma Maternal Grandfather         age at dx unk    Cancer Maternal Aunt 79        bladder    Heart disease Maternal Aunt     Colon cancer Maternal Uncle 72    Hodgkin's lymphoma Maternal Uncle 79    Coronary artery disease Mother     Hyperlipidemia Mother    [facility] Rheum arthritis Mother     Other Father         Acute myocardial infarction    No Known Problems Maternal Grandmother     No Known Problems Paternal Grandmother     No Known Problems Paternal Grandfather     No Known Problems Son     No Known Problems Son     Stroke Maternal Aunt         6 CVA before death    Breast cancer Maternal Aunt     No Known Problems Paternal Aunt     No Known Problems Paternal Aunt     No Known Problems Paternal Aunt Social History     Socioeconomic History    Marital status: /Civil Union     Spouse name: Not on file    Number of children: 2    Years of education: Completed bachelor's degree    Highest education level: Not on file   Occupational History    Occupation: RN     Comment: Retired   Social Needs    Financial resource strain: Not on file    Food insecurity     Worry: Not on file     Inability: Not on file   [employer] needs     Medical: Not on file     Non-medical: Not on file   Tobacco Use    Smoking status: Former Smoker     Packs/day: 1 00     Years: 35 00     Pack years: 35 00     Quit date:      Years since quittin 1    Smokeless tobacco: Never Used   Substance and Sexual Activity    Alcohol use: No    Drug use: No    Sexual activity: Yes     Partners: Male     Birth control/protection: Post-menopausal   Lifestyle    Physical activity     Days per week: Not on file     Minutes per session: Not on file    Stress: Not on file   Relationships    Social connections     Talks on phone: Not on file     Gets together: Not on file     Attends [employer]ism service: Not on file     Active member of club or organization: Not on file     Attends meetings of clubs or organizations: Not on file     Relationship status: Not on file    Intimate partner violence     Fear of current or ex partner: Not on file     Emotionally abused: Not on file     Physically abused: Not on file     Forced sexual activity: Not on file   Other Topics Concern    Not on file   Social History Narrative    Denied:  History of caffeine use       Current Outpatient Medications:     ALPHA LIPOIC ACID PO, Take by mouth, Disp: , Rfl:     Ampyra 10 MG TB12, Take 1 tablet (10 mg total) by mouth 2 (two) times a day, Disp: 180 tablet, Rfl: 3    ascorbic acid (VITAMIN C) 500 mg tablet, Take 500 mg by mouth daily, Disp: , Rfl:     baclofen 10 mg tablet, TAKE 1 TABLET EVERY        MORNING, 1 TABLET EVERY    EVENING, AND TAKE 2 TABLETSAT BEDTIME, Disp: 360 tablet, Rfl: 0    Biotin 5000 MCG TABS, , Disp: , Rfl:     Cholecalciferol (VITAMIN D3) 1000 units CAPS, Take 5,000 Units by mouth  , Disp: , Rfl:     clonazePAM (KlonoPIN) 0 5 mg tablet, 1 by mouth at bedtime  , Disp: 90 tablet, Rfl: 1    Copaxone 40 MG/ML SOSY, Inject 40mg under the skin three times weekly  , Disp: 36 mL, Rfl: 3    Cranberry 1000 MG CAPS, Take 300 mg by mouth  , Disp: , Rfl:     denosumab (PROLIA) 60 mg/mL, Inject under the skin, Disp: , Rfl:     docusate sodium (Colace) 100 mg capsule, Take 1 capsule by mouth Daily, Disp: , Rfl:     gabapentin (NEURONTIN) 300 mg capsule, Take 1 capsule (300 mg total) by mouth daily at bedtime, Disp: 90 capsule, Rfl: 3    gabapentin (NEURONTIN) 600 MG tablet, Take 2 tablets (1,200 mg total) by mouth 3 (three) times a day, Disp: 540 tablet, Rfl: 3    lisinopril (ZESTRIL) 20 mg tablet, Take 1 tablet (20 mg total) by mouth daily, Disp: 90 tablet, Rfl: 1    Magnesium 500 MG TABS, Take 500 tablets by mouth once, Disp: , Rfl:     pramipexole (MIRAPEX) 0 25 mg tablet, 1 po qhs, Disp: 90 tablet, Rfl: 3    RESTASIS 0 05 % ophthalmic emulsion, , Disp: , Rfl: 0    solifenacin (VESICARE) 10 MG tablet, Take 1 tablet (10 mg total) by mouth daily, Disp: 90 tablet, Rfl: 3    zonisamide (ZONEGRAN) 100 mg capsule, Take 4 capsules (400 mg total) by mouth daily at bedtime, Disp: 360 capsule, Rfl: 3    cyanocobalamin (VITAMIN B-12) 1,000 mcg tablet, Take by mouth daily, Disp: , Rfl:   No current facility-administered medications for this visit       Facility-Administered Medications Ordered in Other Visits:     bacitracin 50,000 Units, gentamicin (GARAMYCIN) 40 mg/mL 80 mg, ceFAZolin (ANCEF) 1,000 mg in sodium chloride 0 9 % 1,000 mL irrigation bottle, , Irrigation, Once, Carleen Woodward MD  Allergies   Allergen Reactions    Contrast Dye [Iodinated Diagnostic Agents] Anaphylaxis    Duloxetine Hcl      Rapid Heart rate      Acetaminophen Other (See Comments)     Heart palpitations    Cetirizine      Only occurs with generic, weakness in legs, off balance and couldn't walk   Morphine Other (See Comments)     Migraine       The following portions of the patient's history were reviewed and updated as appropriate: allergies, current medications, past family history, past medical history, past social history, past surgical history and problem list         Vitals:    02/04/21 1023   BP: 132/80   Pulse: 71   Temp: 97 9 °F (36 6 °C)       Physical Exam  Constitutional:       General: She is not in acute distress  Appearance: Normal appearance  She is well-developed  HENT:      Head: Normocephalic and atraumatic  Cardiovascular:      Heart sounds: Normal heart sounds  Pulmonary:      Breath sounds: Normal breath sounds  Chest:      Breasts:         Right: Skin change ( as noted) present  No inverted nipple, mass, nipple discharge or tenderness  Left: Skin change (  Mastectomy scar with implant) present  No mass or tenderness  Comments: Tightness at the pectoralis edge on the left side that this is nontender;  Right-sided implant with scar  Abdominal:      Palpations: Abdomen is soft  Lymphadenopathy:      Upper Body:      Right upper body: No supraclavicular, axillary or pectoral adenopathy  Left upper body: No supraclavicular, axillary or pectoral adenopathy  Neurological:      Mental Status: She is alert and oriented to person, place, and time  Psychiatric:         Mood and Affect: Mood normal            Results:  Labs:      Imaging   11/30/2020 right 3D screening mammogram was a BI-RADS 0 secondary to an asymmetry   12/03/2020 right 3D diagnostic mammogram and right breast ultrasound show likely postop hematomas and a six month follow-up ultrasound was recommended    I reviewed the above imaging data      Discussion/Summary: 50-year-old female status post left mastectomy and reconstruction for ductal carcinoma in situ  She recently had revision of her right implant  She is feeling much better after this  There are no concerns on examination today  Her most recent imaging likely shows postoperative changes from the recent Plastic surgery  She is already scheduled for an ultrasound in December    Provided there are no concerns, I will order her right-sided mammogram for next year and see her again in one year for another exam

## 2021-02-13 DIAGNOSIS — Z23 ENCOUNTER FOR IMMUNIZATION: ICD-10-CM

## 2021-02-17 ENCOUNTER — TRANSCRIBE ORDERS (OUTPATIENT)
Dept: LAB | Facility: CLINIC | Age: 71
End: 2021-02-17

## 2021-02-17 ENCOUNTER — LAB (OUTPATIENT)
Dept: LAB | Facility: CLINIC | Age: 71
End: 2021-02-17
Payer: MEDICARE

## 2021-02-17 DIAGNOSIS — M81.0 SENILE OSTEOPOROSIS: Primary | ICD-10-CM

## 2021-02-17 DIAGNOSIS — M81.0 SENILE OSTEOPOROSIS: ICD-10-CM

## 2021-02-17 LAB — CALCIUM SERPL-MCNC: 9.3 MG/DL (ref 8.3–10.1)

## 2021-02-17 PROCEDURE — 82310 ASSAY OF CALCIUM: CPT

## 2021-02-17 PROCEDURE — 36415 COLL VENOUS BLD VENIPUNCTURE: CPT

## 2021-02-22 ENCOUNTER — TELEPHONE (OUTPATIENT)
Dept: SLEEP CENTER | Facility: CLINIC | Age: 71
End: 2021-02-22

## 2021-02-22 NOTE — TELEPHONE ENCOUNTER
----- Message from Regla Reddy sent at 2/22/2021  8:20 AM EST -----  Regarding: Non-Urgent Medical Question  Contact: [phone]  Dr Mina Lemons,  I have been having continuing and increasing symptoms that appear to be side effects of the Mirapex that I am taking  My balance is getting increasingly getting worse with swollen feet and ankles despite compression socks  My weight continues to increase ( 6 lbs since Christmas)   I eat so I don't fall asleep when I sit down which just makes things worse also ( never had weight problems)  I blamed some of this on my MS other than the weight and swelling till lately I am having problems swallowing  Since starting the Klonopin I sleep fairly well 95% of the time  Would you suggest trying to titrate off the Mirapex to see if it is the culprit or stay on it? I just feel miserable  Walking with cane and still don't feel secure with stiff joints  Let me know  Don't see you for several months    Thank you  Amara Briggs

## 2021-04-12 ENCOUNTER — TELEPHONE (OUTPATIENT)
Dept: NEUROLOGY | Facility: CLINIC | Age: 71
End: 2021-04-12

## 2021-04-12 DIAGNOSIS — G60.3 IDIOPATHIC PROGRESSIVE NEUROPATHY: ICD-10-CM

## 2021-04-12 DIAGNOSIS — G62.9 PERIPHERAL POLYNEUROPATHY: Primary | ICD-10-CM

## 2021-04-12 DIAGNOSIS — G60.9 HEREDITARY AND IDIOPATHIC NEUROPATHY, UNSPECIFIED: ICD-10-CM

## 2021-04-12 NOTE — TELEPHONE ENCOUNTER
Rajesh Villanueva, please mail pt our traditional follow up questionaire unless there is an electronic version  rx for labs to be done prior to visit in emr  Alethea Reynaga for pt to have done prior to visit  I ordered her routine labs as well as neuropathy labs

## 2021-04-12 NOTE — TELEPHONE ENCOUNTER
----- Message from Jose Daniel Shabazz sent at 4/12/2021 11:59 AM EDT -----  Regarding: Non-Urgent Medical Question  Contact: [phone]  I have routine appointment coming up and since my last appointments were virtual, I do not have a paper follow up questionnaire  Would it be possible to mail me one or is the online one acceptable? Also, do you want any blood work done prior to my visit? Please let me know  Thank you    Shawn Lawson

## 2021-04-12 NOTE — TELEPHONE ENCOUNTER
Rosario Gonzales, are any specific questionnaires needed for pt's upcoming appt? Dr Peyton Gibbons, please advise if you would like to have any labs prior to her appt

## 2021-04-13 NOTE — TELEPHONE ENCOUNTER
Called patient and she goes to a [facility] facility and she does not need to script for labs, but she will get them done before the appointment  I also explained to patient that she did not need the follow up form since we review everything before seeing the doctor

## 2021-04-14 DIAGNOSIS — G35 MULTIPLE SCLEROSIS (HCC): ICD-10-CM

## 2021-04-14 DIAGNOSIS — G47.61 PLMD (PERIODIC LIMB MOVEMENT DISORDER): ICD-10-CM

## 2021-04-14 RX ORDER — BACLOFEN 10 MG/1
TABLET ORAL
Qty: 360 TABLET | Refills: 0 | Status: SHIPPED | OUTPATIENT
Start: 2021-04-14 | End: 2021-07-06

## 2021-04-15 RX ORDER — CLONAZEPAM 0.5 MG/1
TABLET ORAL
Qty: 90 TABLET | Refills: 1 | Status: SHIPPED | OUTPATIENT
Start: 2021-04-15 | End: 2021-08-18 | Stop reason: SDUPTHER

## 2021-04-26 ENCOUNTER — OFFICE VISIT (OUTPATIENT)
Dept: PLASTIC SURGERY | Facility: CLINIC | Age: 71
End: 2021-04-26
Payer: MEDICARE

## 2021-04-26 ENCOUNTER — APPOINTMENT (OUTPATIENT)
Dept: LAB | Facility: CLINIC | Age: 71
End: 2021-04-26
Payer: MEDICARE

## 2021-04-26 VITALS — TEMPERATURE: 98.4 F

## 2021-04-26 DIAGNOSIS — G60.3 IDIOPATHIC PROGRESSIVE NEUROPATHY: ICD-10-CM

## 2021-04-26 DIAGNOSIS — Z86.000 PERSONAL HISTORY OF IN-SITU NEOPLASM OF BREAST: Primary | ICD-10-CM

## 2021-04-26 DIAGNOSIS — G60.9 HEREDITARY AND IDIOPATHIC NEUROPATHY, UNSPECIFIED: ICD-10-CM

## 2021-04-26 DIAGNOSIS — G62.9 PERIPHERAL POLYNEUROPATHY: ICD-10-CM

## 2021-04-26 DIAGNOSIS — Z98.890 STATUS POST BILATERAL BREAST RECONSTRUCTION: ICD-10-CM

## 2021-04-26 LAB
ALBUMIN SERPL BCP-MCNC: 3.9 G/DL (ref 3.5–5)
ALP SERPL-CCNC: 78 U/L (ref 46–116)
ALT SERPL W P-5'-P-CCNC: 36 U/L (ref 12–78)
AST SERPL W P-5'-P-CCNC: 16 U/L (ref 5–45)
BASOPHILS # BLD AUTO: 0.06 THOUSANDS/ΜL (ref 0–0.1)
BASOPHILS NFR BLD AUTO: 1 % (ref 0–1)
BILIRUB DIRECT SERPL-MCNC: 0.14 MG/DL (ref 0–0.2)
BILIRUB SERPL-MCNC: 0.39 MG/DL (ref 0.2–1)
EOSINOPHIL # BLD AUTO: 0.23 THOUSAND/ΜL (ref 0–0.61)
EOSINOPHIL NFR BLD AUTO: 5 % (ref 0–6)
ERYTHROCYTE [DISTWIDTH] IN BLOOD BY AUTOMATED COUNT: 12.5 % (ref 11.6–15.1)
FOLATE SERPL-MCNC: 11 NG/ML (ref 3.1–17.5)
HCT VFR BLD AUTO: 39 % (ref 34.8–46.1)
HGB BLD-MCNC: 12.7 G/DL (ref 11.5–15.4)
IMM GRANULOCYTES # BLD AUTO: 0.01 THOUSAND/UL (ref 0–0.2)
IMM GRANULOCYTES NFR BLD AUTO: 0 % (ref 0–2)
LYMPHOCYTES # BLD AUTO: 1.62 THOUSANDS/ΜL (ref 0.6–4.47)
LYMPHOCYTES NFR BLD AUTO: 35 % (ref 14–44)
MCH RBC QN AUTO: 30.7 PG (ref 26.8–34.3)
MCHC RBC AUTO-ENTMCNC: 32.6 G/DL (ref 31.4–37.4)
MCV RBC AUTO: 94 FL (ref 82–98)
MONOCYTES # BLD AUTO: 0.32 THOUSAND/ΜL (ref 0.17–1.22)
MONOCYTES NFR BLD AUTO: 7 % (ref 4–12)
NEUTROPHILS # BLD AUTO: 2.35 THOUSANDS/ΜL (ref 1.85–7.62)
NEUTS SEG NFR BLD AUTO: 52 % (ref 43–75)
NRBC BLD AUTO-RTO: 0 /100 WBCS
PLATELET # BLD AUTO: 216 THOUSANDS/UL (ref 149–390)
PMV BLD AUTO: 9.9 FL (ref 8.9–12.7)
PROT SERPL-MCNC: 7.5 G/DL (ref 6.4–8.2)
RBC # BLD AUTO: 4.14 MILLION/UL (ref 3.81–5.12)
WBC # BLD AUTO: 4.59 THOUSAND/UL (ref 4.31–10.16)

## 2021-04-26 PROCEDURE — 82746 ASSAY OF FOLIC ACID SERUM: CPT

## 2021-04-26 PROCEDURE — 36415 COLL VENOUS BLD VENIPUNCTURE: CPT

## 2021-04-26 PROCEDURE — 84165 PROTEIN E-PHORESIS SERUM: CPT | Performed by: PATHOLOGY

## 2021-04-26 PROCEDURE — 80076 HEPATIC FUNCTION PANEL: CPT

## 2021-04-26 PROCEDURE — 84425 ASSAY OF VITAMIN B-1: CPT

## 2021-04-26 PROCEDURE — 84165 PROTEIN E-PHORESIS SERUM: CPT

## 2021-04-26 PROCEDURE — 84207 ASSAY OF VITAMIN B-6: CPT

## 2021-04-26 PROCEDURE — 99212 OFFICE O/P EST SF 10 MIN: CPT | Performed by: PHYSICIAN ASSISTANT

## 2021-04-26 PROCEDURE — 86255 FLUORESCENT ANTIBODY SCREEN: CPT

## 2021-04-26 PROCEDURE — 85025 COMPLETE CBC W/AUTO DIFF WBC: CPT

## 2021-04-26 PROCEDURE — 86618 LYME DISEASE ANTIBODY: CPT

## 2021-04-26 NOTE — LETTER
April 26, 2021     [phone]    Patient: Neeta Vázquez   YOB: 1950   Date of Visit: 4/26/2021       Dear Dr Arturo Rodriguez: Thank you for referring Kelton Walker to me for evaluation  Below are my notes for this consultation  If you have questions, please do not hesitate to call me  I look forward to following your patient along with you  Sincerely,        Juayn Cuevas PA-C        CC: MD Juany Graf PA-C  4/26/2021  1:23 PM  Sign when Signing Visit  Assessment/Plan:    Imani Prakash is a 69year old female who is status post revision of right breast mound and right breast implant exchange on 1/28/20  Please see HPI  She is a patient of Dr Gerardo Wilehlm  She is pleased with her results  She will continue with implant displacement exercises  Will see her back on a yearly basis  She can follow up sooner with any concerns  Diagnoses and all orders for this visit:    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

## 2021-04-26 NOTE — PROGRESS NOTES
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

## 2021-04-27 ENCOUNTER — OFFICE VISIT (OUTPATIENT)
Dept: FAMILY MEDICINE CLINIC | Facility: OTHER | Age: 71
End: 2021-04-27
Payer: MEDICARE

## 2021-04-27 VITALS
TEMPERATURE: 98.2 F | SYSTOLIC BLOOD PRESSURE: 120 MMHG | WEIGHT: 141.25 LBS | BODY MASS INDEX: 24.11 KG/M2 | DIASTOLIC BLOOD PRESSURE: 60 MMHG | HEIGHT: 64 IN | HEART RATE: 82 BPM | OXYGEN SATURATION: 99 %

## 2021-04-27 DIAGNOSIS — Z13.1 SCREENING FOR DIABETES MELLITUS: ICD-10-CM

## 2021-04-27 DIAGNOSIS — M79.89 LEG SWELLING: Primary | ICD-10-CM

## 2021-04-27 DIAGNOSIS — Z13.220 ENCOUNTER FOR LIPID SCREENING FOR CARDIOVASCULAR DISEASE: ICD-10-CM

## 2021-04-27 DIAGNOSIS — Z13.6 ENCOUNTER FOR LIPID SCREENING FOR CARDIOVASCULAR DISEASE: ICD-10-CM

## 2021-04-27 LAB
ALBUMIN SERPL ELPH-MCNC: 4.35 G/DL (ref 3.5–5)
ALBUMIN SERPL ELPH-MCNC: 61.3 % (ref 52–65)
ALPHA1 GLOB SERPL ELPH-MCNC: 0.32 G/DL (ref 0.1–0.4)
ALPHA1 GLOB SERPL ELPH-MCNC: 4.5 % (ref 2.5–5)
ALPHA2 GLOB SERPL ELPH-MCNC: 0.8 G/DL (ref 0.4–1.2)
ALPHA2 GLOB SERPL ELPH-MCNC: 11.2 % (ref 7–13)
B BURGDOR IGG+IGM SER-ACNC: 15
BETA GLOB ABNORMAL SERPL ELPH-MCNC: 0.44 G/DL (ref 0.4–0.8)
BETA1 GLOB SERPL ELPH-MCNC: 6.2 % (ref 5–13)
BETA2 GLOB SERPL ELPH-MCNC: 6.4 % (ref 2–8)
BETA2+GAMMA GLOB SERPL ELPH-MCNC: 0.45 G/DL (ref 0.2–0.5)
GAMMA GLOB ABNORMAL SERPL ELPH-MCNC: 0.74 G/DL (ref 0.5–1.6)
GAMMA GLOB SERPL ELPH-MCNC: 10.4 % (ref 12–22)
IGG/ALB SER: 1.58 {RATIO} (ref 1.1–1.8)
PROT PATTERN SERPL ELPH-IMP: ABNORMAL
PROT SERPL-MCNC: 7.1 G/DL (ref 6.4–8.2)

## 2021-04-27 PROCEDURE — 99213 OFFICE O/P EST LOW 20 MIN: CPT | Performed by: FAMILY MEDICINE

## 2021-04-27 RX ORDER — POTASSIUM CHLORIDE 750 MG/1
10 TABLET, EXTENDED RELEASE ORAL DAILY
Qty: 10 TABLET | Refills: 0 | Status: SHIPPED | OUTPATIENT
Start: 2021-04-27 | End: 2021-11-01

## 2021-04-27 RX ORDER — PRAMIPEXOLE DIHYDROCHLORIDE 0.25 MG/1
0.5 TABLET ORAL DAILY
COMMUNITY
Start: 2021-03-01 | End: 2021-06-22 | Stop reason: ALTCHOICE

## 2021-04-27 RX ORDER — FUROSEMIDE 20 MG/1
20 TABLET ORAL 2 TIMES DAILY
Qty: 10 TABLET | Refills: 0 | Status: SHIPPED | OUTPATIENT
Start: 2021-04-27 | End: 2021-11-01

## 2021-04-27 NOTE — PROGRESS NOTES
Assessment/Plan:     Diagnoses and all orders for this visit:    Leg swelling  -     furosemide (LASIX) 20 mg tablet; Take 1 tablet (20 mg total) by mouth 2 (two) times a day  -     potassium chloride (K-DUR,KLOR-CON) 10 mEq tablet; Take 1 tablet (10 mEq total) by mouth daily   - Advised patient to discontinue use of Mirapex for now and follow up with Sleep Medicine for alternative treatment of RLS as leg swelling is a well known side effect of Mirapex  Recommended she use 3 days of Lasix (20mg daily) to improve leg swelling  Encounter for lipid screening for cardiovascular disease  -     Lipid Panel with Direct LDL reflex; Future    Screening for diabetes mellitus  -     Comprehensive metabolic panel; Future      Return in about 3 months (around 7/27/2021) for annual wellness visit   The patient indicates understanding of these issues and agrees with the plan  Subjective:      Patient ID: Ovidio Remy is a 66 y o  female  HPI  Patient states that she has been having swelling in B/L LE since being started on Mirapex last July (2020)  Patient was stared on this medication by Sleep Medicine for RLS  Patient states when she was on higher dose of Mirapex, she usually has "4+ pitting edea" from slightly above ankle to her toes  Since decreasing her of Mirapex to 0 5mg leg swelling has mildly improved  She states she is currently on Mirapex 0 25mg daily  States that she has worn compression stockings in the past which did not improved leg swelling  Patient reports 5 pound weight gain over the last few months  Denies chest pain and dyspnea  Patient was worked up for this complaint within the last year and echocardiogram from October of 2020 showed that left ventricular systolic function was normal  EF was estimated to be 65%  No diastolic dysfunction  No regional wall abnormalities  BNP was also normal at that time         The following portions of the patient's history were reviewed and updated as appropriate: allergies, current medications, past family history, past medical history, past social history, past surgical history and problem list     Review of Systems   Constitutional: Positive for unexpected weight change  Negative for activity change, appetite change, chills and fever  Respiratory: Negative for cough, chest tightness, shortness of breath and wheezing  Cardiovascular: Positive for leg swelling  Negative for chest pain and palpitations  Musculoskeletal: Positive for gait problem (  chornic issue )  Skin: Negative for color change  Neurological: Negative for dizziness and light-headedness  Objective:  /60 (BP Location: Left arm, Patient Position: Sitting, Cuff Size: Adult)   Pulse 82   Temp 98 2 °F (36 8 °C) (Temporal)   Ht 5' 4" (1 626 m)   Wt 64 1 kg (141 lb 4 oz)   SpO2 99%   BMI 24 25 kg/m²      Physical Exam  Vitals signs reviewed  Constitutional:       General: She is not in acute distress  Appearance: Normal appearance  She is not ill-appearing, toxic-appearing or diaphoretic  HENT:      Head: Normocephalic and atraumatic  Eyes:      General: No scleral icterus  Right eye: No discharge  Left eye: No discharge  Extraocular Movements: Extraocular movements intact  Conjunctiva/sclera: Conjunctivae normal    Neck:      Musculoskeletal: Normal range of motion and neck supple  No neck rigidity or muscular tenderness  Vascular: No carotid bruit  Cardiovascular:      Rate and Rhythm: Normal rate and regular rhythm  Pulses: Normal pulses  Heart sounds: Normal heart sounds  Pulmonary:      Effort: Pulmonary effort is normal       Breath sounds: Normal breath sounds  Abdominal:      General: Bowel sounds are normal  There is no distension  Palpations: Abdomen is soft  Tenderness: There is no abdominal tenderness  Musculoskeletal:      Right lower leg: Edema (  1+ pitting ) present        Left lower leg: Edema (  1+ pitting ) present  Lymphadenopathy:      Cervical: No cervical adenopathy  Skin:     General: Skin is warm and dry  Neurological:      Mental Status: She is alert and oriented to person, place, and time  Gait: Gait abnormal (  ambulates with cane )     Psychiatric:         Mood and Affect: Mood normal          Behavior: Behavior normal

## 2021-04-28 ENCOUNTER — TELEPHONE (OUTPATIENT)
Dept: NEUROLOGY | Facility: CLINIC | Age: 71
End: 2021-04-28

## 2021-04-28 LAB
ANTI-YO ANTIBODIES: NEGATIVE
HU1 AB TITR SER: NEGATIVE {TITER}
HU2 AB TITR SER IF: NEGATIVE {TITER}

## 2021-04-28 NOTE — TELEPHONE ENCOUNTER
Spoke with patient  Advised of results and recommendations  Patient verbalized understanding  Patient met with PCP yesterday and did go over lab results with her

## 2021-04-28 NOTE — TELEPHONE ENCOUNTER
----- Message from Codey Chopra MD sent at 4/28/2021  6:08 AM EDT -----  Let pt know spep normal except for slightly low gamma globulin  Unlikely of clinical significance  Please have pt also check with pcp as well    Send copy of lab to pcp

## 2021-04-30 LAB — VIT B6 SERPL-MCNC: 8.3 UG/L (ref 2–32.8)

## 2021-05-01 LAB — VIT B1 BLD-SCNC: 112 NMOL/L (ref 66.5–200)

## 2021-05-04 ENCOUNTER — LAB (OUTPATIENT)
Dept: LAB | Facility: CLINIC | Age: 71
End: 2021-05-04
Payer: MEDICARE

## 2021-05-04 DIAGNOSIS — Z13.1 SCREENING FOR DIABETES MELLITUS: ICD-10-CM

## 2021-05-04 DIAGNOSIS — Z13.6 ENCOUNTER FOR LIPID SCREENING FOR CARDIOVASCULAR DISEASE: ICD-10-CM

## 2021-05-04 DIAGNOSIS — Z13.220 ENCOUNTER FOR LIPID SCREENING FOR CARDIOVASCULAR DISEASE: ICD-10-CM

## 2021-05-04 LAB
ALBUMIN SERPL BCP-MCNC: 3.7 G/DL (ref 3.5–5)
ALP SERPL-CCNC: 67 U/L (ref 46–116)
ALT SERPL W P-5'-P-CCNC: 32 U/L (ref 12–78)
ANION GAP SERPL CALCULATED.3IONS-SCNC: 2 MMOL/L (ref 4–13)
AST SERPL W P-5'-P-CCNC: 16 U/L (ref 5–45)
BILIRUB SERPL-MCNC: 0.42 MG/DL (ref 0.2–1)
BUN SERPL-MCNC: 22 MG/DL (ref 5–25)
CALCIUM SERPL-MCNC: 8.7 MG/DL (ref 8.3–10.1)
CHLORIDE SERPL-SCNC: 110 MMOL/L (ref 100–108)
CHOLEST SERPL-MCNC: 208 MG/DL (ref 50–200)
CO2 SERPL-SCNC: 28 MMOL/L (ref 21–32)
CREAT SERPL-MCNC: 1.02 MG/DL (ref 0.6–1.3)
GFR SERPL CREATININE-BSD FRML MDRD: 55 ML/MIN/1.73SQ M
GLUCOSE P FAST SERPL-MCNC: 91 MG/DL (ref 65–99)
HDLC SERPL-MCNC: 65 MG/DL
LDLC SERPL CALC-MCNC: 124 MG/DL (ref 0–100)
POTASSIUM SERPL-SCNC: 4.2 MMOL/L (ref 3.5–5.3)
PROT SERPL-MCNC: 7 G/DL (ref 6.4–8.2)
SODIUM SERPL-SCNC: 140 MMOL/L (ref 136–145)
TRIGL SERPL-MCNC: 96 MG/DL

## 2021-05-04 PROCEDURE — 80053 COMPREHEN METABOLIC PANEL: CPT

## 2021-05-04 PROCEDURE — 36415 COLL VENOUS BLD VENIPUNCTURE: CPT

## 2021-05-04 PROCEDURE — 80061 LIPID PANEL: CPT

## 2021-05-06 ENCOUNTER — OFFICE VISIT (OUTPATIENT)
Dept: NEUROLOGY | Facility: CLINIC | Age: 71
End: 2021-05-06
Payer: MEDICARE

## 2021-05-06 VITALS
HEART RATE: 66 BPM | WEIGHT: 142 LBS | SYSTOLIC BLOOD PRESSURE: 140 MMHG | BODY MASS INDEX: 24.24 KG/M2 | HEIGHT: 64 IN | DIASTOLIC BLOOD PRESSURE: 76 MMHG

## 2021-05-06 DIAGNOSIS — G62.9 PERIPHERAL POLYNEUROPATHY: Primary | ICD-10-CM

## 2021-05-06 DIAGNOSIS — G35 MULTIPLE SCLEROSIS (HCC): Primary | ICD-10-CM

## 2021-05-06 DIAGNOSIS — G62.9 PERIPHERAL POLYNEUROPATHY: ICD-10-CM

## 2021-05-06 PROBLEM — G25.81 RLS (RESTLESS LEGS SYNDROME): Status: ACTIVE | Noted: 2021-05-06

## 2021-05-06 PROCEDURE — 99214 OFFICE O/P EST MOD 30 MIN: CPT | Performed by: PSYCHIATRY & NEUROLOGY

## 2021-05-06 RX ORDER — LORAZEPAM 0.5 MG/1
TABLET ORAL
Qty: 1 TABLET | Refills: 0 | Status: SHIPPED | OUTPATIENT
Start: 2021-05-06 | End: 2021-11-01

## 2021-05-06 NOTE — TELEPHONE ENCOUNTER
Let  Pt know I filled the ativan  Would recommend that she does not take klonopin on same day as the ativan and she will need  for the study

## 2021-05-06 NOTE — ASSESSMENT & PLAN NOTE
Pt here for MS follow up  Pt last seen in Nov  Since last visit no recent infections  No recent hospitalizations  Pt had covid vaccination  Pt notes some increased heaviness of the lower ext  Pt with known neuropathy in feet as well  Pt rec to have mri t spine for further eval  Last imaging in 2018 with various chronic prior lesions in t spine, none active in 2018  Pt to call me 2 days after imaging  Exam stable

## 2021-05-06 NOTE — ASSESSMENT & PLAN NOTE
n mirapex  Following up with sleep med next week  Pt had noted some increased lower ext swelling and more eating compulsion  Much improved at this time

## 2021-05-06 NOTE — PROGRESS NOTES
Patient ID: Kobe Barrera is a 66 y o  female  Assessment/Plan:    Multiple sclerosis ([address] )  Pt here for MS follow up  Pt last seen in Nov  Since last visit no recent infections  No recent hospitalizations  Pt had covid vaccination  Pt notes some increased heaviness of the lower ext  Pt with known neuropathy in feet as well  Pt rec to have mri t spine for further eval  Last imaging in 2018 with various chronic prior lesions in t spine, none active in 2018  Pt to call me 2 days after imaging  Exam stable    Peripheral polyneuropathy  Pt with known neuropathy  Pt had neuropathy labs  Labs good including paraneoplastic panel  Pt with slightly low gamma globulin on spep  Pt also followed up with pcp as well  Vit b12, 6 and 1 good  Pt remains on gabapentin      RLS (restless legs syndrome)  n mirapex  Following up with sleep med next week  Pt had noted some increased lower ext swelling and more eating compulsion  Much improved at this time         Diagnoses and all orders for this visit:    Multiple sclerosis ([facility] [address] )  -     MRI thoracic spine wo contrast; Future    Peripheral polyneuropathy           Subjective:    HPI    Kobe Barrera is a 66 y o  female  presents for MS follow up, last seen in 11/19/20 by me   Per my last note "Patient with history of MS dating back to 2008, but likely with symptoms even in 1998  Patient also with PMH of breast cancer s/p left mastectomy in August 2012   Pt has been on Copaxone since 2008 and changed to the 40mg TIW dosing    Pt notes she has to go for colonoscopy due to abn stool screening testing   Pt notes she is also being considered for some breast reconstruction with autologous fat transfer at mastectomy site   Pt is following up with her doctors   Pt notes overall still dealing with her walking and trying to help her left leg and her balance   Pt has rx from pcp for PT   Pt feels PT has been very helpful   Pt also had reconstructive surgery on breast in jan 28th and still working on healing with follow up visits with her surgeon     Since last visit pt had same day procedure with right breast reconstruction   Pt notes she had open area that was poorly healing   Now area is closed and no abx   Pt notes her biggest issue at this time is poor sleep related to not staying asleep as well as neurpathic pain in the legs   Pt cannot take lyrica or cymbalta as tried in past   Pt still on her baclofen, gabapentin and zonegran   Pt had updated labs in July and nl cbc diff and lfts   Rev in depth with pt  Pt notes she had one fall while in storage/ sewing area and fell trying to lift something and hurt left trapezius area   This is now improved since PT   Pt remains on brand copaxone and padmini med well "  Kept above paragraph due to longevity and complexiites of pt case  Pt last seen in nov  Overall no recurrent infections  Pt has gotten her covid vaccination  Pt did fairly well with her vaccine  Pt denies any new sxs  Pt had one fall since last visit  No change in bowel or bladder  No LOC, no seizure  No change in speech or swallowing  No change in vision  No loss of vision  No diplopia  No loss of vision  No headache, no nausea or vomiting  No recent hospitalizations  No recent infections  Pt remains on brand copaxone and is continuing to work with her pharmacy to ensure coverage  Social work also avail to help if needed  Pt is continuing to help grandson doing virtual schooling as well as helping him to manage his DM  Pt had his labs on 4/26/21 with nl cbc diff and lfts  Pt did have slightly low gfr of 55 in may labs for pcp  Neg paraneoplastic panel  spep nl except for slightly low gamma globulin, unlikely of clinical significance  Pt to follow up with pcp  Vit b12, b1 and b6 ok  Lyme neg  Folate normal    Pt now using fit bit which is also helping with her activity as well as her sleep      Rev prior mri t spine with chronic t spine lesions at T2/3, T1/2, T7 and T8/9  Pt notes some int heaviness of the legs  rec updated study as this last study was from [address] [place] [address] of 2018 to ensure no new lesions  Pt tried to stop mirapex but had increased pain and burning in the lower ext  Pt notes feeling of being off balance  Pt is seeing sleep med next week  Pt does note she is sleeping better currently  Pt was having issues with lower ext pitting edema which has resolved  Pt cont on brand ampyra as well  No new radicular or myelopathic sxs at this time          The following portions of the patient's history were reviewed and updated as appropriate: allergies, current medications, past family history, past medical history, past social history, past surgical history and problem list and ros rev and med rec rev  Objective:    Blood pressure 140/76, pulse 66, height 5' 4" (1 626 m), weight 64 4 kg (142 lb)  Physical Exam  Constitutional:       General: She is not in acute distress  Appearance: She is not ill-appearing  Eyes:      General: Lids are normal       Extraocular Movements: Extraocular movements intact  Pupils: Pupils are equal, round, and reactive to light  Musculoskeletal:      Right lower leg: No edema  Left lower leg: No edema  Neurological:      Mental Status: She is alert  Deep Tendon Reflexes: Strength normal and reflexes are normal and symmetric  Psychiatric:         Speech: Speech normal          Neurological Exam  Mental Status  Alert  Recent and remote memory are intact  Speech is normal  Language is fluent with no aphasia  Attention and concentration are normal  Fund of knowledge is appropriate for level of education  Cranial Nerves  CN II: Visual acuity is normal  Visual fields full to confrontation  Right funduscopic exam: disc intact  Left funduscopic exam: disc intact  CN III, IV, VI: Extraocular movements intact bilaterally  Normal lids and orbits bilaterally   Pupils equal round and reactive to light bilaterally  CN V: Facial sensation is normal   CN VII: Full and symmetric facial movement  CN VIII: Hearing is normal   CN IX, X: Palate elevates symmetrically  Normal gag reflex  CN XI: Shoulder shrug strength is normal   CN XII: Tongue midline without atrophy or fasciculations  Motor  Normal muscle bulk throughout  Normal muscle tone  No abnormal involuntary movements  Strength is 5/5 throughout all four extremities  Sensory  Dec vib chanelle lowers  Reflexes  Deep tendon reflexes are 2+ and symmetric in all four extremities with downgoing toes bilaterally  Coordination  Right: Finger-to-nose normal  Rapid alternating movement normal   Left: Finger-to-nose normal  Rapid alternating movement normal     Gait  Casual gait: Wide stance  ROS:    Review of Systems   Constitutional: Positive for fatigue  Negative for appetite change and fever  HENT: Negative  Negative for hearing loss, tinnitus, trouble swallowing and voice change  Eyes: Negative  Negative for photophobia and pain  Respiratory: Negative  Negative for shortness of breath  Cardiovascular: Negative  Negative for palpitations  Gastrointestinal: Negative  Negative for nausea and vomiting  Endocrine: Negative  Negative for cold intolerance  Genitourinary: Negative  Negative for dysuria, frequency and urgency  Musculoskeletal: Negative  Negative for myalgias and neck pain  Skin: Negative  Negative for rash  Neurological: Positive for weakness (BL legs) and numbness (feet)  Negative for dizziness, tremors, seizures, syncope, facial asymmetry, speech difficulty, light-headedness and headaches  Hematological: Negative  Does not bruise/bleed easily  Psychiatric/Behavioral: Negative  Negative for confusion, hallucinations and sleep disturbance

## 2021-05-06 NOTE — TELEPHONE ENCOUNTER
Received patient message below  Patient received premedication for prior MRI 7/2020  Script pended below  Dr Leighton Haines - please review and sign if agreeable

## 2021-05-06 NOTE — ASSESSMENT & PLAN NOTE
Pt with known neuropathy  Pt had neuropathy labs  Labs good including paraneoplastic panel  Pt with slightly low gamma globulin on spep  Pt also followed up with pcp as well  Vit b12, 6 and 1 good  Pt remains on gabapentin

## 2021-05-06 NOTE — TELEPHONE ENCOUNTER
Silvana Shelley  to Salomon Esposito MD      5/6/21 2:54 PM  I have scheduled my MRI for June 7th at [facility] [facility]  They were very busy  and I figured it wasn't immediate  Don't know if it's going to need a precert  Also I am very claustrophobic  Can you please call in a rx to [facility] for lorazepam pre mri     Thanks   Channing Coto

## 2021-05-07 NOTE — TELEPHONE ENCOUNTER
Spoke with patient  Advised of below recommendation  Patient verbalized understanding and agreement

## 2021-05-11 ENCOUNTER — OFFICE VISIT (OUTPATIENT)
Dept: SLEEP CENTER | Facility: CLINIC | Age: 71
End: 2021-05-11
Payer: MEDICARE

## 2021-05-11 VITALS
HEIGHT: 64 IN | WEIGHT: 142 LBS | SYSTOLIC BLOOD PRESSURE: 130 MMHG | BODY MASS INDEX: 24.24 KG/M2 | DIASTOLIC BLOOD PRESSURE: 90 MMHG

## 2021-05-11 DIAGNOSIS — G25.81 RLS (RESTLESS LEGS SYNDROME): ICD-10-CM

## 2021-05-11 DIAGNOSIS — D50.9 IRON DEFICIENCY ANEMIA, UNSPECIFIED IRON DEFICIENCY ANEMIA TYPE: Primary | ICD-10-CM

## 2021-05-11 PROCEDURE — 99214 OFFICE O/P EST MOD 30 MIN: CPT | Performed by: INTERNAL MEDICINE

## 2021-05-11 NOTE — PROGRESS NOTES
Progress Note - Sleep Center   Minna Card SSW:[id number] MRN: [id number]      Reason for Visit:    66 y  o female With multiple sclerosis and severe RLS    Assessment:   the patient developed compulsive eating associated with Mirapex use  By decreasing the dosage in half, her eating is slightly less, but so is her symptom control  I will try Neupro and see if I can transition in such a way that she will not experience the same compulsion to eat  She is already on high dosages of gabapentin as well as clonazepam   She is also using baclofen  Plan:    As above    Follow up:   three months    History of Present Illness:   severe RLS and PLMD which started with multiple sclerosis  Mirapex was extremely effective for both disorders, however caused compulsive eating and associated weight gain      Review of Systems      Genitourinary post menopausal (no peroids)   Cardiology ankle/leg swelling   Gastrointestinal frequent heartburn/acid reflux and abdominal pain or cramping that disturb sleep    Neurology muscle weakness, numbness/tingling of an extremity and balance problems   Constitutional claustrophobia, fatigue and weight change   Integumentary none   Psychiatry none   Musculoskeletal joint pain, muscle aches, legs twitching/jerking and leg cramps   Pulmonary shortness of breath with activity and difficulty breathing when lying flat    ENT none   Endocrine none   Hematological none           I have reviewed and updated the review of systems as necessary     Historical Information    Past Medical History:   Diagnosis Date    Allergic 1967    seasonal    Bone pain     Breast ptosis     Depression     Fatigue     Gait disturbance     uses cane at times    Headache     Hip fracture (HCC)     Hip pain     History of transfusion 1975    placenta previa s/p work accident, no rx    Hypokalemia     Insomnia     Laceration of finger     right hand    Left ankle pain     Left knee pain     Multiple sclerosis ([address] )     Muscle strain     left lower leg    Nephrolithiasis     Osteopenia     Osteoporosis     Pain of left calf     Pneumonia     Rash     Scoliosis birth    scoliosis    Solitary pulmonary nodule     SVT (supraventricular tachycardia) (HCC)     Tingling     Urgency of urination     Urticaria     Wrist fracture, left          Past Surgical History:   Procedure Laterality Date    ANKLE SURGERY      APPENDECTOMY  11/2012    Dr Alo Guido      Enlargement procedure with prosthetic implant bilateral    AUGMENTATION MAMMAPLASTY Right 01/28/2020    implant replaced because of recall    AUGMENTATION MAMMAPLASTY Bilateral 2012    BREAST BIOPSY Left 07/23/2012    BREAST IMPLANT Right 1/28/2020    Procedure: BREAST IMPLANT EXCHANGE WITH CAPSULECTOMY;  Surgeon: Suri Young MD;  [facility];  Service: Plastics    BREAST IMPLANT REMOVAL Right 01/28/2020    implant placement, implant revision, right mastopexy    CYSTOSTOMY      with basket extraction of calculus; x2    EXCISION / BIOPSY BREAST / NIPPLE / DUCT Right 05/04/2020    scar revision to resuture non healing surgical wound    EXPLORATORY LAPAROTOMY      FOOT SURGERY      FRACTURE SURGERY  Lt wrist 9/2014 - Lt matthew femur 9/14    HIP FRACTURE SURGERY Right     Subcapital    HIP SURGERY Left     JOINT REPLACEMENT  Rt hip 10/2012    LEG SURGERY      Repair    MASTECTOMY Left 08/16/2012    SD REVISION OF RECONSTRUCTED BREAST Right 5/4/2020    Procedure: REVISION RIGHT BREAST MOUND;  Surgeon: Suri Young MD;  [facility];  Service: Plastics    SD SUSPENSION OF BREAST Right 1/28/2020    Procedure: BREAST MASTOPEXY;  Surgeon: Suri Young MD;  [facility];  Service: Plastics    REDUCTION MAMMAPLASTY Right 01/28/2020    reduced to match left side    SENTINEL LYMPH NODE BIOPSY Left 08/16/2012    SKIN BIOPSY      TONSILLECTOMY      TUBAL LIGATION      WRIST SURGERY Left          Social History     Socioeconomic History    Marital status: /Civil Union     Spouse name: None    Number of children: 2    Years of education: Completed bachelor's degree    Highest education level: None   Occupational History    Occupation: RN     Comment: Retired   Social Needs    Financial resource strain: None    Food insecurity     Worry: None     Inability: None    Transportation needs     Medical: None     Non-medical: None   Tobacco Use    Smoking status: Former Smoker     Packs/day: 1 00     Years: 35 00     Pack years: 35 00     Quit date:      Years since quittin 3    Smokeless tobacco: Never Used   Substance and Sexual Activity    Alcohol use: No    Drug use: No    Sexual activity: Yes     Partners: Male     Birth control/protection: Post-menopausal   Lifestyle    Physical activity     Days per week: None     Minutes per session: None    Stress: None   Relationships    Social connections     Talks on phone: None     Gets together: None     Attends Rastafarian service: None     Active member of club or organization: None     Attends meetings of clubs or organizations: None     Relationship status: None    Intimate partner violence     Fear of current or ex partner: None     Emotionally abused: None     Physically abused: None     Forced sexual activity: None   Other Topics Concern    None   Social History Narrative    Denied:  History of caffeine use           History   Alcohol use: Not on file       History   Smoking Status    Not on file   Smokeless Tobacco    Not on file       Family History:   Family History   Problem Relation Age of Onset    Hodgkin's lymphoma Maternal Grandfather         age at dx unk    Cancer Maternal Aunt 79        bladder    Heart disease Maternal Aunt     Colon cancer Maternal Uncle 72    Hodgkin's lymphoma Maternal Uncle 79    Coronary artery disease Mother     Hyperlipidemia Mother     Rheum arthritis Mother     Other Father         Acute myocardial infarction    No Known Problems Maternal Grandmother     No Known Problems Paternal Grandmother     No Known Problems Paternal Grandfather     No Known Problems Son     No Known Problems Son     Stroke Maternal Aunt         6 CVA before death    Breast cancer Maternal Aunt     No Known Problems Paternal Aunt     No Known Problems Paternal Aunt     No Known Problems Paternal Aunt        Medications/Allergies:      Current Outpatient Medications:     ALPHA LIPOIC ACID PO, Take by mouth, Disp: , Rfl:     Ampyra 10 MG TB12, Take 1 tablet (10 mg total) by mouth 2 (two) times a day, Disp: 180 tablet, Rfl: 3    ascorbic acid (VITAMIN C) 500 mg tablet, Take 500 mg by mouth daily, Disp: , Rfl:     baclofen 10 mg tablet, TAKE 1 TABLET EVERY        MORNING, 1 TABLET EVERY    EVENING, AND TAKE 2 TABLETSAT BEDTIME, Disp: 360 tablet, Rfl: 0    Biotin 5000 MCG TABS, , Disp: , Rfl:     Cholecalciferol (VITAMIN D3) 1000 units CAPS, Take 5,000 Units by mouth  , Disp: , Rfl:     clonazePAM (KlonoPIN) 0 5 mg tablet, 1 by mouth at bedtime  , Disp: 90 tablet, Rfl: 1    Copaxone 40 MG/ML SOSY, Inject 40mg under the skin three times weekly  , Disp: 36 mL, Rfl: 3    Cranberry 1000 MG CAPS, Take 300 mg by mouth  , Disp: , Rfl:     denosumab (PROLIA) 60 mg/mL, Inject under the skin, Disp: , Rfl:     docusate sodium (Colace) 100 mg capsule, Take 1 capsule by mouth Daily, Disp: , Rfl:     furosemide (LASIX) 20 mg tablet, Take 1 tablet (20 mg total) by mouth 2 (two) times a day, Disp: 10 tablet, Rfl: 0    gabapentin (NEURONTIN) 300 mg capsule, Take 1 capsule (300 mg total) by mouth daily at bedtime, Disp: 90 capsule, Rfl: 3    gabapentin (NEURONTIN) 600 MG tablet, Take 2 tablets (1,200 mg total) by mouth 3 (three) times a day, Disp: 540 tablet, Rfl: 3    lisinopril (ZESTRIL) 20 mg tablet, Take 1 tablet (20 mg total) by mouth daily, Disp: 90 tablet, Rfl: 1    LORazepam (ATIVAN) 0 5 mg tablet, Take 1 tablet 30 minutes prior to MRI, Disp: 1 tablet, Rfl: 0    Magnesium 500 MG TABS, Take 500 tablets by mouth once, Disp: , Rfl:     pramipexole (Mirapex) 0 25 mg tablet, Take 0 5 tablets by mouth daily, Disp: , Rfl:     RESTASIS 0 05 % ophthalmic emulsion, , Disp: , Rfl: 0    solifenacin (VESICARE) 10 MG tablet, Take 1 tablet (10 mg total) by mouth daily, Disp: 90 tablet, Rfl: 3    zonisamide (ZONEGRAN) 100 mg capsule, Take 4 capsules (400 mg total) by mouth daily at bedtime, Disp: 360 capsule, Rfl: 3    potassium chloride (K-DUR,KLOR-CON) 10 mEq tablet, Take 1 tablet (10 mEq total) by mouth daily for 10 days, Disp: 10 tablet, Rfl: 0    rotigotine (NEUPRO) 2 MG/24HR, Place 1 patch on the skin daily, Disp: 30 patch, Rfl: 5  No current facility-administered medications for this visit  Facility-Administered Medications Ordered in Other Visits:     bacitracin 50,000 Units, gentamicin (GARAMYCIN) 40 mg/mL 80 mg, ceFAZolin (ANCEF) 1,000 mg in sodium chloride 0 9 % 1,000 mL irrigation bottle, , Irrigation, Once, Lit Steinberg MD      Objective    Vital Signs:   Vitals:    05/11/21 0816   BP: 130/90   Weight: 64 4 kg (142 lb)   Height: 5' 4" (1 626 m)     [place] Sleepiness Scale: Total score: 8    Physical Exam:    General: Alert, appropriate, cooperative, overweight    Head: NC/AT    Skin: Warm, dry    Neuro: No motor abnormalities, cranial nerves appear intact    Psych: Normal affect            ROGER Mckeon    Board Certified Sleep Specialist

## 2021-05-13 ENCOUNTER — TELEPHONE (OUTPATIENT)
Dept: FAMILY MEDICINE CLINIC | Facility: OTHER | Age: 71
End: 2021-05-13

## 2021-05-13 ENCOUNTER — TELEPHONE (OUTPATIENT)
Dept: SLEEP CENTER | Facility: CLINIC | Age: 71
End: 2021-05-13

## 2021-05-13 DIAGNOSIS — E78.5 HYPERLIPIDEMIA, UNSPECIFIED HYPERLIPIDEMIA TYPE: Primary | ICD-10-CM

## 2021-05-13 RX ORDER — ATORVASTATIN CALCIUM 40 MG/1
40 TABLET, FILM COATED ORAL DAILY
Qty: 30 TABLET | Refills: 1 | Status: SHIPPED | OUTPATIENT
Start: 2021-05-13 | End: 2021-11-01

## 2021-05-13 NOTE — TELEPHONE ENCOUNTER
----- Message from Marquis Bobby MD sent at 5/13/2021  7:54 AM EDT -----  Hi Mrs Emmy Matute,     Your LDL "bad cholesterol" is mildly elevated  I recommend a low fat diet  I also recommend beginning a cholesterol lowering medication such a Lipitor to decrease risk of heart disease  Please let me know if you have any questions or concerns       Dr Negin Slater

## 2021-05-13 NOTE — RESULT ENCOUNTER NOTE
Hi Mrs Luceroa Janak,     Your LDL "bad cholesterol" is mildly elevated  I recommend a low fat diet  I also recommend beginning a cholesterol lowering medication such a Lipitor to decrease risk of heart disease  Please let me know if you have any questions or concerns       Dr Villarreal Pro

## 2021-05-14 ENCOUNTER — APPOINTMENT (OUTPATIENT)
Dept: LAB | Facility: CLINIC | Age: 71
End: 2021-05-14
Payer: MEDICARE

## 2021-05-14 DIAGNOSIS — D50.9 IRON DEFICIENCY ANEMIA, UNSPECIFIED IRON DEFICIENCY ANEMIA TYPE: ICD-10-CM

## 2021-05-14 LAB — FERRITIN SERPL-MCNC: 67 NG/ML (ref 8–388)

## 2021-05-14 PROCEDURE — 36415 COLL VENOUS BLD VENIPUNCTURE: CPT

## 2021-05-14 PROCEDURE — 82728 ASSAY OF FERRITIN: CPT

## 2021-05-20 ENCOUNTER — TELEPHONE (OUTPATIENT)
Dept: SLEEP CENTER | Facility: CLINIC | Age: 71
End: 2021-05-20

## 2021-05-20 NOTE — TELEPHONE ENCOUNTER
Patient left message, states she had ferritin level drawn and would like to know if there is anything that needs to be done?     Ferritin level was 67    Please advise

## 2021-05-29 DIAGNOSIS — G35 MULTIPLE SCLEROSIS (HCC): ICD-10-CM

## 2021-05-29 RX ORDER — ZONISAMIDE 100 MG/1
CAPSULE ORAL
Qty: 360 CAPSULE | Refills: 3 | Status: SHIPPED | OUTPATIENT
Start: 2021-05-29 | End: 2022-01-21 | Stop reason: SDUPTHER

## 2021-06-07 ENCOUNTER — HOSPITAL ENCOUNTER (OUTPATIENT)
Dept: MRI IMAGING | Facility: HOSPITAL | Age: 71
Discharge: HOME | End: 2021-06-07
Attending: PSYCHIATRY & NEUROLOGY
Payer: MEDICARE

## 2021-06-07 DIAGNOSIS — G35 MULTIPLE SCLEROSIS (HCC): ICD-10-CM

## 2021-06-07 PROCEDURE — G1004 CDSM NDSC: HCPCS

## 2021-06-07 PROCEDURE — 72146 MRI CHEST SPINE W/O DYE: CPT

## 2021-06-09 ENCOUNTER — HOSPITAL ENCOUNTER (OUTPATIENT)
Dept: ULTRASOUND IMAGING | Facility: CLINIC | Age: 71
Discharge: HOME | End: 2021-06-09
Payer: MEDICARE

## 2021-06-09 ENCOUNTER — TRANSCRIBE ORDERS (OUTPATIENT)
Dept: MAMMOGRAPHY | Facility: CLINIC | Age: 71
End: 2021-06-09

## 2021-06-09 VITALS — BODY MASS INDEX: 24.24 KG/M2 | WEIGHT: 142 LBS | HEIGHT: 64 IN

## 2021-06-09 DIAGNOSIS — Z09 FOLLOW-UP EXAM, 3-6 MONTHS SINCE PREVIOUS EXAM: ICD-10-CM

## 2021-06-09 DIAGNOSIS — R92.8 ABNORMAL FINDINGS ON DIAGNOSTIC IMAGING OF BREAST: Primary | ICD-10-CM

## 2021-06-09 PROCEDURE — 76642 ULTRASOUND BREAST LIMITED: CPT

## 2021-06-14 ENCOUNTER — TELEPHONE (OUTPATIENT)
Dept: SLEEP CENTER | Facility: CLINIC | Age: 71
End: 2021-06-14

## 2021-06-14 NOTE — TELEPHONE ENCOUNTER
----- Message from Cammy Brambila sent at 6/14/2021 11:52 AM EDT -----  Regarding: Non-Urgent Medical Question  Contact: [phone]  Dr Chaneta Collet,  I have been on the Neupro for approximately 3 weeks now  I have had increasing difficulty with pain and burning in the legs at night and it actually wakes me out of a sound sleep  The pain does not allow me to return to sleep and does not achieve any effect from NSAID  My sleep pattern has been deteriorating  On the positive side, I no longer am compulsively eating and may have a shot at losing some of the weight that I put on  Walking has started to become less painful  I have lost my addiction to sugar and carbohydrates  Do you want me to increase the dose or is there something else I should be doing? I really don't want to take Mirapex with it since I will then look like a pear and OD on M&M's again!!  Please advise  Thank you    Inocencio Alan

## 2021-06-16 ENCOUNTER — OFFICE VISIT (OUTPATIENT)
Dept: OBGYN CLINIC | Facility: MEDICAL CENTER | Age: 71
End: 2021-06-16
Payer: MEDICARE

## 2021-06-16 ENCOUNTER — APPOINTMENT (OUTPATIENT)
Dept: RADIOLOGY | Facility: MEDICAL CENTER | Age: 71
End: 2021-06-16
Payer: MEDICARE

## 2021-06-16 VITALS
BODY MASS INDEX: 24.17 KG/M2 | DIASTOLIC BLOOD PRESSURE: 81 MMHG | HEIGHT: 64 IN | HEART RATE: 71 BPM | TEMPERATURE: 97.5 F | WEIGHT: 141.6 LBS | SYSTOLIC BLOOD PRESSURE: 154 MMHG

## 2021-06-16 DIAGNOSIS — Z96.641 HISTORY OF TOTAL HIP REPLACEMENT, RIGHT: ICD-10-CM

## 2021-06-16 DIAGNOSIS — M25.551 PAIN IN RIGHT HIP: ICD-10-CM

## 2021-06-16 DIAGNOSIS — M25.551 PAIN IN RIGHT HIP: Primary | ICD-10-CM

## 2021-06-16 PROCEDURE — 73502 X-RAY EXAM HIP UNI 2-3 VIEWS: CPT

## 2021-06-16 PROCEDURE — 99214 OFFICE O/P EST MOD 30 MIN: CPT | Performed by: ORTHOPAEDIC SURGERY

## 2021-06-16 NOTE — PROGRESS NOTES
Assessment/Plan     1  Pain in right hip    2  History of total hip replacement, right      Orders Placed This Encounter   Procedures    XR hip/pelv 2-3 vws right if performed    Ambulatory referral to Physical Therapy     66 y o  female with right hip pain with a history of a total hip replacement in 2012 by Dr Colan Ormond, mild TTP over greater trochanter  -R hip implants appear stable  -We discussed a work up vs  Getting started with conservative treatment  She would prefer to start with PT  -Physical therapy script was given to begin stretching and strengthening exercises for the next 6 weeks    -Patient may continue taking her Naproxen as needed for pain    -I discussed that the patient may apply ice or heat to the area for comfort and relief  Return for Follow up with Dr Colan Ormond 6-8 weeks from now for next step if pain persists  I answered all of the patient's questions during the visit and provided education of the patient's condition during the visit  The patient verbalized understanding of the information given and agrees with the plan  This note was dictated using eCert software  It may contain errors including improperly dictated words  Please contact physician directly for any questions  History of Present Illness   Chief complaint:   Chief Complaint   Patient presents with    Right Hip - Pain       HPI: Thor Krabbe is a 66 y o  female that c/o right hip pain  Patient stated that on 4/01/2012 she fractured her right femur and Dr Colan Ormond placed pins about her right femur  She stated that she had the pins removed at the end of May 2012  She stated that on 10/01/2012 she had a right hip replacement done by Dr Colan Ormond  She developed breast cancer between the pins being removed and the replacement  She stated that she has MS and falls frequently her last fall being April 2020  Patient uses a cane for ambulation   Patient stated that she has began to feel pain about the right hip for the last few months without incidence of trauma or fall  She stated that the right hip is stiff and feels painful to lay on  She stated the incision feels tender to the touch  She stated that most of her pain is on the lateral aspect of the hip  She stated that she experiences locking about the right hip  She stated that the right hip begins to feel heavy at times  She stated that she takes Naproxen for pain  She denies any complications after her R IDALIA including infection or dislocation  She was hoping to see Dr Arvin Edwards but could not wait until August for an appointment  Dr Lynnette Us performed surgery on her L femur  She was happy with her care from both physicians  ROS:    See HPI for musculoskeletal review     All other systems reviewed are negative     Historical Information   Past Medical History:   Diagnosis Date    Allergic 1967    seasonal    Bone pain     Breast ptosis     Depression     Fatigue     Gait disturbance     uses cane at times    Headache     Hip fracture (HCC)     Hip pain     History of transfusion 1975    placenta previa s/p work accident, no rx    Hypokalemia     Insomnia     Laceration of finger     right hand    Left ankle pain     Left knee pain     Multiple sclerosis (HCC)     Muscle strain     left lower leg    Nephrolithiasis     Osteopenia     Osteoporosis     Pain of left calf     Pneumonia     Rash     Scoliosis birth    scoliosis    Solitary pulmonary nodule     SVT (supraventricular tachycardia) (HCC)     Tingling     Urgency of urination     Urticaria     Wrist fracture, left      Past Surgical History:   Procedure Laterality Date    ANKLE SURGERY      APPENDECTOMY  11/2012    Dr Patti Tejada      Enlargement procedure with prosthetic implant bilateral    AUGMENTATION MAMMAPLASTY Right 01/28/2020    implant replaced because of recall    AUGMENTATION MAMMAPLASTY Bilateral 2012    BREAST BIOPSY Left 07/23/2012    BREAST IMPLANT Right 2020    Procedure: BREAST IMPLANT EXCHANGE WITH CAPSULECTOMY;  Surgeon: Trevor Almanza MD;  [facility];  Service: Plastics    BREAST IMPLANT REMOVAL Right 2020    implant placement, implant revision, right mastopexy    CYSTOSTOMY      with basket extraction of calculus; x2    EXCISION / BIOPSY BREAST / Mabeline Markus / DUCT Right 2020    scar revision to resuture non healing surgical wound    EXPLORATORY LAPAROTOMY      FOOT SURGERY      FRACTURE SURGERY  Lt wrist 2014 - Lt matthew femur     HIP FRACTURE SURGERY Right     [employer]    HIP SURGERY Left     JOINT REPLACEMENT  Rt hip 10/2012    LEG SURGERY      Repair    MASTECTOMY Left 2012    WA REVISION OF RECONSTRUCTED BREAST Right 2020    Procedure: REVISION RIGHT BREAST MOUND;  Surgeon: Trevor Almanza MD;  [facility];  Service: Plastics    WA SUSPENSION OF BREAST Right 2020    Procedure: BREAST MASTOPEXY;  Surgeon: Trevor Almanza MD;  [facility];  Service: Plastics    REDUCTION MAMMAPLASTY Right 2020    reduced to match left side    SENTINEL LYMPH NODE BIOPSY Left 2012    SKIN BIOPSY      TONSILLECTOMY      TUBAL LIGATION      WRIST SURGERY Left      Social History   Social History     Substance and Sexual Activity   Alcohol Use No     Social History     Substance and Sexual Activity   Drug Use No     Social History     Tobacco Use   Smoking Status Former Smoker    Packs/day: 1 00    Years: 35 00    Pack years: 35 00    Quit date:     Years since quittin 4   Smokeless Tobacco Never Used     Family History:   Family History   Problem Relation Age of Onset    Hodgkin's lymphoma Maternal Grandfather         age at dx unk    Cancer Maternal Aunt 79        bladder    Heart disease Maternal Aunt     Colon cancer Maternal Uncle 72    Hodgkin's lymphoma Maternal Uncle 79    Coronary artery disease Mother     Hyperlipidemia Mother     Rheum arthritis Mother     Other Father         Acute myocardial infarction    No Known Problems Maternal Grandmother     No Known Problems Paternal Grandmother     No Known Problems Paternal Grandfather     No Known Problems Son     No Known Problems Son     Stroke Maternal Aunt         6 CVA before death    Breast cancer Maternal Aunt     No Known Problems Paternal Aunt     No Known Problems Paternal Aunt     No Known Problems Paternal Aunt        Meds/Allergies   (Not in a hospital admission)    Allergies   Allergen Reactions    Duloxetine Hcl      Rapid Heart rate      Iodinated Diagnostic Agents Anaphylaxis    Acetaminophen Other (See Comments)     Heart palpitations    Cetirizine      Only occurs with generic, weakness in legs, off balance and couldn't walk   Morphine Other (See Comments)     Migraine       Objective   Vitals: Blood pressure 154/81, pulse 71, temperature 97 5 °F (36 4 °C), height 5' 4" (1 626 m), weight 64 2 kg (141 lb 9 6 oz)  ,Body mass index is 24 31 kg/m²  PE:  AAOx 3  WDWN   Hearing intact, no drainage from eyes  Regular rate  no audible wheezing  no abdominal distension  LE compartments soft, skin intact    right hip:   No dislocation/deformity  Scar over posterolateral aspect of hip  No obvious swelling   No erythema or ecchymosis   ROM: 0-100 pain free hip flexion   0-30 IR pain noted   0-35 ER pain noted   TTP over greater trochanter  Tenderness posterior greater trochanter   No TTP over SIJ  No tenderness lumbar spine   Neg  Winnie test  Neg   Sergio's test  Abduction 4/5   AT/GS intact    Neurovascular:   Decreased sensation L4, L5 and S1 on left, normal on right   EHL great toe 3/5 left, 5/5 right   AT 3/5, 5/5 right   GS 4/5, 5/5 right   5/5 strength right; 4/5 left   3/5 iliopsoas left, 4/5 right   (above is at baseline for patient)     Back:    No TTP over lumbar spinous processes, paraspinal musculature  Negative SLR      Imaging Studies: I have personally reviewed pertinent films in PACS and my impression of right hip XR is right hip hardware in good anatomic alignment, possible focal area of osteolysis superior to acetabular cup otherwise no signs of loosening          Scribe Attestation    I,:  Geri Choudhary am acting as a scribe while in the presence of the attending physician :       I,:  Bill Maker, DO personally performed the services described in this documentation    as scribed in my presence :

## 2021-06-19 NOTE — PROGRESS NOTES
PT Evaluation     Today's date: 2021  Patient name: Char Ly  : 1950  MRN: [id number]  Referring provider: Ruthie Lee DO  Dx:   Encounter Diagnosis     ICD-10-CM    1  Pain in right hip  M25 551 Ambulatory referral to Physical Therapy   2  History of total hip replacement, right  Z96 641 Ambulatory referral to Physical Therapy                  Assessment  Assessment details: Char Ly is a 66 y o  female who presents with signs and symptoms consistent of right hip pain similar to Greater trochanteric pain syndrome  Patient presents with pain in her right hip (posterolateral region), decreased ROM, reduced flexibility, and decreased strength/muscle performance  Positive ADINA, FADIR, Scour, and SLR present on the right  Lumbar spine ROM did not reproduce comparable sign  Pt's gait is abnormal with decreased sylvia, step length, and hip IR during limb advancement  Due to these impairments, Patient has difficulty performing lying on her right side, ambulating, transferring, standing, and performing WB ADLs  Patient would benefit from skilled physical therapy to address the impairments, improve their level of function, and to improve their overall quality of life  Impairments: abnormal coordination, abnormal gait, abnormal or restricted ROM, abnormal movement, activity intolerance, impaired balance, impaired physical strength, lacks appropriate home exercise program and pain with function  Barriers to therapy: MS, complex history of surgery in bilateral hips, deconditioning, Osteoporosis, Fall risk, Peripheral neuropathy  Understanding of Dx/Px/POC: good   Prognosis: good    Goals  Short Term Goals: to be achieved by 4 weeks  1) Patient to be independent with basic HEP  2) Decrease pain to 3/10 at its worst   3) Increase right hip ROM by 5-10 degrees   4) Increase LE strength by 1/2 MMT grade in all deficient planes      Long Term Goals: to be achieved by discharge  1) FOTO equal to or greater than expected (50)  2) Ambulation to improve to maximal level of function  3) Stair negotiation will improve to reciprocal   4) Sit to stand transfers will improve to maximal level of function       Plan  Patient would benefit from: PT eval and skilled physical therapy  Planned therapy interventions: joint mobilization, manual therapy, neuromuscular re-education, patient education, therapeutic activities, therapeutic exercise and home exercise program  Frequency: 2x per week for 6 weeks  Treatment plan discussed with: patient        Subjective Evaluation    History of Present Illness  Mechanism of injury: History of Current Injury: Pt referred to PT secondary to right hip pain  PMH significant for right total hip replacement in 2012 after ORIF  PT also has IM nailing in left femur  Pt saw Dr Peter Medel who XR the left hip and and prescribed PT  Pt notes gradual onset of pain in the right hip which is worsening  No trauma, fall, or inciting event  Pt has gained weight over this time period due to new medication side effects over the last 6 months  Pt feels the right hip is very stiff and feels like her legs are "concrete " Pt notes worsening balance secondary to hip issue  Pain location/Descriptors: right lateral hip pain which feel stiff and heavy  Symptoms can radiate from posterolateral hip to mid thigh  Aggravating factors: walking (in the grocery store), squatting, prolonging sitting or standing   Eases: Naprosyn (once a day)   24 HR pattern: Worse after 5 PM  Imaging: XRs of R hip demonstrate stable prosthesis according to last physician note  Special Questions: Pt notes constant numbness/tingling from the MS  Patient goals:  Improve ambulation without assisted device, Improve motion in right hip, relieve pain in right hip       Occupation: Retired nurse     Pain  Current pain ratin  At worst pain rating: 10          Objective     Neurological Testing     Sensation     Lumbar Left   Diminished: light touch    Right   Intact: light touch    Comments   Left light touch: L5 distally at ankle    Reflexes   Left   Patellar (L4): trace (1+)  Achilles (S1): trace (1+)  Clonus sign: negative    Right   Patellar (L4): trace (1+)  Achilles (S1): trace (1+)  Clonus sign: negative    Active Range of Motion     Lumbar   Flexion:  WFL  Extension:  WFL  Left lateral flexion:  WFL  Right lateral flexion:  WFL  Left rotation:  WFL  Right rotation:  [facility]    Additional Active Range of Motion Details  *Right posterolateral hip pain with left side bending   *Quadrant was normal bilaterally     Passive Range of Motion   Left Hip   Flexion: 120 degrees   External rotation (90/90): 60 degrees   Internal rotation (90/90): 50 degrees     Right Hip   Flexion: 90 degrees with pain  External rotation (90/90): 30 degrees with pain  Internal rotation (90/90): 30 degrees with pain    Strength/Myotome Testing     Left Hip   Planes of Motion   Flexion: 3  Adduction: 3    Right Hip   Planes of Motion   Flexion: 3  Abduction: 2+  Adduction: 3  External rotation: 3    Left Knee   Flexion: 3+  Extension: 3+    Right Knee   Flexion: 3+  Extension: 3+    Left Ankle/Foot   Dorsiflexion: 3  Plantar flexion: 3+    Right Ankle/Foot   Dorsiflexion: 3+  Plantar flexion: 4-    Tests     Right Hip   Positive ADINAEZ and milton  SLR: Positive  Additional Tests Details  SLR: 45 degrees R (pain in posterolateral hip), 55 degrees L (hamstring stretch)   *Pt unable to perform SLR on the R, able to on left      Ambulation     Observational Gait   Gait: antalgic, asymmetric and circumduction   Decreased walking speed and stride length  Additional Observational Gait Details  Gait: internally rotated femur on the right causing excessive toeing in during stance phase  Gait is antalgic, slow sylvia, and small step length                Precautions: MS, Gait dysfunction, Insomnia, Osteoporosis, Peripheral polyneuropathy, Hx of Breast CA, R IDALIA in 2012- Dr Whitman Boards, Left femur IM nailing      Manuals             Right hip PROM             Right sciatic nerve glide             STM (rolling) right lateral hip                           Neuro Re-Ed             Bridge             SL clam shell             TrA March             SLR                                                    Ther Ex             Nu Step             LTR             Piriformis stretch             Leg press             Long arc quad             Hamstring curl                                        Ther Activity                                       Gait Training             Focus on reducing IR during limb advancement and during stance nv                         Modalities                                         Pt given a HEP with verbal and written instructions: Access Code [email or web address]

## 2021-06-22 ENCOUNTER — EVALUATION (OUTPATIENT)
Dept: PHYSICAL THERAPY | Facility: CLINIC | Age: 71
End: 2021-06-22
Payer: MEDICARE

## 2021-06-22 DIAGNOSIS — Z96.641 HISTORY OF TOTAL HIP REPLACEMENT, RIGHT: ICD-10-CM

## 2021-06-22 DIAGNOSIS — M25.551 PAIN IN RIGHT HIP: ICD-10-CM

## 2021-06-22 PROCEDURE — 97163 PT EVAL HIGH COMPLEX 45 MIN: CPT | Performed by: PHYSICAL THERAPIST

## 2021-06-22 PROCEDURE — 97110 THERAPEUTIC EXERCISES: CPT | Performed by: PHYSICAL THERAPIST

## 2021-06-23 ENCOUNTER — OFFICE VISIT (OUTPATIENT)
Dept: PHYSICAL THERAPY | Facility: CLINIC | Age: 71
End: 2021-06-23
Payer: MEDICARE

## 2021-06-23 DIAGNOSIS — Z96.641 HISTORY OF TOTAL HIP REPLACEMENT, RIGHT: ICD-10-CM

## 2021-06-23 DIAGNOSIS — M25.551 PAIN IN RIGHT HIP: Primary | ICD-10-CM

## 2021-06-23 PROCEDURE — 97112 NEUROMUSCULAR REEDUCATION: CPT

## 2021-06-23 PROCEDURE — 97110 THERAPEUTIC EXERCISES: CPT

## 2021-06-23 PROCEDURE — 97140 MANUAL THERAPY 1/> REGIONS: CPT

## 2021-06-23 NOTE — TELEPHONE ENCOUNTER
Called patient and made aware of increase in Neupro from 2 to 3mg  Advised refill sent to [facility] in [place]

## 2021-06-23 NOTE — PROGRESS NOTES
Daily Note     Today's date: 2021  Patient name: Ovidio Remy  : 1950  MRN: [id number]  Referring provider: Sang Samuel DO  Dx:   Encounter Diagnosis     ICD-10-CM    1  Pain in right hip  M25 551    2  History of total hip replacement, right  Z96 641                   Subjective: Pt c/o R LE stiffness today  Reports she had difficulty performing HEP last night  Objective: See treatment diary below      Assessment: Tolerated treatment fair  Patient would benefit from continued PT  R hip discomfort noted w/ LTR to the R  Pt needs reminders not to hold her breath when performing supine march w/ abdominal bracing  Unable to perform SL clamshells w/out compensation; tolerates supine hip abduction w/ YTB  Plan: Progress treatment as tolerated         Precautions: MS, Gait dysfunction, Insomnia, Osteoporosis, Peripheral polyneuropathy, Hx of Breast CA, R IDALIA in 2012- Dr Humberto Fatima, Left femur IM nailing      Manuals             Right hip PROM  8'           Right sciatic nerve glide             STM (rolling) right lateral hip   5'                        Neuro Re-Ed             Bridge  x15           SL clam shell  supine YTB 2x10           TrA March  x5 ea           SLR                                                    Ther Ex             Nu Step  5' lv 1           LTR  10x5" ea           Piriformis stretch             Leg press             Long arc quad             Hamstring curl                                        Ther Activity                                       Gait Training             Focus on reducing IR during limb advancement and during stance nv                         Modalities

## 2021-06-28 ENCOUNTER — OFFICE VISIT (OUTPATIENT)
Dept: PHYSICAL THERAPY | Facility: CLINIC | Age: 71
End: 2021-06-28
Payer: MEDICARE

## 2021-06-28 DIAGNOSIS — M25.551 PAIN IN RIGHT HIP: Primary | ICD-10-CM

## 2021-06-28 DIAGNOSIS — Z96.641 HISTORY OF TOTAL HIP REPLACEMENT, RIGHT: ICD-10-CM

## 2021-06-28 PROCEDURE — 97110 THERAPEUTIC EXERCISES: CPT | Performed by: PHYSICAL THERAPIST

## 2021-06-28 PROCEDURE — 97112 NEUROMUSCULAR REEDUCATION: CPT | Performed by: PHYSICAL THERAPIST

## 2021-06-28 PROCEDURE — 97140 MANUAL THERAPY 1/> REGIONS: CPT | Performed by: PHYSICAL THERAPIST

## 2021-06-28 NOTE — PROGRESS NOTES
Daily Note     Today's date: 2021  Patient name: Jamila Gavin  : 1950  MRN: [id number]  Referring provider: aJnet Pratt DO  Dx:   Encounter Diagnosis     ICD-10-CM    1  Pain in right hip  M25 551    2  History of total hip replacement, right  Z96 641                   Subjective: Pt notes slow but progressive improvement in motion and pain  Objective: See treatment diary below      Assessment: Considerable limitation in hip motion on the R in ER compared to IR  Implemented motor control exercises today with good tolerance  Pt fairly fatigued with program; however, was able to perform more today than 1st session  Patient would benefit from continued PT  Plan: Continue per plan of care        Precautions: MS, Gait dysfunction, Insomnia, Osteoporosis, Peripheral polyneuropathy, Hx of Breast CA, R IDALIA in - Dr Lanis Opitz, Left femur IM nailing      Manuals            Right hip PROM  8' 8'          Right sciatic nerve glide             STM (rolling) right lateral hip   5' 5'                       Neuro Re-Ed             Bridge  x15 x15          SL clam shell  supine YTB 2x10 10x          TrA March  x5 ea           SLR                                                    Ther Ex             Nu Step  5' lv 1 7' lvl 2          LTR  10x5" ea 10x5" ea          Piriformis stretch   5x10"          Leg press             Long arc quad             Hamstring curl                                        Ther Activity                                       Gait Training             Focus on reducing IR during limb advancement and during stance nv  5'                       Modalities                                       1:1 with PT from 1215-1PM

## 2021-06-30 ENCOUNTER — TELEPHONE (OUTPATIENT)
Dept: SLEEP CENTER | Facility: CLINIC | Age: 71
End: 2021-06-30

## 2021-06-30 NOTE — TELEPHONE ENCOUNTER
----- Message from Igor Solomon sent at 6/29/2021  6:47 PM EDT -----  Regarding: Prescription Question  Contact: [phone]  Dr Julian Mendez  Phoned [facility] to check on why my Neupro had not been filled yet  The pharmacist informed me that the company said it was on back order and they weren't sure when it would be available  Had I not checked I am not sure how long it would have taken for them to notify me  What would you like me to do? Please let me know  Thank you so much     Britney Cooper

## 2021-07-01 ENCOUNTER — OFFICE VISIT (OUTPATIENT)
Dept: PHYSICAL THERAPY | Facility: CLINIC | Age: 71
End: 2021-07-01
Payer: MEDICARE

## 2021-07-01 DIAGNOSIS — M25.551 PAIN IN RIGHT HIP: Primary | ICD-10-CM

## 2021-07-01 DIAGNOSIS — Z96.641 HISTORY OF TOTAL HIP REPLACEMENT, RIGHT: ICD-10-CM

## 2021-07-01 PROCEDURE — 97112 NEUROMUSCULAR REEDUCATION: CPT | Performed by: PHYSICAL THERAPIST

## 2021-07-01 PROCEDURE — 97140 MANUAL THERAPY 1/> REGIONS: CPT | Performed by: PHYSICAL THERAPIST

## 2021-07-01 PROCEDURE — 97110 THERAPEUTIC EXERCISES: CPT | Performed by: PHYSICAL THERAPIST

## 2021-07-01 NOTE — PROGRESS NOTES
Daily Note     Today's date: 2021  Patient name: Magaly Chery  : 1950  MRN: [id number]  Referring provider: Marcello Dorman DO  Dx:   Encounter Diagnosis     ICD-10-CM    1  Pain in right hip  M25 551    2  History of total hip replacement, right  Z96 641        Start Time: 1140  Stop Time: 1220  Total time in clinic (min): 40 minutes    Subjective: Pt notes 2 days of not being able to do anything secondary to hip pain and LE fatigue  She did do a lot after her last PT session (grocery shopping and [employer] game)  Objective: See treatment diary below      Assessment: Pt very tender throughout right TFL and ITB region  Pt tolerated PROM fine in all planes  Decreased vigor of program today to accommodate for increased hip and RLE discomfort  Tolerated treatment fair  Considerable weakness in BLE, likely due to activity and heat  Patient would benefit from continued PT  Plan: Continue per plan of care        Precautions: MS, Gait dysfunction, Insomnia, Osteoporosis, Peripheral polyneuropathy, Hx of Breast CA, R IDALIA in - Dr Norma Salguero, Left femur IM nailing      Manuals           Right hip PROM  8' 8' 8'         Right sciatic nerve glide             STM (rolling) right lateral hip   5' 5' 5'                      Neuro Re-Ed             Bridge  x15 x15 10x         SL clam shell  supine YTB 2x10 10x          TrA March  x5 ea  x5 on each         SLR             Hookyling hip abduction isometric    20x5"                                   Ther Ex             Nu Step  5' lv 1 7' lvl 2 7' lvl 1         LTR  10x5" ea 10x5" ea 10x5" each         Piriformis stretch   5x10"          Leg press             Long arc quad             Hamstring curl                                        Ther Activity                                       Gait Training             Focus on reducing IR during limb advancement and during stance nv  5'                       Modalities 1:1 with PT [id number]

## 2021-07-06 ENCOUNTER — OFFICE VISIT (OUTPATIENT)
Dept: PHYSICAL THERAPY | Facility: CLINIC | Age: 71
End: 2021-07-06
Payer: MEDICARE

## 2021-07-06 DIAGNOSIS — G35 MULTIPLE SCLEROSIS (HCC): ICD-10-CM

## 2021-07-06 DIAGNOSIS — M25.551 PAIN IN RIGHT HIP: Primary | ICD-10-CM

## 2021-07-06 DIAGNOSIS — Z96.641 HISTORY OF TOTAL HIP REPLACEMENT, RIGHT: ICD-10-CM

## 2021-07-06 PROCEDURE — 97140 MANUAL THERAPY 1/> REGIONS: CPT | Performed by: PHYSICAL THERAPIST

## 2021-07-06 PROCEDURE — 97110 THERAPEUTIC EXERCISES: CPT | Performed by: PHYSICAL THERAPIST

## 2021-07-06 PROCEDURE — 97112 NEUROMUSCULAR REEDUCATION: CPT | Performed by: PHYSICAL THERAPIST

## 2021-07-06 RX ORDER — BACLOFEN 10 MG/1
TABLET ORAL
Qty: 360 TABLET | Refills: 0 | Status: SHIPPED | OUTPATIENT
Start: 2021-07-06 | End: 2021-10-04

## 2021-07-06 NOTE — PROGRESS NOTES
Daily Note     Today's date: 2021  Patient name: Mikayla Quiñonez  : 1950  MRN: [id number]  Referring provider: Jose C Harding DO  Dx:   Encounter Diagnosis     ICD-10-CM    1  Pain in right hip  M25 551    2  History of total hip replacement, right  Z96 641                   Subjective: Pt c/o LE soreness from cooking this weekend and being on her feet  Pt still experiencing cramping in RLE consistently each day  Objective: See treatment diary below      Assessment:  PROM tolerated well in all planes  Still restricted, stiff, and uncomfortable at end range motion  Continued with hip and pelvic strengthening  R LE still considerably weak; although improving  Progressed program with LAQ and hamstring curls  Tolerated treatment well  Patient would benefit from continued PT  Plan: Continue per plan of care        Precautions: MS, Gait dysfunction, Insomnia, Osteoporosis, Peripheral polyneuropathy, Hx of Breast CA, R IDALIA in - Dr Cindy Contreras, Left femur IM nailing      Manuals          Right hip PROM  8' 8' 8' 8'        Right sciatic nerve glide             STM (rolling) right lateral hip   5' 5' 5' 5                     Neuro Re-Ed             Bridge  x15 x15 10x         SL clam shell  supine YTB 2x10 10x  15x        TrA March  x5 ea  x5 on each 10x on each side        SLR             Hookyling hip abduction isometric    20x5"                                   Ther Ex             Nu Step  5' lv 1 7' lvl 2 7' lvl 1 7' lvl 3         LTR  10x5" ea 10x5" ea 10x5" each 10x5"        Piriformis stretch   5x10"          Leg press             Long arc quad     10x 2#        Hamstring curl      ytb 10x on each        Hamstring stretch     5x10" RLE                     Ther Activity                                       Gait Training             Focus on reducing IR during limb advancement and during stance nv  5'                       Modalities 1:1 with PT from 9720-26

## 2021-07-08 ENCOUNTER — OFFICE VISIT (OUTPATIENT)
Dept: PHYSICAL THERAPY | Facility: CLINIC | Age: 71
End: 2021-07-08
Payer: MEDICARE

## 2021-07-08 DIAGNOSIS — Z96.641 HISTORY OF TOTAL HIP REPLACEMENT, RIGHT: ICD-10-CM

## 2021-07-08 DIAGNOSIS — M25.551 PAIN IN RIGHT HIP: Primary | ICD-10-CM

## 2021-07-08 PROCEDURE — 97140 MANUAL THERAPY 1/> REGIONS: CPT | Performed by: PHYSICAL THERAPIST

## 2021-07-08 PROCEDURE — 97110 THERAPEUTIC EXERCISES: CPT | Performed by: PHYSICAL THERAPIST

## 2021-07-08 PROCEDURE — 97112 NEUROMUSCULAR REEDUCATION: CPT | Performed by: PHYSICAL THERAPIST

## 2021-07-08 NOTE — PROGRESS NOTES
Daily Note     Today's date: 2021  Patient name: Mirela Mccurdy  : 1950  MRN: [id number]  Referring provider: Josef Lundberg DO  Dx:   Encounter Diagnosis     ICD-10-CM    1  Pain in right hip  M25 551    2  History of total hip replacement, right  Z96 641                   Subjective: Pt c/o worsening right hip pain today  She is also experiencing numbness in distal LE bilaterally  Objective: See treatment diary below      Assessment: Moderate soreness present in lateral hip musculature from greater trochanter to distal ITB  Hip ER still considerably weakn (3-/5)  Tolerated treatment fair  Pt more fatigued today than previous sessions  Patient would benefit from continued PT  Plan: Continue per plan of care        Precautions: MS, Gait dysfunction, Insomnia, Osteoporosis, Peripheral polyneuropathy, Hx of Breast CA, R IDALIA in - Dr Faith Yarbrough, Left femur IM nailing      Manuals         Right hip PROM  8' 8' 8' 8' 8'       Right sciatic nerve glide             STM (rolling) right lateral hip   5' 5' 5' 5 5'                    Neuro Re-Ed             Bridge  x15 x15 10x  115x       SL clam shell  supine YTB 2x10 10x  15x 10x       TrA March  x5 ea  x5 on each 10x on each side 10x on each        SLR             Hookyling hip abduction isometric    20x5"                                   Ther Ex             Nu Step  5' lv 1 7' lvl 2 7' lvl 1 7' lvl 3  7' lvl 3        LTR  10x5" ea 10x5" ea 10x5" each 10x5" 10x5"       Piriformis stretch   5x10"          Leg press             Long arc quad     10x 2# 2x10 2#       Hamstring curl      ytb 10x on each ytb 10x on each       Hamstring stretch     5x10" RLE                     Ther Activity                                       Gait Training             Focus on reducing IR during limb advancement and during stance nv  5'                       Modalities                                       1:1 with PT from 1115-12P

## 2021-07-12 ENCOUNTER — OFFICE VISIT (OUTPATIENT)
Dept: PHYSICAL THERAPY | Facility: CLINIC | Age: 71
End: 2021-07-12
Payer: MEDICARE

## 2021-07-12 DIAGNOSIS — Z96.641 HISTORY OF TOTAL HIP REPLACEMENT, RIGHT: ICD-10-CM

## 2021-07-12 DIAGNOSIS — M25.551 PAIN IN RIGHT HIP: Primary | ICD-10-CM

## 2021-07-12 PROCEDURE — 97110 THERAPEUTIC EXERCISES: CPT | Performed by: PHYSICAL THERAPIST

## 2021-07-12 PROCEDURE — 97112 NEUROMUSCULAR REEDUCATION: CPT | Performed by: PHYSICAL THERAPIST

## 2021-07-12 PROCEDURE — 97140 MANUAL THERAPY 1/> REGIONS: CPT | Performed by: PHYSICAL THERAPIST

## 2021-07-12 NOTE — PROGRESS NOTES
Daily Note     Today's date: 2021  Patient name: Mara Barreto  : 1950  MRN: [id number]  Referring provider: Charles Lopez DO  Dx:   Encounter Diagnosis     ICD-10-CM    1  Pain in right hip  M25 551    2  History of total hip replacement, right  Z96 641        Start Time: 1100  Stop Time: 1145  Total time in clinic (min): 45 minutes    Subjective: Pt states that she had been dragging her legs today  More fatigue than usual  She notes her hip is not bad today but feels her distal LE is likely concrete  Objective: See treatment diary below      Assessment: Tolerated treatment fair  Attempted bicycle today which was more uncomfortable than the Nust[place], therefore, she only performed 5 minutes  Pt's LE more fatigued, weak, and tight compared to pervious session  After hamstring stretching and PROM of the R hip, pt felt more comfortable walking  Patient would benefit from continued PT  Plan: Continue per plan of care        Precautions: MS, Gait dysfunction, Insomnia, Osteoporosis, Peripheral polyneuropathy, Hx of Breast CA, R IDALIA in - Dr Mika Jovel, Left femur IM nailing      Manuals        Right hip PROM  8' 8' 8' 8' 8' 8'      Right sciatic nerve glide             STM (rolling) right lateral hip   5' 5' 5' 5 5'       Right hamstring stretch       5'      Neuro Re-Ed             Bridge  x15 x15 10x  15x 15x      SL clam shell  supine YTB 2x10 10x  15x 10x       TrA March  x5 ea  x5 on each 10x on each side 10x on each        SLR             Hookyling hip abduction isometric    20x5"   20x5"      Hooklying hip adduction isometric       20x5"                   Ther Ex             Nu Step  5' lv 1 7' lvl 2 7' lvl 1 7' lvl 3  7' lvl 3        LTR  10x5" ea 10x5" ea 10x5" each 10x5" 10x5" 10x5"      Piriformis stretch   5x10"          Leg press             Long arc quad     10x 2# 2x10 2# 2x10      Hamstring curl      ytb 10x on each ytb 10x on each       Hamstring stretch     5x10" RLE        Bicycle        5'      Ther Activity                                       Gait Training             Focus on reducing IR during limb advancement and during stance nv  5'                       Modalities                                       1:1 with PT from

## 2021-07-13 ENCOUNTER — OFFICE VISIT (OUTPATIENT)
Dept: PHYSICAL THERAPY | Facility: CLINIC | Age: 71
End: 2021-07-13
Payer: MEDICARE

## 2021-07-13 DIAGNOSIS — M25.551 PAIN IN RIGHT HIP: Primary | ICD-10-CM

## 2021-07-13 DIAGNOSIS — Z96.641 HISTORY OF TOTAL HIP REPLACEMENT, RIGHT: ICD-10-CM

## 2021-07-13 PROCEDURE — 97112 NEUROMUSCULAR REEDUCATION: CPT | Performed by: PHYSICAL THERAPIST

## 2021-07-13 PROCEDURE — 97110 THERAPEUTIC EXERCISES: CPT | Performed by: PHYSICAL THERAPIST

## 2021-07-13 PROCEDURE — 97140 MANUAL THERAPY 1/> REGIONS: CPT | Performed by: PHYSICAL THERAPIST

## 2021-07-13 NOTE — PROGRESS NOTES
Daily Note     Today's date: 2021  Patient name: Richelle Robles  : 1950  MRN: [id number]  Referring provider: Gino Ruiz DO  Dx:   Encounter Diagnosis     ICD-10-CM    1  Pain in right hip  M25 551    2  History of total hip replacement, right  Z96 641        Start Time: 1115  Stop Time: 1200  Total time in clinic (min): 45 minutes    Subjective: Pt had to take 2 naprosyn today due to "locking" of the right hip  Objective: See treatment diary below      Assessment: Improved tolerance to strengthening and movement today compared to yesterday's session  Right hamstring flexibility was considerably limited; however, this improved with stretching  Resumed 2# weights with LAQ  Swelling is present in distal LE at ankle/foot region which is pitting  Tolerated treatment fair  Patient would benefit from continued PT  Pt will leave for meetings/convention in [place] tomorrow  Plan: Continue per plan of care        Precautions: MS, Gait dysfunction, Insomnia, Osteoporosis, Peripheral polyneuropathy, Hx of Breast CA, R IDALIA in - Dr Koki Ryder, Left femur IM nailing      Manuals       Right hip PROM  8' 8' 8' 8' 8' 8' 8'     Right sciatic nerve glide             STM (rolling) right lateral hip   5' 5' 5' 5 5'       Right hamstring stretch       5' 5'     Neuro Re-Ed             Bridge  x15 x15 10x  15x 15x 15x     SL clam shell  supine YTB 2x10 10x  15x 10x       TrA March  x5 ea  x5 on each 10x on each side 10x on each   10x on each     SLR             Hookyling hip abduction isometric    20x5"   20x5" With tb 20x     Hooklying hip adduction isometric       20x5" 20x5"                  Ther Ex             Nu Step  5' lv 1 7' lvl 2 7' lvl 1 7' lvl 3  7' lvl 3   7 5' lvl 3     LTR  10x5" ea 10x5" ea 10x5" each 10x5" 10x5" 10x5" 10x5"     Piriformis stretch   5x10"          Leg press             Long arc quad     10x 2# 2x10 2# 2x10 2x10 2#     Hamstring curl ytb 10x on each ytb 10x on each       Hamstring stretch     5x10" RLE        Bicycle        5'      Ther Activity                                       Gait Training             Focus on reducing IR during limb advancement and during stance nv  5'                       Modalities                                       1:1 with PT from 1116-12PM

## 2021-07-19 ENCOUNTER — OFFICE VISIT (OUTPATIENT)
Dept: PHYSICAL THERAPY | Facility: CLINIC | Age: 71
End: 2021-07-19
Payer: MEDICARE

## 2021-07-19 DIAGNOSIS — Z96.641 HISTORY OF TOTAL HIP REPLACEMENT, RIGHT: ICD-10-CM

## 2021-07-19 DIAGNOSIS — M25.551 PAIN IN RIGHT HIP: Primary | ICD-10-CM

## 2021-07-19 PROCEDURE — 97140 MANUAL THERAPY 1/> REGIONS: CPT | Performed by: PHYSICAL THERAPIST

## 2021-07-19 PROCEDURE — 97112 NEUROMUSCULAR REEDUCATION: CPT | Performed by: PHYSICAL THERAPIST

## 2021-07-19 PROCEDURE — 97110 THERAPEUTIC EXERCISES: CPT | Performed by: PHYSICAL THERAPIST

## 2021-07-19 NOTE — PROGRESS NOTES
Daily Note     Today's date: 2021  Patient name: Tracy Berkowitz  : 1950  MRN: [id number]  Referring provider: Wallace Baca DO  Dx:   Encounter Diagnosis     ICD-10-CM    1  Pain in right hip  M25 551    2  History of total hip replacement, right  Z96 641        Start Time: 930  Stop Time: 1015  Total time in clinic (min): 45 minutes    Subjective: Pt feeling better in terms of weakness and right hip pain  Pt was away last week in [facility] at a conference but still was able to complete her exercises  Objective: See treatment diary below      Assessment: Improved tolerance to exercise and movement compared to last week  Flexibility and weakness not has severe in RLE  Tolerated treatment well  Less femoral and tibial IR during LAQ due to improved movement patterns  Patient demonstrated fatigue post treatment and would benefit from continued PT  Plan: Continue per plan of care  Pt will be re-evaluated next visit        Precautions: MS, Gait dysfunction, Insomnia, Osteoporosis, Peripheral polyneuropathy, Hx of Breast CA, R IDALIA in - Dr Marlene Dorman, Left femur IM nailing      Manuals      Right hip PROM  8' 8' 8' 8' 8' 8' 8' 8'    Right sciatic nerve glide             STM (rolling) right lateral hip   5' 5' 5' 5 5'       Right hamstring stretch       5' 5' 5'    Neuro Re-Ed             Bridge  x15 x15 10x  15x 15x 15x 15x    SL clam shell  supine YTB 2x10 10x  15x 10x       TrA March  x5 ea  x5 on each 10x on each side 10x on each   10x on each 10x on each    SLR             Hookyling hip abduction isometric    20x5"   20x5" With tb 20x With tb 20x    Hooklying hip adduction isometric       20x5" 20x5"                  Ther Ex             Nu Step  5' lv 1 7' lvl 2 7' lvl 1 7' lvl 3  7' lvl 3   7 5' lvl 3 9' lvl 3    LTR  10x5" ea 10x5" ea 10x5" each 10x5" 10x5" 10x5" 10x5" 10x5"    Piriformis stretch   5x10"          Leg press             Long arc quad 10x 2# 2x10 2# 2x10 2x10 2# 2x10 2#    Hamstring curl      ytb 10x on each ytb 10x on each       Hamstring stretch     5x10" RLE        Bicycle        5'      Ther Activity                                       Gait Training             Focus on reducing IR during limb advancement and during stance nv  5'                       Modalities                                       1:1 with PT from [phone]

## 2021-07-23 ENCOUNTER — EVALUATION (OUTPATIENT)
Dept: PHYSICAL THERAPY | Facility: CLINIC | Age: 71
End: 2021-07-23
Payer: MEDICARE

## 2021-07-23 DIAGNOSIS — Z96.641 HISTORY OF TOTAL HIP REPLACEMENT, RIGHT: ICD-10-CM

## 2021-07-23 DIAGNOSIS — M25.551 PAIN IN RIGHT HIP: Primary | ICD-10-CM

## 2021-07-23 PROCEDURE — 97530 THERAPEUTIC ACTIVITIES: CPT | Performed by: PHYSICAL THERAPIST

## 2021-07-23 PROCEDURE — 97112 NEUROMUSCULAR REEDUCATION: CPT | Performed by: PHYSICAL THERAPIST

## 2021-07-23 PROCEDURE — 97110 THERAPEUTIC EXERCISES: CPT | Performed by: PHYSICAL THERAPIST

## 2021-07-23 NOTE — PROGRESS NOTES
[id number]    Today's date: 2021  Patient name: Flor Guaman  : 1950  MRN: [id number]  Referring provider: Veena Alexis DO  Dx:   Encounter Diagnosis     ICD-10-CM    1  Pain in right hip  M25 551    2  History of total hip replacement, right  Z96 641        Start Time:   Stop Time:   Total time in clinic (min): 55 minutes    Assessment  Assessment details: Darus Current reports good improvement over the last 5 weeks; however, patient notes considerable fatigue in LE over the last 36 hours  Unlikely exercise induced and more likely a result over overall activity level  However, patient has been on her feet a lot this past week since returning from her trip  Functional status measure has improved to  49 for her right hip  Patient denies much right hip pain but mild posterior buttock pain  Right hip ROM and tenderness to surrounding musculature has both improved  Strength in RLE has gradually improved  Left hip is still weaker than right which is historical  Passive SLR has demonstrated greater improvements in neural mobility  Hip pain still reproduced with provocative tests; however, symptoms much less irritable  Pt is now able to perform a SLR on the right  Overall, patient has made steady progress toward goals and would benefit from additional PT at this time  Pt's right hip is not limited her as much as over deconditioning and weakness from MS (neurogenic weakness)  We will implement more functional training next session          Impairments: abnormal coordination, abnormal gait, abnormal or restricted ROM, abnormal movement, activity intolerance, impaired balance, impaired physical strength, lacks appropriate home exercise program and pain with function  Barriers to therapy: MS, complex history of surgery in bilateral hips, deconditioning, Osteoporosis, Fall risk, Peripheral neuropathy  Understanding of Dx/Px/POC: good   Prognosis: good    Goals  Short Term Goals: to be achieved by 4 weeks  1) Patient to be independent with basic HEP -MET  2) Decrease pain to 3/10 at its worst -Partially met  3) Increase right hip ROM by 5-10 degrees -Partially met  4) Increase LE strength by 1/2 MMT grade in all deficient planes -Partially met    Long Term Goals: to be achieved by discharge  1) FOTO equal to or greater than expected (50)-Partially met  2) Ambulation to improve to maximal level of function-Partially met  3) Stair negotiation will improve to reciprocal -Partially met  4) Sit to stand transfers will improve to maximal level of function -Partially met    Plan  Patient would benefit from: PT eval and skilled physical therapy  Planned therapy interventions: joint mobilization, manual therapy, neuromuscular re-education, patient education, therapeutic activities, therapeutic exercise and home exercise program  Frequency: 2x per week for 6 weeks  Treatment plan discussed with: patient        Subjective Evaluation    History of Present Illness  Mechanism of injury: History of Current Injury: Pt referred to PT secondary to right hip pain  PMH significant for right total hip replacement in 2012 after ORIF  PT also has IM nailing in left femur  Pt saw Dr Bobby Barlow who XR the left hip and and prescribed PT  Pt notes gradual onset of pain in the right hip which is worsening  No trauma, fall, or inciting event  Pt has gained weight over this time period due to new medication side effects over the last 6 months  Pt feels the right hip is very stiff and feels like her legs are "concrete " Pt notes worsening balance secondary to hip issue  Pain location/Descriptors: right lateral hip pain which feel stiff and heavy  Symptoms can radiate from posterolateral hip to mid thigh     Aggravating factors: walking (in the grocery store), squatting, prolonging sitting or standing   Eases: Naprosyn (once a day)   24 HR pattern: Worse after 5 PM  Imaging: XRs of R hip demonstrate stable prosthesis according to last physician note  Special Questions: Pt notes constant numbness/tingling from the MS  Patient goals:  Improve ambulation without assisted device, Improve motion in right hip, relieve pain in right hip  Occupation: Retired nurse     Pain  Current pain ratin  At worst pain ratin          Objective     Neurological Testing     Sensation     Lumbar   Left   Diminished: light touch    Right   Intact: light touch    Comments   Left light touch: L5 distally at ankle    Reflexes   Left   Patellar (L4): trace (1+)  Achilles (S1): trace (1+)  Clonus sign: negative    Right   Patellar (L4): trace (1+)  Achilles (S1): trace (1+)  Clonus sign: negative    Active Range of Motion     Lumbar   Flexion:  WFL  Extension:  WFL  Left lateral flexion:  WFL  Right lateral flexion:  WFL  Left rotation:  [facility]  Right rotation:  [facility]    Additional Active Range of Motion Details  *Right posterolateral hip pain with left side bending   *Quadrant was normal bilaterally     Passive Range of Motion   Left Hip   Flexion: 115 degrees   External rotation (90/90): 45 degrees   Internal rotation (90/90): 50 degrees     Right Hip   Flexion: 110 degrees   External rotation (90/90): 40 degrees   Internal rotation (90/90): 50 degrees     Strength/Myotome Testing     Left Hip   Planes of Motion   Flexion: 3- (Supine)  Abduction: 2+ (SL)  Adduction: 3 (Seated)  External rotation: 3+ (SL)    Right Hip   Planes of Motion   Flexion: 3 (Supine)  Abduction: 2+ (SL)  Adduction: 3 (Seated)  External rotation: 3 (SL)    Left Knee   Flexion: 3+  Extension: 3+    Right Knee   Flexion: 4-  Extension: 4-    Left Ankle/Foot   Dorsiflexion: 3+  Plantar flexion: 4-    Right Ankle/Foot   Dorsiflexion: 3+  Plantar flexion: 4-    Tests     Right Hip   Positive ADINA and milton  Negative FADIR  SLR: Negative       Additional Tests Details  *SLR: 60 degrees R (hamstring restriction), 50 degrees L (hamstring stretch)   *Pt unable to perform SLR on the R, able to on left: This is reverse on 7/23 (Pt unable to do SLR in L but able on R)      Ambulation     Observational Gait   Gait: antalgic, asymmetric and circumduction   Decreased walking speed and stride length  Additional Observational Gait Details  Gait: internally rotated femur on the right causing excessive toeing in during stance phase  Gait is antalgic, slow sylvia, and small step length  - 7/23: Improved motion and control with reduced internal rotation of femur during ambulation                Precautions: MS, Gait dysfunction, Insomnia, Osteoporosis, Peripheral polyneuropathy, Hx of Breast CA, R IDALIA in 2012- Dr Demetrio Sims, Left femur IM nailing      Manuals  6/23 6/28 7/1 7/6 7/8 7/12 7/13 7/19 7/23   Right hip PROM  8' 8' 8' 8' 8' 8' 8' 8'    Right sciatic nerve glide             STM (rolling) right lateral hip   5' 5' 5' 5 5'       Right hamstring stretch       5' 5' 5'    Neuro Re-Ed             Bridge  x15 x15 10x  15x 15x 15x 15x    SL clam shell  supine YTB 2x10 10x  15x 10x       TrA March  x5 ea  x5 on each 10x on each side 10x on each   10x on each 10x on each    SLR             Hookyling hip abduction isometric    20x5"   20x5" With tb 20x With tb 20x    Hooklying hip adduction isometric       20x5" 20x5"                  Ther Ex             Nu Step  5' lv 1 7' lvl 2 7' lvl 1 7' lvl 3  7' lvl 3   7 5' lvl 3 9' lvl 3 7' lvl 4    LTR  10x5" ea 10x5" ea 10x5" each 10x5" 10x5" 10x5" 10x5" 10x5"    Piriformis stretch   5x10"          Leg press             Long arc quad     10x 2# 2x10 2# 2x10 2x10 2# 2x10 2#    Hamstring curl      ytb 10x on each ytb 10x on each       Hamstring stretch     5x10" RLE        Bicycle        5'      Ther Activity                                       Gait Training             Focus on reducing IR during limb advancement and during stance nv  5'                       Modalities                                       1:1 with PT from 945-1700   Pt was re-evaluated x 45 minute

## 2021-07-26 ENCOUNTER — OFFICE VISIT (OUTPATIENT)
Dept: PHYSICAL THERAPY | Facility: CLINIC | Age: 71
End: 2021-07-26
Payer: MEDICARE

## 2021-07-26 DIAGNOSIS — Z96.641 HISTORY OF TOTAL HIP REPLACEMENT, RIGHT: ICD-10-CM

## 2021-07-26 DIAGNOSIS — M25.551 PAIN IN RIGHT HIP: Primary | ICD-10-CM

## 2021-07-26 PROCEDURE — 97140 MANUAL THERAPY 1/> REGIONS: CPT | Performed by: PHYSICAL THERAPIST

## 2021-07-26 PROCEDURE — 97112 NEUROMUSCULAR REEDUCATION: CPT | Performed by: PHYSICAL THERAPIST

## 2021-07-26 PROCEDURE — 97110 THERAPEUTIC EXERCISES: CPT | Performed by: PHYSICAL THERAPIST

## 2021-07-26 NOTE — PROGRESS NOTES
Daily Note     Today's date: 2021  Patient name: Lloyd Woods  : 1950  MRN: [id number]  Referring provider: Doreen Shirley DO  Dx:   Encounter Diagnosis     ICD-10-CM    1  Pain in right hip  M25 551    2  History of total hip replacement, right  Z96 641        Start Time: 0945  Stop Time: 1030  Total time in clinic (min): 45 minutes    Subjective: Pt reports having a challenging weekend with sleep  Unable to get comfortable which prevented adequate sleep  Objective: See treatment diary below      Assessment: Improved tolerance to treatment today  Pt experiences a positive result with passive stretching and motion at involved hip  Moderate genu valgus noted with sit to stand transfers  Tolerated treatment well  Patient exhibited good technique with therapeutic exercises and would benefit from continued PT  Plan: Continue per plan of care        Precautions: MS, Gait dysfunction, Insomnia, Osteoporosis, Peripheral polyneuropathy, Hx of Breast CA, R IDALIA in - Dr Flex Rodriguez, Left femur IM nailing      Manuals    Right hip PROM 8'     8' 8' 8' 8'    Right sciatic nerve glide             STM (rolling) right lateral hip       5'       Right hamstring stretch       5' 5' 5'    Neuro Re-Ed             Bridge 15x     15x 15x 15x 15x    SL clam shell      10x       TrA March 15x on each     10x on each   10x on each 10x on each    SLR             Hookyling hip abduction isometric       20x5" With tb 20x With tb 20x    Hooklying hip adduction isometric       20x5" 20x5"                  Ther Ex             Nu Step 7' lvl2 473 steps     7' lvl 3   7 5' lvl 3 9' lvl 3 7' lvl 4    LTR      10x5" 10x5" 10x5" 10x5"    Piriformis stretch             Leg press             Long arc quad 2x10 2#     2x10 2# 2x10 2x10 2# 2x10 2#    Hamstring curl  ytb 10x on each     ytb 10x on each       Hamstring stretch             Bicycle        5'      Ther Activity             Sit to stand squat 10x on high/low                         Gait Training             Focus on reducing IR during limb advancement and during stance                          Modalities                                       1:1 with PT from 930-1017

## 2021-07-29 ENCOUNTER — OFFICE VISIT (OUTPATIENT)
Dept: PHYSICAL THERAPY | Facility: CLINIC | Age: 71
End: 2021-07-29
Payer: MEDICARE

## 2021-07-29 DIAGNOSIS — M25.551 PAIN IN RIGHT HIP: Primary | ICD-10-CM

## 2021-07-29 DIAGNOSIS — Z96.641 HISTORY OF TOTAL HIP REPLACEMENT, RIGHT: ICD-10-CM

## 2021-07-29 PROCEDURE — 97112 NEUROMUSCULAR REEDUCATION: CPT | Performed by: PHYSICAL THERAPIST

## 2021-07-29 PROCEDURE — 97110 THERAPEUTIC EXERCISES: CPT | Performed by: PHYSICAL THERAPIST

## 2021-07-29 PROCEDURE — 97140 MANUAL THERAPY 1/> REGIONS: CPT | Performed by: PHYSICAL THERAPIST

## 2021-07-29 NOTE — PROGRESS NOTES
Daily Note     Today's date: 2021  Patient name: Olya Yeager  : 1950  MRN: [id number]  Referring provider: Cathryn Woodall DO  Dx:   Encounter Diagnosis     ICD-10-CM    1  Pain in right hip  M25 551    2  History of total hip replacement, right  Z96 641                   Subjective: Pt was unable to sleep last night but doesn't know why  She states, suprisingly, she doesn't feel to bad  Pt c/o tightness and discomfort in right hamstrings (proximal portion)  Objective: See treatment diary below      Assessment: Moderate restriction in hamstring upon assessment  Hamstring extensibility and discomfort improved with passive stretching/motion  Pitting edema in distal LLE vs RLE  Continued light LE and core strengthening today  Tolerated treatment fair  Patient would benefit from continued PT  Plan: Continue per plan of care        Precautions: MS, Gait dysfunction, Insomnia, Osteoporosis, Peripheral polyneuropathy, Hx of Breast CA, R IDALIA in - Dr Riddle Laughter, Left femur IM nailing      Manuals    Right hip PROM 8' 5'    8' 8' 8' 8'    Right sciatic nerve glide             STM (rolling) right lateral hip       5'       Right hamstring stretch  5'     5' 5' 5'    Neuro Re-Ed             Bridge 15x 10x    15x 15x 15x 15x    SL clam shell      10x       TrA March 15x on each 10x on each     10x on each   10x on each 10x on each    SLR             Hookyling hip abduction isometric       20x5" With tb 20x With tb 20x    Hooklying hip adduction isometric       20x5" 20x5"                  Ther Ex             Nu Step 7' lvl2 473 steps 7' lvl2 500 steps    7' lvl 3   7 5' lvl 3 9' lvl 3 7' lvl 4    Hamstring stretch  C/ strap 10x10"           LTR      10x5" 10x5" 10x5" 10x5"    Piriformis stretch             Leg press             Long arc quad 2x10 2# 1x10 2#    2x10 2# 2x10 2x10 2# 2x10 2#    Hamstring curl  ytb 10x on each ytb 10x on each    ytb 10x on each Hamstring stretch             Bicycle        5'      Ther Activity             Sit to stand squat 10x on high/low 2x10                        Gait Training             Focus on reducing IR during limb advancement and during stance                          Modalities                                       1:1 with PT from [id number]

## 2021-07-30 ENCOUNTER — OFFICE VISIT (OUTPATIENT)
Dept: FAMILY MEDICINE CLINIC | Facility: OTHER | Age: 71
End: 2021-07-30
Payer: MEDICARE

## 2021-07-30 VITALS
WEIGHT: 137.4 LBS | SYSTOLIC BLOOD PRESSURE: 140 MMHG | BODY MASS INDEX: 23.46 KG/M2 | RESPIRATION RATE: 16 BRPM | HEART RATE: 76 BPM | OXYGEN SATURATION: 99 % | HEIGHT: 64 IN | DIASTOLIC BLOOD PRESSURE: 86 MMHG | TEMPERATURE: 98 F

## 2021-07-30 DIAGNOSIS — Z00.00 MEDICARE ANNUAL WELLNESS VISIT, SUBSEQUENT: Primary | ICD-10-CM

## 2021-07-30 DIAGNOSIS — I10 HYPERTENSION, UNSPECIFIED TYPE: ICD-10-CM

## 2021-07-30 DIAGNOSIS — Z11.59 NEED FOR HEPATITIS C SCREENING TEST: ICD-10-CM

## 2021-07-30 PROCEDURE — G0439 PPPS, SUBSEQ VISIT: HCPCS | Performed by: FAMILY MEDICINE

## 2021-07-30 PROCEDURE — 1123F ACP DISCUSS/DSCN MKR DOCD: CPT | Performed by: FAMILY MEDICINE

## 2021-07-30 RX ORDER — LISINOPRIL 20 MG/1
20 TABLET ORAL DAILY
Qty: 90 TABLET | Refills: 1 | Status: SHIPPED | OUTPATIENT
Start: 2021-07-30 | End: 2022-05-09

## 2021-07-30 NOTE — PATIENT INSTRUCTIONS
Medicare Preventive Visit Patient Instructions  Thank you for completing your Welcome to Medicare Visit or Medicare Annual Wellness Visit today  Your next wellness visit will be due in one year (7/31/2022)  The screening/preventive services that you may require over the next 5-10 years are detailed below  Some tests may not apply to you based off risk factors and/or age  Screening tests ordered at today's visit but not completed yet may show as past due  Also, please note that scanned in results may not display below  Preventive Screenings:  Service Recommendations Previous Testing/Comments   Colorectal Cancer Screening  * Colonoscopy    * Fecal Occult Blood Test (FOBT)/Fecal Immunochemical Test (FIT)  * Fecal DNA/Cologuard Test  * Flexible Sigmoidoscopy Age: 54-65 years old   Colonoscopy: every 10 years (may be performed more frequently if at higher risk)  OR  FOBT/FIT: every 1 year  OR  Cologuard: every 3 years  OR  Sigmoidoscopy: every 5 years  Screening may be recommended earlier than age 48 if at higher risk for colorectal cancer  Also, an individualized decision between you and your healthcare provider will decide whether screening between the ages of 74-80 would be appropriate  Colonoscopy: 07/19/2019  FOBT/FIT: Not on file  Cologuard: 05/06/2019  Sigmoidoscopy: Not on file          Breast Cancer Screening Age: 36 years old  Frequency: every 1-2 years  Not required if history of left and right mastectomy Mammogram: 12/08/2020        Cervical Cancer Screening Between the ages of 21-29, pap smear recommended once every 3 years  Between the ages of 33-67, can perform pap smear with HPV co-testing every 5 years     Recommendations may differ for women with a history of total hysterectomy, cervical cancer, or abnormal pap smears in past  Pap Smear: 05/16/2018        Hepatitis C Screening Once for adults born between 1945 and 1965  More frequently in patients at high risk for Hepatitis C Hep C Antibody: Not on file        Diabetes Screening 1-2 times per year if you're at risk for diabetes or have pre-diabetes Fasting glucose: 91 mg/dL   A1C: 4 9 %        Cholesterol Screening Once every 5 years if you don't have a lipid disorder  May order more often based on risk factors  Lipid panel: 05/04/2021          Other Preventive Screenings Covered by Medicare:  1  Abdominal Aortic Aneurysm (AAA) Screening: covered once if your at risk  You're considered to be at risk if you have a family history of AAA  2  Lung Cancer Screening: covers low dose CT scan once per year if you meet all of the following conditions: (1) Age 50-69; (2) No signs or symptoms of lung cancer; (3) Current smoker or have quit smoking within the last 15 years; (4) You have a tobacco smoking history of at least 30 pack years (packs per day multiplied by number of years you smoked); (5) You get a written order from a healthcare provider  3  Glaucoma Screening: covered annually if you're considered high risk: (1) You have diabetes OR (2) Family history of glaucoma OR (3)  aged 48 and older OR (3)  American aged 72 and older  3  Osteoporosis Screening: covered every 2 years if you meet one of the following conditions: (1) You're estrogen deficient and at risk for osteoporosis based off medical history and other findings; (2) Have a vertebral abnormality; (3) On glucocorticoid therapy for more than 3 months; (4) Have primary hyperparathyroidism; (5) On osteoporosis medications and need to assess response to drug therapy  · Last bone density test (DXA Scan): 07/02/2013  5  HIV Screening: covered annually if you're between the age of 12-76  Also covered annually if you are younger than 13 and older than 72 with risk factors for HIV infection  For pregnant patients, it is covered up to 3 times per pregnancy      Immunizations:  Immunization Recommendations   Influenza Vaccine Annual influenza vaccination during flu season is recommended for all persons aged >= 6 months who do not have contraindications   Pneumococcal Vaccine (Prevnar and Pneumovax)  * Prevnar = PCV13  * Pneumovax = PPSV23   Adults 25-60 years old: 1-3 doses may be recommended based on certain risk factors  Adults 72 years old: Prevnar (PCV13) vaccine recommended followed by Pneumovax (PPSV23) vaccine  If already received PPSV23 since turning 65, then PCV13 recommended at least one year after PPSV23 dose  Hepatitis B Vaccine 3 dose series if at intermediate or high risk (ex: diabetes, end stage renal disease, liver disease)   Tetanus (Td) Vaccine - COST NOT COVERED BY MEDICARE PART B Following completion of primary series, a booster dose should be given every 10 years to maintain immunity against tetanus  Td may also be given as tetanus wound prophylaxis  Tdap Vaccine - COST NOT COVERED BY MEDICARE PART B Recommended at least once for all adults  For pregnant patients, recommended with each pregnancy  Shingles Vaccine (Shingrix) - COST NOT COVERED BY MEDICARE PART B  2 shot series recommended in those aged 48 and above     Health Maintenance Due:      Topic Date Due    Hepatitis C Screening  Never done    Breast Cancer Screening: Mammogram  12/08/2022    Colorectal Cancer Screening  07/19/2029     Immunizations Due:      Topic Date Due    COVID-19 Vaccine (2 - Moderna 2-dose series) 03/30/2021    Influenza Vaccine (1) 09/01/2021     Advance Directives   What are advance directives? Advance directives are legal documents that state your wishes and plans for medical care  These plans are made ahead of time in case you lose your ability to make decisions for yourself  Advance directives can apply to any medical decision, such as the treatments you want, and if you want to donate organs  What are the types of advance directives? There are many types of advance directives, and each state has rules about how to use them   You may choose a combination of any of the following:  · Living will: This is a written record of the treatment you want  You can also choose which treatments you do not want, which to limit, and which to stop at a certain time  This includes surgery, medicine, IV fluid, and tube feedings  · Durable power of  for healthcare [facility]): This is a written record that states who you want to make healthcare choices for you when you are unable to make them for yourself  This person, called a proxy, is usually a family member or a friend  You may choose more than 1 proxy  · Do not resuscitate (DNR) order:  A DNR order is used in case your heart stops beating or you stop breathing  It is a request not to have certain forms of treatment, such as CPR  A DNR order may be included in other types of advance directives  · Medical directive: This covers the care that you want if you are in a coma, near death, or unable to make decisions for yourself  You can list the treatments you want for each condition  Treatment may include pain medicine, surgery, blood transfusions, dialysis, IV or tube feedings, and a ventilator (breathing machine)  · Values history: This document has questions about your views, beliefs, and how you feel and think about life  This information can help others choose the care that you would choose  Why are advance directives important? An advance directive helps you control your care  Although spoken wishes may be used, it is better to have your wishes written down  Spoken wishes can be misunderstood, or not followed  Treatments may be given even if you do not want them  An advance directive may make it easier for your family to make difficult choices about your care  © Copyright [employer] 2018 Information is for End User's use only and may not be sold, redistributed or otherwise used for commercial purposes   All illustrations and images included in CareNotes® are the copyrighted property of [employer]  or [employer] [facility]

## 2021-07-30 NOTE — PROGRESS NOTES
Assessment and Plan:     Problem List Items Addressed This Visit     None      Visit Diagnoses     Medicare annual wellness visit, subsequent    -  Primary    Need for hepatitis C screening test        Relevant Orders    Hepatitis C antibody    Hypertension, unspecified type        Relevant Medications    lisinopril (ZESTRIL) 20 mg tablet        Patient presents for Medicare AWV  She is UTD with health screenings with the exception of pap smear which patient has declined at this time  Of note, patient reports she is not taking Lipitor (40mg qd) as prescribed as she is worried about side effects  She does understand ASCVD risk score elevated at 18% and that she is at increased risk for MI or Stroke  Return in about 3 months (around 10/30/2021) for Recheck HTN  The patient indicates understanding of these issues and agrees with the plan  Preventive health issues were discussed with patient, and age appropriate screening tests were ordered as noted in patient's After Visit Summary  Personalized health advice and appropriate referrals for health education or preventive services given if needed, as noted in patient's After Visit Summary            Emotional and Mental Well-being, Sleep, Connectedness Assessment and Intervention:    PHQ-9 or GAD7 performed for initial evaluation or follow-up            History of Present Illness:     Patient presents for Medicare Annual Wellness visit    Patient Care Team:  Lesly Bowles MD as PCP - General (Family Medicine)  MD Romina Parks MD Irvin Grinder, MD Alesia Manchester, PA-C Arlyn Keens, MD Marcus Ambrose MD Shirly Goad, MD Tom Libra, MD (Gastroenterology)     Problem List:     Patient Active Problem List   Diagnosis    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    Multiple sclerosis ([facility] [address] )    Peripheral polyneuropathy    Depression    Fatigue    Gait disturbance    Hip pain, right    Headache    History of bilateral hip replacements    Hypokalemia    Injury of right leg    Insomnia    Laceration of finger of right hand    Solitary pulmonary nodule    Rash    Pain of left lower leg    Pain of left calf    Osteopenia    Nephrolithiasis    Muscle strain of left lower leg    Left knee pain    Left ankle pain    Urticaria    Tingling    Screening mammogram, encounter for    Encounter for breast reconstruction following mastectomy    Abnormal stool test    Encounter for follow-up surveillance of breast cancer    Essential hypertension    TSH (thyroid-stimulating hormone deficiency)    B12 deficiency    RLS (restless legs syndrome)      Past Medical and Surgical History:     Past Medical History:   Diagnosis Date    Allergic 1967    seasonal    Bone pain     Breast ptosis     Depression     Fatigue     Gait disturbance     uses cane at times    Headache     Hip fracture (HCC)     Hip pain     History of transfusion 1975    placenta previa s/p work accident, no rx    Hypokalemia     Insomnia     Laceration of finger     right hand    Left ankle pain     Left knee pain     Multiple sclerosis (HCC)     Muscle strain     left lower leg    Nephrolithiasis     Osteopenia     Osteoporosis     Pain of left calf     Pneumonia     Rash     Scoliosis birth    scoliosis    Solitary pulmonary nodule     SVT (supraventricular tachycardia) (HCC)     Tingling     Urgency of urination     Urticaria     Wrist fracture, left      Past Surgical History:   Procedure Laterality Date    ANKLE SURGERY      APPENDECTOMY  11/2012    Dr Sydnie Chamberlain      Enlargement procedure with prosthetic implant bilateral    AUGMENTATION MAMMAPLASTY Right 01/28/2020    implant replaced because of recall    AUGMENTATION MAMMAPLASTY Bilateral 2012    BREAST BIOPSY Left 07/23/2012    BREAST IMPLANT Right 1/28/2020    Procedure: BREAST IMPLANT EXCHANGE WITH CAPSULECTOMY;  Surgeon: Gill Byrnes MD;  [facility];  Service: Plastics    BREAST IMPLANT REMOVAL Right 01/28/2020    implant placement, implant revision, right mastopexy    CYSTOSTOMY      with basket extraction of calculus; x2    EXCISION / BIOPSY BREAST / NIPPLE / DUCT Right 05/04/2020    scar revision to resuture non healing surgical wound    EXPLORATORY LAPAROTOMY      FOOT SURGERY      FRACTURE SURGERY  Lt wrist 9/2014 - Lt matthew femur 9/14    HIP FRACTURE SURGERY Right     Subcapital    HIP SURGERY Left     JOINT REPLACEMENT  Rt hip 10/2012    LEG SURGERY      Repair    MASTECTOMY Left 08/16/2012    ME REVISION OF RECONSTRUCTED BREAST Right 5/4/2020    Procedure: REVISION RIGHT BREAST MOUND;  Surgeon: Gill Byrnes MD;  [facility];  Service: Plastics    ME SUSPENSION OF BREAST Right 1/28/2020    Procedure: BREAST MASTOPEXY;  Surgeon: Gill Byrnes MD;  [facility];  Service: Plastics    REDUCTION MAMMAPLASTY Right 01/28/2020    reduced to match left side    SENTINEL LYMPH NODE BIOPSY Left 08/16/2012    SKIN BIOPSY      TONSILLECTOMY      TUBAL LIGATION      WRIST SURGERY Left       Family History:     Family History   Problem Relation Age of Onset    Hodgkin's lymphoma Maternal Grandfather         age at dx unk    Cancer Maternal Aunt 79        bladder    Heart disease Maternal Aunt     Colon cancer Maternal Uncle 72    Hodgkin's lymphoma Maternal Uncle 79    Coronary artery disease Mother     Hyperlipidemia Mother    Wang Rheum arthritis Mother     Other Father         Acute myocardial infarction    No Known Problems Maternal Grandmother     No Known Problems Paternal Grandmother     No Known Problems Paternal Grandfather     No Known Problems Son     No Known Problems Son     Stroke Maternal Aunt         6 CVA before death    Breast cancer Maternal Aunt     No Known Problems Paternal Aunt     No Known Problems Paternal Aunt     No Known Problems Paternal Aunt       Social History:     Social History     Socioeconomic History    Marital status: /Civil Union     Spouse name: None    Number of children: 2    Years of education: Completed bachelor's degree    Highest education level: None   Occupational History    Occupation: RN     Comment: Retired   Tobacco Use    Smoking status: Former Smoker     Packs/day: 1 00     Years: 35 00     Pack years: 35 00     Quit date:      Years since quittin 5    Smokeless tobacco: Never Used   Vaping Use    Vaping Use: Never used   Substance and Sexual Activity    Alcohol use: No    Drug use: No    Sexual activity: Yes     Partners: Male     Birth control/protection: Post-menopausal   Other Topics Concern    None   Social History Narrative    Denied:  History of caffeine use     Social Determinants of Health     Financial Resource Strain:     Difficulty of Paying Living Expenses:    Food Insecurity:     Worried About Running Out of Food in the Last Year:     Ran Out of Food in the Last Year:    Transportation Needs:     Lack of Transportation (Medical):      Lack of Transportation (Non-Medical):    Physical Activity:     Days of Exercise per Week:     Minutes of Exercise per Session:    Stress:     Feeling of Stress :    Social Connections:     Frequency of Communication with Friends and Family:     Frequency of Social Gatherings with Friends and Family:     Attends Orthodoxy Services:     Active Member of Clubs or Organizations:     Attends Club or Organization Meetings:     Marital Status:    Intimate Partner Violence:     Fear of Current or Ex-Partner:     Emotionally Abused:     Physically Abused:     Sexually Abused:       Medications and Allergies:     Current Outpatient Medications   Medication Sig Dispense Refill    ALPHA LIPOIC ACID PO Take by mouth      Ampyra 10 MG TB12 Take 1 tablet (10 mg total) by mouth 2 (two) times a day 180 tablet 3    ascorbic acid (VITAMIN C) 500 mg tablet Take 500 mg by mouth daily      baclofen 10 mg tablet TAKE 1 TABLET EVERY        MORNING, 1 TABLET EVERY    EVENING, AND TAKE 2 TABLETSAT BEDTIME 360 tablet 0    Biotin 5000 MCG TABS       Cholecalciferol (VITAMIN D3) 1000 units CAPS Take 5,000 Units by mouth        clonazePAM (KlonoPIN) 0 5 mg tablet 1 by mouth at bedtime  90 tablet 1    Copaxone 40 MG/ML SOSY Inject 40mg under the skin three times weekly  36 mL 3    Cranberry 1000 MG CAPS Take 300 mg by mouth        denosumab (PROLIA) 60 mg/mL Inject under the skin      docusate sodium (Colace) 100 mg capsule Take 1 capsule by mouth Daily      gabapentin (NEURONTIN) 300 mg capsule Take 1 capsule (300 mg total) by mouth daily at bedtime 90 capsule 3    gabapentin (NEURONTIN) 600 MG tablet Take 2 tablets (1,200 mg total) by mouth 3 (three) times a day 540 tablet 3    Magnesium 500 MG TABS Take 500 tablets by mouth once      RESTASIS 0 05 % ophthalmic emulsion   0    rotigotine (Neupro) 3 MG/24HR transdermal patch Place 1 patch on the skin daily 30 patch 11    solifenacin (VESICARE) 10 MG tablet Take 1 tablet (10 mg total) by mouth daily 90 tablet 3    zonisamide (ZONEGRAN) 100 mg capsule TAKE 4 CAPSULES AT BEDTIME 360 capsule 3    atorvastatin (LIPITOR) 40 mg tablet Take 1 tablet (40 mg total) by mouth daily 30 tablet 1    furosemide (LASIX) 20 mg tablet Take 1 tablet (20 mg total) by mouth 2 (two) times a day 10 tablet 0    lisinopril (ZESTRIL) 20 mg tablet Take 1 tablet (20 mg total) by mouth daily 90 tablet 1    LORazepam (ATIVAN) 0 5 mg tablet Take 1 tablet 30 minutes prior to MRI 1 tablet 0    potassium chloride (K-DUR,KLOR-CON) 10 mEq tablet Take 1 tablet (10 mEq total) by mouth daily for 10 days 10 tablet 0     No current facility-administered medications for this visit       Facility-Administered Medications Ordered in Other Visits   Medication Dose Route Frequency Provider Last Rate Last Admin    bacitracin 50,000 Units, gentamicin (GARAMYCIN) 40 mg/mL 80 mg, ceFAZolin (ANCEF) 1,000 mg in sodium chloride 0 9 % 1,000 mL irrigation bottle   Irrigation Once Cody Mayfield MD         Allergies   Allergen Reactions    Duloxetine Hcl      Rapid Heart rate      Iodinated Diagnostic Agents Anaphylaxis    Acetaminophen Other (See Comments)     Heart palpitations    Cetirizine      Only occurs with generic, weakness in legs, off balance and couldn't walk   Morphine Other (See Comments)     Migraine      Immunizations:     Immunization History   Administered Date(s) Administered    INFLUENZA 11/04/2015, 10/17/2016, 10/18/2017, 10/26/2018    Influenza, high dose seasonal 0 7 mL 10/18/2020    Influenza, seasonal, injectable 1950, 09/18/2012, 11/04/2015    Pneumococcal Conjugate 13-Valent 10/17/2016    Pneumococcal Polysaccharide PPV23 10/26/2018    SARS-CoV-2 / COVID-19 mRNA IM (Moderna) 03/02/2021    Tdap 08/05/2015    Zoster 10/18/2017    influenza, trivalent, adjuvanted 10/30/2019      Health Maintenance:         Topic Date Due    Hepatitis C Screening  Never done    Breast Cancer Screening: Mammogram  12/08/2022    Colorectal Cancer Screening  07/19/2029         Topic Date Due    COVID-19 Vaccine (2 - Moderna 2-dose series) 03/30/2021    Influenza Vaccine (1) 09/01/2021      Medicare Health Risk Assessment:     /86   Pulse 76   Temp 98 °F (36 7 °C)   Resp 16   Ht 5' 4" (1 626 m)   Wt 62 3 kg (137 lb 6 4 oz)   SpO2 99%   BMI 23 58 kg/m²      Nadeen Fields is here for her Subsequent Wellness visit  Health Risk Assessment:   Patient rates overall health as good  Patient feels that their physical health rating is slightly better  Patient is satisfied with their life  Eyesight was rated as same  Hearing was rated as same  Patient feels that their emotional and mental health rating is slightly better  Patients states they are sometimes angry  Patient states they are often unusually tired/fatigued   Pain experienced in the last 7 days has been some  Patient's pain rating has been 4/10  Patient states that she has experienced weight loss or gain in last 6 months  Fall Risk Screening: In the past year, patient has experienced: no history of falling in past year      Urinary Incontinence Screening:   Patient has not leaked urine accidently in the last six months  Home Safety:  Patient has trouble with stairs inside or outside of their home  Patient has working smoke alarms and has working carbon monoxide detector  Home safety hazards include: medications that cause fatigue  Nutrition:   Current diet is Regular  Medications:   Patient is currently taking over-the-counter supplements  OTC medications include: Vitamin C, D, Biotin, Cranberry, Magnesium, Alpha Lipoic Acid  Patient is able to manage medications  Activities of Daily Living (ADLs)/Instrumental Activities of Daily Living (IADLs):   Walk and transfer into and out of bed and chair?: Yes  Dress and groom yourself?: Yes    Bathe or shower yourself?: Yes    Feed yourself? Yes  Do your laundry/housekeeping?: Yes  Manage your money, pay your bills and track your expenses?: Yes  Make your own meals?: Yes    Do your own shopping?: Yes    Durable Medical Equipment Suppliers  N/A    Previous Hospitalizations:   Any hospitalizations or ED visits within the last 12 months?: No      Advance Care Planning:   Living will: Yes    Durable POA for healthcare: Yes    Advanced directive: Yes      Comments: - in Durable POA     -Patient is a Level 3- DNR     Cognitive Screening:   Provider or family/friend/caregiver concerned regarding cognition?: No    PREVENTIVE SCREENINGS      Cardiovascular Screening:    General: Screening Not Indicated and History Lipid Disorder      Diabetes Screening:     General: Screening Current      Colorectal Cancer Screening:     General: Screening Current      Breast Cancer Screening:     General: Screening Current Cervical Cancer Screening:    General: Patient Declines      Osteoporosis Screening:    General: History Osteoporosis    Due for: Bone Density Ultrasound      Lung Cancer Screening:     General: Screening Not Indicated      Preventive Screening Comments: Scheduled for mammogram/breast ultrasound in December  Patient states she gets her DXA every 2 years- is currently on Prolia every 6 months  Will request records,    Screening, Brief Intervention, and Referral to Treatment (SBIRT)    Screening  Typical number of drinks in a day: 0  Typical number of drinks in a week: 0  Interpretation: Low risk drinking behavior  AUDIT-C Screenin) How often did you have a drink containing alcohol in the past year? never  2) How many drinks did you have on a typical day when you were drinking in the past year? never  3) How often did you have 6 or more drinks on one occasion in the past year? never    AUDIT-C Score: 0  Interpretation: Score 0-2 (female): Negative screen for alcohol misuse    Single Item Drug Screening:  How often have you used an illegal drug (including marijuana) or a prescription medication for non-medical reasons in the past year? never    Single Item Drug Screen Score: 0  Interpretation: Negative screen for possible drug use disorder    Physical Exam  Vitals reviewed  Constitutional:       General: She is not in acute distress  Appearance: Normal appearance  She is normal weight  She is not ill-appearing, toxic-appearing or diaphoretic  HENT:      Head: Normocephalic and atraumatic  Right Ear: Tympanic membrane, ear canal and external ear normal  There is no impacted cerumen  Left Ear: Tympanic membrane, ear canal and external ear normal  There is no impacted cerumen  Nose: Nose normal       Mouth/Throat:      Mouth: Mucous membranes are moist       Pharynx: Oropharynx is clear  No oropharyngeal exudate or posterior oropharyngeal erythema  Eyes:      General: No scleral icterus  Right eye: No discharge  Left eye: No discharge  Extraocular Movements: Extraocular movements intact  Conjunctiva/sclera: Conjunctivae normal       Pupils: Pupils are equal, round, and reactive to light  Cardiovascular:      Rate and Rhythm: Normal rate and regular rhythm  Pulses: Normal pulses  Heart sounds: Normal heart sounds  Pulmonary:      Effort: Pulmonary effort is normal       Breath sounds: Normal breath sounds  Abdominal:      General: Bowel sounds are normal  There is no distension  Palpations: Abdomen is soft  Tenderness: There is no abdominal tenderness  Musculoskeletal:      Right lower leg: Edema (  trace) present  Left lower leg: Edema (  trace) present  Skin:     General: Skin is warm and dry  Neurological:      Mental Status: She is alert and oriented to person, place, and time  Motor: No weakness  Gait: Gait abnormal (  ambulates with cane )     Psychiatric:         Mood and Affect: Mood normal          Behavior: Behavior normal          Lolis Sheehan MD

## 2021-08-02 ENCOUNTER — OFFICE VISIT (OUTPATIENT)
Dept: PHYSICAL THERAPY | Facility: CLINIC | Age: 71
End: 2021-08-02
Payer: MEDICARE

## 2021-08-02 DIAGNOSIS — Z96.641 HISTORY OF TOTAL HIP REPLACEMENT, RIGHT: ICD-10-CM

## 2021-08-02 DIAGNOSIS — M25.551 PAIN IN RIGHT HIP: Primary | ICD-10-CM

## 2021-08-02 PROCEDURE — 97110 THERAPEUTIC EXERCISES: CPT | Performed by: PHYSICAL THERAPIST

## 2021-08-02 PROCEDURE — 97140 MANUAL THERAPY 1/> REGIONS: CPT | Performed by: PHYSICAL THERAPIST

## 2021-08-02 PROCEDURE — 97112 NEUROMUSCULAR REEDUCATION: CPT | Performed by: PHYSICAL THERAPIST

## 2021-08-02 NOTE — PROGRESS NOTES
Daily Note     Today's date: 2021  Patient name: Yuliana Mcpherson  : 1950  MRN: [id number]  Referring provider: Keshawn Gracia DO  Dx:   Encounter Diagnosis     ICD-10-CM    1  Pain in right hip  M25 551    2  History of total hip replacement, right  Z96 641        Start Time: 1015  Stop Time: 1100  Total time in clinic (min): 45 minutes    Subjective: Pt reports feeling better today than last week  She is feeling stiff in left hamstring as well  Objective: See treatment diary below      Assessment: Implemented left hamstring stretching today  Pt experiences mild medial aspect knee discomfort with sit to stand transfers; however, she is able to complete 2x10  Tolerated treatment well  Patient would benefit from continued PT  Plan: Continue per plan of care        Precautions: MS, Gait dysfunction, Insomnia, Osteoporosis, Peripheral polyneuropathy, Hx of Breast CA, R IDALIA in - Dr Juliet Stanford, Left femur IM nailing      Manuals    Right hip PROM 8' 5' 5'   8' 8' 8' 8'    Right sciatic nerve glide             STM (rolling) right lateral hip       5'       Right hamstring stretch  5' 5' B    5' 5' 5'    Neuro Re-Ed             Bridge 15x 10x 10x   15x 15x 15x 15x    SL clam shell      10x       TrA March 15x on each 10x on each  10x on each    10x on each   10x on each 10x on each    SLR             Hookyling hip abduction isometric       20x5" With tb 20x With tb 20x    Hooklying hip adduction isometric       20x5" 20x5"                  Ther Ex             Nu Step 7' lvl2 473 steps 7' lvl2 500 steps 7' lvl 3 579 steps   7' lvl 3   7 5' lvl 3 9' lvl 3 7' lvl 4    Hamstring stretch  C/ strap 10x10"           LTR      10x5" 10x5" 10x5" 10x5"    Piriformis stretch             Leg press             Long arc quad 2x10 2# 1x10 2# 1x10 2#   2x10 2# 2x10 2x10 2# 2x10 2#    Hamstring curl  ytb 10x on each ytb 10x on each ytb 10x on each   ytb 10x on each Hamstring stretch             Bicycle        5'      Ther Activity             Sit to stand squat 10x on high/low 2x10 2x10                       Gait Training             Focus on reducing IR during limb advancement and during stance                          Modalities                                       1:1 with PT from 1015-11a

## 2021-08-04 ENCOUNTER — TELEPHONE (OUTPATIENT)
Dept: NEUROLOGY | Facility: CLINIC | Age: 71
End: 2021-08-04

## 2021-08-04 NOTE — TELEPHONE ENCOUNTER
Received voicemail from [facility] with [facility]  He is asking if pt needs to remain on brand Copaxone and Ampyra  [phone] ext [phone]    Per last office note, pt is to continue on brand Copaxone and brand Ampyra  Arley Riedel made aware

## 2021-08-05 ENCOUNTER — OFFICE VISIT (OUTPATIENT)
Dept: PHYSICAL THERAPY | Facility: CLINIC | Age: 71
End: 2021-08-05
Payer: MEDICARE

## 2021-08-05 DIAGNOSIS — M25.551 PAIN IN RIGHT HIP: Primary | ICD-10-CM

## 2021-08-05 DIAGNOSIS — Z96.641 HISTORY OF TOTAL HIP REPLACEMENT, RIGHT: ICD-10-CM

## 2021-08-05 PROCEDURE — 97110 THERAPEUTIC EXERCISES: CPT | Performed by: PHYSICAL THERAPIST

## 2021-08-05 PROCEDURE — 97140 MANUAL THERAPY 1/> REGIONS: CPT | Performed by: PHYSICAL THERAPIST

## 2021-08-05 NOTE — PROGRESS NOTES
Daily Note     Today's date: 2021  Patient name: Tracy Berkowitz  : 1950  MRN: [id number]  Referring provider: Wallace Baca DO  Dx:   Encounter Diagnosis     ICD-10-CM    1  Pain in right hip  M25 551    2  History of total hip replacement, right  Z96 641                   Subjective: Pt having a bad day with balance and weakness  Pt did not sleep well and she is typically worse in the afternoon  Objective: See treatment diary below      Assessment: Low tolerance to movement today secondary to fatigue  Lack of sleep and PT in the afternoon made this session limited  Pt admits to getting only 5 hours of interrupted sleep last night  Focused treatment on ROM and flexibility  Pt deferred Nustep at this would fatigue legs quickly  Plan: Continue per plan of care        Precautions: MS, Gait dysfunction, Insomnia, Osteoporosis, Peripheral polyneuropathy, Hx of Breast CA, R IDALIA in - Dr En Howard, Left femur IM nailing      Manuals    Right hip PROM 8' 5' 5' 5'  8' 8' 8' 8'    Right sciatic nerve glide             STM (rolling) right lateral hip       5'       Right hamstring stretch  5' 5' B 5' B   5' 5' 5'    Neuro Re-Ed             Bridge 15x 10x 10x 10x  15x 15x 15x 15x    SL clam shell      10x       TrA March 15x on each 10x on each  10x on each  5x  10x on each   10x on each 10x on each    SLR             Hookyling hip abduction isometric       20x5" With tb 20x With tb 20x    Hooklying hip adduction isometric       20x5" 20x5"                  Ther Ex             Nu Step 7' lvl2 473 steps 7' lvl2 500 steps 7' lvl 3 579 steps   7' lvl 3   7 5' lvl 3 9' lvl 3 7' lvl 4    Hamstring stretch  C/ strap 10x10"  seated with strap 15x10"         LTR      10x5" 10x5" 10x5" 10x5"    Piriformis stretch             Leg press             Long arc quad 2x10 2# 1x10 2# 1x10 2#   2x10 2# 2x10 2x10 2# 2x10 2#    Hamstring curl  ytb 10x on each ytb 10x on each ytb 10x on each   ytb 10x on each       Hamstring stretch             Bicycle        5'      Ther Activity             Sit to stand squat 10x on high/low 2x10 2x10                       Gait Training             Focus on reducing IR during limb advancement and during stance                          Modalities                                       1:1 with PT from 225-248pm

## 2021-08-09 ENCOUNTER — OFFICE VISIT (OUTPATIENT)
Dept: PHYSICAL THERAPY | Facility: CLINIC | Age: 71
End: 2021-08-09
Payer: MEDICARE

## 2021-08-09 DIAGNOSIS — Z96.641 HISTORY OF TOTAL HIP REPLACEMENT, RIGHT: ICD-10-CM

## 2021-08-09 DIAGNOSIS — M25.551 PAIN IN RIGHT HIP: Primary | ICD-10-CM

## 2021-08-09 PROCEDURE — 97112 NEUROMUSCULAR REEDUCATION: CPT | Performed by: PHYSICAL THERAPIST

## 2021-08-09 PROCEDURE — 97110 THERAPEUTIC EXERCISES: CPT | Performed by: PHYSICAL THERAPIST

## 2021-08-09 PROCEDURE — 97140 MANUAL THERAPY 1/> REGIONS: CPT | Performed by: PHYSICAL THERAPIST

## 2021-08-09 NOTE — PROGRESS NOTES
Daily Note     Today's date: 2021  Patient name: Lyly Agee  : 1950  MRN: [id number]  Referring provider: Juliette Toscano DO  Dx:   Encounter Diagnosis     ICD-10-CM    1  Pain in right hip  M25 551    2  History of total hip replacement, right  Z96 641        Start Time: 1015  Stop Time: 1100  Total time in clinic (min): 45 minutes    Subjective: Pt reports feeling good today compared to last session  She states that Saturday was a bad day from morning till evening but was able to recover on   Objective: See treatment diary below      Assessment: Mild distal swelling present  Improve tolerance to exercise and movement today  Continue LE strengthening and resistance  Considerable limitation in bilateral hamstrings, improved with passive stretching  Minimal fatigue noted without program today  Patient exhibited good technique with therapeutic exercises and would benefit from continued PT  Plan: Continue per plan of care        Precautions: MS, Gait dysfunction, Insomnia, Osteoporosis, Peripheral polyneuropathy, Hx of Breast CA, R IDALIA in - Dr Adriana Lam, Left femur IM nailing      Manuals         Right hip PROM 8' 5' 5' 5' 5'        Right sciatic nerve glide             STM (rolling) right lateral hip              Right hamstring stretch  5' 5' B 5' B 5' B        Neuro Re-Ed             Bridge 15x 10x 10x 10x 10x        SL clam shell             TrA March 15x on each 10x on each  10x on each  5x 20x on each        SLR             Hookyling hip abduction isometric             Hooklying hip adduction isometric                          Ther Ex             Nu Step 7' lvl2 473 steps 7' lvl2 500 steps 7' lvl 3 579 steps  7' lvl 3 501 steps        Hamstring stretch  C/ strap 10x10"  seated with strap 15x10"         LTR             Piriformis stretch             Leg press             Long arc quad 2x10 2# 1x10 2# 1x10 2#  1x10 2#        Hamstring curl  ytb 10x on each ytb 10x on each ytb 10x on each  ytb 10x on each        Hamstring stretch             Bicycle              Ther Activity             Sit to stand squat 10x on high/low 2x10 2x10                       Gait Training             Focus on reducing IR during limb advancement and during stance                          Modalities                                       1:1 with PT from 1015-11a

## 2021-08-11 ENCOUNTER — TELEPHONE (OUTPATIENT)
Dept: NEUROLOGY | Facility: CLINIC | Age: 71
End: 2021-08-11

## 2021-08-11 NOTE — TELEPHONE ENCOUNTER
Nadia Ulloa, please call Janet Course and let her know the standard covid vaccination and MS statement from our [address]  Let pt know this is not a live vaccine  Not aware of the vaccine causing MS in healthy individuals  The vaccine could exacerbate chronic underlying ms sxs but not cause new lesions  The Paleo diet would not reverse her long standing history of MS  Typically the benefits of getting the vaccination outweigh the risks of exacerbation

## 2021-08-11 NOTE — TELEPHONE ENCOUNTER
----- Message from Chung Peterson sent at 8/11/2021  2:37 PM EDT -----  Regarding: Non-Urgent Medical Question  Contact: [phone]  Dr David Macias,  I was seen by my retinal specialist, Dr Charlie Lunsford last Friday  He became very upset when he questioned me as to if I had gotten the Covid vaccine  He said that being a former medical professional with MS I should have known that I should not have taken the vaccine as it could be very dangerous for me  He stated that the formula crosses the blood brain barrier and has been known to cause MS in healthy individuals  He wanted me to promise not to take any booster shot  He said that the vaccine could be the cause of my increased symptoms  Also wants me to go on Paleo diet to reverse my MS!!  Have you heard of anything like this or is this just the opinion of an anti-vaxer? I had little reaction to the vaccine and think my gait, balance and fatigue issues are secondary to the length of time I have had MS and that I am 71! Please advise    Thanks so much  [employer]

## 2021-08-12 ENCOUNTER — OFFICE VISIT (OUTPATIENT)
Dept: PHYSICAL THERAPY | Facility: CLINIC | Age: 71
End: 2021-08-12
Payer: MEDICARE

## 2021-08-12 DIAGNOSIS — Z96.641 HISTORY OF TOTAL HIP REPLACEMENT, RIGHT: ICD-10-CM

## 2021-08-12 DIAGNOSIS — M25.551 PAIN IN RIGHT HIP: Primary | ICD-10-CM

## 2021-08-12 PROCEDURE — 97112 NEUROMUSCULAR REEDUCATION: CPT | Performed by: PHYSICAL THERAPIST

## 2021-08-12 PROCEDURE — 97140 MANUAL THERAPY 1/> REGIONS: CPT | Performed by: PHYSICAL THERAPIST

## 2021-08-12 PROCEDURE — 97110 THERAPEUTIC EXERCISES: CPT | Performed by: PHYSICAL THERAPIST

## 2021-08-12 NOTE — PROGRESS NOTES
Daily Note     Today's date: 2021  Patient name: Bridgette Heimlich  : 1950  MRN: [id number]  Referring provider: Kb Sepulveda DO  Dx:   Encounter Diagnosis     ICD-10-CM    1  Pain in right hip  M25 551    2  History of total hip replacement, right  Z96 641        Start Time: 1015  Stop Time: 1055  Total time in clinic (min): 40 minutes    Subjective: Pt doing well today  Heat index very hot today so patient plans to stay inside  She was baking this morning for her meeting [employer] and plans to finish after her session today  She would like to hold on the nustep this morning  Objective: See treatment diary below      Assessment: Tolerated treatment well  Initiated romberg balance on biodex foam to improve static balance  Patient demonstrated fatigue post treatment  Left hamstring more limited than right  Right nearly WNL at this time  Plan: Continue per plan of care        Precautions: MS, Gait dysfunction, Insomnia, Osteoporosis, Peripheral polyneuropathy, Hx of Breast CA, R IDALIA in - Dr Ce Alanis, Left femur IM nailing      Manuals        Right hip PROM 8' 5' 5' 5' 5' 5'       Right sciatic nerve glide             STM (rolling) right lateral hip              Right hamstring stretch  5' 5' B 5' B 5' B 5' B       Neuro Re-Ed             Bridge 15x 10x 10x 10x 10x 2x10       SL clam shell             TrA March 15x on each 10x on each  10x on each  5x 20x on each 20x on each                    Romberg Foam      BD 2'       Hookyling hip abduction isometric             Hooklying hip adduction isometric                          Ther Ex             Nu Step 7' lvl2 473 steps 7' lvl2 500 steps 7' lvl 3 579 steps  7' lvl 3 501 steps        Hamstring stretch  C/ strap 10x10"  seated with strap 15x10"         LTR             Piriformis stretch             Leg press             Long arc quad 2x10 2# 1x10 2# 1x10 2#  1x10 2# 1x10 2#       Hamstring curl  ytb 10x on each ytb 10x on each ytb 10x on each  ytb 10x on each ytb 10x on each       Hamstring stretch             Bicycle              Ther Activity             Sit to stand squat 10x on high/low 2x10 2x10   2x10                    Gait Training             Focus on reducing IR during limb advancement and during stance                          Modalities                                       1:1 with PT from [id number]

## 2021-08-12 NOTE — TELEPHONE ENCOUNTER
Called and Left a message on pt's answering machine for a call back    Please advise patient of our statement regarding MS and Covid-19 vaccine  Please also advise of Dr Ok Freeman below  While the Covid-19 vaccine was not specifically studied in MS patients, it is probably safe for patients with MS, off or on disease modifying therapy, 25years of age and older  The benefits  of the Covid-19 vaccine outweigh the risks in this evolving pandemic      Please follow the link below to the [facility] MS Society's statement on the vaccine in MS patients:  Scooby gonzalez

## 2021-08-16 ENCOUNTER — OFFICE VISIT (OUTPATIENT)
Dept: PHYSICAL THERAPY | Facility: CLINIC | Age: 71
End: 2021-08-16
Payer: MEDICARE

## 2021-08-16 DIAGNOSIS — Z96.641 HISTORY OF TOTAL HIP REPLACEMENT, RIGHT: ICD-10-CM

## 2021-08-16 DIAGNOSIS — M25.551 PAIN IN RIGHT HIP: Primary | ICD-10-CM

## 2021-08-16 PROCEDURE — 97112 NEUROMUSCULAR REEDUCATION: CPT | Performed by: PHYSICAL THERAPIST

## 2021-08-16 PROCEDURE — 97110 THERAPEUTIC EXERCISES: CPT | Performed by: PHYSICAL THERAPIST

## 2021-08-16 PROCEDURE — 97140 MANUAL THERAPY 1/> REGIONS: CPT | Performed by: PHYSICAL THERAPIST

## 2021-08-16 NOTE — PROGRESS NOTES
Daily Note     Today's date: 2021  Patient name: Ced Javed  : 1950  MRN: [id number]  Referring provider: Macrina Iglesias DO  Dx:   Encounter Diagnosis     ICD-10-CM    1  Pain in right hip  M25 551    2  History of total hip replacement, right  Z96 641        Start Time: 1015  Stop Time: 1100  Total time in clinic (min): 45 minutes    Subjective: Pt doing well this morning  She was able to sleep without waking up last evening, which is the first in a long time, according to patient  Objective: See treatment diary below      Assessment: Left hamstring still more restricted than right  End range hamstring stretch does reproduce discomfort in left right hip  Tolerated treatment well  Improved motor control in hip flexors during   Patient would benefit from continued PT      Plan: Continue per plan of care        Precautions: MS, Gait dysfunction, Insomnia, Osteoporosis, Peripheral polyneuropathy, Hx of Breast CA, R IDALIA in - Dr Vale Jung, Left femur IM nailing      Manuals       Right hip PROM 8' 5' 5' 5' 5' 5' 5'      Right sciatic nerve glide             STM (rolling) right lateral hip              Right hamstring stretch  5' 5' B 5' B 5' B 5' B 5' B      Neuro Re-Ed             Bridge 15x 10x 10x 10x 10x 2x10 2x10      SL clam shell              15x on each 10x on each  10x on each  5x 20x on each 20x on each 20x on each                   Romberg Foam      BD 2' BD 2'      Hookyling hip abduction isometric             Hooklying hip adduction isometric                          Ther Ex             Nu Step 7' lvl2 473 steps 7' lvl2 500 steps 7' lvl 3 579 steps  7' lvl 3 501 steps  7' 591 steps lvl 2      Hamstring stretch  C/ strap 10x10"  seated with strap 15x10"         LTR             Piriformis stretch             Leg press             Long arc quad 2x10 2# 1x10 2# 1x10 2#  1x10 2# 1x10 2# 1x10 2#      Hamstring curl  ytb 10x on each ytb 10x on each ytb 10x on each  ytb 10x on each ytb 10x on each ytb 10x on each      Hamstring stretch             Bicycle              Ther Activity             Sit to stand squat 10x on high/low 2x10 2x10   2x10 2x10                   Gait Training             Focus on reducing IR during limb advancement and during stance                          Modalities                                       1:1 with PT from [id number]

## 2021-08-18 ENCOUNTER — OFFICE VISIT (OUTPATIENT)
Dept: SLEEP CENTER | Facility: CLINIC | Age: 71
End: 2021-08-18
Payer: MEDICARE

## 2021-08-18 VITALS — BODY MASS INDEX: 23.6 KG/M2 | HEIGHT: 64 IN | WEIGHT: 138.2 LBS

## 2021-08-18 DIAGNOSIS — G47.61 PLMD (PERIODIC LIMB MOVEMENT DISORDER): ICD-10-CM

## 2021-08-18 DIAGNOSIS — G35 MULTIPLE SCLEROSIS (HCC): Primary | ICD-10-CM

## 2021-08-18 DIAGNOSIS — G25.81 RLS (RESTLESS LEGS SYNDROME): ICD-10-CM

## 2021-08-18 PROCEDURE — 99213 OFFICE O/P EST LOW 20 MIN: CPT | Performed by: INTERNAL MEDICINE

## 2021-08-18 RX ORDER — CLONAZEPAM 0.5 MG/1
TABLET ORAL
Qty: 90 TABLET | Refills: 1 | Status: SHIPPED | OUTPATIENT
Start: 2021-08-18 | End: 2022-01-04 | Stop reason: SDUPTHER

## 2021-08-18 RX ORDER — ROTIGOTINE 3 MG/24H
1 PATCH, EXTENDED RELEASE TRANSDERMAL DAILY
Qty: 90 PATCH | Refills: 3 | Status: SHIPPED | OUTPATIENT
Start: 2021-08-18 | End: 2022-01-04

## 2021-08-18 NOTE — PROGRESS NOTES
Progress Note - [facility]   Mabel Fountain PRC:[birth year] MRN: [id number]      Reason for Visit:    66 y  o female with RLS and PLMD    Assessment:     The patient is better since initiating Neupro at 2 mg/24 hrs and adjusting to 3 mg  She remains on clonazepam 0 5 mg at bedtime  Plan:  Continue same  Rx's renewed  Follow up:  1 year    History of Present Illness:  RLS and PLMD  Pramipexole caused compulsive eating      Review of Systems      Genitourinary post menopausal (no peroids)   Cardiology ankle/leg swelling   Gastrointestinal none   Neurology need to move extremities, muscle weakness, numbness/tingling of an extremity and balance problems   Constitutional claustrophobia, fatigue and weight change   Integumentary none   Psychiatry none   Musculoskeletal joint pain and leg cramps   Pulmonary none   ENT none   Endocrine none   Hematological none           I have reviewed and updated the review of systems as necessary     Historical Information    Past Medical History:   Diagnosis Date    Allergic 1967    seasonal    Allergic rhinitis     Bone pain     Breast ptosis     Depression     Fatigue     Gait disturbance     uses cane at times    Headache     Hip fracture (HCC)     Hip pain     History of transfusion 1975    placenta previa s/p work accident, no rx    Hypokalemia     Insomnia     Laceration of finger     right hand    Left ankle pain     Left knee pain     Multiple sclerosis (HCC)     Muscle strain     left lower leg    Nephrolithiasis     Osteopenia     Osteoporosis     Pain of left calf     Pneumonia     Rash     Scoliosis birth    scoliosis    Solitary pulmonary nodule     SVT (supraventricular tachycardia) (HCC)     Tingling     Urgency of urination     Urticaria     Wrist fracture, left          Past Surgical History:   Procedure Laterality Date    ANKLE SURGERY      APPENDECTOMY  11/2012    Dr Heide Carrillo Enlargement procedure with prosthetic implant bilateral    AUGMENTATION MAMMAPLASTY Right 2020    implant replaced because of recall    AUGMENTATION MAMMAPLASTY Bilateral 2012    BREAST BIOPSY Left 2012    BREAST IMPLANT Right 2020    Procedure: BREAST IMPLANT EXCHANGE WITH CAPSULECTOMY;  Surgeon: Bill Harding MD;  [facility];  Service: Plastics    BREAST IMPLANT REMOVAL Right 2020    implant placement, implant revision, right mastopexy    CYSTOSTOMY      with basket extraction of calculus; x2    EXCISION / BIOPSY BREAST / NIPPLE / DUCT Right 2020    scar revision to resuture non healing surgical wound    EXPLORATORY LAPAROTOMY      FOOT SURGERY      FRACTURE SURGERY  Lt wrist 2014 - Lt matthew femur     HIP FRACTURE SURGERY Right     Subcapital    HIP SURGERY Left     JOINT REPLACEMENT  Rt hip 10/2012    LEG SURGERY      Repair    MASTECTOMY Left 2012    VT REVISION OF RECONSTRUCTED BREAST Right 2020    Procedure: REVISION RIGHT BREAST MOUND;  Surgeon: Bill Harding MD;  [facility];  Service: Plastics    VT SUSPENSION OF BREAST Right 2020    Procedure: BREAST MASTOPEXY;  Surgeon: Bill Harding MD;  [facility];  Service: Plastics    REDUCTION MAMMAPLASTY Right 2020    reduced to match left side    SENTINEL LYMPH NODE BIOPSY Left 2012    SKIN BIOPSY      TONSILLECTOMY      TUBAL LIGATION      WRIST SURGERY Left          Social History     Socioeconomic History    Marital status: /Civil Union     Spouse name: None    Number of children: 2    Years of education: Completed bachelor's degree    Highest education level: None   Occupational History    Occupation: RN     Comment: Retired   Tobacco Use    Smoking status: Former Smoker     Packs/day: 1 00     Years: 35 00     Pack years: 35 00     Quit date:      Years since quittin 6    Smokeless tobacco: Never Used Vaping Use    Vaping Use: Never used   Substance and Sexual Activity    Alcohol use: No    Drug use: No    Sexual activity: Yes     Partners: Male     Birth control/protection: Post-menopausal   Other Topics Concern    None   Social History Narrative    Denied:  History of caffeine use     Social Determinants of Health     Financial Resource Strain:     Difficulty of Paying Living Expenses:    Food Insecurity:     Worried About Running Out of Food in the Last Year:     Ran Out of Food in the Last Year:    Transportation Needs:     Lack of Transportation (Medical):      Lack of Transportation (Non-Medical):    Physical Activity:     Days of Exercise per Week:     Minutes of Exercise per Session:    Stress:     Feeling of Stress :    Social Connections:     Frequency of Communication with Friends and Family:     Frequency of Social Gatherings with Friends and Family:     Attends Christian Services:     Active Member of Clubs or Organizations:     Attends Club or Organization Meetings:     Marital Status:    Intimate Partner Violence:     Fear of Current or Ex-Partner:     Emotionally Abused:     Physically Abused:     Sexually Abused:            History   Alcohol use: Not on file       History   Smoking Status    Not on file   Smokeless Tobacco    Not on file       Family History:   Family History   Problem Relation Age of Onset    Hodgkin's lymphoma Maternal Grandfather         age at dx unk    Cancer Maternal Aunt 79        bladder    Heart disease Maternal Aunt     Colon cancer Maternal Uncle 72    Hodgkin's lymphoma Maternal Uncle 79    Coronary artery disease Mother     Hyperlipidemia Mother     Rheum arthritis Mother     Other Father         Acute myocardial infarction    No Known Problems Maternal Grandmother     No Known Problems Paternal Grandmother     No Known Problems Paternal Grandfather     No Known Problems Son     No Known Problems Son     Stroke Maternal Aunt 6 CVA before death    Breast cancer Maternal Aunt     No Known Problems Paternal Aunt     No Known Problems Paternal Aunt     No Known Problems Paternal Aunt        Medications/Allergies:      Current Outpatient Medications:     ALPHA LIPOIC ACID PO, Take by mouth, Disp: , Rfl:     Ampyra 10 MG TB12, Take 1 tablet (10 mg total) by mouth 2 (two) times a day, Disp: 180 tablet, Rfl: 3    ascorbic acid (VITAMIN C) 500 mg tablet, Take 500 mg by mouth daily, Disp: , Rfl:     baclofen 10 mg tablet, TAKE 1 TABLET EVERY        MORNING, 1 TABLET EVERY    EVENING, AND TAKE 2 TABLETSAT BEDTIME, Disp: 360 tablet, Rfl: 0    Biotin 5000 MCG TABS, , Disp: , Rfl:     Cholecalciferol (VITAMIN D3) 1000 units CAPS, Take 5,000 Units by mouth  , Disp: , Rfl:     clonazePAM (KlonoPIN) 0 5 mg tablet, 1 by mouth at bedtime  , Disp: 90 tablet, Rfl: 1    Copaxone 40 MG/ML SOSY, Inject 40mg under the skin three times weekly  , Disp: 36 mL, Rfl: 3    Cranberry 1000 MG CAPS, Take 300 mg by mouth  , Disp: , Rfl:     denosumab (PROLIA) 60 mg/mL, Inject under the skin, Disp: , Rfl:     docusate sodium (Colace) 100 mg capsule, Take 1 capsule by mouth Daily, Disp: , Rfl:     gabapentin (NEURONTIN) 300 mg capsule, Take 1 capsule (300 mg total) by mouth daily at bedtime, Disp: 90 capsule, Rfl: 3    gabapentin (NEURONTIN) 600 MG tablet, Take 2 tablets (1,200 mg total) by mouth 3 (three) times a day, Disp: 540 tablet, Rfl: 3    lisinopril (ZESTRIL) 20 mg tablet, Take 1 tablet (20 mg total) by mouth daily, Disp: 90 tablet, Rfl: 1    Magnesium 500 MG TABS, Take 500 tablets by mouth once, Disp: , Rfl:     RESTASIS 0 05 % ophthalmic emulsion, , Disp: , Rfl: 0    solifenacin (VESICARE) 10 MG tablet, Take 1 tablet (10 mg total) by mouth daily, Disp: 90 tablet, Rfl: 3    zonisamide (ZONEGRAN) 100 mg capsule, TAKE 4 CAPSULES AT BEDTIME, Disp: 360 capsule, Rfl: 3    atorvastatin (LIPITOR) 40 mg tablet, Take 1 tablet (40 mg total) by mouth daily, Disp: 30 tablet, Rfl: 1    furosemide (LASIX) 20 mg tablet, Take 1 tablet (20 mg total) by mouth 2 (two) times a day, Disp: 10 tablet, Rfl: 0    LORazepam (ATIVAN) 0 5 mg tablet, Take 1 tablet 30 minutes prior to MRI, Disp: 1 tablet, Rfl: 0    potassium chloride (K-DUR,KLOR-CON) 10 mEq tablet, Take 1 tablet (10 mEq total) by mouth daily for 10 days, Disp: 10 tablet, Rfl: 0    rotigotine (Neupro) 3 MG/24HR transdermal patch, Place 1 patch on the skin daily, Disp: 90 patch, Rfl: 3  No current facility-administered medications for this visit  Facility-Administered Medications Ordered in Other Visits:     bacitracin 50,000 Units, gentamicin (GARAMYCIN) 40 mg/mL 80 mg, ceFAZolin (ANCEF) 1,000 mg in sodium chloride 0 9 % 1,000 mL irrigation bottle, , Irrigation, Once, Kary Campo MD      Objective    Vital Signs:   Vitals:    08/18/21 1149   Weight: 62 7 kg (138 lb 3 2 oz)   Height: 5' 4" (1 626 m)     [place] Sleepiness Scale: Total score: 12    Physical Exam:    General: Alert, appropriate, cooperative, overweight    Head: NC/AT    Skin: Warm, dry    Neuro: No motor abnormalities, cranial nerves appear intact    Psych: Normal affect            ROGER Lacey    Board Certified Sleep Specialist

## 2021-08-19 ENCOUNTER — APPOINTMENT (OUTPATIENT)
Dept: PHYSICAL THERAPY | Facility: CLINIC | Age: 71
End: 2021-08-19
Payer: MEDICARE

## 2021-08-30 ENCOUNTER — APPOINTMENT (OUTPATIENT)
Dept: LAB | Facility: CLINIC | Age: 71
End: 2021-08-30
Payer: MEDICARE

## 2021-08-30 ENCOUNTER — OFFICE VISIT (OUTPATIENT)
Dept: PHYSICAL THERAPY | Facility: CLINIC | Age: 71
End: 2021-08-30
Payer: MEDICARE

## 2021-08-30 DIAGNOSIS — M25.551 PAIN IN RIGHT HIP: Primary | ICD-10-CM

## 2021-08-30 DIAGNOSIS — G35 MULTIPLE SCLEROSIS (HCC): ICD-10-CM

## 2021-08-30 DIAGNOSIS — M81.0 SENILE OSTEOPOROSIS: ICD-10-CM

## 2021-08-30 DIAGNOSIS — Z96.641 HISTORY OF TOTAL HIP REPLACEMENT, RIGHT: ICD-10-CM

## 2021-08-30 DIAGNOSIS — Z11.59 NEED FOR HEPATITIS C SCREENING TEST: ICD-10-CM

## 2021-08-30 LAB
25(OH)D3 SERPL-MCNC: 74.2 NG/ML (ref 30–100)
ALBUMIN SERPL BCP-MCNC: 3.9 G/DL (ref 3.5–5)
ALP SERPL-CCNC: 75 U/L (ref 46–116)
ALT SERPL W P-5'-P-CCNC: 31 U/L (ref 12–78)
AST SERPL W P-5'-P-CCNC: 17 U/L (ref 5–45)
BASOPHILS # BLD AUTO: 0.06 THOUSANDS/ΜL (ref 0–0.1)
BASOPHILS NFR BLD AUTO: 1 % (ref 0–1)
BILIRUB DIRECT SERPL-MCNC: 0.11 MG/DL (ref 0–0.2)
BILIRUB SERPL-MCNC: 0.45 MG/DL (ref 0.2–1)
CALCIUM SERPL-MCNC: 9.2 MG/DL (ref 8.3–10.1)
EOSINOPHIL # BLD AUTO: 0.22 THOUSAND/ΜL (ref 0–0.61)
EOSINOPHIL NFR BLD AUTO: 4 % (ref 0–6)
ERYTHROCYTE [DISTWIDTH] IN BLOOD BY AUTOMATED COUNT: 12.6 % (ref 11.6–15.1)
HCT VFR BLD AUTO: 38.1 % (ref 34.8–46.1)
HCV AB SER QL: NORMAL
HGB BLD-MCNC: 11.9 G/DL (ref 11.5–15.4)
IMM GRANULOCYTES # BLD AUTO: 0.02 THOUSAND/UL (ref 0–0.2)
IMM GRANULOCYTES NFR BLD AUTO: 0 % (ref 0–2)
LYMPHOCYTES # BLD AUTO: 1.84 THOUSANDS/ΜL (ref 0.6–4.47)
LYMPHOCYTES NFR BLD AUTO: 34 % (ref 14–44)
MCH RBC QN AUTO: 30.8 PG (ref 26.8–34.3)
MCHC RBC AUTO-ENTMCNC: 31.2 G/DL (ref 31.4–37.4)
MCV RBC AUTO: 99 FL (ref 82–98)
MONOCYTES # BLD AUTO: 0.36 THOUSAND/ΜL (ref 0.17–1.22)
MONOCYTES NFR BLD AUTO: 7 % (ref 4–12)
NEUTROPHILS # BLD AUTO: 2.85 THOUSANDS/ΜL (ref 1.85–7.62)
NEUTS SEG NFR BLD AUTO: 54 % (ref 43–75)
NRBC BLD AUTO-RTO: 0 /100 WBCS
PLATELET # BLD AUTO: 233 THOUSANDS/UL (ref 149–390)
PMV BLD AUTO: 10.3 FL (ref 8.9–12.7)
PROT SERPL-MCNC: 7.3 G/DL (ref 6.4–8.2)
RBC # BLD AUTO: 3.86 MILLION/UL (ref 3.81–5.12)
WBC # BLD AUTO: 5.35 THOUSAND/UL (ref 4.31–10.16)

## 2021-08-30 PROCEDURE — 80076 HEPATIC FUNCTION PANEL: CPT

## 2021-08-30 PROCEDURE — 97140 MANUAL THERAPY 1/> REGIONS: CPT | Performed by: PHYSICAL THERAPIST

## 2021-08-30 PROCEDURE — 36415 COLL VENOUS BLD VENIPUNCTURE: CPT

## 2021-08-30 PROCEDURE — 82310 ASSAY OF CALCIUM: CPT

## 2021-08-30 PROCEDURE — 86803 HEPATITIS C AB TEST: CPT

## 2021-08-30 PROCEDURE — 97110 THERAPEUTIC EXERCISES: CPT | Performed by: PHYSICAL THERAPIST

## 2021-08-30 PROCEDURE — 97112 NEUROMUSCULAR REEDUCATION: CPT | Performed by: PHYSICAL THERAPIST

## 2021-08-30 PROCEDURE — 85025 COMPLETE CBC W/AUTO DIFF WBC: CPT

## 2021-08-30 PROCEDURE — 82306 VITAMIN D 25 HYDROXY: CPT

## 2021-08-30 NOTE — PROGRESS NOTES
Daily Note     Today's date: 2021  Patient name: Zeb Lowe  : 1950  MRN: [id number]  Referring provider: Yvon Daniels DO  Dx:   Encounter Diagnosis     ICD-10-CM    1  Pain in right hip  M25 551    2  History of total hip replacement, right  Z96 641                 Subjective: Pt returns from the beach at [facility]  She admits to falling in the condo in the middle of the night and has a bruise near the medial aspect of the right knee  Objective: See treatment diary below  Negative Florence knee rules: Able to bear weight, no isolated tenderness to patella/fibula, able to flex >90 pain free  Moderate ecchymosis near pes anserine on the right  Moderate edema in BLE (Distally at ankles)   Assessment: Decreased vigor of exercise program today to accommodate for knee contusion  Tolerated treatment fair  Patient would benefit from continued PT  Pt will be re-evaluated next session  Plan: Continue per plan of care        Precautions: MS, Gait dysfunction, Insomnia, Osteoporosis, Peripheral polyneuropathy, Hx of Breast CA, R IDALIA in - Dr Faheem Marx, Left femur IM nailing      Manuals      Right hip PROM 8' 5' 5' 5' 5' 5' 5' 5'     Right sciatic nerve glide             STM (rolling) right lateral hip              Right hamstring stretch  5' 5' B 5' B 5' B 5' B 5' B 5' B     Neuro Re-Ed             Bridge 15x 10x 10x 10x 10x 2x10 2x10 2x10     SL clam shell             TrA March 15x on each 10x on each  10x on each  5x 20x on each 20x on each 20x on each 20x on each                   Romberg Foam      BD 2' BD 2' BD 2'      Hookyling hip abduction isometric             Hooklying hip adduction isometric                          Ther Ex             Nu Step 7' lvl2 473 steps 7' lvl2 500 steps 7' lvl 3 579 steps  7' lvl 3 501 steps  7' 591 steps lvl 2 6' 400 steps lvl 2     Hamstring stretch  C/ strap 10x10"  seated with strap 15x10"         LTR Piriformis stretch             Leg press             Long arc quad 2x10 2# 1x10 2# 1x10 2#  1x10 2# 1x10 2# 1x10 2# Hold     Hamstring curl  ytb 10x on each ytb 10x on each ytb 10x on each  ytb 10x on each ytb 10x on each ytb 10x on each ytb 10x on each     Hamstring stretch             Bicycle              Ther Activity             Sit to stand squat 10x on high/low 2x10 2x10   2x10 2x10 2x10                  Gait Training             Focus on reducing IR during limb advancement and during stance                          Modalities                                       1:1 with PT from 1015-11

## 2021-09-01 ENCOUNTER — TELEMEDICINE (OUTPATIENT)
Dept: NEUROLOGY | Facility: CLINIC | Age: 71
End: 2021-09-01
Payer: MEDICARE

## 2021-09-01 ENCOUNTER — TELEPHONE (OUTPATIENT)
Dept: NEUROLOGY | Facility: CLINIC | Age: 71
End: 2021-09-01

## 2021-09-01 DIAGNOSIS — G35 MULTIPLE SCLEROSIS (HCC): Primary | ICD-10-CM

## 2021-09-01 DIAGNOSIS — R53.83 FATIGUE, UNSPECIFIED TYPE: ICD-10-CM

## 2021-09-01 DIAGNOSIS — G62.9 PERIPHERAL POLYNEUROPATHY: ICD-10-CM

## 2021-09-01 PROCEDURE — 99442 PR PHYS/QHP TELEPHONE EVALUATION 11-20 MIN: CPT | Performed by: PSYCHIATRY & NEUROLOGY

## 2021-09-01 NOTE — ASSESSMENT & PLAN NOTE
No new neuropathic pain  Pt notes on going fatigue  Pt remains on her gabapentin  Working with sleep med to help with rls like sxs  Pt had prior iron studies completed  tsh from dec 2020 ok

## 2021-09-01 NOTE — TELEPHONE ENCOUNTER
Pt seen via telephone today  Please mail out rx for labs for dec and also make follow up appt at [facility] in 4 months   thanks

## 2021-09-01 NOTE — ASSESSMENT & PLAN NOTE
Likely mutifactorial  Pt with known neuropathy as well as MS  Pt also with rls  Pt on neupro patch now  rec pt to follow up with dr moody to consider MSLT for her hypersomnolence

## 2021-09-01 NOTE — ASSESSMENT & PLAN NOTE
Pt here for neuro follow up  Pt seen for MS follow up  Overall pt doing about the same from [address] 86 standpoint  Pt has followed up with ortho since last visit to eval hip stability  Pt did have fall while at beach this past week during fire alarm and cane getting stuck on threshold   No fx but bruised knees  Pt remains on her copaxone brand tiw  Rev mri t spine from June 2021 and stable  Currently getting PT with Belem Meyer which is very helpful

## 2021-09-01 NOTE — TELEPHONE ENCOUNTER
Spoke to patient and scheduled follow up appt 1/21/22 in [place] with Dr Mgada Vigil  Mailed lab order to patients home address listed in chart

## 2021-09-01 NOTE — PROGRESS NOTES
Virtual Brief Visit    Verification of patient location:    Patient is located in the following state in which I hold an active license [place]      Assessment/Plan:    Problem List Items Addressed This Visit        Nervous and Auditory    Multiple sclerosis ([address] ) - Primary     Pt here for neuro follow up  Pt seen for MS follow up  Overall pt doing about the same from [address] standpoint  Pt has followed up with ortho since last visit to eval hip stability  Pt did have fall while at beach this past week during fire alarm and cane getting stuck on threshold   No fx but bruised knees  Pt remains on her copaxone brand tiw  Rev mri t spine from June 2021 and stable  Currently getting PT with Deanie Dandy which is very helpful           Peripheral polyneuropathy     No new neuropathic pain  Pt notes on going fatigue  Pt remains on her gabapentin  Working with sleep med to help with rls like sxs  Pt had prior iron studies completed  tsh from dec 2020 ok            Other    Fatigue     Likely mutifactorial  Pt with known neuropathy as well as MS  Pt also with rls  Pt on neupro patch now  rec pt to follow up with dr Kim Howard to consider MSLT for her hypersomnolence                     Reason for visit is   Chief Complaint   Patient presents with    Virtual Brief Visit        Encounter provider Jessie Ho MD    Provider located at [address] [place] [address] Drive  [address]  [phone]    Recent Visits  No visits were found meeting these conditions  Showing recent visits within past 7 days and meeting all other requirements  Today's Visits  Date Type Provider Dept   09/01/21 Telemedicine Jessie Ho MD [facility]   Showing today's visits and meeting all other requirements  Future Appointments  No visits were found meeting these conditions    Showing future appointments within next 150 days and meeting all other requirements       After connecting through telephone, the patient was identified by name and date of birth  Barton Sandifer was informed that this is a telemedicine visit and that the visit is being conducted through telephone  My office door was closed  No one else was in the room  She acknowledged consent and understanding of privacy and security of the platform  The patient has agreed to participate and understands she can discontinue the visit at any time  Patient is aware this is a billable service  Subjective    Barton Sandifer is a 66 y o  female presents for MS follow up, last seen in  5/6/21 by me   Per my last note "Patient with history of MS dating back to 2008, but likely with symptoms even in 1998  Patient also with PMH of breast cancer s/p left mastectomy in August 2012  Pt has been on Copaxone since 2008 and changed to the 40mg TIW dosing    Pt notes she has to go for colonoscopy due to abn stool screening testing   Pt notes she is also being considered for some breast reconstruction with autologous fat transfer at mastectomy site   Pt is following up with her doctors   Pt notes overall still dealing with her walking and trying to help her left leg and her balance   Pt has rx from pcp for PT   Pt feels PT has been very helpful   Pt also had reconstructive surgery on breast in jan 28th and still working on healing with follow up visits with her surgeon     Since last visit pt had same day procedure with right breast reconstruction   Pt notes she had open area that was poorly healing   Now area is closed and no abx   Pt notes her biggest issue at this time is poor sleep related to not staying asleep as well as neurpathic pain in the legs   Pt cannot take lyrica or cymbalta as tried in past   Pt still on her baclofen, gabapentin and zonegran   Pt had updated labs in July and nl cbc diff and lfts   Rev in depth with pt   Pt notes she had one fall while in storage/ sewing area and fell trying to lift something and hurt left trapezius area   This is now improved since PT   Pt remains on brand copaxone and padmini med well "  Kept above paragraph due to longevity and complexiites of pt case  Pt last seen in May  Overall pt feeling much more fatigued  Pt is following with sleep med closely , recently seen by dr Jose Dinero  Pt now off mirapex due to weight gain and now on neupro patch  Pt notes increased fatigue during day  Pt never on stimulant in past   Pt notes she does not even use caffeine  rec pt to discuss possible help with further testing with MSLT  Pt had one fall since last visit  Pt was at beach last week and at 1am fire alarm went off and pt was getting out of [place] and cane got stuck on door threshold and pt feel onto her knees  No loc  Pt more bruised  No er assessment  Pt notes no other new sxs  Pt with ongoing issues with balance and more reliant on cane  Pt has been seen by ortho ie dr Denny Hernandez since our last visit  Pt had re eval for stability of hip prosthetic  Pt notes hip integrity ok, and now doing PT as recommended by ortho team   Pt is getting good success with Ryne Arriaza from PT  Pt had updated labs in aug 30 2021 with nl vit d at 74, nl wbc of 5 35, ANC of 2 85, abs lymphs of 1 84  Pt with slighlty lower hb from her baseline  Pt is following up with pcp on this  No blood in stool  Pt has had colonoscopy as well  No other new ms sxs  No change in speech  No change in vision  No loss of vision  No vertigo  Of note, pt had updated mri t spine since last visit  Mri 6/7/21 stable demyelinating lesion at T12/L1  No additional new lesion  Some motion artifact  No evidence of metastatic disease  Pt going to [facility] PT in [place] about 2 miles from her home  No recent infections  No recent hospitalizations     Visit was done today as telephone visit due to power outage at office today            The following portions of the patient's history were reviewed and updated as appropriate: allergies, current medications, past family history, past medical history, past social history, past surgical history and problem list and ros rev and med rec rev         HPI     Past Medical History:   Diagnosis Date    Allergic 1967    seasonal    Allergic rhinitis     Bone pain     Breast ptosis     Depression     Fatigue     Gait disturbance     uses cane at times    Headache     Hip fracture ([address] )     Hip pain     History of transfusion 1975    placenta previa s/p work accident, no rx    Hypokalemia     Insomnia     Laceration of finger     right hand    Left ankle pain     Left knee pain     Multiple sclerosis ([address] )     Muscle strain     left lower leg    Nephrolithiasis     Osteopenia     Osteoporosis     Pain of left calf     Pneumonia     Rash     Scoliosis birth    scoliosis    Solitary pulmonary nodule     SVT (supraventricular tachycardia) (HCC)     Tingling     Urgency of urination     Urticaria     Wrist fracture, left        Past Surgical History:   Procedure Laterality Date    ANKLE SURGERY      APPENDECTOMY  11/2012    Dr Constance Valderrama      Enlargement procedure with prosthetic implant bilateral    AUGMENTATION MAMMAPLASTY Right 01/28/2020    implant replaced because of recall    AUGMENTATION MAMMAPLASTY Bilateral 2012    BREAST BIOPSY Left 07/23/2012    BREAST IMPLANT Right 1/28/2020    Procedure: BREAST IMPLANT EXCHANGE WITH CAPSULECTOMY;  Surgeon: aZki Flores MD;  [facility];  Service: Plastics    BREAST IMPLANT REMOVAL Right 01/28/2020    implant placement, implant revision, right mastopexy    CYSTOSTOMY      with basket extraction of calculus; x2    EXCISION / BIOPSY BREAST / NIPPLE / DUCT Right 05/04/2020    scar revision to resuture non healing surgical wound    EXPLORATORY LAPAROTOMY      FOOT SURGERY      FRACTURE SURGERY  Lt wrist 9/2014 - Lt matthew femur 9/14    HIP FRACTURE SURGERY Right     Subcapital    HIP SURGERY Left     JOINT REPLACEMENT  Rt hip 10/2012    LEG SURGERY      Repair    MASTECTOMY Left 08/16/2012    OK REVISION OF RECONSTRUCTED BREAST Right 5/4/2020    Procedure: REVISION RIGHT BREAST MOUND;  Surgeon: Aleksander Villalobos MD;  [facility];  Service: Plastics    OK SUSPENSION OF BREAST Right 1/28/2020    Procedure: BREAST MASTOPEXY;  Surgeon: Aleksander Villalobos MD;  [facility];  Service: Plastics    REDUCTION MAMMAPLASTY Right 01/28/2020    reduced to match left side    SENTINEL LYMPH NODE BIOPSY Left 08/16/2012    SKIN BIOPSY      TONSILLECTOMY      TUBAL LIGATION      WRIST SURGERY Left        Current Outpatient Medications   Medication Sig Dispense Refill    ALPHA LIPOIC ACID PO Take by mouth      Ampyra 10 MG TB12 Take 1 tablet (10 mg total) by mouth 2 (two) times a day 180 tablet 3    ascorbic acid (VITAMIN C) 500 mg tablet Take 500 mg by mouth daily      atorvastatin (LIPITOR) 40 mg tablet Take 1 tablet (40 mg total) by mouth daily 30 tablet 1    baclofen 10 mg tablet TAKE 1 TABLET EVERY        MORNING, 1 TABLET EVERY    EVENING, AND TAKE 2 TABLETSAT BEDTIME 360 tablet 0    Biotin 5000 MCG TABS       Cholecalciferol (VITAMIN D3) 1000 units CAPS Take 5,000 Units by mouth        clonazePAM (KlonoPIN) 0 5 mg tablet 1 by mouth at bedtime  90 tablet 1    Copaxone 40 MG/ML SOSY Inject 40mg under the skin three times weekly   36 mL 3    Cranberry 1000 MG CAPS Take 300 mg by mouth        denosumab (PROLIA) 60 mg/mL Inject under the skin      docusate sodium (Colace) 100 mg capsule Take 1 capsule by mouth Daily      furosemide (LASIX) 20 mg tablet Take 1 tablet (20 mg total) by mouth 2 (two) times a day 10 tablet 0    gabapentin (NEURONTIN) 300 mg capsule Take 1 capsule (300 mg total) by mouth daily at bedtime 90 capsule 3    gabapentin (NEURONTIN) 600 MG tablet Take 2 tablets (1,200 mg total) by mouth 3 (three) times a day 540 tablet 3    lisinopril (ZESTRIL) 20 mg tablet Take 1 tablet (20 mg total) by mouth daily 90 tablet 1    LORazepam (ATIVAN) 0 5 mg tablet Take 1 tablet 30 minutes prior to MRI 1 tablet 0    Magnesium 500 MG TABS Take 500 tablets by mouth once      potassium chloride (K-DUR,KLOR-CON) 10 mEq tablet Take 1 tablet (10 mEq total) by mouth daily for 10 days 10 tablet 0    RESTASIS 0 05 % ophthalmic emulsion   0    rotigotine (Neupro) 3 MG/24HR transdermal patch Place 1 patch on the skin daily 90 patch 3    solifenacin (VESICARE) 10 MG tablet Take 1 tablet (10 mg total) by mouth daily 90 tablet 3    zonisamide (ZONEGRAN) 100 mg capsule TAKE 4 CAPSULES AT BEDTIME 360 capsule 3     No current facility-administered medications for this visit  Facility-Administered Medications Ordered in Other Visits   Medication Dose Route Frequency Provider Last Rate Last Admin    bacitracin 50,000 Units, gentamicin (GARAMYCIN) 40 mg/mL 80 mg, ceFAZolin (ANCEF) 1,000 mg in sodium chloride 0 9 % 1,000 mL irrigation bottle   Irrigation Once Zaki Flores MD            Allergies   Allergen Reactions    Duloxetine Hcl      Rapid Heart rate      Iodinated Diagnostic Agents Anaphylaxis    Acetaminophen Other (See Comments)     Heart palpitations    Cetirizine      Only occurs with generic, weakness in legs, off balance and couldn't walk   Morphine Other (See Comments)     Migraine       Review of Systems   Constitutional: Positive for fatigue  Neurological: Positive for numbness  All other systems reviewed and are negative  There were no vitals filed for this visit  I spent 31 minutes directly with the patient during this visit    VIRTUAL VISIT [address] verbally agrees to participate in [employer]   Pt is aware that [employer] could be limited without vital signs or the ability to perform a full hands-on physical exam  Jacy Phelan understands she or the provider may request at any time to terminate the video visit and request the patient to seek care or treatment in person

## 2021-09-02 ENCOUNTER — EVALUATION (OUTPATIENT)
Dept: PHYSICAL THERAPY | Facility: CLINIC | Age: 71
End: 2021-09-02
Payer: MEDICARE

## 2021-09-02 DIAGNOSIS — G35 MULTIPLE SCLEROSIS (HCC): Primary | ICD-10-CM

## 2021-09-02 DIAGNOSIS — Z96.641 HISTORY OF TOTAL HIP REPLACEMENT, RIGHT: ICD-10-CM

## 2021-09-02 DIAGNOSIS — R26.89 IMPAIRMENT OF BALANCE: ICD-10-CM

## 2021-09-02 DIAGNOSIS — M25.551 PAIN IN RIGHT HIP: ICD-10-CM

## 2021-09-02 PROCEDURE — 97530 THERAPEUTIC ACTIVITIES: CPT | Performed by: PHYSICAL THERAPIST

## 2021-09-02 PROCEDURE — 97110 THERAPEUTIC EXERCISES: CPT | Performed by: PHYSICAL THERAPIST

## 2021-09-02 NOTE — PROGRESS NOTES
[id number]    Today's date: 2021  Patient name: Epi Dumont  : 1950  MRN: [id number]  Referring provider: Franky Little DO  Dx:   Encounter Diagnosis     ICD-10-CM    1  Multiple sclerosis ([facility] [address] )  G35    2  Pain in right hip  M25 551    3  History of total hip replacement, right  Z96 641    4  Impairment of balance  R26 89        Start Time: 1020  Stop Time: 1050  Total time in clinic (min): 30 minutes    Assessment  Assessment details: Kyle Espinoza reports good progress with pain at her hips  She denies much pain at this time  Her hip ROM has gradually improved in all planes  Her MS and balance have been progressively worsening  She had a virtual visit with Dr Estuardo Harrison yesterday who recommended continuing with PT for MS and balance  Pt does admit to falling while on vacation last week in [place]   Considerable weakness present in bilateral LE  Pt is unable to perform a SLR on either LE  Her weakness is present in all anti-gravity muscles and much worse on the left than the right  Her FOTO score for the hip has improved nicely over the course of care  At this point in her rehabilitation, her hip pain is not limiting her function; however, the neurogenic weakness, balance, and worsening MS has challenged patients recovery  We will continue PT at 2 x's per week for 6 week focusing on over function, strength, and endurance to improve gait pattern and reduce the risk of falls           Impairments: abnormal coordination, abnormal gait, abnormal or restricted ROM, abnormal movement, activity intolerance, impaired balance, impaired physical strength, lacks appropriate home exercise program and pain with function  Barriers to therapy: MS, complex history of surgery in bilateral hips, deconditioning, Osteoporosis, Fall risk, Peripheral neuropathy  Understanding of Dx/Px/POC: good   Prognosis: good    Goals  Short Term Goals: to be achieved by 4 weeks  1) Patient to be independent with basic HEP -MET  2) Decrease pain to 3/10 at its worst -Partially met  3) Increase right hip ROM by 5-10 degrees -MET  4) Increase LE strength by 1/2 MMT grade in all deficient planes -Partially met    Long Term Goals: to be achieved by discharge  1) FOTO equal to or greater than expected- DC for hip  2) Ambulation to improve to maximal level of function-Partially met  3) Stair negotiation will improve to reciprocal -Partially met  4) Sit to stand transfers will improve to maximal level of function -Partially met    Plan  Patient would benefit from: PT eval and skilled physical therapy  Planned therapy interventions: joint mobilization, manual therapy, neuromuscular re-education, patient education, therapeutic activities, therapeutic exercise and home exercise program  Frequency: 2x per week for 6 weeks  Treatment plan discussed with: patient        Subjective Evaluation    History of Present Illness  Mechanism of injury: History of Current Injury: Pt referred to PT secondary to right hip pain  PMH significant for right total hip replacement in 2012 after ORIF  PT also has IM nailing in left femur  Pt saw Dr Diya Martin who XR the left hip and and prescribed PT  Pt notes gradual onset of pain in the right hip which is worsening  No trauma, fall, or inciting event  Pt has gained weight over this time period due to new medication side effects over the last 6 months  Pt feels the right hip is very stiff and feels like her legs are "concrete " Pt notes worsening balance secondary to hip issue  Pain location/Descriptors: right lateral hip pain which feel stiff and heavy  Symptoms can radiate from posterolateral hip to mid thigh  Aggravating factors: walking (in the grocery store), squatting, prolonging sitting or standing   Eases: Naprosyn (once a day)   24 HR pattern: Worse after 5 PM  Imaging: XRs of R hip demonstrate stable prosthesis according to last physician note     Special Questions: Pt notes constant numbness/tingling from the MS    Patient goals:  Improve ambulation without assisted device, Improve motion in right hip, relieve pain in right hip  Occupation: Retired nurse     Pain  Current pain ratin  At worst pain ratin          Objective     Neurological Testing     Sensation     Lumbar   Left   Diminished: light touch    Right   Intact: light touch    Comments   Left light touch: L5 distally at ankle    Reflexes   Left   Patellar (L4): trace (1+)  Achilles (S1): trace (1+)  Clonus sign: negative    Right   Patellar (L4): trace (1+)  Achilles (S1): trace (1+)  Clonus sign: negative    Active Range of Motion     Lumbar   Flexion:  WFL  Extension:  WFL  Left lateral flexion:  WFL  Right lateral flexion:  WFL  Left rotation:  WFL  Right rotation:  [facility]    Additional Active Range of Motion Details  *Right posterolateral hip pain with left side bending -WNL 9/2  *Quadrant was normal bilaterally - WNL 9/2    Passive Range of Motion   Left Hip   Flexion: 115 degrees   External rotation (90/90): 50 degrees   Internal rotation (90/90): 50 degrees     Right Hip   Flexion: 110 degrees   External rotation (90/90): 45 degrees   Internal rotation (90/90): 50 degrees     Strength/Myotome Testing     Left Hip   Planes of Motion   Flexion: 3- (Supine)  Abduction: 2+ (SL)  Adduction: 3 (Seated)  External rotation: 3+ (SL)    Right Hip   Planes of Motion   Flexion: 3- (Supine)  Abduction: 2+ (SL)  Adduction: 3 (Seated)  External rotation: 3 (SL)    Left Knee   Flexion: 3+  Extension: 3+    Right Knee   Flexion: 4-  Extension: 4-    Left Ankle/Foot   Dorsiflexion: 3+  Plantar flexion: 4-    Right Ankle/Foot   Dorsiflexion: 3+  Plantar flexion: 4-    Tests     Right Hip   Positive ADINA  Negative FADIR and scour  SLR: Negative       Additional Tests Details  *SLR: 60 degrees R (hamstring restriction), 50 degrees L (hamstring stretch)   *Pt unable to perform SLR on the B      Ambulation     Observational Gait   Gait: antalgic, asymmetric and circumduction   Decreased walking speed and stride length  Additional Observational Gait Details  Gait: internally rotated femur on the right causing excessive toeing in during stance phase  Gait is antalgic, slow sylvia, and small step length  - 7/23: Improved motion and control with reduced internal rotation of femur during ambulation  Functional Assessment        Comments  Timed up and go: 20 seconds with SPC and hand support     5 x sit to stand: 24 seconds, moderate difficulty, UE support every rep, bilateral genu valgum, and 2x lack of control on decent                Precautions: MS, Gait dysfunction, Insomnia, Osteoporosis, Peripheral polyneuropathy, Hx of Breast CA, R IDALIA in 2012- Dr Juliet Eden, Left femur IM nailing    Manuals 7/26 7/29 8/2 8/5 8/9 8/12 8/16 8/30 9/2    Right hip PROM 8' 5' 5' 5' 5' 5' 5' 5'     Right sciatic nerve glide             STM (rolling) right lateral hip              Right hamstring stretch  5' 5' B 5' B 5' B 5' B 5' B 5' B     Neuro Re-Ed             Bridge 15x 10x 10x 10x 10x 2x10 2x10 2x10     SL clam shell             TrA March 15x on each 10x on each  10x on each  5x 20x on each 20x on each 20x on each 20x on each                   Romberg Foam      BD 2' BD 2' BD 2'      Hookyling hip abduction isometric             Hooklying hip adduction isometric                          Ther Ex             Nu Step 7' lvl2 473 steps 7' lvl2 500 steps 7' lvl 3 579 steps  7' lvl 3 501 steps  7' 591 steps lvl 2 6' 400 steps lvl 2     Long arc quad 2x10 2# 1x10 2# 1x10 2#  1x10 2# 1x10 2# 1x10 2# Hold     Hamstring curl  ytb 10x on each ytb 10x on each ytb 10x on each  ytb 10x on each ytb 10x on each ytb 10x on each ytb 10x on each     Hamstring stretch             Bicycle              Ther Activity             Sit to stand squat 10x on high/low 2x10 2x10   2x10 2x10 2x10     6 MWT         NV     Leenan walks        nv     Lateral walks        nv     Obstacle course with foam         nv Gait Training             Focus on reducing IR during limb advancement and during stance                          Modalities                                       1:1 with PT from [id number]  Pt was re-evaluated today x 30 minutes

## 2021-09-08 ENCOUNTER — OFFICE VISIT (OUTPATIENT)
Dept: PHYSICAL THERAPY | Facility: CLINIC | Age: 71
End: 2021-09-08
Payer: MEDICARE

## 2021-09-08 DIAGNOSIS — G35 MULTIPLE SCLEROSIS (HCC): Primary | ICD-10-CM

## 2021-09-08 DIAGNOSIS — R26.89 BALANCE PROBLEM: ICD-10-CM

## 2021-09-08 PROCEDURE — 97530 THERAPEUTIC ACTIVITIES: CPT | Performed by: PHYSICAL THERAPIST

## 2021-09-08 PROCEDURE — 97140 MANUAL THERAPY 1/> REGIONS: CPT | Performed by: PHYSICAL THERAPIST

## 2021-09-08 NOTE — PROGRESS NOTES
Daily Note     Today's date: 2021  Patient name: Geovanna Garcia  : 1950  MRN: [id number]  Referring provider: Katherin Mathis DO  Dx:   Encounter Diagnosis     ICD-10-CM    1  Multiple sclerosis ([address] )  G35    2  Balance problem  R26 89                   Subjective: Pt reports severe leg cramps and nerve shocks in LE the other day  Pt states that she forgot to take her Neupro that day  Objective: See treatment diary below      Assessment: Excellent progression in today's session  Switched focus to more functional exercises and dyanmic balance  Tolerated treatment well  Patient would benefit from continued PT  Plan: Continue per plan of care        Precautions: MS, Gait dysfunction, Insomnia, Osteoporosis, Peripheral polyneuropathy, Hx of Breast CA, R IDALIA in - Dr Geri Gomez, Left femur IM nailing    Manuals    Right hip PROM 8' 5' 5' 5' 5' 5' 5' 5'  5'   Right sciatic nerve glide             STM (rolling) right lateral hip              Right hamstring stretch  5' 5' B 5' B 5' B 5' B 5' B 5' B  5' B   Neuro Re-Ed             Bridge 15x 10x 10x 10x 10x 2x10 2x10 2x10  20x   SL clam shell             TrA March 15x on each 10x on each  10x on each  5x 20x on each 20x on each 20x on each 20x on each                   Romberg Foam      BD 2' BD 2' BD 2'      Hookyling hip abduction isometric             Hooklying hip adduction isometric                          Ther Ex             Nu Step 7' lvl2 473 steps 7' lvl2 500 steps 7' lvl 3 579 steps  7' lvl 3 501 steps  7' 591 steps lvl 2 6' 400 steps lvl 2  7' lvl 2 570   Long arc quad 2x10 2# 1x10 2# 1x10 2#  1x10 2# 1x10 2# 1x10 2# Hold     Hamstring curl  ytb 10x on each ytb 10x on each ytb 10x on each  ytb 10x on each ytb 10x on each ytb 10x on each ytb 10x on each     Hamstring stretch             Bicycle              Ther Activity             Sit to stand squat 10x on high/low 2x10 2x10   2x10 2x10 2x10     6 MWT         NV     Leeann walks        nv  10x10"   Lateral walks        nv  10x10"   Obstacle course with foam         nv     Marching on foam           Green 30x on each    Lateral walks           No UE support 6x10   Step up/down          6" 5x on each    Gait Training             Focus on reducing IR during limb advancement and during stance                          Modalities                                       1:1 with PT from [id number]

## 2021-09-13 ENCOUNTER — OFFICE VISIT (OUTPATIENT)
Dept: PHYSICAL THERAPY | Facility: CLINIC | Age: 71
End: 2021-09-13
Payer: MEDICARE

## 2021-09-13 DIAGNOSIS — G35 MULTIPLE SCLEROSIS (HCC): Primary | ICD-10-CM

## 2021-09-13 DIAGNOSIS — R26.89 BALANCE PROBLEM: ICD-10-CM

## 2021-09-13 DIAGNOSIS — G35 MULTIPLE SCLEROSIS (HCC): ICD-10-CM

## 2021-09-13 DIAGNOSIS — R26.89 IMPAIRMENT OF BALANCE: ICD-10-CM

## 2021-09-13 PROCEDURE — 97140 MANUAL THERAPY 1/> REGIONS: CPT | Performed by: PHYSICAL THERAPIST

## 2021-09-13 PROCEDURE — 97110 THERAPEUTIC EXERCISES: CPT | Performed by: PHYSICAL THERAPIST

## 2021-09-13 PROCEDURE — 97530 THERAPEUTIC ACTIVITIES: CPT | Performed by: PHYSICAL THERAPIST

## 2021-09-13 RX ORDER — GABAPENTIN 600 MG/1
TABLET ORAL
Qty: 540 TABLET | Refills: 3 | Status: SHIPPED | OUTPATIENT
Start: 2021-09-13 | End: 2022-01-21 | Stop reason: SDUPTHER

## 2021-09-13 NOTE — PROGRESS NOTES
Daily Note     Today's date: 2021  Patient name: Tracy Berkowitz  : 1950  MRN: [id number]  Referring provider: Wallace Baca DO  Dx:   Encounter Diagnosis     ICD-10-CM    1  Multiple sclerosis ([address] )  G35    2  Balance problem  R26 89    3  Impairment of balance  R26 89        Start Time: 1015  Stop Time: 1100  Total time in clinic (min): 45 minutes    Subjective: Pt went to a conference over the weekend which required a lot of walking  Pt notes that her legs felt like jello  Objective: See treatment diary below      Assessment: Tolerated treatment well  Pt challenged with step up/down on LLE due to quad weakness  Improve mobility of the bilateral LE today compared to previous sessions  Patient exhibited good technique with therapeutic exercises and would benefit from continued PT      Plan: Continue per plan of care        Precautions: MS, Gait dysfunction, Insomnia, Osteoporosis, Peripheral polyneuropathy, Hx of Breast CA, R IDALIA in - Dr En Howard, Left femur IM nailing    Manuals    Right hip PROM 5'     5' 5' 5'  5'   Right sciatic nerve glide             STM (rolling) right lateral hip              Right hamstring stretch 5' B     5' B 5' B 5' B  5' B   Neuro Re-Ed             Bridge      2x10 2x10 2x10  20x   SL clam shell             TrA March      20x on each 20x on each 20x on each                   Romberg Foam      BD 2' BD 2' BD 2'      Hookyling hip abduction isometric             Hooklying hip adduction isometric                          Ther Ex             Nu Step 7' lvl 2 570      7' 591 steps lvl 2 6' 400 steps lvl 2  7' lvl 2 570   Long arc quad      1x10 2# 1x10 2# Hold     Hamstring curl       ytb 10x on each ytb 10x on each ytb 10x on each     Hamstring stretch             Bicycle              Ther Activity             Sit to stand squat      2x10 2x10 2x10     6 MWT         NV     Leeann walks 10x10"       nv  10x10"   Lateral walks nv  10x10"   Obstacle course with foam         nv     Marching on foam  BD foam 30x on each          Green 30x on each    Lateral walks  No UE support 6x10         No UE support 6x10   Step up/down 6" 5x on each          6" 5x on each    Gait Training             Focus on reducing IR during limb advancement and during stance                          Modalities                                       1:1 with PT from 1015-11

## 2021-09-15 ENCOUNTER — OFFICE VISIT (OUTPATIENT)
Dept: PHYSICAL THERAPY | Facility: CLINIC | Age: 71
End: 2021-09-15
Payer: MEDICARE

## 2021-09-15 DIAGNOSIS — G35 MULTIPLE SCLEROSIS (HCC): Primary | ICD-10-CM

## 2021-09-15 DIAGNOSIS — R26.89 IMPAIRMENT OF BALANCE: ICD-10-CM

## 2021-09-15 DIAGNOSIS — R26.89 BALANCE PROBLEM: ICD-10-CM

## 2021-09-15 PROCEDURE — 97112 NEUROMUSCULAR REEDUCATION: CPT | Performed by: PHYSICAL THERAPIST

## 2021-09-15 PROCEDURE — 97110 THERAPEUTIC EXERCISES: CPT | Performed by: PHYSICAL THERAPIST

## 2021-09-15 PROCEDURE — 97140 MANUAL THERAPY 1/> REGIONS: CPT | Performed by: PHYSICAL THERAPIST

## 2021-09-15 NOTE — PROGRESS NOTES
Daily Note     Today's date: 9/15/2021  Patient name: Yuliana Mcpherson  : 1950  MRN: [id number]  Referring provider: Keshawn Gracia DO  Dx:   Encounter Diagnosis     ICD-10-CM    1  Multiple sclerosis ([address] )  G35    2  Balance problem  R26 89    3  Impairment of balance  R26 89        Start Time: 0845  Stop Time: 930  Total time in clinic (min): 45 minutes    Subjective: Pt reports "yesterday and today are bad days  I was in tears " Pt had to utilize her husbands help to get off the couch  She states "he literally had to lift me up " Pt believes she pushed herself too much over the past day  Objective: See treatment diary below      Assessment: Modified treatment to accommodate for fatigue and weakness in LE  Pt challenged with most exercises today  Reverted back to mat exercises and we plan to resume functional/standing exercises next session  Tolerated treatment fair  Patient would benefit from continued PT  Plan: Continue per plan of care        Precautions: MS, Gait dysfunction, Insomnia, Osteoporosis, Peripheral polyneuropathy, Hx of Breast CA, R IDALIA in - Dr Juliet Stanford, Left femur IM nailing    Manuals 9/13 9/15    8/12 8/16 8/30 9/2 9/8   Right hip PROM 5' 5'    5' 5' 5'  5'   Right sciatic nerve glide             STM (rolling) right lateral hip              Right hamstring stretch 5' B 5' B    5' B 5' B 5' B  5' B   Neuro Re-Ed             Bridge      2x10 2x10 2x10  20x   SL clam shell             TrA March  10x on each     20x on each 20x on each 20x on each                   Romberg Foam      BD 2' BD 2' BD 2'      Hookyling hip abduction isometric  10x5"            Hooklying hip adduction isometric  10x5"           VOR x 1 viewing in standing  nv            Ther Ex             Nu Step 7' lvl 2 570      7' 591 steps lvl 2 6' 400 steps lvl 2  7' lvl 2 570   Long arc quad  15x B    1x10 2# 1x10 2# Hold     Hamstring curl   15x B ytb     ytb 10x on each ytb 10x on each ytb 10x on each Hamstring stretch             Bicycle              Ther Activity             HR  15x           Sit to stand squat      2x10 2x10 2x10     6 MWT         NV     Leeann walks 10x10"       nv  10x10"   Lateral walks        nv  10x10"   Obstacle course with foam         nv     Marching on foam  BD foam 30x on each          Green 30x on each    Lateral walks  No UE support 6x10         No UE support 6x10   Step up/down 6" 5x on each          6" 5x on each    Gait Training             Focus on reducing IR during limb advancement and during stance                          Modalities                                       1:1 with PT from [phone]

## 2021-09-20 ENCOUNTER — OFFICE VISIT (OUTPATIENT)
Dept: PHYSICAL THERAPY | Facility: CLINIC | Age: 71
End: 2021-09-20
Payer: MEDICARE

## 2021-09-20 DIAGNOSIS — G35 MULTIPLE SCLEROSIS (HCC): Primary | ICD-10-CM

## 2021-09-20 DIAGNOSIS — R26.89 IMPAIRMENT OF BALANCE: ICD-10-CM

## 2021-09-20 DIAGNOSIS — R26.89 BALANCE PROBLEM: ICD-10-CM

## 2021-09-20 PROCEDURE — 97110 THERAPEUTIC EXERCISES: CPT | Performed by: PHYSICAL THERAPIST

## 2021-09-20 PROCEDURE — 97530 THERAPEUTIC ACTIVITIES: CPT | Performed by: PHYSICAL THERAPIST

## 2021-09-20 PROCEDURE — 97112 NEUROMUSCULAR REEDUCATION: CPT | Performed by: PHYSICAL THERAPIST

## 2021-09-20 NOTE — PROGRESS NOTES
Daily Note     Today's date: 2021  Patient name: Lloyd Woods  : 1950  MRN: [id number]  Referring provider: Doreen Shirley DO  Dx:   Encounter Diagnosis     ICD-10-CM    1  Multiple sclerosis ([address] )  G35    2  Balance problem  R26 89    3  Impairment of balance  R26 89        Start Time: 1015  Stop Time: 1053  Total time in clinic (min): 38 minutes    Subjective: Pt reports having difficulty ambulating and receiving intermittent "nerve shocks/cramping" in distal LE  She states that it really has been a bad 2 weeks  She defers stretching, nustep, and strengthening  Would prefer just to focus on balance exercises today  Objective: See treatment diary below      Assessment: Decreased session vigor today to accommodate for relapsing MS symptoms  Initiated static balance and VOR exercises  Tolerated treatment fair  Moderately fatigued at the end of treatment  Patient exhibited good technique with therapeutic exercises and would benefit from continued PT  Plan: Continue per plan of care  Discussed decreasing plan of care with patient until symptoms resolves  Pt may cancel session on Thursday depending on how she feels        Precautions: MS, Gait dysfunction, Insomnia, Osteoporosis, Peripheral polyneuropathy, Hx of Breast CA, R IDALIA in - Dr Flex Rodriguez, Left femur IM nailing    Manuals 9/13 9/15 9/20   8/12 8/16 8/30 9/2 9/8   Right hip PROM 5' 5'    5' 5' 5'  5'   Right sciatic nerve glide             STM (rolling) right lateral hip              Right hamstring stretch 5' B 5' B    5' B 5' B 5' B  5' B   Neuro Re-Ed             Bridge      2x10 2x10 2x10  20x   SL clam shell             TrA March  10x on each     20x on each 20x on each 20x on each                   Romberg Foam      BD 2' BD 2' BD 2'      Hookyling hip abduction isometric  10x5"            Hooklying hip adduction isometric  10x5"           VOR x 1 viewing in standing  nv  horizontabl and vertical 1' each            Romberg foam   Feet apart/feet together 2' each on BD foam          Ther Ex             Nu Step 7' lvl 2 570      7' 591 steps lvl 2 6' 400 steps lvl 2  7' lvl 2 570   Long arc quad  15x B 15x   1x10 2# 1x10 2# Hold     Hamstring curl   15x B ytb  15x standing   ytb 10x on each ytb 10x on each ytb 10x on each     Hamstring stretch             Bicycle              Ther Activity             HR  15x           Sit to stand squat      2x10 2x10 2x10     6 MWT         NV     Leeann walks 10x10"  4x15 feet      nv  10x10"   Lateral walks   4x15 feet     nv  10x10"   Obstacle course with foam         nv     Marching on foam  BD foam 30x on each   BD foam 30x on each        Green 30x on each    Lateral walks  No UE support 6x10         No UE support 6x10   Step up/down 6" 5x on each          6" 5x on each    Gait Training             Focus on reducing IR during limb advancement and during stance                          Modalities                                       1:1 with PT from [id number]

## 2021-09-23 ENCOUNTER — OFFICE VISIT (OUTPATIENT)
Dept: PHYSICAL THERAPY | Facility: CLINIC | Age: 71
End: 2021-09-23
Payer: MEDICARE

## 2021-09-23 DIAGNOSIS — G35 MULTIPLE SCLEROSIS (HCC): Primary | ICD-10-CM

## 2021-09-23 DIAGNOSIS — R26.89 IMPAIRMENT OF BALANCE: ICD-10-CM

## 2021-09-23 DIAGNOSIS — R26.89 BALANCE PROBLEM: ICD-10-CM

## 2021-09-23 PROCEDURE — 97110 THERAPEUTIC EXERCISES: CPT | Performed by: PHYSICAL THERAPIST

## 2021-09-23 PROCEDURE — 97530 THERAPEUTIC ACTIVITIES: CPT | Performed by: PHYSICAL THERAPIST

## 2021-09-23 PROCEDURE — 97112 NEUROMUSCULAR REEDUCATION: CPT | Performed by: PHYSICAL THERAPIST

## 2021-09-23 NOTE — PROGRESS NOTES
Daily Note     Today's date: 2021  Patient name: Teodora Corona  : 1950  MRN: [id number]  Referring provider: Edilson Llanos DO  Dx:   Encounter Diagnosis     ICD-10-CM    1  Multiple sclerosis ([address] )  G35    2  Balance problem  R26 89    3  Impairment of balance  R26 89        Start Time: 1015  Stop Time: 1055  Total time in clinic (min): 40 minutes    Subjective: Pt reports doing well this morning  She had a good 2 days with LE strength, endurance, and balance  Objective: See treatment diary below      Assessment: Improved stability, balance, and ambulation tolerance compared to the past 2 weeks  Focused intervention on function and balance with several intermittent rest breaks  Tolerated treatment well  No evidence of LOB or  Instability throughout treatment  Patient demonstrated fatigue post treatment, exhibited good technique with therapeutic exercises and would benefit from continued PT  Plan: Continue per plan of care        Precautions: MS, Gait dysfunction, Insomnia, Osteoporosis, Peripheral polyneuropathy, Hx of Breast CA, R IDALIA in - Dr Elisha Monteiro, Left femur IM nailing    Manuals 9/13 9/15 9/20 9/23  8/12 8/16 8/30 9/2 9/8   Right hip PROM 5' 5'    5' 5' 5'  5'   Right sciatic nerve glide             STM (rolling) right lateral hip              Right hamstring stretch 5' B 5' B    5' B 5' B 5' B  5' B   Neuro Re-Ed             Bridge      2x10 2x10 2x10  20x   SL clam shell             TrA March  10x on each     20x on each 20x on each 20x on each                   Romberg Foam      BD 2' BD 2' BD 2'      Hookyling hip abduction isometric  10x5"            Hooklying hip adduction isometric  10x5"           VOR x 1 viewing in standing  nv  horizontabl and vertical 1' each   horizontabl and vertical 1' each         Romberg foam   Feet apart/feet together 2' each on BD foam Feet apart/feet together 2' each on BD foam         Ther Ex             Nu Step 7' lvl 2 570      7' 591 steps lvl 2 6' 400 steps lvl 2  7' lvl 2 570   Long arc quad  15x B 15x   1x10 2# 1x10 2# Hold     Hamstring curl   15x B ytb  15x standing   ytb 10x on each ytb 10x on each ytb 10x on each     Hamstring stretch             Bicycle              Ther Activity             HR  15x           Sit to stand squat      2x10 2x10 2x10     6 MWT         NV     Leeann walks 10x10"  4x15 feet  4x15 feet    nv  10x10"   Lateral walks   4x15 feet 4x15 feet    nv  10x10"   Obstacle course with foam         nv     Marching on foam  BD foam 30x on each   BD foam 30x on each  BD foam 30x on each       Green 30x on each    Lateral walks  No UE support 6x10         No UE support 6x10   Step up/down 6" 5x on each          6" 5x on each    Gait Training             Focus on reducing IR during limb advancement and during stance                          Modalities                                       1:1 with PT from [id number]

## 2021-09-27 ENCOUNTER — APPOINTMENT (OUTPATIENT)
Dept: PHYSICAL THERAPY | Facility: CLINIC | Age: 71
End: 2021-09-27
Payer: MEDICARE

## 2021-09-30 ENCOUNTER — OFFICE VISIT (OUTPATIENT)
Dept: PHYSICAL THERAPY | Facility: CLINIC | Age: 71
End: 2021-09-30
Payer: MEDICARE

## 2021-09-30 DIAGNOSIS — R26.89 BALANCE PROBLEM: ICD-10-CM

## 2021-09-30 DIAGNOSIS — G35 MULTIPLE SCLEROSIS (HCC): Primary | ICD-10-CM

## 2021-09-30 DIAGNOSIS — R26.89 IMPAIRMENT OF BALANCE: ICD-10-CM

## 2021-09-30 PROCEDURE — 97530 THERAPEUTIC ACTIVITIES: CPT | Performed by: PHYSICAL THERAPIST

## 2021-09-30 PROCEDURE — 97112 NEUROMUSCULAR REEDUCATION: CPT | Performed by: PHYSICAL THERAPIST

## 2021-09-30 NOTE — PROGRESS NOTES
Daily Note     Today's date: 2021  Patient name: Aggie Sanders  : 1950  MRN: [id number]  Referring provider: Viri Baltazar DO  Dx:   Encounter Diagnosis     ICD-10-CM    1  Multiple sclerosis ([facility] [address] )  G35    2  Balance problem  R26 89    3  Impairment of balance  R26 89        Start Time: 1020  Stop Time: 1058  Total time in clinic (min): 38 minutes    Subjective: Pt offers no new complaints this morning  She had to cancel Monday due to illness      Objective: See treatment diary below      Assessment: Tolerated treatment well  Pt fairly steady today and required less PT contact guard during static and dynamic balance  Pt still fatigues quickly and requires several rest breaks  Patient would benefit from continued PT  Plan: Continue per plan of care        Precautions: MS, Gait dysfunction, Insomnia, Osteoporosis, Peripheral polyneuropathy, Hx of Breast CA, R IDALIA in - Dr Carmela Bridges, Left femur IM nailing    Manuals 9/13 9/15 9/20 9/23 9/30        Right hip PROM 5' 5'           Right sciatic nerve glide             STM (rolling) right lateral hip              Right hamstring stretch 5' B 5' B           Neuro Re-Ed             Bridge             SL clam shell             TrA March  10x on each                         Romberg Foam             Hookyling hip abduction isometric  10x5"            Hooklying hip adduction isometric  10x5"           VOR x 1 viewing in standing  nv  horizontabl and vertical 1' each   horizontabl and vertical 1' each horizontabl and vertical 1' each x 2        Romberg foam   Feet apart/feet together 2' each on BD foam  Feet apart/feet together 2' each on BD foam        Ther Ex             Nu Step 7' lvl 2 570            Long arc quad  15x B 15x 15x 20x        Hamstring curl   15x B ytb  15x standing 15x standing 15x standing        Hamstring stretch             Bicycle              Ther Activity             HR  15x           Sit to stand squat             6 MWT Leeann walks 10x10"  4x15 feet  4x15 feet 4x15 feet        Lateral walks   4x15 feet 4x15 feet 4x15 feet        Obstacle course with foam              Marching on foam  BD foam 30x on each   BD foam 30x on each  BD foam 30x on each  BD foam 30x on each         Lateral walks  No UE support 6x10            Step up/down 6" 5x on each             Gait Training             Focus on reducing IR during limb advancement and during stance                          Modalities                                       1:1 with PT from [id number]

## 2021-10-03 DIAGNOSIS — G35 MULTIPLE SCLEROSIS (HCC): ICD-10-CM

## 2021-10-04 RX ORDER — BACLOFEN 10 MG/1
TABLET ORAL
Qty: 360 TABLET | Refills: 0 | Status: SHIPPED | OUTPATIENT
Start: 2021-10-04 | End: 2022-01-02

## 2021-10-05 ENCOUNTER — OFFICE VISIT (OUTPATIENT)
Dept: PHYSICAL THERAPY | Facility: CLINIC | Age: 71
End: 2021-10-05
Payer: MEDICARE

## 2021-10-05 DIAGNOSIS — R26.89 IMPAIRMENT OF BALANCE: ICD-10-CM

## 2021-10-05 DIAGNOSIS — G35 MULTIPLE SCLEROSIS (HCC): Primary | ICD-10-CM

## 2021-10-05 DIAGNOSIS — R26.89 BALANCE PROBLEM: ICD-10-CM

## 2021-10-05 PROCEDURE — 97530 THERAPEUTIC ACTIVITIES: CPT | Performed by: PHYSICAL THERAPIST

## 2021-10-05 PROCEDURE — 97112 NEUROMUSCULAR REEDUCATION: CPT | Performed by: PHYSICAL THERAPIST

## 2021-10-06 DIAGNOSIS — G62.9 PERIPHERAL POLYNEUROPATHY: ICD-10-CM

## 2021-10-06 DIAGNOSIS — G47.61 PLMD (PERIODIC LIMB MOVEMENT DISORDER): ICD-10-CM

## 2021-10-06 DIAGNOSIS — G35 MULTIPLE SCLEROSIS (HCC): ICD-10-CM

## 2021-10-06 DIAGNOSIS — R26.9 GAIT DISTURBANCE: ICD-10-CM

## 2021-10-06 RX ORDER — CLONAZEPAM 0.5 MG/1
TABLET ORAL
Qty: 90 TABLET | Refills: 0 | Status: CANCELLED | OUTPATIENT
Start: 2021-10-06

## 2021-10-07 RX ORDER — DALFAMPRIDINE 10 MG/1
10 TABLET, FILM COATED, EXTENDED RELEASE ORAL 2 TIMES DAILY
Qty: 180 TABLET | Refills: 0 | Status: SHIPPED | OUTPATIENT
Start: 2021-10-07 | End: 2021-12-20 | Stop reason: SDUPTHER

## 2021-10-08 ENCOUNTER — OFFICE VISIT (OUTPATIENT)
Dept: PHYSICAL THERAPY | Facility: CLINIC | Age: 71
End: 2021-10-08
Payer: MEDICARE

## 2021-10-08 DIAGNOSIS — G35 MULTIPLE SCLEROSIS (HCC): Primary | ICD-10-CM

## 2021-10-08 DIAGNOSIS — R26.89 BALANCE PROBLEM: ICD-10-CM

## 2021-10-08 DIAGNOSIS — R26.89 IMPAIRMENT OF BALANCE: ICD-10-CM

## 2021-10-08 PROCEDURE — 97112 NEUROMUSCULAR REEDUCATION: CPT | Performed by: PHYSICAL THERAPIST

## 2021-10-08 PROCEDURE — 97530 THERAPEUTIC ACTIVITIES: CPT | Performed by: PHYSICAL THERAPIST

## 2021-10-13 ENCOUNTER — OFFICE VISIT (OUTPATIENT)
Dept: PHYSICAL THERAPY | Facility: CLINIC | Age: 71
End: 2021-10-13
Payer: MEDICARE

## 2021-10-13 DIAGNOSIS — G35 MULTIPLE SCLEROSIS (HCC): Primary | ICD-10-CM

## 2021-10-13 DIAGNOSIS — R26.89 BALANCE PROBLEM: ICD-10-CM

## 2021-10-13 DIAGNOSIS — R26.89 IMPAIRMENT OF BALANCE: ICD-10-CM

## 2021-10-13 PROCEDURE — 97112 NEUROMUSCULAR REEDUCATION: CPT | Performed by: PHYSICAL THERAPIST

## 2021-10-13 PROCEDURE — 97530 THERAPEUTIC ACTIVITIES: CPT | Performed by: PHYSICAL THERAPIST

## 2021-10-14 RX ORDER — GABAPENTIN 300 MG/1
300 CAPSULE ORAL
Qty: 90 CAPSULE | Refills: 0 | OUTPATIENT
Start: 2021-10-14

## 2021-10-22 ENCOUNTER — EVALUATION (OUTPATIENT)
Dept: PHYSICAL THERAPY | Facility: CLINIC | Age: 71
End: 2021-10-22
Payer: MEDICARE

## 2021-10-22 DIAGNOSIS — G35 MULTIPLE SCLEROSIS (HCC): Primary | ICD-10-CM

## 2021-10-22 DIAGNOSIS — R26.89 BALANCE PROBLEM: ICD-10-CM

## 2021-10-22 DIAGNOSIS — R26.89 IMPAIRMENT OF BALANCE: ICD-10-CM

## 2021-10-22 PROCEDURE — 97110 THERAPEUTIC EXERCISES: CPT | Performed by: PHYSICAL THERAPIST

## 2021-10-22 PROCEDURE — 97530 THERAPEUTIC ACTIVITIES: CPT | Performed by: PHYSICAL THERAPIST

## 2021-10-22 PROCEDURE — 97112 NEUROMUSCULAR REEDUCATION: CPT | Performed by: PHYSICAL THERAPIST

## 2021-10-27 ENCOUNTER — OFFICE VISIT (OUTPATIENT)
Dept: PHYSICAL THERAPY | Facility: CLINIC | Age: 71
End: 2021-10-27
Payer: MEDICARE

## 2021-10-27 DIAGNOSIS — G35 MULTIPLE SCLEROSIS (HCC): Primary | ICD-10-CM

## 2021-10-27 DIAGNOSIS — R26.89 BALANCE PROBLEM: ICD-10-CM

## 2021-10-27 DIAGNOSIS — R26.89 IMPAIRMENT OF BALANCE: ICD-10-CM

## 2021-10-27 PROCEDURE — 97530 THERAPEUTIC ACTIVITIES: CPT | Performed by: PHYSICAL THERAPIST

## 2021-10-27 PROCEDURE — 97116 GAIT TRAINING THERAPY: CPT | Performed by: PHYSICAL THERAPIST

## 2021-10-28 ENCOUNTER — APPOINTMENT (OUTPATIENT)
Dept: LAB | Facility: CLINIC | Age: 71
End: 2021-10-28
Payer: MEDICARE

## 2021-10-28 DIAGNOSIS — G35 MULTIPLE SCLEROSIS (HCC): ICD-10-CM

## 2021-10-28 LAB
ALBUMIN SERPL BCP-MCNC: 3.7 G/DL (ref 3.5–5)
ALP SERPL-CCNC: 77 U/L (ref 46–116)
ALT SERPL W P-5'-P-CCNC: 35 U/L (ref 12–78)
AST SERPL W P-5'-P-CCNC: 15 U/L (ref 5–45)
BASOPHILS # BLD AUTO: 0.07 THOUSANDS/ΜL (ref 0–0.1)
BASOPHILS NFR BLD AUTO: 1 % (ref 0–1)
BILIRUB DIRECT SERPL-MCNC: 0.12 MG/DL (ref 0–0.2)
BILIRUB SERPL-MCNC: 0.46 MG/DL (ref 0.2–1)
EOSINOPHIL # BLD AUTO: 0.38 THOUSAND/ΜL (ref 0–0.61)
EOSINOPHIL NFR BLD AUTO: 7 % (ref 0–6)
ERYTHROCYTE [DISTWIDTH] IN BLOOD BY AUTOMATED COUNT: 12.4 % (ref 11.6–15.1)
HCT VFR BLD AUTO: 39.7 % (ref 34.8–46.1)
HGB BLD-MCNC: 12.7 G/DL (ref 11.5–15.4)
IMM GRANULOCYTES # BLD AUTO: 0.02 THOUSAND/UL (ref 0–0.2)
IMM GRANULOCYTES NFR BLD AUTO: 0 % (ref 0–2)
LYMPHOCYTES # BLD AUTO: 1.74 THOUSANDS/ΜL (ref 0.6–4.47)
LYMPHOCYTES NFR BLD AUTO: 30 % (ref 14–44)
MCH RBC QN AUTO: 31.3 PG (ref 26.8–34.3)
MCHC RBC AUTO-ENTMCNC: 32 G/DL (ref 31.4–37.4)
MCV RBC AUTO: 98 FL (ref 82–98)
MONOCYTES # BLD AUTO: 0.45 THOUSAND/ΜL (ref 0.17–1.22)
MONOCYTES NFR BLD AUTO: 8 % (ref 4–12)
NEUTROPHILS # BLD AUTO: 3.12 THOUSANDS/ΜL (ref 1.85–7.62)
NEUTS SEG NFR BLD AUTO: 54 % (ref 43–75)
NRBC BLD AUTO-RTO: 0 /100 WBCS
PLATELET # BLD AUTO: 238 THOUSANDS/UL (ref 149–390)
PMV BLD AUTO: 10 FL (ref 8.9–12.7)
PROT SERPL-MCNC: 7.5 G/DL (ref 6.4–8.2)
RBC # BLD AUTO: 4.06 MILLION/UL (ref 3.81–5.12)
WBC # BLD AUTO: 5.78 THOUSAND/UL (ref 4.31–10.16)

## 2021-10-28 PROCEDURE — 85025 COMPLETE CBC W/AUTO DIFF WBC: CPT

## 2021-10-28 PROCEDURE — 36415 COLL VENOUS BLD VENIPUNCTURE: CPT

## 2021-10-28 PROCEDURE — 80076 HEPATIC FUNCTION PANEL: CPT

## 2021-10-29 ENCOUNTER — APPOINTMENT (OUTPATIENT)
Dept: PHYSICAL THERAPY | Facility: CLINIC | Age: 71
End: 2021-10-29
Payer: MEDICARE

## 2021-11-01 ENCOUNTER — OFFICE VISIT (OUTPATIENT)
Dept: PHYSICAL THERAPY | Facility: CLINIC | Age: 71
End: 2021-11-01
Payer: MEDICARE

## 2021-11-01 ENCOUNTER — OFFICE VISIT (OUTPATIENT)
Dept: FAMILY MEDICINE CLINIC | Facility: OTHER | Age: 71
End: 2021-11-01
Payer: MEDICARE

## 2021-11-01 VITALS
BODY MASS INDEX: 24.04 KG/M2 | WEIGHT: 140.8 LBS | DIASTOLIC BLOOD PRESSURE: 84 MMHG | RESPIRATION RATE: 18 BRPM | HEIGHT: 64 IN | HEART RATE: 78 BPM | SYSTOLIC BLOOD PRESSURE: 110 MMHG | OXYGEN SATURATION: 98 % | TEMPERATURE: 98 F

## 2021-11-01 DIAGNOSIS — I10 HYPERTENSION, UNSPECIFIED TYPE: Primary | ICD-10-CM

## 2021-11-01 DIAGNOSIS — G35 MULTIPLE SCLEROSIS (HCC): Primary | ICD-10-CM

## 2021-11-01 DIAGNOSIS — N18.31 STAGE 3A CHRONIC KIDNEY DISEASE (HCC): ICD-10-CM

## 2021-11-01 DIAGNOSIS — R26.89 IMPAIRMENT OF BALANCE: ICD-10-CM

## 2021-11-01 DIAGNOSIS — R26.89 BALANCE PROBLEM: ICD-10-CM

## 2021-11-01 PROCEDURE — 97110 THERAPEUTIC EXERCISES: CPT | Performed by: PHYSICAL THERAPIST

## 2021-11-01 PROCEDURE — 97530 THERAPEUTIC ACTIVITIES: CPT | Performed by: PHYSICAL THERAPIST

## 2021-11-01 PROCEDURE — 99213 OFFICE O/P EST LOW 20 MIN: CPT | Performed by: FAMILY MEDICINE

## 2021-11-01 PROCEDURE — 97112 NEUROMUSCULAR REEDUCATION: CPT | Performed by: PHYSICAL THERAPIST

## 2021-11-03 ENCOUNTER — OFFICE VISIT (OUTPATIENT)
Dept: PHYSICAL THERAPY | Facility: CLINIC | Age: 71
End: 2021-11-03
Payer: MEDICARE

## 2021-11-03 ENCOUNTER — APPOINTMENT (OUTPATIENT)
Dept: LAB | Facility: CLINIC | Age: 71
End: 2021-11-03
Payer: MEDICARE

## 2021-11-03 DIAGNOSIS — G35 MULTIPLE SCLEROSIS (HCC): Primary | ICD-10-CM

## 2021-11-03 DIAGNOSIS — R26.89 BALANCE PROBLEM: ICD-10-CM

## 2021-11-03 DIAGNOSIS — N18.31 STAGE 3A CHRONIC KIDNEY DISEASE (HCC): ICD-10-CM

## 2021-11-03 DIAGNOSIS — R26.89 IMPAIRMENT OF BALANCE: ICD-10-CM

## 2021-11-03 DIAGNOSIS — I10 HYPERTENSION, UNSPECIFIED TYPE: ICD-10-CM

## 2021-11-03 LAB
ANION GAP SERPL CALCULATED.3IONS-SCNC: 4 MMOL/L (ref 4–13)
BUN SERPL-MCNC: 20 MG/DL (ref 5–25)
CALCIUM SERPL-MCNC: 9.2 MG/DL (ref 8.3–10.1)
CHLORIDE SERPL-SCNC: 114 MMOL/L (ref 100–108)
CO2 SERPL-SCNC: 24 MMOL/L (ref 21–32)
CREAT SERPL-MCNC: 0.86 MG/DL (ref 0.6–1.3)
GFR SERPL CREATININE-BSD FRML MDRD: 68 ML/MIN/1.73SQ M
GLUCOSE P FAST SERPL-MCNC: 88 MG/DL (ref 65–99)
POTASSIUM SERPL-SCNC: 4.2 MMOL/L (ref 3.5–5.3)
SODIUM SERPL-SCNC: 142 MMOL/L (ref 136–145)

## 2021-11-03 PROCEDURE — 97530 THERAPEUTIC ACTIVITIES: CPT | Performed by: PHYSICAL THERAPIST

## 2021-11-03 PROCEDURE — 36415 COLL VENOUS BLD VENIPUNCTURE: CPT

## 2021-11-03 PROCEDURE — 97112 NEUROMUSCULAR REEDUCATION: CPT | Performed by: PHYSICAL THERAPIST

## 2021-11-03 PROCEDURE — 80048 BASIC METABOLIC PNL TOTAL CA: CPT

## 2021-11-04 ENCOUNTER — APPOINTMENT (OUTPATIENT)
Dept: PHYSICAL THERAPY | Facility: CLINIC | Age: 71
End: 2021-11-04
Payer: MEDICARE

## 2021-11-05 ENCOUNTER — TELEPHONE (OUTPATIENT)
Dept: NEUROLOGY | Facility: CLINIC | Age: 71
End: 2021-11-05

## 2021-11-08 ENCOUNTER — OFFICE VISIT (OUTPATIENT)
Dept: PHYSICAL THERAPY | Facility: CLINIC | Age: 71
End: 2021-11-08
Payer: MEDICARE

## 2021-11-08 DIAGNOSIS — R26.89 IMPAIRMENT OF BALANCE: ICD-10-CM

## 2021-11-08 DIAGNOSIS — G35 MULTIPLE SCLEROSIS (HCC): Primary | ICD-10-CM

## 2021-11-08 DIAGNOSIS — R26.89 BALANCE PROBLEM: ICD-10-CM

## 2021-11-08 PROCEDURE — 97530 THERAPEUTIC ACTIVITIES: CPT | Performed by: PHYSICAL THERAPIST

## 2021-11-08 PROCEDURE — 97112 NEUROMUSCULAR REEDUCATION: CPT | Performed by: PHYSICAL THERAPIST

## 2021-11-08 PROCEDURE — 97110 THERAPEUTIC EXERCISES: CPT | Performed by: PHYSICAL THERAPIST

## 2021-11-11 ENCOUNTER — OFFICE VISIT (OUTPATIENT)
Dept: PHYSICAL THERAPY | Facility: CLINIC | Age: 71
End: 2021-11-11
Payer: MEDICARE

## 2021-11-11 DIAGNOSIS — G35 MULTIPLE SCLEROSIS (HCC): Primary | ICD-10-CM

## 2021-11-11 DIAGNOSIS — R26.89 BALANCE PROBLEM: ICD-10-CM

## 2021-11-11 DIAGNOSIS — R26.89 IMPAIRMENT OF BALANCE: ICD-10-CM

## 2021-11-11 PROCEDURE — 97112 NEUROMUSCULAR REEDUCATION: CPT | Performed by: PHYSICAL THERAPIST

## 2021-11-11 PROCEDURE — 97110 THERAPEUTIC EXERCISES: CPT | Performed by: PHYSICAL THERAPIST

## 2021-11-11 PROCEDURE — 97530 THERAPEUTIC ACTIVITIES: CPT | Performed by: PHYSICAL THERAPIST

## 2021-11-15 ENCOUNTER — OFFICE VISIT (OUTPATIENT)
Dept: PHYSICAL THERAPY | Facility: CLINIC | Age: 71
End: 2021-11-15
Payer: MEDICARE

## 2021-11-15 DIAGNOSIS — R26.89 BALANCE PROBLEM: Primary | ICD-10-CM

## 2021-11-15 DIAGNOSIS — R26.89 IMPAIRMENT OF BALANCE: ICD-10-CM

## 2021-11-15 DIAGNOSIS — G35 MULTIPLE SCLEROSIS (HCC): ICD-10-CM

## 2021-11-15 PROCEDURE — 97112 NEUROMUSCULAR REEDUCATION: CPT | Performed by: PHYSICAL THERAPIST

## 2021-11-15 PROCEDURE — 97110 THERAPEUTIC EXERCISES: CPT | Performed by: PHYSICAL THERAPIST

## 2021-11-15 PROCEDURE — 97530 THERAPEUTIC ACTIVITIES: CPT | Performed by: PHYSICAL THERAPIST

## 2021-11-18 ENCOUNTER — PATIENT MESSAGE (OUTPATIENT)
Dept: UROLOGY | Facility: AMBULATORY SURGERY CENTER | Age: 71
End: 2021-11-18

## 2021-11-18 ENCOUNTER — OFFICE VISIT (OUTPATIENT)
Dept: PHYSICAL THERAPY | Facility: CLINIC | Age: 71
End: 2021-11-18
Payer: MEDICARE

## 2021-11-18 DIAGNOSIS — R26.89 IMPAIRMENT OF BALANCE: ICD-10-CM

## 2021-11-18 DIAGNOSIS — R39.15 URGENCY OF URINATION: ICD-10-CM

## 2021-11-18 DIAGNOSIS — R26.89 BALANCE PROBLEM: Primary | ICD-10-CM

## 2021-11-18 DIAGNOSIS — G35 MULTIPLE SCLEROSIS (HCC): ICD-10-CM

## 2021-11-18 PROCEDURE — 97110 THERAPEUTIC EXERCISES: CPT | Performed by: PHYSICAL THERAPIST

## 2021-11-18 PROCEDURE — 97112 NEUROMUSCULAR REEDUCATION: CPT | Performed by: PHYSICAL THERAPIST

## 2021-11-18 PROCEDURE — 97530 THERAPEUTIC ACTIVITIES: CPT | Performed by: PHYSICAL THERAPIST

## 2021-11-19 RX ORDER — SOLIFENACIN SUCCINATE 10 MG/1
10 TABLET, FILM COATED ORAL DAILY
Qty: 90 TABLET | Refills: 3 | Status: SHIPPED | OUTPATIENT
Start: 2021-11-19 | End: 2022-03-03 | Stop reason: SDUPTHER

## 2021-11-22 ENCOUNTER — APPOINTMENT (OUTPATIENT)
Dept: PHYSICAL THERAPY | Facility: CLINIC | Age: 71
End: 2021-11-22
Payer: MEDICARE

## 2021-11-22 DIAGNOSIS — G62.9 PERIPHERAL POLYNEUROPATHY: ICD-10-CM

## 2021-11-23 RX ORDER — GABAPENTIN 300 MG/1
CAPSULE ORAL
Qty: 90 CAPSULE | Refills: 3 | Status: SHIPPED | OUTPATIENT
Start: 2021-11-23 | End: 2022-01-21 | Stop reason: SDUPTHER

## 2021-11-24 ENCOUNTER — OFFICE VISIT (OUTPATIENT)
Dept: PHYSICAL THERAPY | Facility: CLINIC | Age: 71
End: 2021-11-24
Payer: MEDICARE

## 2021-11-24 DIAGNOSIS — R26.89 BALANCE PROBLEM: ICD-10-CM

## 2021-11-24 DIAGNOSIS — G35 MULTIPLE SCLEROSIS (HCC): ICD-10-CM

## 2021-11-24 DIAGNOSIS — R26.89 IMPAIRMENT OF BALANCE: Primary | ICD-10-CM

## 2021-11-24 PROCEDURE — 97112 NEUROMUSCULAR REEDUCATION: CPT | Performed by: PHYSICAL THERAPIST

## 2021-11-24 PROCEDURE — 97530 THERAPEUTIC ACTIVITIES: CPT | Performed by: PHYSICAL THERAPIST

## 2021-11-29 ENCOUNTER — EVALUATION (OUTPATIENT)
Dept: PHYSICAL THERAPY | Facility: CLINIC | Age: 71
End: 2021-11-29
Payer: MEDICARE

## 2021-11-29 DIAGNOSIS — R26.89 BALANCE PROBLEM: ICD-10-CM

## 2021-11-29 DIAGNOSIS — R26.89 IMPAIRMENT OF BALANCE: Primary | ICD-10-CM

## 2021-11-29 DIAGNOSIS — G35 MULTIPLE SCLEROSIS (HCC): ICD-10-CM

## 2021-11-29 PROCEDURE — 97110 THERAPEUTIC EXERCISES: CPT | Performed by: PHYSICAL THERAPIST

## 2021-11-29 PROCEDURE — 97112 NEUROMUSCULAR REEDUCATION: CPT | Performed by: PHYSICAL THERAPIST

## 2021-12-02 ENCOUNTER — OFFICE VISIT (OUTPATIENT)
Dept: PHYSICAL THERAPY | Facility: CLINIC | Age: 71
End: 2021-12-02
Payer: MEDICARE

## 2021-12-02 DIAGNOSIS — R26.89 BALANCE PROBLEM: ICD-10-CM

## 2021-12-02 DIAGNOSIS — G35 MULTIPLE SCLEROSIS (HCC): ICD-10-CM

## 2021-12-02 DIAGNOSIS — R26.89 IMPAIRMENT OF BALANCE: Primary | ICD-10-CM

## 2021-12-02 PROCEDURE — 97110 THERAPEUTIC EXERCISES: CPT | Performed by: PHYSICAL THERAPIST

## 2021-12-02 PROCEDURE — 97530 THERAPEUTIC ACTIVITIES: CPT | Performed by: PHYSICAL THERAPIST

## 2021-12-02 PROCEDURE — 97112 NEUROMUSCULAR REEDUCATION: CPT | Performed by: PHYSICAL THERAPIST

## 2021-12-06 ENCOUNTER — OFFICE VISIT (OUTPATIENT)
Dept: PHYSICAL THERAPY | Facility: CLINIC | Age: 71
End: 2021-12-06
Payer: MEDICARE

## 2021-12-06 DIAGNOSIS — G35 MULTIPLE SCLEROSIS (HCC): ICD-10-CM

## 2021-12-06 DIAGNOSIS — R26.89 IMPAIRMENT OF BALANCE: Primary | ICD-10-CM

## 2021-12-06 DIAGNOSIS — R26.89 BALANCE PROBLEM: ICD-10-CM

## 2021-12-06 PROCEDURE — 97110 THERAPEUTIC EXERCISES: CPT | Performed by: PHYSICAL THERAPIST

## 2021-12-06 PROCEDURE — 97112 NEUROMUSCULAR REEDUCATION: CPT | Performed by: PHYSICAL THERAPIST

## 2021-12-09 ENCOUNTER — OFFICE VISIT (OUTPATIENT)
Dept: PHYSICAL THERAPY | Facility: CLINIC | Age: 71
End: 2021-12-09
Payer: MEDICARE

## 2021-12-09 DIAGNOSIS — R26.89 BALANCE PROBLEM: Primary | ICD-10-CM

## 2021-12-09 DIAGNOSIS — G35 MULTIPLE SCLEROSIS (HCC): ICD-10-CM

## 2021-12-09 DIAGNOSIS — R26.89 IMPAIRMENT OF BALANCE: ICD-10-CM

## 2021-12-09 PROCEDURE — 97110 THERAPEUTIC EXERCISES: CPT | Performed by: PHYSICAL THERAPIST

## 2021-12-09 PROCEDURE — 97112 NEUROMUSCULAR REEDUCATION: CPT | Performed by: PHYSICAL THERAPIST

## 2021-12-09 PROCEDURE — 97530 THERAPEUTIC ACTIVITIES: CPT | Performed by: PHYSICAL THERAPIST

## 2021-12-10 ENCOUNTER — HOSPITAL ENCOUNTER (OUTPATIENT)
Dept: MAMMOGRAPHY | Facility: CLINIC | Age: 71
Discharge: HOME | End: 2021-12-10
Payer: MEDICARE

## 2021-12-10 ENCOUNTER — HOSPITAL ENCOUNTER (OUTPATIENT)
Dept: ULTRASOUND IMAGING | Facility: CLINIC | Age: 71
Discharge: HOME | End: 2021-12-10
Payer: MEDICARE

## 2021-12-10 DIAGNOSIS — R92.8 ABNORMAL FINDINGS ON DIAGNOSTIC IMAGING OF BREAST: ICD-10-CM

## 2021-12-10 PROCEDURE — G0279 TOMOSYNTHESIS, MAMMO: HCPCS

## 2021-12-10 PROCEDURE — 77065 DX MAMMO INCL CAD UNI: CPT

## 2021-12-10 PROCEDURE — 76642 ULTRASOUND BREAST LIMITED: CPT

## 2021-12-13 ENCOUNTER — OFFICE VISIT (OUTPATIENT)
Dept: PHYSICAL THERAPY | Facility: CLINIC | Age: 71
End: 2021-12-13
Payer: MEDICARE

## 2021-12-13 DIAGNOSIS — R26.89 IMPAIRMENT OF BALANCE: ICD-10-CM

## 2021-12-13 DIAGNOSIS — R26.89 BALANCE PROBLEM: Primary | ICD-10-CM

## 2021-12-13 DIAGNOSIS — G35 MULTIPLE SCLEROSIS (HCC): ICD-10-CM

## 2021-12-13 PROCEDURE — 97112 NEUROMUSCULAR REEDUCATION: CPT | Performed by: PHYSICAL THERAPIST

## 2021-12-13 PROCEDURE — 97530 THERAPEUTIC ACTIVITIES: CPT | Performed by: PHYSICAL THERAPIST

## 2021-12-13 PROCEDURE — 97110 THERAPEUTIC EXERCISES: CPT | Performed by: PHYSICAL THERAPIST

## 2021-12-16 ENCOUNTER — OFFICE VISIT (OUTPATIENT)
Dept: PHYSICAL THERAPY | Facility: CLINIC | Age: 71
End: 2021-12-16
Payer: MEDICARE

## 2021-12-16 DIAGNOSIS — R26.89 IMPAIRMENT OF BALANCE: ICD-10-CM

## 2021-12-16 DIAGNOSIS — G35 MULTIPLE SCLEROSIS (HCC): ICD-10-CM

## 2021-12-16 DIAGNOSIS — R26.89 BALANCE PROBLEM: Primary | ICD-10-CM

## 2021-12-16 PROCEDURE — 97530 THERAPEUTIC ACTIVITIES: CPT | Performed by: PHYSICAL THERAPIST

## 2021-12-16 PROCEDURE — 97110 THERAPEUTIC EXERCISES: CPT | Performed by: PHYSICAL THERAPIST

## 2021-12-16 PROCEDURE — 97112 NEUROMUSCULAR REEDUCATION: CPT | Performed by: PHYSICAL THERAPIST

## 2021-12-18 ENCOUNTER — PATIENT MESSAGE (OUTPATIENT)
Dept: NEUROLOGY | Facility: CLINIC | Age: 71
End: 2021-12-18

## 2021-12-20 ENCOUNTER — OFFICE VISIT (OUTPATIENT)
Dept: PHYSICAL THERAPY | Facility: CLINIC | Age: 71
End: 2021-12-20
Payer: MEDICARE

## 2021-12-20 DIAGNOSIS — R26.89 IMPAIRMENT OF BALANCE: ICD-10-CM

## 2021-12-20 DIAGNOSIS — R26.89 BALANCE PROBLEM: ICD-10-CM

## 2021-12-20 DIAGNOSIS — G35 MULTIPLE SCLEROSIS (HCC): Primary | ICD-10-CM

## 2021-12-20 DIAGNOSIS — R26.9 GAIT DISTURBANCE: ICD-10-CM

## 2021-12-20 DIAGNOSIS — G35 MULTIPLE SCLEROSIS (HCC): ICD-10-CM

## 2021-12-20 PROCEDURE — 97112 NEUROMUSCULAR REEDUCATION: CPT | Performed by: PHYSICAL THERAPIST

## 2021-12-20 PROCEDURE — 97110 THERAPEUTIC EXERCISES: CPT | Performed by: PHYSICAL THERAPIST

## 2021-12-20 PROCEDURE — 97530 THERAPEUTIC ACTIVITIES: CPT | Performed by: PHYSICAL THERAPIST

## 2021-12-20 RX ORDER — DALFAMPRIDINE 10 MG/1
10 TABLET, FILM COATED, EXTENDED RELEASE ORAL 2 TIMES DAILY
Qty: 180 TABLET | Refills: 0 | Status: SHIPPED | OUTPATIENT
Start: 2021-12-20 | End: 2022-03-07

## 2021-12-20 RX ORDER — GLATIRAMER ACETATE 40 MG/ML
INJECTION, SOLUTION SUBCUTANEOUS
Qty: 36 ML | Refills: 3 | Status: SHIPPED | OUTPATIENT
Start: 2021-12-20

## 2021-12-23 ENCOUNTER — OFFICE VISIT (OUTPATIENT)
Dept: PHYSICAL THERAPY | Facility: CLINIC | Age: 71
End: 2021-12-23
Payer: MEDICARE

## 2021-12-23 DIAGNOSIS — R26.89 BALANCE PROBLEM: ICD-10-CM

## 2021-12-23 DIAGNOSIS — G35 MULTIPLE SCLEROSIS (HCC): Primary | ICD-10-CM

## 2021-12-23 DIAGNOSIS — R26.89 IMPAIRMENT OF BALANCE: ICD-10-CM

## 2021-12-23 PROCEDURE — 97112 NEUROMUSCULAR REEDUCATION: CPT | Performed by: PHYSICAL THERAPIST

## 2021-12-23 PROCEDURE — 97110 THERAPEUTIC EXERCISES: CPT | Performed by: PHYSICAL THERAPIST

## 2021-12-23 PROCEDURE — 97530 THERAPEUTIC ACTIVITIES: CPT | Performed by: PHYSICAL THERAPIST

## 2021-12-27 ENCOUNTER — OFFICE VISIT (OUTPATIENT)
Dept: PHYSICAL THERAPY | Facility: CLINIC | Age: 71
End: 2021-12-27
Payer: MEDICARE

## 2021-12-27 DIAGNOSIS — G35 MULTIPLE SCLEROSIS (HCC): Primary | ICD-10-CM

## 2021-12-27 DIAGNOSIS — R26.89 BALANCE PROBLEM: ICD-10-CM

## 2021-12-27 DIAGNOSIS — R26.89 IMPAIRMENT OF BALANCE: ICD-10-CM

## 2021-12-27 PROCEDURE — 97110 THERAPEUTIC EXERCISES: CPT | Performed by: PHYSICAL THERAPIST

## 2021-12-27 PROCEDURE — 97530 THERAPEUTIC ACTIVITIES: CPT | Performed by: PHYSICAL THERAPIST

## 2021-12-27 PROCEDURE — 97112 NEUROMUSCULAR REEDUCATION: CPT | Performed by: PHYSICAL THERAPIST

## 2021-12-30 ENCOUNTER — EVALUATION (OUTPATIENT)
Dept: PHYSICAL THERAPY | Facility: CLINIC | Age: 71
End: 2021-12-30
Payer: MEDICARE

## 2021-12-30 DIAGNOSIS — G35 MULTIPLE SCLEROSIS (HCC): Primary | ICD-10-CM

## 2021-12-30 DIAGNOSIS — R26.89 IMPAIRMENT OF BALANCE: ICD-10-CM

## 2021-12-30 DIAGNOSIS — R26.89 BALANCE PROBLEM: ICD-10-CM

## 2021-12-30 PROCEDURE — 97112 NEUROMUSCULAR REEDUCATION: CPT | Performed by: PHYSICAL THERAPIST

## 2021-12-30 PROCEDURE — 97110 THERAPEUTIC EXERCISES: CPT | Performed by: PHYSICAL THERAPIST

## 2022-01-01 DIAGNOSIS — G35 MULTIPLE SCLEROSIS (HCC): ICD-10-CM

## 2022-01-02 RX ORDER — BACLOFEN 10 MG/1
TABLET ORAL
Qty: 360 TABLET | Refills: 0 | Status: SHIPPED | OUTPATIENT
Start: 2022-01-02 | End: 2022-01-21 | Stop reason: SDUPTHER

## 2022-01-03 ENCOUNTER — PATIENT MESSAGE (OUTPATIENT)
Dept: SLEEP CENTER | Facility: CLINIC | Age: 72
End: 2022-01-03

## 2022-01-03 DIAGNOSIS — G47.61 PLMD (PERIODIC LIMB MOVEMENT DISORDER): ICD-10-CM

## 2022-01-04 ENCOUNTER — OFFICE VISIT (OUTPATIENT)
Dept: PHYSICAL THERAPY | Facility: CLINIC | Age: 72
End: 2022-01-04
Payer: MEDICARE

## 2022-01-04 DIAGNOSIS — G35 MULTIPLE SCLEROSIS (HCC): ICD-10-CM

## 2022-01-04 DIAGNOSIS — R26.89 BALANCE PROBLEM: Primary | ICD-10-CM

## 2022-01-04 DIAGNOSIS — R26.89 IMPAIRMENT OF BALANCE: ICD-10-CM

## 2022-01-04 PROCEDURE — 97112 NEUROMUSCULAR REEDUCATION: CPT | Performed by: PHYSICAL THERAPIST

## 2022-01-04 PROCEDURE — 97110 THERAPEUTIC EXERCISES: CPT | Performed by: PHYSICAL THERAPIST

## 2022-01-04 PROCEDURE — 97530 THERAPEUTIC ACTIVITIES: CPT | Performed by: PHYSICAL THERAPIST

## 2022-01-04 RX ORDER — CLONAZEPAM 0.5 MG/1
TABLET ORAL
Qty: 90 TABLET | Refills: 1 | Status: SHIPPED | OUTPATIENT
Start: 2022-01-04 | End: 2022-01-11

## 2022-01-04 NOTE — TELEPHONE ENCOUNTER
Dr Meño Grant, I spoke with pharmacist at [facility], last time script filled 10/8/2021, due for refill tomorrow  I cancelled remaining refill    Can you please review and sign script, to go to [facility]

## 2022-01-04 NOTE — PROGRESS NOTES
Daily Note     Today's date: 2022  Patient name: Thor Krabbe  : 1950  MRN: [id number]  Referring provider: Qiana Ortiz,*  Dx:   Encounter Diagnosis     ICD-10-CM    1  Balance problem  R26 89    2  Impairment of balance  R26 89    3  Multiple sclerosis ([facility] [address] )  G35        Start Time: 1000  Stop Time: 1045  Total time in clinic (min): 45 minutes    Subjective: Pt reports more fatigue today due to packing up toni decorations this morning  Pt notes decrease in neuropathic symptoms in distal LE today  Objective: See treatment diary below      Assessment: Despite fatigue, patient was able to complete all prescribed exercises  Contact guard utilized throughout treatment  Challenged proprioception with wobble board activity to improve both hip and ankle strategy of balance  Tolerated treatment well  Modified weight with leg press due to LE fatigue  Patient exhibited good technique with therapeutic exercises and would benefit from continued PT  Plan: Continue per plan of care  POC change to 1x per week after next week       Precautions: MS, Gait dysfunction, Insomnia, Osteoporosis, Peripheral polyneuropathy, Hx of Breast CA, R IDALIA in - Dr Elian Peterson, Left femur IM nailing      Manuals                     Neuro Re-Ed             VOR x 1 viewing in standing horizontal and vertical 1' each horizontal and vertical 1' each horizontal and vertical 1' each horizontal and vertical 1' each horizontal and vertical 1' each horizontal and vertical 1' each       Romberg foam Feet apart/feet together 1' each on BD foam  Feet apart/feet together 1' each on BD foam  Feet apart/feet together 1' each on BD foam  Feet apart/feet together 1' each on BD foam  Feet apart/feet together 1' each on BD foam  Feet apart/feet together 1' each on BD foam        Wobble board      Fwd/bkwad 1' each c/ finger tip support       Ther Ex             Nu Step             Long arc quad 20x AROM 20x AROM 20x AROM 20x AROM 20x AROM 20x AROM       Hamstring curl  20x standing 20x standing 20x standing 20x standing 20x standing 20x standing       Hamstring stretch             Bicycle              Ther Activity             HR     20x        Mini squats             6 MWT              Leeann walks 6x15 feet 8x15 feet 6x15 feet 6x15 feet 8x15 feet 8x15 feet       Lateral walks 6x15 feet on blue foam  8x15 feet on blue foam 8x15 feet on blue foam 8x15 feet on blue foam 8x15 feet on blue foam 8x15 feet on blue foam       Tandem walk on foam  6x15 feet on blue foam  8x15 feet on blue foam 8x15 feet on blue foam 8x15 feet on blue foam 8x15 feet on blue foam 8x15 feet on blue foam       Marching on foam  BD foam 30x on each BD foam 30x on each BD foam 30x on each BD foam 30x on each BD foam 30x on each BD foam 30x on each       Leg press  30# 3x10 30# 3x10 Deferred today   20x 20#       Gait Training             Focus on reducing IR during limb advancement and during stance                          Modalities                                       1:1 with PT from [phone]

## 2022-01-06 ENCOUNTER — OFFICE VISIT (OUTPATIENT)
Dept: PHYSICAL THERAPY | Facility: CLINIC | Age: 72
End: 2022-01-06
Payer: MEDICARE

## 2022-01-06 DIAGNOSIS — R26.89 BALANCE PROBLEM: Primary | ICD-10-CM

## 2022-01-06 DIAGNOSIS — R26.89 IMPAIRMENT OF BALANCE: ICD-10-CM

## 2022-01-06 DIAGNOSIS — G35 MULTIPLE SCLEROSIS (HCC): ICD-10-CM

## 2022-01-06 PROCEDURE — 97530 THERAPEUTIC ACTIVITIES: CPT | Performed by: PHYSICAL THERAPIST

## 2022-01-06 PROCEDURE — 97110 THERAPEUTIC EXERCISES: CPT | Performed by: PHYSICAL THERAPIST

## 2022-01-06 PROCEDURE — 97112 NEUROMUSCULAR REEDUCATION: CPT | Performed by: PHYSICAL THERAPIST

## 2022-01-06 NOTE — PROGRESS NOTES
Daily Note     Today's date: 2022  Patient name: Marie Ritchie  : 1950  MRN: [id number]  Referring provider: Jed Decker,*  Dx:   Encounter Diagnosis     ICD-10-CM    1  Balance problem  R26 89    2  Impairment of balance  R26 89    3  Multiple sclerosis ([facility] [address] )  G35        Start Time: 1015  Stop Time: 6110  Total time in clinic (min): 38 minutes    Subjective: Pt reports increased hip stiffness and weakness  Could be weather and barometric pressure related, according to patient  Objective: See treatment diary below      Assessment: Modified treatment to accommodate for weakness at hip  Pt not able to complete all exercises  Stepping over ladders and hamstring curls were the most difficult to complete  Tolerated treatment fair  Will resume all exercises next session as tolerated  Patient would benefit from continued PT  Plan: Continue per plan of care        Precautions: MS, Gait dysfunction, Insomnia, Osteoporosis, Peripheral polyneuropathy, Hx of Breast CA, R IDALIA in - Dr Claudio Jerry, Left femur IM nailing      Manuals                    Neuro Re-Ed             VOR x 1 viewing in standing horizontal and vertical 1' each horizontal and vertical 1' each horizontal and vertical 1' each horizontal and vertical 1' each horizontal and vertical 1' each horizontal and vertical 1' each horizontal and vertical 1' each      Romberg foam Feet apart/feet together 1' each on BD foam  Feet apart/feet together 1' each on BD foam  Feet apart/feet together 1' each on BD foam  Feet apart/feet together 1' each on BD foam  Feet apart/feet together 1' each on BD foam  Feet apart/feet together 1' each on BD foam  Feet apart/feet together 1' each on BD foam       Wobble board      Fwd/bkwad 1' each c/ finger tip support Fwd/bkwad 1' each c/ finger tip support      Ther Ex             Nu Step             Long arc quad 20x AROM 20x AROM 20x AROM 20x AROM 20x AROM 20x AROM 20x AROM      Hamstring curl  20x standing 20x standing 20x standing 20x standing 20x standing 20x standing 20x c/ modification      Hamstring stretch             Bicycle              Ther Activity             HR     20x        Mini squats             6 MWT              Leeann walks 6x15 feet 8x15 feet 6x15 feet 6x15 feet 8x15 feet 8x15 feet Unable to complete more than 2      Lateral walks 6x15 feet on blue foam  8x15 feet on blue foam 8x15 feet on blue foam 8x15 feet on blue foam 8x15 feet on blue foam 8x15 feet on blue foam 8x15 feet on blue foam      Tandem walk on foam  6x15 feet on blue foam  8x15 feet on blue foam 8x15 feet on blue foam 8x15 feet on blue foam 8x15 feet on blue foam 8x15 feet on blue foam 8x15 feet on blue foam      Marching on foam  BD foam 30x on each BD foam 30x on each BD foam 30x on each BD foam 30x on each BD foam 30x on each BD foam 30x on each BD foam 30x on each      Leg press  30# 3x10 30# 3x10 Deferred today   20x 20#       Gait Training             Focus on reducing IR during limb advancement and during stance                          Modalities                                       1:1 with PT from [id number]

## 2022-01-07 ENCOUNTER — TELEPHONE (OUTPATIENT)
Dept: NEUROLOGY | Facility: CLINIC | Age: 72
End: 2022-01-07

## 2022-01-07 NOTE — TELEPHONE ENCOUNTER
Patient of Dr Stormy Almonte, , last office visit 9/1  Labs ordered:  CBC/hepatic function panel  Patient called to advise she had labs drawn early by mistake:    hepatic panel was drawn 10/28 and was WNL, bmp was drawn 11/3, all wnl except chloride "114"  Do you need these labs redrawn prior to office visit scheduled 1/21  If so, I will enter new orders  Thanks for your help        best  [phone] (ok to leave detailed message)

## 2022-01-10 DIAGNOSIS — G25.81 RLS (RESTLESS LEGS SYNDROME): ICD-10-CM

## 2022-01-10 DIAGNOSIS — G47.61 PLMD (PERIODIC LIMB MOVEMENT DISORDER): ICD-10-CM

## 2022-01-10 NOTE — TELEPHONE ENCOUNTER
----- Message from Danny Dickson sent at 1/10/2022 10:47 AM EST -----  Regarding: Neupro Rx  Dr Edward Erickson,  I received a call from [address] that a rx for my Neupro was sent there  We have been getting the script from [facility] order pharmacy   Would it be possible to fax the order to the mail order pharmacy instead? Having increasing problems of sudden falling asleep while just sitting down watching TV or having a cup of coffee  On the reverse, after waking up after a short period of sleep, when getting in bed I can't fall asleep      Thank you  iVjay SNYDER 1950  [phone]

## 2022-01-10 NOTE — TELEPHONE ENCOUNTER
Spoke with the pharmacist at [facility]  Canceled Neupro patch      [facility]     Left message for the patient, offered to schedule a follow up with Dr Fabiana Apodaca

## 2022-01-11 ENCOUNTER — OFFICE VISIT (OUTPATIENT)
Dept: SLEEP CENTER | Facility: CLINIC | Age: 72
End: 2022-01-11
Payer: MEDICARE

## 2022-01-11 ENCOUNTER — OFFICE VISIT (OUTPATIENT)
Dept: PHYSICAL THERAPY | Facility: CLINIC | Age: 72
End: 2022-01-11
Payer: MEDICARE

## 2022-01-11 VITALS
SYSTOLIC BLOOD PRESSURE: 122 MMHG | HEIGHT: 64 IN | BODY MASS INDEX: 23.49 KG/M2 | DIASTOLIC BLOOD PRESSURE: 64 MMHG | WEIGHT: 137.6 LBS

## 2022-01-11 DIAGNOSIS — G47.61 PLMD (PERIODIC LIMB MOVEMENT DISORDER): ICD-10-CM

## 2022-01-11 DIAGNOSIS — G35 MULTIPLE SCLEROSIS (HCC): Primary | ICD-10-CM

## 2022-01-11 DIAGNOSIS — R26.89 BALANCE PROBLEM: ICD-10-CM

## 2022-01-11 DIAGNOSIS — R26.89 IMPAIRMENT OF BALANCE: ICD-10-CM

## 2022-01-11 PROCEDURE — 97530 THERAPEUTIC ACTIVITIES: CPT | Performed by: PHYSICAL THERAPIST

## 2022-01-11 PROCEDURE — 99213 OFFICE O/P EST LOW 20 MIN: CPT | Performed by: INTERNAL MEDICINE

## 2022-01-11 PROCEDURE — 97110 THERAPEUTIC EXERCISES: CPT | Performed by: PHYSICAL THERAPIST

## 2022-01-11 PROCEDURE — 97112 NEUROMUSCULAR REEDUCATION: CPT | Performed by: PHYSICAL THERAPIST

## 2022-01-11 RX ORDER — CLONAZEPAM 1 MG/1
TABLET ORAL
Qty: 30 TABLET | Refills: 5 | Status: SHIPPED | OUTPATIENT
Start: 2022-01-11 | End: 2022-02-07 | Stop reason: DRUGHIGH

## 2022-01-11 NOTE — PROGRESS NOTES
Progress Note - Sleep Center   Nola Rivera EXB:[birth year] MRN: [id number]      Reason for Visit:    66 y  o female who was doing reasonably well on her current regimen of Neupro 3 mg and clonazepam 0 5 mg  She is unsure if the Neupro is helping, because she noticed no difference when she stopped taking it for a few days  Assessment:  The patient may do better on a higher dosage of clonazepam, which will also help her with her anxiety and stress  I will wean her off the Neupro and see how she responds  Plan:  Increase clonazepam to 1 mg at bedtime and discontinue Neupro  Follow up: One year    History of Present Illness:  Patient has multiple sclerosis and restless legs/PLMS      Review of Systems      Genitourinary post menopausal (no peroids)   Cardiology none   Gastrointestinal none   Neurology muscle weakness, numbness/tingling of an extremity and balance problems   Constitutional claustrophobia and fatigue   Integumentary none   Psychiatry none   Musculoskeletal joint pain   Pulmonary none   ENT none   Endocrine none   Hematological none           I have reviewed and updated the review of systems as necessary     Historical Information    Past Medical History:   Diagnosis Date    Allergic 1967    seasonal    Allergic rhinitis     Bone pain     Breast cancer ([facility] [address] ) 2012    left    Breast ptosis     Depression     Fatigue     Gait disturbance     uses cane at times    Headache     Hip fracture ([facility] [address] )     Hip pain     History of transfusion 1975    placenta previa s/p work accident, no rx    Hypokalemia     Insomnia     Laceration of finger     right hand    Left ankle pain     Left knee pain     Multiple sclerosis (HCC)     Muscle strain     left lower leg    Nephrolithiasis     Osteopenia     Osteoporosis     Pain of left calf     Pneumonia     Rash     Scoliosis birth    scoliosis    Solitary pulmonary nodule     SVT (supraventricular tachycardia) (HCC)     Tingling  Urgency of urination     Urticaria     Wrist fracture, left          Past Surgical History:   Procedure Laterality Date    ANKLE SURGERY      APPENDECTOMY  11/2012    Dr Riley Hyde      Enlargement procedure with prosthetic implant bilateral    AUGMENTATION MAMMAPLASTY Right 01/28/2020    implant replaced because of recall    AUGMENTATION MAMMAPLASTY Bilateral 2012    BREAST BIOPSY Left 07/23/2012    BREAST IMPLANT Right 1/28/2020    Procedure: BREAST IMPLANT EXCHANGE WITH CAPSULECTOMY;  Surgeon: Elli Wilson MD;  [facility];  Service: Plastics    BREAST IMPLANT REMOVAL Right 01/28/2020    implant placement, implant revision, right mastopexy    CYSTOSTOMY      with basket extraction of calculus; x2    EXCISION / BIOPSY BREAST / NIPPLE / DUCT Right 05/04/2020    scar revision to resuture non healing surgical wound    EXPLORATORY LAPAROTOMY      FOOT SURGERY      FRACTURE SURGERY  Lt wrist 9/2014 - Lt matthew femur 9/14    HIP FRACTURE SURGERY Right     [employer]    HIP SURGERY Left     JOINT REPLACEMENT  Rt hip 10/2012    LEG SURGERY      Repair    MASTECTOMY Left 08/16/2012    MS REVISION OF RECONSTRUCTED BREAST Right 5/4/2020    Procedure: REVISION RIGHT BREAST MOUND;  Surgeon: Elli Wilson MD;  [facility];  Service: Plastics    MS SUSPENSION OF BREAST Right 1/28/2020    Procedure: BREAST MASTOPEXY;  Surgeon: Elli Wilson MD;  [facility];  Service: Plastics    REDUCTION MAMMAPLASTY Right 01/28/2020    reduced to match left side    SENTINEL LYMPH NODE BIOPSY Left 08/16/2012    SKIN BIOPSY      TONSILLECTOMY      TUBAL LIGATION      WRIST SURGERY Left          Social History     Socioeconomic History    Marital status: /Civil Union     Spouse name: Not on file    Number of children: 2    Years of education: Completed bachelor's degree    Highest education level: Not on file   Occupational History    Occupation: RN     Comment: Retired   Tobacco Use    Smoking status: Former Smoker     Packs/day: 1 00     Years: 35 00     Pack years: 35 00     Quit date:      Years since quittin 0    Smokeless tobacco: Never Used   Vaping Use    Vaping Use: Never used   Substance and Sexual Activity    Alcohol use: No    Drug use: No    Sexual activity: Yes     Partners: Male     Birth control/protection: Post-menopausal   Other Topics Concern    Not on file   Social History Narrative    Denied:  History of caffeine use     Social Determinants of Health     Financial Resource Strain: Not on file   Food Insecurity: Not on file   Transportation Needs: Not on file   Physical Activity: Not on file   Stress: Not on file   Social Connections: Not on file   Intimate Partner Violence: Not on file   Housing Stability: Not on file           History   Alcohol use: Not on file       History   Smoking Status    Not on file   Smokeless Tobacco    Not on file       Family History:   Family History   Problem Relation Age of Onset    Hodgkin's lymphoma Maternal Grandfather         age at dx unk    Cancer Maternal Aunt 79        bladder    Heart disease Maternal Aunt     Colon cancer Maternal Uncle 72    Hodgkin's lymphoma Maternal Uncle 79    Coronary artery disease Mother     Hyperlipidemia Mother     Rheum arthritis Mother     Other Father         Acute myocardial infarction    No Known Problems Maternal Grandmother     No Known Problems Paternal Grandmother     No Known Problems Paternal Grandfather     No Known Problems Son     No Known Problems Son     Stroke Maternal Aunt         6 CVA before death    Breast cancer Maternal Aunt     No Known Problems Paternal Aunt     No Known Problems Paternal Aunt     No Known Problems Paternal Aunt        Medications/Allergies:      Current Outpatient Medications:     ALPHA LIPOIC ACID PO, Take by mouth, Disp: , Rfl:     Ampyra 10 MG TB12, Take 1 tablet (10 mg total) by mouth 2 (two) times a day, Disp: 180 tablet, Rfl: 0    ascorbic acid (VITAMIN C) 500 mg tablet, Take 500 mg by mouth daily, Disp: , Rfl:     baclofen 10 mg tablet, TAKE 1 TABLET EVERY        MORNING, 1 TABLET EVERY    EVENING, AND TAKE 2 TABLETSAT BEDTIME, Disp: 360 tablet, Rfl: 0    Biotin 5000 MCG TABS, , Disp: , Rfl:     Cholecalciferol (VITAMIN D3) 1000 units CAPS, Take 5,000 Units by mouth  , Disp: , Rfl:     clonazePAM (KlonoPIN) 1 mg tablet, 1 by mouth at bedtime  , Disp: 30 tablet, Rfl: 5    Copaxone 40 MG/ML SOSY, Inject 40mg under the skin three times weekly  , Disp: 36 mL, Rfl: 3    Cranberry 1000 MG CAPS, Take 300 mg by mouth  , Disp: , Rfl:     denosumab (PROLIA) 60 mg/mL, Inject under the skin, Disp: , Rfl:     docusate sodium (Colace) 100 mg capsule, Take 1 capsule by mouth Daily, Disp: , Rfl:     gabapentin (NEURONTIN) 600 MG tablet, TAKE 2 TABLETS 3 TIMES A   DAY, Disp: 540 tablet, Rfl: 3    Magnesium 500 MG TABS, Take 500 tablets by mouth once, Disp: , Rfl:     rotigotine (Neupro) 3 MG/24HR transdermal patch, Place 1 patch on the skin daily, Disp: 90 patch, Rfl: 3    solifenacin (VESICARE) 10 MG tablet, Take 1 tablet (10 mg total) by mouth daily, Disp: 90 tablet, Rfl: 3    zonisamide (ZONEGRAN) 100 mg capsule, TAKE 4 CAPSULES AT BEDTIME, Disp: 360 capsule, Rfl: 3    gabapentin (NEURONTIN) 300 mg capsule, TAKE 1 CAPSULE DAILY AT    BEDTIME, Disp: 90 capsule, Rfl: 3    lisinopril (ZESTRIL) 20 mg tablet, Take 1 tablet (20 mg total) by mouth daily, Disp: 90 tablet, Rfl: 1    RESTASIS 0 05 % ophthalmic emulsion, , Disp: , Rfl: 0  No current facility-administered medications for this visit      Facility-Administered Medications Ordered in Other Visits:     bacitracin 50,000 Units, gentamicin (GARAMYCIN) 40 mg/mL 80 mg, ceFAZolin (ANCEF) 1,000 mg in sodium chloride 0 9 % 1,000 mL irrigation bottle, , Irrigation, Once, Alistair Edwards MD      Objective    Vital Signs:   Vitals: 01/11/22 1440   BP: 122/64   Weight: 62 4 kg (137 lb 9 6 oz)   Height: 5' 4" (1 626 m)     E[place] Sleepiness Scale: Total score: 14    Physical Exam:    General: Alert, appropriate, cooperative, overweight    Head: NC/AT    Skin: Warm, dry    Neuro: No motor abnormalities, cranial nerves appear intact    Psych: Normal affect            ROGER Felipe    Board Certified Sleep Specialist

## 2022-01-11 NOTE — PROGRESS NOTES
Daily Note     Today's date: 2022  Patient name: Linda Padilla  : 1950  MRN: [id number]  Referring provider: Duane Gentry,*  Dx:   Encounter Diagnosis     ICD-10-CM    1  Multiple sclerosis ([address] )  G35    2  Impairment of balance  R26 89    3  Balance problem  R26 89        Start Time: 1000  Stop Time: 1038  Total time in clinic (min): 38 minutes    Subjective: Pt reports having a good night of sleep even with forgetting to apply the Neupro patch  She made an appointment with the sleep specialist to discuss weaning from this patch  She notes more weakness in right LE than left LE  Objective: See treatment diary below      Assessment: Resumed all exercises today that were modified last treatment  Less rest breaks required throughout treatment  Pt was able to perform 28 minutes of standing exercises prior to requiring a seated rest break Tolerated treatment well  Patient would benefit from continued PT  Plan: Continue per plan of care        Precautions: MS, Gait dysfunction, Insomnia, Osteoporosis, Peripheral polyneuropathy, Hx of Breast CA, R IDALIA in - Dr Wilton Mathias, Left femur IM nailing      Manuals                   Neuro Re-Ed             VOR x 1 viewing in standing horizontal and vertical 1' each horizontal and vertical 1' each horizontal and vertical 1' each horizontal and vertical 1' each horizontal and vertical 1' each horizontal and vertical 1' each horizontal and vertical 1' each horizontal and vertical 1' each     Romberg foam Feet apart/feet together 1' each on BD foam  Feet apart/feet together 1' each on BD foam  Feet apart/feet together 1' each on BD foam  Feet apart/feet together 1' each on BD foam  Feet apart/feet together 1' each on BD foam  Feet apart/feet together 1' each on BD foam  Feet apart/feet together 1' each on BD foam  Feet apart/feet together 1' each on BD foam      Wobble board      Fwd/bkwad 1' each c/ finger tip support Fwd/bkwad 1' each c/ finger tip support Fwd/bkwad 1' each c/ finger tip support     Ther Ex             Nu Step             Long arc quad 20x AROM 20x AROM 20x AROM 20x AROM 20x AROM 20x AROM 20x AROM 20x AROM     Hamstring curl  20x standing 20x standing 20x standing 20x standing 20x standing 20x standing 20x c/ modification 20x standing     Hamstring stretch             Bicycle              Ther Activity             HR     20x   20x     Mini squats             6 MWT              Leeann walks 6x15 feet 8x15 feet 6x15 feet 6x15 feet 8x15 feet 8x15 feet Unable to complete more than 2 [id number]     Lateral walks 6x15 feet on blue foam  8x15 feet on blue foam 8x15 feet on blue foam 8x15 feet on blue foam 8x15 feet on blue foam 8x15 feet on blue foam 8x15 feet on blue foam 8x15 feet on blue foam     Tandem walk on foam  6x15 feet on blue foam  8x15 feet on blue foam 8x15 feet on blue foam 8x15 feet on blue foam 8x15 feet on blue foam 8x15 feet on blue foam 8x15 feet on blue foam 8x15 feet on blue foam     Marching on foam  BD foam 30x on each BD foam 30x on each BD foam 30x on each BD foam 30x on each BD foam 30x on each BD foam 30x on each BD foam 30x on each BD foam 30x on each     Leg press  30# 3x10 30# 3x10 Deferred today   20x 20#       Gait Training             Focus on reducing IR during limb advancement and during stance                          Modalities                                       1:1 with PT from

## 2022-01-13 ENCOUNTER — APPOINTMENT (OUTPATIENT)
Dept: PHYSICAL THERAPY | Facility: CLINIC | Age: 72
End: 2022-01-13
Payer: MEDICARE

## 2022-01-13 RX ORDER — ROTIGOTINE 3 MG/24H
1 PATCH, EXTENDED RELEASE TRANSDERMAL DAILY
Qty: 90 PATCH | Refills: 3 | Status: SHIPPED | OUTPATIENT
Start: 2022-01-13 | End: 2022-05-09

## 2022-01-18 ENCOUNTER — OFFICE VISIT (OUTPATIENT)
Dept: PHYSICAL THERAPY | Facility: CLINIC | Age: 72
End: 2022-01-18
Payer: MEDICARE

## 2022-01-18 DIAGNOSIS — G35 MULTIPLE SCLEROSIS (HCC): ICD-10-CM

## 2022-01-18 DIAGNOSIS — R26.89 BALANCE PROBLEM: Primary | ICD-10-CM

## 2022-01-18 DIAGNOSIS — R26.89 IMPAIRMENT OF BALANCE: ICD-10-CM

## 2022-01-18 PROCEDURE — 97530 THERAPEUTIC ACTIVITIES: CPT | Performed by: PHYSICAL THERAPIST

## 2022-01-18 PROCEDURE — 97110 THERAPEUTIC EXERCISES: CPT | Performed by: PHYSICAL THERAPIST

## 2022-01-18 PROCEDURE — 97112 NEUROMUSCULAR REEDUCATION: CPT | Performed by: PHYSICAL THERAPIST

## 2022-01-18 NOTE — PROGRESS NOTES
Daily Note     Today's date: 2022  Patient name: Francisco Campoverde  : 1950  MRN: [id number]  Referring provider: Shawna Ricardo,*  Dx:   Encounter Diagnosis     ICD-10-CM    1  Balance problem  R26 89    2  Impairment of balance  R26 89    3  Multiple sclerosis ([facility] [address] )  G35        Start Time: 1000  Stop Time: 1045  Total time in clinic (min): 45 minutes    Subjective: Pt reports falling last week on her knees on the hardwood floor  Mild contusion, but no injury, according to patient  Pt is no longer taking Neupro and is now on a higher dose of klonopin at night time  Objective: See treatment diary below      Assessment: Continued maintenance therapy to focus on balance, proprioception, and LE endurance  Despite fall last week, no significant change in physical status  Pt was able to complete all designed dynamic balance program with good tolerance  Contact guard and cueing required throughout treatment, although patient demonstrated safe self correction throughout program completion  Pt experienced intermittent sharp pain in right LE, likely peripheral neuropathic pain  I initiated nerve glides at program completion to help manage symptoms and instructed a HEP to patient  Patient would benefit from continued PT      Plan: Continue per plan of care        Precautions: MS, Gait dysfunction, Insomnia, Osteoporosis, Peripheral polyneuropathy, Hx of Breast CA, R IDALIA in - Dr Coni Parra, Left femur IM nailing      Manuals     Sciatic nerve glides         2x30    Neuro Re-Ed             VOR x 1 viewing in standing horizontal and vertical 1' each horizontal and vertical 1' each horizontal and vertical 1' each horizontal and vertical 1' each horizontal and vertical 1' each horizontal and vertical 1' each horizontal and vertical 1' each horizontal and vertical 1' each     Romberg foam Feet apart/feet together 1' each on BD foam  Feet apart/feet together 1' each on BD foam  Feet apart/feet together 1' each on BD foam  Feet apart/feet together 1' each on BD foam  Feet apart/feet together 1' each on BD foam  Feet apart/feet together 1' each on BD foam  Feet apart/feet together 1' each on BD foam  Feet apart/feet together 1' each on BD foam  Feet apart/feet together 1' each on BD    Wobble board      Fwd/bkwad 1' each c/ finger tip support Fwd/bkwad 1' each c/ finger tip support Fwd/bkwad 1' each c/ finger tip support Fwd/bkwad 1' each c/ finger tip support    Ther Ex             Nu Step             Long arc quad 20x AROM 20x AROM 20x AROM 20x AROM 20x AROM 20x AROM 20x AROM 20x AROM 20x AROM    Hamstring curl  20x standing 20x standing 20x standing 20x standing 20x standing 20x standing 20x c/ modification 20x standing 30x standing    Hamstring stretch             Bicycle              Ther Activity             HR     20x   20x 20x    Mini squats             6 MWT              Leeann walks 6x15 feet 8x15 feet 6x15 feet 6x15 feet 8x15 feet 8x15 feet Unable to complete more than 2 [id number]    Lateral walks 6x15 feet on blue foam  8x15 feet on blue foam 8x15 feet on blue foam 8x15 feet on blue foam 8x15 feet on blue foam 8x15 feet on blue foam 8x15 feet on blue foam 8x15 feet on blue foam 8x15 feet on blue foam    Tandem walk on foam  6x15 feet on blue foam  8x15 feet on blue foam 8x15 feet on blue foam 8x15 feet on blue foam 8x15 feet on blue foam 8x15 feet on blue foam 8x15 feet on blue foam 8x15 feet on blue foam 8x15 feet on blue foam    Marching on foam  BD foam 30x on each BD foam 30x on each BD foam 30x on each BD foam 30x on each BD foam 30x on each BD foam 30x on each BD foam 30x on each BD foam 30x on each BD foam 30x on each    Leg press  30# 3x10 30# 3x10 Deferred today   20x 20#       Gait Training             Focus on reducing IR during limb advancement and during stance                          Modalities                                       1:1 with PT from

## 2022-01-19 ENCOUNTER — TELEPHONE (OUTPATIENT)
Dept: NEUROLOGY | Facility: CLINIC | Age: 72
End: 2022-01-19

## 2022-01-20 ENCOUNTER — APPOINTMENT (OUTPATIENT)
Dept: PHYSICAL THERAPY | Facility: CLINIC | Age: 72
End: 2022-01-20
Payer: MEDICARE

## 2022-01-21 ENCOUNTER — TELEPHONE (OUTPATIENT)
Dept: NEUROLOGY | Facility: CLINIC | Age: 72
End: 2022-01-21

## 2022-01-21 ENCOUNTER — TELEMEDICINE (OUTPATIENT)
Dept: NEUROLOGY | Facility: CLINIC | Age: 72
End: 2022-01-21
Payer: MEDICARE

## 2022-01-21 DIAGNOSIS — G62.9 PERIPHERAL POLYNEUROPATHY: ICD-10-CM

## 2022-01-21 DIAGNOSIS — G35 MULTIPLE SCLEROSIS (HCC): Primary | ICD-10-CM

## 2022-01-21 PROCEDURE — 99214 OFFICE O/P EST MOD 30 MIN: CPT | Performed by: PSYCHIATRY & NEUROLOGY

## 2022-01-21 NOTE — ASSESSMENT & PLAN NOTE
No new neuropathic sxs today  neuroapthy likely contributory to balance issues as well  Working with PT  Pt notes since decrease by 300 mg of gapapentin more pain noted  Will resume gabapentin back to prior dosing  Pt to call for any issues  meds refilled

## 2022-01-21 NOTE — TELEPHONE ENCOUNTER
Pt seen virtually in office today    Please mail out rx for labs and retn visit needed at [address] [facility] for 4-6 months

## 2022-01-21 NOTE — PROGRESS NOTES
Virtual Regular Visit    Verification of patient location:    Patient is located in the following state in which I hold an active license [place]      Assessment/Plan:    Problem List Items Addressed This Visit        Nervous and Auditory    Multiple sclerosis ([address] ) - Primary     Pt seen today for neuro follow up  Last seen in sept  Pt notes one fall about 10 days ago  Pt is following with ortho  Pt also now following with PT  Pt doing PT with [facility] till end of month  Pt notes int issues with balance as well  Pt notes some increased bad days for her legs  Rev last mri t spine from June  Offered updated imaging  Pt notes she does not feel like much different at this time  Will call is wishes to pursue  Cont with brand copaxone  Labs from oct ok  Pt needs labs in feb  New rx provided  meds renewed ie zonegran, baclofen, gabapentin  Pt back on extra 300 mg gabapentin to help with some increased pain  Pt to call for any new sxs         Relevant Orders    CBC and differential    Hepatic function panel    Peripheral polyneuropathy     No new neuropathic sxs today  neuroapthy likely contributory to balance issues as well  Working with PT  Pt notes since decrease by 300 mg of gapapentin more pain noted  Will resume gabapentin back to prior dosing  Pt to call for any issues  meds refilled                      Reason for visit is   Chief Complaint   Patient presents with    Virtual Regular Visit        Encounter provider Quoc Bradford MD    Provider located at [address] [place] [address] Drive  [facility] [address]  [address]  [phone]      Recent Visits  Date Type Provider Dept   01/19/22 Telephone Baron Kocher [facility] recent visits within past 7 days and meeting all other requirements  Today's Visits  Date Type Provider Dept   01/21/22 Telemedicine Quoc Bradford MD [facility] today's visits and meeting all other requirements  Future Appointments  No visits were found meeting these conditions  Showing future appointments within next 150 days and meeting all other requirements       The patient was identified by name and date of birth  Francisco Campoverde was informed that this is a telemedicine visit and that the visit is being conducted through [facility] and patient was informed this is a secure, HIPAA-complaint platform  She agrees to proceed     My office door was closed  No one else was in the room  She acknowledged consent and understanding of privacy and security of the video platform  The patient has agreed to participate and understands they can discontinue the visit at any time  Patient is aware this is a billable service  Subjective  Francisco Campoverde is a 66 y o  female  presents for MS follow up, last seen in  9/1/21 by me   Per my last note "Patient with history of MS dating back to 2008, but likely with symptoms even in 0  Patient also with PMH of breast cancer s/p left mastectomy in August 2012   Pt has been on Copaxone since 2008 and changed to the 40mg TIW dosing    Pt notes she has to go for colonoscopy due to abn stool screening testing   Pt notes she is also being considered for some breast reconstruction with autologous fat transfer at mastectomy site   Pt is following up with her doctors   Pt notes overall still dealing with her walking and trying to help her left leg and her balance   Pt has rx from pcp for PT   Pt feels PT has been very helpful   Pt also had reconstructive surgery on breast in jan 28th and still working on healing with follow up visits with her surgeon     Since last visit pt had same day procedure with right breast reconstruction   Pt notes she had open area that was poorly healing   Now area is closed and no abx   Pt notes her biggest issue at this time is poor sleep related to not staying asleep as well as neurpathic pain in the legs   Pt cannot take lyrica or cymbalta as tried in past   Pt still on her baclofen, gabapentin and zonegran   Pt had updated labs in July and [facility] cbc diff and lfts   Rev in depth with pt  Pt notes she had one fall while in storage/ sewing area and fell trying to lift something and hurt left trapezius area   This is now improved since PT   Pt remains on brand copaxone and padmini med well "  Kept above paragraph due to longevity and complexiites of pt case  Pt last seen in Sept   Overall pt doing about the same as last visit  Pt notes good and bad days  Pt is back to PT as initially recommended by otho  Pt notes she feels she has plateaued at PT and will do therapy till about end of the month  Pt notes some int issues with balance  Pt had one additonal fall about 10 days ago  Pt feels int leg weakness  Re rev last mri t spine from June 2021  Rev report: Stable demyelinating lesion in the thoracic cord at the level of the T12-L1  There are no new additional lesion seen  The lesions which were described at T2-3 level and T1-T2 level are not visible on the current likely due to motion   No evidence of metastatic disease  Pt currently not interested in repeating imaging for her legs  Pt feels she is about the same as [place]  Pt notes no new sxs  No vertigo  No changes in vision  No loss of vision  No diplopia  occ issues with swallowing ie certain textures like rice and pasta  Pt seen by ent for allergies  rec pt to also consider seeing ent as well for the int issue with swallowing  No diff with articulation of speech  Pt had labs in oc with nl cbc doff and lfts  Nl gfr in nov  Pt had booster dose of moderna and tolerated well  Pt notes no new neuropathic sxs  Pt cont to follow with dr Bharat Grimes from [facility] for sleep med related issues  Pt notes she has been increased to 1 mg klonopin and at times feels more sedated and splitting dose    rec discussing further for his recommenedations due to sedation / hang over effect more in am  Pt is also using cane in outdoor setting    Pt remains on brand copaxone and padmini med well              The following portions of the patient's history were reviewed and updated as appropriate: allergies, current medications, past family history, past medical history, past social history, past surgical history and problem list and ros rev and med rec rev          HPI     Past Medical History:   Diagnosis Date    Allergic 1967    seasonal    Allergic rhinitis     Bone pain     Breast cancer ([address] ) 2012    left    Breast ptosis     Depression     Fatigue     Gait disturbance     uses cane at times    Headache     [facility] [address]     Hip pain     History of transfusion 1975    placenta previa s/p work accident, no rx    Hypokalemia     Insomnia     Laceration of finger     right hand    Left ankle pain     Left knee pain     Multiple sclerosis ([address] )     Muscle strain     left lower leg    Nephrolithiasis     Osteopenia     Osteoporosis     Pain of left calf     Pneumonia     Rash     Scoliosis birth    scoliosis    Solitary pulmonary nodule     SVT (supraventricular tachycardia) ([facility] )     Tingling     Urgency of urination     Urticaria     Wrist fracture, left        Past Surgical History:   Procedure Laterality Date    ANKLE SURGERY      APPENDECTOMY  11/2012    Dr Luis Felipe Weaver      Enlargement procedure with prosthetic implant bilateral    AUGMENTATION MAMMAPLASTY Right 01/28/2020    implant replaced because of recall    AUGMENTATION MAMMAPLASTY Bilateral 2012    BREAST BIOPSY Left 07/23/2012    BREAST IMPLANT Right 1/28/2020    Procedure: BREAST IMPLANT EXCHANGE WITH CAPSULECTOMY;  Surgeon: Alistair Edwards MD;  [facility];  Service: Plastics    BREAST IMPLANT REMOVAL Right 01/28/2020    implant placement, implant revision, right mastopexy    CYSTOSTOMY      with basket extraction of calculus; x2    EXCISION / BIOPSY BREAST / NIPPLE / DUCT Right 05/04/2020    scar revision to resuture non healing surgical wound    EXPLORATORY LAPAROTOMY      FOOT SURGERY      FRACTURE SURGERY  Lt wrist 9/2014 - Lt matthew femur 9/14    HIP FRACTURE SURGERY Right     [employer]    HIP SURGERY Left     JOINT REPLACEMENT  Rt hip 10/2012    LEG SURGERY      Repair    MASTECTOMY Left 08/16/2012    MI REVISION OF RECONSTRUCTED BREAST Right 5/4/2020    Procedure: REVISION RIGHT BREAST MOUND;  Surgeon: Lit Steinberg MD;  [facility];  Service: Plastics    MI SUSPENSION OF BREAST Right 1/28/2020    Procedure: BREAST MASTOPEXY;  Surgeon: Lit Steinberg MD;  [facility];  Service: Plastics    REDUCTION MAMMAPLASTY Right 01/28/2020    reduced to match left side    SENTINEL LYMPH NODE BIOPSY Left 08/16/2012    SKIN BIOPSY      TONSILLECTOMY      TUBAL LIGATION      WRIST SURGERY Left        Current Outpatient Medications   Medication Sig Dispense Refill    ALPHA LIPOIC ACID PO Take by mouth      Ampyra 10 MG TB12 Take 1 tablet (10 mg total) by mouth 2 (two) times a day 180 tablet 0    ascorbic acid (VITAMIN C) 500 mg tablet Take 500 mg by mouth daily      Biotin 5000 MCG TABS       Cholecalciferol (VITAMIN D3) 1000 units CAPS Take 5,000 Units by mouth        clonazePAM (KlonoPIN) 1 mg tablet 1 by mouth at bedtime  30 tablet 5    Copaxone 40 MG/ML SOSY Inject 40mg under the skin three times weekly   36 mL 3    Cranberry 1000 MG CAPS Take 300 mg by mouth        lisinopril (ZESTRIL) 20 mg tablet Take 1 tablet (20 mg total) by mouth daily (Patient taking differently: Take 10 mg by mouth daily  ) 90 tablet 1    Magnesium 500 MG TABS Take 500 tablets by mouth once      solifenacin (VESICARE) 10 MG tablet Take 1 tablet (10 mg total) by mouth daily 90 tablet 3    baclofen 10 mg tablet 1 tab pos q am, 1 tab po q pm and 2 tabs hs 360 tablet 3    denosumab (PROLIA) 60 mg/mL Inject under the skin      docusate sodium (Colace) 100 mg capsule Take 1 capsule by mouth Daily (Patient not taking: Reported on 1/21/2022 )      gabapentin (NEURONTIN) 300 mg capsule Take 1 capsule (300 mg total) by mouth daily at bedtime 90 capsule 3    gabapentin (NEURONTIN) 600 MG tablet 2 tabs po tid 540 tablet 3    RESTASIS 0 05 % ophthalmic emulsion   0    rotigotine (Neupro) 3 MG/24HR transdermal patch Place 1 patch on the skin daily 90 patch 3    zonisamide (ZONEGRAN) 100 mg capsule Take 4 capsules (400 mg total) by mouth daily at bedtime 360 capsule 3     No current facility-administered medications for this visit  Facility-Administered Medications Ordered in Other Visits   Medication Dose Route Frequency Provider Last Rate Last Admin    bacitracin 50,000 Units, gentamicin (GARAMYCIN) 40 mg/mL 80 mg, ceFAZolin (ANCEF) 1,000 mg in sodium chloride 0 9 % 1,000 mL irrigation bottle   Irrigation Once Suri Young MD            Allergies   Allergen Reactions    Duloxetine Hcl      Rapid Heart rate      Iodinated Diagnostic Agents Anaphylaxis    Acetaminophen Other (See Comments)     Heart palpitations    Cetirizine      Only occurs with generic, weakness in legs, off balance and couldn't walk   Morphine Other (See Comments)     Migraine       Review of Systems   Constitutional: Negative  Negative for appetite change and fever  HENT: Positive for trouble swallowing  Negative for hearing loss, tinnitus and voice change  Eyes: Negative  Negative for photophobia and pain  Respiratory: Negative  Negative for shortness of breath  Cardiovascular: Negative  Negative for palpitations  Gastrointestinal: Negative  Negative for nausea and vomiting  Endocrine: Negative  Negative for cold intolerance  Genitourinary: Negative  Negative for dysuria, frequency and urgency  Musculoskeletal: Positive for gait problem  Negative for myalgias and neck pain  Patient fell couple days ago, Kiana Gupta has increased bone density 17%   Has fallen couple times and is now cane dependant outside, wall dependant in house    Skin: Negative  Negative for rash  Neurological: Negative for dizziness, tremors, seizures, syncope, facial asymmetry, speech difficulty, weakness, light-headedness, numbness and headaches  Hematological: Negative  Does not bruise/bleed easily  Psychiatric/Behavioral: Negative  Negative for confusion, hallucinations and sleep disturbance  Memory issues now can't recall anything  All other systems reviewed and are negative  Video Exam    There were no vitals filed for this visit  Physical Exam  Constitutional:       General: She is not in acute distress  Appearance: She is not ill-appearing  Eyes:      General: No visual field deficit  Musculoskeletal:      Right lower leg: No edema  Left lower leg: No edema  Neurological:      Mental Status: She is alert  Cranial Nerves: Cranial nerves are intact  No cranial nerve deficit, dysarthria or facial asymmetry  Motor: No weakness, tremor, abnormal muscle tone, seizure activity or pronator drift  Coordination: Romberg sign negative  Finger-Nose-Finger Test normal  Rapid alternating movements normal       Gait: Gait abnormal       Comments: Wide based gait  Using cane  Diff with fine motor on left            I spent 30 minutes directly with the patient during this visit    VIRTUAL VISIT [address] verbally agrees to participate in [employer]  Pt is aware that [employer] could be limited without vital signs or the ability to perform a full hands-on physical exam  Jacy Whitt understands she or the provider may request at any time to terminate the video visit and request the patient to seek care or treatment in person

## 2022-01-21 NOTE — ASSESSMENT & PLAN NOTE
Pt seen today for neuro follow up  Last seen in sept  Pt notes one fall about 10 days ago  Pt is following with ortho  Pt also now following with PT  Pt doing PT with [facility] till end of month  Pt notes int issues with balance as well  Pt notes some increased bad days for her legs  Rev last mri t spine from June  Offered updated imaging  Pt notes she does not feel like much different at this time  Will call is wishes to pursue  Cont with brand copaxone  Labs from oct ok  Pt needs labs in feb  New rx provided  meds renewed ie zonegran, baclofen, gabapentin  Pt back on extra 300 mg gabapentin to help with some increased pain  Pt to call for any new sxs

## 2022-01-24 ENCOUNTER — OFFICE VISIT (OUTPATIENT)
Dept: PHYSICAL THERAPY | Facility: CLINIC | Age: 72
End: 2022-01-24
Payer: MEDICARE

## 2022-01-24 DIAGNOSIS — R26.89 BALANCE PROBLEM: Primary | ICD-10-CM

## 2022-01-24 DIAGNOSIS — R26.89 IMPAIRMENT OF BALANCE: ICD-10-CM

## 2022-01-24 DIAGNOSIS — G35 MULTIPLE SCLEROSIS (HCC): ICD-10-CM

## 2022-01-24 PROCEDURE — 97112 NEUROMUSCULAR REEDUCATION: CPT | Performed by: PHYSICAL THERAPIST

## 2022-01-24 PROCEDURE — 97110 THERAPEUTIC EXERCISES: CPT | Performed by: PHYSICAL THERAPIST

## 2022-01-24 NOTE — PROGRESS NOTES
Daily Note     Today's date: 2022  Patient name: Miguelina Matamoros  : 1950  MRN: [id number]  Referring provider: Anibal Cardenas,*  Dx:   Encounter Diagnosis     ICD-10-CM    1  Balance problem  R26 89    2  Impairment of balance  R26 89    3  Multiple sclerosis ([facility] [address] )  G35        Start Time: 1045  Stop Time: 1130  Total time in clinic (min): 45 minutes    Subjective: Pt saw Dr Harless Galeazzi on Friday who is now changing a few medications to help with side effects of medication  Pt notes lower extremity heaviness when ambulating  Objective: See treatment diary below      Assessment: Pt challenged with idris step overs due to weakness/fatigue in lower extremity  Increase toe drag present today during dynamic gait and balance  Increased therapeutic rest breaks to allow for recovery during program  Tolerated treatment fair  Patient would benefit from continued PT      Plan: Continue per plan of care  Pt will continue with 1 time per week until stabilization occurs        Precautions: MS, Gait dysfunction, Insomnia, Osteoporosis, Peripheral polyneuropathy, Hx of Breast CA, R IDALIA in - Dr Emmie Lomeli, Left femur IM nailing      Manuals    Sciatic nerve glides         2x30    Neuro Re-Ed             VOR x 1 viewing in standing horizontal and vertical 1' each horizontal and vertical 1' each horizontal and vertical 1' each horizontal and vertical 1' each horizontal and vertical 1' each horizontal and vertical 1' each horizontal and vertical 1' each horizontal and vertical 1' each  horizontal and vertical 1' each   Romberg foam Feet apart/feet together 1' each on BD foam  Feet apart/feet together 1' each on BD foam  Feet apart/feet together 1' each on BD foam  Feet apart/feet together 1' each on BD foam  Feet apart/feet together 1' each on BD foam  Feet apart/feet together 1' each on BD foam  Feet apart/feet together 1' each on BD foam  Feet apart/feet together 1' each on BD foam  Feet apart/feet together 1' each on BD Feet apart/feet together 1' each on BD   Wobble board      Fwd/bkwad 1' each c/ finger tip support Fwd/bkwad 1' each c/ finger tip support Fwd/bkwad 1' each c/ finger tip support Fwd/bkwad 1' each c/ finger tip support Fwd/bkwad 1' each c/ finger tip support   Ther Ex             Nu Step             Long arc quad 20x AROM 20x AROM 20x AROM 20x AROM 20x AROM 20x AROM 20x AROM 20x AROM 20x AROM 20x AROM   Hamstring curl  20x standing 20x standing 20x standing 20x standing 20x standing 20x standing 20x c/ modification 20x standing 30x standing 30x standing R and 19 on left    Hamstring stretch             Bicycle              Ther Activity             HR     20x   20x 20x 20x   Mini squats             6 MWT              Leeann walks 6x15 feet 8x15 feet 6x15 feet 6x15 feet 8x15 feet 8x15 feet Unable to complete more than 2 [id number]   Lateral walks 6x15 feet on blue foam  8x15 feet on blue foam 8x15 feet on blue foam 8x15 feet on blue foam 8x15 feet on blue foam 8x15 feet on blue foam 8x15 feet on blue foam 8x15 feet on blue foam 8x15 feet on blue foam 8x15 feet on blue foam   Tandem walk on foam  6x15 feet on blue foam  8x15 feet on blue foam 8x15 feet on blue foam 8x15 feet on blue foam 8x15 feet on blue foam 8x15 feet on blue foam 8x15 feet on blue foam 8x15 feet on blue foam 8x15 feet on blue foam 8x15 feet on blue foam   Marching on foam  BD foam 30x on each BD foam 30x on each BD foam 30x on each BD foam 30x on each BD foam 30x on each BD foam 30x on each BD foam 30x on each BD foam 30x on each BD foam 30x on each BD foam 30x on each   Leg press  30# 3x10 30# 3x10 Deferred today   20x 20#       Gait Training             Focus on reducing IR during limb advancement and during stance                          Modalities                                       1:1 with PT from [id number]

## 2022-01-27 ENCOUNTER — APPOINTMENT (OUTPATIENT)
Dept: PHYSICAL THERAPY | Facility: CLINIC | Age: 72
End: 2022-01-27
Payer: MEDICARE

## 2022-02-02 ENCOUNTER — APPOINTMENT (OUTPATIENT)
Dept: PHYSICAL THERAPY | Facility: CLINIC | Age: 72
End: 2022-02-02
Payer: MEDICARE

## 2022-02-03 ENCOUNTER — TELEMEDICINE (OUTPATIENT)
Dept: FAMILY MEDICINE CLINIC | Facility: OTHER | Age: 72
End: 2022-02-03
Payer: MEDICARE

## 2022-02-03 DIAGNOSIS — I10 ESSENTIAL HYPERTENSION: Primary | ICD-10-CM

## 2022-02-03 DIAGNOSIS — J06.9 VIRAL UPPER RESPIRATORY TRACT INFECTION: ICD-10-CM

## 2022-02-03 PROCEDURE — 99213 OFFICE O/P EST LOW 20 MIN: CPT | Performed by: FAMILY MEDICINE

## 2022-02-03 NOTE — PROGRESS NOTES
Virtual Regular Visit    Verification of patient location:    Patient is located in the following state in which I hold an active license [place]      Assessment/Plan:    Problem List Items Addressed This Visit        Cardiovascular and Mediastinum    Essential hypertension - Primary      Other Visit Diagnoses     Viral upper respiratory tract infection            -Patient to continue on Lisinopril 10 mg once daily for HTN as BP is currently well controlled  I recommend patient follow up in office for next hypertension follow-up  -Recommend patient continue with supportive management at home for likely viral URI  Patient did test negative for COVID-19 with at home rapid antigen test    Return in about 3 months (around 5/3/2022) for Recheck BP  The patient indicates understanding of these issues and agrees with the plan  Reason for visit is   Chief Complaint   Patient presents with    Virtual Regular Visit        Encounter provider Gwendolyn Koehler MD    Provider located at [address] [id number]      Recent Visits  No visits were found meeting these conditions  Showing recent visits within past 7 days and meeting all other requirements  Today's Visits  Date Type Provider Dept   02/03/22 Telemedicine Gwendolyn Koehler MD [facility]   Showing today's visits and meeting all other requirements  Future Appointments  No visits were found meeting these conditions  Showing future appointments within next 150 days and meeting all other requirements       The patient was identified by name and date of birth  Nola Rivera was informed that this is a telemedicine visit and that the visit is being conducted through [address] and patient was informed this is a secure, HIPAA-complaint platform  She agrees to proceed     My office door was closed  No one else was in the room    She acknowledged consent and understanding of privacy and security of the video platform  The patient has agreed to participate and understands they can discontinue the visit at any time  Patient is aware this is a billable service  Subjective  Marleny Martin is a 66 y o  female       HPI   Patient presents for follow-up hypertension  She reports that systolic blood pressure at home has been ranging between the 635R-134U and that diastolic blood pressure has been in the 70s  Patient reports daily compliance with lisinopril 10 mg once daily  She denies any unwanted side effects of this medication  Patient reports limiting sodium in her diet  Patient does report she is currently experiencing cold symptoms, which her grandson and daughter are also experiencing  She has been tested for COVID-19 and tested negative  She denies any shortness of breath or chest pain, but does report a dry cough, some body aches and nasal congestion  Patient also reports some low-grade fevers  Patient reports she has been using Coricidin HBP for symptoms which has been helping      Past Medical History:   Diagnosis Date    Allergic 1967    seasonal    Allergic rhinitis     Bone pain     Breast cancer ([facility] [address] ) 2012    left    Breast ptosis     Depression     Fatigue     Gait disturbance     uses cane at times    Headache     Hip fracture (HCC)     Hip pain     History of transfusion 1975    placenta previa s/p work accident, no rx    Hypokalemia     Insomnia     Laceration of finger     right hand    Left ankle pain     Left knee pain     Multiple sclerosis (HCC)     Muscle strain     left lower leg    Nephrolithiasis     Osteopenia     Osteoporosis     Pain of left calf     Pneumonia     Rash     Scoliosis birth    scoliosis    Solitary pulmonary nodule     SVT (supraventricular tachycardia) (HCC)     Tingling     Urgency of urination     Urticaria     Wrist fracture, left        Past Surgical History:   Procedure Laterality Date    ANKLE SURGERY  APPENDECTOMY  11/2012    Dr Vicenta Wahl      Enlargement procedure with prosthetic implant bilateral    AUGMENTATION MAMMAPLASTY Right 01/28/2020    implant replaced because of recall    AUGMENTATION MAMMAPLASTY Bilateral 2012    BREAST BIOPSY Left 07/23/2012    BREAST IMPLANT Right 1/28/2020    Procedure: BREAST IMPLANT EXCHANGE WITH CAPSULECTOMY;  Surgeon: Shy Merchant MD;  [facility];  Service: Plastics    BREAST IMPLANT REMOVAL Right 01/28/2020    implant placement, implant revision, right mastopexy    CYSTOSTOMY      with basket extraction of calculus; x2    EXCISION / BIOPSY BREAST / NIPPLE / DUCT Right 05/04/2020    scar revision to resuture non healing surgical wound    EXPLORATORY LAPAROTOMY      FOOT SURGERY      FRACTURE SURGERY  Lt wrist 9/2014 - Lt matthew femur 9/14    HIP FRACTURE SURGERY Right     Subcapital    HIP SURGERY Left     JOINT REPLACEMENT  Rt hip 10/2012    LEG SURGERY      Repair    MASTECTOMY Left 08/16/2012    RI REVISION OF RECONSTRUCTED BREAST Right 5/4/2020    Procedure: REVISION RIGHT BREAST MOUND;  Surgeon: Shy Merchant MD;  [facility];  Service: Plastics    RI SUSPENSION OF BREAST Right 1/28/2020    Procedure: BREAST MASTOPEXY;  Surgeon: Shy Merchant MD;  [facility];  Service: Plastics    REDUCTION MAMMAPLASTY Right 01/28/2020    reduced to match left side    SENTINEL LYMPH NODE BIOPSY Left 08/16/2012    SKIN BIOPSY      TONSILLECTOMY      TUBAL LIGATION      WRIST SURGERY Left        Current Outpatient Medications   Medication Sig Dispense Refill    ALPHA LIPOIC ACID PO Take by mouth      Ampyra 10 MG TB12 Take 1 tablet (10 mg total) by mouth 2 (two) times a day 180 tablet 0    ascorbic acid (VITAMIN C) 500 mg tablet Take 500 mg by mouth daily      baclofen 10 mg tablet 1 tab pos q am, 1 tab po q pm and 2 tabs hs 360 tablet 3    Biotin 5000 MCG TABS       Cholecalciferol (VITAMIN D3) 1000 units CAPS Take 5,000 Units by mouth        clonazePAM (KlonoPIN) 1 mg tablet 1 by mouth at bedtime  30 tablet 5    Copaxone 40 MG/ML SOSY Inject 40mg under the skin three times weekly  36 mL 3    Cranberry 1000 MG CAPS Take 300 mg by mouth        denosumab (PROLIA) 60 mg/mL Inject under the skin      docusate sodium (Colace) 100 mg capsule Take 1 capsule by mouth Daily (Patient not taking: Reported on 1/21/2022 )      gabapentin (NEURONTIN) 300 mg capsule Take 1 capsule (300 mg total) by mouth daily at bedtime 90 capsule 3    gabapentin (NEURONTIN) 600 MG tablet 2 tabs po tid 540 tablet 3    lisinopril (ZESTRIL) 20 mg tablet Take 1 tablet (20 mg total) by mouth daily (Patient taking differently: Take 10 mg by mouth daily  ) 90 tablet 1    Magnesium 500 MG TABS Take 500 tablets by mouth once      RESTASIS 0 05 % ophthalmic emulsion   0    rotigotine (Neupro) 3 MG/24HR transdermal patch Place 1 patch on the skin daily 90 patch 3    solifenacin (VESICARE) 10 MG tablet Take 1 tablet (10 mg total) by mouth daily 90 tablet 3    zonisamide (ZONEGRAN) 100 mg capsule Take 4 capsules (400 mg total) by mouth daily at bedtime 360 capsule 3     No current facility-administered medications for this visit  Facility-Administered Medications Ordered in Other Visits   Medication Dose Route Frequency Provider Last Rate Last Admin    bacitracin 50,000 Units, gentamicin (GARAMYCIN) 40 mg/mL 80 mg, ceFAZolin (ANCEF) 1,000 mg in sodium chloride 0 9 % 1,000 mL irrigation bottle   Irrigation Once Josias Guo MD            Allergies   Allergen Reactions    Duloxetine Hcl      Rapid Heart rate      Iodinated Diagnostic Agents Anaphylaxis    Acetaminophen Other (See Comments)     Heart palpitations    Cetirizine      Only occurs with generic, weakness in legs, off balance and couldn't walk      Morphine Other (See Comments)     Migraine       Review of Systems   Constitutional: Positive for fever  Negative for appetite change and chills  Respiratory: Positive for cough  Negative for shortness of breath  Cardiovascular: Negative for chest pain and palpitations  Musculoskeletal: Positive for myalgias  Neurological: Negative for dizziness, light-headedness and headaches  Video Exam    There were no vitals filed for this visit  VS per patient (BP- 115/75, HR- 77, Temp- 99 2)    Physical Exam  Constitutional:       General: She is not in acute distress  Appearance: Normal appearance  She is not ill-appearing, toxic-appearing or diaphoretic  Eyes:      General: No scleral icterus  Right eye: No discharge  Left eye: No discharge  Conjunctiva/sclera: Conjunctivae normal    Pulmonary:      Effort: Pulmonary effort is normal  No respiratory distress  Comments: -no conversational dyspnea appreciated  Neurological:      Mental Status: She is alert and oriented to person, place, and time  Psychiatric:         Mood and Affect: Mood normal          Behavior: Behavior normal           I spent 15 minutes directly with the patient during this visit    VIRTUAL VISIT [address] verbally agrees to participate in [employer]  Pt is aware that [employer] could be limited without vital signs or the ability to perform a full hands-on physical exam  Jacy Arevalo understands she or the provider may request at any time to terminate the video visit and request the patient to seek care or treatment in person

## 2022-02-04 ENCOUNTER — TELEPHONE (OUTPATIENT)
Dept: SLEEP CENTER | Facility: CLINIC | Age: 72
End: 2022-02-04

## 2022-02-04 DIAGNOSIS — G47.61 PLMD (PERIODIC LIMB MOVEMENT DISORDER): Primary | ICD-10-CM

## 2022-02-04 NOTE — TELEPHONE ENCOUNTER
----- Message from Jose Daniel Shabazz sent at 2/4/2022  8:44 AM EST -----  Regarding: Saman Ortiz  I tried to increase my Klonopin dose to 1 mg  After taking it I fell asleep on the couch and when my  woke me up to go to bed I felt like I had been drinking all night and couldn't even walk  I couldn't stay awake the next day either unless I kept busy  [facility] mail order only gave me a 30 pill order so since I had some 0 5 and 1 mg I cut both in half and tried 0 75  That dosage is working well without any side effects and no residual hangover  Is this acceptable to you  I feel like Jesus looking for what works  If this is acceptable please let me know     Thanks   Sahwn Lawson

## 2022-02-07 ENCOUNTER — APPOINTMENT (OUTPATIENT)
Dept: PHYSICAL THERAPY | Facility: CLINIC | Age: 72
End: 2022-02-07
Payer: MEDICARE

## 2022-02-07 DIAGNOSIS — G47.61 PLMD (PERIODIC LIMB MOVEMENT DISORDER): Primary | ICD-10-CM

## 2022-02-07 RX ORDER — CLONAZEPAM 0.5 MG/1
TABLET ORAL
Qty: 45 TABLET | Refills: 5 | Status: SHIPPED | OUTPATIENT
Start: 2022-02-07 | End: 2022-02-10

## 2022-02-07 NOTE — TELEPHONE ENCOUNTER
Per patient's message clonazepam 0 75mg dose is working best   If you write for 0 5mg tablets she can take one and a half tablets nightly  Called [facility] and spoke to Ana Luisa Norwood  Cancelled refills on clonazepam 1mg tablets  Per Ana Luisa Norwood, last filled on 1/12/22 for 30 tablets  Dr Macarena Kellogg, please see script I teed up and review  Sign if appropriate

## 2022-02-10 ENCOUNTER — OFFICE VISIT (OUTPATIENT)
Dept: SURGICAL ONCOLOGY | Facility: CLINIC | Age: 72
End: 2022-02-10
Payer: MEDICARE

## 2022-02-10 VITALS
WEIGHT: 137 LBS | SYSTOLIC BLOOD PRESSURE: 122 MMHG | RESPIRATION RATE: 16 BRPM | BODY MASS INDEX: 23.39 KG/M2 | HEART RATE: 80 BPM | OXYGEN SATURATION: 97 % | DIASTOLIC BLOOD PRESSURE: 70 MMHG | HEIGHT: 64 IN | TEMPERATURE: 96.9 F

## 2022-02-10 DIAGNOSIS — Z08 ENCOUNTER FOR FOLLOW-UP SURVEILLANCE OF BREAST CANCER: Primary | ICD-10-CM

## 2022-02-10 DIAGNOSIS — G47.61 PLMD (PERIODIC LIMB MOVEMENT DISORDER): ICD-10-CM

## 2022-02-10 DIAGNOSIS — Z86.000 PERSONAL HISTORY OF IN-SITU NEOPLASM OF BREAST: ICD-10-CM

## 2022-02-10 DIAGNOSIS — R92.8 ABNORMAL FINDING ON BREAST IMAGING: ICD-10-CM

## 2022-02-10 DIAGNOSIS — Z85.3 ENCOUNTER FOR FOLLOW-UP SURVEILLANCE OF BREAST CANCER: Primary | ICD-10-CM

## 2022-02-10 DIAGNOSIS — R92.2 DENSE BREAST TISSUE: ICD-10-CM

## 2022-02-10 PROBLEM — R92.30 DENSE BREAST TISSUE: Status: ACTIVE | Noted: 2022-02-10

## 2022-02-10 PROCEDURE — 99213 OFFICE O/P EST LOW 20 MIN: CPT | Performed by: SURGERY

## 2022-02-10 RX ORDER — CLONAZEPAM 0.5 MG/1
TABLET ORAL
Qty: 45 TABLET | Refills: 5 | Status: CANCELLED | OUTPATIENT
Start: 2022-02-10

## 2022-02-10 NOTE — PROGRESS NOTES
Surgical Oncology Follow Up       [address] [facility] [place]  [address] [facility] [address]  [facility] [address]    Li Patterson  1950  [id number]  [address] [facility] [place]  [address] [address]    Chief Complaint   Patient presents with    Follow-up     1 year f/u       Assessment/Plan   Diagnoses and all orders for this visit:    Encounter for follow-up surveillance of breast cancer    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    Abnormal finding on breast imaging    Dense breast tissue        Advance Care Planning/Advance Directives:  Discussed disease status, cancer treatment plans and/or cancer treatment goals with the patient  Oncology History:    Oncology History   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    Initial Diagnosis    Intraductal carcinoma in situ of left breast     8/16/2012 Surgery    Left breast mastectomy, SLNB  Left breast reconstruction with      4/7/2014 Surgery    Left breast implant replaced (X 3) right mastopexy  History of Present Illness:  Breast cancer follow-up, no breast referable concerns  -Interval History:  Recent contralateral imaging    Review of Systems:  Review of Systems   Constitutional: Negative  Negative for appetite change and fever  Eyes: Negative  Respiratory: Negative for shortness of breath  Cardiovascular: Negative  Gastrointestinal: Negative  Endocrine: Negative  Genitourinary: Negative  Musculoskeletal: Negative  Negative for arthralgias and myalgias  Skin: Negative  Allergic/Immunologic: Negative  Neurological: Positive for weakness (MS)  Hematological: Negative  Negative for adenopathy  Does not bruise/bleed easily  Psychiatric/Behavioral: Negative          Patient Active Problem List   Diagnosis    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    Multiple sclerosis ([facility] [address] )    Peripheral polyneuropathy    Depression    Fatigue    Gait disturbance    Hip pain, right    Headache    History of bilateral hip replacements    Hypokalemia    Injury of right leg    Insomnia    Laceration of finger of right hand    Solitary pulmonary nodule    Rash    Pain of left lower leg    Pain of left calf    Osteopenia    Nephrolithiasis    Muscle strain of left lower leg    Left knee pain    Left ankle pain    Urticaria    Tingling    Screening mammogram, encounter for    Encounter for breast reconstruction following mastectomy    Abnormal stool test    Encounter for follow-up surveillance of breast cancer    Essential hypertension    Abnormal finding on breast imaging    TSH (thyroid-stimulating hormone deficiency)    B12 deficiency    RLS (restless legs syndrome)    Dense breast tissue     Past Medical History:   Diagnosis Date    Allergic 1967    seasonal    Allergic rhinitis     Bone pain     Breast ptosis     Depression     Fatigue     Gait disturbance     uses cane at times    Headache     Hip fracture (HCC)     Hip pain     History of transfusion 1975    placenta previa s/p work accident, no rx    Hypokalemia     Insomnia     Laceration of finger     right hand    Left ankle pain     Left knee pain     Multiple sclerosis (HCC)     Muscle strain     left lower leg    Nephrolithiasis     Osteopenia     Osteoporosis     Pain of left calf     Pneumonia     Rash     Scoliosis birth    scoliosis    Solitary pulmonary nodule     SVT (supraventricular tachycardia) (HCC)     Tingling     Urgency of urination     Urticaria     Wrist fracture, left      Past Surgical History:   Procedure Laterality Date    ANKLE SURGERY      APPENDECTOMY  11/2012    Dr Susan Schuler      Enlargement procedure with prosthetic implant bilateral    AUGMENTATION MAMMAPLASTY Right 01/28/2020    implant replaced because of recall    AUGMENTATION MAMMAPLASTY Bilateral 2012    BREAST BIOPSY Left 07/23/2012    BREAST IMPLANT Right 1/28/2020    Procedure: BREAST IMPLANT EXCHANGE WITH CAPSULECTOMY;  Surgeon: Nilsa Cruz MD;  [facility];  Service: Plastics    BREAST IMPLANT REMOVAL Right 01/28/2020    implant placement, implant revision, right mastopexy    CYSTOSTOMY      with basket extraction of calculus; x2    EXCISION / BIOPSY BREAST / NIPPLE / DUCT Right 05/04/2020    scar revision to resuture non healing surgical wound    EXPLORATORY LAPAROTOMY      FOOT SURGERY      FRACTURE SURGERY  Lt wrist 9/2014 - Lt matthew femur 9/14    HIP FRACTURE SURGERY Right     Subcapital    HIP SURGERY Left     JOINT REPLACEMENT  Rt hip 10/2012    LEG SURGERY      Repair    MASTECTOMY Left 08/16/2012    MO REVISION OF RECONSTRUCTED BREAST Right 5/4/2020    Procedure: REVISION RIGHT BREAST MOUND;  Surgeon: Nilsa Cruz MD;  [facility];  Service: Plastics    MO SUSPENSION OF BREAST Right 1/28/2020    Procedure: BREAST MASTOPEXY;  Surgeon: Nilsa Cruz MD;  [facility];  Service: Plastics    REDUCTION MAMMAPLASTY Right 01/28/2020    reduced to match left side    SENTINEL LYMPH NODE BIOPSY Left 08/16/2012    SKIN BIOPSY      TONSILLECTOMY      TUBAL LIGATION      WRIST SURGERY Left      Family History   Problem Relation Age of Onset    Hodgkin's lymphoma Maternal Grandfather         age at dx unk    Cancer Maternal Aunt 79        bladder    Heart disease Maternal Aunt     Colon cancer Maternal Uncle 72    Hodgkin's lymphoma Maternal Uncle 79    Coronary artery disease Mother     Hyperlipidemia Mother    Fausto Abt Rheum arthritis Mother     Other Father         Acute myocardial infarction    No Known Problems Maternal Grandmother     No Known Problems Paternal Grandmother     No Known Problems Paternal Grandfather     No Known Problems Son     No Known Problems Son     Stroke Maternal Aunt         6 CVA before death    Breast cancer Maternal Aunt     No Known Problems Paternal Aunt     No Known Problems Paternal Aunt     No Known Problems Paternal Aunt      Social History     Socioeconomic History    Marital status: /Civil Union     Spouse name: Not on file    Number of children: 2    Years of education: Completed bachelor's degree    Highest education level: Not on file   Occupational History    Occupation: RN     Comment: Retired   Tobacco Use    Smoking status: Former Smoker     Packs/day: 1 00     Years: 35 00     Pack years: 35 00     Quit date:      Years since quittin 1    Smokeless tobacco: Never Used   Vaping Use    Vaping Use: Never used   Substance and Sexual Activity    Alcohol use: No    Drug use: No    Sexual activity: Yes     Partners: Male     Birth control/protection: Post-menopausal   Other Topics Concern    Not on file   Social History Narrative    Denied:  History of caffeine use     Social Determinants of Health     Financial Resource Strain: Not on file   Food Insecurity: Not on file   Transportation Needs: Not on file   Physical Activity: Not on file   Stress: Not on file   Social Connections: Not on file   Intimate Partner Violence: Not on file   Housing Stability: Not on file       Current Outpatient Medications:     ALPHA LIPOIC ACID PO, Take by mouth, Disp: , Rfl:     Ampyra 10 MG TB12, Take 1 tablet (10 mg total) by mouth 2 (two) times a day, Disp: 180 tablet, Rfl: 0    ascorbic acid (VITAMIN C) 500 mg tablet, Take 500 mg by mouth daily, Disp: , Rfl:     baclofen 10 mg tablet, 1 tab pos q am, 1 tab po q pm and 2 tabs hs, Disp: 360 tablet, Rfl: 3    Biotin 5000 MCG TABS, , Disp: , Rfl:     Cholecalciferol (VITAMIN D3) 1000 units CAPS, Take 5,000 Units by mouth  , Disp: , Rfl:     Copaxone 40 MG/ML SOSY, Inject 40mg under the skin three times weekly  , Disp: 36 mL, Rfl: 3    Cranberry 1000 MG CAPS, Take 300 mg by mouth  , Disp: , Rfl:     denosumab (PROLIA) 60 mg/mL, Inject under the skin, Disp: , Rfl:     gabapentin (NEURONTIN) 300 mg capsule, Take 1 capsule (300 mg total) by mouth daily at bedtime, Disp: 90 capsule, Rfl: 3    gabapentin (NEURONTIN) 600 MG tablet, 2 tabs po tid, Disp: 540 tablet, Rfl: 3    Magnesium 500 MG TABS, Take 500 tablets by mouth once, Disp: , Rfl:     solifenacin (VESICARE) 10 MG tablet, Take 1 tablet (10 mg total) by mouth daily, Disp: 90 tablet, Rfl: 3    zonisamide (ZONEGRAN) 100 mg capsule, Take 4 capsules (400 mg total) by mouth daily at bedtime, Disp: 360 capsule, Rfl: 3    docusate sodium (Colace) 100 mg capsule, Take 1 capsule by mouth Daily (Patient not taking: Reported on 1/21/2022 ), Disp: , Rfl:     lisinopril (ZESTRIL) 20 mg tablet, Take 1 tablet (20 mg total) by mouth daily (Patient taking differently: Take 10 mg by mouth daily  ), Disp: 90 tablet, Rfl: 1    RESTASIS 0 05 % ophthalmic emulsion, , Disp: , Rfl: 0    rotigotine (Neupro) 3 MG/24HR transdermal patch, Place 1 patch on the skin daily, Disp: 90 patch, Rfl: 3  No current facility-administered medications for this visit  Facility-Administered Medications Ordered in Other Visits:     bacitracin 50,000 Units, gentamicin (GARAMYCIN) 40 mg/mL 80 mg, ceFAZolin (ANCEF) 1,000 mg in sodium chloride 0 9 % 1,000 mL irrigation bottle, , Irrigation, Once, Mattie Velazquez MD  Allergies   Allergen Reactions    Duloxetine Hcl      Rapid Heart rate      Iodinated Diagnostic Agents Anaphylaxis    Acetaminophen Other (See Comments)     Heart palpitations    Cetirizine      Only occurs with generic, weakness in legs, off balance and couldn't walk      Morphine Other (See Comments)     Migraine       The following portions of the patient's history were reviewed and updated as appropriate: allergies, current medications, past family history, past medical history, past social history, past surgical history and problem list         Deborah Watkins:    02/10/22 1106   BP: 122/70   Pulse: 80   Resp: 16   Temp: (!) 96 9 °F (36 1 °C)   SpO2: 97%       Physical Exam  Constitutional:       General: She is not in acute distress  Appearance: She is well-developed  HENT:      Head: Normocephalic and atraumatic  Cardiovascular:      Heart sounds: Normal heart sounds  Pulmonary:      Breath sounds: Normal breath sounds  Chest:   Breasts:      Right: No inverted nipple, mass, nipple discharge, skin change, tenderness, axillary adenopathy or supraclavicular adenopathy  Left: Skin change (Mastectomy scar with implant) present  No mass, tenderness, axillary adenopathy or supraclavicular adenopathy  Abdominal:      Palpations: Abdomen is soft  Lymphadenopathy:      Upper Body:      Right upper body: No supraclavicular, axillary or pectoral adenopathy  Left upper body: No supraclavicular, axillary or pectoral adenopathy  Neurological:      Mental Status: She is alert and oriented to person, place, and time  Psychiatric:         Mood and Affect: Mood normal            Results:  Labs:      Imaging  12/10/2021 right 3D diagnostic mammogram and ultrasound show stable likely postoperative changes for which a follow-up diagnostic mammogram and ultrasound were recommended in one year, density is a three    I reviewed the above imaging data  Discussion/Summary:  69-year-old female status post left mastectomy and reconstruction for ductal carcinoma in Situ  There is no evidence of disease based on examination today  Her recent contralateral mammogram and ultrasound show likely postoperative changes  She is already scheduled for a follow-up mammogram and ultrasound for next year  I will plan to see her again in one year for an exam or sooner should the need arise

## 2022-02-15 ENCOUNTER — TELEPHONE (OUTPATIENT)
Dept: SLEEP CENTER | Facility: CLINIC | Age: 72
End: 2022-02-15

## 2022-02-15 ENCOUNTER — OFFICE VISIT (OUTPATIENT)
Dept: PHYSICAL THERAPY | Facility: CLINIC | Age: 72
End: 2022-02-15
Payer: MEDICARE

## 2022-02-15 DIAGNOSIS — R26.89 IMPAIRMENT OF BALANCE: ICD-10-CM

## 2022-02-15 DIAGNOSIS — R26.89 BALANCE PROBLEM: Primary | ICD-10-CM

## 2022-02-15 DIAGNOSIS — G35 MULTIPLE SCLEROSIS (HCC): ICD-10-CM

## 2022-02-15 PROCEDURE — 97530 THERAPEUTIC ACTIVITIES: CPT | Performed by: PHYSICAL THERAPIST

## 2022-02-15 PROCEDURE — 97112 NEUROMUSCULAR REEDUCATION: CPT | Performed by: PHYSICAL THERAPIST

## 2022-02-15 PROCEDURE — 97110 THERAPEUTIC EXERCISES: CPT | Performed by: PHYSICAL THERAPIST

## 2022-02-15 RX ORDER — CLONAZEPAM 0.5 MG/1
TABLET ORAL
Qty: 45 TABLET | Refills: 5 | Status: SHIPPED | OUTPATIENT
Start: 2022-02-15

## 2022-02-15 NOTE — PROGRESS NOTES
Daily Note     Today's date: 2/15/2022  Patient name: Li Patterson  : 1950  MRN: [id number]  Referring provider: Ricardo Bello,*  Dx:   Encounter Diagnosis     ICD-10-CM    1  Balance problem  R26 89    2  Impairment of balance  R26 89    3  Multiple sclerosis ([facility] [address] )  G35                   Subjective: Pt returns to PT after being sick for 2 weeks  She is feeling much better and ready to resume her exercise program  She denies much loss of function currently  Objective: See treatment diary below      Assessment: Resumed plan of care of maintenance PT  Pt required contact guard throughout treatment for safe performance of balance and proprioception exercises  Modified gait training during idris walks  Pt's LLE is not strong enough to start idris walks but can drag LLE after RLE completes task  Compensatory motion was needed for LLE advance  Pt frequently internally rotates left lower extremity to advance limb forward  Offered frequent rest breaks after functional activities  Tolerated treatment fair  Patient would benefit from continued PT      Plan: Continue per plan of care        Precautions: MS, Gait dysfunction, Insomnia, Osteoporosis, Peripheral polyneuropathy, Hx of Breast CA, R IDALIA in - Dr Rodolfo Elena, Left femur IM nailing      Manuals 2/15    12/27 1/4 1/6 1/11 1/18 1/24   Sciatic nerve glides         2x30    Neuro Re-Ed             VOR x 1 viewing in standing horizontal and vertical 1' each    horizontal and vertical 1' each horizontal and vertical 1' each horizontal and vertical 1' each horizontal and vertical 1' each  horizontal and vertical 1' each   Romberg foam Feet apart/feet together 1' each on BD    Feet apart/feet together 1' each on BD foam  Feet apart/feet together 1' each on BD foam  Feet apart/feet together 1' each on BD foam  Feet apart/feet together 1' each on BD foam  Feet apart/feet together 1' each on BD Feet apart/feet together 1' each on BD   Wobble board Fwd/bkwad 1' each c/ finger tip support     Fwd/bkwad 1' each c/ finger tip support Fwd/bkwad 1' each c/ finger tip support Fwd/bkwad 1' each c/ finger tip support Fwd/bkwad 1' each c/ finger tip support Fwd/bkwad 1' each c/ finger tip support   Ther Ex             Nu Step             Long arc quad 20x AROM    20x AROM 20x AROM 20x AROM 20x AROM 20x AROM 20x AROM   Hamstring curl  20x AROM    20x standing 20x standing 20x c/ modification 20x standing 30x standing 30x standing R and 19 on left    Hamstring stretch             Bicycle              Ther Activity             HR     20x   20x 20x 20x   Mini squats             6 MWT              Leeann walks 8x15 feet    8x15 feet 8x15 feet Unable to complete more than 2 [id number]   Lateral walks 8x15 feet on blue foam    8x15 feet on blue foam 8x15 feet on blue foam 8x15 feet on blue foam 8x15 feet on blue foam 8x15 feet on blue foam 8x15 feet on blue foam   Tandem walk on foam  8x15 feet on blue foam    8x15 feet on blue foam 8x15 feet on blue foam 8x15 feet on blue foam 8x15 feet on blue foam 8x15 feet on blue foam 8x15 feet on blue foam   Marching on foam  BD foam 30x on each    BD foam 30x on each BD foam 30x on each BD foam 30x on each BD foam 30x on each BD foam 30x on each BD foam 30x on each   Leg press       20x 20#       Gait Training             Focus on reducing IR during limb advancement and during stance                          Modalities                                       1:1 with PT from [id number]

## 2022-02-15 NOTE — TELEPHONE ENCOUNTER
----- Message from [place] sent at 2022  7:09 PM EST -----  Regarding: Klonopin   I have not heard anything since I sent the message regarding my dosage of Klonopin  I received a message from [facility] that it was too early to refill  I believe that I changed my pharmacy on my chart to [facility] [address] in [place]  [employer] changed their preferred pharmacies  Could you please let me know what I should do? Thanks so much     Sami Kraus    1950  [phone]

## 2022-02-18 ENCOUNTER — TELEPHONE (OUTPATIENT)
Dept: NEUROLOGY | Facility: CLINIC | Age: 72
End: 2022-02-18

## 2022-02-18 NOTE — TELEPHONE ENCOUNTER
Patient called to report she received an email from [facility] ([facility]) stating that her funding had been rescinded as there was no diagnosis listed on berhane application  Per chart review, diagnosis is listed on application  Called [employer] camacho/[facility]abel  Advised her that application does reflect diagnosis of multiple sclerosis (form in media tab)  [address] states the form they have does not include diagnosis information  Please re-fax form to [phone]  If funds are still available, financial assistance will be reinstated  [employer] should have a decision by Wednesday  Please have patient f/u with them then  Spoke w/patient  Advised her of above  Patient verbalized understanding and appreciation  She will f/u w/[employer] on Wednesday

## 2022-02-21 ENCOUNTER — OFFICE VISIT (OUTPATIENT)
Dept: PHYSICAL THERAPY | Facility: CLINIC | Age: 72
End: 2022-02-21
Payer: MEDICARE

## 2022-02-21 DIAGNOSIS — R26.89 BALANCE PROBLEM: Primary | ICD-10-CM

## 2022-02-21 DIAGNOSIS — G35 MULTIPLE SCLEROSIS (HCC): ICD-10-CM

## 2022-02-21 DIAGNOSIS — R26.89 IMPAIRMENT OF BALANCE: ICD-10-CM

## 2022-02-21 PROCEDURE — 97530 THERAPEUTIC ACTIVITIES: CPT | Performed by: PHYSICAL THERAPIST

## 2022-02-21 PROCEDURE — 97110 THERAPEUTIC EXERCISES: CPT | Performed by: PHYSICAL THERAPIST

## 2022-02-21 PROCEDURE — 97112 NEUROMUSCULAR REEDUCATION: CPT | Performed by: PHYSICAL THERAPIST

## 2022-02-21 NOTE — PROGRESS NOTES
Daily Note     Today's date: 2022  Patient name: Pedro Pablo Kramer  : 1950  MRN: [id number]  Referring provider: Johnny Jones,*  Dx:   Encounter Diagnosis     ICD-10-CM    1  Balance problem  R26 89    2  Impairment of balance  R26 89    3  Multiple sclerosis ([facility] [address] )  G35                   Subjective: Pt reports "throwing out her back" over the weekend cooking in her kitchen  She reports feeling better since the incident  Most pain located laterally over quadratus muscle, not centrally located  No tenderness present to spinous process and pain is not radiating  Objective: See treatment diary below      Assessment: Pt still struggling to clear left foot over hurdles due to fatigue and weakness  This is likely due to her hamstring weakness  Her sit to stand transfers are more hesitant and non-fluid today due to lower back pain  Tolerated treatment fair  Patient would benefit from continued PT  Plan: Continue per plan of care        Precautions: MS, Gait dysfunction, Insomnia, Osteoporosis, Peripheral polyneuropathy, Hx of Breast CA, R IDALIA in - Dr Mcdermott Friends, Left femur IM nailing      Manuals 2/15 2/21   12/27 1/4 1/6 1/11 1/18 1/24   Sciatic nerve glides         2x30    Neuro Re-Ed             VOR x 1 viewing in standing horizontal and vertical 1' each horizontal and vertical 1' each   horizontal and vertical 1' each horizontal and vertical 1' each horizontal and vertical 1' each horizontal and vertical 1' each  horizontal and vertical 1' each   Romberg foam Feet apart/feet together 1' each on BD Feet apart/feet together 1' each on BD   Feet apart/feet together 1' each on BD foam  Feet apart/feet together 1' each on BD foam  Feet apart/feet together 1' each on BD foam  Feet apart/feet together 1' each on BD foam  Feet apart/feet together 1' each on BD Feet apart/feet together 1' each on BD   Wobble board Fwd/bkwad 1' each c/ finger tip support Fwd/bkwad 1' each c/ finger tip support Fwd/bkwad 1' each c/ finger tip support Fwd/bkwad 1' each c/ finger tip support Fwd/bkwad 1' each c/ finger tip support Fwd/bkwad 1' each c/ finger tip support Fwd/bkwad 1' each c/ finger tip support   Ther Ex             Nu Step             Long arc quad 20x AROM 20x AROM   20x AROM 20x AROM 20x AROM 20x AROM 20x AROM 20x AROM   Hamstring curl  20x AROM 20x AROM   20x standing 20x standing 20x c/ modification 20x standing 30x standing 30x standing R and 19 on left    Hamstring stretch             Bicycle              Ther Activity             HR     20x   20x 20x 20x   Mini squats             6 MWT              Leeann walks 8x15 feet 8x15 feet   8x15 feet 8x15 feet Unable to complete more than 2 [id number]   Lateral walks 8x15 feet on blue foam 8x15 feet on blue foam   8x15 feet on blue foam 8x15 feet on blue foam 8x15 feet on blue foam 8x15 feet on blue foam 8x15 feet on blue foam 8x15 feet on blue foam   Tandem walk on foam  8x15 feet on blue foam 8x15 feet on blue foam   8x15 feet on blue foam 8x15 feet on blue foam 8x15 feet on blue foam 8x15 feet on blue foam 8x15 feet on blue foam 8x15 feet on blue foam   Marching on foam  BD foam 30x on each BD foam 30x on each   BD foam 30x on each BD foam 30x on each BD foam 30x on each BD foam 30x on each BD foam 30x on each BD foam 30x on each   Leg press       20x 20#       Gait Training             Focus on reducing IR during limb advancement and during stance                          Modalities                                       1:1 with PT from 1015-11a

## 2022-02-27 NOTE — PROGRESS NOTES
[id number]    Today's date: 2022  Patient name: Swetha Brito  : 1950  MRN: [id number]  Referring provider: Maximilian Mclaughlin  Dx:   Encounter Diagnosis     ICD-10-CM    1  Impairment of balance  R26 89    2  Balance problem  R26 89    3  Multiple sclerosis ([facility] [address] )  G35        Start Time: 1015  Stop Time: 6659  Total time in clinic (min): 38 minutes    Assessment  Assessment details:    May Nielsen has been attending skilled maintenance therapy focusing on balance, LE strength, endurance, fall prevention, and function  She has only attended PT 3 times over the past 6 weeks secondary to illness  Patient has experienced intermittent and transient functional improvements over the course of care  She reports having a good day today for testing  She still has not fallen since last re-evaluation  All objective measures and functional tests re-assessed today  No significant changes with 6 minute walk test  Pt was able to complete 640 feet in 6 minutes compared to 650 last re-evaluation  However, patient did demonstrate improved Timed up and go tests 5x sit to stand, and hip strength compared to last re-evaluation  Due to the complexity of this patient, including MS, Osteoporosis, previous IDALIA and ORIF, LE weakness, deconditioning, ambulatory dysfunction, and bilateral polyneuropathy, patient would benefit from continuation of maintenance physical therapy to slow down or prevent the deterioration of MS  This patient will require a threapists skill to carry out these interventions because of the following conditions: fluctuating fatigue and deconditioning, LE pain from polyneuropathy, and balance/gait abnormalities which increase the risk of falls  Pt does require skilled cueing and equipment in order to properly complete her dynamic balance and gait program to prevent falls and further decline function   Without the skills of the PT in providing these necessary services the patient would be at increased risk of falls which can result in pathologic fracture (due to osteoporosis), deconditioning, and decline in functional status  Impairments: abnormal coordination, abnormal gait, abnormal or restricted ROM, abnormal movement, activity intolerance, impaired balance, impaired physical strength, lacks appropriate home exercise program and pain with function  Barriers to therapy: MS, complex history of surgery in bilateral hips, deconditioning, Osteoporosis, Fall risk, Peripheral neuropathy  Understanding of Dx/Px/POC: good   Prognosis: good    Goals  Short Term Goals: to be achieved by 4 week  1) Maintain current LE strength in all planes-Continue    Long Term Goals: to be achieved by discharge  1) Maintain static and dynamic balance to reduce the risk of falls  - Continue   2) Maintain 6 minute walk test at 560 feet with SPC-Continue  3) Maintain TUG at 17 seconds with SPC- Continue  4) Maintain 5x sit to stand at 18 seconds with BUE support-Continue    Plan  Patient would benefit from: PT eval and skilled physical therapy  Planned therapy interventions: joint mobilization, manual therapy, neuromuscular re-education, patient education, therapeutic activities, therapeutic exercise and home exercise program  Frequency: 2x per week for 6 weeks  Treatment plan discussed with: patient        Subjective Evaluation    History of Present Illness  Mechanism of injury: History of Current Injury: Pt referred to PT secondary to right hip pain  PMH significant for right total hip replacement in 2012 after ORIF  PT also has IM nailing in left femur  Pt saw Dr Brandie ramirez who XR the left hip and and prescribed PT  Pt notes gradual onset of pain in the right hip which is worsening  No trauma, fall, or inciting event  Pt has gained weight over this time period due to new medication side effects over the last 6 months   Pt feels the right hip is very stiff and feels like her legs are "concrete " Pt notes worsening balance secondary to hip issue  Pain location/Descriptors: right lateral hip pain which feel stiff and heavy  Symptoms can radiate from posterolateral hip to mid thigh  Aggravating factors: walking (in the grocery store), squatting, prolonging sitting or standing   Eases: Naprosyn (once a day)   24 HR pattern: Worse after 5 PM  Imaging: XRs of R hip demonstrate stable prosthesis according to last physician note  Special Questions: Pt notes constant numbness/tingling from the MS  Patient goals:  Improve ambulation without assisted device, Improve motion in right hip, relieve pain in right hip       Occupation: Retired nurse     Pain  Current pain ratin  At worst pain ratin          Objective     Neurological Testing     Sensation     Lumbar   Left   Diminished: light touch    Right   Intact: light touch    Comments   Left light touch: L5 distally at ankle    Reflexes   Left   Patellar (L4): trace (1+)  Achilles (S1): trace (1+)  Clonus sign: negative    Right   Patellar (L4): trace (1+)  Achilles (S1): trace (1+)  Clonus sign: negative    Active Range of Motion -NOT tested     Lumbar   Flexion:  WFL  Extension:  WFL  Left lateral flexion:  WFL  Right lateral flexion:  WFL  Left rotation:  L  Right rotation:  [facility]    Additional Active Range of Motion Details  *Right posterolateral hip pain with left side bending -WNL 9/2  *Quadrant was normal bilaterally - WNL 9/2    Passive Range of Motion - Not tested   Left Hip   Flexion: 115 degrees   External rotation (90/90): 50 degrees   Internal rotation (90/90): 50 degrees     Right Hip   Flexion: 110 degrees   External rotation (90/90): 45 degrees   Internal rotation (90/90): 50 degrees     Strength/Myotome Testing     Left Hip   Planes of Motion   Flexion: 3 (Supine) 12 in raise  Abduction: 3 (SL) 6 in raise  Adduction: 3+ (Seated)  External rotation: 3+ (SL)    Right Hip   Planes of Motion   Flexion: 3 (Supine) Full SLR c/ Lag   Abduction: 3+ (SL)  Adduction: 3+ (Seated)  External rotation: 3 (SL)    Left Knee   Flexion: 4-  Extension: 4-    Right Knee   Flexion: 4  Extension: 4    Left Ankle/Foot   Dorsiflexion: 3+  Plantar flexion: 4    Right Ankle/Foot   Dorsiflexion: 4  Plantar flexion: 4-    Tests     Right Hip   Positive ADINA  Negative FADIR and scour  SLR: Negative  Additional Tests Details  *SLR: 60 degrees R (hamstring restriction), 50 degrees L (hamstring stretch)   *Pt is able to perform a SLR    Ambulation     Observational Gait   Gait: antalgic, asymmetric and circumduction   Decreased walking speed and stride length  Additional Observational Gait Details  Gait: internally rotated femur on the right causing excessive toeing in during stance phase  Gait is antalgic, slow sylvia, and small step length  - 7/23: Improved motion and control with reduced internal rotation of femur during ambulation  Functional Assessment        Comments  Timed up and go: 20 seconds with SPC and hand support  10/22: 17 seconds c/ SPC and hand support   11/24: 16 seconds c/ SPC and hand support   12/30: 16 seconds c/ SPC and hand support   02/28: 14 seconds without hand; hand support during transfers  5 x sit to stand: 24 seconds, moderate difficulty, UE support every rep, bilateral genu valgum, and 2x lack of control on decent  10/22: 18 seconds, bilateral use of UE, bilateral genu valgum  11/24: 17 seconds, bilateral use of UE, bilateral genu valgum  12/30: 16 seconds, bilateral use of UE, bilateral genu valgum  02/28: 14 seconds, 1 rep both hands, 1 rep no hands, 3 reps one hand, bilateral genu valgum  6 minute walk test:   10/22: 540 feet c/ SPC and 1 standing rest break in 6 minutes      11/27: 580 feet c/ SPC and no rest in 6 minutes   12/30: 650 feet c/ SPC and no rest in 6 minutes  02/28: 640 feet c/ SPC and no rest in 6 minutes          Precautions: MS, Gait dysfunction, Insomnia, Osteoporosis, Peripheral polyneuropathy, Hx of Breast CA, R IDALIA in 2012- Dr Wilton Mathias, Left femur IM nailing        Manuals 2/15 2/21 2/28     1/11 1/18 1/24   Sciatic nerve glides         2x30    Neuro Re-Ed             VOR x 1 viewing in standing horizontal and vertical 1' each horizontal and vertical 1' each      horizontal and vertical 1' each  horizontal and vertical 1' each   Romberg foam Feet apart/feet together 1' each on BD Feet apart/feet together 1' each on BD      Feet apart/feet together 1' each on BD foam  Feet apart/feet together 1' each on BD Feet apart/feet together 1' each on BD   Wobble board Fwd/bkwad 1' each c/ finger tip support Fwd/bkwad 1' each c/ finger tip support      Fwd/bkwad 1' each c/ finger tip support Fwd/bkwad 1' each c/ finger tip support Fwd/bkwad 1' each c/ finger tip support   Ther Ex             Nu Step             Long arc quad 20x AROM 20x AROM      20x AROM 20x AROM 20x AROM   Hamstring curl  20x AROM 20x AROM      20x standing 30x standing 30x standing R and 19 on left    Hamstring stretch             Bicycle              Ther Activity             HR        20x 20x 20x   Mini squats             6 MWT              Leeann walks 8x15 feet 8x15 feet      [id number]   Lateral walks 8x15 feet on blue foam 8x15 feet on blue foam      8x15 feet on blue foam 8x15 feet on blue foam 8x15 feet on blue foam   Tandem walk on foam  8x15 feet on blue foam 8x15 feet on blue foam      8x15 feet on blue foam 8x15 feet on blue foam 8x15 feet on blue foam   Marching on foam  BD foam 30x on each BD foam 30x on each      BD foam 30x on each BD foam 30x on each BD foam 30x on each   Leg press              Gait Training             Focus on reducing IR during limb advancement and during stance                          Modalities                                       1:1 with PT from [id number]

## 2022-02-28 ENCOUNTER — EVALUATION (OUTPATIENT)
Dept: PHYSICAL THERAPY | Facility: CLINIC | Age: 72
End: 2022-02-28
Payer: MEDICARE

## 2022-02-28 ENCOUNTER — OFFICE VISIT (OUTPATIENT)
Dept: PLASTIC SURGERY | Facility: CLINIC | Age: 72
End: 2022-02-28
Payer: MEDICARE

## 2022-02-28 DIAGNOSIS — G35 MULTIPLE SCLEROSIS (HCC): ICD-10-CM

## 2022-02-28 DIAGNOSIS — Z98.890 STATUS POST RIGHT BREAST RECONSTRUCTION: Primary | ICD-10-CM

## 2022-02-28 DIAGNOSIS — R26.89 IMPAIRMENT OF BALANCE: Primary | ICD-10-CM

## 2022-02-28 DIAGNOSIS — R26.89 BALANCE PROBLEM: ICD-10-CM

## 2022-02-28 PROCEDURE — 97110 THERAPEUTIC EXERCISES: CPT | Performed by: PHYSICAL THERAPIST

## 2022-02-28 PROCEDURE — 99214 OFFICE O/P EST MOD 30 MIN: CPT | Performed by: SURGERY

## 2022-02-28 PROCEDURE — 97112 NEUROMUSCULAR REEDUCATION: CPT | Performed by: PHYSICAL THERAPIST

## 2022-02-28 PROCEDURE — 97530 THERAPEUTIC ACTIVITIES: CPT | Performed by: PHYSICAL THERAPIST

## 2022-02-28 NOTE — PROGRESS NOTES
Assessment/Plan:  Please see HPI  She remains pleased with the results of the recent breast modifications, and is happy with the symmetry  She had seen Dr Tammy Shaffer earlier this month, since that time she has noticed a palpable nodule deep to the right nipple-areolar complex, stable in size and nontender  I have asked her to bring this to Dr Milan Cody attention, as she would be the 1 to determine management  Regarding breast implant exchange, mastopexy and mound revision, she is quite pleased with the results  We will continue to see her on an annual basis, sooner treva Brooks There are no diagnoses linked to this encounter  Subjective: Follow-up visit     Patient ID: Brooklyn Marmolejo is a 66 y o  female  HPI Jefersonerlin Ema presents for routine follow-up visit, briefly she has undergone right breast implant exchange, mastopexy and mound revision for symmetry purposes  She is happy with the results  The following portions of the patient's history were reviewed and updated as appropriate: allergies, current medications, past family history, past medical history, past social history, past surgical history and problem list     Review of Systems   Constitutional: Negative for chills and fever  HENT: Negative for hearing loss  Eyes: Negative for discharge and visual disturbance  Respiratory: Negative for chest tightness and shortness of breath  Cardiovascular: Negative for chest pain and leg swelling  Gastrointestinal: Negative for blood in stool, constipation, diarrhea and nausea  Genitourinary: Negative for dysuria  Musculoskeletal: Positive for gait problem  Ambulates with walker   Skin: Negative for rash  Allergic/Immunologic: Negative for immunocompromised state  Neurological: Negative for seizures and headaches  Hematological: Does not bruise/bleed easily  Psychiatric/Behavioral: Negative for dysphoric mood  The patient is not nervous/anxious  Objective:       There were no vitals taken for this visit  Physical Exam  Constitutional:       Appearance: Normal appearance  HENT:      Head: Normocephalic and atraumatic  Eyes:      Extraocular Movements: Extraocular movements intact  Pupils: Pupils are equal, round, and reactive to light  Cardiovascular:      Rate and Rhythm: Normal rate  Pulmonary:      Effort: Pulmonary effort is normal    Abdominal:      Palpations: Abdomen is soft  Musculoskeletal:         General: Normal range of motion  Cervical back: Normal range of motion and neck supple  Skin:     General: Skin is warm  Comments: Breast examination reveals good breast symmetry, both breast implants are in good position, the breasts are soft and supple, minimal contracture on the left, essentially with out signs of capsular contracture on the right, see photos   Neurological:      Mental Status: She is alert and oriented to person, place, and time     Psychiatric:         Mood and Affect: Mood normal

## 2022-03-01 NOTE — PROGRESS NOTES
3/3/2022    Pedro Pablo Kramer  1950  [id number]        Assessment  -Nephrolithiasis   -Urinary urgency    Discussion/Plan  Edie Marquez is a 66 y o  female being managed by our office    1  Urinary urgency- urine dip in the office today appears negative for infection or blood  PVR assessment is 25 mL  She is doing well without any urinary complaints  Patient will remain on VESIcare 10 mg daily  Prescription refill was electronically sent to her pharmacy  2  Nephrolithiasis- patient remains asymptomatic  Her last KUB performed in 2021 identified punctate nonobstructing left renal calculi  Reviewed dietary recommendations  She would like to obtain follow-up imaging on as-needed basis     Return to the office in 1 year for re-evaluation and PVR assessment  She was instructed to call sooner with any issues     -All questions answered, patients agree with plan     History of Present Illness  66 y o  female with a history of urinary urgency and nephrolithiasis presents today for follow up  Patient last seen in the office in January 2021  She remains on VESIcare 10mg daily  Patient denies any lower urinary tract symptoms, gross hematuria, or dysuria  Last KUB identified punctate, nonobstructing, left renal calculi  She denies any recent stone episodes or flank pain  Patient states her multiple sclerosis symptoms have been progressing  She now ambulates with a cane and is attending physical therapy  Review of Systems  Review of Systems   Constitutional: Negative  HENT: Negative  Respiratory: Negative  Cardiovascular: Negative  Gastrointestinal: Negative  Genitourinary: Negative for decreased urine volume, difficulty urinating, dysuria, flank pain, frequency, hematuria and urgency  Musculoskeletal: Negative  Skin: Negative  Neurological: Negative  Psychiatric/Behavioral: Negative          Past Medical History  Past Medical History:   Diagnosis Date    Allergic 1967    seasonal    Allergic rhinitis     Bone pain     Breast ptosis     Depression     Fatigue     Gait disturbance     uses cane at times    Headache     Hip fracture (HCC)     Hip pain     History of transfusion 1975    placenta previa s/p work accident, no rx    Hypokalemia     Insomnia     Laceration of finger     right hand    Left ankle pain     Left knee pain     Multiple sclerosis ([address] )     Muscle strain     left lower leg    Nephrolithiasis     Osteopenia     Osteoporosis     Pain of left calf     Pneumonia     Rash     Scoliosis birth    scoliosis    Solitary pulmonary nodule     SVT (supraventricular tachycardia) (HCC)     Tingling     Urgency of urination     Urticaria     Wrist fracture, left        Past Social History  Past Surgical History:   Procedure Laterality Date    ANKLE SURGERY      APPENDECTOMY  11/2012    Dr Baker       Enlargement procedure with prosthetic implant bilateral    AUGMENTATION MAMMAPLASTY Right 01/28/2020    implant replaced because of recall    AUGMENTATION MAMMAPLASTY Bilateral 2012    BREAST BIOPSY Left 07/23/2012    BREAST IMPLANT Right 1/28/2020    Procedure: BREAST IMPLANT EXCHANGE WITH CAPSULECTOMY;  Surgeon: Nyla Thomas MD;  [facility];  Service: Plastics    BREAST IMPLANT REMOVAL Right 01/28/2020    implant placement, implant revision, right mastopexy    CYSTOSTOMY      with basket extraction of calculus; x2    EXCISION / BIOPSY BREAST / NIPPLE / DUCT Right 05/04/2020    scar revision to resuture non healing surgical wound    EXPLORATORY LAPAROTOMY      FOOT SURGERY      FRACTURE SURGERY  Lt wrist 9/2014 - Lt matthew femur 9/14    HIP FRACTURE SURGERY Right     Subcapital    HIP SURGERY Left     JOINT REPLACEMENT  Rt hip 10/2012    LEG SURGERY      Repair    MASTECTOMY Left 08/16/2012    OK REVISION OF RECONSTRUCTED BREAST Right 5/4/2020    Procedure: REVISION RIGHT BREAST MOUND;  Surgeon: Schuyler Jesus Karel Marie MD;  [facility];  Service: Plastics    CT SUSPENSION OF BREAST Right 2020    Procedure: BREAST MASTOPEXY;  Surgeon: Sarah Koenig MD;  [facility];  Service: Plastics    REDUCTION MAMMAPLASTY Right 2020    reduced to match left side    SENTINEL LYMPH NODE BIOPSY Left 2012    SKIN BIOPSY      TONSILLECTOMY      TUBAL LIGATION      WRIST SURGERY Left        Past Family History  Family History   Problem Relation Age of Onset    Hodgkin's lymphoma Maternal Grandfather         age at dx unk    Cancer Maternal Aunt 79        bladder    Heart disease Maternal Aunt     Colon cancer Maternal Uncle 72    Hodgkin's lymphoma Maternal Uncle 79    Coronary artery disease Mother     Hyperlipidemia Mother    [facility] Rheum arthritis Mother     Other Father         Acute myocardial infarction    No Known Problems Maternal Grandmother     No Known Problems Paternal Grandmother     No Known Problems Paternal Grandfather     No Known Problems Son     No Known Problems Son     Stroke Maternal Aunt         6 CVA before death    Breast cancer Maternal Aunt     No Known Problems Paternal Aunt     No Known Problems Paternal Aunt     No Known Problems Paternal Aunt        Past Social history  Social History     Socioeconomic History    Marital status: /Civil Union     Spouse name: Not on file    Number of children: 2    Years of education: Completed bachelor's degree    Highest education level: Not on file   Occupational History    Occupation: RN     Comment: Retired   Tobacco Use    Smoking status: Former Smoker     Packs/day: 1 00     Years: 35 00     Pack years: 35 00     Quit date:      Years since quittin     Smokeless tobacco: Never Used   Vaping Use    Vaping Use: Never used   Substance and Sexual Activity    Alcohol use: No    Drug use: No    Sexual activity: Yes     Partners: Male     Birth control/protection: Post-menopausal   Other Topics Concern    Not on file   Social History Narrative    Denied:  History of caffeine use     Social Determinants of Health     Financial Resource Strain: Not on file   Food Insecurity: Not on file   Transportation Needs: Not on file   Physical Activity: Not on file   Stress: Not on file   Social Connections: Not on file   Intimate Partner Violence: Not on file   Housing Stability: Not on file       Current Medications  Current Outpatient Medications   Medication Sig Dispense Refill    ALPHA LIPOIC ACID PO Take by mouth      Ampyra 10 MG TB12 Take 1 tablet (10 mg total) by mouth 2 (two) times a day 180 tablet 0    ascorbic acid (VITAMIN C) 500 mg tablet Take 500 mg by mouth daily      baclofen 10 mg tablet 1 tab pos q am, 1 tab po q pm and 2 tabs hs 360 tablet 3    Biotin 5000 MCG TABS       Cholecalciferol (VITAMIN D3) 1000 units CAPS Take 5,000 Units by mouth        clonazePAM (KlonoPIN) 0 5 mg tablet 1 5 tablets qhs 45 tablet 5    Copaxone 40 MG/ML SOSY Inject 40mg under the skin three times weekly   36 mL 3    Cranberry 1000 MG CAPS Take 300 mg by mouth        denosumab (PROLIA) 60 mg/mL Inject under the skin      docusate sodium (Colace) 100 mg capsule Take 1 capsule by mouth Daily (Patient not taking: Reported on 1/21/2022 )      gabapentin (NEURONTIN) 300 mg capsule Take 1 capsule (300 mg total) by mouth daily at bedtime 90 capsule 3    gabapentin (NEURONTIN) 600 MG tablet 2 tabs po tid 540 tablet 3    lisinopril (ZESTRIL) 20 mg tablet Take 1 tablet (20 mg total) by mouth daily (Patient taking differently: Take 10 mg by mouth daily  ) 90 tablet 1    Magnesium 500 MG TABS Take 500 tablets by mouth once      RESTASIS 0 05 % ophthalmic emulsion   0    rotigotine (Neupro) 3 MG/24HR transdermal patch Place 1 patch on the skin daily 90 patch 3    solifenacin (VESICARE) 10 MG tablet Take 1 tablet (10 mg total) by mouth daily 90 tablet 3    zonisamide (ZONEGRAN) 100 mg capsule Take 4 capsules (400 mg total) by mouth daily at bedtime 360 capsule 3     No current facility-administered medications for this visit  Facility-Administered Medications Ordered in Other Visits   Medication Dose Route Frequency Provider Last Rate Last Admin    bacitracin 50,000 Units, gentamicin (GARAMYCIN) 40 mg/mL 80 mg, ceFAZolin (ANCEF) 1,000 mg in sodium chloride 0 9 % 1,000 mL irrigation bottle   Irrigation Once Josias Guo MD           Allergies  Allergies   Allergen Reactions    Duloxetine Hcl      Rapid Heart rate      Iodinated Diagnostic Agents Anaphylaxis    Acetaminophen Other (See Comments)     Heart palpitations    Cetirizine      Only occurs with generic, weakness in legs, off balance and couldn't walk   Morphine Other (See Comments)     Migraine       Past medical history, social history, family history, medications and allergies were reviewed  Vitals  There were no vitals filed for this visit  Physical Exam  Physical Exam  Constitutional:       Appearance: Normal appearance  She is well-developed  HENT:      Head: Normocephalic  Eyes:      Pupils: Pupils are equal, round, and reactive to light  Pulmonary:      Effort: Pulmonary effort is normal    Abdominal:      Palpations: Abdomen is soft  Tenderness: There is no right CVA tenderness or left CVA tenderness  Musculoskeletal:         General: Normal range of motion  Cervical back: Normal range of motion  Skin:     General: Skin is warm and dry  Neurological:      General: No focal deficit present  Mental Status: She is alert and oriented to person, place, and time  Psychiatric:         Mood and Affect: Mood normal          Behavior: Behavior normal          Thought Content:  Thought content normal          Judgment: Judgment normal          Results    I have personally reviewed all pertinent lab results and reviewed with patient  Lab Results   Component Value Date    GLUCOSE 95 01/08/2015    CALCIUM 9 2 11/03/2021     04/04/2017    K 4 2 11/03/2021    CO2 24 11/03/2021     (H) 11/03/2021    BUN 20 11/03/2021    CREATININE 0 86 11/03/2021     Lab Results   Component Value Date    WBC 5 78 10/28/2021    HGB 12 7 10/28/2021    HCT 39 7 10/28/2021    MCV 98 10/28/2021     10/28/2021     No results found for this or any previous visit (from the past 1 hour(s))

## 2022-03-03 ENCOUNTER — OFFICE VISIT (OUTPATIENT)
Dept: UROLOGY | Facility: AMBULATORY SURGERY CENTER | Age: 72
End: 2022-03-03
Payer: MEDICARE

## 2022-03-03 VITALS
BODY MASS INDEX: 23.39 KG/M2 | DIASTOLIC BLOOD PRESSURE: 72 MMHG | HEIGHT: 64 IN | HEART RATE: 80 BPM | WEIGHT: 137 LBS | SYSTOLIC BLOOD PRESSURE: 122 MMHG

## 2022-03-03 DIAGNOSIS — R39.15 URGENCY OF URINATION: ICD-10-CM

## 2022-03-03 DIAGNOSIS — N20.0 NEPHROLITHIASIS: Primary | ICD-10-CM

## 2022-03-03 DIAGNOSIS — R39.15 URINARY URGENCY: ICD-10-CM

## 2022-03-03 LAB
POST-VOID RESIDUAL VOLUME, ML POC: 25 ML
SL AMB  POCT GLUCOSE, UA: NORMAL
SL AMB LEUKOCYTE ESTERASE,UA: NORMAL
SL AMB POCT BILIRUBIN,UA: NORMAL
SL AMB POCT BLOOD,UA: NORMAL
SL AMB POCT CLARITY,UA: CLEAR
SL AMB POCT COLOR,UA: YELLOW
SL AMB POCT KETONES,UA: NORMAL
SL AMB POCT NITRITE,UA: NORMAL
SL AMB POCT PH,UA: 7.5
SL AMB POCT SPECIFIC GRAVITY,UA: 1.01
SL AMB POCT URINE PROTEIN: NORMAL
SL AMB POCT UROBILINOGEN: 0.2

## 2022-03-03 PROCEDURE — 99213 OFFICE O/P EST LOW 20 MIN: CPT | Performed by: NURSE PRACTITIONER

## 2022-03-03 PROCEDURE — 81002 URINALYSIS NONAUTO W/O SCOPE: CPT | Performed by: NURSE PRACTITIONER

## 2022-03-03 PROCEDURE — 51798 US URINE CAPACITY MEASURE: CPT | Performed by: NURSE PRACTITIONER

## 2022-03-03 RX ORDER — SOLIFENACIN SUCCINATE 10 MG/1
10 TABLET, FILM COATED ORAL DAILY
Qty: 90 TABLET | Refills: 3 | Status: SHIPPED | OUTPATIENT
Start: 2022-03-03

## 2022-03-03 RX ORDER — AMOXICILLIN 500 MG/1
CAPSULE ORAL
COMMUNITY
Start: 2021-12-15 | End: 2022-06-30

## 2022-03-03 RX ORDER — LISINOPRIL 10 MG/1
TABLET ORAL
COMMUNITY
Start: 2020-11-20 | End: 2022-03-07 | Stop reason: SDUPTHER

## 2022-03-04 ENCOUNTER — PATIENT MESSAGE (OUTPATIENT)
Dept: NEUROLOGY | Facility: CLINIC | Age: 72
End: 2022-03-04

## 2022-03-04 ENCOUNTER — TELEPHONE (OUTPATIENT)
Dept: SURGICAL ONCOLOGY | Facility: CLINIC | Age: 72
End: 2022-03-04

## 2022-03-04 NOTE — TELEPHONE ENCOUNTER
Received a message from [place] with concerns about a new right breast lump at her nipple  She is experiencing pain at site  She had seen Dr Bianca Peterson and he directed her to have Dr [employer] check the area  Appt given for 03/09/22 at 1:45 PM  She was appreciative

## 2022-03-05 ENCOUNTER — PATIENT MESSAGE (OUTPATIENT)
Dept: FAMILY MEDICINE CLINIC | Facility: OTHER | Age: 72
End: 2022-03-05

## 2022-03-05 DIAGNOSIS — I10 HYPERTENSION, UNSPECIFIED TYPE: Primary | ICD-10-CM

## 2022-03-07 ENCOUNTER — OFFICE VISIT (OUTPATIENT)
Dept: PHYSICAL THERAPY | Facility: CLINIC | Age: 72
End: 2022-03-07
Payer: MEDICARE

## 2022-03-07 DIAGNOSIS — G35 MULTIPLE SCLEROSIS (HCC): ICD-10-CM

## 2022-03-07 DIAGNOSIS — R26.9 GAIT DISTURBANCE: ICD-10-CM

## 2022-03-07 DIAGNOSIS — R26.89 IMPAIRMENT OF BALANCE: Primary | ICD-10-CM

## 2022-03-07 DIAGNOSIS — R26.89 BALANCE PROBLEM: ICD-10-CM

## 2022-03-07 PROCEDURE — 97112 NEUROMUSCULAR REEDUCATION: CPT | Performed by: PHYSICAL THERAPIST

## 2022-03-07 PROCEDURE — 97530 THERAPEUTIC ACTIVITIES: CPT | Performed by: PHYSICAL THERAPIST

## 2022-03-07 PROCEDURE — 97110 THERAPEUTIC EXERCISES: CPT | Performed by: PHYSICAL THERAPIST

## 2022-03-07 RX ORDER — DALFAMPRIDINE 10 MG/1
TABLET, FILM COATED, EXTENDED RELEASE ORAL
Qty: 180 TABLET | Refills: 0 | Status: SHIPPED | OUTPATIENT
Start: 2022-03-07 | End: 2022-06-06 | Stop reason: SDUPTHER

## 2022-03-07 RX ORDER — LISINOPRIL 10 MG/1
10 TABLET ORAL DAILY
Qty: 90 TABLET | Refills: 1 | Status: SHIPPED | OUTPATIENT
Start: 2022-03-07 | End: 2022-05-09

## 2022-03-07 NOTE — PROGRESS NOTES
Daily Note     Today's date: 3/7/2022  Patient name: Bobbi Saleh  : 1950  MRN: [id number]  Referring provider: Camelia Rodriguez  Dx:   Encounter Diagnosis     ICD-10-CM    1  Impairment of balance  R26 89    2  Balance problem  R26 89    3  Multiple sclerosis ([address] )  G35        Start Time: 1008  Stop Time: 1050  Total time in clinic (min): 42 minutes    Subjective: Pt report more weakness in right lower extremity  Pt see oncology specialist on this Wednesday as she noticed a palpable lump her in breast last week  Objective: See treatment diary below      Assessment: Continued balance and endurance training  RLE demonstrates mild weakness but patient still able to clear hurdles and perform LE strengthening program without modification today  Tolerated treatment well  Patient would benefit from continued PT  Plan: Continue per plan of care        Precautions: MS, Gait dysfunction, Insomnia, Osteoporosis, Peripheral polyneuropathy, Hx of Breast CA, R IDALIA in - Dr Elizabet Robertson, Left femur IM nailing        Manuals 2/15 2/21 2/28 3/7    1/11 1/18 1/24   Sciatic nerve glides         2x30    Neuro Re-Ed             VOR x 1 viewing in standing horizontal and vertical 1' each horizontal and vertical 1' each  horizontal and vertical 1' each    horizontal and vertical 1' each  horizontal and vertical 1' each   Romberg foam Feet apart/feet together 1' each on BD Feet apart/feet together 1' each on BD  Feet apart/feet together 1' each on BD    Feet apart/feet together 1' each on BD foam  Feet apart/feet together 1' each on BD Feet apart/feet together 1' each on BD   Wobble board Fwd/bkwad 1' each c/ finger tip support Fwd/bkwad 1' each c/ finger tip support  Fwd/bkwad 1' each c/ finger tip support    Fwd/bkwad 1' each c/ finger tip support Fwd/bkwad 1' each c/ finger tip support Fwd/bkwad 1' each c/ finger tip support   Ther Ex             Nu Step             Long arc quad 20x AROM 20x AROM  20x AROM    20x AROM 20x AROM 20x AROM   Hamstring curl  20x AROM 20x AROM  20x AROM    20x standing 30x standing 30x standing R and 19 on left    Hamstring stretch             Bicycle              Ther Activity             HR        20x 20x 20x   Mini squats             6 MWT              Leeann walks 8x15 feet 8x15 feet  8x15 feet    [id number]   Lateral walks 8x15 feet on blue foam 8x15 feet on blue foam  8x15 feet on blue foam    8x15 feet on blue foam 8x15 feet on blue foam 8x15 feet on blue foam   Tandem walk on foam  8x15 feet on blue foam 8x15 feet on blue foam  8x15 feet on blue foam    8x15 feet on blue foam 8x15 feet on blue foam 8x15 feet on blue foam   Marching on foam  BD foam 30x on each BD foam 30x on each  BD foam 30x on each    BD foam 30x on each BD foam 30x on each BD foam 30x on each   Leg press              Gait Training             Focus on reducing IR during limb advancement and during stance                          Modalities                                       1:1 with PT from [id number]

## 2022-03-07 NOTE — TELEPHONE ENCOUNTER
From: Hayden Thomson  To: Herve Morel MD  Sent: 3/5/2022 3:28 PM EST  Subject: Lisinopril Rx    Dear Dr Milind Ingram  I was clearing out the rx  that I no longer take and accidentally threw out my remaining pills of lisinopril  I only had some left since I was breaking them in half  Would you please call in a new script to [facility] on [address]  in [place]  All of the pills were in the same type containers and I guess I wasn't paying close enough attention  Thank you so much  I appreciate your assistance    Denisse Guajardo   1950

## 2022-03-08 NOTE — TELEPHONE ENCOUNTER
Called [facility]  Spoke w/Valerie  She transferred me to pharmacy  Spoke w/Joyce-pharmacist      Provided info for verbal order of Copaxone 40mg Autoject device  [place] advised order will be processed and autoject device shipped to patient  Patient made aware via [employer] [facility]

## 2022-03-09 ENCOUNTER — OFFICE VISIT (OUTPATIENT)
Dept: SURGICAL ONCOLOGY | Facility: CLINIC | Age: 72
End: 2022-03-09
Payer: MEDICARE

## 2022-03-09 VITALS
RESPIRATION RATE: 16 BRPM | DIASTOLIC BLOOD PRESSURE: 72 MMHG | BODY MASS INDEX: 23.39 KG/M2 | TEMPERATURE: 98 F | HEIGHT: 64 IN | SYSTOLIC BLOOD PRESSURE: 120 MMHG | OXYGEN SATURATION: 100 % | WEIGHT: 137 LBS | HEART RATE: 70 BPM

## 2022-03-09 DIAGNOSIS — Z86.000 PERSONAL HISTORY OF IN-SITU NEOPLASM OF BREAST: ICD-10-CM

## 2022-03-09 DIAGNOSIS — N63.41 SUBAREOLAR LUMP OF RIGHT BREAST: Primary | ICD-10-CM

## 2022-03-09 PROCEDURE — 99212 OFFICE O/P EST SF 10 MIN: CPT | Performed by: SURGERY

## 2022-03-09 PROCEDURE — 76642 ULTRASOUND BREAST LIMITED: CPT | Performed by: SURGERY

## 2022-03-09 NOTE — PROGRESS NOTES
Surgical Oncology Follow Up       [address] [facility]  [facility] [address]    Sanjiv Liu  1950  [id number]  [facility] SURGICAL ONCOLOGY [facility]  [address] [id number]    Chief Complaint   Patient presents with    Follow-up       Assessment/Plan   Diagnoses and all orders for this visit:    Subareolar lump of right breast    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        Advance Care Planning/Advance Directives:  Did not discuss  with the patient  Oncology History:    Oncology History   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    Initial Diagnosis    Intraductal carcinoma in situ of left breast     8/16/2012 Surgery    Left breast mastectomy, SLNB  Left breast reconstruction with      4/7/2014 Surgery    Left breast implant replaced (X 3) right mastopexy  History of Present Illness:  Patient is here today because she noted tenderness in the right breast and then was able to palpate a lump below the nipple, she is being followed for two Rosaura areolar lesions likely postsurgical in nature, last diagnostic imaging was in December of 2021 and benign/stable  -Interval History:  Noted    Review of Systems:  Review of Systems   Constitutional: Negative  Negative for appetite change and fever  Eyes: Negative  Respiratory: Negative for shortness of breath  Cardiovascular: Negative  Gastrointestinal: Negative  Endocrine: Negative  Genitourinary: Negative  Musculoskeletal: Negative  Negative for arthralgias and myalgias  Skin: Negative  Allergic/Immunologic: Negative  Neurological: Negative  Hematological: Negative  Negative for adenopathy  Does not bruise/bleed easily  Psychiatric/Behavioral: Negative          Patient Active Problem List   Diagnosis    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    Multiple sclerosis ([facility] [address] )    Peripheral polyneuropathy    Depression    Fatigue    Gait disturbance    Hip pain, right    Headache    History of bilateral hip replacements    Hypokalemia    Injury of right leg    Insomnia    Laceration of finger of right hand    Solitary pulmonary nodule    Rash    Pain of left lower leg    Pain of left calf    Osteopenia    Nephrolithiasis    Muscle strain of left lower leg    Left knee pain    Left ankle pain    Urticaria    Tingling    Screening mammogram, encounter for    Encounter for breast reconstruction following mastectomy    Abnormal stool test    Encounter for follow-up surveillance of breast cancer    Essential hypertension    Abnormal finding on breast imaging    TSH (thyroid-stimulating hormone deficiency)    B12 deficiency    RLS (restless legs syndrome)    Dense breast tissue    Subareolar lump of right breast     Past Medical History:   Diagnosis Date    Allergic 1967    seasonal    Allergic rhinitis     Bone pain     Breast ptosis     Depression     Fatigue     Gait disturbance     uses cane at times    Headache     Hip fracture (HCC)     Hip pain     History of transfusion 1975    placenta previa s/p work accident, no rx    Hypokalemia     Insomnia     Laceration of finger     right hand    Left ankle pain     Left knee pain     Multiple sclerosis (HCC)     Muscle strain     left lower leg    Nephrolithiasis     Osteopenia     Osteoporosis     Pain of left calf     Pneumonia     Rash     Scoliosis birth    scoliosis    Solitary pulmonary nodule     SVT (supraventricular tachycardia) (HCC)     Tingling     Urgency of urination     Urticaria     Wrist fracture, left      Past Surgical History:   Procedure Laterality Date    ANKLE SURGERY      APPENDECTOMY  11/2012    Dr Garcia Last      Enlargement procedure with prosthetic implant bilateral    AUGMENTATION MAMMAPLASTY Right 01/28/2020    implant replaced because of recall   [address] Bilateral 2012    BREAST BIOPSY Left 07/23/2012    BREAST IMPLANT Right 1/28/2020    Procedure: BREAST IMPLANT EXCHANGE WITH CAPSULECTOMY;  Surgeon: Trevor Almanza MD;  [facility];  Service: Plastics    BREAST IMPLANT REMOVAL Right 01/28/2020    implant placement, implant revision, right mastopexy    CYSTOSTOMY      with basket extraction of calculus; x2    EXCISION / BIOPSY BREAST / NIPPLE / DUCT Right 05/04/2020    scar revision to resuture non healing surgical wound    EXPLORATORY LAPAROTOMY      FOOT SURGERY      FRACTURE SURGERY  Lt wrist 9/2014 - Lt matthew femur 9/14    HIP FRACTURE SURGERY Right     Subcapital    HIP SURGERY Left     JOINT REPLACEMENT  Rt hip 10/2012    LEG SURGERY      Repair    MASTECTOMY Left 08/16/2012    NC REVISION OF RECONSTRUCTED BREAST Right 5/4/2020    Procedure: REVISION RIGHT BREAST MOUND;  Surgeon: Trevor Almanza MD;  [facility];  Service: Plastics    NC SUSPENSION OF BREAST Right 1/28/2020    Procedure: BREAST MASTOPEXY;  Surgeon: Trevor Almanza MD;  [facility];  Service: Plastics    REDUCTION MAMMAPLASTY Right 01/28/2020    reduced to match left side    SENTINEL LYMPH NODE BIOPSY Left 08/16/2012    SKIN BIOPSY      TONSILLECTOMY      TUBAL LIGATION      WRIST SURGERY Left      Family History   Problem Relation Age of Onset    Hodgkin's lymphoma Maternal Grandfather         age at dx unk    Cancer Maternal Aunt 79        bladder    Heart disease Maternal Aunt     Colon cancer Maternal Uncle 72    Hodgkin's lymphoma Maternal Uncle 79    Coronary artery disease Mother     Hyperlipidemia Mother    Aylamac Lamb Rheum arthritis Mother     Other Father         Acute myocardial infarction    No Known Problems Maternal Grandmother     No Known Problems Paternal Grandmother     No Known Problems Paternal Grandfather     No Known Problems Son     No Known Problems Son    Aylamac Lynin Stroke Maternal Aunt         6 CVA before death    Breast cancer Maternal Aunt     No Known Problems Paternal Aunt     No Known Problems Paternal Aunt     No Known Problems Paternal Aunt      Social History     Socioeconomic History    Marital status: /Civil Union     Spouse name: Not on file    Number of children: 2    Years of education: Completed bachelor's degree    Highest education level: Not on file   Occupational History    Occupation: RN     Comment: Retired   Tobacco Use    Smoking status: Former Smoker     Packs/day: 1 00     Years: 35 00     Pack years: 35 00     Quit date:      Years since quittin     Smokeless tobacco: Never Used   Vaping Use    Vaping Use: Never used   Substance and Sexual Activity    Alcohol use: No    Drug use: No    Sexual activity: Yes     Partners: Male     Birth control/protection: Post-menopausal   Other Topics Concern    Not on file   Social History Narrative    Denied:  History of caffeine use     Social Determinants of Health     Financial Resource Strain: Not on file   Food Insecurity: Not on file   Transportation Needs: Not on file   Physical Activity: Not on file   Stress: Not on file   Social Connections: Not on file   Intimate Partner Violence: Not on file   Housing Stability: Not on file       Current Outpatient Medications:     ALPHA LIPOIC ACID PO, Take by mouth, Disp: , Rfl:     amoxicillin (AMOXIL) 500 mg capsule, TAKE 2 CAPSULE BY MOUTH NOW, THEN 1 3 TIMES DAILY UNTIL COMPLETED, Disp: , Rfl:     Ampyra 10 MG TB12, TAKE 1 TABLET BY MOUTH 2 TIMES A DAY, Disp: 180 tablet, Rfl: 0    ascorbic acid (VITAMIN C) 500 mg tablet, Take 500 mg by mouth daily, Disp: , Rfl:     baclofen 10 mg tablet, 1 tab pos q am, 1 tab po q pm and 2 tabs hs, Disp: 360 tablet, Rfl: 3    Biotin 5000 MCG TABS, , Disp: , Rfl:     Cholecalciferol (VITAMIN D3) 1000 units CAPS, Take 5,000 Units by mouth  , Disp: , Rfl:     clonazePAM (KlonoPIN) 0 5 mg tablet, 1 5 tablets qhs, Disp: 45 tablet, Rfl: 5    Copaxone 40 MG/ML SOSY, Inject 40mg under the skin three times weekly  , Disp: 36 mL, Rfl: 3    Cranberry 1000 MG CAPS, Take 300 mg by mouth  , Disp: , Rfl:     denosumab (PROLIA) 60 mg/mL, Inject under the skin, Disp: , Rfl:     docusate sodium (Colace) 100 mg capsule, Take 1 capsule by mouth every other day  , Disp: , Rfl:     gabapentin (NEURONTIN) 300 mg capsule, Take 1 capsule (300 mg total) by mouth daily at bedtime, Disp: 90 capsule, Rfl: 3    gabapentin (NEURONTIN) 600 MG tablet, 2 tabs po tid, Disp: 540 tablet, Rfl: 3    lisinopril (ZESTRIL) 10 mg tablet, Take 1 tablet (10 mg total) by mouth daily, Disp: 90 tablet, Rfl: 1    Magnesium 500 MG TABS, Take 500 tablets by mouth once, Disp: , Rfl:     solifenacin (VESICARE) 10 MG tablet, Take 1 tablet (10 mg total) by mouth daily, Disp: 90 tablet, Rfl: 3    zonisamide (ZONEGRAN) 100 mg capsule, Take 4 capsules (400 mg total) by mouth daily at bedtime, Disp: 360 capsule, Rfl: 3    lisinopril (ZESTRIL) 20 mg tablet, Take 1 tablet (20 mg total) by mouth daily (Patient not taking: Reported on 3/9/2022 ), Disp: 90 tablet, Rfl: 1    RESTASIS 0 05 % ophthalmic emulsion, , Disp: , Rfl: 0    rotigotine (Neupro) 3 MG/24HR transdermal patch, Place 1 patch on the skin daily (Patient not taking: Reported on 3/9/2022 ), Disp: 90 patch, Rfl: 3  No current facility-administered medications for this visit  Facility-Administered Medications Ordered in Other Visits:     bacitracin 50,000 Units, gentamicin (GARAMYCIN) 40 mg/mL 80 mg, ceFAZolin (ANCEF) 1,000 mg in sodium chloride 0 9 % 1,000 mL irrigation bottle, , Irrigation, Once, Janki Leyva MD  Allergies   Allergen Reactions    Duloxetine Hcl      Rapid Heart rate      Iodinated Diagnostic Agents Anaphylaxis    Acetaminophen Other (See Comments)     Heart palpitations    Cetirizine      Only occurs with generic, weakness in legs, off balance and couldn't walk   Morphine Other (See Comments)     Migraine       The following portions of the patient's history were reviewed and updated as appropriate: allergies, current medications, past family history, past medical history, past social history, past surgical history and problem list         Vitals:    03/09/22 1403   BP: 120/72   Pulse: 70   Resp: 16   Temp: 98 °F (36 7 °C)   SpO2: 100%       Physical Exam  Constitutional:       General: She is not in acute distress  Chest:   Breasts:      Right: Mass (Superficial nodules x2 subareolar) present  Neurological:      Mental Status: She is alert and oriented to person, place, and time  Psychiatric:         Mood and Affect: Mood normal              Results:  Labs:      Imaging  12/10/2021 right 3D diagnostic mammogram and ultrasound were reviewed, ultrasound images were displayed for the patient          Breast Ultrasound     Date/Time 3/9/2022 2:43 PM     Performed by  Larry Glass MD     Authorized by Larry Glass MD      Procedure Details   Procedure Notes: The right breast was scanned with attention to the nikita areolar superficial nodules medial and lateral subareolar  Both of these lesions correspond to nearly identical lesions noted on her last ultrasound from December with the medial lesion roughly 6 mm and the lateral measuring 1 4 cm  I do not appreciate any changes and there are no new lesions noted  This is a benign study  I reviewed the above imaging data  Discussion/Summary:  48-year-old female who presents with tenderness in the right nipple area with a palpable nodule  I am able to appreciate two lesions subareolar medial and lateral   These do correspond to the same areas of concern that have been followed on diagnostic imaging  Office ultrasound was performed showing no changes  I did review these current and recent images with the patient as well  This could be fat necrosis  I advised her to do massage to the areas    She is already scheduled for her follow-up mammogram and ultrasound as well as visit with me

## 2022-03-14 ENCOUNTER — OFFICE VISIT (OUTPATIENT)
Dept: PHYSICAL THERAPY | Facility: CLINIC | Age: 72
End: 2022-03-14
Payer: MEDICARE

## 2022-03-14 DIAGNOSIS — R26.89 IMPAIRMENT OF BALANCE: Primary | ICD-10-CM

## 2022-03-14 DIAGNOSIS — G35 MULTIPLE SCLEROSIS (HCC): ICD-10-CM

## 2022-03-14 DIAGNOSIS — R26.89 BALANCE PROBLEM: ICD-10-CM

## 2022-03-14 PROCEDURE — 97530 THERAPEUTIC ACTIVITIES: CPT | Performed by: PHYSICAL THERAPIST

## 2022-03-14 PROCEDURE — 97110 THERAPEUTIC EXERCISES: CPT | Performed by: PHYSICAL THERAPIST

## 2022-03-14 PROCEDURE — 97112 NEUROMUSCULAR REEDUCATION: CPT | Performed by: PHYSICAL THERAPIST

## 2022-03-14 NOTE — PROGRESS NOTES
Daily Note     Today's date: 3/14/2022  Patient name: Ovidio Remy  : 1950  MRN: [id number]  Referring provider: Madan Gardner  Dx:   Encounter Diagnosis     ICD-10-CM    1  Impairment of balance  R26 89    2  Balance problem  R26 89    3  Multiple sclerosis ([facility] [address] )  G35        Start Time:   Stop Time:   Total time in clinic (min): 38 minutes    Subjective: Pt was unloading items out of a box and was doing deep knee bends and lifting  After feeling fatigued, pt eased her self to the floor after she was in a very deep knee squat  Objective: See treatment diary below      Assessment: Tolerated treatment well  Pt's medical condition is stabilizing  She is able to advance RLE well over idris but continues to be challenged with LLE advance due to both hip and ankle weakness  Pt was able to safely lower her body to the ground after doing a series of squats when at home  She notes that her legs weren't able to drive her body up without assistance after her legs became fatigued  Overall, patient is pleased with her condition and next visit will be her last        Plan: Continue per plan of care        Precautions: MS, Gait dysfunction, Insomnia, Osteoporosis, Peripheral polyneuropathy, Hx of Breast CA, R IDALIA in - Dr Peter Teixeira, Left femur IM nailing        Manuals 2/15 2/21 2/28 3/7 3/14        Sciatic nerve glides             Neuro Re-Ed             VOR x 1 viewing in standing horizontal and vertical 1' each horizontal and vertical 1' each  horizontal and vertical 1' each horizontal and vertical 1' each        Romberg foam Feet apart/feet together 1' each on BD Feet apart/feet together 1' each on BD  Feet apart/feet together 1' each on BD Feet apart/feet together 1' each on BD        Wobble board Fwd/bkwad 1' each c/ finger tip support Fwd/bkwad 1' each c/ finger tip support  Fwd/bkwad 1' each c/ finger tip support Fwd/bkwad 1' each c/ finger tip support        Ther Ex             Nu Step             Long arc quad 20x AROM 20x AROM  20x AROM 20x AROM        Hamstring curl  20x AROM 20x AROM  20x AROM 20x AROM        Hamstring stretch             Bicycle              Ther Activity             HR             Mini squats             6 MWT              Leeann walks 8x15 feet 8x15 feet  8x15 feet 8x15 feet        Lateral walks 8x15 feet on blue foam 8x15 feet on blue foam  8x15 feet on blue foam 8x15 feet on blue foam        Tandem walk on foam  8x15 feet on blue foam 8x15 feet on blue foam  8x15 feet on blue foam 8x15 feet on blue foam        Marching on foam  BD foam 30x on each BD foam 30x on each  BD foam 30x on each BD foam 30x on each        Leg press              Gait Training             Focus on reducing IR during limb advancement and during stance                          Modalities                                       1:1 with PT from [id number]

## 2022-03-21 ENCOUNTER — OFFICE VISIT (OUTPATIENT)
Dept: PHYSICAL THERAPY | Facility: CLINIC | Age: 72
End: 2022-03-21
Payer: MEDICARE

## 2022-03-21 DIAGNOSIS — G35 MULTIPLE SCLEROSIS (HCC): ICD-10-CM

## 2022-03-21 DIAGNOSIS — R26.89 IMPAIRMENT OF BALANCE: ICD-10-CM

## 2022-03-21 DIAGNOSIS — R26.89 BALANCE PROBLEM: Primary | ICD-10-CM

## 2022-03-21 PROCEDURE — 97530 THERAPEUTIC ACTIVITIES: CPT | Performed by: PHYSICAL THERAPIST

## 2022-03-21 PROCEDURE — 97112 NEUROMUSCULAR REEDUCATION: CPT | Performed by: PHYSICAL THERAPIST

## 2022-03-21 PROCEDURE — 97110 THERAPEUTIC EXERCISES: CPT | Performed by: PHYSICAL THERAPIST

## 2022-03-21 NOTE — PROGRESS NOTES
PT-Discharge      Today's date: 3/21/2022  Patient name: Bobbi Saleh  : 1950  MRN: [id number]  Referring provider: Camelia Rodriguez,*  Dx:   Encounter Diagnosis     ICD-10-CM    1  Balance problem  R26 89    2  Multiple sclerosis ([facility] [address] )  G35    3  Impairment of balance  R26 89                   Subjective: Pt feeling fine over the past few weeks  Notes good strength in legs  Objective: See treatment diary below  Strength/Myotome Testing   SEATED:   Left Hip   Planes of Motion   Flexion: 3  Abduction: 3+  Adduction: 3+  External rotation: 3    Right Hip   Planes of Motion   Flexion: 3+   Abduction: 3+   Adduction: 3+   External rotation: 3+    Left Knee   Flexion: 4-  Extension: 4-    Right Knee   Flexion: 4  Extension: 4    Left Ankle/Foot   Dorsiflexion: 3+  Plantar flexion: 4    Right Ankle/Foot   Dorsiflexion: 4  Plantar flexion: 4-    Timed up and go: 20 seconds with SPC and hand support  10/22: 17 seconds c/ SPC and hand support   : 16 seconds c/ SPC and hand support   : 16 seconds c/ SPC and hand support   : 14 seconds without hand; hand support during transfers  :  12 seconds c/SPC: hand support during transfers  5 x sit to stand: 24 seconds, moderate difficulty, UE support every rep, bilateral genu valgum, and 2x lack of control on decent  10/22: 18 seconds, bilateral use of UE, bilateral genu valgum  : 17 seconds, bilateral use of UE, bilateral genu valgum  : 16 seconds, bilateral use of UE, bilateral genu valgum  : 14 seconds, 1 rep both hands, 1 rep no hands, 3 reps one hand, bilateral genu valgum  : 15 second with hand support during transfer    Assessment:  Overall, patient has demonstrated stabilization of function with only attending 1 treatment session per week  She still demonstrates weakness in LLE>RLE and has difficulty with dynamic balance activities; however, she is not falling   Pt following up with oncologist regarding recent lump in breast which is fine according to patient  Pt is pleased with overall functional status and will continue exercises at home  Should condition or functional status worsen, pt is welcome to return to PT  Tolerated treatment well  Plan: D/C to [facility]        Precautions: MS, Gait dysfunction, Insomnia, Osteoporosis, Peripheral polyneuropathy, Hx of Breast CA, R IDALIA in 2012- Dr Soria Crest, Left femur IM nailing        Manuals 2/15 2/21 2/28 3/7 3/14 3/21       Sciatic nerve glides             Neuro Re-Ed             VOR x 1 viewing in standing horizontal and vertical 1' each horizontal and vertical 1' each  horizontal and vertical 1' each horizontal and vertical 1' each horizontal and vertical 1' each       Romberg foam Feet apart/feet together 1' each on BD Feet apart/feet together 1' each on BD  Feet apart/feet together 1' each on BD Feet apart/feet together 1' each on BD Feet apart/feet together 1' each on BD       Wobble board Fwd/bkwad 1' each c/ finger tip support Fwd/bkwad 1' each c/ finger tip support  Fwd/bkwad 1' each c/ finger tip support Fwd/bkwad 1' each c/ finger tip support Fwd/bkwad 1' each c/ finger tip support       Ther Ex             Nu Step             Long arc quad 20x AROM 20x AROM  20x AROM 20x AROM 20x AROM       Hamstring curl  20x AROM 20x AROM  20x AROM 20x AROM 20x AROM       Hamstring stretch             Bicycle              Ther Activity             HR             Mini squats             6 MWT              Leeann walks 8x15 feet 8x15 feet  8x15 feet 8x15 feet 8x15 feet       Lateral walks 8x15 feet on blue foam 8x15 feet on blue foam  8x15 feet on blue foam 8x15 feet on blue foam 8x15 feet on blue foam       Tandem walk on foam  8x15 feet on blue foam 8x15 feet on blue foam  8x15 feet on blue foam 8x15 feet on blue foam 8x15 feet on blue foam       Marching on foam  BD foam 30x on each BD foam 30x on each  BD foam 30x on each BD foam 30x on each BD foam 30x on each       Leg press              Gait Training             Focus on reducing IR during limb advancement and during stance                          Modalities                                       1:1 with PT from [id number]

## 2022-03-28 ENCOUNTER — APPOINTMENT (OUTPATIENT)
Dept: PHYSICAL THERAPY | Facility: CLINIC | Age: 72
End: 2022-03-28
Payer: MEDICARE

## 2022-05-09 ENCOUNTER — OFFICE VISIT (OUTPATIENT)
Dept: FAMILY MEDICINE CLINIC | Facility: OTHER | Age: 72
End: 2022-05-09
Payer: MEDICARE

## 2022-05-09 VITALS
TEMPERATURE: 98 F | BODY MASS INDEX: 23.83 KG/M2 | WEIGHT: 139.6 LBS | OXYGEN SATURATION: 98 % | HEIGHT: 64 IN | DIASTOLIC BLOOD PRESSURE: 68 MMHG | HEART RATE: 75 BPM | RESPIRATION RATE: 18 BRPM | SYSTOLIC BLOOD PRESSURE: 124 MMHG

## 2022-05-09 DIAGNOSIS — I10 HYPERTENSION, UNSPECIFIED TYPE: Primary | ICD-10-CM

## 2022-05-09 PROCEDURE — 99213 OFFICE O/P EST LOW 20 MIN: CPT | Performed by: FAMILY MEDICINE

## 2022-05-09 RX ORDER — LISINOPRIL 10 MG/1
5 TABLET ORAL DAILY
Qty: 30 TABLET | Refills: 1 | Status: SHIPPED | OUTPATIENT
Start: 2022-05-09 | End: 2022-05-10 | Stop reason: SDUPTHER

## 2022-05-09 NOTE — PROGRESS NOTES
Assessment/Plan:     Diagnoses and all orders for this visit:    Hypertension, unspecified type  -BP WNL at 124/68  -Given occasional lightheadedness will decrease Lisinopril from 10mg to 5mg once daily  As orthostatic hypotension is likely, patient advised to continue wearing compression stockings, staying well-hydrated and slowly standing    -Continue on DASH diet   -Patient advised to continue to monitor BP at home daily and to keep a log of these readings for review at our next visit  Return in about 3 months (around 8/9/2022) for Recheck BP/Medicare wellness visit   The patient indicates understanding of these issues and agrees with the plan  Subjective:      Patient ID: Adriano Barakat is a 67 y o  female  HPI  Patient presents for follow up of Hypertension  She reports that at home SBP 110s-120s and DBP in 60s-70s  Patient reports daily compliance with Lisinopril 10mg once daily  She also reports limiting sodium in her diet to less than 2g daily  She reports she has been occasionally experiencing lightheadedness with standing  She denies headaches, vision changes, cough, shortness of breath and chest pain  The following portions of the patient's history were reviewed and updated as appropriate: allergies, current medications, past family history, past medical history, past social history, past surgical history and problem list     Review of Systems   Constitutional: Negative for chills, fever and unexpected weight change  Eyes: Negative for visual disturbance  Respiratory: Negative for cough, chest tightness and shortness of breath  Cardiovascular: Negative for chest pain and leg swelling  Neurological: Positive for light-headedness  Negative for headaches  Objective:  /68   Pulse 75   Temp 98 °F (36 7 °C)   Resp 18   Ht 5' 4" (1 626 m)   Wt 63 3 kg (139 lb 9 6 oz)   SpO2 98%   BMI 23 96 kg/m²          Physical Exam  Vitals reviewed     Constitutional: General: She is not in acute distress  Appearance: Normal appearance  She is not ill-appearing, toxic-appearing or diaphoretic  Eyes:      General: No scleral icterus  Right eye: No discharge  Left eye: No discharge  Extraocular Movements: Extraocular movements intact  Conjunctiva/sclera: Conjunctivae normal    Cardiovascular:      Rate and Rhythm: Normal rate and regular rhythm  Pulses: Normal pulses  Heart sounds: Normal heart sounds  Pulmonary:      Effort: Pulmonary effort is normal       Breath sounds: Normal breath sounds  Musculoskeletal:      Right lower leg: No edema  Left lower leg: No edema  Skin:     General: Skin is warm and dry  Neurological:      Mental Status: She is alert and oriented to person, place, and time     Psychiatric:         Mood and Affect: Mood normal          Behavior: Behavior normal

## 2022-05-10 DIAGNOSIS — I10 HYPERTENSION, UNSPECIFIED TYPE: ICD-10-CM

## 2022-05-10 RX ORDER — LISINOPRIL 10 MG/1
5 TABLET ORAL DAILY
Qty: 30 TABLET | Refills: 1 | Status: SHIPPED | OUTPATIENT
Start: 2022-05-10 | End: 2022-06-06

## 2022-05-26 ENCOUNTER — TELEPHONE (OUTPATIENT)
Dept: NEUROLOGY | Facility: CLINIC | Age: 72
End: 2022-05-26

## 2022-05-26 NOTE — TELEPHONE ENCOUNTER
Called and [facility]MM for patient to remind her of outstanding Labs that should be completed prior to her appt on 6/3 w/Dr Alexei Patiño

## 2022-05-27 ENCOUNTER — APPOINTMENT (OUTPATIENT)
Dept: LAB | Facility: CLINIC | Age: 72
End: 2022-05-27
Payer: MEDICARE

## 2022-05-27 DIAGNOSIS — G35 MULTIPLE SCLEROSIS (HCC): ICD-10-CM

## 2022-05-27 LAB
ALBUMIN SERPL BCP-MCNC: 4 G/DL (ref 3.5–5)
ALP SERPL-CCNC: 101 U/L (ref 46–116)
ALT SERPL W P-5'-P-CCNC: 43 U/L (ref 12–78)
AST SERPL W P-5'-P-CCNC: 20 U/L (ref 5–45)
BASOPHILS # BLD AUTO: 0.06 THOUSANDS/ΜL (ref 0–0.1)
BASOPHILS NFR BLD AUTO: 1 % (ref 0–1)
BILIRUB DIRECT SERPL-MCNC: 0.17 MG/DL (ref 0–0.2)
BILIRUB SERPL-MCNC: 0.59 MG/DL (ref 0.2–1)
EOSINOPHIL # BLD AUTO: 0.31 THOUSAND/ΜL (ref 0–0.61)
EOSINOPHIL NFR BLD AUTO: 5 % (ref 0–6)
ERYTHROCYTE [DISTWIDTH] IN BLOOD BY AUTOMATED COUNT: 12.3 % (ref 11.6–15.1)
HCT VFR BLD AUTO: 38.9 % (ref 34.8–46.1)
HGB BLD-MCNC: 12.6 G/DL (ref 11.5–15.4)
IMM GRANULOCYTES # BLD AUTO: 0.02 THOUSAND/UL (ref 0–0.2)
IMM GRANULOCYTES NFR BLD AUTO: 0 % (ref 0–2)
LYMPHOCYTES # BLD AUTO: 1.81 THOUSANDS/ΜL (ref 0.6–4.47)
LYMPHOCYTES NFR BLD AUTO: 31 % (ref 14–44)
MCH RBC QN AUTO: 31.1 PG (ref 26.8–34.3)
MCHC RBC AUTO-ENTMCNC: 32.4 G/DL (ref 31.4–37.4)
MCV RBC AUTO: 96 FL (ref 82–98)
MONOCYTES # BLD AUTO: 0.42 THOUSAND/ΜL (ref 0.17–1.22)
MONOCYTES NFR BLD AUTO: 7 % (ref 4–12)
NEUTROPHILS # BLD AUTO: 3.15 THOUSANDS/ΜL (ref 1.85–7.62)
NEUTS SEG NFR BLD AUTO: 56 % (ref 43–75)
NRBC BLD AUTO-RTO: 0 /100 WBCS
PLATELET # BLD AUTO: 224 THOUSANDS/UL (ref 149–390)
PMV BLD AUTO: 10.5 FL (ref 8.9–12.7)
PROT SERPL-MCNC: 7.5 G/DL (ref 6.4–8.2)
RBC # BLD AUTO: 4.05 MILLION/UL (ref 3.81–5.12)
WBC # BLD AUTO: 5.77 THOUSAND/UL (ref 4.31–10.16)

## 2022-05-27 PROCEDURE — 80076 HEPATIC FUNCTION PANEL: CPT

## 2022-05-27 PROCEDURE — 85025 COMPLETE CBC W/AUTO DIFF WBC: CPT

## 2022-05-27 PROCEDURE — 36415 COLL VENOUS BLD VENIPUNCTURE: CPT

## 2022-06-03 ENCOUNTER — OFFICE VISIT (OUTPATIENT)
Dept: NEUROLOGY | Facility: CLINIC | Age: 72
End: 2022-06-03
Payer: MEDICARE

## 2022-06-03 ENCOUNTER — TELEPHONE (OUTPATIENT)
Dept: NEUROLOGY | Facility: CLINIC | Age: 72
End: 2022-06-03

## 2022-06-03 VITALS
WEIGHT: 138 LBS | TEMPERATURE: 97.1 F | SYSTOLIC BLOOD PRESSURE: 122 MMHG | DIASTOLIC BLOOD PRESSURE: 84 MMHG | BODY MASS INDEX: 23.56 KG/M2 | HEART RATE: 74 BPM | HEIGHT: 64 IN

## 2022-06-03 DIAGNOSIS — G62.9 PERIPHERAL POLYNEUROPATHY: ICD-10-CM

## 2022-06-03 DIAGNOSIS — H04.123 DRY EYES: ICD-10-CM

## 2022-06-03 DIAGNOSIS — G35 MULTIPLE SCLEROSIS (HCC): Primary | ICD-10-CM

## 2022-06-03 DIAGNOSIS — R26.9 GAIT DISTURBANCE: ICD-10-CM

## 2022-06-03 PROCEDURE — 99214 OFFICE O/P EST MOD 30 MIN: CPT | Performed by: PSYCHIATRY & NEUROLOGY

## 2022-06-03 NOTE — PROGRESS NOTES
Patient ID: Bianca Brito is a 67 y o  female  Assessment/Plan:    Multiple sclerosis ([facility] [address] )  Pt here for MS follow up  Overall about the same as last visit  Pt notes continued diff with balance and endurance  She remains on brand copaxone and ampyra  No falls since last visit  No change in exam  Re rev last mri t spine from June 2021 and stable at that time  Pt with int blurriness of vision, seeing 3 opthos  Will update nmo testing  Per pt, no history of optic neuritis in past  Currently no eye pain  Pt with dry eyes jesus since cataracts  Will check sjogren labs too  Last checked in 2014 and neg      Peripheral polyneuropathy  Pt with long standing history of neuropathy which pre dates MS dx  Re rev last emg of the lowers with confirmation of mild to mod PN  Pt to have updated sjogren labs as well  Question component of allodynea as well related to neuropathy  Agree with current dosing of gabapentin  For total of 3600 mg daily  Will update bmp as well for gfr status  Pt to consider aquatherapy  Will check with sw if any hydrocenters nearby pt home location       Diagnoses and all orders for this visit:    [facility] sclerosis ([facility] )  -     Aquaporin-4 (AQP4) (NMO-IgG) Antibody with Reflex to Titer, Serum; Future  -     Basic metabolic panel; Future    Dry eyes  -     Sjogren's Antibodies; Future    Gait disturbance    Peripheral polyneuropathy           Subjective:    HPI    Bianca Brito is a 67 y o  female  presents for MS follow up, last seen in 1/21/22 by me   Per my last note "Patient with history of MS dating back to 2008, but likely with symptoms even in 1998  Patient also with PMH of breast cancer s/p left mastectomy in August 2012   Pt has been on Copaxone since 2008 and changed to the 40mg TIW dosing    Pt notes she has to go for colonoscopy due to abn stool screening testing   Pt notes she is also being considered for some breast reconstruction with autologous fat transfer at mastectomy site   Pt is following up with her doctors   Pt notes overall still dealing with her walking and trying to help her left leg and her balance   Pt has rx from pcp for PT   Pt feels PT has been very helpful   Pt also had reconstructive surgery on breast in jan 28th and still working on healing with follow up visits with her surgeon     Since last visit pt had same day procedure with right breast reconstruction   Pt notes she had open area that was poorly healing   Now area is closed and no abx   Pt notes her biggest issue at this time is poor sleep related to not staying asleep as well as neurpathic pain in the legs   Pt cannot take lyrica or cymbalta as tried in past   Pt still on her baclofen, gabapentin and zonegran   Pt had updated labs in July and nl cbc diff and lfts   Rev in depth with pt  Pt notes she had one fall while in storage/ sewing area and fell trying to lift something and hurt left trapezius area   This is now improved since PT   Pt remains on brand copaxone and padmini med well "  Kept above paragraph due to longevity and complexiites of pt case  Pt last seen in Jan 2022 virtually  Pt notes she is about the same as last visit  Pt notes int leg pain  Pt ntoes neuropathy worse  Pt notes she is now taking gabapentin at 1200 mg in am, 900 mg in mid day and 1500 mg hs  Pt notes this helps to take edge off  No recent falls  Pt had last mri t spine in June 7 2021 with old stable lesion at T12/L1  No evidence of metastatic disease  Pt remains on brand copaxone and padmini med well  Pt had labs on 5/27/22 with nl ast and alt,  Pt with nl cbc diff as well  Pt using cane mostly to help with balance  Pt may benefit from aquatherapy, will ask sw for any locations near her home  Pt with component of allodynea today with touching of the lower ext  Pt notes at times her legs hurt to the littlest of stimuli  Rev CRPS with pt as well  Pt to cont to need gabapentin   Pt notes her neuropathy dx actually predated her MS dx   Pt also with history of dry eyes especially since cataracts  Pt seen by 3 opthos  Pt still with int blurriness of vision  Pt denies currently any eye pain  Pt also notes no history of optic neuritis over the past 15 yrs  In light of recurrent visual sxs, we also rev recommendations for nmo and sjogren labs  Pt had sjogren labs in 2014 and neg at that time  Pt is in agreement  Pt denies any new changes with bowel or bladder  No vertigo or n or v    No change in speech or swallowing  Pt has completed her PT with Liza Shepard around March  Pt may benefit from aquatherapy as a different modality for her leg endurance     due to increased gabapentin dosing, will update kidney labs as well to ensure clearance  The following portions of the patient's history were reviewed and updated as appropriate: allergies, current medications, past family history, past medical history, past social history, past surgical history and problem list and med rec and ros rev  Objective:    Blood pressure 122/84, pulse 74, temperature (!) 97 1 °F (36 2 °C), height 5' 4" (1 626 m), weight 62 6 kg (138 lb)  Physical Exam  Constitutional:       General: She is not in acute distress  Appearance: She is not ill-appearing  Eyes:      General: Lids are normal       Extraocular Movements: Extraocular movements intact  Pupils: Pupils are equal, round, and reactive to light  Musculoskeletal:      Right lower leg: No edema  Left lower leg: No edema  Neurological:      Mental Status: She is alert  Deep Tendon Reflexes: Strength normal and reflexes are normal and symmetric  Psychiatric:         Speech: Speech normal          Neurological Exam  Mental Status  Alert  Recent and remote memory are intact  Speech is normal  Language is fluent with no aphasia  Attention and concentration are normal     Cranial Nerves  CN II: Visual acuity is normal  Visual fields full to confrontation    CN III, IV, VI: Extraocular movements intact bilaterally  Normal lids and orbits bilaterally  Pupils equal round and reactive to light bilaterally  CN V: Facial sensation is normal   CN VII: Full and symmetric facial movement  CN VIII: Hearing is normal   CN IX, X: Palate elevates symmetrically  Normal gag reflex  CN XI: Shoulder shrug strength is normal   CN XII: Tongue midline without atrophy or fasciculations  Motor  Normal muscle bulk throughout  Normal muscle tone  No abnormal involuntary movements  Strength is 5/5 throughout all four extremities  Sensory  Dec vib chanelle lowers  Reflexes  Deep tendon reflexes are 2+ and symmetric in all four extremities  Coordination  Right: Finger-to-nose normal  Rapid alternating movement normal Left: Finger-to-nose normal  Rapid alternating movement normal     Gait  Casual gait: Wide stance  Using cane  ROS:    Review of Systems   Constitutional: Negative  Negative for appetite change and fever  HENT: Negative  Negative for hearing loss, tinnitus, trouble swallowing and voice change  Eyes: Positive for visual disturbance  Negative for photophobia and pain  Blurry vision has started in the last month   Respiratory: Negative  Negative for shortness of breath  Cardiovascular: Negative  Negative for palpitations  Gastrointestinal: Negative  Negative for nausea and vomiting  Endocrine: Negative  Negative for cold intolerance  Genitourinary: Negative  Negative for dysuria, frequency and urgency  Musculoskeletal: Positive for gait problem  Negative for myalgias and neck pain  Neuropathy has increased, feels exhausted all the time  Having balance issues, has not fallen but has to wall/furniture walk   Skin: Negative  Negative for rash  Neurological: Positive for weakness and numbness  Negative for dizziness, tremors, seizures, syncope, facial asymmetry, speech difficulty, light-headedness and headaches          Knees to her toes numb/tingle Hematological: Negative  Does not bruise/bleed easily  Psychiatric/Behavioral: Negative  Negative for confusion, hallucinations and sleep disturbance

## 2022-06-03 NOTE — ASSESSMENT & PLAN NOTE
Pt with long standing history of neuropathy which pre dates MS dx  Re rev last emg of the lowers with confirmation of mild to mod PN  Pt to have updated sjogren labs as well  Question component of allodynea as well related to neuropathy  Agree with current dosing of gabapentin  For total of 3600 mg daily  Will update bmp as well for gfr status  Pt to consider aquatherapy  Will check with sw if any hydrocenters nearby pt home location

## 2022-06-03 NOTE — TELEPHONE ENCOUNTER
Pt seen today for neuro visit  Pt with known long standing MS  Looking for any nearby locations to her home for aquatherapy and hopefully coverage of sessions from insurance standpoint to help with MS lower ext weakness and endurance/ stamina

## 2022-06-03 NOTE — ASSESSMENT & PLAN NOTE
Pt here for MS follow up  Overall about the same as last visit  Pt notes continued diff with balance and endurance  She remains on brand copaxone and ampyra  No falls since last visit  No change in exam  Re rev last mri t spine from June 2021 and stable at that time  Pt with int blurriness of vision, seeing 3 opthos  Will update nmo testing  Per pt, no history of optic neuritis in past  Currently no eye pain  Pt with dry eyes jesus since cataracts  Will check sjogren labs too  Last checked in 2014 and neg

## 2022-06-06 ENCOUNTER — TELEPHONE (OUTPATIENT)
Dept: NEUROLOGY | Facility: CLINIC | Age: 72
End: 2022-06-06

## 2022-06-06 DIAGNOSIS — R26.9 GAIT DISTURBANCE: ICD-10-CM

## 2022-06-06 DIAGNOSIS — G35 MULTIPLE SCLEROSIS (HCC): Primary | ICD-10-CM

## 2022-06-06 DIAGNOSIS — I10 HYPERTENSION, UNSPECIFIED TYPE: ICD-10-CM

## 2022-06-06 RX ORDER — DALFAMPRIDINE 10 MG/1
1 TABLET, FILM COATED, EXTENDED RELEASE ORAL 2 TIMES DAILY
Qty: 180 TABLET | Refills: 3 | Status: SHIPPED | OUTPATIENT
Start: 2022-06-06

## 2022-06-06 RX ORDER — LISINOPRIL 10 MG/1
TABLET ORAL
Qty: 45 TABLET | Refills: 1 | Status: SHIPPED | OUTPATIENT
Start: 2022-06-06

## 2022-06-06 NOTE — TELEPHONE ENCOUNTER
MSW called the patient to inform her that [address] [place] [address] located in [place] offers aquatic therapy in their location if interested  The patient was interested in their location but is still not sure if she would like to move forward with aquatic therapy  She asked for Dr Sanjiv Perdue to enter the referral and she will reach out to them if she decides to move forward  MSW provided her with their phone number ([phone]) and asked her to please reach out to our office if she has any trouble scheduling an appointment  Patient expressed understanding  Dr Sanjiv Perdue: can you please enter a referral for PT and in the notes section indicate aquatic therapy?  Thanks!!

## 2022-06-06 NOTE — TELEPHONE ENCOUNTER
No problem! Referral entered via Epic  The patient has the contact information for [address] and will reach out to them if she would like to move forward  There are no further social needs to be addressed at this time  MSW will be available to assist with any future needs in regard to this patient

## 2022-06-06 NOTE — TELEPHONE ENCOUNTER
Please see below from Sadie  I am doing lab testing for the dry eyes and dry mouth  Keeping rest of information in telephone message to have as reference for her  Please let her know to get the sjogren labs done when she can to help further address dry eyes and mouth  Sometimes visit with rheum can also be helpful for this  Also please see how I can do additional ampyra refills for her now correctly and I will sign  I want her to have refills for the full year  I did just resend with 180 tabs and 3 refills, so hopefully correct  Please also have pt follow up with pcp as well re the food stuck feeling  Does pt want to see ent? I  Could also order a barium swallow to see if any issue with her swallowing stages  Thanks so much

## 2022-06-06 NOTE — TELEPHONE ENCOUNTER
----- Message from Salud Yates RN sent at 2022  3:13 PM EDT -----  Regarding: FW: Post Visit    ----- Message -----  From: Florence Johnson  Sent: 6/3/2022   4:25 PM EDT  To: Neurology [facility] Clinical Team 3  Subject: Sherren Allen Visit                                       Dr Simone Martínez,  These actually are symptoms I am currently experiencing  When you check in online there is no place for you to check in on symptoms such as this  During the exam, the conversation turns to another subject, and I forget to mention them and remember when I get home  I am experiencing the following symptoms for the past approximately six months:  Dry mouth   Dry eyes that burn, itch  Dry throat, lips, & skin  Shortness of breath  Fatigue  Pain, and stiffness in my joints   Heartburn ( never had heartburn before ) take Nausean (OTC) for it  Feels like food stuck area of upper sternum  Increased numbness and tingling in lower legs and sometimes in finger tips   The reason for the oral surgery is that a bridge that I had in since  just fell out of my mouth on  evening  The remaining part of the teeth it was connected to had to be cut from the gum by an oral surgeon due to being on Prolia  Had to wait till 6 months after my  last shot and now extend my next shot till gum healed so that no necrosis will occur  Spoke with [facility]  Stated that the expiration date for Ampyra is 3/1/2023, but, only 2 ninety day rx were ordered  I had fill in March and one is on the way  After the fill of 2022 there are no remaining refills  I would appreciate it if a new rx could be phoned or faxed to them for the Ampyra 10 mg Q12h  As always, thank you so much for everything    Caesar Randhawa    1950

## 2022-06-07 NOTE — TELEPHONE ENCOUNTER
Pt made aware, she verbalizes understanding  Pt reports she has an appt with rheumatology in August or September with Dr Luisa Chew      Pt made aware of refill for Ampyra  Pt states she would not like to f/u with PCP regarding food stuck feeling  States she does not feel the issue is with swallowing  It is a sensation she gets only when swallowing certain things such as rice and certain pastas  More like a heartburn feeling per pt  Has no issue swallowing liquids  When sensation occurs it lasts under 5 minutes  Nothing helps this go away, just needs to wait for it to resolve  This has been occurring for a while now, not a new problem  Pt also wanted to note she will call tomorrow to schedule aqua therapy

## 2022-06-08 ENCOUNTER — PATIENT MESSAGE (OUTPATIENT)
Dept: NEUROLOGY | Facility: CLINIC | Age: 72
End: 2022-06-08

## 2022-06-08 NOTE — TELEPHONE ENCOUNTER
Holly Alves  to Cristian Hodge MD          6/8/22 11:53 AM  Dr Franklin Garcia  Here is the info on my eval   This was their first opening    Please fax the rx to [phone]      Appointment Information:      Visit Type: Physical Therapy Evaluation              Date: 7/7/2022                      Dept: [address] [place] [address]                      Provider: Sherice Chow PT                      Appointment Time: 1:00 PM                             Length:45 Minutes  Thank you  Arias Perrin

## 2022-06-08 NOTE — TELEPHONE ENCOUNTER
Patient called to advise PT at [facility] advised their pool is 96 degrees and very humid  They did not feel it was a good fit for a patient with MS  Patient states she would not be able to tolerate those temperatures or humidity  Advised patient [facility] provides aqua therapy  Patient states she cannot drive that far  Advised patient [facility] also offers aqua therapy  Provided patient with phone number [phone]  If patient is agreeable to this facility, she will provide fax # to our office for referral to be faxed

## 2022-06-13 ENCOUNTER — APPOINTMENT (OUTPATIENT)
Dept: LAB | Facility: CLINIC | Age: 72
End: 2022-06-13
Payer: MEDICARE

## 2022-06-13 DIAGNOSIS — H04.123 DRY EYES: ICD-10-CM

## 2022-06-13 DIAGNOSIS — G35 MULTIPLE SCLEROSIS (HCC): ICD-10-CM

## 2022-06-13 DIAGNOSIS — M81.0 SENILE OSTEOPOROSIS: ICD-10-CM

## 2022-06-13 LAB
ANION GAP SERPL CALCULATED.3IONS-SCNC: 7 MMOL/L (ref 4–13)
BUN SERPL-MCNC: 20 MG/DL (ref 5–25)
CALCIUM SERPL-MCNC: 9.9 MG/DL (ref 8.3–10.1)
CHLORIDE SERPL-SCNC: 111 MMOL/L (ref 100–108)
CO2 SERPL-SCNC: 27 MMOL/L (ref 21–32)
CREAT SERPL-MCNC: 1.26 MG/DL (ref 0.6–1.3)
GFR SERPL CREATININE-BSD FRML MDRD: 42 ML/MIN/1.73SQ M
GLUCOSE P FAST SERPL-MCNC: 89 MG/DL (ref 65–99)
POTASSIUM SERPL-SCNC: 3.4 MMOL/L (ref 3.5–5.3)
SODIUM SERPL-SCNC: 145 MMOL/L (ref 136–145)

## 2022-06-13 PROCEDURE — 86235 NUCLEAR ANTIGEN ANTIBODY: CPT

## 2022-06-13 PROCEDURE — 86051 AQUAPORIN-4 ANTB ELISA: CPT

## 2022-06-13 PROCEDURE — 80048 BASIC METABOLIC PNL TOTAL CA: CPT

## 2022-06-13 PROCEDURE — 36415 COLL VENOUS BLD VENIPUNCTURE: CPT

## 2022-06-14 ENCOUNTER — TELEPHONE (OUTPATIENT)
Dept: NEUROLOGY | Facility: CLINIC | Age: 72
End: 2022-06-14

## 2022-06-14 LAB
ENA SS-A AB SER-ACNC: <0.2 AI (ref 0–0.9)
ENA SS-B AB SER-ACNC: <0.2 AI (ref 0–0.9)

## 2022-06-14 NOTE — TELEPHONE ENCOUNTER
Nursing, Let pt know potassium low at 3 4   send copy of labs to pcp and have pt follow up with pcp   Pt also with low gfr of 42   Typically usually over 60 which is normal   Not sure why this is so much lower?  This is more concerning as not ever this low in past   Again send copy of labs to pcp and have pt follow up with pcp   Not sure if this is an error or true result  I am ccing pcp directly with these results to follow up on gfr as this should not be related to MS meds

## 2022-06-14 NOTE — TELEPHONE ENCOUNTER
Please reach out to patient and schedule a follow up with us within the next 2-3 weeks to review abnormal labs ordered by Neurology  The findings noted were concerning, but not emergent, and absolutely warrant some follow up  Thanks!   Isha Coronado, DO

## 2022-06-14 NOTE — TELEPHONE ENCOUNTER
Called patient  Received vm  Left detailed msg (per consent in patient) advising patient Dr Nicole Lombardo reviewed results and had contacted PCP regarding same  Patient has appt w/PCP on 6/30/22

## 2022-06-15 LAB — AQP4 H2O CHANNEL AB SERPL IA-ACNC: <1.5 U/ML (ref 0–3)

## 2022-06-30 ENCOUNTER — OFFICE VISIT (OUTPATIENT)
Dept: FAMILY MEDICINE CLINIC | Facility: OTHER | Age: 72
End: 2022-06-30
Payer: MEDICARE

## 2022-06-30 VITALS
DIASTOLIC BLOOD PRESSURE: 88 MMHG | OXYGEN SATURATION: 98 % | SYSTOLIC BLOOD PRESSURE: 120 MMHG | HEART RATE: 72 BPM | HEIGHT: 64 IN | BODY MASS INDEX: 23.66 KG/M2 | WEIGHT: 138.6 LBS | RESPIRATION RATE: 18 BRPM | TEMPERATURE: 98 F

## 2022-06-30 DIAGNOSIS — N18.31 STAGE 3A CHRONIC KIDNEY DISEASE (HCC): Primary | ICD-10-CM

## 2022-06-30 DIAGNOSIS — R79.89 ELEVATED SERUM CREATININE: ICD-10-CM

## 2022-06-30 PROCEDURE — 99214 OFFICE O/P EST MOD 30 MIN: CPT | Performed by: FAMILY MEDICINE

## 2022-06-30 NOTE — PROGRESS NOTES
Assessment/Plan:    No problem-specific Assessment & Plan notes found for this encounter  Diagnoses and all orders for this visit:    Stage 3a chronic kidney disease ([address] )  -     Basic metabolic panel; Future    Elevated serum creatinine  -     Basic metabolic panel; Future      66 yo female with h/o MS presents for acute worsening in renal function as noted on labs done by Neurology  Suspect this is related to pt's lack of water intake, creating a volume depleted state which is taxing to the kidneys  Recommend that she aim for at least 64 oz of water daily -- try to eliminate all other types of beverages over the next 1-2 weeks  Recheck renal function and, if not improved, may benefit from Nephrology consult  Return in about 6 weeks (around 8/11/2022) for Recheck renal function  The patient indicates understanding of these issues and agrees with the plan  Subjective:      Patient ID: Swetha Brito is a 67 y o  female      HPI   Pt presents regarding abnormal renal function  This was seen on labs ordered by Neurology (under tx for MS)  States that she is feeling ok overall, MS flares aside  Her Cr was noted to be elevated from baseline and eGFR was significantly decreased  Pt states that she never drinks water -- it makes her sick to her stomach  She drinks mostly tea  These labs have her concerned and she is willing to make the necessary changes       The following portions of the patient's history were reviewed and updated as appropriate: allergies, current medications, past family history, past medical history, past social history, past surgical history and problem list       Current Outpatient Medications:     ALPHA LIPOIC ACID PO, Take by mouth, Disp: , Rfl:     Ampyra 10 MG TB12, Take 1 tablet (10 mg total) by mouth 2 (two) times a day, Disp: 180 tablet, Rfl: 3    ascorbic acid (VITAMIN C) 500 mg tablet, Take 500 mg by mouth daily, Disp: , Rfl:     baclofen 10 mg tablet, 1 tab pos q am, 1 tab po q pm and 2 tabs hs, Disp: 360 tablet, Rfl: 3    Biotin 5000 MCG TABS, , Disp: , Rfl:     Cholecalciferol (VITAMIN D3) 1000 units CAPS, Take 5,000 Units by mouth  , Disp: , Rfl:     clonazePAM (KlonoPIN) 0 5 mg tablet, 1 5 tablets qhs, Disp: 45 tablet, Rfl: 5    Copaxone 40 MG/ML SOSY, Inject 40mg under the skin three times weekly  , Disp: 36 mL, Rfl: 3    Cranberry 1000 MG CAPS, Take 300 mg by mouth  , Disp: , Rfl:     denosumab (PROLIA) 60 mg/mL, Inject under the skin, Disp: , Rfl:     docusate sodium (COLACE) 100 mg capsule, Take 1 capsule by mouth every other day  , Disp: , Rfl:     gabapentin (NEURONTIN) 300 mg capsule, Take 1 capsule (300 mg total) by mouth daily at bedtime, Disp: 90 capsule, Rfl: 3    gabapentin (NEURONTIN) 600 MG tablet, 2 tabs po tid, Disp: 540 tablet, Rfl: 3    lisinopril (ZESTRIL) 10 mg tablet, TAKE 1/2 TABLET BY MOUTH DAILY, Disp: 45 tablet, Rfl: 1    Magnesium 500 MG TABS, Take 500 tablets by mouth once, Disp: , Rfl:     solifenacin (VESICARE) 10 MG tablet, Take 1 tablet (10 mg total) by mouth daily, Disp: 90 tablet, Rfl: 3    zonisamide (ZONEGRAN) 100 mg capsule, Take 4 capsules (400 mg total) by mouth daily at bedtime, Disp: 360 capsule, Rfl: 3  No current facility-administered medications for this visit  Facility-Administered Medications Ordered in Other Visits:     bacitracin 50,000 Units, gentamicin (GARAMYCIN) 40 mg/mL 80 mg, ceFAZolin (ANCEF) 1,000 mg in sodium chloride 0 9 % 1,000 mL irrigation bottle, , Irrigation, Once, Anahi Tellez MD      Review of Systems   Constitutional: Negative for activity change, fatigue and fever  HENT: Negative for congestion, ear pain, sinus pain and sore throat  Eyes: Negative for pain and itching  Respiratory: Negative for cough and shortness of breath  Cardiovascular: Negative for chest pain and palpitations     Gastrointestinal: Negative for abdominal pain, constipation, diarrhea, nausea and vomiting  Endocrine: Negative for cold intolerance and heat intolerance  Genitourinary: Negative for dysuria  Musculoskeletal: Negative for myalgias  Skin: Negative for color change and rash  Neurological: Negative for dizziness, syncope and headaches  Hematological: Negative for adenopathy  Psychiatric/Behavioral: Negative for behavioral problems, dysphoric mood and sleep disturbance  The patient is not nervous/anxious  Objective:      /88   Pulse 72   Temp 98 °F (36 7 °C)   Resp 18   Ht 5' 4" (1 626 m)   Wt 62 9 kg (138 lb 9 6 oz)   SpO2 98%   BMI 23 79 kg/m²          Physical Exam  Vitals and nursing note reviewed  Constitutional:       General: She is not in acute distress  Appearance: Normal appearance  She is well-developed  She is not ill-appearing  Comments: Body mass index is 23 79 kg/m²  HENT:      Head: Normocephalic and atraumatic  Right Ear: External ear normal       Left Ear: External ear normal       Nose: Nose normal       Mouth/Throat:      Mouth: Mucous membranes are dry  Eyes:      General: No scleral icterus  Conjunctiva/sclera: Conjunctivae normal       Pupils: Pupils are equal, round, and reactive to light  Neck:      Thyroid: No thyromegaly  Cardiovascular:      Rate and Rhythm: Normal rate and regular rhythm  Heart sounds: Normal heart sounds  No murmur heard  Pulmonary:      Effort: Pulmonary effort is normal  No respiratory distress  Breath sounds: Normal breath sounds  No wheezing  Abdominal:      General: Bowel sounds are normal  There is no distension  Palpations: Abdomen is soft  Tenderness: There is no abdominal tenderness  Musculoskeletal:         General: No tenderness  Normal range of motion  Cervical back: Normal range of motion and neck supple  Right lower leg: No edema  Left lower leg: No edema  Lymphadenopathy:      Cervical: No cervical adenopathy     Skin:     General: Skin is warm and dry  Coloration: Skin is not jaundiced  Findings: No rash  Neurological:      Mental Status: She is alert and oriented to person, place, and time  Cranial Nerves: No cranial nerve deficit     Psychiatric:         Mood and Affect: Mood normal          Behavior: Behavior normal

## 2022-07-07 ENCOUNTER — PATIENT MESSAGE (OUTPATIENT)
Dept: NEUROLOGY | Facility: CLINIC | Age: 72
End: 2022-07-07

## 2022-07-11 ENCOUNTER — APPOINTMENT (OUTPATIENT)
Dept: LAB | Facility: CLINIC | Age: 72
End: 2022-07-11
Payer: MEDICARE

## 2022-07-11 DIAGNOSIS — R79.89 ELEVATED SERUM CREATININE: ICD-10-CM

## 2022-07-11 DIAGNOSIS — N18.31 STAGE 3A CHRONIC KIDNEY DISEASE (HCC): ICD-10-CM

## 2022-07-11 LAB
ANION GAP SERPL CALCULATED.3IONS-SCNC: 5 MMOL/L (ref 4–13)
BUN SERPL-MCNC: 18 MG/DL (ref 5–25)
CALCIUM SERPL-MCNC: 9.2 MG/DL (ref 8.3–10.1)
CHLORIDE SERPL-SCNC: 113 MMOL/L (ref 100–108)
CO2 SERPL-SCNC: 23 MMOL/L (ref 21–32)
CREAT SERPL-MCNC: 1.02 MG/DL (ref 0.6–1.3)
GFR SERPL CREATININE-BSD FRML MDRD: 54 ML/MIN/1.73SQ M
GLUCOSE P FAST SERPL-MCNC: 91 MG/DL (ref 65–99)
POTASSIUM SERPL-SCNC: 4.6 MMOL/L (ref 3.5–5.3)
SODIUM SERPL-SCNC: 141 MMOL/L (ref 136–145)

## 2022-07-11 PROCEDURE — 80048 BASIC METABOLIC PNL TOTAL CA: CPT

## 2022-07-11 PROCEDURE — 36415 COLL VENOUS BLD VENIPUNCTURE: CPT

## 2022-07-12 DIAGNOSIS — N18.31 STAGE 3A CHRONIC KIDNEY DISEASE (HCC): Primary | ICD-10-CM

## 2022-07-20 ENCOUNTER — TELEPHONE (OUTPATIENT)
Dept: NEUROLOGY | Facility: CLINIC | Age: 72
End: 2022-07-20

## 2022-07-20 NOTE — TELEPHONE ENCOUNTER
Let pt know good job  Please have her pcp keep us in loop as well  J  ----- Message from Otsi Morgan RN sent at 7/20/2022 12:37 PM EDT -----  Regarding: FW: Lab results    ----- Message -----  From: Agnes Dillon  Sent: 7/11/2022   1:02 PM EDT  To: Neurology [facility] Clinical Team 3  Subject: Lab results                                      Dr Quan Hayden,  Had BMP this am   Drinking propel las few days  K+ improved and GFR now 55 up from 42 in a week  Still can't drink plain water and juice too sweet but I'm trying    Jonny Lenz

## 2022-07-27 ENCOUNTER — PATIENT MESSAGE (OUTPATIENT)
Dept: UROLOGY | Facility: AMBULATORY SURGERY CENTER | Age: 72
End: 2022-07-27

## 2022-07-27 ENCOUNTER — TELEPHONE (OUTPATIENT)
Dept: UROLOGY | Facility: HOSPITAL | Age: 72
End: 2022-07-27

## 2022-07-27 NOTE — TELEPHONE ENCOUNTER
Regarding: Low GFR  ----- Message from Josef Richardson RN sent at 2022  1:11 PM EDT -----       ----- Message from Gerry Alcantar to Duyen Limon MD sent at 2022  8:27 AM -----   Dr Ti Silva,  I had a routine BMP prior to a visit to my neurologist and my GFR had decreased significantly to 42 because I did not have anything to drink for 12 hours  I was under the impression that I was to be NPO for one of the tests and the lab had issues and were backed up which accounted for the delay  My PCP freaked and said I was in grade 3 renal failure and if I did not change my habits I would need a nephrology referral and may need dialysis! ! Would not hear the fact that I was likely dehydrated and that I probably don't drink enough  Had repeat in 10 days and GFR up to 55  Just wanted to keep your office updated  Can't stand water, but, drinking propel since Na and K+ also off  Now after emptying bladder have a lot of pressure which I never had before  Do you still want me on the Vesicare 10mg?   Thanks  Denisse Guajardo   1950  [phone]

## 2022-07-27 NOTE — TELEPHONE ENCOUNTER
Symptoms likely secondary to increased water consumption  If she is having any difficulties urinating, would recommen holding VESIcare and monitoring symptoms off medication

## 2022-08-02 ENCOUNTER — LAB (OUTPATIENT)
Dept: LAB | Facility: CLINIC | Age: 72
End: 2022-08-02
Payer: MEDICARE

## 2022-08-02 DIAGNOSIS — N18.31 STAGE 3A CHRONIC KIDNEY DISEASE (HCC): ICD-10-CM

## 2022-08-02 LAB
ANION GAP SERPL CALCULATED.3IONS-SCNC: 7 MMOL/L (ref 4–13)
BUN SERPL-MCNC: 15 MG/DL (ref 5–25)
CALCIUM SERPL-MCNC: 8.9 MG/DL (ref 8.3–10.1)
CHLORIDE SERPL-SCNC: 113 MMOL/L (ref 96–108)
CO2 SERPL-SCNC: 23 MMOL/L (ref 21–32)
CREAT SERPL-MCNC: 0.94 MG/DL (ref 0.6–1.3)
GFR SERPL CREATININE-BSD FRML MDRD: 60 ML/MIN/1.73SQ M
GLUCOSE P FAST SERPL-MCNC: 86 MG/DL (ref 65–99)
POTASSIUM SERPL-SCNC: 3.9 MMOL/L (ref 3.5–5.3)
SODIUM SERPL-SCNC: 143 MMOL/L (ref 135–147)

## 2022-08-02 PROCEDURE — 80048 BASIC METABOLIC PNL TOTAL CA: CPT

## 2022-08-02 PROCEDURE — 36415 COLL VENOUS BLD VENIPUNCTURE: CPT

## 2022-08-08 NOTE — RESULT ENCOUNTER NOTE
Labs stable -- plan to review at patient's next visit with me 8/11      Sheila Pham, DO partial weight bearing left

## 2022-08-11 ENCOUNTER — OFFICE VISIT (OUTPATIENT)
Dept: FAMILY MEDICINE CLINIC | Facility: OTHER | Age: 72
End: 2022-08-11
Payer: MEDICARE

## 2022-08-11 VITALS
BODY MASS INDEX: 23.05 KG/M2 | HEIGHT: 64 IN | DIASTOLIC BLOOD PRESSURE: 64 MMHG | SYSTOLIC BLOOD PRESSURE: 118 MMHG | HEART RATE: 74 BPM | RESPIRATION RATE: 18 BRPM | TEMPERATURE: 97.8 F | WEIGHT: 135 LBS

## 2022-08-11 DIAGNOSIS — N18.31 STAGE 3A CHRONIC KIDNEY DISEASE (HCC): ICD-10-CM

## 2022-08-11 DIAGNOSIS — I10 ESSENTIAL HYPERTENSION: Primary | ICD-10-CM

## 2022-08-11 PROCEDURE — 99213 OFFICE O/P EST LOW 20 MIN: CPT | Performed by: FAMILY MEDICINE

## 2022-08-11 NOTE — PROGRESS NOTES
Assessment/Plan:    No problem-specific Assessment & Plan notes found for this encounter  Diagnoses and all orders for this visit:    Essential hypertension  Comments:  Home BP's running low  Will d/c lisinopril and recheck BP at next visit in 6 wks    Stage 3a chronic kidney disease ([address] )  Comments:  Cr stable  eGFR has improved to 60 (up from 42)  Continue good oral hydration        Return in about 6 weeks (around 9/22/2022) for AWV  The patient indicates understanding of these issues and agrees with the plan  Subjective:      Patient ID: Pedro Pablo Kramer is a 67 y o  female  HPI   Pt presents for HTN f/u and to recheck renal function  States that her BP has not been above 120/80 at home  Often feels it is low and becomes lightheaded  Has been increasing her oral hydration with water since last visit  eGFR has improved but pt now frustrated because she has exacerbated her OAB and this is also leading to poor sleep  Declines bedside commode      The following portions of the patient's history were reviewed and updated as appropriate: allergies, current medications, past family history, past medical history, past social history, past surgical history and problem list       Current Outpatient Medications:     ALPHA LIPOIC ACID PO, Take by mouth, Disp: , Rfl:     Ampyra 10 MG TB12, Take 1 tablet (10 mg total) by mouth 2 (two) times a day, Disp: 180 tablet, Rfl: 3    ascorbic acid (VITAMIN C) 500 mg tablet, Take 500 mg by mouth daily, Disp: , Rfl:     baclofen 10 mg tablet, 1 tab pos q am, 1 tab po q pm and 2 tabs hs, Disp: 360 tablet, Rfl: 3    Biotin 5000 MCG TABS, , Disp: , Rfl:     Cholecalciferol (VITAMIN D3) 1000 units CAPS, Take 5,000 Units by mouth  , Disp: , Rfl:     clonazePAM (KlonoPIN) 0 5 mg tablet, 1 5 tablets qhs, Disp: 45 tablet, Rfl: 5    Copaxone 40 MG/ML SOSY, Inject 40mg under the skin three times weekly  , Disp: 36 mL, Rfl: 3    Cranberry 1000 MG CAPS, Take 300 mg by mouth  , Disp: , Rfl:     denosumab (PROLIA) 60 mg/mL, Inject under the skin, Disp: , Rfl:     docusate sodium (COLACE) 100 mg capsule, Take 1 capsule by mouth every other day  , Disp: , Rfl:     gabapentin (NEURONTIN) 300 mg capsule, Take 1 capsule (300 mg total) by mouth daily at bedtime, Disp: 90 capsule, Rfl: 3    gabapentin (NEURONTIN) 600 MG tablet, 2 tabs po tid, Disp: 540 tablet, Rfl: 3    Magnesium 500 MG TABS, Take 500 tablets by mouth once, Disp: , Rfl:     zonisamide (ZONEGRAN) 100 mg capsule, Take 4 capsules (400 mg total) by mouth daily at bedtime, Disp: 360 capsule, Rfl: 3  No current facility-administered medications for this visit  Facility-Administered Medications Ordered in Other Visits:     bacitracin 50,000 Units, gentamicin (GARAMYCIN) 40 mg/mL 80 mg, ceFAZolin (ANCEF) 1,000 mg in sodium chloride 0 9 % 1,000 mL irrigation bottle, , Irrigation, Once, Sabino Craft MD      Review of Systems   Constitutional: Negative for activity change, fatigue and fever  HENT: Negative for congestion, ear pain, sinus pain and sore throat  Eyes: Negative for pain and itching  Respiratory: Negative for cough and shortness of breath  Cardiovascular: Negative for chest pain and palpitations  Gastrointestinal: Negative for abdominal pain, constipation, diarrhea, nausea and vomiting  Endocrine: Negative for cold intolerance and heat intolerance  Genitourinary: Positive for frequency (MS-related) and urgency (MS-related)  Negative for dysuria  Musculoskeletal: Positive for gait problem (secondary to MS)  Negative for myalgias  Skin: Negative for color change and rash  Neurological: Negative for dizziness, syncope and headaches  Hematological: Negative for adenopathy  Psychiatric/Behavioral: Positive for sleep disturbance (due to nocturia)  Negative for behavioral problems and dysphoric mood  The patient is not nervous/anxious            Objective:      /64   Pulse 74 Temp 97 8 °F (36 6 °C)   Resp 18   Ht 5' 4" (1 626 m)   Wt 61 2 kg (135 lb)   BMI 23 17 kg/m²          Physical Exam  Vitals and nursing note reviewed  Constitutional:       General: She is not in acute distress  Appearance: Normal appearance  She is well-developed  She is not ill-appearing  Comments: Body mass index is 23 17 kg/m²  HENT:      Head: Normocephalic and atraumatic  Right Ear: External ear normal       Left Ear: External ear normal       Nose: Nose normal    Eyes:      General: No scleral icterus  Conjunctiva/sclera: Conjunctivae normal       Pupils: Pupils are equal, round, and reactive to light  Neck:      Thyroid: No thyromegaly  Cardiovascular:      Rate and Rhythm: Normal rate and regular rhythm  Heart sounds: Normal heart sounds  No murmur heard  Pulmonary:      Effort: Pulmonary effort is normal  No respiratory distress  Breath sounds: Normal breath sounds  No wheezing  Abdominal:      General: Bowel sounds are normal  There is no distension  Palpations: Abdomen is soft  Tenderness: There is no abdominal tenderness  Musculoskeletal:         General: No tenderness  Normal range of motion  Cervical back: Normal range of motion and neck supple  Right lower leg: No edema  Left lower leg: No edema  Lymphadenopathy:      Cervical: No cervical adenopathy  Skin:     General: Skin is warm and dry  Coloration: Skin is not jaundiced  Findings: No rash  Neurological:      General: No focal deficit present  Mental Status: She is alert and oriented to person, place, and time  Cranial Nerves: No cranial nerve deficit  Gait: Gait abnormal (ambulates with cane)     Psychiatric:         Mood and Affect: Mood normal          Behavior: Behavior normal

## 2022-08-17 DIAGNOSIS — G47.61 PLMD (PERIODIC LIMB MOVEMENT DISORDER): ICD-10-CM

## 2022-08-17 RX ORDER — CLONAZEPAM 0.5 MG/1
TABLET ORAL
Qty: 45 TABLET | Refills: 5 | Status: SHIPPED | OUTPATIENT
Start: 2022-08-17

## 2022-08-23 ENCOUNTER — PATIENT MESSAGE (OUTPATIENT)
Dept: UROLOGY | Facility: AMBULATORY SURGERY CENTER | Age: 72
End: 2022-08-23

## 2022-08-24 ENCOUNTER — PATIENT MESSAGE (OUTPATIENT)
Dept: UROLOGY | Facility: AMBULATORY SURGERY CENTER | Age: 72
End: 2022-08-24

## 2022-08-24 ENCOUNTER — TELEPHONE (OUTPATIENT)
Dept: UROLOGY | Facility: HOSPITAL | Age: 72
End: 2022-08-24

## 2022-08-24 DIAGNOSIS — N32.81 OAB (OVERACTIVE BLADDER): ICD-10-CM

## 2022-08-24 DIAGNOSIS — N32.81 OAB (OVERACTIVE BLADDER): Primary | ICD-10-CM

## 2022-08-24 RX ORDER — SOLIFENACIN SUCCINATE 5 MG/1
5 TABLET, FILM COATED ORAL DAILY
Qty: 30 TABLET | Refills: 11 | Status: SHIPPED | OUTPATIENT
Start: 2022-08-24 | End: 2022-09-27 | Stop reason: SDUPTHER

## 2022-08-24 NOTE — TELEPHONE ENCOUNTER
Regarding: Bladder urgency   ----- Message from Ladarius Franklin RN sent at 2022  8:04 AM EDT -----       ----- Message from Donovan Boeck to Lanette Linares MD sent at 2022  6:14 PM -----   Dr Wilbert Norwood   We stopped the Vesicare 10mg due to the pressure felt on emptying my bladder after PCP insisted on increasing fluid intake and stopping tea  I have tried but I have such urgency and for the first time have to get up in the middle of the night  Would it be possible to try only 5mg of Vesicare to see if this will help at all  I have loads of 10 mg that I can cut in half  Please let me know  Thank you     Morelia Jones    1950  [phone]

## 2022-08-26 ENCOUNTER — TELEPHONE (OUTPATIENT)
Dept: FAMILY MEDICINE CLINIC | Facility: OTHER | Age: 72
End: 2022-08-26

## 2022-08-26 DIAGNOSIS — E87.6 HYPOKALEMIA: ICD-10-CM

## 2022-08-26 DIAGNOSIS — N18.31 STAGE 3A CHRONIC KIDNEY DISEASE (HCC): Primary | ICD-10-CM

## 2022-08-26 NOTE — TELEPHONE ENCOUNTER
The patient asked if you can place orders for her BMP prior to her appt  10/24  She will be seeing neurology on 10/28 and would like to get all lab work completed before her appt to neurology  Thank you!

## 2022-09-08 ENCOUNTER — TELEPHONE (OUTPATIENT)
Dept: NEUROLOGY | Facility: CLINIC | Age: 72
End: 2022-09-08

## 2022-09-08 DIAGNOSIS — G35 MULTIPLE SCLEROSIS (HCC): Primary | ICD-10-CM

## 2022-09-08 NOTE — TELEPHONE ENCOUNTER
----- Message from Jose Alfredo Sotelo RN sent at 9/8/2022 11:39 AM EDT -----  Regarding: FW: Physical therapy    ----- Message -----  From: Onelia Bergman  Sent: 9/8/2022  10:59 AM EDT  To: Neurology [facility] Clinical Team 3  Subject: Physical therapy                                 Dr Huong Alonzo,  I have been in aqua therapy for approximately two months and although some of my tests on evaluation had seen an improvement, it was a good day for me  In general, MS is never consistent  My walking is the same (if not worse) and my right arm is weak  After the eval, I could only be scheduled once a week  The water has been really cool and the air vents surround the perimeter of the pool blasting cold air so much as some days the therapist has a jacket on! Yesterday I showed up and was advised that there was a problem and the water was 85 degrees and in the shallow end the water was almost to my shoulders  I shook so much that I toughed it out 45 minutes and had to quit  I stayed with a jacket on the rest of the night  This just isn't working  They want to switch me to traditional therapy there, but the facility capabilities won't improve  Is it possible to change my therapy back to at [facility] where I was before?   Jasmyne Landaverde  1950

## 2022-09-08 NOTE — TELEPHONE ENCOUNTER
Let pt know no problem  Please see how we can place this specific referral for her and I will sign    thanks

## 2022-09-09 NOTE — TELEPHONE ENCOUNTER
Called  PT  Spoke w/Lizz  She states they place referrals by zipcode  Patient is in [facility] so likely she would be assigned [facility] location  Shadi Cedillo advised to provide patient w/phone number to [address] [facility] location and once referral is placed, patient can call there and be scheduled   PT [facility] [phone]  Called patient  Received vm  Left detailed msg (per consent in chart) regarding above  Referral pended  Dr Elida Grossman - please sign referral  Thank you!

## 2022-09-15 ENCOUNTER — EVALUATION (OUTPATIENT)
Dept: PHYSICAL THERAPY | Facility: CLINIC | Age: 72
End: 2022-09-15
Payer: MEDICARE

## 2022-09-15 DIAGNOSIS — G35 MULTIPLE SCLEROSIS (HCC): Primary | ICD-10-CM

## 2022-09-15 PROCEDURE — 97163 PT EVAL HIGH COMPLEX 45 MIN: CPT | Performed by: PHYSICAL THERAPIST

## 2022-09-15 NOTE — PROGRESS NOTES
PT Evaluation     Today's date: 9/15/2022  Patient name: Samina Juarez  : 1950  MRN: [id number]  Referring provider: Nino Kanner, MD  Dx:   Encounter Diagnosis     ICD-10-CM    1  Multiple sclerosis [facility])  [facility] Ambulatory Referral to Physical Therapy                  Assessment  Assessment details: Samina Juarez is a 67 y o  female who presents with signs and symptoms consistent of a primary balance impairment, gait abnormality, weakness in the BLE and right UE secondary to Multiple Sclerosis  Pt also has persistent neuropathic pain in BLE which ebbs and flows  Patient presents with weakness in bilateral LEs, weakness in RUE, deconditioning, poor static and dynamic balance  Her standing posture is poor due to functional scoliosis and her R shoulder is considerably more depressed than left likely due to weakness in shoulder girdle and scapular stabilizers  This is making ambulation with a SPC difficult with the right hand  She is currently ambulating with a SPC for safety  Due to these impairments, Patient has difficulty ambulating for distance, performing sit to stand transfers, negotiating stairs, performing household ADLs, and is at increased risk of falls  Patient would benefit from skilled physical therapy to address the impairments, improve their level of function, and to improve their overall quality of life  Impairments: abnormal coordination, abnormal gait, abnormal or restricted ROM, abnormal movement, difficulty understanding, impaired balance, impaired physical strength, lacks appropriate home exercise program and poor posture   Barriers to therapy: Chronicity of pain, MS, H/O R IDALIA and L IM nailing in femur  Understanding of Dx/Px/POC: good   Prognosis: good    Goals  Short Term Goals: to be achieved by 4 weeks  1) Patient to be independent with basic HEP    2) Improve 5x sit to stand by 5 seconds  3) Improve TUG by 5 seconds   4) Increase LE strength by 1/2 MMT grade in all deficient planes  Long Term Goals: to be achieved by discharge  1) FOTO equal to or greater than expected  2) Ambulation to improve to maximal level of function  3) Stair negotiation will improve to reciprocal   4) Sit to stand transfers will improve to maximal level of function     Plan  Patient would benefit from: PT eval and skilled physical therapy  Planned therapy interventions: manual therapy, neuromuscular re-education, patient education, strengthening, therapeutic activities, therapeutic exercise, transfer training, home exercise program and functional ROM exercises  Frequency: 2x per week for 12 weeks  Treatment plan discussed with: patient        Subjective Evaluation    History of Present Illness  Mechanism of injury: History of Current Injury: Pt referred to PT from Neurologist secondary to MS relapse and functional decline  Pt has several week long flares which have made her extremely fatigued and weak  Pt attempted aquatic therapy for gait and balance at [facility]  She has been previously treated by me and last seen in March of 2022  Pt continues to report difficulties with ambulation, endurance, balance, and persistent neuropathic pain in BLE  Her symptoms and function varies day to day  She continues to ambulate with a SPC  Pt's PMH is significant for R total hip replacement in 2012 and L IM nailing in femur due to fracture  Pt's primary concern is her balance and unsteadiness  She also reports increasing right sided weakness including her right shoulder  She notes her right shoulder is considerably depressed  She has difficulty using SPC in R hand  Pt feels heaviness in RUE  Pt has intermittent numbness in BLE  Imaging: None recently    Patient goals:  Improve ambulation, endurance, balance, and strength  Pt would like to also work on R shoulder strength to improve the ability to walk with cane  She notes that she consistent veers to the right       Hobbies/Interest: Cooking, family, Conferences with [employer] as [employer]  Occupation: Retired nurse           Objective     Static Posture   General Observations  Scoliosis  Shoulders  Depressed  Neurological Testing     Reflexes   Left   Biceps (C5/C6): normal (2+)  Brachioradialis (C6): normal (2+)  Triceps (C7): normal (2+)  Patellar (L4): trace (1+)  Achilles (S1): trace (1+)    Right   Biceps (C5/C6): normal (2+)  Brachioradialis (C6): normal (2+)  Triceps (C7): normal (2+)  Patellar (L4): trace (1+)  Achilles (S1): trace (1+)    Strength/Myotome Testing   Cervical Spine     Left   Interossei strength (t1): 4    Right   Interossei strength (t1): 4-    Left Shoulder     Planes of Motion   Flexion: 4   Abduction: 4   External rotation at 0°: 4   Internal rotation at 0°: 4     Right Shoulder     Planes of Motion   Flexion: 4-   Abduction: 3+   External rotation at 0°: 4-   Internal rotation at 0°: 4     Left Elbow   Flexion: 4  Extension: 4    Right Elbow   Flexion: 4-  Extension: 4-    Left Wrist/Hand   Wrist extension: 4  Wrist flexion: 4  Thumb extension: 4-    Right Wrist/Hand   Wrist extension: 4-  Wrist flexion: 4-  Thumb extension: 4-    Left Hip   Planes of Motion   Flexion: 3+  Abduction: 3+  Adduction: 3+    Right Hip   Planes of Motion   Flexion: 4  Abduction: 3+  Adduction: 3+    Left Knee   Flexion: 3+  Extension: 3+    Right Knee   Flexion: 4-  Extension: 4-    Left Ankle/Foot   Dorsiflexion: 4-  Plantar flexion: 4-    Right Ankle/Foot   Dorsiflexion: 4-  Plantar flexion: 4-    Additional Strength Details  *Limited R shoulder ROM compared to L with  Functional ER and Functional IR   *Right scapular and humeral depression compared to the Left (This is likely due to functional scoliosis)  Functional Assessment        Comments  Timed up and go:  Date: 25 seconds  with use of hands on rails for assistance and SPC in RUE    5x sit to stand:   Date: 19 seconds with BUE assistance  Moderate knee valgus alignment preset during transitions  2 minute walk test:   NT    MCTSIB: feet together c/ yellow foam  EO firm: 30 seconds with mod postural sway   EC firm: 8 seconds with prior to LOB (then to 23 seconds with FT support)   EO foam: FT support after 10 seconds which was required throughout with moderate postural sway  EC foam: Unable to perform     6 minute walk test:   NT             Precautions: MS, Gait dysfunction, Insomnia, Osteoporosis, Peripheral polyneuropathy, Hx of Breast CA, R IDALIA in 2012- Dr Mcdermott [facility], Left femur IM nailing      Manuals                                                                 Neuro Re-Ed             TB rows             TB Ws             TB lower trap             TB extension             UBE             Romberg on foam                          Ther Ex                                                                                                                     Ther Activity             Lateral walks              Lateral idris walks              Tandem idris walks             [place] row with backward walking                                       Gait Training                                       Modalities                                        1:1 with PT from 1015-11a

## 2022-09-20 ENCOUNTER — OFFICE VISIT (OUTPATIENT)
Dept: PHYSICAL THERAPY | Facility: CLINIC | Age: 72
End: 2022-09-20
Payer: MEDICARE

## 2022-09-20 DIAGNOSIS — G35 MULTIPLE SCLEROSIS (HCC): Primary | ICD-10-CM

## 2022-09-20 PROCEDURE — 97530 THERAPEUTIC ACTIVITIES: CPT | Performed by: PHYSICAL THERAPIST

## 2022-09-20 PROCEDURE — 97112 NEUROMUSCULAR REEDUCATION: CPT | Performed by: PHYSICAL THERAPIST

## 2022-09-20 PROCEDURE — 97110 THERAPEUTIC EXERCISES: CPT | Performed by: PHYSICAL THERAPIST

## 2022-09-20 NOTE — PROGRESS NOTES
Daily Note     Today's date: 2022  Patient name: Miguelina Matamoros  : 1950  MRN: [id number]  Referring provider: Sandee Ramirez MD  Dx:   Encounter Diagnosis     ICD-10-CM    1  Multiple sclerosis ([facility] [address] )  Liborio Felix        Start Time: 1015  Stop Time: 1100  Total time in clinic (min): 45 minutes    Subjective: Pt notes more neuropathic pain in right leg upon waking this am  She reports getting minimal sleep last evening  Objective: See treatment diary below      Assessment: Initiated POC addressing primary impairments found at initial evaluation  Tolerated treatment fair  Pt reports 7/10 on RPE for program but feels it was appropriate  Pt was able to tolerate most of the session in standing  Pt utilized UE support as needed throughout program  She expressed more interest in land based PT vs aquatic therapy  Patient would benefit from continued PT  Plan: Continue per plan of care        Precautions: MS, Gait dysfunction, Insomnia, Osteoporosis, Peripheral polyneuropathy, Hx of Breast CA, R IDALIA in - Dr Emmie Lomeli, Left femur IM nailing      Manuals                                                                 Neuro Re-Ed             TB rows 2x15 tandem            TB Ws             TB lower trap             TB extension 2x15 rtb             Romberg on foam 2"                         Ther Ex             UBE 3'/3' forward/backward                                                                                                       Ther Activity             Lateral walks  5x15 feet            Lateral idris walks  4x 3 hurdles            Tandem idris walks 4x 3 hurdles            [place] row with backward walking btb 4x 5 feet                                      Gait Training                                       Modalities                                       1:1 with PT from 8110-0132

## 2022-09-26 ENCOUNTER — OFFICE VISIT (OUTPATIENT)
Dept: PHYSICAL THERAPY | Facility: CLINIC | Age: 72
End: 2022-09-26
Payer: MEDICARE

## 2022-09-26 DIAGNOSIS — G35 MULTIPLE SCLEROSIS (HCC): Primary | ICD-10-CM

## 2022-09-26 PROCEDURE — 97110 THERAPEUTIC EXERCISES: CPT | Performed by: PHYSICAL THERAPIST

## 2022-09-26 PROCEDURE — 97112 NEUROMUSCULAR REEDUCATION: CPT | Performed by: PHYSICAL THERAPIST

## 2022-09-26 PROCEDURE — 97530 THERAPEUTIC ACTIVITIES: CPT | Performed by: PHYSICAL THERAPIST

## 2022-09-26 NOTE — PROGRESS NOTES
Daily Note     Today's date: 2022  Patient name: Ovidio Remy  : 1950  MRN: [id number]  Referring provider: Lucian Ly MD  Dx:   Encounter Diagnosis     ICD-10-CM    1  Multiple sclerosis ([address] )  G35                   Subjective: Pt states that she had several bouts of orthostatic hypotension yesterday with highest BP in 117/70s  Objective: See treatment diary below      Assessment: Tolerated treatment well  Implemented 3 way hip strengthening  Left hip weaker than right  Patient exhibited good technique with therapeutic exercises and would benefit from continued PT      Plan: Continue per plan of care        Precautions: MS, Gait dysfunction, Insomnia, Osteoporosis, Peripheral polyneuropathy, Hx of Breast CA, R IDALIA in 2012- Dr Peter Teixeira, Left femur IM nailing      Manuals                                                                Neuro Re-Ed             TB rows 2x15 tandem 2x20 btb tandem           TB Ws             TB lower trap             TB extension 2x15 rtb  2x20 rtb tandem           Romberg on foam 2" 2"           Standing hip 3 way  15x ea           Ther Ex             UBE 3'/3' forward/backward 3'/3' forward/backward                                                                                                      Ther Activity             Lateral walks  5x15 feet 5x15 feet           Lateral idris walks  4x 3 hurdles 4x 4 hurdles           Tandem idris walks 4x 3 hurdles 4x 4 hurdles           [place] row with backward walking btb 4x 5 feet btb 4x 5 feet                                     Gait Training                                       Modalities                                       1:1 with PT from 1145-1223pm Speech Infant Evaluation    Today's date: 2021  Patient name: Anel Posadas  : 2022  Age:4 wk o  MRN Number: [id number]  Referring provider: Christine Rodríguez DO  Dx:   Encounter Diagnosis     ICD-10-CM    1  Oral phase dysphagia  R13 11        Start Time: 1300  Stop Time: 1400  Total time in clinic (min): 60 minutes   Patient and parent were met at the door, clinician was wearing a face mask  Patient and/or parent arrived with a face mask on  Patient appeared well without overt s/s of illness  Patient was then allowed to enter the clinic with the clinician, and was escorted to the sink to wash hands with soap and water  After washing hands, the patient was then transitioned into a designated treatment session  Items used in therapy were sanitized before and after use  Following the session, the patient was escorted back to the front door  Subjective Comments: ST evaluation X 60 minutes  Anel Posadas presented to Physical Therapy at [facility] for [facility]  He was accompanied by mom and dad  Anel Posadas had a script from [facility]  Primary concerns include "poor feeding of "  Anel Posadas participated well in all evaluation activities  Parent goals: Mom stated her goal is for [facility] to take an appropriate amount of breast milk/formula  She also stated she would prefer to do a mix of breastfeeding and bottle feeding (w/ formula) versus pump and put her breast milk in a bottle as it is "twice as much work"  Reason for Referral:Difficulty swallowing solids or liquids    Medical History significant for:   Past Medical History:   Diagnosis Date    Double aortic arch     Feeding problem     Right-sided aortic arch     Swallowing problem      Weeks Gestation: 39 weeks    Delivery via:Vaginal  Pregnancy/ birth complications: [facility] was the result of an IVF pregnancy   Per mom's report, her placenta and Sylvia's umbilical cord were twisted during the pregnancy, and she had stress tests every week to ensure baby was healthy  No other pregnancy or birth complications noted  Birth weight: 7lbs 1oz  NICU following birth:Yes, Length of stay a few days due to periods of apnea and poor feeding  Feeding tube: Yes, was placed twice due to poor feeding    Hearing:Within Normal limits  Vision:WNL  Medication List:   Current Outpatient Medications   Medication Sig Dispense Refill    ofloxacin (OCUFLOX) 0 3 % ophthalmic solution       VITAMIN D PO Take by mouth       No current facility-administered medications for this visit  Allergies: No Known Allergies  Primary Language: English  Preferred Language: English  Home Environment/ Lifestyle: Schuyler Moya lives at home with his mom ([employer] employee), dad (rivera), and three older siblings (23, 13 and 15years old)  He spends his day at home with mom  In 8 weeks, mom plans to return to work (works weekends)  Mental Status: Alert  Behavior Status:Cooperative  Rehabilitation Prognosis:Good rehab potential to reach the established goals    Past Medical History:   Past Medical History:   Diagnosis Date    Double aortic arch     Feeding problem     Right-sided aortic arch     Swallowing problem      Specialist: Cardiologist (is followed by [facility] Cardiologist)    Cardiac Concerns:Yes Currently followed by [facility] Cardiologist, diagnosed w/ vascular ring of aorta/double aortic arch  Current Respiratory status:Stridor    History of:Slow weight gain, Coughing/choking during meals, Poor intake of solids/liquids and Other: Wet vocal quality after food intake  Previous feeding history: Yes Location:in NICU  History of MBSS/FEES: WNL    Specialist seen:Cardiologist  Allergies: No Known Allergies, however, older brother was allergic to dairy as an infant (resolved around 3year old)      Child was Breast fed from birth   Per mom's report, Schuyler Moya had difficulty obtaining milk from the breast  Mom explained that Schuyler Moya would become exhausted and fall asleep, but would wake up less than an hour later hungry  Mom started pumping and would feed Sylvia breast milk from the bottle once he became tired from breastfeeding  Formula was introduced during this time to supplement if mom did not have enough breast milk  In the NICU Kelle Sales was given Similac Pro Sensitive via Dr José Siddiqui bottle w/ premie nipple  NICU SLP implemented sidelying and pacing strategy and Sylvia showed improvement  Kelle Sales was then changed to Similac Total Comfort due to an excess of gas via Dr José Siddiqui bottle w/  nipple  Mom stated they will be changing formula again to Emfamil Sensitive due to continuing gas symptoms  Current diet consist of Formula Amount accpeted daily: 9 oz and Breastmilk Amount accpeted daily: 9 oz    According to parent report, a typical day of meals consists of: feeding ~3 oz (takes 30 minutes) every 3-4 hours, then Kelle Sales sleeps    Current Consistency accepted:Regular Thin  Current Bottle system: Dr José Siddiqui with  nipple     Eating Assessment Tool - Mixed Breastfeeding and Bottle-feeding (NeoEAT - Mixed Feeding) by ROXI Whitaker Jerrel Salters , Mercedes Lucas , and [address] [place] [address] (2019)  An inventory of Deannas current eating and behavior skills was completed by mom using a Feeding Flock Pediatric Assessment Tool  The  Eating Assessment Tool - Mixed Breastfeeding and Bottle-feeding is a valid and reliable questionnaire used to assess observable symptoms of problematic feeding in infants less than 7 months old who are both breastfeeding and bottle-feeding  It is to be completed by a caregiver of the infant who is familiar with the child's typical eating  This tool uses a 6-point Likert scale to measure the areas of Infant Regulation, Energy and Physiologic Stability, Gastrointestinal Function, Sensory Responsiveness, and Feeding Flexibility  The answers to all questions are totaled and scores are compared to same-aged children  According to the reference values, Sylvia's scores are as follows:    Categories Raw Score Concern Description   Infant Regulation 13 No Concern   Energy and Physiologic Stability 24 Concern   Gastrointestinal Tract Function 55 Concern   Sensory Responsiveness 6 No Concern   Feeding Flexibility 24 Concern     Specific areas of deficit indicated by this assessment include: difficulty sleeping lying on back, breathing faster while eating, coughing/choking while eating, sounding gurgly during/after eating, is very gassy, and needs a bottle after breastfeeding        Observations/Assessments:Infant Oral Motor    Infant State Prior to feeding:Deep Sleep  Respiration at Rest:WNL  Hunger Cues:Lip smacking   Facial Appearance:Symmetrical  Mandible:WFL  Lips:Other:Noted upper lip tie  Palate:WFL  Tongue:Normal ROM  Positioning for Feeding:Sidelying  Non Nutritive Sucking Observation    Modality:Gloved finger  Initiation of NNS:Unable to be elicited as Dariel Dewitt was in a deep sleep  Burst Cycles during NNS:Other:None  Nutritive Sucking Observation  Position for Feeding:Sidelying  Type of Feeding:Bottle  Type of Liquid Presented:Regular Thin  Method of Acceptance:Bottle Type: Dr Pena Glassing bottle with  nipple  Burst Cycles:   Average sucks per burst: 4-15  Fluid Expression:Good  Nutritive Coordination:Uncoordinated SSB pattern  Nutritive suction:Appropriate  Nutritive Rhythm:Decreases with progression of feeding   Endurance: Poor  External Stimulation to re-initiate suck:Stimulation required  Lip closure:WFL  Jaw control:Consistent jaw excursions  Tongue Control:WFL  Response to feeding:Respiratory Changes Stridor  Oral Loss of Liquid:Mild   Nasal Liquid Loss: No    Intervention:Position Change  Other:Corrected sidelying position  External Pacing:Yes Required on 100 % of feeding  Consistency Trial:Regular Thin  Response to Intervention: Dariel Dewitt benefited from sidelying position and external pacing to maintain an appropriate SSB ratio  Duration of feeding 30 minutes  Total Volume Accepted: Bottle:1 oz    LATCH Assessment  Latch:2- Grasps breast Tongue down Lips flanged Rhythmical sucking  Audible Swallowin- Spontaneous and intermitent  Hold (Positioning):1- Minimal assist (i e, elevate head of bed, place pillows for support): Teach one side, mother does other Staff holds and then mother takes over    Recommendations  Nipple Suggested: Preemie/ nipple  Positioning:Sidelying  Strategies:External pacing, Alerting strategies and state regulation  Referrals:Formal evaluation by Gastroenterologist to rule out underlying medical issue       Goals  Short Term Goals:  1  Nidhi Levine will improve coordination of suck/swallow/breathe given outside cues re: pacing with no evidence of stridor/increased work of breathing in 3 consecutive sessions  2  Family will demonstrate understanding of age appropriate oral motor skills and strategies through accuracy in return demonstration requiring no more than 2 verbal cues to assist Nidhi Levine in feeding  3  Referral to Baby and [address] for lactation support  Long Term Goals:  1  Nidhi Levine will improve his oral motor skills for the acceptance of breast/bottle as expected for a child of his age  2  Ongoing family education to increase carry over of learned strategies to promote safe, supportive, and developmentally appropriate feeding  Impressions/ Recommendations  Impressions: Impressions: Myna Corpus is a sweet 4 wk  o  year old patient who came with his mom and dad to Physical Therapy at [place] [address] for a feeding evaluation due to parent concerns regarding Twila Echevarria ability to eat in an age appropriate way  Per parent report and as observed during today's evaluation, Nidhi Levine has difficulty maintaining a coordinated suck/swallow/breathe  Due to these difficulties, Nidhi Levine presents with an oral phase dysphagia   Twila Echevarria would benefit from dysphagia/feeding therapy services to improve his oral motor skills in order to more effectively consume age appropriate foods      Recommendations:Dysphagia therapy  Frequency:1 x weekly  Duration:4-5 weeks    Intervention certification from: 3/16/1436  Intervention certification to: 8/50/1825  Intervention Comments: Continue feeding therapy, refer to GI due to gas

## 2022-09-28 ENCOUNTER — OFFICE VISIT (OUTPATIENT)
Dept: PHYSICAL THERAPY | Facility: CLINIC | Age: 72
End: 2022-09-28
Payer: MEDICARE

## 2022-09-28 DIAGNOSIS — G35 MULTIPLE SCLEROSIS (HCC): Primary | ICD-10-CM

## 2022-09-28 PROCEDURE — 97110 THERAPEUTIC EXERCISES: CPT | Performed by: PHYSICAL THERAPIST

## 2022-09-28 PROCEDURE — 97530 THERAPEUTIC ACTIVITIES: CPT | Performed by: PHYSICAL THERAPIST

## 2022-09-28 PROCEDURE — 97112 NEUROMUSCULAR REEDUCATION: CPT | Performed by: PHYSICAL THERAPIST

## 2022-09-28 RX ORDER — SOLIFENACIN SUCCINATE 5 MG/1
5 TABLET, FILM COATED ORAL DAILY
Qty: 90 TABLET | Refills: 2 | Status: SHIPPED | OUTPATIENT
Start: 2022-09-28 | End: 2022-09-29 | Stop reason: CLARIF

## 2022-09-28 NOTE — PATIENT COMMUNICATION
Script for Solifenacin (VESICARE) 5mg needs to be rewritten for a 90 day supply per notification received from [facility]      Script requeued and forwarded to the Advanced Practitioner covering the [place] location for approval

## 2022-09-28 NOTE — PROGRESS NOTES
Daily Note     Today's date: 2022  Patient name: Pedro Pablo Kramer  : 1950  MRN: [id number]  Referring provider: Funmilayo Mendiola MD  Dx:   Encounter Diagnosis     ICD-10-CM    1  Multiple sclerosis ([address] )  [place]        Start Time: 0900  Stop Time: 0945  Total time in clinic (min): 45 minutes    Subjective: Pt states that she was extremely fatigued after last treatment session  She also reports left sided neck discomfort that makes driving and turning head more difficult to perform  Objective: See treatment diary below      Assessment: Increase active rest throughout treatment to accommodate for increase fatigue  Tolerated treatment well  Patient demonstrated fatigue post treatment and would benefit from continued PT  Plan: Continue per plan of care        Precautions: MS, Gait dysfunction, Insomnia, Osteoporosis, Peripheral polyneuropathy, Hx of Breast CA, R IDALIA in - Dr Mcdermott Friends, Left femur IM nailing      Manuals                                                               Neuro Re-Ed             TB rows 2x15 tandem 2x20 btb tandem 2x20 gtb romberg  stance          TB Ws             TB lower trap             TB extension 2x15 rtb  2x20 rtb tandem 2x20 rtb romberg          Romberg on foam 2" 2" 2"          Standing hip 3 way  15x ea           Ther Ex             UBE 3'/3' forward/backward 3'/3' forward/backward 3'/3' forward/backward                                                                                                     Ther Activity             Lateral walks  5x15 feet 5x15 feet 6x15 feet           Lateral idris walks  4x 3 hurdles 4x 4 hurdles 4x 4 hurdles          Tandem idris walks 4x 3 hurdles 4x 4 hurdles 4x 4 hurdles          [place] row with backward walking btb 4x 5 feet btb 4x 5 feet btb 4x 5 feet                                    Gait Training                                       Modalities                                       1:1 with PT from [phone]

## 2022-09-29 RX ORDER — SOLIFENACIN SUCCINATE 5 MG/1
5 TABLET, FILM COATED ORAL DAILY
Qty: 90 TABLET | Refills: 2 | Status: SHIPPED | OUTPATIENT
Start: 2022-09-29

## 2022-09-29 NOTE — TELEPHONE ENCOUNTER
I was notified by the pharmacy that maintenance drugs need to be written for 90 day supply        Script requeued and forwarded to the Advanced Practitioner covering the [facility] for approval

## 2022-10-03 ENCOUNTER — OFFICE VISIT (OUTPATIENT)
Dept: PHYSICAL THERAPY | Facility: CLINIC | Age: 72
End: 2022-10-03
Payer: MEDICARE

## 2022-10-03 DIAGNOSIS — G35 MULTIPLE SCLEROSIS (HCC): Primary | ICD-10-CM

## 2022-10-03 PROCEDURE — 97112 NEUROMUSCULAR REEDUCATION: CPT | Performed by: PHYSICAL THERAPIST

## 2022-10-03 PROCEDURE — 97110 THERAPEUTIC EXERCISES: CPT | Performed by: PHYSICAL THERAPIST

## 2022-10-03 PROCEDURE — 97530 THERAPEUTIC ACTIVITIES: CPT | Performed by: PHYSICAL THERAPIST

## 2022-10-03 NOTE — PROGRESS NOTES
Daily Note     Today's date: 10/3/2022  Patient name: Nola Rivera  : 1950  MRN: [id number]  Referring provider: Dede Saenz MD  Dx:   Encounter Diagnosis     ICD-10-CM    1  Multiple sclerosis ([address] )  G35                   Subjective: Pt offers no new complaints today  Still feels "wobbly" in BLE during ambulation  She states that she was in the kitchen cooking a lot over the weekend  Objective: See treatment diary below      Assessment: Tolerated treatment well  Pt has more endurance in LE compared to last session  Pt's LLE fatigues quicker than RLE during functional strengthening and balance program  Patient demonstrated fatigue post treatment and would benefit from continued PT  Plan: Continue per plan of care        Precautions: MS, Gait dysfunction, Insomnia, Osteoporosis, Peripheral polyneuropathy, Hx of Breast CA, R IDALIA in - Dr Ardián Argueta, Left femur IM nailing      Manuals 9/20 9/26 10/3                                                              Neuro Re-Ed             TB rows 2x15 tandem 2x20 btb tandem 2x20 btb romberg          TB Ws             TB lower trap             TB extension 2x15 rtb  2x20 rtb tandem 2x10 rtb romberg          Romberg on foam 2" 2"           Standing hip 3 way  15x ea abd 10x ea          Ther Ex             UBE 3'/3' forward/backward 3'/3' forward/backward 3'/3' forward/backward                                                                                                     Ther Activity             Lateral walks  5x15 feet 5x15 feet           Lateral idris walks  4x 3 hurdles 4x 4 hurdles 4x 4 hurdles          Tandem idris walks 4x 3 hurdles 4x 4 hurdles 4x 4 hurdles          [place] row with backward walking btb 4x 5 feet btb 4x 5 feet btb 4x 5 feet                                    Gait Training                                       Modalities                                       1:1 with PT from 5-11a

## 2022-10-06 ENCOUNTER — OFFICE VISIT (OUTPATIENT)
Dept: PHYSICAL THERAPY | Facility: CLINIC | Age: 72
End: 2022-10-06
Payer: MEDICARE

## 2022-10-06 DIAGNOSIS — G35 MULTIPLE SCLEROSIS (HCC): Primary | ICD-10-CM

## 2022-10-06 PROCEDURE — 97530 THERAPEUTIC ACTIVITIES: CPT | Performed by: PHYSICAL THERAPIST

## 2022-10-06 PROCEDURE — 97112 NEUROMUSCULAR REEDUCATION: CPT | Performed by: PHYSICAL THERAPIST

## 2022-10-06 PROCEDURE — 97110 THERAPEUTIC EXERCISES: CPT | Performed by: PHYSICAL THERAPIST

## 2022-10-06 NOTE — PROGRESS NOTES
Daily Note     Today's date: 10/6/2022  Patient name: Adam Phelan  : 1950  MRN: [id number]  Referring provider: Lucero Tejada MD  Dx:   Encounter Diagnosis     ICD-10-CM    1  Multiple sclerosis ([address] )  Emelina Marin        Start Time: 940  Stop Time: 1020  Total time in clinic (min): 40 minutes    Subjective: Pt reports having a bad day yesterday after going grocery shopping  Pt reports back pain and fatigue in which she had to modify the rest of the day  She is still feeling the lingering effects and has weakness in her LE  Objective: See treatment diary below      Assessment: Tolerated treatment well  Continued with Scapular strengthening, balance, and LE conditioning  Pt requires contact guard throughout treatment and several rest breaks  Implemented runners climb into POC  Patient exhibited good technique with therapeutic exercises and would benefit from continued PT  Plan: Continue per plan of care        Precautions: MS, Gait dysfunction, Insomnia, Osteoporosis, Peripheral polyneuropathy, Hx of Breast CA, R IDALIA in - Dr Trent Opitz, Left femur IM nailing      Manuals 9/20 9/26 10/3 10/6                                                             Neuro Re-Ed             TB rows 2x15 tandem 2x20 btb tandem 2x20 btb romberg 2x20 btb romberg         TB Ws             TB lower trap             TB extension 2x15 rtb  2x20 rtb tandem 2x10 rtb romberg 2x10 gtb romberg         Romberg on foam 2" 2"           Standing hip 3 way  15x ea abd 10x ea abd 10x ea         Ther Ex             UBE 3'/3' forward/backward 3'/3' forward/backward 3'/3' forward/backward 3'/3' forward/backward                                                                                                    Ther Activity             Lateral walks  5x15 feet 5x15 feet           Lateral idris walks  4x 3 hurdles 4x 4 hurdles 4x 4 hurdles 4x 4 hurdles         Tandem idris walks 4x 3 hurdles 4x 4 hurdles 4x 4 hurdles 4x 4 hurdles Brandon row with backward walking btb 4x 5 feet btb 4x 5 feet btb 4x 5 feet btb 4x 5 feet         Runners climb on BD foam    10x on ea                      Gait Training                                       Modalities                                       1:1 with PT from [phone]

## 2022-10-10 ENCOUNTER — OFFICE VISIT (OUTPATIENT)
Dept: PHYSICAL THERAPY | Facility: CLINIC | Age: 72
End: 2022-10-10
Payer: MEDICARE

## 2022-10-10 ENCOUNTER — TELEPHONE (OUTPATIENT)
Dept: NEUROLOGY | Facility: CLINIC | Age: 72
End: 2022-10-10

## 2022-10-10 DIAGNOSIS — G35 MULTIPLE SCLEROSIS (HCC): Primary | ICD-10-CM

## 2022-10-10 PROCEDURE — 97530 THERAPEUTIC ACTIVITIES: CPT | Performed by: PHYSICAL THERAPIST

## 2022-10-10 PROCEDURE — 97112 NEUROMUSCULAR REEDUCATION: CPT | Performed by: PHYSICAL THERAPIST

## 2022-10-10 PROCEDURE — 97110 THERAPEUTIC EXERCISES: CPT | Performed by: PHYSICAL THERAPIST

## 2022-10-10 NOTE — TELEPHONE ENCOUNTER
[facility] to R/S appt (Provider out) offered 10/31/22 @ 11:30 in [place]   Gave direct # until 4:30 today,  central # for return call to confirm/deny offer

## 2022-10-10 NOTE — PROGRESS NOTES
Daily Note     Today's date: 10/10/2022  Patient name: Keily De Leon  : 1950  MRN: [id number]  Referring provider: Lenny Flowers MD  Dx:   Encounter Diagnosis     ICD-10-CM    1  Multiple sclerosis ([facility] [address] )  G35                   Subjective: Pt reports left sided back pain after picking up freezer items to place in another refrigerator  She reports repetitively bending to pick items off the floor which resulted in lower back pain today  Objective: See treatment diary below      Assessment: Tolerated treatment well  During step up on the L, pt has decreased motor control has her knee hyperextends during TKE with minimal control  Pt able to prevent this by keeping her knee slightly flexed during activity  Patient exhibited good technique with therapeutic exercises and would benefit from continued PT  Plan: Continue per plan of care        Precautions: MS, Gait dysfunction, Insomnia, Osteoporosis, Peripheral polyneuropathy, Hx of Breast CA, R IDALIA in - Dr Yang Buckner, Left femur IM nailing      Manuals 9/20 9/26 10/3 10/6 10/10                                                            Neuro Re-Ed             TB rows 2x15 tandem 2x20 btb tandem 2x20 btb romberg 2x20 btb romberg 2x20 btb romberg        TB Ws             TB lower trap             TB extension 2x15 rtb  2x20 rtb tandem 2x10 rtb romberg 2x10 gtb romberg 1x15,1x17 gtb romberg        Romberg on foam 2" 2"           Standing hip 3 way  15x ea abd 10x ea abd 10x ea abd 10x ea        Ther Ex             UBE 3'/3' forward/backward 3'/3' forward/backward 3'/3' forward/backward 3'/3' forward/backward 3'/3' forward/backward                                                                                                   Ther Activity             Lateral walks  5x15 feet 5x15 feet   6x10 feet        Lateral idris walks  4x 3 hurdles 4x 4 hurdles 4x 4 hurdles 4x 4 hurdles 3x 4 hurdles        Tandem idris walks 4x 3 hurdles 4x 4 hurdles 4x 4 hurdles 4x 4 hurdles 3x 4 hurdles        [place] row with backward walking btb 4x 5 feet btb 4x 5 feet btb 4x 5 feet btb 4x 5 feet btb 4x 5 feet        Runners climb on BD foam    10x on ea 20x on R/ 10x on L                     Gait Training                                       Modalities                                       1:1 with PT from [phone]

## 2022-10-13 ENCOUNTER — OFFICE VISIT (OUTPATIENT)
Dept: PHYSICAL THERAPY | Facility: CLINIC | Age: 72
End: 2022-10-13
Payer: MEDICARE

## 2022-10-13 DIAGNOSIS — G35 MULTIPLE SCLEROSIS (HCC): Primary | ICD-10-CM

## 2022-10-13 PROCEDURE — 97110 THERAPEUTIC EXERCISES: CPT | Performed by: PHYSICAL THERAPIST

## 2022-10-13 PROCEDURE — 97112 NEUROMUSCULAR REEDUCATION: CPT | Performed by: PHYSICAL THERAPIST

## 2022-10-13 PROCEDURE — 97530 THERAPEUTIC ACTIVITIES: CPT | Performed by: PHYSICAL THERAPIST

## 2022-10-13 NOTE — PROGRESS NOTES
Daily Note     Today's date: 10/13/2022  Patient name: Crispin Yates  : 1950  MRN: [id number]  Referring provider: Darrell Guillen MD  Dx:   Encounter Diagnosis     ICD-10-CM    1  Multiple sclerosis ([address] )  G35                   Subjective: Pt reports her neck felt like a  last night with stiffness and clicking  Objective: See treatment diary below      Assessment: Tolerated treatment well  Improved control of left knee during runner's climb exercise  No hyperextension noted  Performed shoulder abduction and flexion movements during romberg stance on foam  Balance controlled in session without evidence of LOB  Pt did require cueing and min support during dyanmic balance activities  Patient would benefit from continued PT      Plan: Continue per plan of care        Precautions: MS, Gait dysfunction, Insomnia, Osteoporosis, Peripheral polyneuropathy, Hx of Breast CA, R IDALIA in - Dr Martinez Linker, Left femur IM nailing      Manuals 9/20 9/26 10/3 10/6 10/10 10/13                                                           Neuro Re-Ed             TB rows 2x15 tandem 2x20 btb tandem 2x20 btb romberg 2x20 btb romberg 2x20 btb romberg 2x20 btb romberg       TB Ws             TB lower trap             TB extension 2x15 rtb  2x20 rtb tandem 2x10 rtb romberg 2x10 gtb romberg 1x15,1x17 gtb romberg 2x20 gtb       Romberg on foam 2" 2"    20x shoulder abd/20x shoulder flexion        Standing hip 3 way  15x ea abd 10x ea abd 10x ea abd 10x ea                     Ther Ex             UBE 3'/3' forward/backward 3'/3' forward/backward 3'/3' forward/backward 3'/3' forward/backward 3'/3' forward/backward 3'/3' forward/backward                                                                                                  Ther Activity             Lateral walks  5x15 feet 5x15 feet   6x10 feet 6x10 ft on blue foam        Lateral idris walks  4x 3 hurdles 4x 4 hurdles 4x 4 hurdles 4x 4 hurdles 3x 4 hurdles 4x4 hurdles        Tandem idris walks 4x 3 hurdles 4x 4 hurdles 4x 4 hurdles 4x 4 hurdles 3x 4 hurdles 4x4 hurdles        Brandon row with backward walking btb 4x 5 feet btb 4x 5 feet btb 4x 5 feet btb 4x 5 feet btb 4x 5 feet btb 4x 5 feet       Runners climb on BD foam    10x on ea 20x on R/ 10x on L 20x on each                    Gait Training                                       Modalities                                       1:1 with PT from [id number]

## 2022-10-14 ENCOUNTER — TELEPHONE (OUTPATIENT)
Dept: NEUROLOGY | Facility: CLINIC | Age: 72
End: 2022-10-14

## 2022-10-14 DIAGNOSIS — G35 MULTIPLE SCLEROSIS (HCC): Primary | ICD-10-CM

## 2022-10-14 NOTE — TELEPHONE ENCOUNTER
----- Message from Tony Smith RN sent at 10/14/2022  4:27 PM EDT -----  Regarding: FW: Lab work   BMP ordered by PCP  Please order additional testing if needed  ----- Message -----  From: Jg Sims  Sent: 10/14/2022   3:42 PM EDT  To: Neurology [facility] Clinical Team 3  Subject: Lab work                                         Dr Roya Najera   I am having labs drawn on Monday for my pcp  Would you like to check and see what she ordered and add any other test you may want  I am scheduled to see you the end of the month     Thank you   Cindy Mccarthy

## 2022-10-17 ENCOUNTER — APPOINTMENT (OUTPATIENT)
Dept: PHYSICAL THERAPY | Facility: CLINIC | Age: 72
End: 2022-10-17

## 2022-10-18 ENCOUNTER — LAB (OUTPATIENT)
Dept: LAB | Facility: CLINIC | Age: 72
End: 2022-10-18
Payer: MEDICARE

## 2022-10-18 DIAGNOSIS — N18.31 STAGE 3A CHRONIC KIDNEY DISEASE (HCC): ICD-10-CM

## 2022-10-18 DIAGNOSIS — G35 MULTIPLE SCLEROSIS (HCC): ICD-10-CM

## 2022-10-18 DIAGNOSIS — E87.6 HYPOKALEMIA: ICD-10-CM

## 2022-10-18 LAB
ALBUMIN SERPL BCP-MCNC: 3.8 G/DL (ref 3.5–5)
ALP SERPL-CCNC: 83 U/L (ref 46–116)
ALT SERPL W P-5'-P-CCNC: 34 U/L (ref 12–78)
ANION GAP SERPL CALCULATED.3IONS-SCNC: 5 MMOL/L (ref 4–13)
AST SERPL W P-5'-P-CCNC: 14 U/L (ref 5–45)
BASOPHILS # BLD AUTO: 0.07 THOUSANDS/ΜL (ref 0–0.1)
BASOPHILS NFR BLD AUTO: 1 % (ref 0–1)
BILIRUB DIRECT SERPL-MCNC: 0.15 MG/DL (ref 0–0.2)
BILIRUB SERPL-MCNC: 0.59 MG/DL (ref 0.2–1)
BUN SERPL-MCNC: 16 MG/DL (ref 5–25)
CALCIUM SERPL-MCNC: 9 MG/DL (ref 8.3–10.1)
CHLORIDE SERPL-SCNC: 112 MMOL/L (ref 96–108)
CO2 SERPL-SCNC: 24 MMOL/L (ref 21–32)
CREAT SERPL-MCNC: 0.91 MG/DL (ref 0.6–1.3)
EOSINOPHIL # BLD AUTO: 0.26 THOUSAND/ΜL (ref 0–0.61)
EOSINOPHIL NFR BLD AUTO: 4 % (ref 0–6)
ERYTHROCYTE [DISTWIDTH] IN BLOOD BY AUTOMATED COUNT: 12.7 % (ref 11.6–15.1)
GFR SERPL CREATININE-BSD FRML MDRD: 62 ML/MIN/1.73SQ M
GLUCOSE P FAST SERPL-MCNC: 139 MG/DL (ref 65–99)
HCT VFR BLD AUTO: 41.5 % (ref 34.8–46.1)
HGB BLD-MCNC: 13.2 G/DL (ref 11.5–15.4)
IMM GRANULOCYTES # BLD AUTO: 0.03 THOUSAND/UL (ref 0–0.2)
IMM GRANULOCYTES NFR BLD AUTO: 1 % (ref 0–2)
LYMPHOCYTES # BLD AUTO: 1.78 THOUSANDS/ΜL (ref 0.6–4.47)
LYMPHOCYTES NFR BLD AUTO: 30 % (ref 14–44)
MCH RBC QN AUTO: 30.6 PG (ref 26.8–34.3)
MCHC RBC AUTO-ENTMCNC: 31.8 G/DL (ref 31.4–37.4)
MCV RBC AUTO: 96 FL (ref 82–98)
MONOCYTES # BLD AUTO: 0.37 THOUSAND/ΜL (ref 0.17–1.22)
MONOCYTES NFR BLD AUTO: 6 % (ref 4–12)
NEUTROPHILS # BLD AUTO: 3.47 THOUSANDS/ΜL (ref 1.85–7.62)
NEUTS SEG NFR BLD AUTO: 58 % (ref 43–75)
NRBC BLD AUTO-RTO: 0 /100 WBCS
PLATELET # BLD AUTO: 235 THOUSANDS/UL (ref 149–390)
PMV BLD AUTO: 9.8 FL (ref 8.9–12.7)
POTASSIUM SERPL-SCNC: 3.9 MMOL/L (ref 3.5–5.3)
PROT SERPL-MCNC: 7.6 G/DL (ref 6.4–8.4)
RBC # BLD AUTO: 4.31 MILLION/UL (ref 3.81–5.12)
SODIUM SERPL-SCNC: 141 MMOL/L (ref 135–147)
WBC # BLD AUTO: 5.98 THOUSAND/UL (ref 4.31–10.16)

## 2022-10-18 PROCEDURE — 80048 BASIC METABOLIC PNL TOTAL CA: CPT

## 2022-10-18 PROCEDURE — 80076 HEPATIC FUNCTION PANEL: CPT

## 2022-10-18 PROCEDURE — 85025 COMPLETE CBC W/AUTO DIFF WBC: CPT

## 2022-10-18 PROCEDURE — 36415 COLL VENOUS BLD VENIPUNCTURE: CPT

## 2022-10-18 RX ORDER — GLATIRAMER ACETATE 40 MG/ML
INJECTION, SOLUTION SUBCUTANEOUS
Qty: 12 ML | Refills: 2 | Status: SHIPPED | OUTPATIENT
Start: 2022-10-18

## 2022-10-19 NOTE — PROGRESS NOTES
Daily Note     Today's date: 10/20/2022  Patient name: Don Bey  : 1950  MRN: [id number]  Referring provider: Casi Augustine MD  Dx:   Encounter Diagnosis     ICD-10-CM    1  Multiple sclerosis ([address] )  Jerod Dickens        Start Time:   Stop Time: 1130  Total time in clinic (min): 45 minutes    Subjective: Pt was unable to attend Monday's appointment due to significant fatigue  Pt also reports 4 days of minimal sleep leading up to Monday  She is doing better today  Objective: See treatment diary below      Assessment: Pt's LLE demonstrated greater difficulty clearing hurdles secondary to weakness  Pt experienced sharp pain in RLE during certain exercises but this was transient  Tolerated treatment fair  Patient would benefit from continued PT  Plan: Continue per plan of care        Precautions: MS, Gait dysfunction, Insomnia, Osteoporosis, Peripheral polyneuropathy, Hx of Breast CA, R IDALIA in - Dr Cherylene Fiddler, Left femur IM nailing      Manuals 9/20 9/26 10/3 10/6 10/10 10/13 10/20                                                          Neuro Re-Ed             TB rows 2x15 tandem 2x20 btb tandem 2x20 btb romberg 2x20 btb romberg 2x20 btb romberg 2x20 btb romberg 2x20 btb romberg      TB Ws             TB lower trap             TB extension 2x15 rtb  2x20 rtb tandem 2x10 rtb romberg 2x10 gtb romberg 1x15,1x17 gtb romberg 2x20 gtb 2x20 gtb      Romberg on foam 2" 2"    20x shoulder abd/20x shoulder flexion        Standing hip 3 way  15x ea abd 10x ea abd 10x ea abd 10x ea                     Ther Ex             UBE 3'/3' forward/backward 3'/3' forward/backward 3'/3' forward/backward 3'/3' forward/backward 3'/3' forward/backward 3'/3' forward/backward 3'/3' forward/backward                                                                                                 Ther Activity             Lateral walks  5x15 feet 5x15 feet   6x10 feet 6x10 ft on blue foam        Lateral idris walks  4x 3 hurdles 4x 4 hurdles 4x 4 hurdles 4x 4 hurdles 3x 4 hurdles 4x4 hurdles  4x4 hurdles       Tandem idris walks 4x 3 hurdles 4x 4 hurdles 4x 4 hurdles 4x 4 hurdles 3x 4 hurdles 4x4 hurdles  4x4 hurdles       Brandon row with backward walking btb 4x 5 feet btb 4x 5 feet btb 4x 5 feet btb 4x 5 feet btb 4x 5 feet btb 4x 5 feet btb 4x 5 feet      Runners climb on BD foam    10x on ea 20x on R/ 10x on L 20x on each 20x on each                   Gait Training                                       Modalities                                       1:1 with PT from [id number]

## 2022-10-20 ENCOUNTER — OFFICE VISIT (OUTPATIENT)
Dept: PHYSICAL THERAPY | Facility: CLINIC | Age: 72
End: 2022-10-20
Payer: MEDICARE

## 2022-10-20 DIAGNOSIS — G35 MULTIPLE SCLEROSIS (HCC): Primary | ICD-10-CM

## 2022-10-20 PROCEDURE — 97112 NEUROMUSCULAR REEDUCATION: CPT | Performed by: PHYSICAL THERAPIST

## 2022-10-20 PROCEDURE — 97110 THERAPEUTIC EXERCISES: CPT | Performed by: PHYSICAL THERAPIST

## 2022-10-20 PROCEDURE — 97530 THERAPEUTIC ACTIVITIES: CPT | Performed by: PHYSICAL THERAPIST

## 2022-10-21 NOTE — TELEPHONE ENCOUNTER
Called patient  Received vm  Left detailed msg (per consent in chart) regarding note below  Advised patient of lab results (these have been reviewed by provider)        Dr Sandra Escamilla - juan a

## 2022-10-24 ENCOUNTER — OFFICE VISIT (OUTPATIENT)
Dept: FAMILY MEDICINE CLINIC | Facility: OTHER | Age: 72
End: 2022-10-24
Payer: MEDICARE

## 2022-10-24 VITALS
HEIGHT: 64 IN | RESPIRATION RATE: 16 BRPM | SYSTOLIC BLOOD PRESSURE: 138 MMHG | BODY MASS INDEX: 23.73 KG/M2 | HEART RATE: 66 BPM | DIASTOLIC BLOOD PRESSURE: 88 MMHG | WEIGHT: 139 LBS | TEMPERATURE: 97.3 F

## 2022-10-24 DIAGNOSIS — Z00.00 MEDICARE ANNUAL WELLNESS VISIT, SUBSEQUENT: Primary | ICD-10-CM

## 2022-10-24 DIAGNOSIS — R73.09 ELEVATED GLUCOSE: ICD-10-CM

## 2022-10-24 DIAGNOSIS — I10 ESSENTIAL HYPERTENSION: ICD-10-CM

## 2022-10-24 PROCEDURE — G0439 PPPS, SUBSEQ VISIT: HCPCS | Performed by: FAMILY MEDICINE

## 2022-10-24 NOTE — PROGRESS NOTES
Assessment and Plan:     Problem List Items Addressed This Visit        Cardiovascular and Mediastinum    Essential hypertension    Relevant Orders    Lipid Panel with Direct LDL reflex      Other Visit Diagnoses     Medicare annual wellness visit, subsequent    -  Primary    Elevated glucose        Relevant Orders    Basic metabolic panel    HEMOGLOBIN A1C W/ EAG ESTIMATION          Depression Screening and Follow-up Plan: Their PHQ-9 score was 5  Patient assessed for underlying major depression  Brief counseling provided and recommend additional follow-up/re-evaluation next office visit  Depressive sx likely related to MS and recent news that a friend passed away  Preventive health issues were discussed with patient, and age appropriate screening tests were ordered as noted in patient's After Visit Summary  Personalized health advice and appropriate referrals for health education or preventive services given if needed, as noted in patient's After Visit Summary  Return in about 6 months (around 4/24/2023) for Recheck HTN, glucose  History of Present Illness:     Patient presents for a Medicare Wellness Visit    HPI   Patient Care Team:  Saniya West DO as PCP - General (Family Medicine)  MD Paulo Michaud MD Lenise Aran, MD Jed Pinal, PAKadiC  Saniya West, Loanne Quail, MD Sherra Holter, MD Luster Gasman, MD Bianca Kea, MD (Gastroenterology)     Review of Systems:     Review of Systems   Constitutional: Negative for appetite change, fatigue, fever and unexpected weight change  HENT: Negative for congestion, dental problem, ear pain, postnasal drip, sore throat and tinnitus  Eyes: Negative for pain, discharge and visual disturbance  Respiratory: Negative for cough, shortness of breath and wheezing  Cardiovascular: Negative for chest pain, palpitations and leg swelling     Gastrointestinal: Negative for abdominal pain, constipation, diarrhea, nausea and vomiting  Endocrine: Negative for cold intolerance and heat intolerance  Genitourinary: Negative for difficulty urinating, dysuria, flank pain and urgency  Musculoskeletal: Negative for arthralgias, back pain, joint swelling and myalgias  Skin: Negative for rash and wound  Allergic/Immunologic: Negative for immunocompromised state  Neurological: Negative for dizziness, syncope, speech difficulty, weakness and numbness  Hematological: Negative for adenopathy  Does not bruise/bleed easily  Psychiatric/Behavioral: Negative for confusion, dysphoric mood and sleep disturbance  The patient is not nervous/anxious           Problem List:     Patient Active Problem List   Diagnosis   • 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   • Multiple sclerosis ([facility] [address] )   • Peripheral polyneuropathy   • Depression   • Fatigue   • Gait disturbance   • Hip pain, right   • Headache   • History of bilateral hip replacements   • Hypokalemia   • Injury of right leg   • Insomnia   • Laceration of finger of right hand   • Solitary pulmonary nodule   • Rash   • Pain of left lower leg   • Pain of left calf   • Osteopenia   • Nephrolithiasis   • Muscle strain of left lower leg   • Left knee pain   • Left ankle pain   • Urticaria   • Tingling   • Screening mammogram, encounter for   • Encounter for breast reconstruction following mastectomy   • Abnormal stool test   • Encounter for follow-up surveillance of breast cancer   • Essential hypertension   • Abnormal finding on breast imaging   • TSH (thyroid-stimulating hormone deficiency)   • B12 deficiency   • RLS (restless legs syndrome)   • Dense breast tissue   • Subareolar lump of right breast      Past Medical and Surgical History:     Past Medical History:   Diagnosis Date   • Allergic 1967    seasonal   • Allergic rhinitis    • Bone pain    • Breast ptosis    • Depression    • Fatigue    • Gait disturbance     uses cane at times   • Headache    • Hip fracture (HCC) • Hip pain    • History of transfusion 1975    placenta previa s/p work accident, no rx   • Hypokalemia    • Insomnia    • Laceration of finger     right hand   • Left ankle pain    • Left knee pain    • Multiple sclerosis (HCC)    • Muscle strain     left lower leg   • Nephrolithiasis    • Osteopenia    • Osteoporosis    • Pain of left calf    • Pneumonia    • Rash    • Scoliosis birth    scoliosis   • Solitary pulmonary nodule    • SVT (supraventricular tachycardia) (HCC)    • Tingling    • Urgency of urination    • Urticaria    • Wrist fracture, left      Past Surgical History:   Procedure Laterality Date   • ANKLE SURGERY     • APPENDECTOMY  11/2012    Dr Wenceslao Gaytan   • AUGMENTATION BREAST      Enlargement procedure with prosthetic implant bilateral   • AUGMENTATION MAMMAPLASTY Right 01/28/2020    implant replaced because of recall   • AUGMENTATION MAMMAPLASTY Bilateral 2012   • BREAST BIOPSY Left 07/23/2012   • BREAST IMPLANT Right 1/28/2020    Procedure: BREAST IMPLANT EXCHANGE WITH CAPSULECTOMY;  Surgeon: Andrés Wahl MD;  [facility];  Service: Plastics   • BREAST IMPLANT REMOVAL Right 01/28/2020    implant placement, implant revision, right mastopexy   • CYSTOSTOMY      with basket extraction of calculus; x2   • EXCISION / BIOPSY BREAST / Tonny Terence / DUCT Right 05/04/2020    scar revision to resuture non healing surgical wound   • EXPLORATORY LAPAROTOMY     • FOOT SURGERY     • FRACTURE SURGERY  Lt wrist 9/2014 - Lt matthew femur 9/14   • HIP FRACTURE SURGERY Right     Subcapital   • HIP SURGERY Left    • JOINT REPLACEMENT  Rt hip 10/2012   • LEG SURGERY      Repair   • MASTECTOMY Left 08/16/2012   • DE REVISION OF RECONSTRUCTED BREAST Right 5/4/2020    Procedure: REVISION RIGHT BREAST MOUND;  Surgeon: Andrés Wahl MD;  [facility];  Service: Plastics   • DE SUSPENSION OF BREAST Right 1/28/2020    Procedure: BREAST MASTOPEXY;  Surgeon: Andrés Wahl MD;  [facility];  Service: Plastics   • REDUCTION MAMMAPLASTY Right 2020    reduced to match left side   • SENTINEL LYMPH NODE BIOPSY Left 2012   • SKIN BIOPSY     • TONSILLECTOMY     • TUBAL LIGATION     • WRIST SURGERY Left       Family History:     Family History   Problem Relation Age of Onset   • Hodgkin's lymphoma Maternal Grandfather         age at dx unk   • Cancer Maternal Aunt 79        bladder   • Heart disease Maternal Aunt    • Colon cancer Maternal Uncle 72   • Hodgkin's lymphoma Maternal Uncle 79   • Coronary artery disease Mother    • Hyperlipidemia Mother    • Rheum arthritis Mother    • Other Father         Acute myocardial infarction   • No Known Problems Maternal Grandmother    • No Known Problems Paternal Grandmother    • No Known Problems Paternal Grandfather    • No Known Problems Son    • No Known Problems Son    • Stroke Maternal Aunt         6 CVA before death   • Breast cancer Maternal Aunt    • No Known Problems Paternal Aunt    • No Known Problems Paternal Aunt    • No Known Problems Paternal Aunt       Social History:     Social History     Socioeconomic History   • Marital status: /Civil Union     Spouse name: None   • Number of children: 2   • Years of education: Completed bachelor's degree   • Highest education level: None   Occupational History   • Occupation: RN     Comment: Retired   Tobacco Use   • Smoking status: Former Smoker     Packs/day: 1 00     Years: 35 00     Pack years: 35 00     Quit date:      Years since quittin 8   • Smokeless tobacco: Never Used   Vaping Use   • Vaping Use: Never used   Substance and Sexual Activity   • Alcohol use: No   • Drug use: No   • Sexual activity: Yes     Partners: Male     Birth control/protection: Post-menopausal   Other Topics Concern   • None   Social History Narrative    Denied:  History of caffeine use     Social Determinants of Health     Financial Resource Strain: Medium Risk   • Difficulty of Paying Living Expenses: Somewhat hard   Food Insecurity: Not on file   Transportation Needs: No Transportation Needs   • Lack of Transportation (Medical): No   • Lack of Transportation (Non-Medical): No   Physical Activity: Not on file   Stress: Not on file   Social Connections: Not on file   Intimate Partner Violence: Not on file   Housing Stability: Not on file      Medications and Allergies:     Current Outpatient Medications   Medication Sig Dispense Refill   • ALPHA LIPOIC ACID PO Take by mouth     • Ampyra 10 MG TB12 Take 1 tablet (10 mg total) by mouth 2 (two) times a day 180 tablet 3   • ascorbic acid (VITAMIN C) 500 mg tablet Take 500 mg by mouth daily     • baclofen 10 mg tablet 1 tab pos q am, 1 tab po q pm and 2 tabs hs 360 tablet 3   • Biotin 5000 MCG TABS      • Cholecalciferol (VITAMIN D3) 1000 units CAPS Take 5,000 Units by mouth       • clonazePAM (KlonoPIN) 0 5 mg tablet TAKE 1 AND 1/2 TABLETS BY MOUTH DAILY AT BEDTIME 45 tablet 5   • Copaxone 40 MG/ML SOSY INJECT ONE SYRINGE SUBCUTANEOUSLY THREE TIMES PER WEEK AT LEAST 48 HOURS APART  ALLOW SYRINGE TO WARM TO ROOM TEMPERATURE FOR 20 MINUTES  REFRIGERATE   12 mL 2   • Cranberry 1000 MG CAPS Take 300 mg by mouth       • denosumab (PROLIA) 60 mg/mL Inject under the skin     • docusate sodium (COLACE) 100 mg capsule Take 1 capsule by mouth every other day       • gabapentin (NEURONTIN) 300 mg capsule Take 1 capsule (300 mg total) by mouth daily at bedtime (Patient taking differently: Take 300 mg by mouth daily at bedtime Pt taking 900 mg in after noon , pt taking 1500mg at bedtime) 90 capsule 3   • gabapentin (NEURONTIN) 600 MG tablet 2 tabs po tid (Patient taking differently: taking 1200 mg  in the am) 540 tablet 3   • Magnesium 500 MG TABS Take 500 tablets by mouth once     • solifenacin (VESICARE) 5 mg tablet Take 1 tablet (5 mg total) by mouth daily (Patient taking differently: Take 10 mg by mouth daily) 90 tablet 2   • zonisamide (ZONEGRAN) 100 mg capsule Take 4 capsules (400 mg total) by mouth daily at bedtime 360 capsule 3     No current facility-administered medications for this visit  Facility-Administered Medications Ordered in Other Visits   Medication Dose Route Frequency Provider Last Rate Last Admin   • bacitracin 50,000 Units, gentamicin (GARAMYCIN) 40 mg/mL 80 mg, ceFAZolin (ANCEF) 1,000 mg in sodium chloride 0 9 % 1,000 mL irrigation bottle   Irrigation Once Renny MD Andi         Allergies   Allergen Reactions   • Duloxetine Hcl      Rapid Heart rate     • Iodinated Diagnostic Agents Anaphylaxis   • Acetaminophen Other (See Comments)     Heart palpitations   • Cetirizine      Only occurs with generic, weakness in legs, off balance and couldn't walk  • Morphine Other (See Comments)     Migraine      Immunizations:     Immunization History   Administered Date(s) Administered   • COVID-19 MODERNA VACC 0 5 ML IM 02/02/2021, 03/02/2021, 05/06/2022   • COVID-19 Moderna Vac BIVALENT 18 Yr+ Im (BOOSTER ONLY) 10/22/2022   • INFLUENZA 11/04/2015, 10/17/2016, 10/18/2017, 10/26/2018, 10/23/2021, 10/22/2022   • Influenza Split 10/25/2020   • Influenza, high dose seasonal 0 7 mL 10/18/2020   • Influenza, seasonal, injectable 1950, 09/18/2012, 11/04/2015   • Pneumococcal Conjugate 13-Valent 10/17/2016   • Pneumococcal Polysaccharide PPV23 10/26/2018   • Tdap 08/05/2015   • Zoster 10/18/2017   • influenza, trivalent, adjuvanted 10/30/2019      Health Maintenance:         Topic Date Due   • DXA SCAN  10/28/2023   • Breast Cancer Screening: Mammogram  12/10/2023   • Colorectal Cancer Screening  07/19/2029   • Hepatitis C Screening  Completed         Topic Date Due   • COVID-19 Vaccine (4 - Booster for Crews Odell series) 09/06/2022      Medicare Screening Tests and Risk Assessments:     Dylan Conley is here for her Subsequent Wellness visit  Last Medicare Wellness visit information reviewed, patient interviewed and updates made to the record today        Health Risk Assessment: Patient rates overall health as fair  Patient feels that their physical health rating is slightly worse  Patient is dissatisfied with their life  Eyesight was rated as same  Hearing was rated as same  Patient feels that their emotional and mental health rating is same  Patients states they are never, rarely angry  Patient states they are often unusually tired/fatigued  Pain experienced in the last 7 days has been some  Patient's pain rating has been 4/10  Patient states that she has experienced no weight loss or gain in last 6 months  Fall Risk Screening: In the past year, patient has experienced: no history of falling in past year      Urinary Incontinence Screening:   Patient has not leaked urine accidently in the last six months  Home Safety:  Patient has trouble with stairs inside or outside of their home  Patient has working smoke alarms and has working carbon monoxide detector  Home safety hazards include: none  Nutrition:   Current diet is Regular  Medications:   Patient is currently taking over-the-counter supplements  OTC medications include: Vitamin C,D,Alpha lipoic acid,biotin  Patient is able to manage medications  Activities of Daily Living (ADLs)/Instrumental Activities of Daily Living (IADLs):   Walk and transfer into and out of bed and chair?: Yes  Dress and groom yourself?: Yes    Bathe or shower yourself?: Yes    Feed yourself? Yes  Do your laundry/housekeeping?: Yes  Manage your money, pay your bills and track your expenses?: Yes  Make your own meals?: Yes    Do your own shopping?: Yes    Previous Hospitalizations:   Any hospitalizations or ED visits within the last 12 months?: No      Advance Care Planning:   Living will: Yes    Durable POA for healthcare:  Yes    Advanced directive: Yes      Cognitive Screening:   Provider or family/friend/caregiver concerned regarding cognition?: No    PREVENTIVE SCREENINGS      Cardiovascular Screening:    General: Screening Current Diabetes Screening:     General: Screening Current      Colorectal Cancer Screening:     General: Screening Current      Breast Cancer Screening:     General: Screening Current      Cervical Cancer Screening:    General: Screening Not Indicated      Osteoporosis Screening:    General: Screening Not Indicated and History Osteoporosis      Abdominal Aortic Aneurysm (AAA) Screening:        General: Screening Not Indicated      Lung Cancer Screening:     General: Screening Not Indicated      Hepatitis C Screening:    General: Screening Current    Screening, Brief Intervention, and Referral to Treatment (SBIRT)    Screening  Typical number of drinks in a day: 0  Typical number of drinks in a week: 0  Interpretation: Low risk drinking behavior  AUDIT-C Screenin) How often did you have a drink containing alcohol in the past year? never  2) How many drinks did you have on a typical day when you were drinking in the past year? 0  3) How often did you have 6 or more drinks on one occasion in the past year? never    AUDIT-C Score: 0  Interpretation: Score 0-2 (female): Negative screen for alcohol misuse    Single Item Drug Screening:  How often have you used an illegal drug (including marijuana) or a prescription medication for non-medical reasons in the past year? never    Single Item Drug Screen Score: 0  Interpretation: Negative screen for possible drug use disorder    Brief Intervention  Alcohol & drug use screenings were reviewed  No concerns regarding substance use disorder identified  Other Counseling Topics:   Car/seat belt/driving safety, skin self-exam, sunscreen and calcium and vitamin D intake and regular weightbearing exercise  No exam data present     Physical Exam:     /88   Pulse 66   Temp (!) 97 3 °F (36 3 °C)   Resp 16   Ht 5' 4" (1 626 m)   Wt 63 kg (139 lb)   BMI 23 86 kg/m²     Physical Exam  Vitals and nursing note reviewed     Constitutional:       General: She is not in acute distress  Appearance: Normal appearance  She is well-developed  She is not ill-appearing  Comments: Body mass index is 23 86 kg/m²  HENT:      Head: Normocephalic and atraumatic  Right Ear: Hearing, tympanic membrane, ear canal and external ear normal       Left Ear: Hearing, tympanic membrane, ear canal and external ear normal       Nose: Nose normal       Mouth/Throat:      Mouth: Mucous membranes are moist       Pharynx: Oropharynx is clear  Uvula midline  Eyes:      General: No scleral icterus  Conjunctiva/sclera: Conjunctivae normal       Pupils: Pupils are equal, round, and reactive to light  Neck:      Thyroid: No thyromegaly  Cardiovascular:      Rate and Rhythm: Normal rate and regular rhythm  Heart sounds: Normal heart sounds  No murmur heard  Pulmonary:      Effort: Pulmonary effort is normal  No respiratory distress  Breath sounds: Normal breath sounds  No wheezing  Abdominal:      General: Bowel sounds are normal  There is no distension  Palpations: Abdomen is soft  Tenderness: There is no abdominal tenderness  Musculoskeletal:         General: No tenderness  Normal range of motion  Cervical back: Normal range of motion and neck supple  Right lower leg: No edema  Left lower leg: No edema  Lymphadenopathy:      Cervical: No cervical adenopathy  Skin:     General: Skin is warm and dry  Coloration: Skin is not jaundiced  Findings: No erythema or rash  Neurological:      General: No focal deficit present  Mental Status: She is alert and oriented to person, place, and time  Cranial Nerves: No cranial nerve deficit  Gait: Gait abnormal (ambulates with cane)     Psychiatric:         Mood and Affect: Mood normal          Behavior: Behavior normal           Miya Calles, DO

## 2022-10-24 NOTE — PATIENT INSTRUCTIONS
Medicare Preventive Visit Patient Instructions  Thank you for completing your Welcome to Medicare Visit or Medicare Annual Wellness Visit today  Your next wellness visit will be due in one year (10/25/2023)  The screening/preventive services that you may require over the next 5-10 years are detailed below  Some tests may not apply to you based off risk factors and/or age  Screening tests ordered at today's visit but not completed yet may show as past due  Also, please note that scanned in results may not display below  Preventive Screenings:  Service Recommendations Previous Testing/Comments   Colorectal Cancer Screening  * Colonoscopy    * Fecal Occult Blood Test (FOBT)/Fecal Immunochemical Test (FIT)  * Fecal DNA/Cologuard Test  * Flexible Sigmoidoscopy Age: 39-70 years old   Colonoscopy: every 10 years (may be performed more frequently if at higher risk)  OR  FOBT/FIT: every 1 year  OR  Cologuard: every 3 years  OR  Sigmoidoscopy: every 5 years  Screening may be recommended earlier than age 39 if at higher risk for colorectal cancer  Also, an individualized decision between you and your healthcare provider will decide whether screening between the ages of 74-80 would be appropriate  Colonoscopy: 07/19/2019  FOBT/FIT: Not on file  Cologuard: Not on file  Sigmoidoscopy: Not on file    Screening Current     Breast Cancer Screening Age: 36 years old  Frequency: every 1-2 years  Not required if history of left and right mastectomy Mammogram: 12/10/2021    Screening Current   Cervical Cancer Screening Between the ages of 21-29, pap smear recommended once every 3 years  Between the ages of 33-67, can perform pap smear with HPV co-testing every 5 years     Recommendations may differ for women with a history of total hysterectomy, cervical cancer, or abnormal pap smears in past  Pap Smear: 05/16/2018    Screening Not Indicated   Hepatitis C Screening Once for adults born between 1945 and 1965  More frequently in patients at high risk for Hepatitis C Hep C Antibody: 08/30/2021    Screening Current   Diabetes Screening 1-2 times per year if you're at risk for diabetes or have pre-diabetes Fasting glucose: 139 mg/dL (10/18/2022)  A1C: 4 9 % (7/8/2019)  Screening Current   Cholesterol Screening Once every 5 years if you don't have a lipid disorder  May order more often based on risk factors  Lipid panel: 05/04/2021    Screening Current     Other Preventive Screenings Covered by Medicare:  1  Abdominal Aortic Aneurysm (AAA) Screening: covered once if your at risk  You're considered to be at risk if you have a family history of AAA  2  Lung Cancer Screening: covers low dose CT scan once per year if you meet all of the following conditions: (1) Age 50-69; (2) No signs or symptoms of lung cancer; (3) Current smoker or have quit smoking within the last 15 years; (4) You have a tobacco smoking history of at least 20 pack years (packs per day multiplied by number of years you smoked); (5) You get a written order from a healthcare provider  3  Glaucoma Screening: covered annually if you're considered high risk: (1) You have diabetes OR (2) Family history of glaucoma OR (3)  aged 48 and older OR (3)  American aged 72 and older  3  Osteoporosis Screening: covered every 2 years if you meet one of the following conditions: (1) You're estrogen deficient and at risk for osteoporosis based off medical history and other findings; (2) Have a vertebral abnormality; (3) On glucocorticoid therapy for more than 3 months; (4) Have primary hyperparathyroidism; (5) On osteoporosis medications and need to assess response to drug therapy  · Last bone density test (DXA Scan): 10/28/2021   5  HIV Screening: covered annually if you're between the age of 15-65  Also covered annually if you are younger than 13 and older than 72 with risk factors for HIV infection   For pregnant patients, it is covered up to 3 times per pregnancy  Immunizations:  Immunization Recommendations   Influenza Vaccine Annual influenza vaccination during flu season is recommended for all persons aged >= 6 months who do not have contraindications   Pneumococcal Vaccine   * Pneumococcal conjugate vaccine = PCV13 (Prevnar 13), PCV15 (Vaxneuvance), PCV20 (Prevnar 20)  * Pneumococcal polysaccharide vaccine = PPSV23 (Pneumovax) Adults 25-60 years old: 1-3 doses may be recommended based on certain risk factors  Adults 72 years old: 1-2 doses may be recommended based off what pneumonia vaccine you previously received   Hepatitis B Vaccine 3 dose series if at intermediate or high risk (ex: diabetes, end stage renal disease, liver disease)   Tetanus (Td) Vaccine - COST NOT COVERED BY MEDICARE PART B Following completion of primary series, a booster dose should be given every 10 years to maintain immunity against tetanus  Td may also be given as tetanus wound prophylaxis  Tdap Vaccine - COST NOT COVERED BY MEDICARE PART B Recommended at least once for all adults  For pregnant patients, recommended with each pregnancy  Shingles Vaccine (Shingrix) - COST NOT COVERED BY MEDICARE PART B  2 shot series recommended in those aged 48 and above     Health Maintenance Due:      Topic Date Due   • DXA SCAN  10/28/2023   • Breast Cancer Screening: Mammogram  12/10/2023   • Colorectal Cancer Screening  07/19/2029   • Hepatitis C Screening  Completed     Immunizations Due:      Topic Date Due   • COVID-19 Vaccine (4 - Booster for Lars [place] series) 09/06/2022     Advance Directives   What are advance directives? Advance directives are legal documents that state your wishes and plans for medical care  These plans are made ahead of time in case you lose your ability to make decisions for yourself  Advance directives can apply to any medical decision, such as the treatments you want, and if you want to donate organs  What are the types of advance directives?   There are many types of advance directives, and each state has rules about how to use them  You may choose a combination of any of the following:  · Living will: This is a written record of the treatment you want  You can also choose which treatments you do not want, which to limit, and which to stop at a certain time  This includes surgery, medicine, IV fluid, and tube feedings  · Durable power of  for healthcare [facility]): This is a written record that states who you want to make healthcare choices for you when you are unable to make them for yourself  This person, called a proxy, is usually a family member or a friend  You may choose more than 1 proxy  · Do not resuscitate (DNR) order:  A DNR order is used in case your heart stops beating or you stop breathing  It is a request not to have certain forms of treatment, such as CPR  A DNR order may be included in other types of advance directives  · Medical directive: This covers the care that you want if you are in a coma, near death, or unable to make decisions for yourself  You can list the treatments you want for each condition  Treatment may include pain medicine, surgery, blood transfusions, dialysis, IV or tube feedings, and a ventilator (breathing machine)  · Values history: This document has questions about your views, beliefs, and how you feel and think about life  This information can help others choose the care that you would choose  Why are advance directives important? An advance directive helps you control your care  Although spoken wishes may be used, it is better to have your wishes written down  Spoken wishes can be misunderstood, or not followed  Treatments may be given even if you do not want them  An advance directive may make it easier for your family to make difficult choices about your care         © Copyright [employer] 2018 Information is for End User's use only and may not be sold, redistributed or otherwise used for commercial purposes   All illustrations and images included in CareNotes® are the copyrighted property of A D A M , Inc  or Kami Cochran

## 2022-10-25 ENCOUNTER — EVALUATION (OUTPATIENT)
Dept: PHYSICAL THERAPY | Facility: CLINIC | Age: 72
End: 2022-10-25
Payer: MEDICARE

## 2022-10-25 DIAGNOSIS — G35 MULTIPLE SCLEROSIS (HCC): Primary | ICD-10-CM

## 2022-10-25 PROCEDURE — 97530 THERAPEUTIC ACTIVITIES: CPT | Performed by: PHYSICAL THERAPIST

## 2022-10-25 PROCEDURE — 97110 THERAPEUTIC EXERCISES: CPT | Performed by: PHYSICAL THERAPIST

## 2022-10-25 PROCEDURE — 97112 NEUROMUSCULAR REEDUCATION: CPT | Performed by: PHYSICAL THERAPIST

## 2022-10-25 NOTE — PROGRESS NOTES
[id number]    Today's date: 10/25/2022  Patient name: Paco Mitchell  : 1950  MRN: [id number]  Referring provider: Delona Dance, MD  Dx:   Encounter Diagnosis     ICD-10-CM    1  Multiple sclerosis [facility])  G35                 Assessment  Assessment details: Paco Mitchell is a 67 y o  female who presents with signs and symptoms consistent of a primary balance impairment, gait abnormality, weakness in the BLE and right UE secondary to Multiple Sclerosis  Pt also has persistent neuropathic pain in BLE which ebbs and flows  Pt has attended 5 weeks of PT  She has been consistent with program and attending PT on a regular basis  She does admit to today being a bad day with fatigue and ambulation  She has demonstrated good improvements in UQ posture  Her shoulders are more symmetrical due to less muscle guarding and improved scapular strength  Due to medical complexity, her function and weakness can change from session to session  She has demonstrated improved TUG and 5x sit to stand compared to IE  Patient continues to present with weakness in bilateral LEs, weakness in RUE, deconditioning, poor static and dynamic balance  She is currently ambulating with a SPC for safety  Due to these impairments, Patient has difficulty ambulating for distance, performing sit to stand transfers, negotiating stairs, performing household ADLs, and is at increased risk of falls  Patient would benefit from continued skilled physical therapy to address the impairments, improve their level of function, and to improve their overall quality of life  Impairments: abnormal coordination, abnormal gait, abnormal or restricted ROM, abnormal movement, difficulty understanding, impaired balance, impaired physical strength, lacks appropriate home exercise program and poor posture   Barriers to therapy: Chronicity of pain, MS, H/O R IDALIA and L IM nailing in femur     Understanding of Dx/Px/POC: good   Prognosis: good    Goals  Short Term Goals: to be achieved by 4 weeks  1) Patient to be independent with basic HEP -MET  2) Improve 5x sit to stand by 5 seconds-Partially met  3) Improve TUG by 5 seconds -Partially met  4) Increase LE strength by 1/2 MMT grade in all deficient planes -Partially met    Long Term Goals: to be achieved by discharge  1) FOTO equal to or greater than expected  2) Ambulation to improve to maximal level of function  3) Stair negotiation will improve to reciprocal   4) Sit to stand transfers will improve to maximal level of function     Plan  Patient would benefit from: PT eval and skilled physical therapy  Planned therapy interventions: manual therapy, neuromuscular re-education, patient education, strengthening, therapeutic activities, therapeutic exercise, transfer training, home exercise program and functional ROM exercises  Frequency: 2x per week for 8 weeks  Treatment plan discussed with: patient        Subjective Evaluation    History of Present Illness  Mechanism of injury: History of Current Injury: Pt referred to PT from Neurologist secondary to MS relapse and functional decline  Pt has several week long flares which have made her extremely fatigued and weak  Pt attempted aquatic therapy for gait and balance at [facility]  She has been previously treated by me and last seen in March of 2022  Pt continues to report difficulties with ambulation, endurance, balance, and persistent neuropathic pain in BLE  Her symptoms and function varies day to day  She continues to ambulate with a SPC  Pt's PMH is significant for R total hip replacement in 2012 and L IM nailing in femur due to fracture  Pt's primary concern is her balance and unsteadiness  She also reports increasing right sided weakness including her right shoulder  She notes her right shoulder is considerably depressed  She has difficulty using SPC in R hand  Pt feels heaviness in RUE  Pt has intermittent numbness in BLE        Imaging: None recently    Patient goals:  Improve ambulation, endurance, balance, and strength  Pt would like to also work on R shoulder strength to improve the ability to walk with cane  She notes that she consistent veers to the right  Hobbies/Interest: Cooking, family, Conferences with [employer]  Occupation: Retired nurse           Objective     Static Posture   General Observations  Scoliosis  Shoulders  Depressed      Neurological Testing     Reflexes   Left   Biceps (C5/C6): normal (2+)  Brachioradialis (C6): normal (2+)  Triceps (C7): normal (2+)  Patellar (L4): trace (1+)  Achilles (S1): trace (1+)    Right   Biceps (C5/C6): normal (2+)  Brachioradialis (C6): normal (2+)  Triceps (C7): normal (2+)  Patellar (L4): trace (1+)  Achilles (S1): trace (1+)    Strength/Myotome Testing   Cervical Spine     Left   Interossei strength (t1): 4    Right   Interossei strength (t1): 4-    Left Shoulder     Planes of Motion   Flexion: 4   Abduction: 4   External rotation at 0°: 4   Internal rotation at 0°: 4     Right Shoulder     Planes of Motion   Flexion: 4-   Abduction: 4-  External rotation at 0°: 4-   Internal rotation at 0°: 4     Left Elbow   Flexion: 4  Extension: 4    Right Elbow   Flexion: 4-  Extension: 4-    Left Wrist/Hand   Wrist extension: 4  Wrist flexion: 4  Thumb extension: 4-    Right Wrist/Hand   Wrist extension: 4-  Wrist flexion: 4-  Thumb extension: 4-    Left Hip   Planes of Motion   Flexion: 3+  Abduction: 3+  Adduction: 3+    Right Hip   Planes of Motion   Flexion: 4  Abduction: 3+  Adduction: 3+    Left Knee   Flexion: 3+  Extension: 3+    Right Knee   Flexion: 4  Extension: 4    Left Ankle/Foot   Dorsiflexion: 3+  Plantar flexion: 4-    Right Ankle/Foot   Dorsiflexion: 4  Plantar flexion: 4    Additional Strength Details  *Limited R shoulder ROM compared to L with  Functional ER and Functional IR   *Right scapular and humeral depression compared to the Left (This is likely due to functional scoliosis)  Functional Assessment        Comments  Timed up and go:  Date: 25 seconds  with use of hands on rails for assistance and SPC in RUE  10/25: 18 seconds  C/ use of hand on rails for assistance and SPC in RUE    5x sit to stand:   Date: 19 seconds with BUE assistance  Moderate knee valgus alignment preset during transitions  10/25: 16 seconds with BUE assistance   Staggered stance      2 minute walk test:   NT    IE:  MCTSIB: feet together c/ yellow foam  EO firm: 30 seconds with mod postural sway   EC firm: 8 seconds with prior to LOB (then to 23 seconds with FT support)   EO foam: FT support after 10 seconds which was required throughout with moderate postural sway  EC foam: Unable to perform     10/25  MCTSIB: feet together c/ yellow foam  EO firm: 30 seconds with min postural sway   EC firm: 30 seconds with mod postural sway   EO foam: 30 second c/ mod postural sway  EC foam: 3 seconds prior to LOB forward     6 minute walk test:   NT    Precautions: MS, Gait dysfunction, Insomnia, Osteoporosis, Peripheral polyneuropathy, Hx of Breast CA, R IDALIA in 2012- Dr Cary Nelson, Left femur IM nailing      Manuals 9/20 9/26 10/3 10/6 10/10 10/13 10/20 10/25                                                         Neuro Re-Ed             TB rows 2x15 tandem 2x20 btb tandem 2x20 btb romberg 2x20 btb romberg 2x20 btb romberg 2x20 btb romberg 2x20 btb romberg 2x20 btb romberg     TB Ws             TB lower trap             TB extension 2x15 rtb  2x20 rtb tandem 2x10 rtb romberg 2x10 gtb romberg 1x15,1x17 gtb romberg 2x20 gtb 2x20 gtb 2x20 btb romberg     Romberg on foam 2" 2"    20x shoulder abd/20x shoulder flexion        Standing hip 3 way  15x ea abd 10x ea abd 10x ea abd 10x ea                     Ther Ex             UBE 3'/3' forward/backward 3'/3' forward/backward 3'/3' forward/backward 3'/3' forward/backward 3'/3' forward/backward 3'/3' forward/backward 3'/3' forward/backward 3'/3' forward/backward Ther Activity             Lateral walks  5x15 feet 5x15 feet   6x10 feet 6x10 ft on blue foam        Lateral idris walks  4x 3 hurdles 4x 4 hurdles 4x 4 hurdles 4x 4 hurdles 3x 4 hurdles 4x4 hurdles  4x4 hurdles       Tandem idris walks 4x 3 hurdles 4x 4 hurdles 4x 4 hurdles 4x 4 hurdles 3x 4 hurdles 4x4 hurdles  4x4 hurdles       Brandon row with backward walking btb 4x 5 feet btb 4x 5 feet btb 4x 5 feet btb 4x 5 feet btb 4x 5 feet btb 4x 5 feet btb 4x 5 feet      Runners climb on BD foam    10x on ea 20x on R/ 10x on L 20x on each 20x on each 30x on ea                  Gait Training                                       Modalities                                       1:1 with PT from 2-253  Pt was re-evaluated today x 25 minutes

## 2022-10-27 ENCOUNTER — OFFICE VISIT (OUTPATIENT)
Dept: PHYSICAL THERAPY | Facility: CLINIC | Age: 72
End: 2022-10-27
Payer: MEDICARE

## 2022-10-27 DIAGNOSIS — G35 MULTIPLE SCLEROSIS (HCC): Primary | ICD-10-CM

## 2022-10-27 PROCEDURE — 97110 THERAPEUTIC EXERCISES: CPT

## 2022-10-27 PROCEDURE — 97112 NEUROMUSCULAR REEDUCATION: CPT

## 2022-10-27 NOTE — PROGRESS NOTES
Daily Note     Today's date: 10/27/2022  Patient name: Helen Davenport  : 1950  MRN: [id number]  Referring provider: Fany Snider MD  Dx:   Encounter Diagnosis     ICD-10-CM    1  Multiple sclerosis ([address] )  Sangeetha Parker        Start Time:   Stop Time: 1140  Total time in clinic (min): 52 minutes    Subjective: China Wagner states that she almost fell last night, but caught herself at the counter  She states this is the fist incidence of LOB that she has had in a while  She states she notices increased LE weakness this visit (L) > (R)  During session patient asked to perform idris TE prior to step-ups on foam with a knee drive 2* to increased (L) LE weakness  Further China Wagner notes that her allergies are increased this visit  Objective: See treatment diary below      Assessment: Tolerated treatment well  CG assist utilized during banded retro ambulation  Moderate fatigue noted during anterior step-ups on foam with knee drive, with min step height demonstrated  Improved step height with step over step demonstrated with idris TE  Fair NM control with standing flexion and abduction on foam with CG utilized for patient safety  Patient demonstrated fatigue post treatment, exhibited good technique with therapeutic exercises and would benefit from continued PTto improve endurance and strength  Plan: Continue per plan of care        Precautions: MS, Gait dysfunction, Insomnia, Osteoporosis, Peripheral polyneuropathy, Hx of Breast CA, R IDALIA in - Dr Goff Signs, Left femur IM nailing      Manuals 9/20 9/26 10/3 10/6 10/10 10/13 10/20 10/25 10/27                                                        Neuro Re-Ed             TB rows 2x15 tandem 2x20 btb tandem 2x20 btb romberg 2x20 btb romberg 2x20 btb romberg 2x20 btb romberg 2x20 btb romberg 2x20 btb romberg 2x20  Black  romberg    TB Ws             TB lower trap             TB extension 2x15 rtb  2x20 rtb tandem 2x10 rtb romberg 2x10 gtb romberg 1x15,1x17 gtb romberg 2x20 gtb 2x20 gtb 2x20 btb romberg 2x20  Breadcrumbtracking on foam 2" 2"    20x shoulder abd/20x shoulder flexion    20x shoulder abd/20x shoulder flexion    Standing hip 3 way  15x ea abd 10x ea abd 10x ea abd 10x ea    np                 Ther Ex             UBE 3'/3' forward/backward 3'/3' forward/backward 3'/3' forward/backward 3'/3' forward/backward 3'/3' forward/backward 3'/3' forward/backward 3'/3' forward/backward 3'/3' forward/backward 3'/3' forward/backward                                                                                               Ther Activity             Lateral walks  5x15 feet 5x15 feet   6x10 feet 6x10 ft on blue foam        Lateral idris walks  4x 3 hurdles 4x 4 hurdles 4x 4 hurdles 4x 4 hurdles 3x 4 hurdles 4x4 hurdles  4x4 hurdles   4x4 hurdles     Tandem idris walks 4x 3 hurdles 4x 4 hurdles 4x 4 hurdles 4x 4 hurdles 3x 4 hurdles 4x4 hurdles  4x4 hurdles   4x4 hurdles    Powell row with backward walking btb 4x 5 feet btb 4x 5 feet btb 4x 5 feet btb 4x 5 feet btb 4x 5 feet btb 4x 5 feet btb 4x 5 feet  btb 4x 5 feet    Runners climb on BD foam    10x on ea 20x on R/ 10x on L 20x on each 20x on each 30x on ea 30x on ea                 Gait Training                                       Modalities

## 2022-10-31 ENCOUNTER — OFFICE VISIT (OUTPATIENT)
Dept: NEUROLOGY | Facility: CLINIC | Age: 72
End: 2022-10-31

## 2022-10-31 ENCOUNTER — OFFICE VISIT (OUTPATIENT)
Dept: PHYSICAL THERAPY | Facility: CLINIC | Age: 72
End: 2022-10-31

## 2022-10-31 VITALS
SYSTOLIC BLOOD PRESSURE: 122 MMHG | BODY MASS INDEX: 23.73 KG/M2 | DIASTOLIC BLOOD PRESSURE: 78 MMHG | HEART RATE: 71 BPM | WEIGHT: 139 LBS | TEMPERATURE: 97.9 F | HEIGHT: 64 IN

## 2022-10-31 DIAGNOSIS — G35 MULTIPLE SCLEROSIS (HCC): Primary | ICD-10-CM

## 2022-10-31 DIAGNOSIS — G62.9 PERIPHERAL POLYNEUROPATHY: ICD-10-CM

## 2022-10-31 DIAGNOSIS — R26.9 GAIT DISTURBANCE: ICD-10-CM

## 2022-10-31 RX ORDER — GABAPENTIN 300 MG/1
300 CAPSULE ORAL
Qty: 90 CAPSULE | Refills: 3 | Status: SHIPPED | OUTPATIENT
Start: 2022-10-31

## 2022-10-31 RX ORDER — BACLOFEN 10 MG/1
TABLET ORAL
Qty: 360 TABLET | Refills: 3 | Status: SHIPPED | OUTPATIENT
Start: 2022-10-31

## 2022-10-31 RX ORDER — GABAPENTIN 600 MG/1
TABLET ORAL
Qty: 540 TABLET | Refills: 3 | Status: SHIPPED | OUTPATIENT
Start: 2022-10-31

## 2022-10-31 RX ORDER — ZONISAMIDE 100 MG/1
400 CAPSULE ORAL
Qty: 360 CAPSULE | Refills: 3 | Status: SHIPPED | OUTPATIENT
Start: 2022-10-31

## 2022-10-31 NOTE — ASSESSMENT & PLAN NOTE
Pt notes no new neuropathic sxs  Pt with component of allodynea in he lower ext to touch jesus along shins  Pt remains on gabapentin at total of 3600 mg daily  Refills completed at this time due to efficacy of current therapy

## 2022-10-31 NOTE — ASSESSMENT & PLAN NOTE
Pt here today for neuro follow up  Pt last seen in June  Since last visit, had flare of MS in end of June  Pt with significant generalized fatigue affecting adls and needing help from spouse  sxs lasted about one week  Pt notes generalized fatigue and weakness and lower right shoulder , decreased dexterity  Pt did trial with aquatherapy but conditions of the pool setting only exacerbated pt sxs further  Pt is now back to routine therapy with [facility] and doing much better over this interval of time  Pt still going weekly and continues to show improvement  Pt remains on copaxone  Pt labs from oct 18 2022, nl gfr, cbc diff and lfts  Pt to call for any new sxs  nmo lab neg from June, [facility] neg in June

## 2022-10-31 NOTE — PROGRESS NOTES
Patient ID: Romeo Todd is a 67 y o  female  Assessment/Plan:    Multiple sclerosis ([facility] [address] )  Pt here today for neuro follow up  Pt last seen in June  Since last visit, had flare of MS in end of June  Pt with significant generalized fatigue affecting adls and needing help from spouse  sxs lasted about one week  Pt notes generalized fatigue and weakness and lower right shoulder , decreased dexterity  Pt did trial with aquatherapy but conditions of the pool setting only exacerbated pt sxs further  Pt is now back to routine therapy with Linnea Grewal and doing much better over this interval of time  Pt still going weekly and continues to show improvement  Pt remains on copaxone  Pt labs from oct 18 2022, nl gfr, cbc diff and lfts  Pt to call for any new sxs  nmo lab neg from June, [facility] neg in June    Peripheral polyneuropathy  Pt notes no new neuropathic sxs  Pt with component of allodynea in he lower ext to touch jesus along shins  Pt remains on gabapentin at total of 3600 mg daily  Refills completed at this time due to efficacy of current therapy         Diagnoses and all orders for this visit:    Multiple sclerosis ([facility] [address] )  -     CBC and differential; Future  -     Hepatic function panel; Future  -     baclofen 10 mg tablet; 1 tab pos q am, 1 tab po q pm and 2 tabs hs  -     zonisamide (ZONEGRAN) 100 mg capsule; Take 4 capsules (400 mg total) by mouth daily at bedtime  -     gabapentin (NEURONTIN) 600 MG tablet; 2 tabs po tid    Gait disturbance    Peripheral polyneuropathy  -     gabapentin (NEURONTIN) 300 mg capsule; Take 1 capsule (300 mg total) by mouth daily at bedtime           Subjective:    HOWARD Porras a 67 y  o  female  presents for MS follow up, last seen in 6/3/22 by me   Per my last note "Patient with history of MS dating back to 2008, but likely with symptoms even in 1998  Patient also with PMH of breast cancer s/p left mastectomy in August 2012   Pt has been on Copaxone since 2008 and changed to the 40mg TIW dosing    Pt notes she has to go for colonoscopy due to abn stool screening testing   Pt notes she is also being considered for some breast reconstruction with autologous fat transfer at mastectomy site   Pt is following up with her doctors   Pt notes overall still dealing with her walking and trying to help her left leg and her balance   Pt has rx from pcp for PT   Pt feels PT has been very helpful   Pt also had reconstructive surgery on breast in jan 28th and still working on healing with follow up visits with her surgeon     Since last visit pt had same day procedure with right breast reconstruction   Pt notes she had open area that was poorly healing   Now area is closed and no abx   Pt notes her biggest issue at this time is poor sleep related to not staying asleep as well as neurpathic pain in the legs   Pt cannot take lyrica or cymbalta as tried in past   Pt still on her baclofen, gabapentin and zonegran   Pt had updated labs in July and nl cbc diff and lfts   Rev in depth with pt  Pt notes she had one fall while in storage/ sewing area and fell trying to lift something and hurt left trapezius area   This is now improved since PT   Pt remains on brand copaxone and padmini med well "  Kept above paragraph due to longevity and complexiites of pt case  Pt last seen in 6/3/22  Since our last visit, pt had difficult end of June related to exacerbation of her MS  Pt notes no clear inciting agent,  Pt notes she had been to conference with spouse on week prior and not immediately but few days after return, pt had extreme fatigue and described herself like a "marshmellow " feeling  Spouse needed to help her to get about and to rest room  Pt had issue even just getting out of bed  Pt notes no fever or chills  No cough or sob  No change in speech or swallowing  No loc or sz  No change in vision  No loss of vision  Specifically no change in bowel or bladder  Pt notes sxs slowly resolved   Pt noted her right shoulder much lower than the left and just felt like dragging  Pt did trial with aquatherapy per my request and pt did not feel any better at all  Pt notes the conditions of the pool ie temp, airconditioning only furhter exacerbated her issues  Pt then discontinued this trial and went back to [place] at her traditional PT facility  Pt is doing much better now with regular therapy  Pt is very self motivated as well  Pt able to drive to office today and getting closer to her baseline  Pt had updated labs in oct 18 2022 with nl gfr of 62, nl cbc diff and lfts  Pt had neg sjogren and neg nmo in June as well  Pt using vesicare for her bladder sxs  Pt notes some hypersensitivity to touch of the lower legs  Possible compenent of allodynea from the neuropathy  Pt to cont with gabapentin  Refills done today for gabapentin, zonegran  And baclofen  Pt is regular with her optho and urology follow up         The following portions of the patient's history were reviewed and updated as appropriate: allergies, current medications, past family history, past medical history, past social history, past surgical history and problem list and med rec and ros rev  Objective:    Blood pressure 122/78, pulse 71, temperature 97 9 °F (36 6 °C), height 5' 4" (1 626 m), weight 63 kg (139 lb)  Physical Exam  Constitutional:       General: She is not in acute distress  Appearance: She is not ill-appearing  Musculoskeletal:      Right lower leg: No edema  Left lower leg: No edema  Neurological:      Deep Tendon Reflexes: Strength normal and reflexes are normal and symmetric  Neurological Exam  Mental Status  Awake, alert and oriented to person, place and time  Motor  Normal muscle bulk throughout  Normal muscle tone  No abnormal involuntary movements  Strength is 5/5 throughout all four extremities      Reflexes  Deep tendon reflexes are 2+ and symmetric in all four extremities  Coordination  Right: Finger-to-nose normal  Rapid alternating movement normal Left: Finger-to-nose normal  Heel-to-shin normal     Gait  Casual gait: Wide stance  ROS:    Review of Systems   Constitutional: Negative  Negative for appetite change and fever  HENT: Negative  Negative for hearing loss, tinnitus, trouble swallowing and voice change  Eyes: Negative  Negative for photophobia, pain and visual disturbance  Respiratory: Negative  Negative for shortness of breath  Cardiovascular: Negative  Negative for palpitations  Gastrointestinal: Negative  Negative for nausea and vomiting  Endocrine: Negative  Negative for cold intolerance  Genitourinary: Negative  Negative for dysuria, frequency and urgency  Musculoskeletal: Negative  Negative for gait problem, myalgias and neck pain  Skin: Negative  Negative for rash  Allergic/Immunologic: Negative  Neurological: Negative  Negative for dizziness, tremors, seizures, syncope, facial asymmetry, speech difficulty, weakness, light-headedness, numbness and headaches  Hematological: Negative  Does not bruise/bleed easily  Psychiatric/Behavioral: Negative  Negative for confusion, hallucinations and sleep disturbance  All other systems reviewed and are negative  Had a relapse end of June beginning of July could not move , Had trouble July and August    For week was incapacitated ,could not function felt like a marshmallow  Balance was completely gone  Did not go to ER, doesn't want steroids with osteo  Tried aqua therapy but was a disaster, went back to regular therapy  Still goes to PT and can stand up straight

## 2022-10-31 NOTE — PROGRESS NOTES
Daily Note     Today's date: 10/31/2022  Patient name: Otis Shelley  : 1950  MRN: [id number]  Referring provider: Yared Sanchez MD  Dx:   Encounter Diagnosis     ICD-10-CM    1  Multiple sclerosis ([address] )  Vidhi Murdock        Start Time: 1400  Stop Time: 1440  Total time in clinic (min): 40 minutes    Subjective: Pt sat for 3 hours in total today going to her appointment in [facility] so feels generally stiff and uncomfortable at the start of treatment  Objective: See treatment diary below      Assessment: Tolerated treatment well  Minor cueing required with scap stabilizers to decrease shoulder hiking on the left  Patient demonstrated fatigue post treatment and would benefit from continued PT  Plan: Continue per plan of care        Precautions: MS, Gait dysfunction, Insomnia, Osteoporosis, Peripheral polyneuropathy, Hx of Breast CA, R IDALIA in - Dr Gonzalez , Left femur IM nailing      Manuals 9/20 9/26 10/3 10/6 10/10 10/13 10/20 10/25 10/27 10/31                                                       Neuro Re-Ed             TB rows 2x15 tandem 2x20 btb tandem 2x20 btb romberg 2x20 btb romberg 2x20 btb romberg 2x20 btb romberg 2x20 btb romberg 2x20 btb romberg 2x20  Black  romberg 2x20  Black  romberg   TB Ws             TB lower trap             TB extension 2x15 rtb  2x20 rtb tandem 2x10 rtb romberg 2x10 gtb romberg 1x15,1x17 gtb romberg 2x20 gtb 2x20 gtb 2x20 btb romberg 2x20  Blue romberg 2x20  Blue romberg   Romberg on foam 2" 2"    20x shoulder abd/20x shoulder flexion    20x shoulder abd/20x shoulder flexion 20x shoulder abd/20x shoulder flexion   Standing hip 3 way  15x ea abd 10x ea abd 10x ea abd 10x ea    np                 Ther Ex             UBE 3'/3' forward/backward 3'/3' forward/backward 3'/3' forward/backward 3'/3' forward/backward 3'/3' forward/backward 3'/3' forward/backward 3'/3' forward/backward 3'/3' forward/backward 3'/3' forward/backward 3'/3' forward/backward Ther Activity             Lateral walks  5x15 feet 5x15 feet   6x10 feet 6x10 ft on blue foam        Lateral idris walks  4x 3 hurdles 4x 4 hurdles 4x 4 hurdles 4x 4 hurdles 3x 4 hurdles 4x4 hurdles  4x4 hurdles   4x4 hurdles  4x4 hurdles    Tandem idris walks 4x 3 hurdles 4x 4 hurdles 4x 4 hurdles 4x 4 hurdles 3x 4 hurdles 4x4 hurdles  4x4 hurdles   4x4 hurdles 4x4 hurdles    [place] row with backward walking btb 4x 5 feet btb 4x 5 feet btb 4x 5 feet btb 4x 5 feet btb 4x 5 feet btb 4x 5 feet btb 4x 5 feet  btb 4x 5 feet btb 4x 5 feet   Runners climb on BD foam    10x on ea 20x on R/ 10x on L 20x on each 20x on each 30x on ea 30x on ea 20x on ea   Foam walks          For improving strides 6x 10feet    Gait Training                                       Modalities                                         1:1 with PT from 2-240pm

## 2022-11-02 ENCOUNTER — LAB (OUTPATIENT)
Dept: LAB | Facility: CLINIC | Age: 72
End: 2022-11-02

## 2022-11-02 DIAGNOSIS — I10 ESSENTIAL HYPERTENSION: ICD-10-CM

## 2022-11-02 DIAGNOSIS — R73.09 ELEVATED GLUCOSE: ICD-10-CM

## 2022-11-02 LAB
ANION GAP SERPL CALCULATED.3IONS-SCNC: 3 MMOL/L (ref 4–13)
BUN SERPL-MCNC: 19 MG/DL (ref 5–25)
CALCIUM SERPL-MCNC: 9.8 MG/DL (ref 8.3–10.1)
CHLORIDE SERPL-SCNC: 110 MMOL/L (ref 96–108)
CHOLEST SERPL-MCNC: 207 MG/DL
CO2 SERPL-SCNC: 27 MMOL/L (ref 21–32)
CREAT SERPL-MCNC: 0.96 MG/DL (ref 0.6–1.3)
EST. AVERAGE GLUCOSE BLD GHB EST-MCNC: 94 MG/DL
GFR SERPL CREATININE-BSD FRML MDRD: 58 ML/MIN/1.73SQ M
GLUCOSE P FAST SERPL-MCNC: 89 MG/DL (ref 65–99)
HBA1C MFR BLD: 4.9 %
HDLC SERPL-MCNC: 75 MG/DL
LDLC SERPL CALC-MCNC: 117 MG/DL (ref 0–100)
POTASSIUM SERPL-SCNC: 3.7 MMOL/L (ref 3.5–5.3)
SODIUM SERPL-SCNC: 140 MMOL/L (ref 135–147)
TRIGL SERPL-MCNC: 73 MG/DL

## 2022-11-02 NOTE — RESULT ENCOUNTER NOTE
Please inform pt that labs are stable  Continue current medications and continue to engage in healthy lifestyle (diet, exercise)  Thanks!   Robert Naranjo

## 2022-11-03 ENCOUNTER — TELEPHONE (OUTPATIENT)
Dept: FAMILY MEDICINE CLINIC | Facility: OTHER | Age: 72
End: 2022-11-03

## 2022-11-03 ENCOUNTER — OFFICE VISIT (OUTPATIENT)
Dept: PHYSICAL THERAPY | Facility: CLINIC | Age: 72
End: 2022-11-03

## 2022-11-03 DIAGNOSIS — G35 MULTIPLE SCLEROSIS (HCC): Primary | ICD-10-CM

## 2022-11-03 NOTE — PROGRESS NOTES
Daily Note     Today's date: 11/3/2022  Patient name: Paco Mitchell  : 1950  MRN: [id number]  Referring provider: Delona Dance, MD  Dx:   Encounter Diagnosis     ICD-10-CM    1  Multiple sclerosis ([address] )  Graham Abreu        Start Time:   Stop Time: 1030  Total time in clinic (min): 44 minutes    Subjective: Pt denies any new complaints  She does report leg fatigue earlier this AM as she didn't sleep well  Objective: See treatment diary below      Assessment: Resumed foam lateral and tandem walks  Tolerated treatment well  Minimal seated rest required today  Pt demonstrated good dyamnic and static balance throughout session  Patient would benefit from continued PT  Plan: Continue per plan of care        Precautions: MS, Gait dysfunction, Insomnia, Osteoporosis, Peripheral polyneuropathy, Hx of Breast CA, R IDALIA in - Dr Regla Ayers, Left femur IM nailing      Manuals 11/3     10/13 10/20 10/25 10/27 10/31                                                       Neuro Re-Ed             TB rows 2x20 black romberg     2x20 btb romberg 2x20 btb romberg 2x20 btb romberg 2x20  Black  romberg 2x20  Black  romberg   TB Ws             TB lower trap             TB extension 2x20  Blue romberg     2x20 gtb 2x20 gtb 2x20 btb romberg 2x20  Blue romberg 2x20  Blue romberg   Romberg on foam 20x shoulder abd/20x shoulder flexion     20x shoulder abd/20x shoulder flexion    20x shoulder abd/20x shoulder flexion 20x shoulder abd/20x shoulder flexion   Standing hip 3 way         np                 Ther Ex             UBE 3'/3' forward/backward     3'/3' forward/backward 3'/3' forward/backward 3'/3' forward/backward 3'/3' forward/backward 3'/3' forward/backward                                                                                              Ther Activity             Foam Lateral + tandem walks  5x15 feet blue foam      6x10 ft on blue foam        Lateral idris walks  4x 4 hurdles     4x4 hurdles  4x4 hurdles 4x4 hurdles  4x4 hurdles    Tandem idris walks 4x 4 hurdles     4x4 hurdles  4x4 hurdles   4x4 hurdles 4x4 hurdles    [place] row with backward walking btb 4x 5 feet     btb 4x 5 feet btb 4x 5 feet  btb 4x 5 feet btb 4x 5 feet   Runners climb on BD foam 20x on ea     20x on each 20x on each 30x on ea 30x on ea 20x on ea   Foam walks          For improving strides 6x 10feet    Gait Training                                       Modalities                                         1:1 with PT from [phone]

## 2022-11-07 DIAGNOSIS — G35 MULTIPLE SCLEROSIS (HCC): ICD-10-CM

## 2022-11-07 RX ORDER — GLATIRAMER ACETATE 40 MG/ML
INJECTION, SOLUTION SUBCUTANEOUS
Qty: 36 ML | Refills: 3 | Status: SHIPPED | OUTPATIENT
Start: 2022-11-07

## 2022-11-07 NOTE — TELEPHONE ENCOUNTER
----- Message from Andrae Avina sent at 11/7/2022 12:48 PM EST -----  Regarding: Copaxone  Hi Dr Jazmine Dale  Well, [facility] strikes again! I received my Copaxone refill and they only sent one box of 12 injections  They stated that Candice Yoshi changed the rx on 10/18/22 to a one month rx  I tried to explain that unless they sent a magical box that would cure MS I would need to continue with 90 days at a time  Would you please have someone attempt to fix this error for me? I would really appreciate it    Thanks so much  [employer]

## 2022-11-07 NOTE — TELEPHONE ENCOUNTER
Please call Rosalee Bustamante  Tell her I resent rx for her  Tell her to always let us know any troubles and I am thinking of her

## 2022-11-07 NOTE — TELEPHONE ENCOUNTER
Copaxone Rx sent on 10/18/22 was for a 30 day supply (this is what was requested by the pharmacy at that time)  Pt requesting 90 day supply  Please review and sign if agreeable

## 2022-11-08 ENCOUNTER — OFFICE VISIT (OUTPATIENT)
Dept: PHYSICAL THERAPY | Facility: CLINIC | Age: 72
End: 2022-11-08

## 2022-11-08 DIAGNOSIS — G35 MULTIPLE SCLEROSIS (HCC): Primary | ICD-10-CM

## 2022-11-08 NOTE — PROGRESS NOTES
Daily Note     Today's date: 2022  Patient name: Mellissa Cruz  : 1950  MRN: [id number]  Referring provider: Piedad Joshi MD  Dx:   Encounter Diagnosis     ICD-10-CM    1  Multiple sclerosis ([address] )  G35                   Subjective: Pt reports weakness in her legs today after sitting a lot at a conference over the weekend  Pt says that she typically doesn't feel the best with prolonged sitting  She notes worsening of nerve pain in her R LE to her ankle  Mostly present at night time  Objective: See treatment diary below      Assessment: Tolerated treatment well despite increased weakness  Left quadriceps demonstrated more control during step ups and reduced knee hyperextension when fatigued  Implemented lateral walks with UE TB  Patient exhibited good technique with therapeutic exercises and would benefit from continued PT  Plan: Continue per plan of care        Precautions: MS, Gait dysfunction, Insomnia, Osteoporosis, Peripheral polyneuropathy, Hx of Breast CA, R IDALIA in - Dr Dimitri Byrnes, Left femur IM nailing      Manuals 11/3 11/8    10/13 10/20 10/25 10/27 10/31                                                       Neuro Re-Ed             TB rows 2x20 black romberg 2x20 blue romberg    2x20 btb romberg 2x20 btb romberg 2x20 btb romberg 2x20  Black  romberg 2x20  Black  romberg   TB Ws             TB lower trap             TB extension 2x20  Blue romberg 2x20 blue romberg    2x20 gtb 2x20 gtb 2x20 btb romberg 2x20  Blue romberg 2x20  Blue romberg   Romberg on foam 20x shoulder abd/20x shoulder flexion 20x shoulder abd/20x shoulder flexion    20x shoulder abd/20x shoulder flexion    20x shoulder abd/20x shoulder flexion 20x shoulder abd/20x shoulder flexion   Standing hip 3 way         np    Lateral walks c/ UE resistance   rtb 4x 10 feet            Ther Ex             UBE 3'/3' forward/backward 3'/3' forward/backward    3'/3' forward/backward 3'/3' forward/backward 3'/3' forward/backward 3'/3' forward/backward 3'/3' forward/backward                                                                                              Ther Activity             Foam Lateral + tandem walks  5x15 feet blue foam  5x15 feet blue foam     6x10 ft on blue foam        Lateral idris walks  4x 4 hurdles 4x 4 hurdles    4x4 hurdles  4x4 hurdles   4x4 hurdles  4x4 hurdles    Tandem idris walks 4x 4 hurdles 4x 4 hurdles    4x4 hurdles  4x4 hurdles   4x4 hurdles 4x4 hurdles    [place] row with backward walking btb 4x 5 feet btb 4x 5 feet    btb 4x 5 feet btb 4x 5 feet  btb 4x 5 feet btb 4x 5 feet   Runners climb on BD foam 20x on ea 20x ea    20x on each 20x on each 30x on ea 30x on ea 20x on ea   Foam walks          For improving strides 6x 10feet    Gait Training                                       Modalities                                         1:1 with PT/PT from [phone]

## 2022-11-09 ENCOUNTER — OFFICE VISIT (OUTPATIENT)
Dept: PHYSICAL THERAPY | Facility: CLINIC | Age: 72
End: 2022-11-09

## 2022-11-09 DIAGNOSIS — G35 MULTIPLE SCLEROSIS (HCC): Primary | ICD-10-CM

## 2022-11-09 NOTE — PATIENT INSTRUCTIONS
Multiple sclerosis ([facility] [address] )  Pt here for neuro follow up  Last seen in May  Pt did have chanelle cataract surgery in interim which was successful  Pt will also be going for breast surgery due to recall of implant on the right side   This was postponed from June and now scheduled for dec 9 th  Pt had updated labs on 11/1 and nl cbc and lfts  Bun and cr also good from that day  Pt with increased pain in the occiput particualarly noted in the lew only  Also some increased issues with word finding  Last cord imaging 2018  Last brain imaging 2016  Will update mri head and pt to call me with 2 days of study  Cont with copaxone brand   Cont with ampyra 8

## 2022-11-09 NOTE — PROGRESS NOTES
Daily Note     Today's date: 2022  Patient name: Ely Fitzgerald  : 1950  MRN: [id number]  Referring provider: Jeramie Starr MD  Dx:   Encounter Diagnosis     ICD-10-CM    1  Multiple sclerosis ([address] )  Bre Stephens        Start Time: 945  Stop Time: 1026  Total time in clinic (min): 41 minutes    Subjective: Pt overall doing well today  She does admit to neuropathy in the feet this morning  Objective: See treatment diary below      Assessment: Tolerated treatment well  Pt was more fatigued during the session, likely due to having PT last evening  Pt also admitted to baking cookies after her sessnio last night  However, with modification, patient was able to complete session  Patient would benefit from continued PT  Plan: Continue per plan of care        Precautions: MS, Gait dysfunction, Insomnia, Osteoporosis, Peripheral polyneuropathy, Hx of Breast CA, R IDALIA in - Dr Charlie Porter, Left femur IM nailing      Manuals 11/3 11/8 11/9   10/13 10/20 10/25 10/27 10/31                                                       Neuro Re-Ed             TB rows 2x20 black romberg 2x20 blue romberg 2x20 blue romberg   2x20 btb romberg 2x20 btb romberg 2x20 btb romberg 2x20  Black  romberg 2x20  Black  romberg   TB Ws             TB lower trap             TB extension 2x20  Blue romberg 2x20 blue romberg 30x blue romberg   2x20 gtb 2x20 gtb 2x20 btb romberg 2x20  Blue romberg 2x20  Blue romberg   Romberg on foam 20x shoulder abd/20x shoulder flexion 20x shoulder abd/20x shoulder flexion 14x shoulder abd/20x shoulder flexion   20x shoulder abd/20x shoulder flexion    20x shoulder abd/20x shoulder flexion 20x shoulder abd/20x shoulder flexion   Standing hip 3 way         np    Lateral walks c/ UE resistance   rtb 4x 10 feet  6x10 double ytb          Ther Ex             UBE 3'/3' forward/backward 3'/3' forward/backward 3'/3' forward/backward   3'/3' forward/backward 3'/3' forward/backward 3'/3' forward/backward 3'/3' forward/backward 3'/3' forward/backward                                                                                              Ther Activity             Foam Lateral + tandem walks  5x15 feet blue foam  5x15 feet blue foam  NP   6x10 ft on blue foam        Lateral idris walks  4x 4 hurdles 4x 4 hurdles 3x 4 hurdles   4x4 hurdles  4x4 hurdles   4x4 hurdles  4x4 hurdles    Tandem idris walks 4x 4 hurdles 4x 4 hurdles 3x 4 hurdles   4x4 hurdles  4x4 hurdles   4x4 hurdles 4x4 hurdles    [place] row with backward walking btb 4x 5 feet btb 4x 5 feet btb 4x 5 feet   btb 4x 5 feet btb 4x 5 feet  btb 4x 5 feet btb 4x 5 feet   Runners climb on BD foam 20x on ea 20x ea NP   20x on each 20x on each 30x on ea 30x on ea 20x on ea   Foam walks          For improving strides 6x 10feet    Gait Training                                       Modalities                                         1:1 with [id number]

## 2022-11-14 ENCOUNTER — OFFICE VISIT (OUTPATIENT)
Dept: PHYSICAL THERAPY | Facility: CLINIC | Age: 72
End: 2022-11-14

## 2022-11-14 DIAGNOSIS — G35 MULTIPLE SCLEROSIS (HCC): Primary | ICD-10-CM

## 2022-11-14 NOTE — PROGRESS NOTES
Daily Note     Today's date: 2022  Patient name: Gina Ramirez  : 1950  MRN: [id number]  Referring provider: Lexx Cross MD  Dx:   Encounter Diagnosis     ICD-10-CM    1  Multiple sclerosis ([facility] [address] )  G35                   Subjective: Patient stated significant fatigue prior to treatment session  Objective: See treatment diary below      Assessment: Patient demonstrated significant difficulty with activities secondary to c/o fatigue; required rest period during activities secondary to fatigue  Plan: Continue per plan of care        Precautions: MS, Gait dysfunction, Insomnia, Osteoporosis, Peripheral polyneuropathy, Hx of Breast CA, R IDALIA in - Dr Carissa Borges, Left femur IM nailing      Manuals 11/3 11/8 11/9 11/14  10/13 10/20 10/25 10/27 10/31                                                       Neuro Re-Ed             TB rows 2x20 black romberg 2x20 blue romberg 2x20 blue romberg 2x20 blue romberg  2x20 btb romberg 2x20 btb romberg 2x20 btb romberg 2x20  Black  romberg 2x20  Black  romberg   TB Ws             TB lower trap             TB extension 2x20  Blue romberg 2x20 blue romberg 30x blue romberg 30x blue romberg  2x20 gtb 2x20 gtb 2x20 btb romberg 2x20  Blue romberg 2x20  Blue romberg   Romberg on foam 20x shoulder abd/20x shoulder flexion 20x shoulder abd/20x shoulder flexion 14x shoulder abd/20x shoulder flexion   20x shoulder abd/20x shoulder flexion    20x shoulder abd/20x shoulder flexion 20x shoulder abd/20x shoulder flexion   Standing hip 3 way         np    Lateral walks c/ UE resistance   rtb 4x 10 feet  6x10 double ytb 5x10 double ytb         Ther Ex             UBE 3'/3' forward/backward 3'/3' forward/backward 3'/3' forward/backward 3'/3' forward/backward  3'/3' forward/backward 3'/3' forward/backward 3'/3' forward/backward 3'/3' forward/backward 3'/3' forward/backward                                                                                              Ther Activity             Foam Lateral + tandem walks  5x15 feet blue foam  5x15 feet blue foam  NP   6x10 ft on blue foam        Lateral idris walks  4x 4 hurdles 4x 4 hurdles 3x 4 hurdles 3x 3 hurdles  4x4 hurdles  4x4 hurdles   4x4 hurdles  4x4 hurdles    Tandem idris walks 4x 4 hurdles 4x 4 hurdles 3x 4 hurdles 3x 3 hurdles  4x4 hurdles  4x4 hurdles   4x4 hurdles 4x4 hurdles    [place] row with backward walking btb 4x 5 feet btb 4x 5 feet btb 4x 5 feet btb 4x 5 feet  btb 4x 5 feet btb 4x 5 feet  btb 4x 5 feet btb 4x 5 feet   Runners climb on BD foam 20x on ea 20x ea NP   20x on each 20x on each 30x on ea 30x on ea 20x on ea   Foam walks          For improving strides 6x 10feet    Gait Training                                       Modalities

## 2022-11-17 ENCOUNTER — OFFICE VISIT (OUTPATIENT)
Dept: PHYSICAL THERAPY | Facility: CLINIC | Age: 72
End: 2022-11-17

## 2022-11-17 DIAGNOSIS — G35 MULTIPLE SCLEROSIS (HCC): Primary | ICD-10-CM

## 2022-11-17 NOTE — PROGRESS NOTES
Daily Note     Today's date: 2022  Patient name: Swetha Brito  : 1950  MRN: [id number]  Referring provider: Naren Sigala MD  Dx:   Encounter Diagnosis     ICD-10-CM    1  Multiple sclerosis ([address] )  G35                      Subjective: Pt reports feeling better than previous session  She states that her legs were very fatigued last session, likely due to weekend activities  Objective: See treatment diary below      Assessment: Tolerated treatment well  Pt demonstrated more strength/endurance today compared to previous session  Excellent symmetry of shoulders and stabilization of left scapula  Patient demonstrated fatigue post treatment and would benefit from continued PT  Plan: Continue per plan of care        Precautions: MS, Gait dysfunction, Insomnia, Osteoporosis, Peripheral polyneuropathy, Hx of Breast CA, R IDALIA in - Dr Joe Mello, Left femur IM nailing      Manuals 11/3 11/8 11/9 11/14 11/17     10/31                                                       Neuro Re-Ed             TB rows 2x20 black romberg 2x20 blue romberg 2x20 blue romberg 2x20 blue romberg 2x20 blue romberg     2x20  Black  romberg   TB Ws             TB lower trap             TB extension 2x20  Blue romberg 2x20 blue romberg 30x blue romberg 30x blue romberg 2x20 blue romberg     2x20  Blue romberg   Romberg on foam 20x shoulder abd/20x shoulder flexion 20x shoulder abd/20x shoulder flexion 14x shoulder abd/20x shoulder flexion  20x shoulder abd/20x shoulder flexion     20x shoulder abd/20x shoulder flexion   Standing hip 3 way             Lateral walks c/ UE resistance   rtb 4x 10 feet  6x10 double ytb 5x10 double ytb 5x10 double ytb        Ther Ex             UBE 3'/3' forward/backward 3'/3' forward/backward 3'/3' forward/backward 3'/3' forward/backward 3'/3' forward/backward     3'/3' forward/backward                                                                                              Ther Activity Foam Lateral + tandem walks  5x15 feet blue foam  5x15 feet blue foam  NP          Lateral idris walks  4x 4 hurdles 4x 4 hurdles 3x 4 hurdles 3x 3 hurdles 3x 3 hurdles     4x4 hurdles    Tandem idris walks 4x 4 hurdles 4x 4 hurdles 3x 4 hurdles 3x 3 hurdles 3x 3 hurdles     4x4 hurdles    [place] row with backward walking btb 4x 5 feet btb 4x 5 feet btb 4x 5 feet btb 4x 5 feet btb 4x 5 feet     btb 4x 5 feet   Runners climb on BD foam 20x on ea 20x ea NP  30x ea     20x on ea   Foam walks          For improving strides 6x 10feet    Gait Training                                       Modalities                                         1:1 with PT from 1040-1120pm

## 2022-11-21 ENCOUNTER — OFFICE VISIT (OUTPATIENT)
Dept: PHYSICAL THERAPY | Facility: CLINIC | Age: 72
End: 2022-11-21

## 2022-11-21 DIAGNOSIS — G35 MULTIPLE SCLEROSIS (HCC): Primary | ICD-10-CM

## 2022-11-21 NOTE — PROGRESS NOTES
Daily Note     Today's date: 2022  Patient name: Florence Johnson  : 1950  MRN: [id number]  Referring provider: Jen Daley MD  Dx:   Encounter Diagnosis     ICD-10-CM    1  Multiple sclerosis ([facility] [address] )  G35                      Subjective: Pt reports having very bad LE numbness and pain  Left LE has more numbness and right has more pain  She states that last evening was the worst of the symptoms  Objective: See treatment diary below      Assessment: Pt required more rest breaks today secondary to LE fatigue  Closed contact guarding was necessary throughout program due to LOB  Terminated session early secondary to LE fatigue  Tolerated treatment fair  Will resume all exercises next session as tolerated  Patient would benefit from continued PT      Plan: Continue per plan of care        Precautions: MS, Gait dysfunction, Insomnia, Osteoporosis, Peripheral polyneuropathy, Hx of Breast CA, R IDALIA in - Dr Mary Julien, Left femur IM nailing      Manuals 11/3 11/8 11/9 11/14 11/17 11/21    10/31                                                       Neuro Re-Ed             TB rows 2x20 black romberg 2x20 blue romberg 2x20 blue romberg 2x20 blue romberg 2x20 blue romberg 2x20 blue romberg    2x20  Black  romberg   TB Ws             TB lower trap             TB extension 2x20  Blue romberg 2x20 blue romberg 30x blue romberg 30x blue romberg 2x20 blue romberg 2x20 blue romberg    2x20  Blue romberg   Romberg on foam 20x shoulder abd/20x shoulder flexion 20x shoulder abd/20x shoulder flexion 14x shoulder abd/20x shoulder flexion  20x shoulder abd/20x shoulder flexion nv    20x shoulder abd/20x shoulder flexion   Standing hip 3 way             Lateral walks c/ UE resistance   rtb 4x 10 feet  6x10 double ytb 5x10 double ytb 5x10 double ytb 5x10 double ytb       Ther Ex             UBE 3'/3' forward/backward 3'/3' forward/backward 3'/3' forward/backward 3'/3' forward/backward 3'/3' forward/backward 3'/3' forward/backward    3'/3' forward/backward                                                                                              Ther Activity             Foam Lateral + tandem walks  5x15 feet blue foam  5x15 feet blue foam  NP          Lateral idris walks  4x 4 hurdles 4x 4 hurdles 3x 4 hurdles 3x 3 hurdles 3x 3 hurdles 3x 3 hurdles    4x4 hurdles    Tandem idris walks 4x 4 hurdles 4x 4 hurdles 3x 4 hurdles 3x 3 hurdles 3x 3 hurdles 3x 3 hurdles    4x4 hurdles    [place] row with backward walking btb 4x 5 feet btb 4x 5 feet btb 4x 5 feet btb 4x 5 feet btb 4x 5 feet btb 4x 5 feet    btb 4x 5 feet   Runners climb on BD foam 20x on ea 20x ea NP  30x ea 15x ea    20x on ea   Foam walks          For improving strides 6x 10feet    Gait Training                                       Modalities                                         1:1 with PT from [id number]

## 2022-11-23 ENCOUNTER — OFFICE VISIT (OUTPATIENT)
Dept: PHYSICAL THERAPY | Facility: CLINIC | Age: 72
End: 2022-11-23

## 2022-11-23 DIAGNOSIS — G35 MULTIPLE SCLEROSIS (HCC): Primary | ICD-10-CM

## 2022-11-23 NOTE — PROGRESS NOTES
Daily Note     Today's date: 2022  Patient name: Akhil Villegas  : 1950  MRN: [id number]  Referring provider: Ary Pierson MD  Dx:   Encounter Diagnosis     ICD-10-CM    1  Multiple sclerosis ([address] )  G35                      Subjective: Pt states she is having a "good day" today  Objective: See treatment diary below      Assessment: Tolerated treatment well  Patient exhibited good technique with therapeutic exercises and would benefit from continued PT  Pt continues to need close supervision for all activities, CG assist w/ TB walkouts  Fatigued by "runners climb" on foam, jesus on L LE  Pt able to maintain good balance w/ arm lifts while standing on foam       Plan: Progress treatment as tolerated         Precautions: MS, Gait dysfunction, Insomnia, Osteoporosis, Peripheral polyneuropathy, Hx of Breast CA, R IDALIA in - Dr Rena Sanchez, Left femur IM nailing      Manuals 11/3 11/8 11/9 11/14 11/17 11/21 11/23   10/31                                                       Neuro Re-Ed             TB rows 2x20 black romberg 2x20 blue romberg 2x20 blue romberg 2x20 blue romberg 2x20 blue romberg 2x20 blue romberg 2x20 blue romberg   2x20  Black  romberg   TB Ws             TB lower trap             TB extension 2x20  Blue romberg 2x20 blue romberg 30x blue romberg 30x blue romberg 2x20 blue romberg 2x20 blue romberg 2x20 blue romberg   2x20  Blue romberg   Romberg on foam 20x shoulder abd/20x shoulder flexion 20x shoulder abd/20x shoulder flexion 14x shoulder abd/20x shoulder flexion  20x shoulder abd/20x shoulder flexion nv 20 ea, shdr abd/flex   20x shoulder abd/20x shoulder flexion   Standing hip 3 way             Lateral walks c/ UE resistance   rtb 4x 10 feet  6x10 double ytb 5x10 double ytb 5x10 double ytb 5x10 double ytb 5x10 dbl YTB      Ther Ex             UBE 3'/3' forward/backward 3'/3' forward/backward 3'/3' forward/backward 3'/3' forward/backward 3'/3' forward/backward 3'/3' forward/backward 3'/3' fwd/back   3'/3' forward/backward                                                                                              Ther Activity             Foam Lateral + tandem walks  5x15 feet blue foam  5x15 feet blue foam  NP          Lateral idris walks  4x 4 hurdles 4x 4 hurdles 3x 4 hurdles 3x 3 hurdles 3x 3 hurdles 3x 3 hurdles 3x3 hurdles    4x4 hurdles    Tandem idris walks 4x 4 hurdles 4x 4 hurdles 3x 4 hurdles 3x 3 hurdles 3x 3 hurdles 3x 3 hurdles 3x3 hurdles   4x4 hurdles    [place] row with backward walking btb 4x 5 feet btb 4x 5 feet btb 4x 5 feet btb 4x 5 feet btb 4x 5 feet btb 4x 5 feet BTB  4x5ft   btb 4x 5 feet   Runners climb on BD foam 20x on ea 20x ea NP  30x ea 15x ea 20 ea   20x on ea   Foam walks          For improving strides 6x 10feet    Gait Training                                       Modalities

## 2022-11-28 ENCOUNTER — OFFICE VISIT (OUTPATIENT)
Dept: PHYSICAL THERAPY | Facility: CLINIC | Age: 72
End: 2022-11-28

## 2022-11-28 DIAGNOSIS — G35 MULTIPLE SCLEROSIS (HCC): Primary | ICD-10-CM

## 2022-11-28 NOTE — PROGRESS NOTES
Daily Note     Today's date: 2022  Patient name: Iván Archuleta  : 1950  MRN: [id number]  Referring provider: Julianne Bello MD  Dx:   Encounter Diagnosis     ICD-10-CM    1  Multiple sclerosis ([address] )  Halie Wan           Start Time: 930  Stop Time: 9471  Total time in clinic (min): 45 minutes    Subjective: Pt offers no new complaints today  Objective: See treatment diary below      Assessment: Pt still fairly fatigued with prescribed program  RLE fatigued quicker than LLE, which is atypical with patient's MS presentation  Tolerated treatment fair  Requires constant tactile feedback during gait/balance/and strength training  Patient would benefit from continued PT  Plan: Continue per plan of care        Precautions: MS, Gait dysfunction, Insomnia, Osteoporosis, Peripheral polyneuropathy, Hx of Breast CA, R IDALIA in - Dr Keyla Painting, Left femur IM nailing      Manuals 11/3 11/8 11/9 11/14 11/17 11/21 11/23 11/28  10/31                                                       Neuro Re-Ed             TB rows 2x20 black romberg 2x20 blue romberg 2x20 blue romberg 2x20 blue romberg 2x20 blue romberg 2x20 blue romberg 2x20 blue romberg 2x20 blue romberg  2x20  Black  romberg   TB Ws             TB lower trap             TB extension 2x20  Blue romberg 2x20 blue romberg 30x blue romberg 30x blue romberg 2x20 blue romberg 2x20 blue romberg 2x20 blue romberg 2x20 blue romberg  2x20  Blue romberg   Romberg on foam 20x shoulder abd/20x shoulder flexion 20x shoulder abd/20x shoulder flexion 14x shoulder abd/20x shoulder flexion  20x shoulder abd/20x shoulder flexion nv 20 ea, shdr abd/flex   20x shoulder abd/20x shoulder flexion   Standing hip 3 way             Lateral walks c/ UE resistance   rtb 4x 10 feet  6x10 double ytb 5x10 double ytb 5x10 double ytb 5x10 double ytb 5x10 dbl YTB 5x10 single ytb     Ther Ex             UBE 3'/3' forward/backward 3'/3' forward/backward 3'/3' forward/backward 3'/3' forward/backward 3'/3' forward/backward 3'/3' forward/backward 3'/3' fwd/back 3'/3' fwd/back  3'/3' forward/backward                                                                                              Ther Activity             Foam Lateral + tandem walks  5x15 feet blue foam  5x15 feet blue foam  NP          Lateral idris walks  4x 4 hurdles 4x 4 hurdles 3x 4 hurdles 3x 3 hurdles 3x 3 hurdles 3x 3 hurdles 3x3 hurdles  3x4 hurdles   4x4 hurdles    Tandem idris walks 4x 4 hurdles 4x 4 hurdles 3x 4 hurdles 3x 3 hurdles 3x 3 hurdles 3x 3 hurdles 3x3 hurdles 3x4 hurdles  4x4 hurdles    [place] row with backward walking btb 4x 5 feet btb 4x 5 feet btb 4x 5 feet btb 4x 5 feet btb 4x 5 feet btb 4x 5 feet BTB  4x5ft BTB  4x5ft  btb 4x 5 feet   Runners climb on BD foam 20x on ea 20x ea NP  30x ea 15x ea 20 ea 9x R/20xL   20x on ea   Foam walks          For improving strides 6x 10feet    Gait Training                                       Modalities

## 2022-12-01 ENCOUNTER — OFFICE VISIT (OUTPATIENT)
Dept: PHYSICAL THERAPY | Facility: CLINIC | Age: 72
End: 2022-12-01

## 2022-12-01 DIAGNOSIS — G35 MULTIPLE SCLEROSIS (HCC): Primary | ICD-10-CM

## 2022-12-01 NOTE — PROGRESS NOTES
Daily Note     Today's date: 2022  Patient name: Brian Saldivar  : 1950  MRN: [id number]  Referring provider: Janet Bray MD  Dx:   Encounter Diagnosis     ICD-10-CM    1  Multiple sclerosis ([address] )  G35                      Subjective: Pt reports mild pressure under right eye which is likely sinus related  Otherwise, patient offers no new complaints at the LE  Objective: See treatment diary below      Assessment: Pt demonstrated difficulty clearing hurdles with left leg secondary to weakness at left hip flexor  Left knee demonstrates instability with step up secondary to decreased quad control  However, patient able to complete all prescribed exercises with modification (UE support)  Tolerated treatment fair  Patient exhibited good technique with therapeutic exercises and would benefit from continued PT  Plan: Continue per plan of care        Precautions: MS, Gait dysfunction, Insomnia, Osteoporosis, Peripheral polyneuropathy, Hx of Breast CA, R IDALIA in - Dr Gage Centeno, Left femur IM nailing      Manuals 11/3 11/8 11/9 11/14 11/17 11/21 11/23 11/28 12/1                                                        Neuro Re-Ed             TB rows 2x20 black romberg 2x20 blue romberg 2x20 blue romberg 2x20 blue romberg 2x20 blue romberg 2x20 blue romberg 2x20 blue romberg 2x20 blue romberg 2x20 blue romberg    TB Ws             TB lower trap             TB extension 2x20  Blue romberg 2x20 blue romberg 30x blue romberg 30x blue romberg 2x20 blue romberg 2x20 blue romberg 2x20 blue romberg 2x20 blue romberg 2x20 blue romberg    Romberg on foam 20x shoulder abd/20x shoulder flexion 20x shoulder abd/20x shoulder flexion 14x shoulder abd/20x shoulder flexion  20x shoulder abd/20x shoulder flexion nv 20 ea, shdr abd/flex  20 ea, shdr abd/flex    Standing hip 3 way             Lateral walks c/ UE resistance   rtb 4x 10 feet  6x10 double ytb 5x10 double ytb 5x10 double ytb 5x10 double ytb 5x10 dbl YTB 5x10 single ytb 5x10 single ytb    Ther Ex             UBE 3'/3' forward/backward 3'/3' forward/backward 3'/3' forward/backward 3'/3' forward/backward 3'/3' forward/backward 3'/3' forward/backward 3'/3' fwd/back 3'/3' fwd/back 3'/3' fwd/back                                                                                               Ther Activity             Foam Lateral + tandem walks  5x15 feet blue foam  5x15 feet blue foam  NP          Lateral idris walks  4x 4 hurdles 4x 4 hurdles 3x 4 hurdles 3x 3 hurdles 3x 3 hurdles 3x 3 hurdles 3x3 hurdles  3x4 hurdles  3x4 hurdles     Tandem idris walks 4x 4 hurdles 4x 4 hurdles 3x 4 hurdles 3x 3 hurdles 3x 3 hurdles 3x 3 hurdles 3x3 hurdles 3x4 hurdles 3x4 hurdles     [place] row with backward walking btb 4x 5 feet btb 4x 5 feet btb 4x 5 feet btb 4x 5 feet btb 4x 5 feet btb 4x 5 feet BTB  4x5ft BTB  4x5ft BTB  4x5ft    Runners climb on BD foam 20x on ea 20x ea NP  30x ea 15x ea 20 ea 9x R/20xL  20x on ea    Foam walks             Gait Training                                       Modalities                                         1:1 with PT from [id number]

## 2022-12-05 ENCOUNTER — APPOINTMENT (OUTPATIENT)
Dept: PHYSICAL THERAPY | Facility: CLINIC | Age: 72
End: 2022-12-05

## 2022-12-08 ENCOUNTER — OFFICE VISIT (OUTPATIENT)
Dept: PHYSICAL THERAPY | Facility: CLINIC | Age: 72
End: 2022-12-08

## 2022-12-08 DIAGNOSIS — G35 MULTIPLE SCLEROSIS (HCC): Primary | ICD-10-CM

## 2022-12-08 NOTE — PROGRESS NOTES
[id number]    Today's date: 2022  Patient name: Nain Reddy  : 1950  MRN: [id number]  Referring provider: Elise Chery MD  Dx:   Encounter Diagnosis     ICD-10-CM    1  Multiple sclerosis ([facility] [address] )  [address]           Start Time: 3901  Stop Time: 1120  Total time in clinic (min): 35 minutes  Assessment  Assessment details: Nain Reddy is a 67 y o  female who presents with signs and symptoms consistent of a primary balance impairment, gait abnormality, weakness in the BLE and right UE secondary to Multiple Sclerosis  Pt also has persistent neuropathic pain in BLE which ebbs and flows  Pt has inconsistent days based on weakness and pain in her extremities  Pt's son had a stroke last week so she has not been sleeping well, which has caused her energy level to be low and weakness in her LE  Pt has demonstrated improvements strength of the UE and symmetry of scapulothoracic  Her LE strength was mildly weaker than last re-evaluation  She is still ambulating with a SPC  Her UE is improving with strength and symmetry at scapulothoracic joint with less muscle guarding and tone at upper trapezius on the left  Her functional testing has slightly worsened this visit, likely due to emotional stress and decreased sleep which effects the nervous system  Patient continues to present with weakness in bilateral LEs, weakness in RUE, deconditioning, poor static and dynamic balance  Patient has difficulty ambulating for distance, performing sit to stand transfers, negotiating stairs, performing household ADLs, and is at increased risk of falls  Patient would benefit from continued skilled physical therapy to address the impairments, improve their level of function, and to improve their overall quality of life        Impairments: abnormal coordination, abnormal gait, abnormal or restricted ROM, abnormal movement, difficulty understanding, impaired balance, impaired physical strength, lacks appropriate home exercise program and poor posture   Barriers to therapy: Chronicity of pain, MS, H/O R IDALIA and L IM nailing in femur  Understanding of Dx/Px/POC: good   Prognosis: good    Goals  Short Term Goals: to be achieved by 4 weeks  1) Patient to be independent with basic HEP -MET  2) Improve 5x sit to stand by 5 seconds-Partially met  3) Improve TUG by 5 seconds -Partially met  4) Increase LE strength by 1/2 MMT grade in all deficient planes -Partially met    Long Term Goals: to be achieved by discharge  1) FOTO equal to or greater than expected  2) Ambulation to improve to maximal level of function  3) Stair negotiation will improve to reciprocal   4) Sit to stand transfers will improve to maximal level of function     Plan  Patient would benefit from: PT eval and skilled physical therapy  Planned therapy interventions: manual therapy, neuromuscular re-education, patient education, strengthening, therapeutic activities, therapeutic exercise, transfer training, home exercise program and functional ROM exercises  Frequency: 2x per week for 8 weeks  Treatment plan discussed with: patient        Subjective Evaluation    History of Present Illness  Mechanism of injury: History of Current Injury: Pt referred to PT from Neurologist secondary to MS relapse and functional decline  Pt has several week long flares which have made her extremely fatigued and weak  Pt attempted aquatic therapy for gait and balance at [facility]  She has been previously treated by me and last seen in March of 2022  Pt continues to report difficulties with ambulation, endurance, balance, and persistent neuropathic pain in BLE  Her symptoms and function varies day to day  She continues to ambulate with a SPC  Pt's PMH is significant for R total hip replacement in 2012 and L IM nailing in femur due to fracture  Pt's primary concern is her balance and unsteadiness  She also reports increasing right sided weakness including her right shoulder   She notes her right shoulder is considerably depressed  She has difficulty using SPC in R hand  Pt feels heaviness in RUE  Pt has intermittent numbness in BLE  Imaging: None recently    Patient goals:  Improve ambulation, endurance, balance, and strength  Pt would like to also work on R shoulder strength to improve the ability to walk with cane  She notes that she consistent veers to the right  Hobbies/Interest: Cooking, family, Conferences with [employer]  Occupation: Retired nurse           Objective     Static Posture   General Observations  Scoliosis  Shoulders  Depressed      Neurological Testing     Reflexes   Left   Biceps (C5/C6): normal (2+)  Brachioradialis (C6): normal (2+)  Triceps (C7): normal (2+)  Patellar (L4): trace (1+)  Achilles (S1): trace (1+)    Right   Biceps (C5/C6): normal (2+)  Brachioradialis (C6): normal (2+)  Triceps (C7): normal (2+)  Patellar (L4): trace (1+)  Achilles (S1): trace (1+)    Strength/Myotome Testing   Cervical Spine     Left   Interossei strength (t1): 4    Right   Interossei strength (t1): 4    Left Shoulder     Planes of Motion   Flexion: 4+   Abduction: 4+   External rotation at 0°: 4   Internal rotation at 0°: 4     Right Shoulder     Planes of Motion   Flexion: 4+   Abduction: 4+  External rotation at 0°: 4  Internal rotation at 0°: 4     Left Elbow   Flexion: 4+  Extension: 4+    Right Elbow   Flexion: 4+  Extension: 4+    Left Wrist/Hand   Wrist extension: 4-  Wrist flexion: 4-  Thumb extension: 4-    Right Wrist/Hand   Wrist extension: 4  Wrist flexion: 4  Thumb extension: 4    Left Hip   Planes of Motion   Flexion: 3+  Abduction: 3+  Adduction: 3+    Right Hip   Planes of Motion   Flexion: 4-  Abduction: 3+  Adduction: 3+    Left Knee   Flexion: 3+  Extension: 3+    Right Knee   Flexion: 4-  Extension: 4-    Left Ankle/Foot   Dorsiflexion: 3  Plantar flexion: 3+    Right Ankle/Foot   Dorsiflexion: 4-  Plantar flexion: 4-    Additional Strength Details  *Limited R shoulder ROM compared to L with  Functional ER and Functional IR   *Right scapular and humeral depression compared to the Left (This is likely due to functional scoliosis)  Functional Assessment    Timed up and go:  Date: 25 seconds  with use of hands on rails for assistance and SPC in RUE  10/25: 18 seconds  C/ use of hand on rails for assistance and SPC in RUE  12/8: 19 seconds  C use of hands on rails of assistance and SPC in RUE (decreased ability to turn)     5x sit to stand:   Date: 19 seconds with BUE assistance  Moderate knee valgus alignment preset during transitions  10/25: 16 seconds with BUE assistance   Staggered stance    12/8: 20 seconds c/ BUE assistance: Staggered stance    2 minute walk test:   NT    IE:  MCTSIB: feet together c/ yellow foam  EO firm: 30 seconds with mod postural sway   EC firm: 8 seconds with prior to LOB (then to 23 seconds with FT support)   EO foam: FT support after 10 seconds which was required throughout with moderate postural sway  EC foam: Unable to perform     10/25  MCTSIB: feet together c/ yellow foam  EO firm: 30 seconds with min postural sway   EC firm: 30 seconds with mod postural sway   EO foam: 30 second c/ mod postural sway  EC foam: 3 seconds prior to LOB forward     12/8  MCTSIB: feet together c/ yellow foam  EO firm: 30 seconds with min postural sway   EC firm: 30 seconds with mod postural sway   EO foam: 30 second c/ mod postural sway  EC foam: 6 seconds prior to LOB forward      6 minute walk test:   NT         Precautions: MS, Gait dysfunction, Insomnia, Osteoporosis, Peripheral polyneuropathy, Hx of Breast CA, R IDALIA in 2012- Dr Eleno Altman, Left femur IM nailing      Manuals 11/3 11/8 11/9 11/14 11/17 11/21 11/23 11/28 12/1                                                        Neuro Re-Ed             TB rows 2x20 black romberg 2x20 blue romberg 2x20 blue romberg 2x20 blue romberg 2x20 blue romberg 2x20 blue romberg 2x20 blue romberg 2x20 blue romberg 2x20 blue romberg    TB Ws             TB lower trap             TB extension 2x20  Blue romberg 2x20 blue romberg 30x blue romberg 30x blue romberg 2x20 blue romberg 2x20 blue romberg 2x20 blue romberg 2x20 blue romberg 2x20 blue romberg    Romberg on foam 20x shoulder abd/20x shoulder flexion 20x shoulder abd/20x shoulder flexion 14x shoulder abd/20x shoulder flexion  20x shoulder abd/20x shoulder flexion nv 20 ea, shdr abd/flex  20 ea, shdr abd/flex    Standing hip 3 way             Lateral walks c/ UE resistance   rtb 4x 10 feet  6x10 double ytb 5x10 double ytb 5x10 double ytb 5x10 double ytb 5x10 dbl YTB 5x10 single ytb 5x10 single ytb    Ther Ex             UBE 3'/3' forward/backward 3'/3' forward/backward 3'/3' forward/backward 3'/3' forward/backward 3'/3' forward/backward 3'/3' forward/backward 3'/3' fwd/back 3'/3' fwd/back 3'/3' fwd/back 3'/3' fwd/back                                                                                              Ther Activity             Foam Lateral + tandem walks  5x15 feet blue foam  5x15 feet blue foam  NP          Lateral idris walks  4x 4 hurdles 4x 4 hurdles 3x 4 hurdles 3x 3 hurdles 3x 3 hurdles 3x 3 hurdles 3x3 hurdles  3x4 hurdles  3x4 hurdles     Tandem idris walks 4x 4 hurdles 4x 4 hurdles 3x 4 hurdles 3x 3 hurdles 3x 3 hurdles 3x 3 hurdles 3x3 hurdles 3x4 hurdles 3x4 hurdles     Brandon row with backward walking btb 4x 5 feet btb 4x 5 feet btb 4x 5 feet btb 4x 5 feet btb 4x 5 feet btb 4x 5 feet BTB  4x5ft BTB  4x5ft BTB  4x5ft    Runners climb on BD foam 20x on ea 20x ea NP  30x ea 15x ea 20 ea 9x R/20xL  20x on ea    Foam walks             Gait Training                                       Modalities                                         1:1 with PT from [id number]  Pt was re-evaluated today x 25 minutes

## 2022-12-12 ENCOUNTER — HOSPITAL ENCOUNTER (OUTPATIENT)
Dept: ULTRASOUND IMAGING | Facility: CLINIC | Age: 72
Discharge: HOME | End: 2022-12-12

## 2022-12-12 ENCOUNTER — HOSPITAL ENCOUNTER (OUTPATIENT)
Dept: MAMMOGRAPHY | Facility: CLINIC | Age: 72
Discharge: HOME | End: 2022-12-12

## 2022-12-12 VITALS — BODY MASS INDEX: 24.1 KG/M2 | HEIGHT: 63 IN | WEIGHT: 136 LBS

## 2022-12-12 DIAGNOSIS — Z85.3 HX: BREAST CANCER: ICD-10-CM

## 2022-12-14 ENCOUNTER — OFFICE VISIT (OUTPATIENT)
Dept: PHYSICAL THERAPY | Facility: CLINIC | Age: 72
End: 2022-12-14

## 2022-12-14 DIAGNOSIS — G35 MULTIPLE SCLEROSIS (HCC): Primary | ICD-10-CM

## 2022-12-14 NOTE — PROGRESS NOTES
Daily Note     Today's date: 2022  Patient name: Thor Krabbe  : 1950  MRN: [id number]  Referring provider: Meaghan Wahl MD  Dx:   Encounter Diagnosis     ICD-10-CM    1  Multiple sclerosis ([address] )  G35                      Subjective: Pt reports poor sleep quality due to the stress of her son's medial recovery  She reports good and bad days but did admit to having palpitations last evening  Objective: See treatment diary below      Assessment: Pt demonstrated poor clearance of BLE over hurdles, especially in the frontal plane of motion (laterally)  She was able to complete this in the sagittal plane  Decreased control of quad and hip flexor noted, making step up with runner climb difficult to perform  Modified treatment to accommodate for decrease control and fatigue  We will resume all exercises as tolerated next session  Plan: Continue per plan of care        Precautions: MS, Gait dysfunction, Insomnia, Osteoporosis, Peripheral polyneuropathy, Hx of Breast CA, R IDALIA in - Dr Colan Ormond, Left femur IM nailing      Manuals 11/3 11/8 11/9 11/14 11/17 11/21 11/23 11/28 12/1 12/14                                                       Neuro Re-Ed             TB rows 2x20 black romberg 2x20 blue romberg 2x20 blue romberg 2x20 blue romberg 2x20 blue romberg 2x20 blue romberg 2x20 blue romberg 2x20 blue romberg 2x20 blue romberg 2x20 blue romberg   TB Ws             TB lower trap             TB extension 2x20  Blue romberg 2x20 blue romberg 30x blue romberg 30x blue romberg 2x20 blue romberg 2x20 blue romberg 2x20 blue romberg 2x20 blue romberg 2x20 blue romberg 2x20 blue romberg   Romberg on foam 20x shoulder abd/20x shoulder flexion 20x shoulder abd/20x shoulder flexion 14x shoulder abd/20x shoulder flexion  20x shoulder abd/20x shoulder flexion nv 20 ea, shdr abd/flex  20 ea, shdr abd/flex    Standing hip 3 way             Lateral walks c/ UE resistance   rtb 4x 10 feet  6x10 double ytb 5x10 double ytb 5x10 double ytb 5x10 double ytb 5x10 dbl YTB 5x10 single ytb 5x10 single ytb    Ther Ex             UBE 3'/3' forward/backward 3'/3' forward/backward 3'/3' forward/backward 3'/3' forward/backward 3'/3' forward/backward 3'/3' forward/backward 3'/3' fwd/back 3'/3' fwd/back 3'/3' fwd/back 3'/3' fwd/back                                                                                              Ther Activity             Foam Lateral + tandem walks  5x15 feet blue foam  5x15 feet blue foam  NP          Lateral idris walks  4x 4 hurdles 4x 4 hurdles 3x 4 hurdles 3x 3 hurdles 3x 3 hurdles 3x 3 hurdles 3x3 hurdles  3x4 hurdles  3x4 hurdles  3x4 hurdles    Tandem idris walks 4x 4 hurdles 4x 4 hurdles 3x 4 hurdles 3x 3 hurdles 3x 3 hurdles 3x 3 hurdles 3x3 hurdles 3x4 hurdles 3x4 hurdles  3x4 hurdles    Brandon row with backward walking btb 4x 5 feet btb 4x 5 feet btb 4x 5 feet btb 4x 5 feet btb 4x 5 feet btb 4x 5 feet BTB  4x5ft BTB  4x5ft BTB  4x5ft BTB  4x5ft   Runners climb on BD foam 20x on ea 20x ea NP  30x ea 15x ea 20 ea 9x R/20xL  20x on ea 10x L/ 20X R    Foam walks             Gait Training                                       Modalities                                         1:1 with PT from [id number]

## 2022-12-16 ENCOUNTER — OFFICE VISIT (OUTPATIENT)
Dept: PHYSICAL THERAPY | Facility: CLINIC | Age: 72
End: 2022-12-16

## 2022-12-16 DIAGNOSIS — G35 MULTIPLE SCLEROSIS (HCC): Primary | ICD-10-CM

## 2022-12-16 NOTE — PROGRESS NOTES
Daily Note     Today's date: 2022  Patient name: Linda Padilla  : 1950  MRN: [id number]  Referring provider: Viviane Cockayne, MD  Dx:   Encounter Diagnosis     ICD-10-CM    1  Multiple sclerosis ([address] )  Andre Major           Start Time: 945  Stop Time: 1030  Total time in clinic (min): 45 minutes    Subjective: Pt reports that she is not doing well today  She states that her RLE is giving way on her and she is almost falling  Mainly noted at the lateral RLE distal leg to the ankle  Objective: See treatment diary below      Assessment: Pt was unable to complete all exercises today secondary to RLE pain and weakness  We modified treatment to accommodate for patient's symptoms and tolerance level to movement  Performed AROM of the hips and implemented heel raises  Tolerated treatment fair with modification  Patient would benefit from continued PT  Will resume all strengthening next session as tolerated  Plan: Continue per plan of care        Precautions: MS, Gait dysfunction, Insomnia, Osteoporosis, Peripheral polyneuropathy, Hx of Breast CA, R IDALIA in - Dr Sheridan Blood, Left femur IM nailing      Manuals                                                        Neuro Re-Ed             TB rows 2x20 blue romberg     2x20 blue romberg 2x20 blue romberg 2x20 blue romberg 2x20 blue romberg 2x20 blue romberg   TB Ws             TB lower trap             TB extension 2x20 blue romberg     2x20 blue romberg 2x20 blue romberg 2x20 blue romberg 2x20 blue romberg 2x20 blue romberg   Romberg on foam 20 shoulder abd/5x shoulder flexion     nv 20 ea, shdr abd/flex  20 ea, shdr abd/flex    Standing hip 3 way             Lateral walks c/ UE resistance       5x10 double ytb 5x10 dbl YTB 5x10 single ytb 5x10 single ytb    Ther Ex             UBE      3'/3' forward/backward 3'/3' fwd/back 3'/3' fwd/back 3'/3' fwd/back 3'/3' fwd/back   NuStep No resistance UE+LE movements 5' Standing HR 20x            Standing hip march 10x ea            Standing hip extension 10x ea            Standing hip abduction 10x ea                                      Ther Activity             Foam Lateral + tandem walks              Lateral idris walks       3x 3 hurdles 3x3 hurdles  3x4 hurdles  3x4 hurdles  3x4 hurdles    Tandem idris walks      3x 3 hurdles 3x3 hurdles 3x4 hurdles 3x4 hurdles  3x4 hurdles    Brandon row with backward walking      btb 4x 5 feet BTB  4x5ft BTB  4x5ft BTB  4x5ft BTB  4x5ft   Runners climb on BD foam      15x ea 20 ea 9x R/20xL  20x on ea 10x L/ 20X R    Foam walks             Gait Training                                       Modalities                                         1:1 with PT from [id number]

## 2022-12-19 ENCOUNTER — OFFICE VISIT (OUTPATIENT)
Dept: PHYSICAL THERAPY | Facility: CLINIC | Age: 72
End: 2022-12-19

## 2022-12-19 DIAGNOSIS — G35 MULTIPLE SCLEROSIS (HCC): Primary | ICD-10-CM

## 2022-12-19 NOTE — PROGRESS NOTES
Daily Note     Today's date: 2022  Patient name: Jacquelin Cannon  : 1950  MRN: [id number]  Referring provider: Samia Shahid MD  Dx:   Encounter Diagnosis     ICD-10-CM    1  Multiple sclerosis ([address] )  Kirk Graham           Start Time: 1030  Stop Time: 9950  Total time in clinic (min): 41 minutes    Subjective: Pt reports having a terrible weekend with excessive fatigue and balance deficits  She believes its primarily due to the stress of her son having a stroke  She feels most weakness in her hips and she is having difficulty clearing her LE for limb advance  Objective: See treatment diary below      Assessment: Resumed exercises and strengthening for the lower extremity  Pt still having difficulty with lifting LE secondary to hip weakness  This is likely a transient regression as patient is improving from the weekend  Tolerated treatment fair  Patient exhibited good technique with therapeutic exercises and would benefit from continued PT  Plan: Continue per plan of care        Precautions: MS, Gait dysfunction, Insomnia, Osteoporosis, Peripheral polyneuropathy, Hx of Breast CA, R IDALIA in - Dr Tiff Escalona, Left femur IM nailing      Manuals                                                        Neuro Re-Ed             TB rows 2x20 blue romberg 2x20 blue romberg    2x20 blue romberg 2x20 blue romberg 2x20 blue romberg 2x20 blue romberg 2x20 blue romberg   TB Ws             TB lower trap             TB extension 2x20 blue romberg 2x20 blue romberg    2x20 blue romberg 2x20 blue romberg 2x20 blue romberg 2x20 blue romberg 2x20 blue romberg   Romberg on foam 20 shoulder abd/5x shoulder flexion 20 shoulder abd/20x shoulder flexion    nv 20 ea, shdr abd/flex  20 ea, shdr abd/flex    Standing hip 3 way             Lateral walks c/ UE resistance       5x10 double ytb 5x10 dbl YTB 5x10 single ytb 5x10 single ytb    Ther Ex             UBE  3'/3' fwd/back    3'/3' forward/backward 3'/3' fwd/back 3'/3' fwd/back 3'/3' fwd/back 3'/3' fwd/back   NuStep No resistance UE+LE movements 5'            Standing HR 20x            Standing hip march 10x ea            Standing hip extension 10x ea            Standing hip abduction 10x ea                                      Ther Activity             Foam Lateral + tandem walks              Lateral idris walks   2x3 hurdles     3x 3 hurdles 3x3 hurdles  3x4 hurdles  3x4 hurdles  3x4 hurdles    Tandem idris walks  2x3 hurdles     3x 3 hurdles 3x3 hurdles 3x4 hurdles 3x4 hurdles  3x4 hurdles    Brandon row with backward walking      btb 4x 5 feet BTB  4x5ft BTB  4x5ft BTB  4x5ft BTB  4x5ft   Runners climb on BD foam  Step ups only x 10 on each    15x ea 20 ea 9x R/20xL  20x on ea 10x L/ 20X R    Foam walks             Gait Training                                       Modalities                                         1:1 with PT from 1030-1111am

## 2022-12-20 ENCOUNTER — RA CDI HCC (OUTPATIENT)
Dept: OTHER | Facility: HOSPITAL | Age: 72
End: 2022-12-20

## 2022-12-22 ENCOUNTER — APPOINTMENT (OUTPATIENT)
Dept: PHYSICAL THERAPY | Facility: CLINIC | Age: 72
End: 2022-12-22

## 2022-12-28 ENCOUNTER — APPOINTMENT (OUTPATIENT)
Dept: LAB | Facility: CLINIC | Age: 72
End: 2022-12-28

## 2022-12-28 ENCOUNTER — TRANSCRIBE ORDERS (OUTPATIENT)
Dept: LAB | Facility: CLINIC | Age: 72
End: 2022-12-28

## 2022-12-28 ENCOUNTER — OFFICE VISIT (OUTPATIENT)
Dept: PHYSICAL THERAPY | Facility: CLINIC | Age: 72
End: 2022-12-28

## 2022-12-28 DIAGNOSIS — M81.0 SENILE OSTEOPOROSIS: ICD-10-CM

## 2022-12-28 DIAGNOSIS — G35 MULTIPLE SCLEROSIS (HCC): Primary | ICD-10-CM

## 2022-12-28 DIAGNOSIS — M81.0 SENILE OSTEOPOROSIS: Primary | ICD-10-CM

## 2022-12-28 LAB
25(OH)D3 SERPL-MCNC: 58.3 NG/ML (ref 30–100)
CALCIUM SERPL-MCNC: 8.7 MG/DL (ref 8.3–10.1)

## 2022-12-28 NOTE — PROGRESS NOTES
Daily Note     Today's date: 2022  Patient name: Kelvin Bustos  : 1950  MRN: [id number]  Referring provider: Nakul Martinez MD  Dx:   Encounter Diagnosis     ICD-10-CM    1  Multiple sclerosis ([address] )  G35                      Subjective: Pt reports having sciatica last week from her constant sitting to standing and preparing for Trav  She was unable to reschedule last week  She is doing better today  Objective: See treatment diary below      Assessment: Tolerated treatment well  Improved endurance and tolerance in LE  Pt demonstrated improved stability and balance today compared to last week  Patient would benefit from continued PT      Plan: Continue per plan of care        Precautions: MS, Gait dysfunction, Insomnia, Osteoporosis, Peripheral polyneuropathy, Hx of Breast CA, R IDALIA in - Dr Enrique Merchant, Left femur IM nailing      Manuals                                                        Neuro Re-Ed             TB rows 2x20 blue romberg 2x20 blue romberg 2x20 blue romberg   2x20 blue romberg 2x20 blue romberg 2x20 blue romberg 2x20 blue romberg 2x20 blue romberg   TB Ws             TB lower trap             TB extension 2x20 blue romberg 2x20 blue romberg 2x20 blue romberg   2x20 blue romberg 2x20 blue romberg 2x20 blue romberg 2x20 blue romberg 2x20 blue romberg   Romberg on foam 20 shoulder abd/5x shoulder flexion 20 shoulder abd/20x shoulder flexion 20 shoulder abd/20x shoulder flexion   nv 20 ea, shdr abd/flex  20 ea, shdr abd/flex    Standing hip 3 way             Lateral walks c/ UE resistance       5x10 double ytb 5x10 dbl YTB 5x10 single ytb 5x10 single ytb    Ther Ex             UBE  3'/3' fwd/back 3'/3' fwd/back   3'/3' forward/backward 3'/3' fwd/back 3'/3' fwd/back 3'/3' fwd/back 3'/3' fwd/back   NuStep No resistance UE+LE movements 5'            Standing HR 20x  20x          Standing hip march 10x ea            Standing hip extension 10x ea  2x10          Standing hip abduction 10x ea  2x10                                    Ther Activity             Foam Lateral + tandem walks              Lateral idris walks   2x3 hurdles  3x4 hurdles    3x 3 hurdles 3x3 hurdles  3x4 hurdles  3x4 hurdles  3x4 hurdles    Tandem idris walks  2x3 hurdles  3x4 hurdles    3x 3 hurdles 3x3 hurdles 3x4 hurdles 3x4 hurdles  3x4 hurdles    Brandon row with backward walking      btb 4x 5 feet BTB  4x5ft BTB  4x5ft BTB  4x5ft BTB  4x5ft   Runners climb on BD foam  Step ups only x 10 on each 20x B    15x ea 20 ea 9x R/20xL  20x on ea 10x L/ 20X R    Foam walks             Gait Training                                       Modalities                                         1:1 with PT from [id number]

## 2022-12-30 ENCOUNTER — OFFICE VISIT (OUTPATIENT)
Dept: PHYSICAL THERAPY | Facility: CLINIC | Age: 72
End: 2022-12-30

## 2022-12-30 DIAGNOSIS — G35 MULTIPLE SCLEROSIS (HCC): Primary | ICD-10-CM

## 2022-12-30 NOTE — PROGRESS NOTES
Daily Note     Today's date: 2022  Patient name: Saud Cramer  : 1950  MRN: [id number]  Referring provider: Katrin Vidales MD  Dx:   Encounter Diagnosis     ICD-10-CM    1  Multiple sclerosis ([address] )  Laurie Leisure           Start Time: 47  Stop Time: 1030  Total time in clinic (min): 40 minutes    Subjective: Pt reports feeling right mid axillary pain and attributes this to the UE strengthening exercise performed at last session  Objective: See treatment diary below      Assessment: Held UQ strengthening in today's visit  We will re-visit these exercises next week  Increased LE/balance program today  Tolerated treatment well  Patient would benefit from continued PT  No evidence of LOB with proprioception exercises  Plan: Continue per plan of care        Precautions: MS, Gait dysfunction, Insomnia, Osteoporosis, Peripheral polyneuropathy, Hx of Breast CA, R IDALIA in - Dr Niesha Johnson, Left femur IM nailing      Manuals                                                        Neuro Re-Ed             TB rows 2x20 blue romberg 2x20 blue romberg 2x20 blue romberg   2x20 blue romberg 2x20 blue romberg 2x20 blue romberg 2x20 blue romberg 2x20 blue romberg   TB Ws             TB lower trap             TB extension 2x20 blue romberg 2x20 blue romberg 2x20 blue romberg   2x20 blue romberg 2x20 blue romberg 2x20 blue romberg 2x20 blue romberg 2x20 blue romberg   Romberg on foam 20 shoulder abd/5x shoulder flexion 20 shoulder abd/20x shoulder flexion 20 shoulder abd/20x shoulder flexion 20 shoulder abd/20x shoulder flexion  nv 20 ea, shdr abd/flex  20 ea, shdr abd/flex    Standing hip 3 way             Lateral walks c/ UE resistance       5x10 double ytb 5x10 dbl YTB 5x10 single ytb 5x10 single ytb    Ther Ex             UBE  3'/3' fwd/back 3'/3' fwd/back Held  3'/3' forward/backward 3'/3' fwd/back 3'/3' fwd/back 3'/3' fwd/back 3'/3' fwd/back   NuStep No resistance UE+LE movements 5'   5' no resistance/no UE         Standing HR 20x  20x 20x         Standing hip march 10x ea            Standing hip extension 10x ea  2x10 2x10         Standing hip abduction 10x ea  2x10 2x10                                   Ther Activity             Foam Lateral + tandem walks              Lateral idris walks   2x3 hurdles  3x4 hurdles  4x4 hurdles  3x 3 hurdles 3x3 hurdles  3x4 hurdles  3x4 hurdles  3x4 hurdles    Tandem idris walks  2x3 hurdles  3x4 hurdles  4x4 idris  3x 3 hurdles 3x3 hurdles 3x4 hurdles 3x4 hurdles  3x4 hurdles    Brandon row with backward walking      btb 4x 5 feet BTB  4x5ft BTB  4x5ft BTB  4x5ft BTB  4x5ft   Runners climb on BD foam  Step ups only x 10 on each 20x B  20x B   15x ea 20 ea 9x R/20xL  20x on ea 10x L/ 20X R    Foam walks    Lateral and tandem 4x10 feet each         Gait Training                                       Modalities                                         1:1 with PT from [phone]

## 2023-01-04 ENCOUNTER — TELEPHONE (OUTPATIENT)
Dept: NEUROLOGY | Facility: CLINIC | Age: 73
End: 2023-01-04

## 2023-01-04 ENCOUNTER — APPOINTMENT (OUTPATIENT)
Dept: PHYSICAL THERAPY | Facility: CLINIC | Age: 73
End: 2023-01-04

## 2023-01-04 NOTE — TELEPHONE ENCOUNTER
----- Message from Maribell Dale RN sent at 2023  4:09 PM EST -----  Regarding: FW: MS Berhane  Contact: [phone]    ----- Message -----  From: Carlton Cobb"  Sent: 2023   3:04 PM EST  To: Neurology [facility] Clinical Team 3  Subject: Neha Potter                                         I understand that Dr Maricarmen Sanchez is out of the office until the , but, I was successful in getting my MS berhane renewed with the [facility] [employer] ([facility])  They will be faxing some paperwork to the office and if it is not completed and refaxed to the [facility] within a week or two, my berhane will be cancelled! !  I will be out $7500 00  Can someone please complete the form for me when it arrives so that I don't lose the berhane  Thank you so much  Their fax number is: [phone]    Kb SNYDER 1950

## 2023-01-05 NOTE — TELEPHONE ENCOUNTER
ROEL called Cindy Mccarthy, patient at [phone]  Per Cindy Mccarthy fax for physician completion to continue MS berhane [facility] #[phone]  SW explained to patient may take a couple of days for fax to come through  Once form obtained and completed SW will return to [facility] and they will continue funding for patient  Patient provided additional information in case needed for SW f/u to [facility], #[phone], Patient listed under name Bryon Holstein and [address] [place] [address] #[phone]  SW remains available to assist with social needs

## 2023-01-06 ENCOUNTER — OFFICE VISIT (OUTPATIENT)
Dept: PHYSICAL THERAPY | Facility: CLINIC | Age: 73
End: 2023-01-06

## 2023-01-06 DIAGNOSIS — G35 MULTIPLE SCLEROSIS (HCC): Primary | ICD-10-CM

## 2023-01-06 NOTE — PROGRESS NOTES
Daily Note     Today's date: 2023  Patient name: Leonardo Germain  : 1950  MRN: [id number]  Referring provider: García Curry MD  Dx:   Encounter Diagnosis     ICD-10-CM    1  Multiple sclerosis ([address] )  Es Welch           Start Time: 945  Stop Time: 1030  Total time in clinic (min): 45 minutes    Subjective: Pt reports feeling better than earlier this week  She had to cancel due to illness  Objective: See treatment diary below      Assessment: Resumed upper quarter exercises today with good tolerance  No loss of balance throughout program; however, patient was steady and cautious  Pt demonstrated more energy and had good clearance of hurdles  Frequent intermittent rest required throughout program  Patient demonstrated fatigue post treatment, exhibited good technique with therapeutic exercises and would benefit from continued PT  Plan: Continue per plan of care        Precautions: MS, Gait dysfunction, Insomnia, Osteoporosis, Peripheral polyneuropathy, Hx of Breast CA, R IDALIA in - Dr Torrey Oshea, Left femur IM nailing      Manuals                                                             Neuro Re-Ed             TB rows 2x20 blue romberg 2x20 blue romberg 2x20 blue romberg  2x20 blue romberg        TB Ws             TB lower trap             TB extension 2x20 blue romberg 2x20 blue romberg 2x20 blue romberg  2x20 blue romberg        Romberg on foam 20 shoulder abd/5x shoulder flexion 20 shoulder abd/20x shoulder flexion 20 shoulder abd/20x shoulder flexion 20 shoulder abd/20x shoulder flexion 20 shoulder abd/20x shoulder flexion        Standing hip 3 way             Lateral walks c/ UE resistance              Ther Ex             UBE  3'/3' fwd/back 3'/3' fwd/back Held 3'/3' fwd/back        NuStep No resistance UE+LE movements 5'   5' no resistance/no UE         Standing HR 20x  20x 20x         Standing hip march 10x ea            Standing hip extension 10x ea  2x10 2x10 Standing hip abduction 10x ea  2x10 2x10                                   Ther Activity             Foam Lateral + tandem walks              Lateral idris walks   2x3 hurdles  3x4 hurdles  4x4 hurdles 4x4 hurdles        Tandem idris walks  2x3 hurdles  3x4 hurdles  4x4 idris 4x4 hurdles        Brandon row with backward walking     4x btb 10 feet        Runners climb on BD foam  Step ups only x 10 on each 20x B  20x B  20x B         Foam walks    Lateral and tandem 4x10 feet each         Gait Training                                       Modalities                                         1:1 with PT from [phone]

## 2023-01-09 NOTE — TELEPHONE ENCOUNTER
SW called Anyi Joy, patient at [phone] and l/m  still needs form for physician to complete, waiting for fax from [facility]  ROEL called Patient [facility] and spoke with Adriana Deleon at #[phone], Patient listed under name Deisy Monzon and Vance0 Val  #[id number] with the [facility]  SW explained to Adriana Deleon that physician form has not arrived to neurology yet  Adriana Deleon informed  that form was faxed to a central fax  Patient has been approved as of 1/4 for the continuation of services  Need physician information within 30 days of approval   ROEL provided new fax# [phone]  Adriana Deleon to fax over shortly  SW called patient back and left another message with details of above phone call  SW received fax from [facility]  SW remains available to assist with social needs

## 2023-01-10 ENCOUNTER — OFFICE VISIT (OUTPATIENT)
Dept: PHYSICAL THERAPY | Facility: CLINIC | Age: 73
End: 2023-01-10

## 2023-01-10 DIAGNOSIS — G35 MULTIPLE SCLEROSIS (HCC): Primary | ICD-10-CM

## 2023-01-10 NOTE — PROGRESS NOTES
Daily Note     Today's date: 1/10/2023  Patient name: Agnes Dillon  : 1950  MRN: [id number]  Referring provider: Nawaf Delvalle MD  Dx:   Encounter Diagnosis     ICD-10-CM    1  Multiple sclerosis ([address] )  Jeanmarie Goodrich           Start Time: 1030  Stop Time: 1125  Total time in clinic (min): 55 minutes    Subjective: Pt reports having a "sciatic twinge" on the left side of her back when transferring this morning  She states that she hasn't had great sleep the past few nights which typically contribute to energy levels and fatigability in the LE  Objective: See treatment diary below      Assessment: Pt noted decreased proprioception in right ankle during balance exercises on unstable surfaces  She continues to have a weaker LLE with difficulty clearing hurdles during lateral walks  Added blue foam during HR and hip isotonics to increase/enhance difficulty and challenge proprioception  Tolerated treatment well  Patient exhibited good technique with therapeutic exercises and would benefit from continued PT  Plan: Continue per plan of care        Precautions: MS, Gait dysfunction, Insomnia, Osteoporosis, Peripheral polyneuropathy, Hx of Breast CA, R IDALIA in - Dr Walls Case, Left femur IM nailing      Manuals 12/16 12/19 12/28 12/30 1/6 1/10                                                           Neuro Re-Ed             TB rows 2x20 blue romberg 2x20 blue romberg 2x20 blue romberg  2x20 blue romberg 2x20 blue romberg       TB Ws             TB lower trap             TB extension 2x20 blue romberg 2x20 blue romberg 2x20 blue romberg  2x20 blue romberg 2x20 blue romberg       Romberg on foam 20 shoulder abd/5x shoulder flexion 20 shoulder abd/20x shoulder flexion 20 shoulder abd/20x shoulder flexion 20 shoulder abd/20x shoulder flexion 20 shoulder abd/20x shoulder flexion 20 shoulder abd/13x shoulder flexion       Standing hip 3 way             Lateral walks c/ UE resistance              Ther Ex UBE  3'/3' fwd/back 3'/3' fwd/back Held 3'/3' fwd/back 3'/3' fwd/back       NuStep No resistance UE+LE movements 5'   5' no resistance/no UE         Standing HR 20x  20x 20x  20x on blue foam        Standing hip march 10x ea            Standing hip extension 10x ea  2x10 2x10  2x10 blue foam       Standing hip abduction 10x ea  2x10 2x10  2x10 blue foam                                  Ther Activity             Foam Lateral + tandem walks              Lateral idris walks   2x3 hurdles  3x4 hurdles  4x4 hurdles 4x4 hurdles 4x4 hurdles       Tandem idris walks  2x3 hurdles  3x4 hurdles  4x4 idris 4x4 hurdles 4x4 hurdles       Brandon row with backward walking     4x btb 10 feet 4x btb 10 feet       Runners climb on BD foam  Step ups only x 10 on each 20x B  20x B  20x B  20x B        Foam walks    Lateral and tandem 4x10 feet each         Gait Training                                       Modalities                                         1:1 with PT from [phone]

## 2023-01-11 ENCOUNTER — OFFICE VISIT (OUTPATIENT)
Dept: SLEEP CENTER | Facility: CLINIC | Age: 73
End: 2023-01-11

## 2023-01-11 VITALS
WEIGHT: 140 LBS | DIASTOLIC BLOOD PRESSURE: 72 MMHG | SYSTOLIC BLOOD PRESSURE: 118 MMHG | HEIGHT: 63 IN | BODY MASS INDEX: 24.8 KG/M2

## 2023-01-11 DIAGNOSIS — G47.61 PLMD (PERIODIC LIMB MOVEMENT DISORDER): ICD-10-CM

## 2023-01-11 RX ORDER — CLONAZEPAM 0.5 MG/1
TABLET ORAL
Qty: 135 TABLET | Refills: 1 | Status: SHIPPED | OUTPATIENT
Start: 2023-01-11

## 2023-01-11 NOTE — PROGRESS NOTES
Progress Note - Sleep Center   Hector Lloyd QHA:[birth year] MRN: [id number]      Reason for Visit:    67 y  o female with restless legs/PLMD and insomnia    Assessment:  The patient seems to be doing well on clonazepam 0 75 mg at bedtime  She has more good nights than bad nights  Plan:  Continue same      Follow up:  1 year    History of Present Illness:  Chronic insomnia with neuropathy     Historical Information    Past Medical History:   Diagnosis Date   • Allergic 1967    seasonal   • Allergic rhinitis    • Bone pain    • Breast cancer ([address] ) 08/16/2012   • Breast ptosis    • Depression    • Fatigue    • Gait disturbance     uses cane at times   • Headache    • Hip fracture (HCC)    • Hip pain    • History of transfusion 1975    placenta previa s/p work accident, no rx   • Hypokalemia    • Insomnia    • Laceration of finger     right hand   • Left ankle pain    • Left knee pain    • Multiple sclerosis (HCC)    • Muscle strain     left lower leg   • Nephrolithiasis    • Osteopenia    • Osteoporosis    • Pain of left calf    • Pneumonia    • Rash    • Scoliosis birth    scoliosis   • Solitary pulmonary nodule    • SVT (supraventricular tachycardia) (HCC)    • Tingling    • Urgency of urination    • Urticaria    • Wrist fracture, left          Past Surgical History:   Procedure Laterality Date   • ANKLE SURGERY     • APPENDECTOMY  11/2012    Dr Josse Alvarez   • AUGMENTATION BREAST      Enlargement procedure with prosthetic implant bilateral   • AUGMENTATION MAMMAPLASTY Right 01/28/2020    implant replaced because of recall   • AUGMENTATION MAMMAPLASTY Bilateral 2012   • BREAST BIOPSY Left 07/23/2012   • BREAST IMPLANT Right 01/28/2020    Procedure: BREAST IMPLANT EXCHANGE WITH CAPSULECTOMY;  Surgeon: Marco Antonio Osorio MD;  Location: [facility];  Service: Plastics   • BREAST IMPLANT REMOVAL Right 01/28/2020    implant placement, implant revision, right mastopexy   • BREAST LUMPECTOMY     • CYSTOSTOMY      with basket extraction of calculus; x2   • EXCISION / BIOPSY BREAST / Joesph Son / DUCT Right 2020    scar revision to resuture non healing surgical wound   • EXPLORATORY LAPAROTOMY     • FOOT SURGERY     • FRACTURE SURGERY  Lt wrist 2014 - Lt matthew femur    • HIP FRACTURE SURGERY Right     Subcapital   • HIP SURGERY Left    • JOINT REPLACEMENT  Rt hip 10/2012   • LEG SURGERY      Repair   • MASTECTOMY Left 2012   • FL MASTOPEXY Right 2020    Procedure: BREAST MASTOPEXY;  Surgeon: Jeimy Tran MD;  [facility];  Service: Plastics   • FL REVISION OF RECONSTRUCTED BREAST Right 2020    Procedure: REVISION RIGHT BREAST MOUND;  Surgeon: Jeimy Tran MD;  [facility];  Service: Plastics   • REDUCTION MAMMAPLASTY Right 2020    reduced to match left side   • SENTINEL LYMPH NODE BIOPSY Left 2012   • SKIN BIOPSY     • TONSILLECTOMY     • TUBAL LIGATION     • WRIST SURGERY Left          Social History     Socioeconomic History   • Marital status: /Civil Union     Spouse name: Not on file   • Number of children: 2   • Years of education: Completed bachelor's degree   • Highest education level: Not on file   Occupational History   • Occupation: RN     Comment: Retired   Tobacco Use   • Smoking status: Former     Packs/day: 1 00     Years: 35 00     Pack years: 35 00     Types: Cigarettes     Quit date:      Years since quittin 0   • Smokeless tobacco: Never   Vaping Use   • Vaping Use: Never used   Substance and Sexual Activity   • Alcohol use: No   • Drug use: No   • Sexual activity: Yes     Partners: Male     Birth control/protection: Post-menopausal   Other Topics Concern   • Not on file   Social History Narrative    Denied:  History of caffeine use     Social Determinants of Health     Financial Resource Strain: Medium Risk   • Difficulty of Paying Living Expenses: Somewhat hard   Food Insecurity: Not on file   Transportation Needs: No Transportation Needs   • Lack of Transportation (Medical): No   • Lack of Transportation (Non-Medical):  No   Physical Activity: Not on file   Stress: Not on file   Social Connections: Not on file   Intimate Partner Violence: Not on file   Housing Stability: Not on file           History   Alcohol use: Not on file       History   Smoking Status   • Not on file   Smokeless Tobacco   • Not on file       Family History:   Family History   Problem Relation Age of Onset   • Coronary artery disease Mother    • Hyperlipidemia Mother    • Rheum arthritis Mother    • Other Father         Acute myocardial infarction   • No Known Problems Maternal Grandmother    • Hodgkin's lymphoma Maternal Grandfather         age at dx unk   • No Known Problems Paternal Grandmother    • No Known Problems Paternal Grandfather    • Stroke Son 52   • No Known Problems Son    • Cancer Maternal Aunt 79        bladder   • Heart disease Maternal Aunt    • Stroke Maternal Aunt         6 CVA before death   • Breast cancer Maternal Aunt    • Colon cancer Maternal Uncle 72   • Hodgkin's lymphoma Maternal Uncle 79   • No Known Problems Paternal Aunt    • No Known Problems Paternal Aunt    • No Known Problems Paternal Aunt        Medications/Allergies:      Current Outpatient Medications:   •  ALPHA LIPOIC ACID PO, Take by mouth, Disp: , Rfl:   •  Ampyra 10 MG TB12, Take 1 tablet (10 mg total) by mouth 2 (two) times a day, Disp: 180 tablet, Rfl: 3  •  ascorbic acid (VITAMIN C) 500 mg tablet, Take 500 mg by mouth daily, Disp: , Rfl:   •  baclofen 10 mg tablet, 1 tab pos q am, 1 tab po q pm and 2 tabs hs, Disp: 360 tablet, Rfl: 3  •  Biotin 5000 MCG TABS, , Disp: , Rfl:   •  Cholecalciferol (VITAMIN D3) 1000 units CAPS, Take 5,000 Units by mouth  , Disp: , Rfl:   •  clonazePAM (KlonoPIN) 0 5 mg tablet, TAKE 1 AND 1/2 TABLETS BY MOUTH DAILY AT BEDTIME, Disp: 45 tablet, Rfl: 5  •  Copaxone 40 MG/ML SOSY, INJECT ONE SYRINGE SUBCUTANEOUSLY THREE TIMES PER WEEK AT LEAST 50 HOURS APART  ALLOW SYRINGE TO WARM TO ROOM TEMPERATURE FOR 20 MINUTES  REFRIGERATE , Disp: 36 mL, Rfl: 3  •  Cranberry 125 MG TABS, Take 125 mg by mouth in the morning, Disp: , Rfl:   •  denosumab (PROLIA) 60 mg/mL, Inject under the skin, Disp: , Rfl:   •  docusate sodium (COLACE) 100 mg capsule, Take 1 capsule by mouth every other day  , Disp: , Rfl:   •  gabapentin (NEURONTIN) 300 mg capsule, Take 1 capsule (300 mg total) by mouth daily at bedtime, Disp: 90 capsule, Rfl: 3  •  gabapentin (NEURONTIN) 600 MG tablet, 2 tabs po tid, Disp: 540 tablet, Rfl: 3  •  Magnesium 500 MG TABS, Take 500 tablets by mouth once, Disp: , Rfl:   •  solifenacin (VESICARE) 5 mg tablet, Take 1 tablet (5 mg total) by mouth daily (Patient taking differently: Take 10 mg by mouth daily), Disp: 90 tablet, Rfl: 2  •  zonisamide (ZONEGRAN) 100 mg capsule, Take 4 capsules (400 mg total) by mouth daily at bedtime, Disp: 360 capsule, Rfl: 3  No current facility-administered medications for this visit  Facility-Administered Medications Ordered in Other Visits:   •  bacitracin 50,000 Units, gentamicin (GARAMYCIN) 40 mg/mL 80 mg, ceFAZolin (ANCEF) 1,000 mg in sodium chloride 0 9 % 1,000 mL irrigation bottle, , Irrigation, Once, Mattie Velazquez MD      Objective    Vital Signs:   Vitals:    01/11/23 1129   BP: 118/72   Weight: 63 5 kg (140 lb)   Height: 5' 3" (1 6 m)     [place] Sleepiness Scale: Total score: 13    Physical Exam:    General: Alert, appropriate, cooperative, normal build    Head: NC/AT    Skin: Warm, dry    Neuro: No motor abnormalities, cranial nerves appear intact    Psych: Normal affect            ROGER Pressley    Board Certified Sleep Specialist

## 2023-01-12 NOTE — TELEPHONE ENCOUNTER
SW emailed physician form for completion  Once completed by Dr Ani Mccloud will be returned to SW team and then faxed to the foundation

## 2023-01-13 ENCOUNTER — TELEPHONE (OUTPATIENT)
Dept: UROLOGY | Facility: AMBULATORY SURGERY CENTER | Age: 73
End: 2023-01-13

## 2023-01-13 ENCOUNTER — OFFICE VISIT (OUTPATIENT)
Dept: PHYSICAL THERAPY | Facility: CLINIC | Age: 73
End: 2023-01-13

## 2023-01-13 DIAGNOSIS — G35 MULTIPLE SCLEROSIS (HCC): Primary | ICD-10-CM

## 2023-01-13 NOTE — PROGRESS NOTES
Daily Note     Today's date: 2023  Patient name: Li Patterson  : 1950  MRN: [id number]  Referring provider: Ann-Marie Roy MD  Dx:   Encounter Diagnosis     ICD-10-CM    1  Multiple sclerosis ([address] )  G35                      Subjective: Pt doing well this morning  Denies any new complaints  Objective: See treatment diary below      Assessment: Left foot dragging today  Noticeable during forward and lateral idris walks  Improved proprioception balance during dynamic and static balance  Pt attributes this to her minimalist like shoes  Tolerated treatment well  Patient would benefit from continued PT      Plan: Continue per plan of care        Precautions: MS, Gait dysfunction, Insomnia, Osteoporosis, Peripheral polyneuropathy, Hx of Breast CA, R IDALIA in - Dr Carley Villalobos, Left femur IM nailing      Manuals 12/16 12/19 12/28 12/30 1/6 1/10 1/13                                                          Neuro Re-Ed             TB rows 2x20 blue romberg 2x20 blue romberg 2x20 blue romberg  2x20 blue romberg 2x20 blue romberg 2x20 blue romberg      TB Ws             TB lower trap             TB extension 2x20 blue romberg 2x20 blue romberg 2x20 blue romberg  2x20 blue romberg 2x20 blue romberg 2x20 green romberg      Romberg on foam 20 shoulder abd/5x shoulder flexion 20 shoulder abd/20x shoulder flexion 20 shoulder abd/20x shoulder flexion 20 shoulder abd/20x shoulder flexion 20 shoulder abd/20x shoulder flexion 20 shoulder abd/13x shoulder flexion 20 shoulder abd/20x shoulder flexion      Standing hip 3 way             Lateral walks c/ UE resistance              Ther Ex             UBE  3'/3' fwd/back 3'/3' fwd/back Held 3'/3' fwd/back 3'/3' fwd/back 3'/3' fwd/back      NuStep No resistance UE+LE movements 5'   5' no resistance/no UE         Standing HR 20x  20x 20x  20x on blue foam  20x on blue foam      Standing hip march 10x ea            Standing hip extension 10x ea  2x10 2x10  2x10 blue foam Standing hip abduction 10x ea  2x10 2x10  2x10 blue foam                                  Ther Activity             Foam Lateral + tandem walks              Lateral idris walks   2x3 hurdles  3x4 hurdles  4x4 hurdles 4x4 hurdles 4x4 hurdles 6x4 hurdles      Tandem idris walks  2x3 hurdles  3x4 hurdles  4x4 idris 4x4 hurdles 4x4 hurdles 6x4 hurdles      Brandon row with backward walking     4x btb 10 feet 4x btb 10 feet 4x btb 10 feet      Runners climb on BD foam  Step ups only x 10 on each 20x B  20x B  20x B  20x B  20x B       Foam walks    Lateral and tandem 4x10 feet each         Gait Training                                       Modalities                                         1:1 with PT from [phone]

## 2023-01-15 NOTE — PROGRESS NOTES
[id number]    Today's date: 2023  Patient name: Krista Sorto  : 1950  MRN: [id number]  Referring provider: Jordana Davenport MD  Dx:   Encounter Diagnosis     ICD-10-CM    1  Multiple sclerosis [facility])  G35                      Assessment  Assessment details: Krista Sorto is a 67 y o  female who presents with signs and symptoms consistent of a primary balance impairment, gait abnormality, weakness in the BLE and right UE secondary to Multiple Sclerosis  Pt's UE strength and symmetry have greatly improved over the course of PT  However, pt continues to have persistent neuropathic pain in BLE which ebbs and flows  Pt has inconsistent days based on weakness and pain in her lower extremities  Her LLE tends to consistently demonstrate more weakness than RLE; however, her strength in LE's have improved  As stated above, MMT of the UE has improved at least 1/2 MMT in all planes  Her BLE strength seems to change functionally depending on the day but overall, improvements noted  Functional testing was re-measured today  Improvements appreciated in 5x sit to stand and MCTSIB  TUG was increased by 2 seconds  Due to the flucuating nature of patient's MS, she will benefit from continuation of skilled PT to address strength, endurance, function, and balance to improve ambulation reduce fall risk, and minimize functional decline  Impairments: abnormal coordination, abnormal gait, abnormal or restricted ROM, abnormal movement, difficulty understanding, impaired balance, impaired physical strength, lacks appropriate home exercise program and poor posture   Barriers to therapy: Chronicity of pain, MS, H/O R IDALIA and L IM nailing in femur     Understanding of Dx/Px/POC: good   Prognosis: good    Goals  Short Term Goals: to be achieved by 4 weeks  1) Patient to be independent with basic HEP -MET  2) Improve 5x sit to stand by 5 seconds-Partially met  3) Improve TUG by 5 seconds -Partially met  4) Increase LE strength by 1/2 MMT grade in all deficient planes -Partially met    Long Term Goals: to be achieved by discharge  1) FOTO equal to or greater than expected  2) Ambulation to improve to maximal level of function  3) Stair negotiation will improve to reciprocal   4) Sit to stand transfers will improve to maximal level of function     Plan  Patient would benefit from: PT eval and skilled physical therapy  Planned therapy interventions: manual therapy, neuromuscular re-education, patient education, strengthening, therapeutic activities, therapeutic exercise, transfer training, home exercise program and functional ROM exercises  Frequency: 2x per week for 6-8 weeks  Treatment plan discussed with: patient        Subjective Evaluation    History of Present Illness  Mechanism of injury: History of Current Injury: Pt referred to PT from Neurologist secondary to MS relapse and functional decline  Pt has several week long flares which have made her extremely fatigued and weak  Pt attempted aquatic therapy for gait and balance at [facility]  She has been previously treated by me and last seen in March of 2022  Pt continues to report difficulties with ambulation, endurance, balance, and persistent neuropathic pain in BLE  Her symptoms and function varies day to day  She continues to ambulate with a SPC  Pt's PMH is significant for R total hip replacement in 2012 and L IM nailing in femur due to fracture  Pt's primary concern is her balance and unsteadiness  She also reports increasing right sided weakness including her right shoulder  She notes her right shoulder is considerably depressed  She has difficulty using SPC in R hand  Pt feels heaviness in RUE  Pt has intermittent numbness in BLE  Imaging: None recently    Patient goals:  Improve ambulation, endurance, balance, and strength  Pt would like to also work on R shoulder strength to improve the ability to walk with cane  She notes that she consistent veers to the right  Hobbies/Interest: Cooking, family, Conferences with [employer]  Occupation: Retired nurse           Objective     Static Posture   General Observations  Scoliosis  Shoulders  Depressed  Neurological Testing     Reflexes   Left   Biceps (C5/C6): normal (2+)  Brachioradialis (C6): normal (2+)  Triceps (C7): normal (2+)  Patellar (L4): trace (1+)  Achilles (S1): trace (1+)    Right   Biceps (C5/C6): normal (2+)  Brachioradialis (C6): normal (2+)  Triceps (C7): normal (2+)  Patellar (L4): trace (1+)  Achilles (S1): trace (1+)    Strength/Myotome Testing   Cervical Spine     Left   Interossei strength (t1): 4    Right   Interossei strength (t1): 4    Left Shoulder     Planes of Motion   Flexion: 4+   Abduction: 4+   External rotation at 0°: 4   Internal rotation at 0°: 4     Right Shoulder     Planes of Motion   Flexion: 4+   Abduction: 4+  External rotation at 0°: 4  Internal rotation at 0°: 4     Left Elbow   Flexion: 4+  Extension: 4+    Right Elbow   Flexion: 4+  Extension: 4+    Left Wrist/Hand   Wrist extension: 4-  Wrist flexion: 4-  Thumb extension: 4-    Right Wrist/Hand   Wrist extension: 4  Wrist flexion: 4  Thumb extension: 4    Left Hip   Planes of Motion   Flexion: 3+  Abduction: 3+  Adduction: 3+    Right Hip   Planes of Motion   Flexion: 4-  Abduction: 3+  Adduction: 3+    Left Knee   Flexion: 3+  Extension: 4-    Right Knee   Flexion: 4-  Extension: 4    Left Ankle/Foot   Dorsiflexion: 3+  Plantar flexion: 4-    Right Ankle/Foot   Dorsiflexion: 4  Plantar flexion: 4    Additional Strength Details  *Limited R shoulder ROM compared to L with  Functional ER and Functional IR   *Right scapular and humeral depression compared to the Left (This is likely due to functional scoliosis)  Functional Assessment    Timed up and go:  Date: 25 seconds  with use of hands on rails for assistance and SPC in RUE  10/25: 18 seconds   C/ use of hand on rails for assistance and SPC in RUE  12/8: 19 seconds  C use of hands on rails of assistance and SPC in RUE (decreased ability to turn)   1/16: 21 92 seconds c/ use of hand rests and SPC (3 seconds during turn)    5x sit to stand:   Date: 19 seconds with BUE assistance  Moderate knee valgus alignment preset during transitions  10/25: 16 seconds with BUE assistance   Staggered stance    12/8: 20 seconds c/ BUE assistance: Staggered stance  1/16: 15 26 seconds c/ BUE assistance: staggered stance     2 minute walk test:   NT    IE:  MCTSIB: feet together c/ yellow foam  EO firm: 30 seconds with mod postural sway   EC firm: 8 seconds with prior to LOB (then to 23 seconds with FT support)   EO foam: FT support after 10 seconds which was required throughout with moderate postural sway  EC foam: Unable to perform     10/25  MCTSIB: feet together c/ yellow foam  EO firm: 30 seconds with min postural sway   EC firm: 30 seconds with mod postural sway   EO foam: 30 second c/ mod postural sway  EC foam: 3 seconds prior to LOB forward     12/8  MCTSIB: feet together c/ yellow foam  EO firm: 30 seconds with min postural sway   EC firm: 30 seconds with mod postural sway   EO foam: 30 second c/ mod postural sway  EC foam: 6 seconds prior to LOB forward    1/16  MCTSIB: feet together c/ yellow foam  EO firm: 30 seconds with min postural sway   EC firm: 30 seconds with mod postural sway   EO foam: 30 second c/ mod postural sway  EC foam: 18 33 seconds prior to LOB forward (she did have finger tip support at 6 seconds but recovered)       6 minute walk test:   NT       Precautions: MS, Gait dysfunction, Insomnia, Osteoporosis, Peripheral polyneuropathy, Hx of Breast CA, R IDALIA in 2012- Dr Luís Hager, Left femur IM nailing      Manuals 12/16 12/19 12/28 12/30 1/6 1/10 1/13 1/16                                                         Neuro Re-Ed             TB rows 2x20 blue romberg 2x20 blue romberg 2x20 blue romberg  2x20 blue romberg 2x20 blue romberg 2x20 blue romberg      TB Ws             TB lower trap             TB extension 2x20 blue romberg 2x20 blue romberg 2x20 blue romberg  2x20 blue romberg 2x20 blue romberg 2x20 green romberg      Romberg on foam 20 shoulder abd/5x shoulder flexion 20 shoulder abd/20x shoulder flexion 20 shoulder abd/20x shoulder flexion 20 shoulder abd/20x shoulder flexion 20 shoulder abd/20x shoulder flexion 20 shoulder abd/13x shoulder flexion 20 shoulder abd/20x shoulder flexion      Standing hip 3 way             Lateral walks c/ UE resistance              Ther Ex             UBE  3'/3' fwd/back 3'/3' fwd/back Held 3'/3' fwd/back 3'/3' fwd/back 3'/3' fwd/back 3'/3' fwd/back     Standing hamstring curl        1x10 c/ PT cueing     NuStep No resistance UE+LE movements 5'   5' no resistance/no UE         Standing HR 20x  20x 20x  20x on blue foam  20x on blue foam 20x on blue foam     Standing hip march 10x ea            Standing hip extension 10x ea  2x10 2x10  2x10 blue foam       Standing hip abduction 10x ea  2x10 2x10  2x10 blue foam                                  Ther Activity             Foam Lateral + tandem walks              Lateral idris walks   2x3 hurdles  3x4 hurdles  4x4 hurdles 4x4 hurdles 4x4 hurdles 6x4 hurdles      Tandem idrsi walks  2x3 hurdles  3x4 hurdles  4x4 idris 4x4 hurdles 4x4 hurdles 6x4 hurdles      [place] row with backward walking     4x btb 10 feet 4x btb 10 feet 4x btb 10 feet      Runners climb on BD foam  Step ups only x 10 on each 20x B  20x B  20x B  20x B  20x B       Foam walks    Lateral and tandem 4x10 feet each         Gait Training                                       Modalities                                         1:1 with PT from 1115-2623a  Pt was re-evaluated today x 30 minutes

## 2023-01-16 ENCOUNTER — EVALUATION (OUTPATIENT)
Dept: PHYSICAL THERAPY | Facility: CLINIC | Age: 73
End: 2023-01-16

## 2023-01-16 DIAGNOSIS — G35 MULTIPLE SCLEROSIS (HCC): Primary | ICD-10-CM

## 2023-01-19 ENCOUNTER — OFFICE VISIT (OUTPATIENT)
Dept: PHYSICAL THERAPY | Facility: CLINIC | Age: 73
End: 2023-01-19

## 2023-01-19 DIAGNOSIS — G35 MULTIPLE SCLEROSIS (HCC): Primary | ICD-10-CM

## 2023-01-19 NOTE — PROGRESS NOTES
Daily Note     Today's date: 2023  Patient name: Ruby Siddiqi  : 1950  MRN: [id number]  Referring provider: Mercedez Perez MD  Dx:   Encounter Diagnosis     ICD-10-CM    1  Multiple sclerosis ([address] )  G35                      Subjective: Pt offers no new complaints today  She states that she got 2 good nights of sleep which makes a world of difference in terms of LE strength and endurance  Pt continued to work on hamstring exercise but used her stove to help stabilize her leg to assist        Objective: See treatment diary below      Assessment: Tolerated treatment well  Improved endurance and less fatigue with exercise prescription  Modified hamstring curl and utilized bolster to block knee so hip flexion moment did not occur during standing hamstring curl  Pt did this at home and it was effective  Patient exhibited good technique with therapeutic exercises and would benefit from continued PT  Plan: Continue per plan of care        Precautions: MS, Gait dysfunction, Insomnia, Osteoporosis, Peripheral polyneuropathy, Hx of Breast CA, R IDALIA in - Dr Shani Remy, Left femur IM nailing      Manuals 12/16 12/19 12/28 12/30 1/6 1/10 1/13 1/16 1/19                                                        Neuro Re-Ed             TB rows 2x20 blue romberg 2x20 blue romberg 2x20 blue romberg  2x20 blue romberg 2x20 blue romberg 2x20 blue romberg 2x20 blue romberg 2x20 blue romberg    TB Ws             TB lower trap             TB extension 2x20 blue romberg 2x20 blue romberg 2x20 blue romberg  2x20 blue romberg 2x20 blue romberg 2x20 green romberg 2x20 blue romberg 2x20 blue romberg    Romberg on foam 20 shoulder abd/5x shoulder flexion 20 shoulder abd/20x shoulder flexion 20 shoulder abd/20x shoulder flexion 20 shoulder abd/20x shoulder flexion 20 shoulder abd/20x shoulder flexion 20 shoulder abd/13x shoulder flexion 20 shoulder abd/20x shoulder flexion 20 shoulder abd/20x shoulder flexion     Standing hip 3 way             Lateral walks c/ UE resistance              Ther Ex             UBE  3'/3' fwd/back 3'/3' fwd/back Held 3'/3' fwd/back 3'/3' fwd/back 3'/3' fwd/back 3'/3' fwd/back 3'/3' fwd/back    Standing hamstring curl        1x10 c/ PT cueing 1x15 (bolster blocking knee)     NuStep No resistance UE+LE movements 5'   5' no resistance/no UE         Standing HR 20x  20x 20x  20x on blue foam  20x on blue foam 20x on blue foam     Standing hip march 10x ea            Standing hip extension 10x ea  2x10 2x10  2x10 blue foam       Standing hip abduction 10x ea  2x10 2x10  2x10 blue foam        Hamstring curl                           Ther Activity             Foam Lateral + tandem walks              Lateral idris walks   2x3 hurdles  3x4 hurdles  4x4 hurdles 4x4 hurdles 4x4 hurdles 6x4 hurdles 6x4 hurdles 6x4 hurdles    Tandem idris walks  2x3 hurdles  3x4 hurdles  4x4 idris 4x4 hurdles 4x4 hurdles 6x4 hurdles 6x4 hurdles 6x4 hurdles    [place] row with backward walking     4x btb 10 feet 4x btb 10 feet 4x btb 10 feet 5x btb 10 feet 5x btb 10 feet    Runners climb on BD foam  Step ups only x 10 on each 20x B  20x B  20x B  20x B  20x B  20x B 20x B    Foam walks    Lateral and tandem 4x10 feet each         Gait Training                                       Modalities                                         1:1 with PT from 1115-12pm

## 2023-01-23 ENCOUNTER — OFFICE VISIT (OUTPATIENT)
Dept: PHYSICAL THERAPY | Facility: CLINIC | Age: 73
End: 2023-01-23

## 2023-01-23 DIAGNOSIS — G35 MULTIPLE SCLEROSIS (HCC): Primary | ICD-10-CM

## 2023-01-23 NOTE — PROGRESS NOTES
Daily Note     Today's date: 2023  Patient name: Sampson Azul  : 1950  MRN: [id number]  Referring provider: Serge Schofield MD  Dx:   Encounter Diagnosis     ICD-10-CM    1  Multiple sclerosis ([address] )  Max Julien           Start Time: 1115  Stop Time: 1200  Total time in clinic (min): 45 minutes    Subjective: Pt doing well today  Had 3 good night sleeps prior to last night which was poor  Objective: See treatment diary below      Assessment: Tolerated treatment well  Implemented 1# weights with tandem balance while performing shoulder flexion+abduction  Left knee continues to buckle while performing step ups on foam  Left foot did drag 1x over idris today but patient demonstrated good clearance other than that  Patient exhibited good technique with therapeutic exercises and would benefit from continued PT  Plan: Continue per plan of care        Precautions: MS, Gait dysfunction, Insomnia, Osteoporosis, Peripheral polyneuropathy, Hx of Breast CA, R IDALIA in - Dr Etienne Cast, Left femur IM nailing      Manuals 12/16 12/19 12/28 12/30 1/6 1/10 1/13 1/16 1/19 1/23                                                       Neuro Re-Ed             TB rows 2x20 blue romberg 2x20 blue romberg 2x20 blue romberg  2x20 blue romberg 2x20 blue romberg 2x20 blue romberg 2x20 blue romberg 2x20 blue romberg 2x20 blue romberg   TB Ws             TB lower trap             TB extension 2x20 blue romberg 2x20 blue romberg 2x20 blue romberg  2x20 blue romberg 2x20 blue romberg 2x20 green romberg 2x20 blue romberg 2x20 blue romberg 2x20 blue romberg   Romberg on foam 20 shoulder abd/5x shoulder flexion 20 shoulder abd/20x shoulder flexion 20 shoulder abd/20x shoulder flexion 20 shoulder abd/20x shoulder flexion 20 shoulder abd/20x shoulder flexion 20 shoulder abd/13x shoulder flexion 20 shoulder abd/20x shoulder flexion 20 shoulder abd/20x shoulder flexion  15x 1# shoulder abduction/ 5x 1# shoulder flexion- 10x no # Standing hip 3 way             Lateral walks c/ UE resistance              Ther Ex             UBE  3'/3' fwd/back 3'/3' fwd/back Held 3'/3' fwd/back 3'/3' fwd/back 3'/3' fwd/back 3'/3' fwd/back 3'/3' fwd/back 3'/3' fwd/back   Standing hamstring curl        1x10 c/ PT cueing 1x15 (bolster blocking knee)  1x15 (bolster blocking knee)    NuStep No resistance UE+LE movements 5'   5' no resistance/no UE         Standing HR 20x  20x 20x  20x on blue foam  20x on blue foam 20x on blue foam  20x on blue foam   Standing hip march 10x ea            Standing hip extension 10x ea  2x10 2x10  2x10 blue foam       Standing hip abduction 10x ea  2x10 2x10  2x10 blue foam        Hamstring curl                           Ther Activity             Foam Lateral + tandem walks              Lateral idris walks   2x3 hurdles  3x4 hurdles  4x4 hurdles 4x4 hurdles 4x4 hurdles 6x4 hurdles 6x4 hurdles 6x4 hurdles 6x4 hurdles   Tandem idris walks  2x3 hurdles  3x4 hurdles  4x4 idris 4x4 hurdles 4x4 hurdles 6x4 hurdles 6x4 hurdles 6x4 hurdles 6x4 hurdles   [place] row with backward walking     4x btb 10 feet 4x btb 10 feet 4x btb 10 feet 5x btb 10 feet 5x btb 10 feet 6x btb 10 feet   Runners climb on BD foam  Step ups only x 10 on each 20x B  20x B  20x B  20x B  20x B  20x B 20x B 20x B   Foam walks    Lateral and tandem 4x10 feet each         Gait Training                                       Modalities                                         1:1 with PT from 1115-12pm

## 2023-01-25 NOTE — PROGRESS NOTES
Daily Note     Today's date: 2023  Patient name: Parul Bullard  : 1950  MRN: [id number]  Referring provider: Sindi Rodriguez MD  Dx:   Encounter Diagnosis     ICD-10-CM    1  Multiple sclerosis ([address] )  Gypsy Gaspar           Start Time:   Stop Time: 1200  Total time in clinic (min): 40 minutes    Subjective: Pt reports having a terrible night of sleep, only 2 hours  Her legs are feeling fatigue and discomfort  "Feel like I was out shoveling snow "       Objective: See treatment diary below      Assessment: Utilized black TB for scapular strengthening  Modified LE exercises secondary to weakness/fatigue  Tolerated treatment fair  Tactile feedback needed during proprioception exercises to steady posture  Will resumed all exercises as tolerated  Patient would benefit from continued PT  Plan: Continue per plan of care        Precautions: MS, Gait dysfunction, Insomnia, Osteoporosis, Peripheral polyneuropathy, Hx of Breast CA, R IDALIA in - Dr Ivette Galvan, Left femur IM nailing      Manuals 1/26    1/6 1/10 1/13 1/16 1/19 1/23                                                       Neuro Re-Ed             TB rows 2x20 blk romberg    2x20 blue romberg 2x20 blue romberg 2x20 blue romberg 2x20 blue romberg 2x20 blue romberg 2x20 blue romberg   TB Ws             TB lower trap             TB extension x20 blue romberg    2x20 blue romberg 2x20 blue romberg 2x20 green romberg 2x20 blue romberg 2x20 blue romberg 2x20 blue romberg   Romberg on foam 8-10x flexion/and abduction    20 shoulder abd/20x shoulder flexion 20 shoulder abd/13x shoulder flexion 20 shoulder abd/20x shoulder flexion 20 shoulder abd/20x shoulder flexion  15x 1# shoulder abduction/ 5x 1# shoulder flexion- 10x no #   Standing hip 3 way             Lateral walks c/ UE resistance              Ther Ex             UBE 3'/3' fwd/back    3'/3' fwd/back 3'/3' fwd/back 3'/3' fwd/back 3'/3' fwd/back 3'/3' fwd/back 3'/3' fwd/back   Standing hamstring curl 1x10 c/ PT cueing 1x15 (bolster blocking knee)  1x15 (bolster blocking knee)    NuStep             Standing HR 20x on blue foam     20x on blue foam  20x on blue foam 20x on blue foam  20x on blue foam   Standing hip march             Standing hip extension      2x10 blue foam       Standing hip abduction      2x10 blue foam        Hamstring curl                           Ther Activity             Foam Lateral + tandem walks              Lateral idris walks      4x4 hurdles 4x4 hurdles 6x4 hurdles 6x4 hurdles 6x4 hurdles 6x4 hurdles   Tandem idris walks     4x4 hurdles 4x4 hurdles 6x4 hurdles 6x4 hurdles 6x4 hurdles 6x4 hurdles   [place]andon row with backward walking     4x btb 10 feet 4x btb 10 feet 4x btb 10 feet 5x btb 10 feet 5x btb 10 feet 6x btb 10 feet   Runners climb on BD foam smaller foam c/ 2 hand support x 20 B    20x B  20x B  20x B  20x B 20x B 20x B   Foam walks             Gait Training                                       Modalities                                         1:1 with PT from 1120-12pm

## 2023-01-26 ENCOUNTER — OFFICE VISIT (OUTPATIENT)
Dept: PHYSICAL THERAPY | Facility: CLINIC | Age: 73
End: 2023-01-26

## 2023-01-26 DIAGNOSIS — G35 MULTIPLE SCLEROSIS (HCC): Primary | ICD-10-CM

## 2023-01-30 ENCOUNTER — OFFICE VISIT (OUTPATIENT)
Dept: PHYSICAL THERAPY | Facility: CLINIC | Age: 73
End: 2023-01-30

## 2023-01-30 DIAGNOSIS — G35 MULTIPLE SCLEROSIS (HCC): Primary | ICD-10-CM

## 2023-01-30 NOTE — PROGRESS NOTES
Daily Note     Today's date: 2023  Patient name: Darrell Mendoza  : 1950  MRN: [id number]  Referring provider: Myrna Carson MD  Dx:   Encounter Diagnosis     ICD-10-CM    1  Multiple sclerosis ([address] )  Zohra Gamez           Start Time: 1118  Stop Time: 8176  Total time in clinic (min): 38 minutes    Subjective: Pt reports feeling better today than previous session but did have muscle cramping in distal LE  Objective: See treatment diary below      Assessment: Tolerated treatment well  Resumed entire program today without adverse effects of excessive fatigue  Pt still appropriately fatigued with treatment  Patient would benefit from continued PT  Plan: Continue per plan of care        Precautions: MS, Gait dysfunction, Insomnia, Osteoporosis, Peripheral polyneuropathy, Hx of Breast CA, R IDALIA in - Dr Kristina Newton, Left femur IM nailing      Manuals 1/26 1/30   1/6 1/10 1/13 1/16 1/19 1/23                                                       Neuro Re-Ed             TB rows 2x20 blk romberg 2x20 blk romberg   2x20 blue romberg 2x20 blue romberg 2x20 blue romberg 2x20 blue romberg 2x20 blue romberg 2x20 blue romberg   TB Ws             TB lower trap             TB extension x20 blue romberg 2x20 blue romberg   2x20 blue romberg 2x20 blue romberg 2x20 green romberg 2x20 blue romberg 2x20 blue romberg 2x20 blue romberg   Romberg on foam 8-10x flexion/and abduction 20x flexion/abduction   20 shoulder abd/20x shoulder flexion 20 shoulder abd/13x shoulder flexion 20 shoulder abd/20x shoulder flexion 20 shoulder abd/20x shoulder flexion  15x 1# shoulder abduction/ 5x 1# shoulder flexion- 10x no #   Standing hip 3 way             Lateral walks c/ UE resistance              Ther Ex             UBE 3'/3' fwd/back 3'/3' fwd/back   3'/3' fwd/back 3'/3' fwd/back 3'/3' fwd/back 3'/3' fwd/back 3'/3' fwd/back 3'/3' fwd/back   Standing hamstring curl        1x10 c/ PT cueing 1x15 (bolster blocking knee)  1x15 (bolster blocking knee)    NuStep             Standing HR 20x on blue foam 20x on blue foam    20x on blue foam  20x on blue foam 20x on blue foam  20x on blue foam   Standing hip march             Standing hip extension      2x10 blue foam       Standing hip abduction      2x10 blue foam        Hamstring curl                           Ther Activity             Foam Lateral + tandem walks              Lateral idris walks   6x4 hurdles   4x4 hurdles 4x4 hurdles 6x4 hurdles 6x4 hurdles 6x4 hurdles 6x4 hurdles   Tandem idris walks  6x4 hurdles   4x4 hurdles 4x4 hurdles 6x4 hurdles 6x4 hurdles 6x4 hurdles 6x4 hurdles   [place] row with backward walking  blk tb 4x10 feet   4x btb 10 feet 4x btb 10 feet 4x btb 10 feet 5x btb 10 feet 5x btb 10 feet 6x btb 10 feet   Runners climb on BD foam smaller foam c/ 2 hand support x 20 B smaller foam c/ 2 hand support x 20 B   20x B  20x B  20x B  20x B 20x B 20x B   Foam walks             Gait Training                                       Modalities                                         1:1 with PT from 1120-12pm

## 2023-02-02 ENCOUNTER — APPOINTMENT (OUTPATIENT)
Dept: PHYSICAL THERAPY | Facility: CLINIC | Age: 73
End: 2023-02-02

## 2023-02-06 ENCOUNTER — APPOINTMENT (OUTPATIENT)
Dept: PHYSICAL THERAPY | Facility: CLINIC | Age: 73
End: 2023-02-06

## 2023-02-06 ENCOUNTER — VBI (OUTPATIENT)
Dept: ADMINISTRATIVE | Facility: OTHER | Age: 73
End: 2023-02-06

## 2023-02-06 NOTE — PROGRESS NOTES
Daily Note     Today's date: 2023  Patient name: Aleena Belle  : 1950  MRN: [id number]  Referring provider: Radha Guidry MD  Dx:   Encounter Diagnosis     ICD-10-CM    1  Multiple sclerosis ([address] )  G35                      Subjective: Pt states she is feeling better than last week  Pt states her vision has improved since recent eye (laser) surgery; reports she has no restrictions/limitations and may participate in PT  Objective: See treatment diary below      Assessment: Tolerated treatment well  Patient exhibited good technique with therapeutic exercises and would benefit from continued PT  Pt fatigued post tx, also noting a "little SOB"; pt states she tends to get SOB when she is tired  Min rest breaks needed throughout  tx  Continues to be most challenged by hurdles, jesus side stepping  No LOB noted w/ tx today  Plan: Continue per plan of care        Precautions: MS, Gait dysfunction, Insomnia, Osteoporosis, Peripheral polyneuropathy, Hx of Breast CA, R IDALIA in - Dr Edilson Atkins, Left femur IM nailing      Manuals 1/26 1/30 2/7  1/6 1/10 1/13 1/16 1/19 1/23                                                       Neuro Re-Ed             TB rows 2x20 blk romberg 2x20 blk romberg 2x20 blk romberg  2x20 blue romberg 2x20 blue romberg 2x20 blue romberg 2x20 blue romberg 2x20 blue romberg 2x20 blue romberg   TB Ws             TB lower trap             TB extension x20 blue romberg 2x20 blue romberg 2x20 blue romberg  2x20 blue romberg 2x20 blue romberg 2x20 green romberg 2x20 blue romberg 2x20 blue romberg 2x20 blue romberg   Romberg on foam 8-10x flexion/and abduction 20x flexion/abduction 20x flex/abduction  20 shoulder abd/20x shoulder flexion 20 shoulder abd/13x shoulder flexion 20 shoulder abd/20x shoulder flexion 20 shoulder abd/20x shoulder flexion  15x 1# shoulder abduction/ 5x 1# shoulder flexion- 10x no #   Standing hip 3 way             Lateral walks c/ UE resistance              Ther Ex             UBE 3'/3' fwd/back 3'/3' fwd/back 3'/3'  3'/3' fwd/back 3'/3' fwd/back 3'/3' fwd/back 3'/3' fwd/back 3'/3' fwd/back 3'/3' fwd/back   Standing hamstring curl        1x10 c/ PT cueing 1x15 (bolster blocking knee)  1x15 (bolster blocking knee)    NuStep             Standing HR 20x on blue foam 20x on blue foam 20x on blue foam   20x on blue foam  20x on blue foam 20x on blue foam  20x on blue foam   Standing hip march             Standing hip extension      2x10 blue foam       Standing hip abduction      2x10 blue foam        Hamstring curl                           Ther Activity             Foam Lateral + tandem walks    3 laps ea          Lateral idris walks   6x4 hurdles 6x4 hurdles  4x4 hurdles 4x4 hurdles 6x4 hurdles 6x4 hurdles 6x4 hurdles 6x4 hurdles   Tandem idris walks  6x4 hurdles 6x4 hurdles  4x4 hurdles 4x4 hurdles 6x4 hurdles 6x4 hurdles 6x4 hurdles 6x4 hurdles   Brandon row with backward walking  blk tb 4x10 feet blk tb 4x10ft  4x btb 10 feet 4x btb 10 feet 4x btb 10 feet 5x btb 10 feet 5x btb 10 feet 6x btb 10 feet   Runners climb on BD foam smaller foam c/ 2 hand support x 20 B smaller foam c/ 2 hand support x 20 B Smaller foam w/ 2 hand suport x20 ea  20x B  20x B  20x B  20x B 20x B 20x B   Foam walks             Gait Training                                       Modalities

## 2023-02-07 ENCOUNTER — OFFICE VISIT (OUTPATIENT)
Dept: PHYSICAL THERAPY | Facility: CLINIC | Age: 73
End: 2023-02-07

## 2023-02-07 DIAGNOSIS — G35 MULTIPLE SCLEROSIS (HCC): Primary | ICD-10-CM

## 2023-02-09 ENCOUNTER — APPOINTMENT (OUTPATIENT)
Dept: PHYSICAL THERAPY | Facility: CLINIC | Age: 73
End: 2023-02-09

## 2023-02-10 ENCOUNTER — TELEMEDICINE (OUTPATIENT)
Dept: FAMILY MEDICINE CLINIC | Facility: OTHER | Age: 73
End: 2023-02-10

## 2023-02-10 ENCOUNTER — VBI (OUTPATIENT)
Dept: ADMINISTRATIVE | Facility: OTHER | Age: 73
End: 2023-02-10

## 2023-02-10 DIAGNOSIS — U07.1 COVID-19: Primary | ICD-10-CM

## 2023-02-10 NOTE — PROGRESS NOTES
COVID-19 Outpatient Progress Note    Assessment/Plan:    Problem List Items Addressed This Visit    None  Visit Diagnoses     COVID-19    -  Primary         Disposition:     Patient has asymptomatic or mild COVID-19 infection  Based off CDC guidelines, they were recommended to isolate for 5 days  If they are asymptomatic or symptoms are improving with no fevers in the past 24 hours, isolation may be ended followed by 5 days of wearing a mask when around othes to minimize risk of infecting others  If still have a fever or other symptoms have not improved, continue to isolate until they improve  Regardless of when they end isolation, avoid being around people who are more likely to get very sick from COVID-19 until at least day 11  If COVID symptoms worsen after ending isolation, restart isolation at day 0  Discussed symptom directed medication options with patient  Discussed vitamin D, vitamin C, and/or zinc supplementation with patient  I have spent 20 minutes directly with the patient  Greater than 50% of this time was spent in counseling/coordination of care regarding: diagnostic results, prognosis, risks and benefits of treatment options, instructions for management, importance of treatment compliance and impressions  Encouraged adequate hydration and nutrition  Opted against Paxlovid as multiple drug interactions with her MS medications  Will follow up with virtual next week to ensure patient is improving  Counseled on risk symptoms to go to ED or call office in the interim  Encounter provider: Virginia Alvarez DO     Provider located at: [address] [id number]     Recent Visits  No visits were found meeting these conditions    Showing recent visits within past 7 days and meeting all other requirements  Today's Visits  Date Type Provider Dept   02/10/23 Telemedicine DO Anirudh Coleman Do   Showing today's visits and meeting all other requirements  Future Appointments  No visits were found meeting these conditions  Showing future appointments within next 150 days and meeting all other requirements     This virtual check-in was done via [address] and patient was informed that this is a secure, HIPAA-compliant platform  She agrees to proceed  Patient agrees to participate in a virtual check in via telephone or video visit instead of presenting to the office to address urgent/immediate medical needs  Patient is aware this is a billable service  She acknowledged consent and understanding of privacy and security of the video platform  The patient has agreed to participate and understands they can discontinue the visit at any time  After connecting through [facility], the patient was identified by name and date of birth  Sacha Orourke was informed that this was a telemedicine visit and that the exam was being conducted confidentially over secure lines  My office door was closed  No one else was in the room  Sacha Orourke acknowledged consent and understanding of privacy and security of the telemedicine visit  I informed the patient that I have reviewed her record in Epic and presented the opportunity for her to ask any questions regarding the visit today  The patient agreed to participate  Verification of patient location:  Patient is located in the following state in which I hold an active license: [place]    Subjective:   Sacha Orourke is a 67 y o  female who has been screened for COVID-19  Symptom change since last report: worsening  Patient's symptoms include fever, chills, fatigue, malaise, nasal congestion, rhinorrhea, sore throat, anosmia, loss of taste, cough, shortness of breath, chest tightness, nausea, myalgias and headache  Patient denies abdominal pain, vomiting and diarrhea  - Date of symptom onset: 2/9/2023  - Date of exposure: 2/7/2023  - Date of positive COVID-19 test: 2/10/2023   Type of test: Home antigen  Patient with typical symptoms of COVID-19 and they attest that they were positive on home rapid antigen testing  Image of positive result is not able to be uploaded into their chart  COVID-19 vaccination status: Fully vaccinated with booster    Patience Law has been staying home and has isolated themselves in her home  She is taking care to not share personal items and is cleaning all surfaces that are touched often, like counters, tabletops, and doorknobs using household cleaning sprays or wipes  She is wearing a mask when she leaves her room  Taking calcium, vit C, vit D, zinc supplement  Trying to stay hydrated, appetite is poor   Patient has MS    No results found for: Macario Cabot, [address] [facility] [facility] [address], [address] [facility], [address] [facility]    Review of Systems   Constitutional: Positive for chills, fatigue and fever  HENT: Positive for congestion, rhinorrhea and sore throat  Respiratory: Positive for cough, chest tightness and shortness of breath  Gastrointestinal: Positive for nausea  Negative for abdominal pain, diarrhea and vomiting  Musculoskeletal: Positive for myalgias  Neurological: Positive for headaches  Current Outpatient Medications on File Prior to Visit   Medication Sig   • ALPHA LIPOIC ACID PO Take by mouth   • Ampyra 10 MG TB12 Take 1 tablet (10 mg total) by mouth 2 (two) times a day   • ascorbic acid (VITAMIN C) 500 mg tablet Take 500 mg by mouth daily   • baclofen 10 mg tablet 1 tab pos q am, 1 tab po q pm and 2 tabs hs   • Biotin 5000 MCG TABS    • Cholecalciferol (VITAMIN D3) 1000 units CAPS Take 5,000 Units by mouth     • clonazePAM (KlonoPIN) 0 5 mg tablet TAKE 1 AND 1/2 TABLETS BY MOUTH DAILY AT BEDTIME   • Copaxone 40 MG/ML SOSY INJECT ONE SYRINGE SUBCUTANEOUSLY THREE TIMES PER WEEK AT LEAST 48 HOURS APART  ALLOW SYRINGE TO WARM TO ROOM TEMPERATURE FOR 20 MINUTES  REFRIGERATE     • Cranberry 125 MG TABS Take 125 mg by mouth in the morning   • denosumab (PROLIA) 60 mg/mL Inject under the skin   • docusate sodium (COLACE) 100 mg capsule Take 1 capsule by mouth every other day     • gabapentin (NEURONTIN) 300 mg capsule Take 1 capsule (300 mg total) by mouth daily at bedtime   • gabapentin (NEURONTIN) 600 MG tablet 2 tabs po tid   • Magnesium 500 MG TABS Take 500 tablets by mouth once   • solifenacin (VESICARE) 5 mg tablet Take 1 tablet (5 mg total) by mouth daily (Patient taking differently: Take 10 mg by mouth daily)   • zonisamide (ZONEGRAN) 100 mg capsule Take 4 capsules (400 mg total) by mouth daily at bedtime       Objective: There were no vitals taken for this visit  Physical Exam  Constitutional:       Appearance: She is normal weight  She is ill-appearing  HENT:      Head: Normocephalic and atraumatic  Nose: Congestion present  Comments: Audible nasal congestion   Eyes:      Extraocular Movements: Extraocular movements intact  Conjunctiva/sclera: Conjunctivae normal    Pulmonary:      Effort: Pulmonary effort is normal       Comments: No conversational dyspnea  Neurological:      Mental Status: She is alert     Psychiatric:         Mood and Affect: Mood normal          Behavior: Behavior normal        Virginie Blow, DO

## 2023-02-13 ENCOUNTER — VBI (OUTPATIENT)
Dept: ADMINISTRATIVE | Facility: OTHER | Age: 73
End: 2023-02-13

## 2023-02-14 ENCOUNTER — APPOINTMENT (OUTPATIENT)
Dept: PHYSICAL THERAPY | Facility: CLINIC | Age: 73
End: 2023-02-14

## 2023-02-15 ENCOUNTER — VBI (OUTPATIENT)
Dept: ADMINISTRATIVE | Facility: OTHER | Age: 73
End: 2023-02-15

## 2023-02-16 ENCOUNTER — APPOINTMENT (OUTPATIENT)
Dept: PHYSICAL THERAPY | Facility: CLINIC | Age: 73
End: 2023-02-16

## 2023-02-16 ENCOUNTER — TELEPHONE (OUTPATIENT)
Dept: SURGICAL ONCOLOGY | Facility: CLINIC | Age: 73
End: 2023-02-16

## 2023-02-16 NOTE — TELEPHONE ENCOUNTER
Called patient and explained Dr Cornel Lockett will be in the OR on 3/2/23 and asked if we could get her rescheduled with our CRNP at [facility] instead of Dr Sim Pendleton next available opening  Patient was agreeable and scheduled for 3/28/23 at 8:30am with Chema Rojas at the [address] [place] [address]

## 2023-02-20 ENCOUNTER — OFFICE VISIT (OUTPATIENT)
Dept: PHYSICAL THERAPY | Facility: CLINIC | Age: 73
End: 2023-02-20

## 2023-02-20 DIAGNOSIS — G35 MULTIPLE SCLEROSIS (HCC): Primary | ICD-10-CM

## 2023-02-20 NOTE — PROGRESS NOTES
Daily Note     Today's date: 2023  Patient name: Marleny Martin  : 1950  MRN: [id number]  Referring provider: Mandy Nesbitt MD  Dx:   Encounter Diagnosis     ICD-10-CM    1  Multiple sclerosis ([facility] [address] )  G35                      Subjective: Pt is 12 days s/p COVID-19 infection  Pt has not been in for 2 weeks  She states that her legs gave out underneath her while in the powder room 2 weeks ago during her COVID infection  Objective: See treatment diary below      Assessment: Pt admits to still trying move as much as she could but has recovered well from [place]  Tolerated treatment well considering recent infection  Pt demonstrated less knee stability and clearance of hurdles  Pt also reports more fatigue/deconditioning today compared to earlier this month  Pt able to complete most exercises with modification  We will resume all exercises as tolerated  Patient would benefit from continued PT  Plan: Continue per plan of care        Precautions: MS, Gait dysfunction, Insomnia, Osteoporosis, Peripheral polyneuropathy, Hx of Breast CA, R IDALIA in - Dr [facility], Left femur IM nailing      Manuals                                                        Neuro Re-Ed             TB rows 2x20 blk romberg 2x20 blk romberg 2x20 blk romberg 2x20 blk romberg    2x20 blue romberg 2x20 blue romberg 2x20 blue romberg   TB Ws             TB lower trap             TB extension x20 blue romberg 2x20 blue romberg 2x20 blue romberg 2x20 blue romberg    2x20 blue romberg 2x20 blue romberg 2x20 blue romberg   Romberg on foam 8-10x flexion/and abduction 20x flexion/abduction 20x flex/abduction 20x flex/abduction    20 shoulder abd/20x shoulder flexion  15x 1# shoulder abduction/ 5x 1# shoulder flexion- 10x no #   Standing hip 3 way             Lateral walks c/ UE resistance              Ther Ex             UBE 3'/3' fwd/back 3'/3' fwd/back 3'/3' 3'/3' fwd/back    3'/3' fwd/back 3'/3' fwd/back 3'/3' fwd/back   Standing hamstring curl        1x10 c/ PT cueing 1x15 (bolster blocking knee)  1x15 (bolster blocking knee)    NuStep             Standing HR 20x on blue foam 20x on blue foam 20x on blue foam 20x on blue foam    20x on blue foam  20x on blue foam   Standing hip march             Standing hip extension             Standing hip abduction             Hamstring curl                           Ther Activity             Foam Lateral + tandem walks    3 laps ea          Lateral idris walks   6x4 hurdles 6x4 hurdles 6x4 hurdles    6x4 hurdles 6x4 hurdles 6x4 hurdles   Tandem idris walks  6x4 hurdles 6x4 hurdles 6x4 hurdles    6x4 hurdles 6x4 hurdles 6x4 hurdles   Brandon row with backward walking  blk tb 4x10 feet blk tb 4x10ft     5x btb 10 feet 5x btb 10 feet 6x btb 10 feet   Runners climb on BD foam smaller foam c/ 2 hand support x 20 B smaller foam c/ 2 hand support x 20 B Smaller foam w/ 2 hand suport x20 ea 1x10    20x B 20x B 20x B   Foam walks             Gait Training                                       Modalities                                       1:1 with PT from [id number] am

## 2023-02-21 ENCOUNTER — TELEMEDICINE (OUTPATIENT)
Dept: UROLOGY | Facility: AMBULATORY SURGERY CENTER | Age: 73
End: 2023-02-21

## 2023-02-21 DIAGNOSIS — N32.81 OAB (OVERACTIVE BLADDER): ICD-10-CM

## 2023-02-21 RX ORDER — SOLIFENACIN SUCCINATE 10 MG/1
10 TABLET, FILM COATED ORAL DAILY
Qty: 90 TABLET | Refills: 3 | Status: SHIPPED | OUTPATIENT
Start: 2023-02-21

## 2023-02-21 NOTE — PROGRESS NOTES
Virtual Brief Visit    Patient is located in the following state in which I hold an active license [place]    Assessment/Plan:    Problem List Items Addressed This Visit    None      Recent Visits  No visits were found meeting these conditions  Showing recent visits within past 7 days and meeting all other requirements  Future Appointments  No visits were found meeting these conditions  Showing future appointments within next 150 days and meeting all other requirements         Visit Time    Visit Start Time: 6873  Visit Stop Time: 9313    Discussion/plan  Patient is a 62-year-old female with a history of urinary urgency and nephrolithiasis  Urinary urgency secondary to multiple sclerosis  Patient has had no new/recent occurrence of stone formation since her last office evaluation  She continues to be managed on Vesicare for management of her Erika Mons urgency and frequency  Patient reports doing a trial of 5 mg of Vesicare daily  She reports worsening lower urinary tract symptoms at the 5 mg dose and has opted to return to the 10 mg dose  She currently denies dysuria, hematuria, Lavelle frequency and urgency  She reports sensation of complete emptying with urination  She denies fever and chills  She continues to attend physical therapy  Her last physical therapy evaluation performed 2/20/2023  She reports vital signs at that time were unremarkable; afebrile, normotensive with stable heart rate  She denies changes to her general health since her last office evaluation      Assessment    Nephrolithiasis  · No new/recent occurrence of stone formation  · Continue with dietary behavior modifications to assist in reduction of stone formation    Urinary urgency  · Continue Vesicare 10 mg daily-prescription refill provided  · Discussed dietary behavior modifications to assist in reduction of irritative symptoms  · Patient is aware to call the office for concerns or questions regarding urinary retention, worsening lower urinary tract symptoms, signs of infection  · Follow-up in the office in 1 year with [facility]      SARAH Guillory

## 2023-02-22 ENCOUNTER — VBI (OUTPATIENT)
Dept: ADMINISTRATIVE | Facility: OTHER | Age: 73
End: 2023-02-22

## 2023-02-23 ENCOUNTER — OFFICE VISIT (OUTPATIENT)
Dept: PHYSICAL THERAPY | Facility: CLINIC | Age: 73
End: 2023-02-23

## 2023-02-23 DIAGNOSIS — G35 MULTIPLE SCLEROSIS (HCC): Primary | ICD-10-CM

## 2023-02-23 NOTE — PROGRESS NOTES
Daily Note     Today's date: 2023  Patient name: Brooklyn Marmolejo  : 1950  MRN: [id number]  Referring provider: Samy Austin MD  Dx:   Encounter Diagnosis     ICD-10-CM    1  Multiple sclerosis ([address] )  Melva Muir           Start Time: 1031  Stop Time: 1115  Total time in clinic (min): 44 minutes    Subjective: Pt reports feeling fatigued  Objective: See treatment diary below  BP: 140/81  SpO2: 96%  HR: 65 bpm    Assessment: Tolerated treatment fair  Vitals appropriate for exercise  Pt still has a challenge clearing LE during idris walks  Pt fatigues quickly with strengthening exercises  Patient would benefit from continued PT  Plan: Continue per plan of care        Precautions: MS, Gait dysfunction, Insomnia, Osteoporosis, Peripheral polyneuropathy, Hx of Breast CA, R IDALIA in - Dr Rosalee Samuels, Left femur IM nailing      Manuals                                                        Neuro Re-Ed             TB rows 2x20 blk romberg 2x20 blk romberg 2x20 blk romberg 2x20 blk romberg 2x20 blk romberg   2x20 blue romberg 2x20 blue romberg 2x20 blue romberg   TB Ws             TB lower trap             TB extension x20 blue romberg 2x20 blue romberg 2x20 blue romberg 2x20 blue romberg 2x20 blue romberg   2x20 blue romberg 2x20 blue romberg 2x20 blue romberg   Romberg on foam 8-10x flexion/and abduction 20x flexion/abduction 20x flex/abduction 20x flex/abduction 20x flexion   20 shoulder abd/20x shoulder flexion  15x 1# shoulder abduction/ 5x 1# shoulder flexion- 10x no #   Standing hip 3 way             Lateral walks c/ UE resistance              Ther Ex             UBE 3'/3' fwd/back 3'/3' fwd/back 3'/3' 3'/3' fwd/back 3'/3' fwd/back   3'/3' fwd/back 3'/3' fwd/back 3'/3' fwd/back   Standing hamstring curl        1x10 c/ PT cueing 1x15 (bolster blocking knee)  1x15 (bolster blocking knee)    NuStep             Standing HR 20x on blue foam 20x on blue foam 20x on blue foam 20x on blue foam 20x on blue foam   20x on blue foam  20x on blue foam   Standing hip march             Standing hip extension             Standing hip abduction             Hamstring curl                           Ther Activity             Foam Lateral + tandem walks    3 laps ea          Lateral idris walks   6x4 hurdles 6x4 hurdles 6x4 hurdles 6x4 hurdles   6x4 hurdles 6x4 hurdles 6x4 hurdles   Tandem idris walks  6x4 hurdles 6x4 hurdles 6x4 hurdles 6x4 hurdles   6x4 hurdles 6x4 hurdles 6x4 hurdles   [place] row with backward walking  blk tb 4x10 feet blk tb 4x10ft     5x btb 10 feet 5x btb 10 feet 6x btb 10 feet   Runners climb on BD foam smaller foam c/ 2 hand support x 20 B smaller foam c/ 2 hand support x 20 B Smaller foam w/ 2 hand suport x20 ea 1x10 1x10 B   20x B 20x B 20x B   Foam walks             Gait Training                                       Modalities                                       1:1 with PT from [id number] am

## 2023-02-28 ENCOUNTER — OFFICE VISIT (OUTPATIENT)
Dept: PHYSICAL THERAPY | Facility: CLINIC | Age: 73
End: 2023-02-28

## 2023-02-28 DIAGNOSIS — G35 MULTIPLE SCLEROSIS (HCC): Primary | ICD-10-CM

## 2023-02-28 NOTE — PROGRESS NOTES
Daily Note     Today's date: 2023  Patient name: Juan Jose Vivas  : 1950  MRN: [id number]  Referring provider: Justo Ingram MD  Dx:   Encounter Diagnosis     ICD-10-CM    1  Multiple sclerosis ([facility] [address] )  G35                      Subjective: Pt reports more energy today compared to last as she continues to recover s/p COVID-19 infection  Pt denies any SOB  Objective: See treatment diary below      Assessment: Tolerated treatment well  Good balance and proprioception today  Good clearance of LE during hurdles and step ups  Pt states that she was able to negotiate stairs reciprocally while at the [employer] building renewing her passport  Patient would benefit from continued PT  Plan: Continue per plan of care        Precautions: MS, Gait dysfunction, Insomnia, Osteoporosis, Peripheral polyneuropathy, Hx of Breast CA, R IDALIA in - Dr Rashaun Walker, Left femur IM nailing      Manuals                                                        Neuro Re-Ed             TB rows 2x20 blk romberg 2x20 blk romberg 2x20 blk romberg 2x20 blk romberg 2x20 blk romberg 2x20 blk romberg  2x20 blue romberg 2x20 blue romberg 2x20 blue romberg   TB Ws             TB lower trap             TB extension x20 blue romberg 2x20 blue romberg 2x20 blue romberg 2x20 blue romberg 2x20 blue romberg 2x20 blue romberg  2x20 blue romberg 2x20 blue romberg 2x20 blue romberg   Romberg on foam 8-10x flexion/and abduction 20x flexion/abduction 20x flex/abduction 20x flex/abduction 20x flexion 20x flexion/abduction  20 shoulder abd/20x shoulder flexion  15x 1# shoulder abduction/ 5x 1# shoulder flexion- 10x no #   Standing hip 3 way             Lateral walks c/ UE resistance              Ther Ex             UBE 3'/3' fwd/back 3'/3' fwd/back 3'/3' 3'/3' fwd/back 3'/3' fwd/back 3'/3' fwd/back  3'/3' fwd/back 3'/3' fwd/back 3'/3' fwd/back   Standing hamstring curl        1x10 c/ PT cueing 1x15 (kennedy blocking knee)  1x15 (bolster blocking knee)    NuStep             Standing HR 20x on blue foam 20x on blue foam 20x on blue foam 20x on blue foam 20x on blue foam 20x on blue foam  20x on blue foam  20x on blue foam   Standing hip march             Standing hip extension             Standing hip abduction             Hamstring curl                           Ther Activity             Foam Lateral + tandem walks    3 laps ea          Lateral idris walks   6x4 hurdles 6x4 hurdles 6x4 hurdles 6x4 hurdles 6x4 hurdles  6x4 hurdles 6x4 hurdles 6x4 hurdles   Tandem idris walks  6x4 hurdles 6x4 hurdles 6x4 hurdles 6x4 hurdles 6x4 hurdles  6x4 hurdles 6x4 hurdles 6x4 hurdles   Brandon row with backward walking  blk tb 4x10 feet blk tb 4x10ft     5x btb 10 feet 5x btb 10 feet 6x btb 10 feet   Runners climb on BD foam smaller foam c/ 2 hand support x 20 B smaller foam c/ 2 hand support x 20 B Smaller foam w/ 2 hand suport x20 ea 1x10 1x10 B 2x10 B  20x B 20x B 20x B   Foam walks             Gait Training                                       Modalities                                       1:1 with PT from 1106-1144am

## 2023-03-03 ENCOUNTER — EVALUATION (OUTPATIENT)
Dept: PHYSICAL THERAPY | Facility: CLINIC | Age: 73
End: 2023-03-03

## 2023-03-03 DIAGNOSIS — G35 MULTIPLE SCLEROSIS (HCC): Primary | ICD-10-CM

## 2023-03-03 NOTE — PROGRESS NOTES
[id number]     Today's date: 3/3/2023  Patient name: Poonam Navarro  : 1950  MRN: [id number]  Referring provider: Gen Woods MD  Dx: No diagnosis found  Assessment  Assessment details: Poonam Navarro is a 67 y o  female who presents with signs and symptoms consistent of a primary balance impairment, gait abnormality, weakness in the BLE and right UE secondary to Multiple Sclerosis  Pt continues to have fluctuating and inconsistent days of LE weakness, balance/proprioception deficits, and pain  However, since last re-evaluation patient has improved motor control in LE with nearly a 1/2 MMT grade strength improvement in LE  Her timed up and go improved; however, her 5x sit to stand and MCTSIB slightly worsened since last re-evaluation  Today, patient was able to complete a trial of a 6 minute walk test  She was able to ambulate 390 feet in 3:30 minutes prior to requiring a rest break  This a task patient was unable to complete last session  Due to patient's medical complexity and progressive neurological condition, I will transfer patient to a skilled maintenance program  She will complete 6-8 more weeks of PT at 1 time per week in order to stabilize function, maintain improvements in LE, and reduce the risks of falling  Pt is in agreement with POC  Impairments: abnormal coordination, abnormal gait, abnormal or restricted ROM, abnormal movement, difficulty understanding, impaired balance, impaired physical strength, lacks appropriate home exercise program and poor posture   Barriers to therapy: Chronicity of pain, MS, H/O R IDALIA and L IM nailing in femur     Understanding of Dx/Px/POC: good   Prognosis: good    Goals  Short Term Goals: to be achieved by 4 weeks  1) Maintain independence with HEP  2) Maintain 5x sit to stand to current metrics  3) Maintain TUG by current metrics  4) Maintain current LE strength over the course of rehabilitation    Long Term Goals: to be achieved by discharge  1) Pt to maintain current 6 minute walk test   2) Pt to maintain MCTSIB testing  3) Pt to maintain 5x sit stand test      Plan  Patient would benefit from: PT eval and skilled maintenance physical therapy  Planned therapy interventions: manual therapy, neuromuscular re-education, patient education, strengthening, therapeutic activities, therapeutic exercise, transfer training, home exercise program and functional ROM exercises  Frequency: 1x per week for 6-8 weeks  Treatment plan discussed with: patient        Subjective Evaluation    History of Present Illness  Mechanism of injury: History of Current Injury: Pt referred to PT from Neurologist secondary to MS relapse and functional decline  Pt has several week long flares which have made her extremely fatigued and weak  Pt attempted aquatic therapy for gait and balance [facility]  She has been previously treated by me and last seen in March of 2022  Pt continues to report difficulties with ambulation, endurance, balance, and persistent neuropathic pain in BLE  Her symptoms and function varies day to day  She continues to ambulate with a SPC  Pt's PMH is significant for R total hip replacement in 2012 and L IM nailing in femur due to fracture  Pt's primary concern is her balance and unsteadiness  She also reports increasing right sided weakness including her right shoulder  She notes her right shoulder is considerably depressed  She has difficulty using SPC in R hand  Pt feels heaviness in RUE  Pt has intermittent numbness in BLE  Imaging: None recently    Patient goals:  Improve ambulation, endurance, balance, and strength  Pt would like to also work on R shoulder strength to improve the ability to walk with cane  She notes that she consistent veers to the right  Hobbies/Interest: Cooking, family, Conferences with [employer]  Occupation: Retired nurse           Objective     Static Posture   General Observations  Scoliosis  Shoulders  Depressed  Neurological Testing     Reflexes   Left   Biceps (C5/C6): normal (2+)  Brachioradialis (C6): normal (2+)  Triceps (C7): normal (2+)  Patellar (L4): trace (1+)  Achilles (S1): trace (1+)    Right   Biceps (C5/C6): normal (2+)  Brachioradialis (C6): normal (2+)  Triceps (C7): normal (2+)  Patellar (L4): trace (1+)  Achilles (S1): trace (1+)    Strength/Myotome Testing   Cervical Spine     Left   Interossei strength (t1): 4    Right   Interossei strength (t1): 4    Left Shoulder     Planes of Motion   Flexion: 4+   Abduction: 4+   External rotation at 0°: 4   Internal rotation at 0°: 4     Right Shoulder     Planes of Motion   Flexion: 4+   Abduction: 4+  External rotation at 0°: 4  Internal rotation at 0°: 4     Left Elbow   Flexion: 4+  Extension: 4+    Right Elbow   Flexion: 4+  Extension: 4+    Left Wrist/Hand   Wrist extension: 4-  Wrist flexion: 4-  Thumb extension: 4-    Right Wrist/Hand   Wrist extension: 4  Wrist flexion: 4  Thumb extension: 4    Left Hip   Planes of Motion   Flexion: 4-  Abduction: 4-  Adduction: 4-    Right Hip   Planes of Motion   Flexion: 4-  Abduction: 4-  Adduction: 4-    Left Knee   Flexion: 4-  Extension: 4    Right Knee   Flexion: 4-  Extension: 4    Left Ankle/Foot   Dorsiflexion: 4-  Plantar flexion: 4    Right Ankle/Foot   Dorsiflexion: 4  Plantar flexion: 4    Additional Strength Details  *Limited R shoulder ROM compared to L with  Functional ER and Functional IR   *Right scapular and humeral depression compared to the Left (This is likely due to functional scoliosis)  Functional Assessment    Timed up and go:  Date: 25 seconds  with use of hands on rails for assistance and SPC in RUE  10/25: 18 seconds  C/ use of hand on rails for assistance and SPC in RUE  12/8: 19 seconds   C use of hands on rails of assistance and SPC in RUE (decreased ability to turn)   1/16: 21 92 seconds c/ use of hand rests and SPC (3 seconds during turn)  3/3: 16 06 seconds c/ use of hand rests and SPC (<2 seconds during turn)    5x sit to stand:   Date: 19 seconds with BUE assistance  Moderate knee valgus alignment preset during transitions  10/25: 16 seconds with BUE assistance  Staggered stance    12/8: 20 seconds c/ BUE assistance: Staggered stance  1/16: 15 26 seconds c/ BUE assistance: staggered stance   3/3: 18 44 seconds c/ BUE assistance: staggered stance    2 minute walk test:   3/3: 230 feet c/ SPC  6 minute walk test:   3/3: 390 feet in 3:30 seconds prior to requiring rest  SPC required  Pt's gait pattern looked more fatigue over the last 50 feet      IE:  MCTSIB: feet together c/ yellow foam  EO firm: 30 seconds with mod postural sway   EC firm: 8 seconds with prior to LOB (then to 23 seconds with FT support)   EO foam: FT support after 10 seconds which was required throughout with moderate postural sway  EC foam: Unable to perform     10/25  MCTSIB: feet together c/ yellow foam  EO firm: 30 seconds with min postural sway   EC firm: 30 seconds with mod postural sway   EO foam: 30 second c/ mod postural sway  EC foam: 3 seconds prior to LOB forward     12/8  MCTSIB: feet together c/ yellow foam  EO firm: 30 seconds with min postural sway   EC firm: 30 seconds with mod postural sway   EO foam: 30 second c/ mod postural sway  EC foam: 6 seconds prior to LOB forward    1/16  MCTSIB: feet together c/ yellow foam  EO firm: 30 seconds with min postural sway   EC firm: 30 seconds with mod postural sway   EO foam: 30 second c/ mod postural sway  EC foam: 18 33 seconds prior to LOB forward (she did have finger tip support at 6 seconds but recovered)     3/3  MCTSIB: feet together c/ yellow foam  EO firm: 30 seconds with min postural sway   EC firm: 30 seconds with mod postural sway   EO foam: 30 second c/ mod postural sway  EC foam: 10 33 seconds prior to LOB forward     6 minute walk test:   NT         Precautions: MS, Gait dysfunction, Insomnia, Osteoporosis, Peripheral polyneuropathy, Hx of Breast CA, R IDALIA in 2012- Dr Tiff Escalona, Left femur IM nailing      Manuals 1/26 1/30 2/7 2/20 2/23 2/28 3/3             Re-eval                                             Neuro Re-Ed             TB rows 2x20 blk romberg 2x20 blk romberg 2x20 blk romberg 2x20 blk romberg 2x20 blk romberg 2x20 blk romberg       TB Ws             TB lower trap             TB extension x20 blue romberg 2x20 blue romberg 2x20 blue romberg 2x20 blue romberg 2x20 blue romberg 2x20 blue romberg       Romberg on foam 8-10x flexion/and abduction 20x flexion/abduction 20x flex/abduction 20x flex/abduction 20x flexion 20x flexion/abduction       Standing hip 3 way             Lateral walks c/ UE resistance              Ther Ex             UBE 3'/3' fwd/back 3'/3' fwd/back 3'/3' 3'/3' fwd/back 3'/3' fwd/back 3'/3' fwd/back 3'/3' fwd/back      Standing hamstring curl             NuStep             Standing HR 20x on blue foam 20x on blue foam 20x on blue foam 20x on blue foam 20x on blue foam 20x on blue foam       Standing hip march             Standing hip extension             Standing hip abduction             Hamstring curl                           Ther Activity             Foam Lateral + tandem walks    3 laps ea          Lateral idris walks   6x4 hurdles 6x4 hurdles 6x4 hurdles 6x4 hurdles 6x4 hurdles       Tandem idris walks  6x4 hurdles 6x4 hurdles 6x4 hurdles 6x4 hurdles 6x4 hurdles       Rector row with backward walking  blk tb 4x10 feet blk tb 4x10ft          Runners climb on BD foam smaller foam c/ 2 hand support x 20 B smaller foam c/ 2 hand support x 20 B Smaller foam w/ 2 hand suport x20 ea 1x10 1x10 B 2x10 B       Foam walks             Gait Training                                       Modalities                                       1:1 with PT from [id number]

## 2023-03-07 ENCOUNTER — OFFICE VISIT (OUTPATIENT)
Dept: PHYSICAL THERAPY | Facility: CLINIC | Age: 73
End: 2023-03-07

## 2023-03-07 ENCOUNTER — APPOINTMENT (OUTPATIENT)
Dept: LAB | Facility: CLINIC | Age: 73
End: 2023-03-07

## 2023-03-07 DIAGNOSIS — G35 MULTIPLE SCLEROSIS (HCC): ICD-10-CM

## 2023-03-07 DIAGNOSIS — G35 MULTIPLE SCLEROSIS (HCC): Primary | ICD-10-CM

## 2023-03-07 LAB
ALBUMIN SERPL BCP-MCNC: 4.2 G/DL (ref 3.5–5)
ALP SERPL-CCNC: 67 U/L (ref 46–116)
ALT SERPL W P-5'-P-CCNC: 30 U/L (ref 12–78)
AST SERPL W P-5'-P-CCNC: 16 U/L (ref 5–45)
BASOPHILS # BLD AUTO: 0.08 THOUSANDS/ÂΜL (ref 0–0.1)
BASOPHILS NFR BLD AUTO: 2 % (ref 0–1)
BILIRUB DIRECT SERPL-MCNC: 0.13 MG/DL (ref 0–0.2)
BILIRUB SERPL-MCNC: 0.63 MG/DL (ref 0.2–1)
EOSINOPHIL # BLD AUTO: 0.24 THOUSAND/ÂΜL (ref 0–0.61)
EOSINOPHIL NFR BLD AUTO: 5 % (ref 0–6)
ERYTHROCYTE [DISTWIDTH] IN BLOOD BY AUTOMATED COUNT: 12.5 % (ref 11.6–15.1)
HCT VFR BLD AUTO: 41.7 % (ref 34.8–46.1)
HGB BLD-MCNC: 13.5 G/DL (ref 11.5–15.4)
IMM GRANULOCYTES # BLD AUTO: 0.02 THOUSAND/UL (ref 0–0.2)
IMM GRANULOCYTES NFR BLD AUTO: 0 % (ref 0–2)
LYMPHOCYTES # BLD AUTO: 1.51 THOUSANDS/ÂΜL (ref 0.6–4.47)
LYMPHOCYTES NFR BLD AUTO: 29 % (ref 14–44)
MCH RBC QN AUTO: 30.6 PG (ref 26.8–34.3)
MCHC RBC AUTO-ENTMCNC: 32.4 G/DL (ref 31.4–37.4)
MCV RBC AUTO: 95 FL (ref 82–98)
MONOCYTES # BLD AUTO: 0.42 THOUSAND/ÂΜL (ref 0.17–1.22)
MONOCYTES NFR BLD AUTO: 8 % (ref 4–12)
NEUTROPHILS # BLD AUTO: 2.94 THOUSANDS/ÂΜL (ref 1.85–7.62)
NEUTS SEG NFR BLD AUTO: 56 % (ref 43–75)
NRBC BLD AUTO-RTO: 0 /100 WBCS
PLATELET # BLD AUTO: 214 THOUSANDS/UL (ref 149–390)
PMV BLD AUTO: 10.3 FL (ref 8.9–12.7)
PROT SERPL-MCNC: 7.4 G/DL (ref 6.4–8.4)
RBC # BLD AUTO: 4.41 MILLION/UL (ref 3.81–5.12)
WBC # BLD AUTO: 5.21 THOUSAND/UL (ref 4.31–10.16)

## 2023-03-07 NOTE — PROGRESS NOTES
Daily Note     Today's date: 3/7/2023  Patient name: Robyn Monae  : 1950  MRN: [id number]  Referring provider: Dominic Rowland MD  Dx:   Encounter Diagnosis     ICD-10-CM    1  Multiple sclerosis ([address] )  G35                      Subjective: Pt reports sharp neuropathic pain in right distal LE from ankle to knee last evening  She also c/o associated restless legs  Pt denies any new exercise, activity, or movement that caused her symptoms  She admits to taking her medications as prescribed  Objective: See treatment diary below      Assessment: Tolerated treatment fair  Left LE more fatigued than previous session  Clearance over hurdles and knee stability during step ups reduced  Her RLE pain was transient and did not effect her completion of balance and strength program  Patient would benefit from continued PT      Plan: Continue per plan of care   1x per week      Precautions: MS, Gait dysfunction, Insomnia, Osteoporosis, Peripheral polyneuropathy, Hx of Breast CA, R IDALIA in - Dr Christa Diaz, Left femur IM nailing      Manuals 1/26 1/30 2/7 2/20 2/23 2/28 3/3 3/7            Re-eval                                             Neuro Re-Ed             TB rows 2x20 blk romberg 2x20 blk romberg 2x20 blk romberg 2x20 blk romberg 2x20 blk romberg 2x20 blk romberg  2x20 blk romberg     TB Ws             TB lower trap             TB extension x20 blue romberg 2x20 blue romberg 2x20 blue romberg 2x20 blue romberg 2x20 blue romberg 2x20 blue romberg  2x20 blue romberg     Romberg on foam 8-10x flexion/and abduction 20x flexion/abduction 20x flex/abduction 20x flex/abduction 20x flexion 20x flexion/abduction  20x flexion/abduction     Standing hip 3 way             Lateral walks c/ UE resistance              Ther Ex             UBE 3'/3' fwd/back 3'/3' fwd/back 3'/3' 3'/3' fwd/back 3'/3' fwd/back 3'/3' fwd/back 3'/3' fwd/back 3'/3' fwd/back     Standing hamstring curl             NuStep             Standing HR 20x on blue foam 20x on blue foam 20x on blue foam 20x on blue foam 20x on blue foam 20x on blue foam  20x on blue foam     Standing hip march             Standing hip extension             Standing hip abduction             Hamstring curl                           Ther Activity             Foam Lateral + tandem walks    3 laps ea          Lateral idris walks   6x4 hurdles 6x4 hurdles 6x4 hurdles 6x4 hurdles 6x4 hurdles  6x4 hurdles     Tandem idris walks  6x4 hurdles 6x4 hurdles 6x4 hurdles 6x4 hurdles 6x4 hurdles  6x4 hurdles     Brandon row with backward walking  blk tb 4x10 feet blk tb 4x10ft          Runners climb on BD foam smaller foam c/ 2 hand support x 20 B smaller foam c/ 2 hand support x 20 B Smaller foam w/ 2 hand suport x20 ea 1x10 1x10 B 2x10 B  2x10 B     Foam walks             Gait Training                                       Modalities                                       1:1 with PT from [id number]

## 2023-03-08 ENCOUNTER — TELEPHONE (OUTPATIENT)
Dept: NEUROLOGY | Facility: CLINIC | Age: 73
End: 2023-03-08

## 2023-03-13 ENCOUNTER — TELEPHONE (OUTPATIENT)
Dept: NEUROLOGY | Facility: CLINIC | Age: 73
End: 2023-03-13

## 2023-03-13 NOTE — TELEPHONE ENCOUNTER
Patient returned call and R/S for 6/6/23 @ 11:00am Aline Butcher, put on wait list as well    LVMM for patient Dr Bill Peres will not be in office on the date of their appt, 3/14/23 10:00am Aline Butcher  Appt will be cxl and need to be R/S  Gave direct # until 5:00 today, central # for after

## 2023-03-16 ENCOUNTER — OFFICE VISIT (OUTPATIENT)
Dept: PHYSICAL THERAPY | Facility: CLINIC | Age: 73
End: 2023-03-16

## 2023-03-16 DIAGNOSIS — G35 MULTIPLE SCLEROSIS (HCC): Primary | ICD-10-CM

## 2023-03-21 ENCOUNTER — OFFICE VISIT (OUTPATIENT)
Dept: PHYSICAL THERAPY | Facility: CLINIC | Age: 73
End: 2023-03-21

## 2023-03-21 DIAGNOSIS — G35 MULTIPLE SCLEROSIS (HCC): Primary | ICD-10-CM

## 2023-03-21 NOTE — PROGRESS NOTES
Daily Note     Today's date: 3/21/2023  Patient name: Mireya Larsen  : 1950  MRN: [id number]  Referring provider: Mukund Francis MD  Dx:   Encounter Diagnosis     ICD-10-CM    1  Multiple sclerosis ([facility] [address] )  G35                      Subjective: Pt doing overall doing well  She continues to have her good days and bad days  Hasn't had too many bad days and has been very active with her volunteering  Objective: See treatment diary below      Assessment: Pt required more tactile cueing during dynamic balance activities  Clearance of left LE over idris continues to be a challenge; however, knee stability and motor control during step up improved  Visual feedback required during idris step overs  Pt appropriately fatigued with current program  Tolerated treatment well  Patient would benefit from continued PT  Plan: Continue per plan of care        Precautions: MS, Gait dysfunction, Insomnia, Osteoporosis, Peripheral polyneuropathy, Hx of Breast CA, R IDALIA in - Dr Ravindra Guzman, Left femur IM nailing      Manuals 1/26 1/30 2/7 2/20 2/23 2/28 3/3 3/7 3/16 3/21          Re-eval                                             Neuro Re-Ed             TB rows 2x20 blk romberg 2x20 blk romberg 2x20 blk romberg 2x20 blk romberg 2x20 blk romberg 2x20 blk romberg  2x20 blk romberg 2x20 blk romberg 2x20 blk romberg   TB Ws             TB lower trap             TB extension x20 blue romberg 2x20 blue romberg 2x20 blue romberg 2x20 blue romberg 2x20 blue romberg 2x20 blue romberg  2x20 blue romberg 2x20 blue romberg 2x20 blue romberg   Romberg on foam 8-10x flexion/and abduction 20x flexion/abduction 20x flex/abduction 20x flex/abduction 20x flexion 20x flexion/abduction  20x flexion/abduction 20x flexion/abduction 20x flexion/abduction   Standing hip 3 way             Lateral walks c/ UE resistance              Ther Ex             UBE 3'/3' fwd/back 3'/3' fwd/back 3'/3' 3'/3' fwd/back 3'/3' fwd/back 3'/3' fwd/back 3'/3' fwd/back 3'/3' fwd/back  3'/3' fwd/back   Standing hamstring curl             NuStep             Standing HR 20x on blue foam 20x on blue foam 20x on blue foam 20x on blue foam 20x on blue foam 20x on blue foam  20x on blue foam 20x on blue foam 20x on blue foam   Standing hip march             Standing hip extension             Standing hip abduction             Hamstring curl                           Ther Activity             Foam Lateral + tandem walks    3 laps ea          Lateral idris walks   6x4 hurdles 6x4 hurdles 6x4 hurdles 6x4 hurdles 6x4 hurdles  6x4 hurdles 6x4 hurdles 6x4 hurdles   Tandem idris walks  6x4 hurdles 6x4 hurdles 6x4 hurdles 6x4 hurdles 6x4 hurdles  6x4 hurdles 6x4 hurdles 6x4 hurdles   Brandon row with backward walking  blk tb 4x10 feet blk tb 4x10ft          Runners climb on BD foam smaller foam c/ 2 hand support x 20 B smaller foam c/ 2 hand support x 20 B Smaller foam w/ 2 hand suport x20 ea 1x10 1x10 B 2x10 B  2x10 B  2x10 B   Foam walks             Gait Training                                       Modalities                            1:1 with PT from [id number] AM

## 2023-03-28 ENCOUNTER — OFFICE VISIT (OUTPATIENT)
Dept: SURGICAL ONCOLOGY | Facility: CLINIC | Age: 73
End: 2023-03-28

## 2023-03-28 VITALS
WEIGHT: 140 LBS | BODY MASS INDEX: 24.8 KG/M2 | RESPIRATION RATE: 16 BRPM | HEIGHT: 63 IN | DIASTOLIC BLOOD PRESSURE: 80 MMHG | HEART RATE: 74 BPM | TEMPERATURE: 97.6 F | SYSTOLIC BLOOD PRESSURE: 124 MMHG | OXYGEN SATURATION: 100 %

## 2023-03-28 DIAGNOSIS — L98.9 BENIGN SKIN LESION OF MULTIPLE SITES: ICD-10-CM

## 2023-03-28 DIAGNOSIS — Z12.31 VISIT FOR SCREENING MAMMOGRAM: ICD-10-CM

## 2023-03-28 DIAGNOSIS — Z85.3 ENCOUNTER FOR FOLLOW-UP SURVEILLANCE OF BREAST CANCER: Primary | ICD-10-CM

## 2023-03-28 DIAGNOSIS — Z08 ENCOUNTER FOR FOLLOW-UP SURVEILLANCE OF BREAST CANCER: Primary | ICD-10-CM

## 2023-03-28 DIAGNOSIS — Z86.000 PERSONAL HISTORY OF IN-SITU NEOPLASM OF BREAST: ICD-10-CM

## 2023-03-28 NOTE — PROGRESS NOTES
Surgical Oncology Follow Up       [address] [facility] [facility]  [facility] [place]  [address] [address]    [facility]  1950  [id number]  [address] [facility],[address] [place] [address]  [facility] [place]  [address] [address]    Chief Complaint   Patient presents with   • Follow-up       Assessment/Plan:  1  Encounter for follow-up surveillance of breast cancer  - prn     2  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    3  Visit for screening mammogram  - Mammo screening right w 3d & cad; Future      Discussion/Summary: Patient is a 69-year-old female presenting for 1 year follow-up for intraductal carcinoma diagnosed in 2012  She had a left breast mastectomy with sentinel node biopsy and left breast reconstruction  She had her left breast implant replaced 3 times with a right mastopexy  She had a diagnostic mammogram and ultrasound of the right breast on 12/12/2022 which was BI-RADS 2 category 3 density  Surgical changes noted  Patient is currently 11 years status post mastectomy  She may now follow-up with her PCP or GYN for clinical breast exams and ordering of her annual mammogram  There were no concerns on her cbe  Patient has concerns over benign skin lesions on her right lateral chest  She states she has been wanting to get these removed because they are painful and her derm will not do it  I am referring her to another dermatologist  I gave her the option to follow us in a year or follow with her pcp  She was instructed to call with questions or concerns in the future  All questions were answered today  History of Present Illness:     Oncology History   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    Initial Diagnosis    Intraductal carcinoma in situ of left breast     8/16/2012 Surgery    Left breast mastectomy, SLNB   Left breast reconstruction with      4/7/2014 Surgery    Left breast implant replaced (X 3) right mastopexy             -Interval History: Patient is a 25-year-old female presenting for 1 year follow-up for intraductal carcinoma diagnosed in 2012  She had a diagnostic mammogram and ultrasound of the right breast on 12/12/2022 which was BI-RADS 2 category 3 density  Patient denies changes on her breast exam  She denies persistent headaches, bone pain, back pain, shortness of breath, or abdominal pain  Review of Systems:  Review of Systems   Constitutional: Negative for activity change, appetite change, fatigue and unexpected weight change  Respiratory: Negative for cough and shortness of breath  Cardiovascular: Negative for chest pain  Gastrointestinal: Negative for abdominal pain, diarrhea, nausea and vomiting  Endocrine: Negative for heat intolerance  Musculoskeletal: Positive for gait problem (cane)  Negative for arthralgias, back pain and myalgias  Skin: Negative for rash  Neurological: Negative for weakness and headaches  Hematological: Negative for adenopathy         Patient Active Problem List   Diagnosis   • 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   • Multiple sclerosis ([facility] [address] )   • Peripheral polyneuropathy   • Depression   • Fatigue   • Gait disturbance   • Hip pain, right   • Headache   • History of bilateral hip replacements   • Hypokalemia   • Injury of right leg   • Insomnia   • Laceration of finger of right hand   • Solitary pulmonary nodule   • Rash   • Pain of left lower leg   • Pain of left calf   • Osteopenia   • Nephrolithiasis   • Muscle strain of left lower leg   • Left knee pain   • Left ankle pain   • Urticaria   • Tingling   • Screening mammogram, encounter for   • Encounter for breast reconstruction following mastectomy   • Abnormal stool test   • Encounter for follow-up surveillance of breast cancer   • Essential hypertension   • Abnormal finding on breast imaging   • TSH (thyroid-stimulating hormone deficiency)   • B12 deficiency   • RLS (restless legs syndrome)   • Dense breast tissue   • Subareolar lump of right breast     Past Medical History:   Diagnosis Date   • Allergic 1967    seasonal   • Allergic rhinitis    • Bone pain    • Breast cancer ([facility] [address] ) 08/16/2012   • Breast ptosis    • Depression    • Fatigue    • Gait disturbance     uses cane at times   • Headache    • Hip fracture (HCC)    • Hip pain    • History of transfusion 1975    placenta previa s/p work accident, no rx   • Hypokalemia    • Insomnia    • Laceration of finger     right hand   • Left ankle pain    • Left knee pain    • Multiple sclerosis (HCC)    • Muscle strain     left lower leg   • Nephrolithiasis    • Osteopenia    • Osteoporosis    • Pain of left calf    • Pneumonia    • Rash    • Scoliosis birth    scoliosis   • Solitary pulmonary nodule    • SVT (supraventricular tachycardia) (HCC)    • Tingling    • Urgency of urination    • Urticaria    • Wrist fracture, left      Past Surgical History:   Procedure Laterality Date   • ANKLE SURGERY     • APPENDECTOMY  11/2012    Dr Debbi Rizvi   • AUGMENTATION BREAST      Enlargement procedure with prosthetic implant bilateral   • AUGMENTATION MAMMAPLASTY Right 01/28/2020    implant replaced because of recall   • AUGMENTATION MAMMAPLASTY Bilateral 2012   • BREAST BIOPSY Left 07/23/2012   • BREAST IMPLANT Right 01/28/2020    Procedure: BREAST IMPLANT EXCHANGE WITH CAPSULECTOMY;  Surgeon: Gissell Joe MD;  Location: [facility];  Service: Plastics   • BREAST IMPLANT REMOVAL Right 01/28/2020    implant placement, implant revision, right mastopexy   • BREAST LUMPECTOMY     • CYSTOSTOMY      with basket extraction of calculus; x2   • EXCISION / BIOPSY BREAST / NIPPLE / DUCT Right 05/04/2020    scar revision to resuture non healing surgical wound   • EXPLORATORY LAPAROTOMY     • FOOT SURGERY     • FRACTURE SURGERY  Lt wrist 9/2014 - Lt matthew femur 9/14   • HIP FRACTURE SURGERY Right     Subcapital   • HIP SURGERY Left    • JOINT REPLACEMENT  Rt hip 10/2012   • LEG SURGERY      Repair   • MASTECTOMY Left 2012   • CA MASTOPEXY Right 2020    Procedure: BREAST MASTOPEXY;  Surgeon: Vale Giraldo MD;  [facility];  Service: Plastics   • CA REVISION OF RECONSTRUCTED BREAST Right 2020    Procedure: REVISION RIGHT BREAST MOUND;  Surgeon: Vale Giraldo MD;  Location: [facility];  Service: Plastics   • REDUCTION MAMMAPLASTY Right 2020    reduced to match left side   • SENTINEL LYMPH NODE BIOPSY Left 2012   • SKIN BIOPSY     • TONSILLECTOMY     • TUBAL LIGATION     • WRIST SURGERY Left      Family History   Problem Relation Age of Onset   • Coronary artery disease Mother    • Hyperlipidemia Mother    • Rheum arthritis Mother    • Other Father         Acute myocardial infarction   • No Known Problems Maternal Grandmother    • Hodgkin's lymphoma Maternal Grandfather         age at dx unk   • No Known Problems Paternal Grandmother    • No Known Problems Paternal Grandfather    • Stroke Son 52   • No Known Problems Son    • Cancer Maternal Aunt 79        bladder   • Heart disease Maternal Aunt    • Stroke Maternal Aunt         6 CVA before death   • Breast cancer Maternal Aunt    • Colon cancer Maternal Uncle 72   • Hodgkin's lymphoma Maternal Uncle 79   • No Known Problems Paternal Aunt    • No Known Problems Paternal Aunt    • No Known Problems Paternal Aunt      Social History     Socioeconomic History   • Marital status: /Civil Union     Spouse name: Not on file   • Number of children: 2   • Years of education: Completed bachelor's degree   • Highest education level: Not on file   Occupational History   • Occupation: RN     Comment: Retired   Tobacco Use   • Smoking status: Former     Packs/day: 1 00     Years: 35 00     Pack years: 35 00     Types: Cigarettes     Quit date:      Years since quittin 2   • Smokeless tobacco: Never   Vaping Use   • Vaping Use: Never used   Substance and Sexual Activity • Alcohol use: No   • Drug use: No   • Sexual activity: Yes     Partners: Male     Birth control/protection: Post-menopausal   Other Topics Concern   • Not on file   Social History Narrative    Denied:  History of caffeine use     Social Determinants of Health     Financial Resource Strain: Medium Risk   • Difficulty of Paying Living Expenses: Somewhat hard   Food Insecurity: Not on file   Transportation Needs: No Transportation Needs   • Lack of Transportation (Medical): No   • Lack of Transportation (Non-Medical): No   Physical Activity: Not on file   Stress: Not on file   Social Connections: Not on file   Intimate Partner Violence: Not on file   Housing Stability: Not on file       Current Outpatient Medications:   •  ALPHA LIPOIC ACID PO, Take by mouth, Disp: , Rfl:   •  Ampyra 10 MG TB12, Take 1 tablet (10 mg total) by mouth 2 (two) times a day, Disp: 180 tablet, Rfl: 3  •  ascorbic acid (VITAMIN C) 500 mg tablet, Take 500 mg by mouth daily, Disp: , Rfl:   •  baclofen 10 mg tablet, 1 tab pos q am, 1 tab po q pm and 2 tabs hs, Disp: 360 tablet, Rfl: 3  •  Biotin 5000 MCG TABS, , Disp: , Rfl:   •  Cholecalciferol (VITAMIN D3) 1000 units CAPS, Take 5,000 Units by mouth  , Disp: , Rfl:   •  clonazePAM (KlonoPIN) 0 5 mg tablet, TAKE 1 AND 1/2 TABLETS BY MOUTH DAILY AT BEDTIME, Disp: 135 tablet, Rfl: 1  •  Copaxone 40 MG/ML SOSY, INJECT ONE SYRINGE SUBCUTANEOUSLY THREE TIMES PER WEEK AT LEAST 48 HOURS APART  ALLOW SYRINGE TO WARM TO ROOM TEMPERATURE FOR 20 MINUTES   REFRIGERATE , Disp: 36 mL, Rfl: 3  •  Cranberry 125 MG TABS, Take 125 mg by mouth in the morning, Disp: , Rfl:   •  denosumab (PROLIA) 60 mg/mL, Inject under the skin, Disp: , Rfl:   •  docusate sodium (COLACE) 100 mg capsule, Take 1 capsule by mouth every other day  , Disp: , Rfl:   •  gabapentin (NEURONTIN) 300 mg capsule, Take 1 capsule (300 mg total) by mouth daily at bedtime, Disp: 90 capsule, Rfl: 3  •  gabapentin (NEURONTIN) 600 MG tablet, 2 tabs po tid, Disp: 540 tablet, Rfl: 3  •  Magnesium 500 MG TABS, Take 500 tablets by mouth once, Disp: , Rfl:   •  solifenacin (VESICARE) 10 MG tablet, Take 1 tablet (10 mg total) by mouth daily, Disp: 90 tablet, Rfl: 3  •  zonisamide (ZONEGRAN) 100 mg capsule, Take 4 capsules (400 mg total) by mouth daily at bedtime, Disp: 360 capsule, Rfl: 3  No current facility-administered medications for this visit  Facility-Administered Medications Ordered in Other Visits:   •  bacitracin 50,000 Units, gentamicin (GARAMYCIN) 40 mg/mL 80 mg, ceFAZolin (ANCEF) 1,000 mg in sodium chloride 0 9 % 1,000 mL irrigation bottle, , Irrigation, Once, Vale Giraldo MD  Allergies   Allergen Reactions   • Duloxetine Hcl      Rapid Heart rate     • Iodinated Contrast Media Anaphylaxis   • Acetaminophen Other (See Comments)     Heart palpitations   • Cetirizine      Only occurs with generic, weakness in legs, off balance and couldn't walk  • Morphine Other (See Comments)     Migraine     Vitals:    03/28/23 0829   BP: 124/80   Pulse: 74   Resp: 16   Temp: 97 6 °F (36 4 °C)   SpO2: 100%       Physical Exam  Constitutional:       General: She is not in acute distress  Appearance: Normal appearance  Cardiovascular:      Rate and Rhythm: Normal rate and regular rhythm  Pulses: Normal pulses  Heart sounds: Normal heart sounds  Pulmonary:      Effort: Pulmonary effort is normal       Breath sounds: Normal breath sounds  Chest:      Chest wall: No mass  Breasts:     Right: No swelling, bleeding, mass, skin change or tenderness  Left: No swelling, bleeding, inverted nipple, mass, nipple discharge, skin change or tenderness  Abdominal:      General: Abdomen is flat  Palpations: Abdomen is soft  Lymphadenopathy:      Upper Body:      Right upper body: No supraclavicular, axillary or pectoral adenopathy  Left upper body: No supraclavicular, axillary or pectoral adenopathy     Skin:     General: Skin is warm    Neurological:      General: No focal deficit present  Mental Status: She is alert and oriented to person, place, and time  Psychiatric:         Mood and Affect: Mood normal          Behavior: Behavior normal            Results:    Imaging  No results found  I reviewed the above imaging data  Advance Care Planning/Advance Directives:  Discussed disease status, cancer treatment plans and/or cancer treatment goals with the patient

## 2023-03-30 ENCOUNTER — OFFICE VISIT (OUTPATIENT)
Dept: PHYSICAL THERAPY | Facility: CLINIC | Age: 73
End: 2023-03-30

## 2023-03-30 DIAGNOSIS — G35 MULTIPLE SCLEROSIS (HCC): Primary | ICD-10-CM

## 2023-03-30 NOTE — PROGRESS NOTES
Daily Note     Today's date: 3/30/2023  Patient name: Abel Wiseman  : 1950  MRN: [id number]  Referring provider: Sydni Flowers MD  Dx:   Encounter Diagnosis     ICD-10-CM    1  Multiple sclerosis ([address] )  Brian Roberson           Start Time: 1030  Stop Time: 1115  Total time in clinic (min): 45 minutes    Subjective: Pt reports having increased fatigued recently and is curious whether or not this can be due to her calcium supplements that she is taking  Objective: See treatment diary below      Assessment: Left foot dragging during idris steps overs today  Tolerated treatment fair  Pt does feel more fatigued at the end of session than she previously does  She states that her neurology appointment was re-scheduled for 3 months time  Pt will be re-evaluated next session  Our plan is to discharge to home program  Pt is welcome to return if symptoms or weakness worsens  Pt is in agreement with POC  Plan: Continue per plan of care        Precautions: MS, Gait dysfunction, Insomnia, Osteoporosis, Peripheral polyneuropathy, Hx of Breast CA, R IDALIA in - Dr Isidro Mater, Left femur IM nailing      Manuals 3/30  2/7 2/20 2/23 2/28 3/3 3/7 3/16 3/21          Re-eval                                             Neuro Re-Ed             TB rows 2x20 blk romberg  2x20 blk romberg 2x20 blk romberg 2x20 blk romberg 2x20 blk romberg  2x20 blk romberg 2x20 blk romberg 2x20 blk romberg   TB Ws             TB lower trap             TB extension 2x20 blue romberg  2x20 blue romberg 2x20 blue romberg 2x20 blue romberg 2x20 blue romberg  2x20 blue romberg 2x20 blue romberg 2x20 blue romberg   Romberg on foam 20x flexion/abduction  20x flex/abduction 20x flex/abduction 20x flexion 20x flexion/abduction  20x flexion/abduction 20x flexion/abduction 20x flexion/abduction   Standing hip 3 way             Lateral walks c/ UE resistance              Ther Ex             UBE 3'/3' fwd/back  3'/3' 3'/3' fwd/back 3'/3' fwd/back 3'/3' fwd/back 3'/3' fwd/back 3'/3' fwd/back  3'/3' fwd/back   Standing hamstring curl             NuStep             Standing HR 20x on blue foam  20x on blue foam 20x on blue foam 20x on blue foam 20x on blue foam  20x on blue foam 20x on blue foam 20x on blue foam   Standing hip march             Standing hip extension             Standing hip abduction             Hamstring curl                           Ther Activity             Foam Lateral + tandem walks    3 laps ea          Lateral idris walks  4x4 hurdles  6x4 hurdles 6x4 hurdles 6x4 hurdles 6x4 hurdles  6x4 hurdles 6x4 hurdles 6x4 hurdles   Tandem idris walks 4x4 hurdles  6x4 hurdles 6x4 hurdles 6x4 hurdles 6x4 hurdles  6x4 hurdles 6x4 hurdles 6x4 hurdles   Brandon row with backward walking   blk tb 4x10ft          Runners climb on BD foam 2x10 B  Smaller foam w/ 2 hand suport x20 ea 1x10 1x10 B 2x10 B  2x10 B  2x10 B   Foam walks             Gait Training                                       Modalities                            1:1 with PT from 9425-0428 AM

## 2023-04-06 ENCOUNTER — APPOINTMENT (OUTPATIENT)
Dept: PHYSICAL THERAPY | Facility: CLINIC | Age: 73
End: 2023-04-06

## 2023-04-13 ENCOUNTER — APPOINTMENT (OUTPATIENT)
Dept: PHYSICAL THERAPY | Facility: CLINIC | Age: 73
End: 2023-04-13

## 2023-04-24 ENCOUNTER — OFFICE VISIT (OUTPATIENT)
Dept: FAMILY MEDICINE CLINIC | Facility: OTHER | Age: 73
End: 2023-04-24

## 2023-04-24 VITALS
SYSTOLIC BLOOD PRESSURE: 134 MMHG | OXYGEN SATURATION: 99 % | RESPIRATION RATE: 13 BRPM | DIASTOLIC BLOOD PRESSURE: 80 MMHG | HEIGHT: 63 IN | TEMPERATURE: 98 F | HEART RATE: 74 BPM | BODY MASS INDEX: 24.8 KG/M2 | WEIGHT: 140 LBS

## 2023-04-24 DIAGNOSIS — R73.09 ELEVATED GLUCOSE: ICD-10-CM

## 2023-04-24 DIAGNOSIS — E78.5 HYPERLIPIDEMIA, UNSPECIFIED HYPERLIPIDEMIA TYPE: ICD-10-CM

## 2023-04-24 DIAGNOSIS — I10 ESSENTIAL HYPERTENSION: Primary | ICD-10-CM

## 2023-04-24 PROBLEM — M25.562 LEFT KNEE PAIN: Status: RESOLVED | Noted: 2017-07-25 | Resolved: 2023-04-24

## 2023-04-24 PROBLEM — S89.91XA INJURY OF RIGHT LEG: Status: RESOLVED | Noted: 2017-07-17 | Resolved: 2023-04-24

## 2023-04-24 PROBLEM — S86.912A MUSCLE STRAIN OF LEFT LOWER LEG: Status: RESOLVED | Noted: 2017-07-17 | Resolved: 2023-04-24

## 2023-04-24 PROBLEM — M79.662 PAIN OF LEFT CALF: Status: RESOLVED | Noted: 2017-07-25 | Resolved: 2023-04-24

## 2023-04-24 PROBLEM — M25.572 LEFT ANKLE PAIN: Status: RESOLVED | Noted: 2017-07-25 | Resolved: 2023-04-24

## 2023-04-24 RX ORDER — PHENOL 1.4 %
600 AEROSOL, SPRAY (ML) MUCOUS MEMBRANE DAILY
COMMUNITY

## 2023-04-24 RX ORDER — CYCLOSPORINE 0.5 MG/ML
1 EMULSION OPHTHALMIC EVERY 12 HOURS
COMMUNITY
Start: 2023-04-07

## 2023-04-24 RX ORDER — UREA 10 %
1 LOTION (ML) TOPICAL DAILY
COMMUNITY
End: 2023-04-25 | Stop reason: CLARIF

## 2023-04-24 NOTE — PROGRESS NOTES
Name: Ba Kent      : 1950      MRN: [id number]  Encounter Provider: Lily Dallas DO  Encounter Date: 2023   Encounter department: [address] [place] [address]      1  Essential hypertension  Comments:  BP goal <140/90  Continue LM/diet control at this time  Minor elevation today likely due to patient's discomfort    2  Hyperlipidemia, unspecified hyperlipidemia type  Comments:  LDL goal <100  ASCVD Risk 15 7%  Given current myalgias, will continue to manage with diet for now    3  Elevated glucose  Comments:  Asymptomatic  Continue to follow fasting glucose and A1c at least annually            Return in about 6 months (around 10/24/2023) for AWV  The patient indicates understanding of these issues and agrees with the plan  Subjective      Patient is a 76 y/o female with a PMH of essential HTN and MS who presents for a BP and glucose check  BP elevated today due to current MS flare (muscle aches, pains, fatigue -- feeling generally unwell)    Pt denies sx of hyperglycemia  Has been trying to maintain good oral hydration (CKD)  Notes that she has been experiencing significant pain/cramps/spasms in legs with this MS flare    Hypertension  This is a chronic problem  The current episode started more than 1 year ago  The problem has been waxing and waning since onset  The problem is controlled  Associated symptoms include malaise/fatigue (chronic 2/2 MS)  Pertinent negatives include no anxiety, blurred vision, chest pain, headaches, neck pain, orthopnea, palpitations, peripheral edema, PND, shortness of breath or sweats  There are no associated agents to hypertension  Risk factors for coronary artery disease include dyslipidemia, family history, post-menopausal state, sedentary lifestyle and stress  Past treatments include lifestyle changes  The current treatment provides moderate improvement   Compliance problems include diet, exercise and psychosocial issues  Hypertensive end-organ damage includes kidney disease  There is no history of angina, CAD/MI, CVA, heart failure, left ventricular hypertrophy, PVD or retinopathy  Identifiable causes of hypertension include chronic renal disease  There is no history of a hypertension causing med or a thyroid problem  Hyperlipidemia  This is a chronic problem  The current episode started more than 1 year ago  The problem is controlled  Exacerbating diseases include chronic renal disease  She has no history of diabetes, hypothyroidism, liver disease or obesity  Associated symptoms include leg pain (chronic 2/2 MS)  Pertinent negatives include no chest pain, focal sensory loss, focal weakness, myalgias or shortness of breath  Current antihyperlipidemic treatment includes diet change  The current treatment provides moderate improvement of lipids  Compliance problems include adherence to diet, adherence to exercise and psychosocial issues  Risk factors for coronary artery disease include post-menopausal, a sedentary lifestyle, stress, hypertension, family history and dyslipidemia  Review of Systems   Constitutional: Positive for malaise/fatigue (chronic 2/2 MS)  Negative for activity change, chills, fatigue, fever and unexpected weight change  HENT: Negative for congestion, ear pain, sinus pain and sore throat  Eyes: Negative for blurred vision, pain and itching  Respiratory: Negative for apnea, cough and shortness of breath  Cardiovascular: Negative for chest pain, palpitations, orthopnea and PND  Gastrointestinal: Negative for abdominal distention, abdominal pain, constipation, diarrhea, nausea and vomiting  Endocrine: Negative for cold intolerance and heat intolerance  Genitourinary: Negative for dysuria  Musculoskeletal: Negative for myalgias and neck pain  Skin: Negative for color change and rash  Neurological: Negative for dizziness, focal weakness, syncope and headaches     Hematological: "Negative for adenopathy  Psychiatric/Behavioral: Positive for sleep disturbance  Negative for behavioral problems and dysphoric mood  The patient is not nervous/anxious  Current Outpatient Medications on File Prior to Visit   Medication Sig   • ALPHA LIPOIC ACID PO Take by mouth   • Ampyra 10 MG TB12 Take 1 tablet (10 mg total) by mouth 2 (two) times a day   • ascorbic acid (VITAMIN C) 500 mg tablet Take 500 mg by mouth daily   • baclofen 10 mg tablet 1 tab pos q am, 1 tab po q pm and 2 tabs hs   • Biotin 5000 MCG TABS    • calcium carbonate (OS-CARTER) 600 MG tablet Take 600 mg by mouth daily   • Cholecalciferol (VITAMIN D3) 1000 units CAPS Take 5,000 Units by mouth     • clonazePAM (KlonoPIN) 0 5 mg tablet TAKE 1 AND 1/2 TABLETS BY MOUTH DAILY AT BEDTIME   • Copaxone 40 MG/ML SOSY INJECT ONE SYRINGE SUBCUTANEOUSLY THREE TIMES PER WEEK AT LEAST 48 HOURS APART  ALLOW SYRINGE TO WARM TO ROOM TEMPERATURE FOR 20 MINUTES  REFRIGERATE     • Cranberry 125 MG TABS Take 125 mg by mouth in the morning   • cycloSPORINE (RESTASIS) 0 05 % ophthalmic emulsion Administer 1 drop to both eyes every 12 (twelve) hours   • denosumab (PROLIA) 60 mg/mL Inject under the skin   • docusate sodium (COLACE) 100 mg capsule Take 1 capsule by mouth every other day     • gabapentin (NEURONTIN) 300 mg capsule Take 1 capsule (300 mg total) by mouth daily at bedtime   • gabapentin (NEURONTIN) 600 MG tablet 2 tabs po tid   • Magnesium 500 MG TABS Take 500 tablets by mouth once   • solifenacin (VESICARE) 10 MG tablet Take 1 tablet (10 mg total) by mouth daily   • zonisamide (ZONEGRAN) 100 mg capsule Take 4 capsules (400 mg total) by mouth daily at bedtime   • [DISCONTINUED] calcium carbonate (OS-CARTER) 1250 (500 Ca) MG chewable tablet Chew 1 tablet daily       Objective     /80   Pulse 74   Temp 98 °F (36 7 °C)   Resp 13   Ht 5' 3\" (1 6 m)   Wt 63 5 kg (140 lb)   SpO2 99%   BMI 24 80 kg/m²     Physical Exam  Vitals and nursing note " reviewed  Constitutional:       General: She is not in acute distress  Appearance: Normal appearance  She is well-developed  She is not ill-appearing  Comments: Body mass index is 24 8 kg/m²  HENT:      Head: Normocephalic and atraumatic  Right Ear: External ear normal       Left Ear: External ear normal       Nose: Nose normal       Mouth/Throat:      Mouth: Mucous membranes are dry  Eyes:      General: No scleral icterus  Conjunctiva/sclera: Conjunctivae normal       Pupils: Pupils are equal, round, and reactive to light  Neck:      Thyroid: No thyromegaly  Cardiovascular:      Rate and Rhythm: Normal rate and regular rhythm  Pulses: Normal pulses  Heart sounds: Normal heart sounds  No murmur heard  Pulmonary:      Effort: Pulmonary effort is normal  No respiratory distress  Breath sounds: Normal breath sounds  No wheezing  Abdominal:      General: Abdomen is flat  Bowel sounds are normal  There is no distension  Palpations: Abdomen is soft  Tenderness: There is no abdominal tenderness  Musculoskeletal:         General: No tenderness  Normal range of motion  Cervical back: Normal range of motion and neck supple  Right lower leg: No edema  Left lower leg: No edema  Lymphadenopathy:      Cervical: No cervical adenopathy  Skin:     General: Skin is warm and dry  Coloration: Skin is not jaundiced  Findings: No rash  Neurological:      General: No focal deficit present  Mental Status: She is alert and oriented to person, place, and time  Cranial Nerves: No cranial nerve deficit     Psychiatric:         Mood and Affect: Mood normal          Behavior: Behavior normal        Miya Calles DO

## 2023-04-28 ENCOUNTER — APPOINTMENT (OUTPATIENT)
Dept: RADIOLOGY | Age: 73
End: 2023-04-28

## 2023-04-28 ENCOUNTER — OFFICE VISIT (OUTPATIENT)
Dept: URGENT CARE | Age: 73
End: 2023-04-28

## 2023-04-28 VITALS
OXYGEN SATURATION: 99 % | DIASTOLIC BLOOD PRESSURE: 96 MMHG | SYSTOLIC BLOOD PRESSURE: 144 MMHG | RESPIRATION RATE: 16 BRPM | HEART RATE: 83 BPM | TEMPERATURE: 97.2 F
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
## 2023-04-28 DIAGNOSIS — S43.402A SPRAIN OF LEFT SHOULDER, UNSPECIFIED SHOULDER SPRAIN TYPE, INITIAL ENCOUNTER: ICD-10-CM

## 2023-04-28 NOTE — PROGRESS NOTES
[facility]        NAME: Emanuel Mendenhall is a 75 y o  female  : 1950    MRN: [id number]  DATE: 2023  TIME: 12:49 PM    Assessment and Plan   Fall, initial encounter [W19  XXXA]  1  Fall, initial encounter  XR shoulder 2+ vw left      2  Sprain of left shoulder, unspecified shoulder sprain type, initial encounter        X-rays reviewed, no abnormality noted, awaiting official read  Continue Naprosyn as directed  Heat and Biofreeze may also be helpful topicals for pain control  Follow-up with primary care provider if symptoms do not resolve within 1 to 2 weeks  Patient Instructions   Shoulder Sprain   WHAT YOU NEED TO KNOW:   A shoulder sprain happens when a ligament in your shoulder is stretched or torn  Ligaments are the tough tissues that connect bones  Ligaments allow you to lift, lower, and rotate your arm         DISCHARGE INSTRUCTIONS:   Return to the emergency department if:   • You feel severe pain in your shoulder when you move it, or it is touched      • Your skin feels cold or clammy      • You have numbness, tingling, or a feeling of pins and needles in your shoulder       • The skin on your injured shoulder looks blue or pale      Call your doctor if:   • You have new or increased swelling and pain in your shoulder      • You have new or increased stiffness when you move your injured shoulder      • Your symptoms do not improve within 5 to 7 days       • You have questions or concerns about your condition or care      Medicines: You may need any of the following:  • Acetaminophen  decreases pain and fever  It is available without a doctor's order  Ask how much to take and how often to take it  Follow directions   Read the labels of all other medicines you are using to see if they also contain acetaminophen, or ask your doctor or pharmacist  Acetaminophen can cause liver damage if not taken correctly      • NSAIDs , such as ibuprofen, help decrease swelling, pain, and fever  This medicine is available with or without a doctor's order  NSAIDs can cause stomach bleeding or kidney problems in certain people  If you take blood thinner medicine, always ask your healthcare provider if NSAIDs are safe for you  Always read the medicine label and follow directions      • Prescription pain medicine  may be given  Ask your healthcare provider how to take this medicine safely  Some prescription pain medicines contain acetaminophen  Do not take other medicines that contain acetaminophen without talking to your healthcare provider  Too much acetaminophen may cause liver damage  Prescription pain medicine may cause constipation  Ask your healthcare provider how to prevent or treat constipation       • Take your medicine as directed  Contact your healthcare provider if you think your medicine is not helping or if you have side effects  Tell your provider if you are allergic to any medicine  Keep a list of the medicines, vitamins, and herbs you take  Include the amounts, and when and why you take them  Bring the list or the pill bottles to follow-up visits  Carry your medicine list with you in case of an emergency      Follow up with your doctor as directed:  Write down your questions so you remember to ask them during your visits  Self-care:   • Rest  your shoulder so it can heal  Avoid moving your shoulder as your injury heals  This will help decrease the risk of more damage to your shoulder      • Apply ice  on your shoulder for 20 to 30 minutes every 2 hours or as directed  Use an ice pack, or put crushed ice in a plastic bag  Cover it with a towel before you apply it to your shoulder  Ice helps prevent tissue damage and decreases swelling and pain      • Compress your shoulder as directed  Compression provides support and helps decrease swelling and movement so your shoulder can heal  For mild sprains, you may be given a sling to support your arm   You may need a padded brace or a plaster cast to hold your shoulder in place if the sprain is more serious      How to wear a brace, sling, or splint:  A brace, sling, or splint may be needed to limit your movement and protect your injured shoulder  • Wear your brace, sling, or splint as directed  You may need to wear it all the time and take it off only to bathe or do exercises  Ask your healthcare provider how long you should wear it      • Keep your skin clean and dry  Padding under your armpit will help absorb sweat and prevent sores on your skin      • Do not hunch your shoulders  This may cause pain  Keep your shoulders relaxed      • Position the sling over your arm and hand so that it also covers your knuckles  This will help the sling support your wrist and hand  Position your wrist higher than your elbow  Your wrist may start to hurt or go numb if your sling is too short  Physical therapy:  A physical therapist teaches you exercises to help improve movement and strength, and to decrease pain  Prevent another injury:   • Do not exercise when you are tired or in pain  Warm up and stretch before you exercise      • Wear equipment to protect yourself when you play sports      • Wear shoes that fit well and run on flat surfaces to prevent falls      © Copyright [employer] [place] 2022 Information is for End User's use only and may not be sold, redistributed or otherwise used for commercial purposes  The above information is an  only  It is not intended as medical advice for individual conditions or treatments  Talk to your doctor, nurse or pharmacist before following any medical regimen to see if it is safe and effective for you  Follow up with PCP in 3-5 days  Proceed to  ER if symptoms worsen      Chief Complaint     Chief Complaint   Patient presents with   • Harsha Lamb yesterday, left shoulder pain and limited ROM, left ankle bruised and swollen however feeling better, hx of MS         History of Present Illness       Patient is a 69-year-old female with past medical history significant for multiple sclerosis, osteoporosis, who presents for evaluation of left shoulder pain after fall yesterday  She reports that she tripped and grabbed a chair with her left arm but fell forward, stretching her left shoulder and arm  Her left ankle was initially swollen, but she reports that the swelling has subsided and there is no tenderness in this area  She denies head strike, current joint swelling, numbness or tingling  Fall  Pertinent negatives include no fever, headaches, nausea, numbness or vomiting  Review of Systems   Review of Systems   Constitutional: Negative for fatigue and fever  HENT: Negative for congestion, ear discharge, ear pain, postnasal drip, rhinorrhea, sinus pressure, sinus pain, sneezing and sore throat  Eyes: Negative  Negative for pain, discharge, redness and itching  Respiratory: Negative  Negative for apnea, cough, choking, chest tightness, shortness of breath, wheezing and stridor  Cardiovascular: Negative  Negative for chest pain and palpitations  Gastrointestinal: Negative  Negative for diarrhea, nausea and vomiting  Endocrine: Negative  Negative for polydipsia, polyphagia and polyuria  Genitourinary: Negative  Negative for decreased urine volume and flank pain  Musculoskeletal: Positive for arthralgias  Negative for back pain, gait problem, joint swelling, myalgias, neck pain and neck stiffness  Skin: Negative  Negative for color change and rash  Allergic/Immunologic: Negative  Negative for environmental allergies  Neurological: Negative  Negative for dizziness, facial asymmetry, light-headedness, numbness and headaches  Hematological: Negative  Negative for adenopathy  Psychiatric/Behavioral: Negative            Current Medications       Current Outpatient Medications:   •  ALPHA LIPOIC ACID PO, Take by mouth, Disp: , Rfl:   •  Ampyra 10 MG TB12, Take 1 tablet (10 mg total) by mouth 2 (two) times a day, Disp: 180 tablet, Rfl: 3  •  ascorbic acid (VITAMIN C) 500 mg tablet, Take 500 mg by mouth daily, Disp: , Rfl:   •  baclofen 10 mg tablet, 1 tab pos q am, 1 tab po q pm and 2 tabs hs, Disp: 360 tablet, Rfl: 3  •  Biotin 5000 MCG TABS, , Disp: , Rfl:   •  calcium carbonate (OS-CARTER) 600 MG tablet, Take 600 mg by mouth daily, Disp: , Rfl:   •  Cholecalciferol (VITAMIN D3) 1000 units CAPS, Take 5,000 Units by mouth  , Disp: , Rfl:   •  clonazePAM (KlonoPIN) 0 5 mg tablet, TAKE 1 AND 1/2 TABLETS BY MOUTH DAILY AT BEDTIME, Disp: 135 tablet, Rfl: 1  •  Copaxone 40 MG/ML SOSY, INJECT ONE SYRINGE SUBCUTANEOUSLY THREE TIMES PER WEEK AT LEAST 48 HOURS APART  ALLOW SYRINGE TO WARM TO ROOM TEMPERATURE FOR 20 MINUTES  REFRIGERATE , Disp: 36 mL, Rfl: 3  •  Cranberry 125 MG TABS, Take 125 mg by mouth in the morning, Disp: , Rfl:   •  cycloSPORINE (RESTASIS) 0 05 % ophthalmic emulsion, Administer 1 drop to both eyes every 12 (twelve) hours, Disp: , Rfl:   •  docusate sodium (COLACE) 100 mg capsule, Take 1 capsule by mouth every other day  , Disp: , Rfl:   •  gabapentin (NEURONTIN) 300 mg capsule, Take 1 capsule (300 mg total) by mouth daily at bedtime, Disp: 90 capsule, Rfl: 3  •  gabapentin (NEURONTIN) 600 MG tablet, 2 tabs po tid, Disp: 540 tablet, Rfl: 3  •  Magnesium 500 MG TABS, Take 500 tablets by mouth once, Disp: , Rfl:   •  solifenacin (VESICARE) 10 MG tablet, Take 1 tablet (10 mg total) by mouth daily, Disp: 90 tablet, Rfl: 3  •  zonisamide (ZONEGRAN) 100 mg capsule, Take 4 capsules (400 mg total) by mouth daily at bedtime, Disp: 360 capsule, Rfl: 3  •  denosumab (PROLIA) 60 mg/mL, Inject under the skin, Disp: , Rfl:   No current facility-administered medications for this visit      Facility-Administered Medications Ordered in Other Visits:   •  bacitracin 50,000 Units, gentamicin (GARAMYCIN) 40 mg/mL 80 mg, ceFAZolin (ANCEF) 1,000 mg in sodium chloride 0 9 % 1,000 mL irrigation bottle, , Irrigation, Once, Torin Bailey MD    Current Allergies     Allergies as of 04/28/2023 - Reviewed 04/28/2023   Allergen Reaction Noted   • Duloxetine hcl  04/11/2017   • Iodinated contrast media Anaphylaxis 04/03/2018   • Acetaminophen Other (See Comments) 05/11/2012   • Cetirizine  02/24/2014   • Morphine Other (See Comments) 07/27/2012            The following portions of the patient's history were reviewed and updated as appropriate: allergies, current medications, past family history, past medical history, past social history, past surgical history and problem list      Past Medical History:   Diagnosis Date   • Allergic 1967    seasonal   • Allergic rhinitis    • Bone pain    • Breast cancer ([facility] [address] ) 08/16/2012   • Breast ptosis    • Depression    • Fatigue    • Gait disturbance     uses cane at times   • Headache    • Hip fracture (HCC)    • Hip pain    • History of transfusion 1975    placenta previa s/p work accident, no rx   • Hypokalemia    • Insomnia    • Laceration of finger     right hand   • Left ankle pain    • Left knee pain    • Multiple sclerosis ([facility] [address] )    • Muscle strain     left lower leg   • Nephrolithiasis    • Osteopenia    • Osteoporosis    • Pain of left calf    • Pneumonia    • Rash    • Scoliosis birth    scoliosis   • Solitary pulmonary nodule    • SVT (supraventricular tachycardia) (HCC)    • Tingling    • Urgency of urination    • Urticaria    • Wrist fracture, left        Past Surgical History:   Procedure Laterality Date   • ANKLE SURGERY     • APPENDECTOMY  11/2012    Dr Elizabeth Singer   • AUGMENTATION BREAST      Enlargement procedure with prosthetic implant bilateral   • AUGMENTATION MAMMAPLASTY Right 01/28/2020    implant replaced because of recall   • AUGMENTATION MAMMAPLASTY Bilateral 2012   • BREAST BIOPSY Left 07/23/2012   • BREAST IMPLANT Right 01/28/2020    Procedure: BREAST IMPLANT EXCHANGE WITH CAPSULECTOMY;  Surgeon: Torin Bailey MD;  Location: [facility];  Service: Plastics   • BREAST IMPLANT REMOVAL Right 01/28/2020    implant placement, implant revision, right mastopexy   • BREAST LUMPECTOMY     • CYSTOSTOMY      with basket extraction of calculus; x2   • EXCISION / BIOPSY BREAST / Emmalene Martha / DUCT Right 05/04/2020    scar revision to resuture non healing surgical wound   • EXPLORATORY LAPAROTOMY     • FOOT SURGERY     • FRACTURE SURGERY  Lt wrist 9/2014 - Lt matthew femur 9/14   • HIP FRACTURE SURGERY Right     Subcapital   • HIP SURGERY Left    • JOINT REPLACEMENT  Rt hip 10/2012   • LEG SURGERY      Repair   • MASTECTOMY Left 08/16/2012   • AZ MASTOPEXY Right 01/28/2020    Procedure: BREAST MASTOPEXY;  Surgeon: Nellie Castillo MD;  [facility];  Service: Plastics   • AZ REVISION OF RECONSTRUCTED BREAST Right 05/04/2020    Procedure: REVISION RIGHT BREAST MOUND;  Surgeon: Nellie Castillo MD;  Location: [facility];  Service: Plastics   • REDUCTION MAMMAPLASTY Right 01/28/2020    reduced to match left side   • SENTINEL LYMPH NODE BIOPSY Left 08/16/2012   • SKIN BIOPSY     • TONSILLECTOMY     • TUBAL LIGATION     • WRIST SURGERY Left        Family History   Problem Relation Age of Onset   • Coronary artery disease Mother    • Hyperlipidemia Mother    • Rheum arthritis Mother    • Other Father         Acute myocardial infarction   • No Known Problems Maternal Grandmother    • Hodgkin's lymphoma Maternal Grandfather         age at dx unk   • No Known Problems Paternal Grandmother    • No Known Problems Paternal Grandfather    • Stroke Son 52   • No Known Problems Son    • Cancer Maternal Aunt 79        bladder   • Heart disease Maternal Aunt    • Stroke Maternal Aunt         6 CVA before death   • Breast cancer Maternal Aunt    • Colon cancer Maternal Uncle 72   • Hodgkin's lymphoma Maternal Uncle 79   • No Known Problems Paternal Aunt    • No Known Problems Paternal Aunt    • No Known Problems Paternal Aunt          Medications have been verified          Objective /96   Pulse 83   Temp (!) 97 2 °F (36 2 °C)   Resp 16   SpO2 99%        Physical Exam     Physical Exam  Vitals and nursing note reviewed  Constitutional:       General: She is not in acute distress  Appearance: Normal appearance  She is not ill-appearing, toxic-appearing or diaphoretic  Interventions: She is not intubated  HENT:      Head: Normocephalic and atraumatic  Right Ear: External ear normal       Left Ear: External ear normal       Nose: Nose normal  No congestion or rhinorrhea  Mouth/Throat:      Mouth: Mucous membranes are moist    Eyes:      Extraocular Movements: Extraocular movements intact  Conjunctiva/sclera: Conjunctivae normal       Pupils: Pupils are equal, round, and reactive to light  Cardiovascular:      Rate and Rhythm: Normal rate and regular rhythm  Pulses: Normal pulses  Heart sounds: Normal heart sounds  No murmur heard  No friction rub  No gallop  Pulmonary:      Effort: Pulmonary effort is normal  No tachypnea, bradypnea, accessory muscle usage, prolonged expiration, respiratory distress or retractions  She is not intubated  Breath sounds: Normal breath sounds  No stridor, decreased air movement or transmitted upper airway sounds  No decreased breath sounds, wheezing, rhonchi or rales  Abdominal:      General: Bowel sounds are normal       Palpations: Abdomen is soft  Tenderness: There is no abdominal tenderness  There is no guarding or rebound  Musculoskeletal:      Left shoulder: Tenderness present  No swelling, deformity, effusion, laceration, bony tenderness or crepitus  Decreased range of motion  Normal strength  Normal pulse  Cervical back: Normal range of motion and neck supple  No tenderness  Comments: Left shoulder extension possible only to about 90 degrees d/t pain  Tenderness acromion process/AC joint to palpation  (-) Crepitus on palpation during ROM      Skin:     General: Skin is warm and dry       Capillary Refill: Capillary refill takes less than 2 seconds  Neurological:      General: No focal deficit present  Mental Status: She is alert and oriented to person, place, and time  Cranial Nerves: No cranial nerve deficit     Psychiatric:         Mood and Affect: Mood normal          Behavior: Behavior normal

## 2023-04-28 NOTE — PATIENT INSTRUCTIONS
X-rays reviewed, no abnormality noted, awaiting official read  Continue Naprosyn as directed  Heat and Biofreeze may also be helpful topicals for pain control  Follow-up with primary care provider if symptoms do not resolve within 1 to 2 weeks

## 2023-05-30 ENCOUNTER — TELEPHONE (OUTPATIENT)
Dept: NEUROLOGY | Facility: CLINIC | Age: 73
End: 2023-05-30

## 2023-05-30 NOTE — TELEPHONE ENCOUNTER
Please check with pt when she refers to [employer], was she seen by ortho? I am happy to write for PT but only if sure no underlying structural orthopedic issue

## 2023-05-30 NOTE — TELEPHONE ENCOUNTER
"----- Message from Carline Cerda RN sent at 5/30/2023 12:33 PM EDT -----  Regarding: FW: Physical Therapy  Contact: [phone]  Dr Carly Henderson - please advise  Pt scheduled w/you on 6/6/23   ----- Message -----  From: Bill Jose"  Sent: 5/30/2023   9:39 AM EDT  To: Neurology [facility] Clinical Team 3  Subject: Physical Therapy                                 Dr Carly Henderson  I am aware that I am seeing you on Tuesday, but, the request I put in for an eval for therapy the better since Laurel Ennis has a very tight schedule  On 4/27/23 I fell on stone with my left arm hyperextended upward  I was seen at [facility] and there was no rotator cuff injury  The PA never laid hands on me to do an exam and just sent me home to rest and ice  Bicep insertion turned black for almost 3 weeks, and I still have pain 24/7 and weakness and numbness of extremity  I am aware of length of healing time, but, if you could order short term PT for faster ROM and decreased sx I can call inn for appt    Thanks  Ray Fuller    "

## 2023-05-31 NOTE — TELEPHONE ENCOUNTER
Left detailed message (ok per consent on file) advising pt of Dr Horvath Speaker response and question  Advised pt to call back if she was seen by Ortho or not  Advised pt that Cashier Livet message would be sent as well  Awaiting call back or reply on [place]

## 2023-06-01 ENCOUNTER — TELEPHONE (OUTPATIENT)
Dept: NEUROLOGY | Facility: CLINIC | Age: 73
End: 2023-06-01

## 2023-06-01 DIAGNOSIS — S49.92XA INJURY OF LEFT SHOULDER, INITIAL ENCOUNTER: Primary | ICD-10-CM

## 2023-06-01 NOTE — TELEPHONE ENCOUNTER
Due to the constant pain after the injury and still symptomatic after urgi visit, strongly recommend pt to see ortho  Per urgi visit, pt to follow up with pcp within 1-2 weeks if symptoms not resolve  If would recommend pcp and ortho appt  Question if more imaging than just an xray needed due to her pain  I am placing an ortho referral in emr  Pt can also check with pcp too for advice  ccing pcp in task

## 2023-06-01 NOTE — TELEPHONE ENCOUNTER
"----- Message from Torie Montelongo RN sent at 6/1/2023  4:06 PM EDT -----  Regarding: FW: Physical Therapy  Contact: [phone]  Dr Jonathan Tolliver - franciscoi to pt reply to your questions    ----- Message -----  From: Princess Toscano"  Sent: 5/31/2023  10:52 AM EDT  To: Neurology [facility] Clinical Team 3  Subject: Physical Therapy                                 I had an X-ray of my left shoulder  No fix or tear of rotator cuff  With time all symptoms pointed to bicep strain  Normal to nsaid, rest, pt  Normal duration 8+ weeks  Using a cane makes life even more complicated  Huge hematomas finally gone  No strength in arm  Constant pain  On my way to [place] but available by cell at [phone]    Thanks     "

## 2023-06-05 NOTE — TELEPHONE ENCOUNTER
I see that pt is scheduled 6/8 with ortho, however you may offer her an appointment with us as well  Thanks!

## 2023-06-06 ENCOUNTER — OFFICE VISIT (OUTPATIENT)
Dept: NEUROLOGY | Facility: CLINIC | Age: 73
End: 2023-06-06
Payer: MEDICARE

## 2023-06-06 ENCOUNTER — TELEPHONE (OUTPATIENT)
Dept: NEUROLOGY | Facility: CLINIC | Age: 73
End: 2023-06-06

## 2023-06-06 VITALS
SYSTOLIC BLOOD PRESSURE: 150 MMHG | HEART RATE: 96 BPM | WEIGHT: 140 LBS | DIASTOLIC BLOOD PRESSURE: 82 MMHG | HEIGHT: 63 IN | BODY MASS INDEX: 24.8 KG/M2

## 2023-06-06 DIAGNOSIS — G35 MULTIPLE SCLEROSIS (HCC): Primary | ICD-10-CM

## 2023-06-06 DIAGNOSIS — G62.9 PERIPHERAL POLYNEUROPATHY: ICD-10-CM

## 2023-06-06 PROCEDURE — 99215 OFFICE O/P EST HI 40 MIN: CPT | Performed by: PSYCHIATRY & NEUROLOGY

## 2023-06-06 NOTE — ASSESSMENT & PLAN NOTE
Patient does not have any new neuropathy complaint but still complains of bilateral allodynia along the shins  Currently on gabapentin 1200, 900, 1500  GFR on lab work shown to be decreased    Plan:  - will decrease gabapentin to 600, 900, 1500  -F/U with neuro clinic in 6 months

## 2023-06-06 NOTE — PROGRESS NOTES
Patient ID: Eduarda Pacheco is a 75 y o  female  Assessment/Plan:    Multiple sclerosis [facility]  Ms Johnnie Negro is a 75year old female presenting for a follow up of her MS  Patient stated that she has had 2 falls since her last visit  Patient stated that in February her legs collapsed due to being weak from [employer]  No injuries noted from that fall  Patient was not treated for COVID with steroids  Patient fell again on April 27th while out eating with friends  Patient stated that she used her left arm to try to catch her fall which caused it to hyperextend  She stated that she has had continuous pain with her left arm  Patient denied any changes with bowel or bladder  Patient denied any vision changes and just had cataract surgery around the beginning of the year  Patient admitted to having increased fatigue since the Covid  Patient currently on copaxone and ampyra    On exam patient has decreased strength and pain with Left upper extremity  Labs unremarkable except for lower GFR      Plan:  - Will get CMP, CBC, hepatic function panel  - Will continue copaxone  - Patient told to decrease ampyra from BID to once daily in the morning  -Will get a MRI MS C spine   -Please see ortho at scheduled visit   - f/u in 6 months at [facility] or [place]    Peripheral polyneuropathy  Patient does not have any new neuropathy complaint but still complains of bilateral allodynia along the shins  Currently on gabapentin 1200, 900, 1500  GFR on lab work shown to be decreased    Plan:  - will decrease gabapentin to 600, 900, 1500  -F/U with neuro clinic in 6 months        Diagnoses and all orders for this visit:    Multiple sclerosis ([facility] [address] )    Peripheral polyneuropathy           Subjective:    HPI    Patient is a 75year old female presenting for a follow up of her MS  Patient stated that she has had 2 falls since her last visit  Patient stated that in February her legs collapsed due to being weak from [place]   No injuries noted from that fall  Patient was not treated for COVID with steroids  Patient fell again on April 27th while out eating with friends  Patient stated that she used her left arm to try to catch her fall which caused it to hyperextend  Patient stated that she went to [facility] and they did an xray which showed no broken bones  She stated that she has had continuous pain with her left arm  Patient stated that she is going to see ortho on Thursday her her arm  Patient denied any changes with bowel or bladder  Patient denied any vision changes and just had cataract surgery around the beginning of the year  Patient admitted to having increased fatigue since the Covid  The following portions of the patient's history were reviewed and updated as appropriate:   She  has a past medical history of Allergic (1967), Allergic rhinitis, Bone pain, Breast cancer ([address] ) (08/16/2012), Breast ptosis, Depression, Fatigue, Gait disturbance, Headache, Hip fracture ([address] ), Hip pain, History of transfusion (1975), Hypokalemia, Insomnia, Laceration of finger, Left ankle pain, Left knee pain, Multiple sclerosis ([address] ), Muscle strain, Nephrolithiasis, Osteopenia, Osteoporosis, Pain of left calf, Pneumonia, Rash, Scoliosis (birth), Solitary pulmonary nodule, SVT (supraventricular tachycardia) ([address] ), Tingling, Urgency of urination, Urticaria, and Wrist fracture, left    She   Patient Active Problem List    Diagnosis Date Noted   • Hyperlipidemia 04/24/2023   • Subareolar lump of right breast 03/09/2022   • Dense breast tissue 02/10/2022   • RLS (restless legs syndrome) 05/06/2021   • TSH (thyroid-stimulating hormone deficiency) 12/11/2020   • B12 deficiency 12/11/2020   • Abnormal finding on breast imaging 11/30/2020   • Essential hypertension 11/17/2020   • Encounter for follow-up surveillance of breast cancer 01/23/2020   • Abnormal stool test 05/29/2019   • Encounter for breast reconstruction following mastectomy 04/16/2019   • Screening mammogram, encounter for 12/13/2018   • Multiple sclerosis ([facility] [address] ) 04/18/2018   • Peripheral polyneuropathy 04/18/2018   • 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    • Pain of left lower leg 07/25/2017   • Solitary pulmonary nodule 11/10/2015   • Hypokalemia 12/30/2014   • Insomnia 12/30/2014   • Fatigue 12/10/2014   • Unilateral occipital headache 12/10/2014   • Tingling 02/24/2014   • Gait disturbance 09/25/2013   • Nephrolithiasis 01/23/2013   • Hip pain, right 09/18/2012   • Depression 09/12/2012   • Osteopenia 09/12/2012   • History of bilateral hip replacements 04/01/2012     She  has a past surgical history that includes [place] lymph node biopsy (Left, 08/16/2012); Ankle surgery; Skin biopsy; Cystostomy; Exploratory laparotomy; Foot surgery; Hip surgery (Left); Appendectomy (11/2012); Leg Surgery; Tonsillectomy; Tubal ligation; Wrist surgery (Left); AUGMENTATION BREAST; Hip fracture surgery (Right); Fracture surgery (Lt wrist 9/2014 - Lt matthew femur 9/14); Joint replacement (Rt hip 10/2012); pr mastopexy (Right, 01/28/2020); BREAST IMPLANT (Right, 01/28/2020); pr revision of reconstructed breast (Right, 05/04/2020); Mastectomy (Left, 08/16/2012); Breast biopsy (Left, 07/23/2012); Excision / biopsy breast / nipple / duct (Right, 05/04/2020); Reduction mammaplasty (Right, 01/28/2020); Breast implant removal (Right, 01/28/2020); Augmentation mammaplasty (Right, 01/28/2020); Augmentation mammaplasty (Bilateral, 2012); and Breast lumpectomy  Her family history includes Breast cancer in her maternal aunt; Cancer (age of onset: 79) in her maternal aunt; Colon cancer (age of onset: 72) in her maternal uncle; Coronary artery disease in her mother; Heart disease in her maternal aunt;  Hodgkin's lymphoma in her maternal grandfather; Hodgkin's lymphoma (age of onset: 79) in her maternal uncle; Hyperlipidemia in her mother; No Known Problems in her maternal grandmother, paternal aunt, paternal aunt, paternal aunt, paternal grandfather, paternal grandmother, and son; Other in her father; Rheum arthritis in her mother; Stroke in her maternal aunt; Stroke (age of onset: 52) in her son  She  reports that she quit smoking about 21 years ago  Her smoking use included cigarettes  She has a 35 00 pack-year smoking history  She has never used smokeless tobacco  She reports that she does not drink alcohol and does not use drugs  Current Outpatient Medications   Medication Sig Dispense Refill   • ALPHA LIPOIC ACID PO Take by mouth     • Ampyra 10 MG TB12 Take 1 tablet (10 mg total) by mouth 2 (two) times a day 180 tablet 3   • ascorbic acid (VITAMIN C) 500 mg tablet Take 500 mg by mouth daily     • baclofen 10 mg tablet 1 tab pos q am, 1 tab po q pm and 2 tabs hs 360 tablet 3   • Biotin 5000 MCG TABS      • calcium carbonate (OS-CARTER) 600 MG tablet Take 600 mg by mouth daily     • Cholecalciferol (VITAMIN D3) 1000 units CAPS Take 5,000 Units by mouth       • clonazePAM (KlonoPIN) 0 5 mg tablet TAKE 1 AND 1/2 TABLETS BY MOUTH DAILY AT BEDTIME 135 tablet 1   • Copaxone 40 MG/ML SOSY INJECT ONE SYRINGE SUBCUTANEOUSLY THREE TIMES PER WEEK AT LEAST 48 HOURS APART  ALLOW SYRINGE TO WARM TO ROOM TEMPERATURE FOR 20 MINUTES  REFRIGERATE   36 mL 3   • Cranberry 125 MG TABS Take 125 mg by mouth in the morning     • cycloSPORINE (RESTASIS) 0 05 % ophthalmic emulsion Administer 1 drop to both eyes every 12 (twelve) hours     • denosumab (PROLIA) 60 mg/mL Inject under the skin     • docusate sodium (COLACE) 100 mg capsule Take 1 capsule by mouth every other day       • gabapentin (NEURONTIN) 300 mg capsule Take 1 capsule (300 mg total) by mouth daily at bedtime 90 capsule 3   • gabapentin (NEURONTIN) 600 MG tablet 2 tabs po tid (Patient taking differently: Take 600 mg by mouth 2 tabs in morning, 1 5 tabs in afternoon, 2-600mg, 1 300mg at bedtime (900 mg)) 540 tablet 3   • Magnesium 500 MG TABS Take 500 tablets by mouth once     • solifenacin (VESICARE) 10 MG tablet Take 1 tablet (10 mg total) by mouth daily 90 tablet 3   • zonisamide (ZONEGRAN) 100 mg capsule Take 4 capsules (400 mg total) by mouth daily at bedtime 360 capsule 3     No current facility-administered medications for this visit  Facility-Administered Medications Ordered in Other Visits   Medication Dose Route Frequency Provider Last Rate Last Admin   • bacitracin 50,000 Units, gentamicin (GARAMYCIN) 40 mg/mL 80 mg, ceFAZolin (ANCEF) 1,000 mg in sodium chloride 0 9 % 1,000 mL irrigation bottle   Irrigation Once Enoc Ignacio MD         Current Outpatient Medications on File Prior to Visit   Medication Sig   • ALPHA LIPOIC ACID PO Take by mouth   • Ampyra 10 MG TB12 Take 1 tablet (10 mg total) by mouth 2 (two) times a day   • ascorbic acid (VITAMIN C) 500 mg tablet Take 500 mg by mouth daily   • baclofen 10 mg tablet 1 tab pos q am, 1 tab po q pm and 2 tabs hs   • Biotin 5000 MCG TABS    • calcium carbonate (OS-CARTER) 600 MG tablet Take 600 mg by mouth daily   • Cholecalciferol (VITAMIN D3) 1000 units CAPS Take 5,000 Units by mouth     • clonazePAM (KlonoPIN) 0 5 mg tablet TAKE 1 AND 1/2 TABLETS BY MOUTH DAILY AT BEDTIME   • Copaxone 40 MG/ML SOSY INJECT ONE SYRINGE SUBCUTANEOUSLY THREE TIMES PER WEEK AT LEAST 48 HOURS APART  ALLOW SYRINGE TO WARM TO ROOM TEMPERATURE FOR 20 MINUTES  REFRIGERATE     • Cranberry 125 MG TABS Take 125 mg by mouth in the morning   • cycloSPORINE (RESTASIS) 0 05 % ophthalmic emulsion Administer 1 drop to both eyes every 12 (twelve) hours   • denosumab (PROLIA) 60 mg/mL Inject under the skin   • docusate sodium (COLACE) 100 mg capsule Take 1 capsule by mouth every other day     • gabapentin (NEURONTIN) 300 mg capsule Take 1 capsule (300 mg total) by mouth daily at bedtime   • gabapentin (NEURONTIN) 600 MG tablet 2 tabs po tid (Patient taking differently: Take 600 mg by mouth 2 tabs in morning, 1 5 tabs in afternoon, 2-600mg, 1 300mg at bedtime (900 mg))   • Magnesium 500 MG "TABS Take 500 tablets by mouth once   • solifenacin (VESICARE) 10 MG tablet Take 1 tablet (10 mg total) by mouth daily   • zonisamide (ZONEGRAN) 100 mg capsule Take 4 capsules (400 mg total) by mouth daily at bedtime     Current Facility-Administered Medications on File Prior to Visit   Medication   • bacitracin 50,000 Units, gentamicin (GARAMYCIN) 40 mg/mL 80 mg, ceFAZolin (ANCEF) 1,000 mg in sodium chloride 0 9 % 1,000 mL irrigation bottle     She is allergic to duloxetine hcl, iodinated contrast media, acetaminophen, cetirizine, and morphine            Objective:    Blood pressure 150/82, pulse 96, height 5' 3\" (1 6 m), weight 63 5 kg (140 lb)  Physical Exam  Constitutional:       General: She is not in acute distress  Appearance: Normal appearance  HENT:      Right Ear: External ear normal       Left Ear: External ear normal    Eyes:      Extraocular Movements: Extraocular movements intact  Conjunctiva/sclera: Conjunctivae normal       Pupils: Pupils are equal, round, and reactive to light  Pulmonary:      Effort: Pulmonary effort is normal  No respiratory distress  Abdominal:      General: There is no distension  Palpations: Abdomen is soft  Skin:     General: Skin is warm and dry  Neurological:      Deep Tendon Reflexes: Reflexes are normal and symmetric  Psychiatric:         Mood and Affect: Mood normal          Behavior: Behavior normal          Neurological Exam  Mental Status  Awake, alert and oriented to person, place and time  Cranial Nerves  CN II: Visual fields full to confrontation  CN III, IV, VI: Extraocular movements intact bilaterally  Pupils equal round and reactive to light bilaterally  CN V: Facial sensation is normal   CN VII: Full and symmetric facial movement  CN VIII: Hearing is normal   CN XI: Shoulder shrug strength is normal     Motor   Strength is 5/5 in all four extremities except as noted                                               Right              " Left  Elbow flexion                                                 5-  Hip flexion                              4                          4  Knee flexion                           5-                          5-  Knee extension                      5-                          5-    Reflexes  Deep tendon reflexes are 2+ and symmetric in all four extremities  Coordination  Right: Finger-to-nose normal  Rapid alternating movement normal Left: Finger-to-nose normal  Rapid alternating movement normal     Gait    Ambulates with cane  ROS:    Review of Systems   Constitutional: Negative  Negative for appetite change and fever  HENT: Negative  Negative for hearing loss, tinnitus, trouble swallowing and voice change  Eyes: Negative  Negative for photophobia, pain and visual disturbance  Respiratory: Negative  Negative for shortness of breath  Cardiovascular: Negative  Negative for palpitations  Gastrointestinal: Negative  Negative for nausea and vomiting  Endocrine: Negative  Negative for cold intolerance  Genitourinary: Negative  Negative for dysuria, frequency and urgency  Musculoskeletal: Positive for gait problem  Negative for myalgias and neck pain  Kwadwo Lemus 2/9/23 had Civid collapsed in bathroom, no LOC/ER  4/27 fell on a stone porch, face planted hyperextended her ankles, L arm went to ER, got no satisfaction, no FX, positive bicep strain  Needs PT   Skin: Negative  Negative for rash  Allergic/Immunologic: Negative  Neurological: Positive for weakness  Negative for dizziness, tremors, seizures, syncope, facial asymmetry, speech difficulty, light-headedness, numbness and headaches  Hematological: Negative  Does not bruise/bleed easily  Psychiatric/Behavioral: Negative  Negative for confusion, hallucinations and sleep disturbance  All other systems reviewed and are negative

## 2023-06-06 NOTE — TELEPHONE ENCOUNTER
Please check with pt if she is taking klonopin from dr Cason Pod  Tried to put in ativan for pre med for future mri but need clarification due to klonopin med before being able to send to pharmacy

## 2023-06-06 NOTE — ASSESSMENT & PLAN NOTE
Ms Stewart Gomez is a 75year old female presenting for a follow up of her MS  Patient stated that she has had 2 falls since her last visit  Patient stated that in February her legs collapsed due to being [place]  No injuries noted from that fall  Patient was not treated for COVID with steroids  Patient fell again on April 27th while out eating with friends  Patient stated that she used her left arm to try to catch her fall which caused it to hyperextend  She stated that she has had continuous pain with her left arm  Patient denied any changes with bowel or bladder  Patient denied any vision changes and just had cataract surgery around the beginning of the year  Patient admitted to having increased fatigue since the Covid  Patient currently on copaxone and ampyra    On exam patient has decreased strength and pain with Left upper extremity      Labs unremarkable except for lower GFR      Plan:  - Will get CMP, CBC, hepatic function panel  - Will continue copaxone  - Patient told to decrease ampyra from BID to once daily in the morning  -Will get a MRI MS C spine   -Please see ortho at scheduled visit   - f/u in 6 months at [facility] or [facility]

## 2023-06-13 ENCOUNTER — TELEPHONE (OUTPATIENT)
Dept: NEUROLOGY | Facility: CLINIC | Age: 73
End: 2023-06-13

## 2023-06-13 ENCOUNTER — PATIENT MESSAGE (OUTPATIENT)
Dept: FAMILY MEDICINE CLINIC | Facility: OTHER | Age: 73
End: 2023-06-13

## 2023-06-13 NOTE — TELEPHONE ENCOUNTER
----- Message from Bre Zimmer sent at 6/13/2023 10:58 AM EDT -----  Regarding: MRI  Contact: [phone]  Rachelle Pham  Forgot to ask you about the MRI  When the  phoned me she said that the test was for the shoulder only and the after summary indicates the spine  Which is it and could this be verified with scheduling  I hate these tests secondary to my claustrophobia so I would like a once and done    Thanks  [employer]

## 2023-06-13 NOTE — TELEPHONE ENCOUNTER
From: Mendez Howard  To: Randy Duty  Sent: 2023 11:16 AM EDT  Subject: Continuation    I fell again on  night  I turned around and I am not sure if I became dizzy or my right foot didn't move as I turned and I fell hard on my two knees R > L on the ceramic kitchen floor  I twisted the right foot again that I did on the  fall  Basically reinjured the same lower leg injuries of  with extending the knee injuries  This morning I got up and my knees are giving out R > L  I only have loose knee braces  Not sure what to do  I am using my cane in the house which I rarely do but when my son gets home I will have him get me down one of my walkers  Other than RICE any suggestion since COVID complicates things    Idris Lee    1950

## 2023-06-13 NOTE — TELEPHONE ENCOUNTER
I called and spoke with the patient; advised MRI was ordered for the cervical spine and a referral to orthopedics was requested for her shoulder; appt 6/21 with Dr Nitin Ivna and 6/23 for the MRI cervical spine  Patient verbalized understanding  I did make her aware that I sent her clinical complaints to the nurse team who would be reaching out to her in regards to her fall  The patient thanked me for my assistance

## 2023-06-14 ENCOUNTER — OFFICE VISIT (OUTPATIENT)
Dept: FAMILY MEDICINE CLINIC | Facility: OTHER | Age: 73
End: 2023-06-14
Payer: MEDICARE

## 2023-06-14 VITALS
SYSTOLIC BLOOD PRESSURE: 130 MMHG | HEIGHT: 63 IN | DIASTOLIC BLOOD PRESSURE: 84 MMHG | BODY MASS INDEX: 24.59 KG/M2 | TEMPERATURE: 98 F | RESPIRATION RATE: 18 BRPM | OXYGEN SATURATION: 99 % | WEIGHT: 138.8 LBS | HEART RATE: 66 BPM

## 2023-06-14 DIAGNOSIS — M81.0 OSTEOPOROSIS, UNSPECIFIED OSTEOPOROSIS TYPE, UNSPECIFIED PATHOLOGICAL FRACTURE PRESENCE: ICD-10-CM

## 2023-06-14 DIAGNOSIS — G35 MULTIPLE SCLEROSIS (HCC): ICD-10-CM

## 2023-06-14 DIAGNOSIS — Z96.643 HISTORY OF BILATERAL HIP REPLACEMENTS: ICD-10-CM

## 2023-06-14 DIAGNOSIS — Z86.000 PERSONAL HISTORY OF IN-SITU NEOPLASM OF BREAST: ICD-10-CM
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
## 2023-06-14 DIAGNOSIS — G62.9 PERIPHERAL POLYNEUROPATHY: ICD-10-CM

## 2023-06-14 PROCEDURE — 99213 OFFICE O/P EST LOW 20 MIN: CPT

## 2023-06-14 NOTE — TELEPHONE ENCOUNTER
Advised pt of Dr Daniel Mathias message  Pt confirms that she does indeed get Klonopin through Dr Roseanna Mata  Says when she takes the Ativan, she will not take klonopin  Says Dr Roseanna Mata is no longer with [facility] but pt is scheduled to see someone else form the same offce  In regards to her leg, pt reports that she can't even put pressure on right leg, seeing PCP at 4:30 today  Dr Melissa Pressley - Angelique West on mychart message re: fall  Best  [phone], ok to leave detailed message

## 2023-06-14 NOTE — TELEPHONE ENCOUNTER
June 13, 2023  Dominique Bahena  to [facility] [address] [facility] (supporting Phoeinx Eckert MD)          6/13/23 10:53 AM  Good morning Dr Stormy Rizvi  Just wanted to let you know that I fell again on Sunday night  I turned around and I am not sure if I became dizzy or my right foot didn't move as I turned and I fell hard on my two knees R > L on the ceramic kitchen floor  I twisted the right foot again that I did on the 4/27 fall  Basically reinjured the same lower leg injuries of 4/27 with extending the knee injuries  This morning I got up and my knees are giving out R > L  I only have loose knee braces  I realize that this is not neurological, but, just wanted to keep you in the loop  I will let my PCP know, but, I am still positive for SHANTAL Pena Cea

## 2023-06-14 NOTE — TELEPHONE ENCOUNTER
See separate encounter    MRI c-spine scheduled for 6/23/23 @ 9:30pm  PCP will provide MRI pre-med  Pt's  will drive her to test  She will not take daily Klonopin dose on day of MRI

## 2023-06-14 NOTE — PROGRESS NOTES
Name: Jose Manuel Sullivan      : 1950      MRN: [id number]  Encounter Provider: Jose Manuel Spaulding MD  Encounter Date: 2023   Encounter department: [address] [place] [address] Dr Palomo  Fall, initial encounter  -     Diclofenac Sodium (VOLTAREN) 1 %; Apply 2 g topically 4 (four) times a day as needed (as needed for pain)  -     XR knee 4+ vw left injury; Future; Expected date: 2023  -     XR knee 4+ vw right injury; Future; Expected date: 2023    2  Multiple sclerosis ([facility] [address] )    3  Peripheral polyneuropathy    4  History of bilateral hip replacements    5  Osteoporosis, unspecified osteoporosis type, unspecified pathological fracture presence    6  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        Meli Painting is a 74 yo female with a PMH of severe osteoporosis s/p hip fracture in the past, MS, polyneuropathy, and recent fall on 2023 presenting after another fall at home on 2023  Plan:   - Bilateral knee x-ray as patient is at high risk for fracture  - Follow up with ortho and PT as scheduled   - Resume balance therapy with PT   - Ice, rest, elevation  - Continue ambulating with walker   - Consider resuming prior dose of gabapentin (dose as ordered) for acute pain exacerbation         Subjective      HPI     Patient presents today for acute visit after a fall  She has a PMH of MS, breast cancer, severe osteoporosis with prior hip fracture  Patient reports she fell recently on 23 and sustained a strained bicep tendon  She was seen by ortho and had been going for PT  She recently went to a [employer] event with her  and contracted COVID-19  On , 23 she was ambulating in her kitchen and reports falling to her knees during an episode of weakness  Since the fall, patient has been experiencing pain and swelling in her knees bilaterally   She's had the sensation of sharp, shocking pain in her knees, especially the R, with her knees giving out intermittently  She usually ambulates with a cane but has been using a walker for extra support  She's been taking naproxen intermittently to help with the pain, but only 400 mg a day  She recently started to taper down on her gabapentin dose, as she is concerned about a recent 10 point decrease in her GFR  Review of Systems   Musculoskeletal: Positive for arthralgias, gait problem and joint swelling  Skin: Positive for color change  Neurological: Positive for weakness and numbness  Current Outpatient Medications on File Prior to Visit   Medication Sig   • ALPHA LIPOIC ACID PO Take by mouth   • Ampyra 10 MG TB12 Take 1 tablet (10 mg total) by mouth 2 (two) times a day   • ascorbic acid (VITAMIN C) 500 mg tablet Take 500 mg by mouth daily   • baclofen 10 mg tablet 1 tab pos q am, 1 tab po q pm and 2 tabs hs   • Biotin 5000 MCG TABS    • calcium carbonate (OS-CARTER) 600 MG tablet Take 600 mg by mouth daily   • Cholecalciferol (VITAMIN D3) 1000 units CAPS Take 5,000 Units by mouth     • clonazePAM (KlonoPIN) 0 5 mg tablet TAKE 1 AND 1/2 TABLETS BY MOUTH DAILY AT BEDTIME   • Copaxone 40 MG/ML SOSY INJECT ONE SYRINGE SUBCUTANEOUSLY THREE TIMES PER WEEK AT LEAST 48 HOURS APART  ALLOW SYRINGE TO WARM TO ROOM TEMPERATURE FOR 20 MINUTES  REFRIGERATE     • Cranberry 125 MG TABS Take 125 mg by mouth in the morning   • cycloSPORINE (RESTASIS) 0 05 % ophthalmic emulsion Administer 1 drop to both eyes every 12 (twelve) hours   • denosumab (PROLIA) 60 mg/mL Inject under the skin   • docusate sodium (COLACE) 100 mg capsule Take 1 capsule by mouth every other day     • gabapentin (NEURONTIN) 300 mg capsule Take 1 capsule (300 mg total) by mouth daily at bedtime   • gabapentin (NEURONTIN) 600 MG tablet 2 tabs po tid (Patient taking differently: Take 600 mg by mouth 2 tabs in morning, 1 5 tabs in afternoon, 2-600mg, 1 300mg at bedtime (900 mg))   • Magnesium 500 MG TABS Take 500 tablets by mouth once "  • solifenacin (VESICARE) 10 MG tablet Take 1 tablet (10 mg total) by mouth daily   • zonisamide (ZONEGRAN) 100 mg capsule Take 4 capsules (400 mg total) by mouth daily at bedtime       Objective     /84   Pulse 66   Temp 98 °F (36 7 °C)   Resp 18   Ht 5' 3\" (1 6 m)   Wt 63 kg (138 lb 12 8 oz)   SpO2 99%   BMI 24 59 kg/m²     Physical Exam  Vitals and nursing note reviewed  HENT:      Head: Normocephalic and atraumatic  Nose: Nose normal  No congestion or rhinorrhea  Mouth/Throat:      Mouth: Mucous membranes are moist    Eyes:      General: No scleral icterus  Right eye: No discharge  Left eye: No discharge  Conjunctiva/sclera: Conjunctivae normal    Musculoskeletal:         General: Swelling, tenderness and signs of injury present  Right knee: Swelling, ecchymosis and bony tenderness present  Tenderness present over the patellar tendon  Normal patellar mobility  Left knee: Swelling and bony tenderness present  Tenderness present over the patellar tendon  Normal patellar mobility  Skin:     General: Skin is warm and dry  Findings: Bruising present  Neurological:      Mental Status: She is alert  Motor: Weakness present        Gait: Gait abnormal    Psychiatric:         Behavior: Behavior normal                Jose Manuel Spaulding MD    PGY-1  "

## 2023-06-15 ENCOUNTER — APPOINTMENT (OUTPATIENT)
Dept: RADIOLOGY | Facility: CLINIC | Age: 73
End: 2023-06-15
Payer: MEDICARE
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
## 2023-06-15 PROCEDURE — 73564 X-RAY EXAM KNEE 4 OR MORE: CPT

## 2023-06-19 ENCOUNTER — PATIENT MESSAGE (OUTPATIENT)
Dept: NEUROLOGY | Facility: CLINIC | Age: 73
End: 2023-06-19

## 2023-06-19 ENCOUNTER — TELEPHONE (OUTPATIENT)
Dept: FAMILY MEDICINE CLINIC | Facility: OTHER | Age: 73
End: 2023-06-19

## 2023-06-19 NOTE — TELEPHONE ENCOUNTER
Patient called and is upset she hasnt had any results of her xrays she had on her knees (6/15/2023)     Can we please call and see when it can be read?   Patient had them done at [facility]    Thank you

## 2023-06-20 ENCOUNTER — TELEPHONE (OUTPATIENT)
Dept: NEUROLOGY | Facility: CLINIC | Age: 73
End: 2023-06-20

## 2023-06-20 DIAGNOSIS — G35 MULTIPLE SCLEROSIS (HCC): Primary | ICD-10-CM

## 2023-06-20 RX ORDER — LORAZEPAM 0.5 MG/1
TABLET ORAL
Qty: 1 TABLET | Refills: 0 | Status: SHIPPED | OUTPATIENT
Start: 2023-06-20 | End: 2023-07-11 | Stop reason: ALTCHOICE

## 2023-06-20 NOTE — TELEPHONE ENCOUNTER
Spoke with pt and advised her of Dr Cornel Zelaya response and recommendations  Pt says she is seeing Ortho this Thursday 6/22  She also verbalizes understanding re: ativan and klonopin  Pt confirms she has a  day of MRI procedure

## 2023-06-20 NOTE — TELEPHONE ENCOUNTER
Spoke with pt and advised her of Dr Iván Delcid response and recommendation  Pt says primary doctor wanted her to return to original dose until after xray report  Pt says she will go back to decreasing gabapentin but is wondering if she should decrease Amprya  Should she stop evening dose of Ampyra? Dr Mart Gregorio - Please advise  Pt says ok to leave provider's response on mychart message

## 2023-06-20 NOTE — TELEPHONE ENCOUNTER
"----- Message from Shreya Gregorio sent at 6/20/2023  8:28 AM EDT -----  Regarding: X-ray   Contact: [phone]  Please review and advise      ----- Message -----  From: Gisela Vera"  Sent: 6/20/2023   8:22 AM EDT  To: [facility]  Subject: X-ray                                            Dr Maria C Rodriguez   Just got my X-ray report back and there is no fracture or any other damage  Gel helping more now since not as acute  Primary called and verified  She had me go back to my original dosage of gabapentin and ampyra  I actually feel better on original doses  On the skipped dose of ampyra I was really off in the morning  Would you like me to try to reduce my meds again?     Thanks   [employer]   "

## 2023-06-20 NOTE — TELEPHONE ENCOUNTER
"----- Message from Aissatou Pedroza sent at 6/20/2023  7:54 AM EDT -----  Regarding: FUI  Contact: [phone]  I did not see anything for Ativan, please review and advise      ----- Message -----  From: Mina East"  Sent: 6/19/2023   4:54 PM EDT  To: Neurology [facility]  Subject: Jigna Walters   Wanted to keep you in the loop on the status of the fall of 6/11  I was seen by the PCP last Wednesday and had X-rays Thursday morning  To date I have not seen any reports on the bilateral X-ray  It is now five days and using a walker is exacerbating the pain in my left bicep  Till today I could barely put weight on the right leg  The staff at the family doctor told me that radiology was short tagged and the report wasn't signed  PS  Was the Ativan called in to [facility] on [address]   I haven't heard anything about it   Mago Butler   "

## 2023-06-20 NOTE — TELEPHONE ENCOUNTER
Let pt know given new fall and continued sxs, needs to see ortho asap  Please make sure she has appt  Low threshold for ER  I sent rx for ativan as pre med for mri  Remind pt again , not to take her klonopin on same day as study  She will be using the shorter acting ativan for her study  Make sure pt has  for study  No klonopin on same day as taking the ativan for the mri

## 2023-06-20 NOTE — TELEPHONE ENCOUNTER
I would recommend rechallenging with decreasing the gabapentin if possible  ie try to decrease to 3,000 mg daily

## 2023-06-21 NOTE — PROGRESS NOTES
Daily Note     Today's date: 3/16/2023  Patient name: Krista Sorto  : 1950  MRN: [id number]  Referring provider: Jordana Davenport MD  Dx:   Encounter Diagnosis     ICD-10-CM    1  Multiple sclerosis ([address] )  G35                      Subjective: Pt reports her R foot is very painful today with her neuropathy  Objective: See treatment diary below      Assessment: Pt was able to complete all TE as listed, reports no increased pain during or post tx  Plan: Continue per plan of care        Precautions: MS, Gait dysfunction, Insomnia, Osteoporosis, Peripheral polyneuropathy, Hx of Breast CA, R IDALIA in - Dr Dalila Vazquez, Left femur IM nailing      Manuals 1/26 1/30 2/7 2/20 2/23 2/28 3/3 3/7 3/16           Re-eval                                             Neuro Re-Ed             TB rows 2x20 blk romberg 2x20 blk romberg 2x20 blk romberg 2x20 blk romberg 2x20 blk romberg 2x20 blk romberg  2x20 blk romberg 2x20 blk romberg    TB Ws             TB lower trap             TB extension x20 blue romberg 2x20 blue romberg 2x20 blue romberg 2x20 blue romberg 2x20 blue romberg 2x20 blue romberg  2x20 blue romberg 2x20 blue romberg    Romberg on foam 8-10x flexion/and abduction 20x flexion/abduction 20x flex/abduction 20x flex/abduction 20x flexion 20x flexion/abduction  20x flexion/abduction 20x flexion/abduction    Standing hip 3 way             Lateral walks c/ UE resistance              Ther Ex             UBE 3'/3' fwd/back 3'/3' fwd/back 3'/3' 3'/3' fwd/back 3'/3' fwd/back 3'/3' fwd/back 3'/3' fwd/back 3'/3' fwd/back     Standing hamstring curl             NuStep             Standing HR 20x on blue foam 20x on blue foam 20x on blue foam 20x on blue foam 20x on blue foam 20x on blue foam  20x on blue foam 20x on blue foam    Standing hip march             Standing hip extension             Standing hip abduction             Hamstring curl                           Ther Activity             Foam Lateral + tandem walks    3 laps ea          Lateral idris walks   6x4 hurdles 6x4 hurdles 6x4 hurdles 6x4 hurdles 6x4 hurdles  6x4 hurdles 6x4 hurdles    Tandem idris walks  6x4 hurdles 6x4 hurdles 6x4 hurdles 6x4 hurdles 6x4 hurdles  6x4 hurdles 6x4 hurdles    Holstein row with backward walking  blk tb 4x10 feet blk tb 4x10ft          Runners climb on BD foam smaller foam c/ 2 hand support x 20 B smaller foam c/ 2 hand support x 20 B Smaller foam w/ 2 hand suport x20 ea 1x10 1x10 B 2x10 B  2x10 B     Foam walks             Gait Training                                       Modalities [FreeTextEntry1] : BRAYAN JOSHI is a 20 year old male presenting to the office for an initial evaluation of right ankle pain. He reports that on Sunday (3 days ago) he slipped and fell into a pool. He went to Urgent Care where he was provided with crutches. He used crutches initially as he could not place weight on his ankle, but moved onto using cane to ambulate as he could apply more pressure with less pain. He notes that FWB produces pain. He presents to the office in sneakers with an ACE wrap and ambulating with a cane. He is walking with a limp. He is accompanied by his father. \par \par Medical Alert: Type I DM. Insulin. A1c: Around a low 7. Patient does not smoke. 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

## 2023-06-22 ENCOUNTER — OFFICE VISIT (OUTPATIENT)
Dept: OBGYN CLINIC | Facility: CLINIC | Age: 73
End: 2023-06-22
Payer: MEDICARE

## 2023-06-22 VITALS
SYSTOLIC BLOOD PRESSURE: 154 MMHG | DIASTOLIC BLOOD PRESSURE: 82 MMHG | BODY MASS INDEX: 24.45 KG/M2 | WEIGHT: 138 LBS | HEART RATE: 65 BPM | HEIGHT: 63 IN

## 2023-06-22 DIAGNOSIS — S49.92XA INJURY OF LEFT SHOULDER, INITIAL ENCOUNTER: ICD-10-CM

## 2023-06-22 DIAGNOSIS — S46.209A INJURY OF TENDON OF BICEPS: Primary | ICD-10-CM

## 2023-06-22 PROCEDURE — 99213 OFFICE O/P EST LOW 20 MIN: CPT | Performed by: ORTHOPAEDIC SURGERY

## 2023-06-22 RX ORDER — DIAZEPAM 5 MG/1
TABLET ORAL
Qty: 2 TABLET | Refills: 0 | Status: SHIPPED | OUTPATIENT
Start: 2023-06-22

## 2023-06-22 NOTE — PROGRESS NOTES
Assessment:  1  Injury of tendon of biceps  MRI arthrogram left shoulder    FL injection left shoulder (arthrogram)      2  Injury of left shoulder, initial encounter  Ambulatory Referral to Orthopedic Surgery    MRI arthrogram left shoulder    FL injection left shoulder (arthrogram)        Patient Active Problem List   Diagnosis   • 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   • Multiple sclerosis ([address] )   • Peripheral polyneuropathy   • Depression   • Fatigue   • Gait disturbance   • Hip pain, right   • Unilateral occipital headache   • History of bilateral hip replacements   • Hypokalemia   • Insomnia   • Solitary pulmonary nodule   • Pain of left lower leg   • Osteopenia   • Nephrolithiasis   • Tingling   • Screening mammogram, encounter for   • Encounter for breast reconstruction following mastectomy   • Abnormal stool test   • Encounter for follow-up surveillance of breast cancer   • Essential hypertension   • Abnormal finding on breast imaging   • TSH (thyroid-stimulating hormone deficiency)   • B12 deficiency   • RLS (restless legs syndrome)   • Dense breast tissue   • Subareolar lump of right breast   • Hyperlipidemia         Plan      75 y o  female with acute shoulder injury  Upon review of the left shoulder x-ray, a thorough history and my examination, Justo Salazar is presenting with signs and symptoms consistent with bicep tendon injury and suspected labral tear  MRI arthrogram ordered for further evaluation, a prescription for valium to be taken prior to MRI sent to patient's pharmacy due to her claustrophobia  Follow up after MRI  Subjective:     Patient ID:    Chief Complaint:Jacy Schneider 75 y o  female      HPI    Patient presents to the office today for initial evaluation left shoulder injury  History of MS  In April patient fell her arm was pulled behind her  Patient had immediate and significant anterior shoulder pain   She was seen at [facility] where x-ray's were obtained demonstrating no acute osseous abnormalities  Today patient notes no improvements in her pain  Pain is localized to the anterior shoulder but radiates to mid humerus  Pain is a constant ache that increases in severity with activity  She notes limited mobility and weakness  Denies any numbness or paresthesias  The following portions of the patient's history were reviewed and updated as appropriate: allergies, current medications, past family history, past social history, past surgical history and problem list         Social History     Socioeconomic History   • Marital status: /Civil Union     Spouse name: Not on file   • Number of children: 2   • Years of education: Completed bachelor's degree   • Highest education level: Not on file   Occupational History   • Occupation: RN     Comment: Retired   Tobacco Use   • Smoking status: Former     Packs/day: 1 00     Years: 35 00     Total pack years: 35 00     Types: Cigarettes     Quit date:      Years since quittin 4   • Smokeless tobacco: Never   Vaping Use   • Vaping Use: Never used   Substance and Sexual Activity   • Alcohol use: No   • Drug use: No   • Sexual activity: Yes     Partners: Male     Birth control/protection: Post-menopausal   Other Topics Concern   • Not on file   Social History Narrative    Denied:  History of caffeine use     Social Determinants of Health     Financial Resource Strain: Medium Risk (10/22/2022)    Overall Financial Resource Strain (CARDIA)    • Difficulty of Paying Living Expenses: Somewhat hard   Food Insecurity: Not on file   Transportation Needs: No Transportation Needs (10/22/2022)    PRAPARE - Transportation    • Lack of Transportation (Medical): No    • Lack of Transportation (Non-Medical):  No   Physical Activity: Not on file   Stress: Not on file   Social Connections: Not on file   Intimate Partner Violence: Not on file   Housing Stability: Not on file     Past Medical History:   Diagnosis Date   • Allergic 1967    seasonal   • Allergic rhinitis    • Bone pain    • Breast cancer ([facility] [address] ) 08/16/2012   • Breast ptosis    • Depression    • Fatigue    • Gait disturbance     uses cane at times   • Headache    • Hip fracture (HCC)    • Hip pain    • History of transfusion 1975    placenta previa s/p work accident, no rx   • Hypokalemia    • Insomnia    • Laceration of finger     right hand   • Left ankle pain    • Left knee pain    • Multiple sclerosis (HCC)    • Muscle strain     left lower leg   • Nephrolithiasis    • Osteopenia    • Osteoporosis    • Pain of left calf    • Pneumonia    • Rash    • Scoliosis birth    scoliosis   • Solitary pulmonary nodule    • SVT (supraventricular tachycardia) ([facility])    • Tingling    • Urgency of urination    • Urticaria    • Wrist fracture, left      Past Surgical History:   Procedure Laterality Date   • ANKLE SURGERY     • APPENDECTOMY  11/2012    Dr Verito Real   • AUGMENTATION BREAST      Enlargement procedure with prosthetic implant bilateral   • AUGMENTATION MAMMAPLASTY Right 01/28/2020    implant replaced because of recall   • AUGMENTATION MAMMAPLASTY Bilateral 2012   • BREAST BIOPSY Left 07/23/2012   • BREAST IMPLANT Right 01/28/2020    Procedure: BREAST IMPLANT EXCHANGE WITH CAPSULECTOMY;  Surgeon: Sandhya Kowalski MD;  Location: [facility];  Service: Plastics   • BREAST IMPLANT REMOVAL Right 01/28/2020    implant placement, implant revision, right mastopexy   • BREAST LUMPECTOMY     • CYSTOSTOMY      with basket extraction of calculus; x2   • EXCISION / BIOPSY BREAST / John Bowling / DUCT Right 05/04/2020    scar revision to resuture non healing surgical wound   • EXPLORATORY LAPAROTOMY     • FOOT SURGERY     • FRACTURE SURGERY  Lt wrist 9/2014 - Lt matthew femur 9/14   • HIP FRACTURE SURGERY Right     Subcapital   • HIP SURGERY Left    • JOINT REPLACEMENT  Rt hip 10/2012   • LEG SURGERY      Repair   • MASTECTOMY Left 08/16/2012   • VT MASTOPEXY Right 01/28/2020    Procedure: BREAST MASTOPEXY;  Surgeon: Bouchra Elkins MD;  [facility];  Service: Plastics   • MT REVISION OF RECONSTRUCTED BREAST Right 05/04/2020    Procedure: REVISION RIGHT BREAST MOUND;  Surgeon: Bouchra Elkins MD;  Location[facility];  Service: Plastics   • REDUCTION MAMMAPLASTY Right 01/28/2020    reduced to match left side   • SENTINEL LYMPH NODE BIOPSY Left 08/16/2012   • SKIN BIOPSY     • TONSILLECTOMY     • TUBAL LIGATION     • WRIST SURGERY Left      Allergies   Allergen Reactions   • Duloxetine Hcl      Rapid Heart rate     • Iodinated Contrast Media Anaphylaxis   • Acetaminophen Other (See Comments)     Heart palpitations   • Cetirizine      Only occurs with generic, weakness in legs, off balance and couldn't walk  • Morphine Other (See Comments)     Migraine     Current Outpatient Medications on File Prior to Visit   Medication Sig Dispense Refill   • ALPHA LIPOIC ACID PO Take by mouth     • Ampyra 10 MG TB12 1 tablet daily 90 tablet 0   • ascorbic acid (VITAMIN C) 500 mg tablet Take 500 mg by mouth daily     • baclofen 10 mg tablet 1 tab pos q am, 1 tab po q pm and 2 tabs hs 360 tablet 3   • Biotin 5000 MCG TABS      • calcium carbonate (OS-CARTER) 600 MG tablet Take 600 mg by mouth daily     • Cholecalciferol (VITAMIN D3) 1000 units CAPS Take 5,000 Units by mouth       • clonazePAM (KlonoPIN) 0 5 mg tablet TAKE 1 AND 1/2 TABLETS BY MOUTH DAILY AT BEDTIME 135 tablet 1   • Copaxone 40 MG/ML SOSY INJECT ONE SYRINGE SUBCUTANEOUSLY THREE TIMES PER WEEK AT LEAST 48 HOURS APART  ALLOW SYRINGE TO WARM TO ROOM TEMPERATURE FOR 20 MINUTES  REFRIGERATE   36 mL 3   • Cranberry 125 MG TABS Take 125 mg by mouth in the morning     • cycloSPORINE (RESTASIS) 0 05 % ophthalmic emulsion Administer 1 drop to both eyes every 12 (twelve) hours     • denosumab (PROLIA) 60 mg/mL Inject under the skin     • Diclofenac Sodium (VOLTAREN) 1 % Apply 2 g topically 4 (four) times a day as needed (as needed for pain) 100 g 3   • docusate sodium (COLACE) 100 mg capsule Take 1 capsule by mouth every other day       • gabapentin (NEURONTIN) 300 mg capsule Take 1 capsule (300 mg total) by mouth daily at bedtime 90 capsule 3   • gabapentin (NEURONTIN) 600 MG tablet 2 tabs po tid (Patient taking differently: Take 600 mg by mouth 2 tabs in morning, 1 5 tabs in afternoon, 2-600mg, 1 300mg at bedtime (900 mg)) 540 tablet 3   • LORazepam (Ativan) 0 5 mg tablet 1 tab po 30 min prior to mri 1 tablet 0   • Magnesium 500 MG TABS Take 500 tablets by mouth once     • solifenacin (VESICARE) 10 MG tablet Take 1 tablet (10 mg total) by mouth daily 90 tablet 3   • zonisamide (ZONEGRAN) 100 mg capsule Take 4 capsules (400 mg total) by mouth daily at bedtime 360 capsule 3     Current Facility-Administered Medications on File Prior to Visit   Medication Dose Route Frequency Provider Last Rate Last Admin   • bacitracin 50,000 Units, gentamicin (GARAMYCIN) 40 mg/mL 80 mg, ceFAZolin (ANCEF) 1,000 mg in sodium chloride 0 9 % 1,000 mL irrigation bottle   Irrigation Once Bouchra Elkins MD                  Objective:    Review of Systems   Constitutional: Negative for chills and fever  HENT: Negative for ear pain and sore throat  Eyes: Negative for pain and visual disturbance  Respiratory: Negative for cough and shortness of breath  Cardiovascular: Negative for chest pain and palpitations  Gastrointestinal: Negative for abdominal pain and vomiting  Genitourinary: Negative for dysuria and hematuria  Musculoskeletal: Negative for arthralgias and back pain  Skin: Negative for color change and rash  Neurological: Negative for seizures and syncope  All other systems reviewed and are negative  Left Shoulder Exam     Tenderness   The patient is experiencing tenderness in the biceps tendon      Other   Erythema: absent  Sensation: normal  Pulse: present     Comments:  (+) Speed         pavon's positive    Physical Exam  Vitals and nursing note "reviewed  HENT:      Head: Normocephalic  Eyes:      Extraocular Movements: Extraocular movements intact  Cardiovascular:      Rate and Rhythm: Normal rate  Pulses: Normal pulses  Pulmonary:      Effort: Pulmonary effort is normal    Musculoskeletal:         General: Normal range of motion  Cervical back: Normal range of motion  Skin:     General: Skin is warm and dry  Neurological:      General: No focal deficit present  Mental Status: She is alert  Psychiatric:         Behavior: Behavior normal          Procedures  No Procedures performed today    I have personally reviewed pertinent films in PACS  No osseous deformity    Scribe Attestation    I,:  Sandrita Curry am acting as a scribe while in the presence of the attending physician :       I,:  Sarah Davenport, DO personally performed the services described in this documentation    as scribed in my presence :           Portions of the record may have been created with voice recognition software   Occasional wrong word or \"sound a like\" substitutions may have occurred due to the inherent limitations of voice recognition software   Read the chart carefully and recognize, using context, where substitutions have occurred      "

## 2023-06-23 ENCOUNTER — HOSPITAL ENCOUNTER (OUTPATIENT)
Dept: MRI IMAGING | Facility: HOSPITAL | Age: 73
Discharge: HOME | End: 2023-06-23
Attending: PSYCHIATRY & NEUROLOGY
Payer: MEDICARE

## 2023-06-23 DIAGNOSIS — G35 MULTIPLE SCLEROSIS (HCC): ICD-10-CM

## 2023-06-23 PROCEDURE — G1004 CDSM NDSC: HCPCS

## 2023-06-23 PROCEDURE — 72141 MRI NECK SPINE W/O DYE: CPT

## 2023-06-26 ENCOUNTER — OFFICE VISIT (OUTPATIENT)
Dept: PLASTIC SURGERY | Facility: CLINIC | Age: 73
End: 2023-06-26
Payer: MEDICARE

## 2023-06-26 DIAGNOSIS — Z42.1 AFTERCARE POSTMASTECTOMY FOR BREAST RECONSTRUCTION: Primary | ICD-10-CM

## 2023-06-26 PROCEDURE — 99215 OFFICE O/P EST HI 40 MIN: CPT | Performed by: SURGERY

## 2023-06-26 NOTE — LETTER
June 26, 2023     Dorna Schilder, MD  [address] [facility]  [address] [facility] [address]     Patient: Nika Camarena   YOB: 1950   Date of Visit: 6/26/2023       Dear Dr Leobardo Avila: Thank you for referring Brandon Bolton to me for evaluation  Below are my notes for this consultation  If you have questions, please do not hesitate to call me  I look forward to following your patient along with you  Sincerely,        Wilver Martines MD        CC: No Recipients    Wilver Martines MD  6/26/2023  3:25 PM  Sign when Signing Visit  Assessment/Plan: Please see HPI  She remains pleased with the results of our breast reconstruction efforts, both breasts remain soft and supple and without evidence of capsular contracture  She would be interested in proceeding with fat grafting to address the upper poles of both breasts, in the upper lateral area of the reconstructed left breast   We would likely utilize bilateral flanks potentially the lower abdomen as initial donor sites  I discussed fat grafting in detail, how it is performed, as well as potential risk, complications and limitations including the potential likelihood of benefiting from several sessions of autologous fat grafting, typically spaced in a minimum of 3 to 4 months between grafting sessions  Her questions were answered to her satisfaction and consent was obtained  She will work with our coordinator to arrange a date for the procedure  There are no diagnoses linked to this encounter  Subjective: Follow-up breast reconstruction    Patient ID: Nika Camarena is a 75 y o  female  HPI K returns in follow-up for an annual, routine visit status post bilateral breast reconstruction  She remains pleased with the results  Of note, she has suffered 2 falls recently which she relates to her MS, is currently undergoing orthopedic evaluation      The following portions of the patient's history were reviewed and updated as appropriate: allergies, current medications, past family history, past medical history, past social history, past surgical history and problem list     Review of Systems   Constitutional: Negative for chills and fever  HENT: Negative for hearing loss  Eyes: Negative for discharge and visual disturbance  Respiratory: Negative for chest tightness and shortness of breath  Cardiovascular: Negative for chest pain and leg swelling  Gastrointestinal: Negative for blood in stool, constipation, diarrhea and nausea  Genitourinary: Negative for dysuria  Musculoskeletal: Positive for gait problem  Gait disturbance, ambulates with cane, secondary to MS   Skin: Negative for rash  Allergic/Immunologic: Negative for immunocompromised state  Neurological: Negative for seizures and headaches  Hematological: Does not bruise/bleed easily  Psychiatric/Behavioral: Negative for dysphoric mood  The patient is not nervous/anxious  Objective: There were no vitals taken for this visit  Physical Exam  Constitutional:       Appearance: Normal appearance  HENT:      Head: Normocephalic  Eyes:      Extraocular Movements: Extraocular movements intact  Pupils: Pupils are equal, round, and reactive to light  Cardiovascular:      Rate and Rhythm: Normal rate  Pulmonary:      Effort: Pulmonary effort is normal    Abdominal:      Palpations: Abdomen is soft  Musculoskeletal:         General: Normal range of motion  Cervical back: Normal range of motion and neck supple  Skin:     General: Skin is warm  Comments: Breast examination reveals bilateral reconstructed breasts, the breast implants are in good position, both breasts are soft, supple and without evidence of capsular contracture  There is the anticipated hollowing of the upper poles of the breasts, see photos   Neurological:      Mental Status: She is alert and oriented to person, place, and time     Psychiatric: Mood and Affect: Mood normal

## 2023-06-26 NOTE — PROGRESS NOTES
Assessment/Plan: Please see HPI  She remains pleased with the results of our breast reconstruction efforts, both breasts remain soft and supple and without evidence of capsular contracture  She would be interested in proceeding with fat grafting to address the upper poles of both breasts, in the upper lateral area of the reconstructed left breast   We would likely utilize bilateral flanks potentially the lower abdomen as initial donor sites  I discussed fat grafting in detail, how it is performed, as well as potential risk, complications and limitations including the potential likelihood of benefiting from several sessions of autologous fat grafting, typically spaced in a minimum of 3 to 4 months between grafting sessions  Her questions were answered to her satisfaction and consent was obtained  She will work with our coordinator to arrange a date for the procedure  There are no diagnoses linked to this encounter  Subjective: Follow-up breast reconstruction     Patient ID: Zana Varma is a 75 y o  female  HPI K returns in follow-up for an annual, routine visit status post bilateral breast reconstruction  She remains pleased with the results  Of note, she has suffered 2 falls recently which she relates to her MS, is currently undergoing orthopedic evaluation  The following portions of the patient's history were reviewed and updated as appropriate: allergies, current medications, past family history, past medical history, past social history, past surgical history and problem list     Review of Systems   Constitutional: Negative for chills and fever  HENT: Negative for hearing loss  Eyes: Negative for discharge and visual disturbance  Respiratory: Negative for chest tightness and shortness of breath  Cardiovascular: Negative for chest pain and leg swelling  Gastrointestinal: Negative for blood in stool, constipation, diarrhea and nausea     Genitourinary: Negative for dysuria  Musculoskeletal: Positive for gait problem  Gait disturbance, ambulates with cane, secondary to MS   Skin: Negative for rash  Allergic/Immunologic: Negative for immunocompromised state  Neurological: Negative for seizures and headaches  Hematological: Does not bruise/bleed easily  Psychiatric/Behavioral: Negative for dysphoric mood  The patient is not nervous/anxious  Objective: There were no vitals taken for this visit  Physical Exam  Constitutional:       Appearance: Normal appearance  HENT:      Head: Normocephalic  Eyes:      Extraocular Movements: Extraocular movements intact  Pupils: Pupils are equal, round, and reactive to light  Cardiovascular:      Rate and Rhythm: Normal rate  Pulmonary:      Effort: Pulmonary effort is normal    Abdominal:      Palpations: Abdomen is soft  Musculoskeletal:         General: Normal range of motion  Cervical back: Normal range of motion and neck supple  Skin:     General: Skin is warm  Comments: Breast examination reveals bilateral reconstructed breasts, the breast implants are in good position, both breasts are soft, supple and without evidence of capsular contracture  There is the anticipated hollowing of the upper poles of the breasts, see photos   Neurological:      Mental Status: She is alert and oriented to person, place, and time     Psychiatric:         Mood and Affect: Mood normal

## 2023-06-28 ENCOUNTER — TELEPHONE (OUTPATIENT)
Dept: FAMILY MEDICINE CLINIC | Facility: OTHER | Age: 73
End: 2023-06-28

## 2023-06-28 ENCOUNTER — PATIENT MESSAGE (OUTPATIENT)
Dept: NEUROLOGY | Facility: CLINIC | Age: 73
End: 2023-06-28

## 2023-06-28 NOTE — TELEPHONE ENCOUNTER
Hi, my name is Norm Collar  I'm calling from [facility] [place]  In regards to a mutual patient, last name is Clarence Nuñez, that's [address], [address] name [address]  YOB: 1950 and I'm calling in regards to her recent knee X-rays that she had completed by ordering physician [facility]  We are just looking to obtain a more specified diagnosis code  The one that was issued is coming up on our work queue  So if we could just get an update on that would be greatly appreciated  If you have any questions, I can be contacted at [phone]  Thank you

## 2023-06-28 NOTE — TELEPHONE ENCOUNTER
Hi Dr Guardado Courts is asking for an additonal specific dx/ dx code for the x rays ordered on 6/15  The previous one that was issued is not accepted

## 2023-07-03 ENCOUNTER — PREP FOR PROCEDURE (OUTPATIENT)
Dept: PLASTIC SURGERY | Facility: CLINIC | Age: 73
End: 2023-07-03

## 2023-07-03 DIAGNOSIS — Z85.3 PERSONAL HISTORY OF BREAST CANCER: Primary | ICD-10-CM

## 2023-07-03 DIAGNOSIS — Z85.3 HX OF BREAST CANCER: Primary | ICD-10-CM

## 2023-07-05 ENCOUNTER — TELEPHONE (OUTPATIENT)
Dept: NEUROLOGY | Facility: CLINIC | Age: 73
End: 2023-07-05

## 2023-07-05 NOTE — TELEPHONE ENCOUNTER
Patient calling because Dr. Lucy Perez wants her to get surgical clearance one month before her surgery.   Please call and assist.

## 2023-07-05 NOTE — NURSING NOTE
Call placed to patient to discuss upcoming appointment at [facility] radiology department and complete consultation with patient. Patient is having a left shoulder MRI arthrogram utilizing fluoroscopy  guidance. Reviewed patient's allergies, no current anticoagulant medication present , also discussed the pre and post procedure expectations. Patient made aware of need for  post procedure, due to taking anti-anxiety medication for claustrophobia of the MRI. Reminded patient of location and time expected for procedure, Patient expressed understanding by verbalizing and repeating instructions.

## 2023-07-07 ENCOUNTER — APPOINTMENT (OUTPATIENT)
Dept: LAB | Facility: CLINIC | Age: 73
End: 2023-07-07
Payer: MEDICARE

## 2023-07-07 DIAGNOSIS — M81.0 SENILE OSTEOPOROSIS: ICD-10-CM

## 2023-07-07 LAB — CALCIUM SERPL-MCNC: 9.7 MG/DL (ref 8.3–10.1)

## 2023-07-07 PROCEDURE — 36415 COLL VENOUS BLD VENIPUNCTURE: CPT

## 2023-07-07 PROCEDURE — 82310 ASSAY OF CALCIUM: CPT

## 2023-07-10 ENCOUNTER — TELEPHONE (OUTPATIENT)
Dept: NEUROLOGY | Facility: CLINIC | Age: 73
End: 2023-07-10

## 2023-07-10 DIAGNOSIS — G47.61 PLMD (PERIODIC LIMB MOVEMENT DISORDER): ICD-10-CM

## 2023-07-10 NOTE — TELEPHONE ENCOUNTER
Ciera Leal from [facility] left a VM re: clarification of Ampyra order. Transcribed VM:   Hi, this is Ciera Leal pharmacy technician calling from [address]. This, this message is regarding a prescription that we have received for patient Jessica Jackson. Birth date is April, 13 1950. And the reason why I'm calling is because. ..the reason why I'm calling is to clarify prescription that we have received for Ampyra 10 mg. We need to clarify the dose and the strength for this prescription. Please call us back at [phone]. I want to clarify this perception. You can also send a fax at [phone]. Thank you. So much and have a nice day.

## 2023-07-11 RX ORDER — CLONAZEPAM 0.5 MG/1
TABLET ORAL
Qty: 135 TABLET | Refills: 1 | Status: SHIPPED | OUTPATIENT
Start: 2023-07-11

## 2023-07-11 NOTE — TELEPHONE ENCOUNTER
Last office visit was with Dr. Den Medel 1/11/23. Follow up scheduled with Dr. Monet Álvarez 1/11/24.

## 2023-07-11 NOTE — TELEPHONE ENCOUNTER
Previous patient of Dr. Michael Jean, PDMP reviewed, dosing appropriate, last refill in January 11th 2023. Continue clonazepam 0.75 mg as requested.   Follow-up in January

## 2023-07-12 ENCOUNTER — TELEPHONE (OUTPATIENT)
Dept: NEUROLOGY | Facility: CLINIC | Age: 73
End: 2023-07-12

## 2023-07-12 NOTE — TELEPHONE ENCOUNTER
----- Message from Nae Frausto MD sent at 6/29/2023 11:54 AM EDT -----  Let pt know mri c spine stable comp to 2018. There is some patchy probable artifact as well. Of note, pt with vertebral artery on right looping into right foramen and chanelle into C4/5 foramen as well. I would avoid any quick neck manipulations in c spine. Would avoid any quick hyperflexion or extension of neck as well.

## 2023-07-12 NOTE — TELEPHONE ENCOUNTER
Called pt. Guanaco nino. Left detailed msg (per consent) regarding results and recommendations below.

## 2023-07-13 NOTE — TELEPHONE ENCOUNTER
Ok to bring in for appt for clearance. Need to know what is the surgery and what anesthesia pt will be getting. Ok to bring into [facility] for fit in in next month. Will pt be intubated?

## 2023-07-14 ENCOUNTER — TELEPHONE (OUTPATIENT)
Dept: NEUROLOGY | Facility: CLINIC | Age: 73
End: 2023-07-14

## 2023-07-14 NOTE — TELEPHONE ENCOUNTER
[facility] for patient to return call so we can schedule her at [facility].  Schedule open 7/19 or 7/27 @

## 2023-07-14 NOTE — TELEPHONE ENCOUNTER
[facility] for patient to return call and schedule sooner appt in [facility] for surgical clearance.  There are openings 7/19 or 7/27 if she returns call to central scheduling

## 2023-07-17 NOTE — TELEPHONE ENCOUNTER
Patient called back and accepted an appt with Dr Braxton Begun on 8/17/23 @ 10:30am. She had prior engagements for the earlier dates that were offered to her.

## 2023-07-18 ENCOUNTER — TELEPHONE (OUTPATIENT)
Dept: OBGYN CLINIC | Facility: HOSPITAL | Age: 73
End: 2023-07-18

## 2023-07-18 DIAGNOSIS — F41.9 ANXIETY: Primary | ICD-10-CM

## 2023-07-18 RX ORDER — DIAZEPAM 2 MG/1
TABLET ORAL
Qty: 1 TABLET | Refills: 0 | Status: SHIPPED | OUTPATIENT
Start: 2023-07-18

## 2023-07-18 NOTE — TELEPHONE ENCOUNTER
Called patient and let her know that script was sent to the pharmacy as requested. Patient understood and is very appreciative.

## 2023-07-18 NOTE — TELEPHONE ENCOUNTER
Caller: Patient    Doctor: Vinay Prakash    Reason for call: Patient has an MRI scheduled for tomorrow and stated Valium was to be [facility] her [facility] [address] but they stated they have no received anything yet.     Call back#: [phone]

## 2023-07-19 ENCOUNTER — HOSPITAL ENCOUNTER (OUTPATIENT)
Dept: RADIOLOGY | Facility: HOSPITAL | Age: 73
Discharge: HOME | End: 2023-07-19
Attending: ORTHOPAEDIC SURGERY
Payer: MEDICARE

## 2023-07-19 DIAGNOSIS — S49.92XA INJURY OF LEFT SHOULDER, INITIAL ENCOUNTER: ICD-10-CM

## 2023-07-19 DIAGNOSIS — S46.209A INJURY OF TENDON OF BICEPS: ICD-10-CM

## 2023-07-19 PROCEDURE — 23350 INJECTION FOR SHOULDER X-RAY: CPT

## 2023-07-19 PROCEDURE — G1004 CDSM NDSC: HCPCS

## 2023-07-19 PROCEDURE — 73222 MRI JOINT UPR EXTREM W/DYE: CPT

## 2023-07-19 PROCEDURE — 77002 NEEDLE LOCALIZATION BY XRAY: CPT

## 2023-07-19 PROCEDURE — A9585 GADOBUTROL INJECTION: HCPCS | Performed by: ORTHOPAEDIC SURGERY

## 2023-07-19 RX ORDER — SODIUM CHLORIDE 9 MG/ML
50 INJECTION INTRAVENOUS
Status: COMPLETED | OUTPATIENT
Start: 2023-07-19 | End: 2023-07-19

## 2023-07-19 RX ORDER — LIDOCAINE HYDROCHLORIDE 10 MG/ML
5 INJECTION, SOLUTION EPIDURAL; INFILTRATION; INTRACAUDAL; PERINEURAL
Status: COMPLETED | OUTPATIENT
Start: 2023-07-19 | End: 2023-07-19

## 2023-07-19 RX ADMIN — IOHEXOL 1 ML: 300 INJECTION, SOLUTION INTRAVENOUS at 14:39

## 2023-07-19 RX ADMIN — LIDOCAINE HYDROCHLORIDE 5 ML: 10 INJECTION, SOLUTION EPIDURAL; INFILTRATION; INTRACAUDAL; PERINEURAL at 14:30

## 2023-07-19 RX ADMIN — SODIUM CHLORIDE 15 ML: 9 INJECTION, SOLUTION INTRAMUSCULAR; INTRAVENOUS; SUBCUTANEOUS at 14:30

## 2023-07-19 RX ADMIN — GADOBUTROL 0.2 ML: 604.72 INJECTION INTRAVENOUS at 14:38

## 2023-07-24 ENCOUNTER — TELEPHONE (OUTPATIENT)
Dept: OBGYN CLINIC | Facility: HOSPITAL | Age: 73
End: 2023-07-24

## 2023-07-24 ENCOUNTER — OFFICE VISIT (OUTPATIENT)
Dept: OBGYN CLINIC | Facility: CLINIC | Age: 73
End: 2023-07-24
Payer: MEDICARE

## 2023-07-24 VITALS
BODY MASS INDEX: 24.45 KG/M2 | SYSTOLIC BLOOD PRESSURE: 170 MMHG | WEIGHT: 138 LBS | HEART RATE: 67 BPM | DIASTOLIC BLOOD PRESSURE: 78 MMHG | HEIGHT: 63 IN

## 2023-07-24 DIAGNOSIS — M75.102 TEAR OF LEFT SUPRASPINATUS TENDON: ICD-10-CM

## 2023-07-24 DIAGNOSIS — S49.92XD INJURY OF LEFT SHOULDER, SUBSEQUENT ENCOUNTER: Primary | ICD-10-CM

## 2023-07-24 PROCEDURE — 99213 OFFICE O/P EST LOW 20 MIN: CPT | Performed by: ORTHOPAEDIC SURGERY

## 2023-07-24 NOTE — TELEPHONE ENCOUNTER
Called and spoke w/pt and informed of LORRIE Jolley's msg. She had to call to get the Diazepam order late as it had been missed. She paid out of pocket $12.00 for one 2mg Diazepam tablet. She states that she just doesn't understand why it wasn't prior authorized. She called insurance to see if it could still be submitted and be reversed.

## 2023-07-24 NOTE — TELEPHONE ENCOUNTER
Caller: Kylah Howard     Doctor: Destiny Frazier     Reason for call:  They need prior auth for diazepam (VALIUM)     Call back#: [phone]   Case [id number]

## 2023-07-24 NOTE — TELEPHONE ENCOUNTER
Please contact the patient. Let her know that her insurance is requiring us a prior authorization for the diazepam that we ordered for her to have prior to her imaging. I am not sure if she picked this up yet or not. It is showed as filled in the PDMP.   We could go through the prior authorization process if she would like but please inform her that she does have the option of paying for this out of pocket and this should be very inexpensive for 1 tablet of diazepam

## 2023-07-24 NOTE — PROGRESS NOTES
Assessment:  1. Injury of left shoulder, subsequent encounter  Ambulatory Referral to Orthopedic Surgery      2. 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   • Multiple sclerosis ([address])   • Peripheral polyneuropathy   • Depression   • Fatigue   • Gait disturbance   • Hip pain, right   • Unilateral occipital headache   • History of bilateral hip replacements   • Hypokalemia   • Insomnia   • Solitary pulmonary nodule   • Pain of left lower leg   • Osteopenia   • Nephrolithiasis   • Tingling   • Screening mammogram, encounter for   • Encounter for breast reconstruction following mastectomy   • Abnormal stool test   • Encounter for follow-up surveillance of breast cancer   • Essential hypertension   • Abnormal finding on breast imaging   • TSH (thyroid-stimulating hormone deficiency)   • B12 deficiency   • RLS (restless legs syndrome)   • Dense breast tissue   • Subareolar lump of right breast   • Hyperlipidemia           Plan      75 y.o. female with left shoulder injury  · MRI reviewed demonstrates supraspinatus tear  · Diagnosis and treatment options discussed, all her questions were addressed  · Referral to Dr Michi Schneider provided to discuss possible surgery   · Patient declined bone stimulator for subacute fracture seen on MRI            Subjective:     Patient ID:    Chief Complaint:Jacy Card [place] 75 y.o. female      HPI    Patient presents for follow up evaluation of left shoulder and MRI review. Original injury was in April. Patient notes continued pain in her anterior shoulder with no improvements. Limitations in strength and range of motion.        The following portions of the patient's history were reviewed and updated as appropriate: allergies, current medications, past family history, past social history, past surgical history and problem list.        Social History Socioeconomic History   • Marital status: /Civil Union     Spouse name: Not on file   • Number of children: 2   • Years of education: Completed bachelor's degree   • Highest education level: Not on file   Occupational History   • Occupation: RN     Comment: Retired   Tobacco Use   • Smoking status: Former     Packs/day: 1.00     Years: 35.00     Total pack years: 35.00     Types: Cigarettes     Quit date:      Years since quittin.5   • Smokeless tobacco: Never   Vaping Use   • Vaping Use: Never used   Substance and Sexual Activity   • Alcohol use: No   • Drug use: No   • Sexual activity: Yes     Partners: Male     Birth control/protection: Post-menopausal   Other Topics Concern   • Not on file   Social History Narrative    Denied:  History of caffeine use     Social Determinants of Health     Financial Resource Strain: Medium Risk (10/22/2022)    Overall Financial Resource Strain (CARDIA)    • Difficulty of Paying Living Expenses: Somewhat hard   Food Insecurity: Not on file   Transportation Needs: No Transportation Needs (10/22/2022)    PRAPARE - Transportation    • Lack of Transportation (Medical): No    • Lack of Transportation (Non-Medical):  No   Physical Activity: Not on file   Stress: Not on file   Social Connections: Not on file   Intimate Partner Violence: Not on file   Housing Stability: Not on file     Past Medical History:   Diagnosis Date   • Allergic 1967    seasonal   • Allergic rhinitis    • Bone pain    • Breast cancer ([address]) 2012   • Breast ptosis    • Depression    • Fatigue    • Gait disturbance     uses cane at times   • Headache    • Hip fracture (HCC)    • Hip pain    • History of transfusion 1975    placenta previa s/p work accident, no rx   • Hypokalemia    • Insomnia    • Laceration of finger     right hand   • Left ankle pain    • Left knee pain    • Multiple sclerosis (HCC)    • Muscle strain     left lower leg   • Nephrolithiasis    • Osteopenia    • Osteoporosis • Pain of left calf    • Pneumonia    • Rash    • Scoliosis birth    scoliosis   • Solitary pulmonary nodule    • SVT (supraventricular tachycardia) (HCC)    • Tingling    • Urgency of urination    • Urticaria    • Wrist fracture, left      Past Surgical History:   Procedure Laterality Date   • ANKLE SURGERY     • APPENDECTOMY  11/2012    Dr. Laureano Chiang   • AUGMENTATION BREAST      Enlargement procedure with prosthetic implant bilateral   • AUGMENTATION MAMMAPLASTY Right 01/28/2020    implant replaced because of recall   • AUGMENTATION MAMMAPLASTY Bilateral 2012   • BREAST BIOPSY Left 07/23/2012   • BREAST IMPLANT Right 01/28/2020    Procedure: BREAST IMPLANT EXCHANGE WITH CAPSULECTOMY;  Surgeon: Alexei Hunt MD;  Location: [facility];  Service: Plastics   • BREAST IMPLANT REMOVAL Right 01/28/2020    implant placement, implant revision, right mastopexy   • BREAST LUMPECTOMY     • CYSTOSTOMY      with basket extraction of calculus; x2   • EXCISION / BIOPSY BREAST / Jadine Debar / DUCT Right 05/04/2020    scar revision to resuture non healing surgical wound   • EXPLORATORY LAPAROTOMY     • FL INJECTION LEFT SHOULDER (ARTHROGRAM)  7/19/2023   • FOOT SURGERY     • FRACTURE SURGERY  Lt wrist 9/2014 - Lt matthew femur 9/14   • HIP FRACTURE SURGERY Right     Subcapital   • HIP SURGERY Left    • JOINT REPLACEMENT  Rt hip 10/2012   • LEG SURGERY      Repair   • MASTECTOMY Left 08/16/2012   • CT MASTOPEXY Right 01/28/2020    Procedure: BREAST MASTOPEXY;  Surgeon: Alexei Hunt MD;  Location: [facility];  Service: Plastics   • CT REVISION OF RECONSTRUCTED BREAST Right 05/04/2020    Procedure: REVISION RIGHT BREAST MOUND;  Surgeon: Alexei Hunt MD;  [facility];  Service: Plastics   • REDUCTION MAMMAPLASTY Right 01/28/2020    reduced to match left side   • SENTINEL LYMPH NODE BIOPSY Left 08/16/2012   • SKIN BIOPSY     • TONSILLECTOMY     • TUBAL LIGATION     • WRIST SURGERY Left      Allergies   Allergen Reactions   • Duloxetine Hcl      Rapid Heart rate     • Iodinated Contrast Media Anaphylaxis   • Acetaminophen Other (See Comments)     Heart palpitations   • Cetirizine      Only occurs with generic, weakness in legs, off balance and couldn't walk. • Morphine Other (See Comments)     Migraine     Current Outpatient Medications on File Prior to Visit   Medication Sig Dispense Refill   • ALPHA LIPOIC ACID PO Take by mouth     • Ampyra 10 MG TB12 1 tablet daily 90 tablet 0   • ascorbic acid (VITAMIN C) 500 mg tablet Take 500 mg by mouth daily     • baclofen 10 mg tablet 1 tab pos q am, 1 tab po q pm and 2 tabs hs 360 tablet 3   • Biotin 5000 MCG TABS      • Cholecalciferol (VITAMIN D3) 1000 units CAPS Take 5,000 Units by mouth       • clonazePAM (KlonoPIN) 0.5 mg tablet TAKE 1 AND 1/2 TABLETS BY MOUTH DAILY AT BEDTIME 135 tablet 1   • Copaxone 40 MG/ML SOSY INJECT ONE SYRINGE SUBCUTANEOUSLY THREE TIMES PER WEEK AT LEAST 48 HOURS APART. ALLOW SYRINGE TO WARM TO ROOM TEMPERATURE FOR 20 MINUTES. REFRIGERATE.  36 mL 3   • Cranberry 125 MG TABS Take 125 mg by mouth in the morning     • cycloSPORINE (RESTASIS) 0.05 % ophthalmic emulsion Administer 1 drop to both eyes every 12 (twelve) hours     • denosumab (PROLIA) 60 mg/mL Inject under the skin     • diazepam (VALIUM) 2 mg tablet Take one tablet one hour prior to MRI 1 tablet 0   • Diclofenac Sodium (VOLTAREN) 1 % Apply 2 g topically 4 (four) times a day as needed (as needed for pain) 100 g 3   • docusate sodium (COLACE) 100 mg capsule Take 1 capsule by mouth every other day       • gabapentin (NEURONTIN) 300 mg capsule Take 1 capsule (300 mg total) by mouth daily at bedtime 90 capsule 3   • gabapentin (NEURONTIN) 600 MG tablet 2 tabs po tid (Patient taking differently: Take 600 mg by mouth 2 tabs in morning, 1.5 tabs in afternoon, 2-600mg, 1 300mg at bedtime (900 mg)) 540 tablet 3   • Magnesium 500 MG TABS Take 500 tablets by mouth once     • solifenacin (VESICARE) 10 MG tablet Take 1 tablet (10 mg total) by mouth daily 90 tablet 3   • zonisamide (ZONEGRAN) 100 mg capsule Take 4 capsules (400 mg total) by mouth daily at bedtime 360 capsule 3   • calcium carbonate (OS-CARTER) 600 MG tablet Take 600 mg by mouth daily (Patient not taking: Reported on 7/24/2023)     • clonazePAM (KlonoPIN) 0.5 mg tablet TAKE 1 AND 1/2 TABLETS BY MOUTH DAILY AT BEDTIME (Patient not taking: Reported on 7/24/2023) 135 tablet 1     Current Facility-Administered Medications on File Prior to Visit   Medication Dose Route Frequency Provider Last Rate Last Admin   • bacitracin 50,000 Units, gentamicin (GARAMYCIN) 40 mg/mL 80 mg, ceFAZolin (ANCEF) 1,000 mg in sodium chloride 0.9 % 1,000 mL irrigation bottle   Irrigation Once Shon Benitez MD                  Objective:    Review of Systems   Constitutional: Negative for chills and fever. HENT: Negative for ear pain and sore throat. Eyes: Negative for pain and visual disturbance. Respiratory: Negative for cough and shortness of breath. Cardiovascular: Negative for chest pain and palpitations. Gastrointestinal: Negative for abdominal pain and vomiting. Genitourinary: Negative for dysuria and hematuria. Musculoskeletal: Negative for arthralgias and back pain. Skin: Negative for color change and rash. Neurological: Negative for seizures and syncope. All other systems reviewed and are negative. Left Shoulder Exam     Tenderness   The patient is experiencing tenderness in the biceps tendon. Other   Erythema: absent  Sensation: normal  Pulse: present     Comments:  (+) Speed   (+) O'juan's            Physical Exam  Vitals and nursing note reviewed. HENT:      Head: Normocephalic. Eyes:      Extraocular Movements: Extraocular movements intact. Cardiovascular:      Rate and Rhythm: Normal rate. Pulses: Normal pulses.    Pulmonary:      Effort: Pulmonary effort is normal.   Musculoskeletal:         General: Normal range of motion. Cervical back: Normal range of motion. Skin:     General: Skin is warm and dry. Neurological:      General: No focal deficit present. Mental Status: She is alert. Psychiatric:         Behavior: Behavior normal.         Procedures  No Procedures performed today    I have personally reviewed pertinent films in PACS. MRI of left shoulder obtained 7/19/2023 demonstrates partial thickness supraspinatus tear measuring 1.0x1.6cm. Subacute to chronic non displaced greater tuberosity fracture. Scribe Attestation    I,:  Vandana Angelo am acting as a scribe while in the presence of the attending physician.:       I,:  Darvin Ceron DO personally performed the services described in this documentation    as scribed in my presence.:           Portions of the record may have been created with voice recognition software.  Occasional wrong word or "sound a like" substitutions may have occurred due to the inherent limitations of voice recognition software.  Read the chart carefully and recognize, using context, where substitutions have occurred.

## 2023-07-24 NOTE — TELEPHONE ENCOUNTER
When I spoke w/pt, I did apologize that that had happened. She stated that she was having insurance send paperwork for prior-auth to be resubmitted. Thank you.

## 2023-07-25 NOTE — TELEPHONE ENCOUNTER
[address] [facility] [phone]. Spoke w/Yaa. Call transferred to pharmacy. Spoke w/Garret. They are questioning dosing. Patient previously on Ampyra BID. Per 6/6/23 LOV note: Will dec ampyra to once daily and also dec gabapentin by 600 mg ie to total of 3000mg daily to see if help with renal fx. Pharmacist verbalized understanding. They will process rx with daily dosing.

## 2023-07-26 ENCOUNTER — TELEPHONE (OUTPATIENT)
Dept: FAMILY MEDICINE CLINIC | Facility: OTHER | Age: 73
End: 2023-07-26

## 2023-07-26 NOTE — TELEPHONE ENCOUNTER
Hi, this is Beau Morgan. Date of birth 1950. My phone number is [phone]. I was just calling to let doctor know that I have a pre op clearance on September 6th. I'm not sure what's going on but the surgery that was scheduled was with Doctor Katherine Griffin. I now have a confirmed fracture in my shoulder with a 50% torn rotator cuff. So I may have to reschedule that and I need a repair. So I will keep her in touch and let her know what's going on. I just wanted to update the doctor. Thank you very much.

## 2023-07-26 NOTE — TELEPHONE ENCOUNTER
Pt wanted me to relay to PCP and physician seeing her on 9/6 for pre op that she had to cancel her mastectomy surgery. States it was recommended for her to get her rotator cuff repair done first on L shoulder due to 50 % torn rotator cuff after fall. Patient just wanted to keep everyone in the loop.

## 2023-07-27 ENCOUNTER — TELEPHONE (OUTPATIENT)
Dept: NEUROLOGY | Facility: CLINIC | Age: 73
End: 2023-07-27

## 2023-07-27 NOTE — TELEPHONE ENCOUNTER
Let pt know mri c spine from ms standpoint seems stable. 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 Typically predominately benign vascular lesion in vertebral body. Pt is seeing ortho and they could comment furhter on bony abnormality. Also incidentally pt with tortuous vertebral arteries near foramen or opening of nerve root. This is her anatomy,  Nothing to do with this.just said to avoid quick manipulation ie neck cracking due to proximity of curvature of vessels and spine. Pt can also ask ortho about her spine for further advice.    Please send copy of mri c spine to her ortho team.

## 2023-07-27 NOTE — TELEPHONE ENCOUNTER
----- Message from Brandie Corral RN sent at 7/27/2023  9:22 AM EDT -----  Regarding: FW: Appointment cancelled  Contact: [phone]    ----- Message -----  From: Rosetta Trejo MA  Sent: 7/26/2023   3:25 PM EDT  To: Neurology [place] Clinical Team 3  Subject: Appointment cancelled                            Please assist       ----- Message -----  From: Ashwin Celaya"  Sent: 7/26/2023   9:17 AM EDT  To: [facility]  Subject: Appointment cancelled                            Dr Jesus Ventura,  I wanted to make sure that I had cancelled my pre surgical clearance appointment with you. I have been trying to keep things straight, but, with some offices only taking messages I wanted to assure all is taken care of. My surgery will be cancelled with Dr. River Rivero. It turns out that when I fell on April 27th I sustained a non displaced fx of my shoulder and 50% tear in my rotator cuff. I will need surgery to repair that and it's on the same side. The delay in dx was the fact that I could not get an appointment for an arthrogram.   Can someone please phone me to explaim the significance of the hemangiomas on the MRI and the instruction of not turning my head quickly. Thank you.   Goran Montejo

## 2023-08-07 ENCOUNTER — APPOINTMENT (OUTPATIENT)
Dept: RADIOLOGY | Facility: AMBULARY SURGERY CENTER | Age: 73
End: 2023-08-07
Attending: STUDENT IN AN ORGANIZED HEALTH CARE EDUCATION/TRAINING PROGRAM
Payer: MEDICARE

## 2023-08-07 ENCOUNTER — OFFICE VISIT (OUTPATIENT)
Dept: OBGYN CLINIC | Facility: CLINIC | Age: 73
End: 2023-08-07
Payer: MEDICARE

## 2023-08-07 VITALS
BODY MASS INDEX: 24.45 KG/M2 | SYSTOLIC BLOOD PRESSURE: 149 MMHG | WEIGHT: 138 LBS | DIASTOLIC BLOOD PRESSURE: 87 MMHG | HEART RATE: 78 BPM

## 2023-08-07 DIAGNOSIS — S49.92XD INJURY OF LEFT SHOULDER, SUBSEQUENT ENCOUNTER: ICD-10-CM

## 2023-08-07 DIAGNOSIS — M75.102 TEAR OF LEFT SUPRASPINATUS TENDON: ICD-10-CM

## 2023-08-07 PROCEDURE — 99214 OFFICE O/P EST MOD 30 MIN: CPT | Performed by: STUDENT IN AN ORGANIZED HEALTH CARE EDUCATION/TRAINING PROGRAM

## 2023-08-07 PROCEDURE — 73030 X-RAY EXAM OF SHOULDER: CPT

## 2023-08-07 NOTE — PROGRESS NOTES
Ortho Sports Medicine Shoulder New Patient Visit     Assesment:   75 y.o. female left shoulder greater tuberosity fracture and partial-thickness rotator cuff tear with date of injury 4/27/2023    Plan:  The patient's diagnosis and treatment were discussed at length today. We discussed no treatment, non-operative treatment, and operative treatment. The patient's finding and exam are consistent with left shoulder greater tuberosity fracture with partial-thickness rotator cuff tear. I explained the challenges of attempting a completion of rotator cuff with repair into a fractured greater tuberosity footprint. I discussed with her this can be treated nonoperatively with a combination of activity modification and outpatient physical therapy. I discussed with her that given her healing fracture as well as history of breast cancer with undergoing surgical procedure for this in the near future I do not recommend any cortisone injection at this point time. I did provide her with a referral to outpatient physical therapy for shoulder, scapular, and rotator cuff strengthening range of motion exercises. I discussed with her that she is able to perform activity as tolerated using pain as a guide, however repetitive overhead motion may increase her symptoms. She can continue to use ice, heat, and over-the-counter medication as needed for pain relief. She can follow-up in approximately 8 weeks or as needed. Conservative treatment:    Ice to shoulder 1-2 times daily, for 20 minutes at a time. PT for ROM and strengthening to shoulder, rotator cuff, scapular stabilizers. Let pain guide return to activities. Imaging: All imaging from today was reviewed by myself and explained to the patient. Injection:    No Injection planned at this time. Surgery:     No surgery is recommended at this point, continue with conservative treatment plan as noted. Follow up:    No follow-ups on file.         Chief Complaint   Patient presents with   • Left Shoulder - Fracture, Pain       History of Present Illness: The patient is a 75 y.o., right hand dominant female whose occupation is retired, referred to me by Dr. Criss Leon DO, seen in clinic for evaluation of left shoulder pain. She does have a history of multiple sclerosis and ambulates with the assistance of a cane. She states on 4/27/2023 she did fall landing on her left shoulder. She states after this fall she had significant pain along the anterior aspect of her shoulder. She previously saw Dr. Vee Miranda who obtained an MR arthrogram that demonstrated partial-thickness rotator cuff tear and discussed with her that this would require surgical intervention. She states that the pain has improved, however it does cause some difficulty performing activities of daily living. She has previously done physical therapy in the past and does have interest in restarting physical therapy and avoiding surgical intervention. She currently attempts to manage her pain with ice and over-the-counter medication. She denies any new injury or trauma to her shoulder. She denies any numbness or tingling.     Shoulder Surgical History:  None    Past Medical, Social and Family History:  Past Medical History:   Diagnosis Date   • Allergic 1967    seasonal   • Allergic rhinitis    • Bone pain    • Breast cancer ([address]) 08/16/2012   • Breast ptosis    • Depression    • Fatigue    • Gait disturbance     uses cane at times   • Headache    • Hip fracture (HCC)    • Hip pain    • History of transfusion 1975    placenta previa s/p work accident, no rx   • Hypokalemia    • Insomnia    • Laceration of finger     right hand   • Left ankle pain    • Left knee pain    • Multiple sclerosis (HCC)    • Muscle strain     left lower leg   • Nephrolithiasis    • Osteopenia    • Osteoporosis    • Pain of left calf    • Pneumonia    • Rash    • Scoliosis birth    scoliosis   • Solitary pulmonary nodule • SVT (supraventricular tachycardia) ([facility])    • Tingling    • Urgency of urination    • Urticaria    • Wrist fracture, left      Past Surgical History:   Procedure Laterality Date   • ANKLE SURGERY     • APPENDECTOMY  11/2012    Dr. Abhijit Patel   • AUGMENTATION BREAST      Enlargement procedure with prosthetic implant bilateral   • AUGMENTATION MAMMAPLASTY Right 01/28/2020    implant replaced because of recall   • AUGMENTATION MAMMAPLASTY Bilateral 2012   • BREAST BIOPSY Left 07/23/2012   • BREAST IMPLANT Right 01/28/2020    Procedure: BREAST IMPLANT EXCHANGE WITH CAPSULECTOMY;  Surgeon: Ella Bruner MD;  [facility];  Service: Plastics   • BREAST IMPLANT REMOVAL Right 01/28/2020    implant placement, implant revision, right mastopexy   • BREAST LUMPECTOMY     • CYSTOSTOMY      with basket extraction of calculus; x2   • EXCISION / BIOPSY BREAST / Robyn Fabian / DUCT Right 05/04/2020    scar revision to resuture non healing surgical wound   • EXPLORATORY LAPAROTOMY     • FL INJECTION LEFT SHOULDER (ARTHROGRAM)  7/19/2023   • FOOT SURGERY     • FRACTURE SURGERY  Lt wrist 9/2014 - Lt matthew femur 9/14   • HIP FRACTURE SURGERY Right     Subcapital   • HIP SURGERY Left    • JOINT REPLACEMENT  Rt hip 10/2012   • LEG SURGERY      Repair   • MASTECTOMY Left 08/16/2012   • MT MASTOPEXY Right 01/28/2020    Procedure: BREAST MASTOPEXY;  Surgeon: Ella Bruner MD;  [facility];  Service: Plastics   • MT REVISION OF RECONSTRUCTED BREAST Right 05/04/2020    Procedure: REVISION RIGHT BREAST MOUND;  Surgeon: Ella Bruner MD;  [facility];  Service: Plastics   • REDUCTION MAMMAPLASTY Right 01/28/2020    reduced to match left side   • SENTINEL LYMPH NODE BIOPSY Left 08/16/2012   • SKIN BIOPSY     • TONSILLECTOMY     • TUBAL LIGATION     • WRIST SURGERY Left      Allergies   Allergen Reactions   • Duloxetine Hcl      Rapid Heart rate     • Iodinated Contrast Media Anaphylaxis   • Acetaminophen Other (See Comments)     Heart palpitations   • Cetirizine      Only occurs with generic, weakness in legs, off balance and couldn't walk. • Morphine Other (See Comments)     Migraine     Current Outpatient Medications on File Prior to Visit   Medication Sig Dispense Refill   • ALPHA LIPOIC ACID PO Take by mouth     • Ampyra 10 MG TB12 1 tablet daily 90 tablet 0   • ascorbic acid (VITAMIN C) 500 mg tablet Take 500 mg by mouth daily     • baclofen 10 mg tablet 1 tab pos q am, 1 tab po q pm and 2 tabs hs 360 tablet 3   • Biotin 5000 MCG TABS      • Cholecalciferol (VITAMIN D3) 1000 units CAPS Take 5,000 Units by mouth       • clonazePAM (KlonoPIN) 0.5 mg tablet TAKE 1 AND 1/2 TABLETS BY MOUTH DAILY AT BEDTIME 135 tablet 1   • Copaxone 40 MG/ML SOSY INJECT ONE SYRINGE SUBCUTANEOUSLY THREE TIMES PER WEEK AT LEAST 48 HOURS APART. ALLOW SYRINGE TO WARM TO ROOM TEMPERATURE FOR 20 MINUTES. REFRIGERATE.  36 mL 3   • Cranberry 125 MG TABS Take 125 mg by mouth in the morning     • cycloSPORINE (RESTASIS) 0.05 % ophthalmic emulsion Administer 1 drop to both eyes every 12 (twelve) hours     • denosumab (PROLIA) 60 mg/mL Inject under the skin     • Diclofenac Sodium (VOLTAREN) 1 % Apply 2 g topically 4 (four) times a day as needed (as needed for pain) 100 g 3   • docusate sodium (COLACE) 100 mg capsule Take 1 capsule by mouth every other day       • gabapentin (NEURONTIN) 300 mg capsule Take 1 capsule (300 mg total) by mouth daily at bedtime 90 capsule 3   • gabapentin (NEURONTIN) 600 MG tablet 2 tabs po tid (Patient taking differently: Take 600 mg by mouth 2 tabs in morning, 1.5 tabs in afternoon, 2-600mg, 1 300mg at bedtime (900 mg)) 540 tablet 3   • Magnesium 500 MG TABS Take 500 tablets by mouth once     • solifenacin (VESICARE) 10 MG tablet Take 1 tablet (10 mg total) by mouth daily 90 tablet 3   • zonisamide (ZONEGRAN) 100 mg capsule Take 4 capsules (400 mg total) by mouth daily at bedtime 360 capsule 3   • calcium carbonate (OS-CARTER) 600 MG tablet Take 600 mg by mouth daily (Patient not taking: Reported on 2023)     • clonazePAM (KlonoPIN) 0.5 mg tablet TAKE 1 AND 1/2 TABLETS BY MOUTH DAILY AT BEDTIME (Patient not taking: Reported on 2023) 135 tablet 1   • diazepam (VALIUM) 2 mg tablet Take one tablet one hour prior to MRI 1 tablet 0     Current Facility-Administered Medications on File Prior to Visit   Medication Dose Route Frequency Provider Last Rate Last Admin   • bacitracin 50,000 Units, gentamicin (GARAMYCIN) 40 mg/mL 80 mg, ceFAZolin (ANCEF) 1,000 mg in sodium chloride 0.9 % 1,000 mL irrigation bottle   Irrigation Once Sharmila Gray MD         Social History     Socioeconomic History   • Marital status: /Civil Union     Spouse name: Not on file   • Number of children: 2   • Years of education: Completed bachelor's degree   • Highest education level: Not on file   Occupational History   • Occupation: RN     Comment: Retired   Tobacco Use   • Smoking status: Former     Packs/day: 1.00     Years: 35.00     Total pack years: 35.00     Types: Cigarettes     Quit date:      Years since quittin.6   • Smokeless tobacco: Never   Vaping Use   • Vaping Use: Never used   Substance and Sexual Activity   • Alcohol use: No   • Drug use: No   • Sexual activity: Yes     Partners: Male     Birth control/protection: Post-menopausal   Other Topics Concern   • Not on file   Social History Narrative    Denied:  History of caffeine use     Social Determinants of Health     Financial Resource Strain: Medium Risk (10/22/2022)    Overall Financial Resource Strain (CARDIA)    • Difficulty of Paying Living Expenses: Somewhat hard   Food Insecurity: Not on file   Transportation Needs: No Transportation Needs (10/22/2022)    PRAPARE - Transportation    • Lack of Transportation (Medical): No    • Lack of Transportation (Non-Medical):  No   Physical Activity: Not on file   Stress: Not on file   Social Connections: Not on file   Intimate Partner Violence: Not on file   Housing Stability: Not on file         I have reviewed the past medical, surgical, social and family history, medications and allergies as documented in the EMR. Review of systems: ROS is negative other than that noted in the HPI. Constitutional: Negative for fatigue and fever. HENT: Negative for sore throat. Respiratory: Negative for shortness of breath. Cardiovascular: Negative for chest pain. Gastrointestinal: Negative for abdominal pain. Endocrine: Negative for cold intolerance and heat intolerance. Genitourinary: Negative for flank pain. Musculoskeletal: Negative for back pain. Skin: Negative for rash. Allergic/Immunologic: Negative for immunocompromised state. Neurological: Negative for dizziness. Psychiatric/Behavioral: Negative for agitation. Physical Exam:    Blood pressure 149/87, pulse 78, weight 62.6 kg (138 lb). General/Constitutional: NAD, well developed, well nourished  HENT: Normocephalic, atraumatic  CV: Intact distal pulses, regular rate  Resp: No respiratory distress or labored breathing  Lymphatic: No lymphadenopathy palpated  Neuro: Alert and Oriented x 3, no focal deficits  Psych: Normal mood, normal affect, normal judgement, normal behavior  Skin: Warm, dry, no rashes, no erythema      Shoulder focused exam:     Visual inspection of the shoulder demonstrates normal contour without atrophy  No evidence of scapular dyskinesia or atrophy.   No scapular winging  Active and passive range of motion demonstrates forward flexion to 150, abduction to 100, extension of 10, external rotation is approximately 90 with the arm in 90° of abduction, external rotation is 45 with the arm the side, internal rotation to mid thoracic   Rotator cuff strength is 4+/5 to resisted forward flexion, 4+/5 resisted abduction, 4/5 resisted external rotation, 5/5 resisted internal rotation  No tenderness to palpation at the distal clavicle, AC joint, acromion, or scapular spine  No pain with cross-body adduction  - Neer's test, - modified Mason impingement test  Negative external rotation lag sign  Negative belly press, negative lift-off  - speed's and Yergason's test  - tenderness to palpation at the bicipital groove  - [place]'s test        UE NV Exam: +2 Radial pulses bilaterally  Sensation intact to light touch C5-T1 bilaterally, Radial/median/ulnar nerve motor intact      Bilateral elbow, wrist, and and forearm ROM full, painless with passive ROM, no ttp or crepitance throughout extremities below shoulder joint    Cervical ROM is full without pain, numbness or tingling      Shoulder Imaging    X-rays of the left shoulder were reviewed, which demonstrate healing greater tuberosity fracture in stable alignment. I have reviewed the radiology report and do not currently have a radiology reading from [facility], but will check the result once the reading is performed. MRI arthrogram of the left shoulder were reviewed, which demonstrate moderate supraspinatus tendinosis with partial-thickness tearing spanning 10 mm x 1.6 cm. There is also evidence of subacute to chronic nondisplaced fracture of greater tuberosity. I have reviewed the radiology report and agree with their impression.       Scribe Attestation    I,:  Juan Pablo Vale am acting as a scribe while in the presence of the attending physician.:       I,:  Ajit Hernandez DO personally performed the services described in this documentation    as scribed in my presence.:

## 2023-08-08 ENCOUNTER — TELEPHONE (OUTPATIENT)
Dept: NEUROLOGY | Facility: CLINIC | Age: 73
End: 2023-08-08

## 2023-08-08 NOTE — TELEPHONE ENCOUNTER
----- Message from Debbi Champion sent at 8/8/2023 12:47 PM EDT -----  Regarding: FW: Copaxone  Contact: [phone]  Just a FYI from patient. ... ----- Message -----  From: Libby Woodard"  Sent: 8/8/2023  11:59 AM EDT  To: [facility]  Subject: Copaxone                                         Dr. Rajat Esqueda,  I received a letter yesterday from [address] [place] [address] the  of Copaxone notifying me that the supplier of the Autojets has discontinued the device. They would like all patients to self inject from now on. I can use the autojet I have while it is still functioning but then I cannot get a replacement. I can self inject my thigh and abdomen. I cannot inject any other part of my body. I stopped allowing my  to inject my gluts because either the site bled, shot the med out like a geyser , blew up or hurt like crazy! Just wanted you to know since I am probably not your only patient on Copaxone or maybe we can think of alternatives till I see you again. I have LOADS so we have time.   Thanks  [employer]

## 2023-08-10 PROCEDURE — NC001 PR NO CHARGE: Performed by: PHYSICIAN ASSISTANT

## 2023-08-10 NOTE — H&P
Assessment/Plan: Please see HPI. She remains pleased with the results of our breast reconstruction efforts, both breasts remain soft and supple and without evidence of capsular contracture. She would be interested in proceeding with fat grafting to address the upper poles of both breasts, in the upper lateral area of the reconstructed left breast.  We would likely utilize bilateral flanks potentially the lower abdomen as initial donor sites. I discussed fat grafting in detail, how it is performed, as well as potential risk, complications and limitations including the potential likelihood of benefiting from several sessions of autologous fat grafting, typically spaced in a minimum of 3 to 4 months between grafting sessions. Her questions were answered to her satisfaction and consent was obtained. She will work with our coordinator to arrange a date for the procedure. There are no diagnoses linked to this encounter. Subjective: Follow-up breast reconstruction     Patient ID: Trina Mckeon is a 75 y.o. female. HOWARD DUNAWAY returns in follow-up for an annual, routine visit status post bilateral breast reconstruction. She remains pleased with the results. Of note, she has suffered 2 falls recently which she relates to her MS, is currently undergoing orthopedic evaluation. The following portions of the patient's history were reviewed and updated as appropriate: allergies, current medications, past family history, past medical history, past social history, past surgical history and problem list.    Review of Systems   Constitutional: Negative for chills and fever. HENT: Negative for hearing loss. Eyes: Negative for discharge and visual disturbance. Respiratory: Negative for chest tightness and shortness of breath. Cardiovascular: Negative for chest pain and leg swelling. Gastrointestinal: Negative for blood in stool, constipation, diarrhea and nausea.    Genitourinary: Negative for dysuria. Musculoskeletal: Positive for gait problem. Gait disturbance, ambulates with cane, secondary to MS   Skin: Negative for rash. Allergic/Immunologic: Negative for immunocompromised state. Neurological: Negative for seizures and headaches. Hematological: Does not bruise/bleed easily. Psychiatric/Behavioral: Negative for dysphoric mood. The patient is not nervous/anxious. Objective: There were no vitals taken for this visit. Physical Exam  Constitutional:       Appearance: Normal appearance. HENT:      Head: Normocephalic. Eyes:      Extraocular Movements: Extraocular movements intact. Pupils: Pupils are equal, round, and reactive to light. Cardiovascular:      Rate and Rhythm: Normal rate. Pulmonary:      Effort: Pulmonary effort is normal.   Abdominal:      Palpations: Abdomen is soft. Musculoskeletal:         General: Normal range of motion. Cervical back: Normal range of motion and neck supple. Skin:     General: Skin is warm. Comments: Breast examination reveals bilateral reconstructed breasts, the breast implants are in good position, both breasts are soft, supple and without evidence of capsular contracture. There is the anticipated hollowing of the upper poles of the breasts, see photos   Neurological:      Mental Status: She is alert and oriented to person, place, and time.    Psychiatric:         Mood and Affect: Mood normal.

## 2023-08-11 ENCOUNTER — APPOINTMENT (OUTPATIENT)
Dept: LAB | Facility: CLINIC | Age: 73
End: 2023-08-11
Payer: MEDICARE

## 2023-08-11 DIAGNOSIS — Z85.3 PERSONAL HISTORY OF BREAST CANCER: ICD-10-CM

## 2023-08-11 LAB
ANION GAP SERPL CALCULATED.3IONS-SCNC: 4 MMOL/L
BASOPHILS # BLD AUTO: 0.06 THOUSANDS/ÂΜL (ref 0–0.1)
BASOPHILS NFR BLD AUTO: 1 % (ref 0–1)
BUN SERPL-MCNC: 14 MG/DL (ref 5–25)
CALCIUM SERPL-MCNC: 10 MG/DL (ref 8.3–10.1)
CHLORIDE SERPL-SCNC: 111 MMOL/L (ref 96–108)
CO2 SERPL-SCNC: 26 MMOL/L (ref 21–32)
CREAT SERPL-MCNC: 1.14 MG/DL (ref 0.6–1.3)
EOSINOPHIL # BLD AUTO: 0.27 THOUSAND/ÂΜL (ref 0–0.61)
EOSINOPHIL NFR BLD AUTO: 5 % (ref 0–6)
ERYTHROCYTE [DISTWIDTH] IN BLOOD BY AUTOMATED COUNT: 12.6 % (ref 11.6–15.1)
GFR SERPL CREATININE-BSD FRML MDRD: 47 ML/MIN/1.73SQ M
GLUCOSE P FAST SERPL-MCNC: 90 MG/DL (ref 65–99)
HCT VFR BLD AUTO: 42.7 % (ref 34.8–46.1)
HGB BLD-MCNC: 13.2 G/DL (ref 11.5–15.4)
IMM GRANULOCYTES # BLD AUTO: 0.02 THOUSAND/UL (ref 0–0.2)
IMM GRANULOCYTES NFR BLD AUTO: 0 % (ref 0–2)
LYMPHOCYTES # BLD AUTO: 1.69 THOUSANDS/ÂΜL (ref 0.6–4.47)
LYMPHOCYTES NFR BLD AUTO: 30 % (ref 14–44)
MCH RBC QN AUTO: 30.5 PG (ref 26.8–34.3)
MCHC RBC AUTO-ENTMCNC: 30.9 G/DL (ref 31.4–37.4)
MCV RBC AUTO: 99 FL (ref 82–98)
MONOCYTES # BLD AUTO: 0.41 THOUSAND/ÂΜL (ref 0.17–1.22)
MONOCYTES NFR BLD AUTO: 7 % (ref 4–12)
NEUTROPHILS # BLD AUTO: 3.22 THOUSANDS/ÂΜL (ref 1.85–7.62)
NEUTS SEG NFR BLD AUTO: 57 % (ref 43–75)
NRBC BLD AUTO-RTO: 0 /100 WBCS
PLATELET # BLD AUTO: 231 THOUSANDS/UL (ref 149–390)
PMV BLD AUTO: 10.2 FL (ref 8.9–12.7)
POTASSIUM SERPL-SCNC: 4.1 MMOL/L (ref 3.5–5.3)
RBC # BLD AUTO: 4.33 MILLION/UL (ref 3.81–5.12)
SODIUM SERPL-SCNC: 141 MMOL/L (ref 135–147)
WBC # BLD AUTO: 5.67 THOUSAND/UL (ref 4.31–10.16)

## 2023-08-11 PROCEDURE — 85025 COMPLETE CBC W/AUTO DIFF WBC: CPT

## 2023-08-11 PROCEDURE — 36415 COLL VENOUS BLD VENIPUNCTURE: CPT

## 2023-08-11 PROCEDURE — 80048 BASIC METABOLIC PNL TOTAL CA: CPT

## 2023-08-14 NOTE — PROGRESS NOTES
PT Evaluation     Today's date: 8/15/2023  Patient name: Alli Ramos  : 1950  MRN: [id number]  Referring provider: Galina Vieyra DO  Dx:   Encounter Diagnosis     ICD-10-CM    1. Chronic left shoulder pain  M25.512     G89.29       2. Tear of left supraspinatus tendon  M75.102 Ambulatory Referral to Physical Therapy      3. Closed nondisplaced fracture of greater tuberosity of left humerus with routine healing, subsequent encounter  S42.255D                      Assessment  Assessment details: Alli Ramos is a 75 y.o. female who presents with signs and symptoms consistent of persistent left shoulder pain after a fall at the end of April which resulted in a greater tuberosity fracture and partial thickness RTC tear. Patient currently presents with left shoulder pain, decreased ROM, weakness of RTC and scapular stabilizers, and decrease endurance of involved UE. Due to these impairments, Patient has difficulty performing lifting, reaching , driving, pushing/pulling, cleaning/cooking  and attending social events . Patient would benefit from skilled physical therapy to address the impairments, improve their level of function, and to improve their overall quality of life. Primary movements impairments:   1) Decreased RTC motor control/weakness  2) Decreased shoulder ROM  3) Decreased scapular mobility    Impairments: abnormal gait, abnormal muscle firing, abnormal muscle tone, abnormal or restricted ROM, activity intolerance, impaired balance, impaired physical strength, lacks appropriate home exercise program, pain with function and poor posture   Understanding of Dx/Px/POC: good   Prognosis: good    Goals  Short Term Goals: to be achieved by 4 weeks  1) Patient to be independent with basic HEP. 2) Decrease pain to 2/10 at its worst.  3) Increase shoulder ROM by 5-10 degrees in all planes  4) Increase shoulder strength by 1/2 MMT grade in all deficient planes.     Long Term Goals: to be achieved by discharge  1) FOTO equal to or greater than expected. 2) Patient to be independent with comprehensive HEP. 3) Patient will demonstrate maximal over head reaching  4) Increase UE strength to 5/5 MMT grade in all planes to improve a/iadls. 5) Patient to report no sleep interruption secondary to pain. Plan  Patient would benefit from: PT eval and skilled physical therapy  Planned modality interventions: low level laser therapy  Planned therapy interventions: joint mobilization, muscle pump exercises, neuromuscular re-education, patient education, therapeutic activities, therapeutic exercise and home exercise program  Frequency: 2x per week for 4-6 weeks. Treatment plan discussed with: patient        Subjective Evaluation    History of Present Illness  Mechanism of injury: History of Current Injury: Pt is s/p nondisplaced greater tuberosity fracture of the left shoulder with partial thickness (50%) RTC tear on 4/27/23 after a fall. Pt has MS with polyneuropathy. Patient also fell again in June. Pt saw Dr. Savita Potter and Rogelio Diaz who recommended PT for left shoulder pain. Pt did have MRA and XR of shoulder. Pt is currently ambulating with a SPC. Pt does admit to stiffness at involved shoulder. She is not taking any pain medication secondary to her kidney disease. Pt also would like to incorporate balance into plan of care due to her 2 falls since April. Pain location/Descriptors: Anterior left shoulder which radiates into the biceps tendon. Pt denies numbness and tingling. Aggravating factors: Reaching, lifting, lying on shoulder, over head activities, bathing, dressing, shoulder abduction. Easing factors: rest. Pt was using Voltaren and Ice but has not used it. 24 HR pattern: Movement dependent. Imaging: MRI and XR. Pt also has an MRI of C/S in June  Special Questions: Denies numbness/tingling  Patient goals:  Improve ROM of shoulder  Occupation: Retired.  Volunteers    Patient Goals  Patient goals for therapy: increased strength, decreased pain, improved balance, increased motion and independence with ADLs/IADLs    Pain  Pain scale: mild. At worst pain ratin    Treatments  No previous or current treatments        Objective     Active Range of Motion   Left Shoulder   Flexion: 92 degrees   Extension: 50 degrees   Abduction: 68 degrees   External rotation 0°: 50 degrees   Internal rotation 0°: Left shoulder active internal rotation at 0 degrees: stomach.      Additional Active Range of Motion Details  Poor eccentric control with lowering arm to neutral    Passive Range of Motion   Left Shoulder   Flexion: 140 degrees   Abduction: 125 degrees   External rotation 45°: 45 degrees   Internal rotation 45°: 75 degrees     Scapular Mobility   Left Shoulder   Scapular mobility: poor    Strength/Myotome Testing     Left Shoulder     Planes of Motion   Left shoulder forward flexion strength: NT.   Left shoulder abduction strength: NT.   External rotation at 0°: 4-   Internal rotation at 0°: 4-     Isolated Muscles   Biceps: 4-   Triceps: 4     Tests     Additional Tests Details  *Decreased scapular mobility in left   *Rib hump noted on Left secondary to scoliosis              Precautions: MS, Gait dysfunction, Insomnia, Osteoporosis, Peripheral polyneuropathy, Hx of Breast CA, R IDALIA in 2012- Dr. Jenna Olszewski, Left femur IM nailing      Manuals             PROM of the left shoulder             Left scapular mobilizations                                       Neuro Re-Ed             Isometrics             Scap retraction              No Money TB                                                                 Ther Ex             Table slides flexion             Table slides abduction             Shoulder ER Stretch              Shoulder extension stretch                                                                  Ther Activity                                       Gait Training Modalities                                         [id number]  Access [facility]: [id number]

## 2023-08-15 ENCOUNTER — ANESTHESIA EVENT (OUTPATIENT)
Dept: PERIOP | Facility: AMBULARY SURGERY CENTER | Age: 73
End: 2023-08-15
Payer: MEDICARE

## 2023-08-15 ENCOUNTER — EVALUATION (OUTPATIENT)
Dept: PHYSICAL THERAPY | Facility: CLINIC | Age: 73
End: 2023-08-15
Payer: MEDICARE

## 2023-08-15 DIAGNOSIS — G89.29 CHRONIC LEFT SHOULDER PAIN: Primary | ICD-10-CM

## 2023-08-15 DIAGNOSIS — S42.255D CLOSED NONDISPLACED FRACTURE OF GREATER TUBEROSITY OF LEFT HUMERUS WITH ROUTINE HEALING, SUBSEQUENT ENCOUNTER: ICD-10-CM

## 2023-08-15 DIAGNOSIS — M25.512 CHRONIC LEFT SHOULDER PAIN: Primary | ICD-10-CM

## 2023-08-15 DIAGNOSIS — M75.102 TEAR OF LEFT SUPRASPINATUS TENDON: ICD-10-CM

## 2023-08-15 PROCEDURE — 97110 THERAPEUTIC EXERCISES: CPT | Performed by: PHYSICAL THERAPIST

## 2023-08-15 PROCEDURE — 97162 PT EVAL MOD COMPLEX 30 MIN: CPT | Performed by: PHYSICAL THERAPIST

## 2023-08-15 NOTE — TELEPHONE ENCOUNTER
Left detailed message for pt making her aware (okay per communication consent). Asked for call back if pt is not comfortable injecting without autoinjector.

## 2023-08-15 NOTE — TELEPHONE ENCOUNTER
[employer] program no longer available. Contacted [employer] at [phone] and spoke with Pam. She reports  of [employer] II device has stopped manufacturing this. It is discontinued. No longer able to get device. [employer] is actually advising pts to discard the device. Pam advised they were unaware if there was a recall or not, recommends all patients only use prefilled syringe moving forward. To confirm, pt to continue injecting with pre-filled syringes only? Okay to advise pt of this?

## 2023-08-16 ENCOUNTER — TELEPHONE (OUTPATIENT)
Dept: NEUROLOGY | Facility: CLINIC | Age: 73
End: 2023-08-16

## 2023-08-16 ENCOUNTER — OFFICE VISIT (OUTPATIENT)
Dept: PHYSICAL THERAPY | Facility: CLINIC | Age: 73
End: 2023-08-16
Payer: MEDICARE

## 2023-08-16 DIAGNOSIS — G89.29 CHRONIC LEFT SHOULDER PAIN: ICD-10-CM

## 2023-08-16 DIAGNOSIS — M25.512 CHRONIC LEFT SHOULDER PAIN: ICD-10-CM

## 2023-08-16 DIAGNOSIS — M75.102 TEAR OF LEFT SUPRASPINATUS TENDON: Primary | ICD-10-CM

## 2023-08-16 DIAGNOSIS — S42.255D CLOSED NONDISPLACED FRACTURE OF GREATER TUBEROSITY OF LEFT HUMERUS WITH ROUTINE HEALING, SUBSEQUENT ENCOUNTER: ICD-10-CM

## 2023-08-16 PROCEDURE — 97140 MANUAL THERAPY 1/> REGIONS: CPT | Performed by: PHYSICAL THERAPIST

## 2023-08-16 PROCEDURE — 97110 THERAPEUTIC EXERCISES: CPT | Performed by: PHYSICAL THERAPIST

## 2023-08-16 NOTE — TELEPHONE ENCOUNTER
----- Message from Mina Phillips RN sent at 8/16/2023  8:38 AM EDT -----  Regarding: FW: Lab tests   Contact: [phone]  Dr. Cynthia velazco  ----- Message -----  From: Tamy Kam  Sent: 8/15/2023   8:42 AM EDT  To: Mina Phillips RN  Subject: Lab tests                                        Not sure how to respond to this, can you review and advise please      ----- Message -----  From: Jean Pierre Richards"  Sent: 8/11/2023   6:56 PM EDT  To: [facility]  Subject: Lab tests                                        Dr Cynthia Duckworth   Just wanted to let you know that I had lab work today pre op for surgery on August 22. GFR still low at 47 and anion gap low at 4. No change if I drink or not. Worried about CKD stage 3A? ?? That's what PCP keeps saying.   Thanks   [employer]

## 2023-08-16 NOTE — PROGRESS NOTES
Daily Note     Today's date: 2023  Patient name: Iona Cardona  : 1950  MRN: [id number]  Referring provider: Jessica Garcia DO  Dx:   Encounter Diagnosis     ICD-10-CM    1. Tear of left supraspinatus tendon  M75.102       2. Chronic left shoulder pain  M25.512     G89.29       3. Closed nondisplaced fracture of greater tuberosity of left humerus with routine healing, subsequent encounter  S42.255D                      Subjective: Pt offers no new complaints today. Objective: See treatment diary below      Assessment: Initiated treatment focusing on impairments identified at initial evaluation. Performed most AAROM and passive ROM. Pt will be undergoing breat surgery with Dr. Yanira Yan on Tuesday next week. Pt will need to receive clearance prior to returning to PT. Tolerated treatment well. Patient would benefit from continued PT. Plan: Continue per plan of care.       Precautions: MS, Gait dysfunction, Insomnia, Osteoporosis, Peripheral polyneuropathy, Hx of Breast CA, R IDALIA in - Dr. Mellody Ahumada, Left femur IM nailing      Manuals             PROM of the left shoulder 8'            Left scapular mobilizations 5'                                      Neuro Re-Ed             Isometrics             Scap retraction              No Money TB             Serratus punch 5x c/ PT assist            Sidelying shoulder ER 10x                                      Ther Ex             Table slides flexion             Table slides abduction             Shoulder ER Stretch  supine c/ cane 10x10"            Shoulder extension stretch              Pulley 5'                                                   Ther Activity                                       Gait Training                                       Modalities                                       1:1 with PT from [id number]

## 2023-08-16 NOTE — TELEPHONE ENCOUNTER
Recd trung Hopson. i am returning your call in regards to the copaxone. I am comfortable giving my shots in my 2 thighs and in my abdomen. But there's no way I can give myself a shot in my glutes or my arms. And my  is not comfortable giving my shots at all without the auto inject. And he's not really good even with the auto inject anymore. So if you can give me a call with alternatives, i would appreciate it at [phone].      Returned call; reached vm, left message acknowledging receipt of message;

## 2023-08-17 ENCOUNTER — CONSULT (OUTPATIENT)
Dept: FAMILY MEDICINE CLINIC | Facility: OTHER | Age: 73
End: 2023-08-17
Payer: MEDICARE

## 2023-08-17 VITALS
RESPIRATION RATE: 18 BRPM | TEMPERATURE: 98 F | WEIGHT: 140.8 LBS | OXYGEN SATURATION: 98 % | BODY MASS INDEX: 24.95 KG/M2 | HEART RATE: 86 BPM | DIASTOLIC BLOOD PRESSURE: 70 MMHG | HEIGHT: 63 IN | SYSTOLIC BLOOD PRESSURE: 151 MMHG

## 2023-08-17 DIAGNOSIS — Z86.000 PERSONAL HISTORY OF IN-SITU NEOPLASM OF BREAST: ICD-10-CM

## 2023-08-17 DIAGNOSIS — N18.31 STAGE 3A CHRONIC KIDNEY DISEASE (HCC): ICD-10-CM

## 2023-08-17 DIAGNOSIS — Z01.818 PRE-OP EXAM: Primary | ICD-10-CM

## 2023-08-17 DIAGNOSIS — I10 ESSENTIAL HYPERTENSION: ICD-10-CM

## 2023-08-17 LAB — ECG INTERP DURING EX: NORMAL MS

## 2023-08-17 PROCEDURE — 93000 ELECTROCARDIOGRAM COMPLETE: CPT

## 2023-08-17 PROCEDURE — 99213 OFFICE O/P EST LOW 20 MIN: CPT

## 2023-08-17 NOTE — LETTER
August 21, 2023       No Recipients    Patient: Ra Bahena   YOB: 1950   Date of Visit: 8/17/2023       Dear Dr. Ariana Leos:    Thank you for referring Lew Gallardo to me for evaluation. Below are my notes for this consultation. If you have questions, please do not hesitate to call me. I look forward to following your patient along with you. Sincerely,        Dinora Monae DO        CC:   No Recipients    Dinora Monae DO  8/21/2023  8:45 AM  Incomplete  PRE-OPERATIVE EXAMINATION  Ra Bahena  1950    Ra Bahena is a 75 y.o. female with Multiple Sclerosis that she reports is well-controlled, HLD, hx of breast cancer s/p mastectomy who is planning to undergo breast surgery under general by Dr. Ariana Leos on 8/22/23 for autologous fat grafting to bilateral breasts. The procedure is indicated for the following condition: personal history of breast cancer. Patient has not had complications with anesthesia in the past.    ROS:   Chest pain: no   Shortness of breath: no  Shortness of breath with exertion: no   Orthopnea: no  Dizziness: no  Unexplained weight change: no    PMH:  CAD: yes  HTN: yes  CKD: yes 3a, latest GFR 47 8/11/23  DM: no on insulin: no  History of CVA: no    She  reports that she quit smoking about 21 years ago. Her smoking use included cigarettes. She has a 35.00 pack-year smoking history. She has never used smokeless tobacco. She reports that she does not drink alcohol and does not use drugs. /70   Pulse 86   Temp 98 °F (36.7 °C)   Resp 18   Ht 5' 3" (1.6 m)   Wt 63.9 kg (140 lb 12.8 oz)   SpO2 98%   BMI 24.94 kg/m²   Physical Exam  Vitals and nursing note reviewed. Constitutional:       General: She is not in acute distress. Appearance: She is well-developed. Comments: Body mass index is 24.94 kg/m². HENT:      Head: Normocephalic and atraumatic.       Right Ear: External ear normal.      Left Ear: External ear normal. Nose: No congestion. Mouth/Throat:      Mouth: Mucous membranes are moist.      Pharynx: Oropharynx is clear. Eyes:      Conjunctiva/sclera: Conjunctivae normal.      Pupils: Pupils are equal, round, and reactive to light. Cardiovascular:      Rate and Rhythm: Normal rate and regular rhythm. Pulses: Normal pulses. Heart sounds: Normal heart sounds. No murmur heard. Pulmonary:      Effort: Pulmonary effort is normal. No respiratory distress. Breath sounds: Normal breath sounds. Abdominal:      General: Bowel sounds are normal.      Palpations: Abdomen is soft. Tenderness: There is no abdominal tenderness. There is no right CVA tenderness or left CVA tenderness. Musculoskeletal:         General: No swelling. Cervical back: Neck supple. Right lower leg: No edema. Left lower leg: No edema. Skin:     General: Skin is warm and dry. Capillary Refill: Capillary refill takes less than 2 seconds. Neurological:      Mental Status: She is alert and oriented to person, place, and time. Cranial Nerves: No cranial nerve deficit.    Psychiatric:         Mood and Affect: Mood normal.         Behavior: Behavior normal.         Revised Cardiac Risk Index (RCRI) for Pre-Operative Risk   (estimates risk of cardiac complications after noncardiac surgery)    High-risk surgery: No 0 / Yes +1  Intraperitoneal, intrathoracic, suprainguinal vascular  History of ischemic heart disease: No 0 / Yes +1  Hx of MI, (+) exercise test, current chest pain considered due to myocardial ischemia, use of nitrate therapy or ECG with pathological Q waves)  History of CHF: No 0 / Yes +1  Pulmonary edema, B/L rales or S3 gallop; LOYD, orthopnea, PND, CXR showing pulmonary vascular redistribution)  History of cerebrovascular disease: No 0 / Yes +1  Prior TIA or stroke  Pre-operative treatment with insulin: No 0 / Yes +1  Pre-operative creatinine >2 mg/dL: No 0 / Yes +1     1.14 on 8/11/2023    RCRI Scorin points: Class I Risk, 3.9% 30-day risk of death, MI, or cardiac arrest  1 point: Class II Risk, 6.0% 30-day risk of death, MI, or cardiac arrest  2 points: Class III Risk, 10.1% 30-day risk of death, MI, or cardiac arrest  3 points: Class IV Risk, 15% 30-day risk of death, MI, or cardiac arrest  4 points: Class IV Risk, 15% 30-day risk of death, MI, or cardiac arrest  5 points: Class IV Risk, 15% 30-day risk of death, MI, or cardiac arrest  6 points: Class IV Risk, 15% 30-day risk of death, MI, or cardiac arrest    Lab Results   Component Value Date    CREATININE 1.14 2023       [facility] was [facility] today for pre-op exam.    Diagnoses and all orders for this visit:    Pre-op exam  -     POCT ECG    Essential hypertension    Stage 3a chronic kidney disease ([address])  -     Ambulatory Referral to Nephrology; Future    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        Recommendations:  Avril Rasmussen is undergoing an elective Moderate Risk surgery, rotator cuff repair. She is RCRI class II risk (1 points for surgery in thoracic region) with 6.0% 30-day risk of death, MI, or cardiac arrest. She may proceed with surgery as planned without further workup. Pre-operative form completed and faxed today to office as requested. Avoid NSAIDs and monitor creatinine every 3-6 months in setting of GFR <60. EKG in office today NSR with previously known LBBB in aVL, no ST elevations. Discussed with Dr. Lit Borden, who is in agreement with the plan as outlined above. Daniel Oshea DO  2023  8:42 AM  Cosign Needed  PRE-OPERATIVE EXAMINATION  Avril Rasmussen  1950    Avril Rasmussen is a 75 y.o. female with Multiple Sclerosis that she reports is well-controlled, HLD, hx of breast cancer s/p mastectomy who is planning to undergo rotator cuff repair under general by Dr. Aliya Robles on 23 for autologous fat grafting to bilateral breasts.  The procedure is indicated for the following condition: personal history of breast cancer. Patient has not had complications with anesthesia in the past.    ROS:   Chest pain: no   Shortness of breath: no  Shortness of breath with exertion: no   Orthopnea: no  Dizziness: no  Unexplained weight change: no    PMH:  CAD: yes  HTN: yes  CKD: yes 3a, latest GFR 47 8/11/23  DM: no on insulin: no  History of CVA: no    She  reports that she quit smoking about 21 years ago. Her smoking use included cigarettes. She has a 35.00 pack-year smoking history. She has never used smokeless tobacco. She reports that she does not drink alcohol and does not use drugs. There were no vitals taken for this visit. Physical Exam  Vitals and nursing note reviewed. Constitutional:       General: She is not in acute distress. Appearance: She is well-developed. Comments: Body mass index is 24.94 kg/m². HENT:      Head: Normocephalic and atraumatic. Right Ear: External ear normal.      Left Ear: External ear normal.      Nose: No congestion. Mouth/Throat:      Mouth: Mucous membranes are moist.      Pharynx: Oropharynx is clear. Eyes:      Conjunctiva/sclera: Conjunctivae normal.      Pupils: Pupils are equal, round, and reactive to light. Cardiovascular:      Rate and Rhythm: Normal rate and regular rhythm. Pulses: Normal pulses. Heart sounds: Normal heart sounds. No murmur heard. Pulmonary:      Effort: Pulmonary effort is normal. No respiratory distress. Breath sounds: Normal breath sounds. Abdominal:      General: Bowel sounds are normal.      Palpations: Abdomen is soft. Tenderness: There is no abdominal tenderness. There is no right CVA tenderness or left CVA tenderness. Musculoskeletal:         General: No swelling. Cervical back: Neck supple. Right lower leg: No edema. Left lower leg: No edema. Skin:     General: Skin is warm and dry. Capillary Refill: Capillary refill takes less than 2 seconds. Neurological:      Mental Status: She is alert and oriented to person, place, and time. Cranial Nerves: No cranial nerve deficit. Psychiatric:         Mood and Affect: Mood normal.         Behavior: Behavior normal.         Revised Cardiac Risk Index (RCRI) for Pre-Operative Risk   (estimates risk of cardiac complications after noncardiac surgery)    High-risk surgery: No 0 / Yes +1  Intraperitoneal, intrathoracic, suprainguinal vascular  History of ischemic heart disease: No 0 / Yes +1  Hx of MI, (+) exercise test, current chest pain considered due to myocardial ischemia, use of nitrate therapy or ECG with pathological Q waves)  History of CHF: No 0 / Yes +1  Pulmonary edema, B/L rales or S3 gallop; LOYD, orthopnea, PND, CXR showing pulmonary vascular redistribution)  History of cerebrovascular disease: No 0 / Yes +1  Prior TIA or stroke  Pre-operative treatment with insulin: No 0 / Yes +1  Pre-operative creatinine >2 mg/dL: No 0 / Yes +1     1.14 on 2023    RCRI Scorin points: Class I Risk, 3.9% 30-day risk of death, MI, or cardiac arrest  1 point: Class II Risk, 6.0% 30-day risk of death, MI, or cardiac arrest  2 points: Class III Risk, 10.1% 30-day risk of death, MI, or cardiac arrest  3 points: Class IV Risk, 15% 30-day risk of death, MI, or cardiac arrest  4 points: Class IV Risk, 15% 30-day risk of death, MI, or cardiac arrest  5 points: Class IV Risk, 15% 30-day risk of death, MI, or cardiac arrest  6 points: Class IV Risk, 15% 30-day risk of death, MI, or cardiac arrest    Lab Results   Component Value Date    CREATININE 1.14 2023       There are no diagnoses linked to this encounter. Recommendations:  Krysta Santos is undergoing an elective Moderate Risk surgery, rotator cuff repair. She is RCRI class II risk (1 points for surgery in thoracic region) with 6.0% 30-day risk of death, MI, or cardiac arrest. She may proceed with surgery as planned without further workup. Pre-operative form completed and faxed today to office as requested. Avoid NSAIDs and monitor creatinine every 3-6 months in setting of GFR <60. EKG in office today NSR with previously known LBBB in aVL, no ST elevations. Discussed with Dr. Iraida Garcia, who is in agreement with the plan as outlined above.

## 2023-08-17 NOTE — PROGRESS NOTES
PRE-OPERATIVE EXAMINATION  Charleen Phoenix  1950    Charleen Phoenix is a 75 y.o. female with Multiple Sclerosis that she reports is well-controlled, HLD, hx of breast cancer s/p mastectomy who is planning to undergo breast surgery under general by Dr. Nikki Morgan on 8/22/23 for autologous fat grafting to bilateral breasts. The procedure is indicated for the following condition: personal history of breast cancer. Patient has not had complications with anesthesia in the past.    ROS:   • Chest pain: no   • Shortness of breath: no  • Shortness of breath with exertion: no   • Orthopnea: no  • Dizziness: no  • Unexplained weight change: no    PMH:  • CAD: yes  • HTN: yes  • CKD: yes 3a, latest GFR 47 8/11/23  • DM: no on insulin: no  • History of CVA: no    She  reports that she quit smoking about 21 years ago. Her smoking use included cigarettes. She has a 35.00 pack-year smoking history. She has never used smokeless tobacco. She reports that she does not drink alcohol and does not use drugs. /70   Pulse 86   Temp 98 °F (36.7 °C)   Resp 18   Ht 5' 3" (1.6 m)   Wt 63.9 kg (140 lb 12.8 oz)   SpO2 98%   BMI 24.94 kg/m²   Physical Exam  Vitals and nursing note reviewed. Constitutional:       General: She is not in acute distress. Appearance: She is well-developed. Comments: Body mass index is 24.94 kg/m². HENT:      Head: Normocephalic and atraumatic. Right Ear: External ear normal.      Left Ear: External ear normal.      Nose: No congestion. Mouth/Throat:      Mouth: Mucous membranes are moist.      Pharynx: Oropharynx is clear. Eyes:      Conjunctiva/sclera: Conjunctivae normal.      Pupils: Pupils are equal, round, and reactive to light. Cardiovascular:      Rate and Rhythm: Normal rate and regular rhythm. Pulses: Normal pulses. Heart sounds: Normal heart sounds. No murmur heard. Pulmonary:      Effort: Pulmonary effort is normal. No respiratory distress. Breath sounds: Normal breath sounds. Abdominal:      General: Bowel sounds are normal.      Palpations: Abdomen is soft. Tenderness: There is no abdominal tenderness. There is no right CVA tenderness or left CVA tenderness. Musculoskeletal:         General: No swelling. Cervical back: Neck supple. Right lower leg: No edema. Left lower leg: No edema. Skin:     General: Skin is warm and dry. Capillary Refill: Capillary refill takes less than 2 seconds. Neurological:      Mental Status: She is alert and oriented to person, place, and time. Cranial Nerves: No cranial nerve deficit.    Psychiatric:         Mood and Affect: Mood normal.         Behavior: Behavior normal.         Revised Cardiac Risk Index (RCRI) for Pre-Operative Risk   (estimates risk of cardiac complications after noncardiac surgery)    · High-risk surgery: No 0 / Yes +1  · Intraperitoneal, intrathoracic, suprainguinal vascular  · History of ischemic heart disease: No 0 / Yes +1  · Hx of MI, (+) exercise test, current chest pain considered due to myocardial ischemia, use of nitrate therapy or ECG with pathological Q waves)  · History of CHF: No 0 / Yes +1  · Pulmonary edema, B/L rales or S3 gallop; LOYD, orthopnea, PND, CXR showing pulmonary vascular redistribution)  · History of cerebrovascular disease: No 0 / Yes +1  · Prior TIA or stroke  · Pre-operative treatment with insulin: No 0 / Yes +1  · Pre-operative creatinine >2 mg/dL: No 0 / Yes +1     1.14 on 8/11/2023    RCRI Scoring:  · 0 points: Class I Risk, 3.9% 30-day risk of death, MI, or cardiac arrest  · 1 point: Class II Risk, 6.0% 30-day risk of death, MI, or cardiac arrest  · 2 points: Class III Risk, 10.1% 30-day risk of death, MI, or cardiac arrest  · 3 points: Class IV Risk, 15% 30-day risk of death, MI, or cardiac arrest  · 4 points: Class IV Risk, 15% 30-day risk of death, MI, or cardiac arrest  · 5 points: Class IV Risk, 15% 30-day risk of death, MI, or cardiac arrest  · 6 points: Class IV Risk, 15% 30-day risk of death, MI, or cardiac arrest    Lab Results   Component Value Date    CREATININE 1.14 08/11/2023       Ricardo Galarza was seen today for pre-op exam.    Diagnoses and all orders for this visit:    Pre-op exam  -     POCT ECG    Essential hypertension    Stage 3a chronic kidney disease ([address])  -     Ambulatory Referral to Nephrology; Future    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        Recommendations:  Otis Grajeda is undergoing an elective Moderate Risk surgery, rotator cuff repair. She is RCRI class II risk (1 points for surgery in thoracic region) with 6.0% 30-day risk of death, MI, or cardiac arrest. She may proceed with surgery as planned without further workup. Pre-operative form completed and faxed today to office as requested. Avoid NSAIDs and monitor creatinine every 3-6 months in setting of GFR <60. EKG in office today NSR with previously known LBBB in aVL, no ST elevations. Discussed with Dr. Britany Haas, who is in agreement with the plan as outlined above.

## 2023-08-17 NOTE — TELEPHONE ENCOUNTER
Please see below. I believe glatiramer acetate and Tamara Figueroa still have autoinjectors available. Please advise on alternatives.

## 2023-08-17 NOTE — LETTER
August 21, 2023     Raoul Delcid MD  [address]    Patient: Sarah Macias   YOB: 1950   Date of Visit: 8/17/2023       Dear Dr. Hong Amaya:    Thank you for referring Jovana Alvares to me for evaluation. Below are my notes for this consultation. If you have questions, please do not hesitate to call me. I look forward to following your patient along with you. Sincerely,        Gibson Marcelino DO        CC: No Recipients    Gibson Marcelino DO  8/21/2023  8:45 AM  Incomplete  PRE-OPERATIVE EXAMINATION  Sarah Macias  1950    Sarah Macias is a 75 y.o. female with Multiple Sclerosis that she reports is well-controlled, HLD, hx of breast cancer s/p mastectomy who is planning to undergo breast surgery under general by Dr. Hong Amaya on 8/22/23 for autologous fat grafting to bilateral breasts. The procedure is indicated for the following condition: personal history of breast cancer. Patient has not had complications with anesthesia in the past.    ROS:   Chest pain: no   Shortness of breath: no  Shortness of breath with exertion: no   Orthopnea: no  Dizziness: no  Unexplained weight change: no    PMH:  CAD: yes  HTN: yes  CKD: yes 3a, latest GFR 47 8/11/23  DM: no on insulin: no  History of CVA: no    She  reports that she quit smoking about 21 years ago. Her smoking use included cigarettes. She has a 35.00 pack-year smoking history. She has never used smokeless tobacco. She reports that she does not drink alcohol and does not use drugs. /70   Pulse 86   Temp 98 °F (36.7 °C)   Resp 18   Ht 5' 3" (1.6 m)   Wt 63.9 kg (140 lb 12.8 oz)   SpO2 98%   BMI 24.94 kg/m²   Physical Exam  Vitals and nursing note reviewed. Constitutional:       General: She is not in acute distress. Appearance: She is well-developed. Comments: Body mass index is 24.94 kg/m². HENT:      Head: Normocephalic and atraumatic.       Right Ear: External ear normal.      Left Ear: External ear normal.      Nose: No congestion. Mouth/Throat:      Mouth: Mucous membranes are moist.      Pharynx: Oropharynx is clear. Eyes:      Conjunctiva/sclera: Conjunctivae normal.      Pupils: Pupils are equal, round, and reactive to light. Cardiovascular:      Rate and Rhythm: Normal rate and regular rhythm. Pulses: Normal pulses. Heart sounds: Normal heart sounds. No murmur heard. Pulmonary:      Effort: Pulmonary effort is normal. No respiratory distress. Breath sounds: Normal breath sounds. Abdominal:      General: Bowel sounds are normal.      Palpations: Abdomen is soft. Tenderness: There is no abdominal tenderness. There is no right CVA tenderness or left CVA tenderness. Musculoskeletal:         General: No swelling. Cervical back: Neck supple. Right lower leg: No edema. Left lower leg: No edema. Skin:     General: Skin is warm and dry. Capillary Refill: Capillary refill takes less than 2 seconds. Neurological:      Mental Status: She is alert and oriented to person, place, and time. Cranial Nerves: No cranial nerve deficit.    Psychiatric:         Mood and Affect: Mood normal.         Behavior: Behavior normal.         Revised Cardiac Risk Index (RCRI) for Pre-Operative Risk   (estimates risk of cardiac complications after noncardiac surgery)    High-risk surgery: No 0 / Yes +1  Intraperitoneal, intrathoracic, suprainguinal vascular  History of ischemic heart disease: No 0 / Yes +1  Hx of MI, (+) exercise test, current chest pain considered due to myocardial ischemia, use of nitrate therapy or ECG with pathological Q waves)  History of CHF: No 0 / Yes +1  Pulmonary edema, B/L rales or S3 gallop; LOYD, orthopnea, PND, CXR showing pulmonary vascular redistribution)  History of cerebrovascular disease: No 0 / Yes +1  Prior TIA or stroke  Pre-operative treatment with insulin: No 0 / Yes +1  Pre-operative creatinine >2 mg/dL: No 0 / Yes +1     1.14 on 2023    RCRI Scorin points: Class I Risk, 3.9% 30-day risk of death, MI, or cardiac arrest  1 point: Class II Risk, 6.0% 30-day risk of death, MI, or cardiac arrest  2 points: Class III Risk, 10.1% 30-day risk of death, MI, or cardiac arrest  3 points: Class IV Risk, 15% 30-day risk of death, MI, or cardiac arrest  4 points: Class IV Risk, 15% 30-day risk of death, MI, or cardiac arrest  5 points: Class IV Risk, 15% 30-day risk of death, MI, or cardiac arrest  6 points: Class IV Risk, 15% 30-day risk of death, MI, or cardiac arrest    Lab Results   Component Value Date    CREATININE 1.14 2023       Mery Arango was seen today for pre-op exam.    Diagnoses and all orders for this visit:    Pre-op exam  -     POCT ECG    Essential hypertension    Stage 3a chronic kidney disease ([facility])  -     Ambulatory Referral to Nephrology; Future    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        Recommendations:  Maria A Rincon is undergoing an elective Moderate Risk surgery, rotator cuff repair. She is RCRI class II risk (1 points for surgery in thoracic region) with 6.0% 30-day risk of death, MI, or cardiac arrest. She may proceed with surgery as planned without further workup. Pre-operative form completed and faxed today to office as requested. Avoid NSAIDs and monitor creatinine every 3-6 months in setting of GFR <60. EKG in office today NSR with previously known LBBB in aVL, no ST elevations. Discussed with Dr. Maria Esther Stevens, who is in agreement with the plan as outlined above.            Emely Saha DO  2023  8:42 AM  Cosign Needed  PRE-OPERATIVE EXAMINATION  Maria A Rincon  1950    Maria A Rincon is a 75 y.o. female with Multiple Sclerosis that she reports is well-controlled, HLD, hx of breast cancer s/p mastectomy who is planning to undergo rotator cuff repair under general by Dr. Tuyet Arevalo on 23 for autologous fat grafting to bilateral breasts. The procedure is indicated for the following condition: personal history of breast cancer. Patient has not had complications with anesthesia in the past.    ROS:   Chest pain: no   Shortness of breath: no  Shortness of breath with exertion: no   Orthopnea: no  Dizziness: no  Unexplained weight change: no    PMH:  CAD: yes  HTN: yes  CKD: yes 3a, latest GFR 47 8/11/23  DM: no on insulin: no  History of CVA: no    She  reports that she quit smoking about 21 years ago. Her smoking use included cigarettes. She has a 35.00 pack-year smoking history. She has never used smokeless tobacco. She reports that she does not drink alcohol and does not use drugs. There were no vitals taken for this visit. Physical Exam  Vitals and nursing note reviewed. Constitutional:       General: She is not in acute distress. Appearance: She is well-developed. Comments: Body mass index is 24.94 kg/m². HENT:      Head: Normocephalic and atraumatic. Right Ear: External ear normal.      Left Ear: External ear normal.      Nose: No congestion. Mouth/Throat:      Mouth: Mucous membranes are moist.      Pharynx: Oropharynx is clear. Eyes:      Conjunctiva/sclera: Conjunctivae normal.      Pupils: Pupils are equal, round, and reactive to light. Cardiovascular:      Rate and Rhythm: Normal rate and regular rhythm. Pulses: Normal pulses. Heart sounds: Normal heart sounds. No murmur heard. Pulmonary:      Effort: Pulmonary effort is normal. No respiratory distress. Breath sounds: Normal breath sounds. Abdominal:      General: Bowel sounds are normal.      Palpations: Abdomen is soft. Tenderness: There is no abdominal tenderness. There is no right CVA tenderness or left CVA tenderness. Musculoskeletal:         General: No swelling. Cervical back: Neck supple. Right lower leg: No edema. Left lower leg: No edema. Skin:     General: Skin is warm and dry. Capillary Refill: Capillary refill takes less than 2 seconds. Neurological:      Mental Status: She is alert and oriented to person, place, and time. Cranial Nerves: No cranial nerve deficit. Psychiatric:         Mood and Affect: Mood normal.         Behavior: Behavior normal.         Revised Cardiac Risk Index (RCRI) for Pre-Operative Risk   (estimates risk of cardiac complications after noncardiac surgery)    High-risk surgery: No 0 / Yes +1  Intraperitoneal, intrathoracic, suprainguinal vascular  History of ischemic heart disease: No 0 / Yes +1  Hx of MI, (+) exercise test, current chest pain considered due to myocardial ischemia, use of nitrate therapy or ECG with pathological Q waves)  History of CHF: No 0 / Yes +1  Pulmonary edema, B/L rales or S3 gallop; LOYD, orthopnea, PND, CXR showing pulmonary vascular redistribution)  History of cerebrovascular disease: No 0 / Yes +1  Prior TIA or stroke  Pre-operative treatment with insulin: No 0 / Yes +1  Pre-operative creatinine >2 mg/dL: No 0 / Yes +1     1.14 on 2023    RCRI Scorin points: Class I Risk, 3.9% 30-day risk of death, MI, or cardiac arrest  1 point: Class II Risk, 6.0% 30-day risk of death, MI, or cardiac arrest  2 points: Class III Risk, 10.1% 30-day risk of death, MI, or cardiac arrest  3 points: Class IV Risk, 15% 30-day risk of death, MI, or cardiac arrest  4 points: Class IV Risk, 15% 30-day risk of death, MI, or cardiac arrest  5 points: Class IV Risk, 15% 30-day risk of death, MI, or cardiac arrest  6 points: Class IV Risk, 15% 30-day risk of death, MI, or cardiac arrest    Lab Results   Component Value Date    CREATININE 1.14 2023       There are no diagnoses linked to this encounter. Recommendations:  Lexii Granda is undergoing an elective Moderate Risk surgery, rotator cuff repair.  She is RCRI class II risk (1 points for surgery in thoracic region) with 6.0% 30-day risk of death, MI, or cardiac arrest. She may proceed with surgery as planned without further workup. Pre-operative form completed and faxed today to office as requested. Avoid NSAIDs and monitor creatinine every 3-6 months in setting of GFR <60. EKG in office today NSR with previously known LBBB in aVL, no ST elevations. Discussed with Dr. Katey Thomas, who is in agreement with the plan as outlined above.

## 2023-08-18 NOTE — PRE-PROCEDURE INSTRUCTIONS
Pre-Surgery Instructions:   Medication Instructions   • ALPHA LIPOIC ACID PO Avoid 1 week prior to surgery    • Ampyra 10 MG TB12 Take Day of Surgery; Continue to take as prescribed including DOS with a small sip of water, unless usually taken at night   • ascorbic acid (VITAMIN C) 500 mg tablet Avoid 1 week prior to surgery    • baclofen 10 mg tablet Take Day of Surgery; Continue to take as prescribed including DOS with a small sip of water, unless usually taken at night   • Biotin 5000 MCG TABS Avoid 1 week prior to surgery    • Cholecalciferol (VITAMIN D3) 1000 units CAPS Avoid 1 week prior to surgery    • clonazePAM (KlonoPIN) 0.5 mg tablet Take Day of Surgery; Continue to take as prescribed including DOS with a small sip of water, unless usually taken at night   • Copaxone 40 MG/ML SOSY Take Day of Surgery; Continue to take as prescribed including DOS with a small sip of water, unless usually taken at night   • Cranberry 125 MG TABS Avoid 1 week prior to surgery    • cycloSPORINE (RESTASIS) 0.05 % ophthalmic emulsion Continue as prescribed   • denosumab (PROLIA) 60 mg/mL Continue as prescribed   • Diclofenac Sodium (VOLTAREN) 1 % Do not take day of surgery; continue as prescribed excluding DOS   • docusate sodium (COLACE) 100 mg capsule Take Day of Surgery; Continue to take as prescribed including DOS with a small sip of water, unless usually taken at night   • gabapentin (NEURONTIN) 300 mg capsule Take Day of Surgery; Continue to take as prescribed including DOS with a small sip of water, unless usually taken at night   • gabapentin (NEURONTIN) 600 MG tablet Take Day of Surgery; Continue to take as prescribed including DOS with a small sip of water, unless usually taken at night   • Magnesium 500 MG TABS Avoid 1 week prior to surgery    • solifenacin (VESICARE) 10 MG tablet Do not take day of surgery; continue as prescribed excluding DOS   • zonisamide (ZONEGRAN) 100 mg capsule Take Day of Surgery; Continue to take as prescribed including DOS with a small sip of water, unless usually taken at night    Medication instructions for day surgery reviewed. Please use only a sip of water to take your instructed medications. Avoid all over the counter vitamins, supplements and NSAIDS for one week prior to surgery per anesthesia guidelines. Tylenol is ok to take as needed. You will receive a call one business day prior to surgery with an arrival time and hospital directions. If your surgery is scheduled on a Monday, the hospital will be calling you on the Friday prior to your surgery. If you have not heard from anyone by 8pm, please call the hospital supervisor through the hospital  at [phone]. [place] [phone]). Do not eat or drink anything after midnight the night before your surgery, including candy, mints, lifesavers, or chewing gum. Do not drink alcohol 24hrs before your surgery. Try not to smoke at least 24hrs before your surgery. Follow the pre surgery showering instructions as listed in the [facility] Surgical Experience Booklet” or otherwise provided by your surgeon's office. Do not shave the surgical area 24 hours before surgery. Do not apply any lotions, creams, including makeup, cologne, deodorant, or perfumes after showering on the day of your surgery. No contact lenses, eye make-up, or artificial eyelashes. Remove nail polish, including gel polish, and any artificial, gel, or acrylic nails if possible. Remove all jewelry including rings and body piercing jewelry. Wear causal clothing that is easy to take on and off. Consider your type of surgery. Keep any valuables, jewelry, piercings at home. Please bring any specially ordered equipment (sling, braces) if indicated. Arrange for a responsible person to drive you to and from the hospital on the day of your surgery. Visitor Guidelines discussed.      Call the surgeon's office with any new illnesses, exposures, or additional questions prior to surgery. Please reference your [facility] Surgical Experience Booklet” for additional information to prepare for your upcoming surgery.

## 2023-08-18 NOTE — PRE-PROCEDURE INSTRUCTIONS
Pre-Surgery Instructions:   Medication Instructions   • ALPHA LIPOIC ACID PO Avoid 1 week prior to surgery    • Ampyra 10 MG TB12 Avoid 1 week prior to surgery    • ascorbic acid (VITAMIN C) 500 mg tablet Avoid 1 week prior to surgery    • baclofen 10 mg tablet Take Day of Surgery; Continue to take as prescribed including DOS with a small sip of water, unless usually taken at night   • Biotin 5000 MCG TABS Avoid 1 week prior to surgery    • Cholecalciferol (VITAMIN D3) 1000 units CAPS Avoid 1 week prior to surgery    • clonazePAM (KlonoPIN) 0.5 mg tablet Take Day of Surgery; Continue to take as prescribed including DOS with a small sip of water, unless usually taken at night   • Copaxone 40 MG/ML SOSY Take Day of Surgery; Continue to take as prescribed including DOS with a small sip of water, unless usually taken at night   • Cranberry 125 MG TABS Avoid 1 week prior to surgery    • docusate sodium (COLACE) 100 mg capsule Take Day of Surgery; Continue to take as prescribed including DOS with a small sip of water, unless usually taken at night   • gabapentin (NEURONTIN) 600 MG tablet Take Day of Surgery; Continue to take as prescribed including DOS with a small sip of water, unless usually taken at night   • Magnesium 500 MG TABS Avoid 1 week prior to surgery    • solifenacin (VESICARE) 10 MG tablet Do not take day of surgery; continue as prescribed excluding DOS   • zonisamide (ZONEGRAN) 100 mg capsule Take Day of Surgery; Continue to take as prescribed including DOS with a small sip of water, unless usually taken at night    Medication instructions for day surgery reviewed. Please use only a sip of water to take your instructed medications. Avoid all over the counter vitamins, supplements and NSAIDS for one week prior to surgery per anesthesia guidelines. Tylenol is ok to take as needed. You will receive a call one business day prior to surgery with an arrival time and hospital directions.  If your surgery is scheduled on a Monday, the hospital will be calling you on the Friday prior to your surgery. If you have not heard from anyone by 8pm, please call the hospital supervisor through the hospital  at [phone]. Karin Pacheco [phone]). Do not eat or drink anything after midnight the night before your surgery, including candy, mints, lifesavers, or chewing gum. Do not drink alcohol 24hrs before your surgery. Try not to smoke at least 24hrs before your surgery. Follow the pre surgery showering instructions as listed in the [facility] Surgical Experience Booklet” or otherwise provided by your surgeon's office. Do not shave the surgical area 24 hours before surgery. Do not apply any lotions, creams, including makeup, cologne, deodorant, or perfumes after showering on the day of your surgery. No contact lenses, eye make-up, or artificial eyelashes. Remove nail polish, including gel polish, and any artificial, gel, or acrylic nails if possible. Remove all jewelry including rings and body piercing jewelry. Wear causal clothing that is easy to take on and off. Consider your type of surgery. Keep any valuables, jewelry, piercings at home. Please bring any specially ordered equipment (sling, braces) if indicated. Arrange for a responsible person to drive you to and from the hospital on the day of your surgery. Visitor Guidelines discussed. Call the surgeon's office with any new illnesses, exposures, or additional questions prior to surgery. Please reference your [facility] Surgical Experience Booklet” for additional information to prepare for your upcoming surgery.

## 2023-08-18 NOTE — TELEPHONE ENCOUNTER
Would pt feel comfortable with self injection in abdomen as a substitute location? As long as she is alternating sites appropriately, she can avoid some of the harder to reach spots.

## 2023-08-21 NOTE — ANESTHESIA PREPROCEDURE EVALUATION
Procedure:  AUTOLOGOUS FAT GRAFTING TO BILATERAL BREASTS (Bilateral: Breast)    Relevant Problems   ANESTHESIA (within normal limits)      CARDIO   (+) Essential hypertension   (+) Hyperlipidemia      ENDO   (+) TSH (thyroid-stimulating hormone deficiency)      GI/HEPATIC (within normal limits)      /RENAL   (+) Nephrolithiasis      HEMATOLOGY (within normal limits)      NEURO/PSYCH   (+) Depression   (+) Unilateral occipital headache      PULMONARY (within normal limits)      Nervous and Auditory   (+) Multiple sclerosis (HCC)   (+) Peripheral polyneuropathy      Other   (+) 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      TTE 10/2020:  LEFT VENTRICLE:  Systolic function was normal. Ejection fraction was estimated to be 65 %. There were no regional wall motion abnormalities.     RIGHT VENTRICLE:  The size was normal.  Systolic function was normal.     MITRAL VALVE:  There was mild regurgitation.     TRICUSPID VALVE:  There was trace regurgitation. Pulmonary artery systolic pressure was within the normal range. Lab Results   Component Value Date    WBC 5.67 08/11/2023    HGB 13.2 08/11/2023    HCT 42.7 08/11/2023    MCV 99 (H) 08/11/2023     08/11/2023     Lab Results   Component Value Date    SODIUM 141 08/11/2023    K 4.1 08/11/2023     (H) 08/11/2023    CO2 26 08/11/2023    BUN 14 08/11/2023    CREATININE 1.14 08/11/2023    CALCIUM 10.0 08/11/2023     Lab Results   Component Value Date    INR 1.17 (H) 10/01/2014    INR 1.07 09/28/2014    PROTIME 14.7 (H) 10/01/2014    PROTIME 13.7 09/28/2014     Lab Results   Component Value Date    HGBA1C 4.9 11/02/2022          Physical Exam    Airway    Mallampati score: II  TM Distance: >3 FB  Neck ROM: full     Dental   No notable dental hx     Cardiovascular  Cardiovascular exam normal    Pulmonary  Pulmonary exam normal     Other Findings        Anesthesia Plan  ASA Score- 3     Anesthesia Type- general with ASA Monitors.          Additional Monitors: Airway Plan: LMA. Plan Factors-Exercise tolerance (METS): >4 METS. Chart reviewed. EKG reviewed. Existing labs reviewed. Patient summary reviewed. Induction- intravenous. Postoperative Plan-     Informed Consent- Anesthetic plan and risks discussed with patient. I personally reviewed this patient with the CRNA. Discussed and agreed on the Anesthesia Plan with the CRNA. Tatiana Garcia

## 2023-08-21 NOTE — TELEPHONE ENCOUNTER
Spoke w/pt. Advised her of note below. Pt verbalized understanding. She received a referral to Nephrology today.  She will f/u w/Nephrology

## 2023-08-22 ENCOUNTER — HOSPITAL ENCOUNTER (OUTPATIENT)
Facility: AMBULARY SURGERY CENTER | Age: 73
Setting detail: OUTPATIENT SURGERY
Discharge: HOME | End: 2023-08-22
Attending: SURGERY | Admitting: SURGERY
Payer: MEDICARE

## 2023-08-22 ENCOUNTER — ANESTHESIA (OUTPATIENT)
Dept: PERIOP | Facility: AMBULARY SURGERY CENTER | Age: 73
End: 2023-08-22
Payer: MEDICARE

## 2023-08-22 VITALS
OXYGEN SATURATION: 95 % | RESPIRATION RATE: 15 BRPM | HEIGHT: 64 IN | TEMPERATURE: 97.4 F | BODY MASS INDEX: 23.9 KG/M2 | HEART RATE: 89 BPM | SYSTOLIC BLOOD PRESSURE: 124 MMHG | DIASTOLIC BLOOD PRESSURE: 58 MMHG | WEIGHT: 140 LBS

## 2023-08-22 RX ORDER — ALBUTEROL SULFATE 2.5 MG/3ML
2.5 SOLUTION RESPIRATORY (INHALATION) ONCE AS NEEDED
Status: DISCONTINUED | OUTPATIENT
Start: 2023-08-22 | End: 2023-08-22 | Stop reason: HOSPADM

## 2023-08-22 RX ORDER — LIDOCAINE HYDROCHLORIDE 20 MG/ML
INJECTION, SOLUTION EPIDURAL; INFILTRATION; INTRACAUDAL; PERINEURAL AS NEEDED
Status: DISCONTINUED | OUTPATIENT
Start: 2023-08-22 | End: 2023-08-22

## 2023-08-22 RX ORDER — PROPOFOL 10 MG/ML
INJECTION, EMULSION INTRAVENOUS AS NEEDED
Status: DISCONTINUED | OUTPATIENT
Start: 2023-08-22 | End: 2023-08-22

## 2023-08-22 RX ORDER — SODIUM CHLORIDE, SODIUM LACTATE, POTASSIUM CHLORIDE, CALCIUM CHLORIDE 600; 310; 30; 20 MG/100ML; MG/100ML; MG/100ML; MG/100ML
INJECTION, SOLUTION INTRAVENOUS CONTINUOUS PRN
Status: DISCONTINUED | OUTPATIENT
Start: 2023-08-22 | End: 2023-08-22

## 2023-08-22 RX ORDER — PHENYLEPHRINE HCL IN 0.9% NACL 1 MG/10 ML
SYRINGE (ML) INTRAVENOUS AS NEEDED
Status: DISCONTINUED | OUTPATIENT
Start: 2023-08-22 | End: 2023-08-22

## 2023-08-22 RX ORDER — SODIUM CHLORIDE, SODIUM LACTATE, POTASSIUM CHLORIDE, CALCIUM CHLORIDE 600; 310; 30; 20 MG/100ML; MG/100ML; MG/100ML; MG/100ML
50 INJECTION, SOLUTION INTRAVENOUS CONTINUOUS
Status: DISCONTINUED | OUTPATIENT
Start: 2023-08-22 | End: 2023-08-22 | Stop reason: HOSPADM

## 2023-08-22 RX ORDER — ONDANSETRON 2 MG/ML
4 INJECTION INTRAMUSCULAR; INTRAVENOUS ONCE AS NEEDED
Status: DISCONTINUED | OUTPATIENT
Start: 2023-08-22 | End: 2023-08-22 | Stop reason: HOSPADM

## 2023-08-22 RX ORDER — MINERAL OIL
OIL (ML) MISCELLANEOUS AS NEEDED
Status: DISCONTINUED | OUTPATIENT
Start: 2023-08-22 | End: 2023-08-22 | Stop reason: HOSPADM

## 2023-08-22 RX ORDER — EPHEDRINE SULFATE 50 MG/ML
INJECTION INTRAVENOUS AS NEEDED
Status: DISCONTINUED | OUTPATIENT
Start: 2023-08-22 | End: 2023-08-22

## 2023-08-22 RX ORDER — MAGNESIUM HYDROXIDE 1200 MG/15ML
LIQUID ORAL AS NEEDED
Status: DISCONTINUED | OUTPATIENT
Start: 2023-08-22 | End: 2023-08-22 | Stop reason: HOSPADM

## 2023-08-22 RX ORDER — LABETALOL HYDROCHLORIDE 5 MG/ML
10 INJECTION, SOLUTION INTRAVENOUS
Status: DISCONTINUED | OUTPATIENT
Start: 2023-08-22 | End: 2023-08-22 | Stop reason: HOSPADM

## 2023-08-22 RX ORDER — FENTANYL CITRATE 50 UG/ML
INJECTION, SOLUTION INTRAMUSCULAR; INTRAVENOUS AS NEEDED
Status: DISCONTINUED | OUTPATIENT
Start: 2023-08-22 | End: 2023-08-22

## 2023-08-22 RX ORDER — METOCLOPRAMIDE HYDROCHLORIDE 5 MG/ML
10 INJECTION INTRAMUSCULAR; INTRAVENOUS ONCE AS NEEDED
Status: DISCONTINUED | OUTPATIENT
Start: 2023-08-22 | End: 2023-08-22 | Stop reason: HOSPADM

## 2023-08-22 RX ORDER — SODIUM CHLORIDE, SODIUM LACTATE, POTASSIUM CHLORIDE, AND CALCIUM CHLORIDE .6; .31; .03; .02 G/100ML; G/100ML; G/100ML; G/100ML
IRRIGANT IRRIGATION AS NEEDED
Status: DISCONTINUED | OUTPATIENT
Start: 2023-08-22 | End: 2023-08-22 | Stop reason: HOSPADM

## 2023-08-22 RX ORDER — HYDRALAZINE HYDROCHLORIDE 20 MG/ML
5 INJECTION INTRAMUSCULAR; INTRAVENOUS ONCE AS NEEDED
Status: DISCONTINUED | OUTPATIENT
Start: 2023-08-22 | End: 2023-08-22 | Stop reason: HOSPADM

## 2023-08-22 RX ORDER — DEXAMETHASONE SODIUM PHOSPHATE 10 MG/ML
INJECTION, SOLUTION INTRAMUSCULAR; INTRAVENOUS AS NEEDED
Status: DISCONTINUED | OUTPATIENT
Start: 2023-08-22 | End: 2023-08-22

## 2023-08-22 RX ORDER — MIDAZOLAM HYDROCHLORIDE 2 MG/2ML
INJECTION, SOLUTION INTRAMUSCULAR; INTRAVENOUS AS NEEDED
Status: DISCONTINUED | OUTPATIENT
Start: 2023-08-22 | End: 2023-08-22

## 2023-08-22 RX ORDER — HYDROMORPHONE HCL/PF 1 MG/ML
0.5 SYRINGE (ML) INJECTION
Status: DISCONTINUED | OUTPATIENT
Start: 2023-08-22 | End: 2023-08-22 | Stop reason: HOSPADM

## 2023-08-22 RX ORDER — FENTANYL CITRATE/PF 50 MCG/ML
50 SYRINGE (ML) INJECTION
Status: DISCONTINUED | OUTPATIENT
Start: 2023-08-22 | End: 2023-08-22 | Stop reason: HOSPADM

## 2023-08-22 RX ORDER — CEFAZOLIN SODIUM 1 G/50ML
1000 SOLUTION INTRAVENOUS ONCE
Status: COMPLETED | OUTPATIENT
Start: 2023-08-22 | End: 2023-08-22

## 2023-08-22 RX ORDER — ONDANSETRON 2 MG/ML
INJECTION INTRAMUSCULAR; INTRAVENOUS AS NEEDED
Status: DISCONTINUED | OUTPATIENT
Start: 2023-08-22 | End: 2023-08-22

## 2023-08-22 RX ADMIN — DEXAMETHASONE SODIUM PHOSPHATE 10 MG: 10 INJECTION, SOLUTION INTRAMUSCULAR; INTRAVENOUS at 13:21

## 2023-08-22 RX ADMIN — SODIUM CHLORIDE, SODIUM LACTATE, POTASSIUM CHLORIDE, AND CALCIUM CHLORIDE: .6; .31; .03; .02 INJECTION, SOLUTION INTRAVENOUS at 13:14

## 2023-08-22 RX ADMIN — PROPOFOL 100 MCG/KG/MIN: 10 INJECTION, EMULSION INTRAVENOUS at 13:23

## 2023-08-22 RX ADMIN — FENTANYL CITRATE 25 MCG: 50 INJECTION INTRAMUSCULAR; INTRAVENOUS at 13:22

## 2023-08-22 RX ADMIN — EPHEDRINE SULFATE 10 MG: 50 INJECTION INTRAVENOUS at 13:51

## 2023-08-22 RX ADMIN — ONDANSETRON 4 MG: 2 INJECTION INTRAMUSCULAR; INTRAVENOUS at 13:21

## 2023-08-22 RX ADMIN — PROPOFOL 120 MG: 10 INJECTION, EMULSION INTRAVENOUS at 13:21

## 2023-08-22 RX ADMIN — MIDAZOLAM HYDROCHLORIDE 2 MG: 1 INJECTION, SOLUTION INTRAMUSCULAR; INTRAVENOUS at 13:12

## 2023-08-22 RX ADMIN — PROPOFOL 30 MG: 10 INJECTION, EMULSION INTRAVENOUS at 13:22

## 2023-08-22 RX ADMIN — CEFAZOLIN SODIUM 1000 MG: 1 SOLUTION INTRAVENOUS at 13:30

## 2023-08-22 RX ADMIN — Medication 100 MCG: at 13:33

## 2023-08-22 RX ADMIN — LIDOCAINE HYDROCHLORIDE 80 MG: 20 INJECTION, SOLUTION EPIDURAL; INFILTRATION; INTRACAUDAL; PERINEURAL at 13:21

## 2023-08-22 NOTE — DISCHARGE INSTR - AVS FIRST PAGE
[facility]  Dr. Shreyas Cheng.  [address], [place]  Phone: [phone]     Postoperative Instructions for Outpatient Surgery     These instructions are being provided by your doctor to give you basic guidelines during your post-op recovery. Please let our office know if your contact information has changed. Please call the office today for an appointment in 2 days for postoperative care     Dressings: Leave dressings intact     Activity Restrictions: No strenuous activity     Bathing: No bathing until seen in the office      Medications:    Resume pre-op medications.    You may take tylenol, aleve, or ibuprofen for pain control                 Other: Wear surgical bra & binder at all times

## 2023-08-22 NOTE — ANESTHESIA POSTPROCEDURE EVALUATION
Post-Op Assessment Note    CV Status:  Stable  Pain Score: 0    Pain management: adequate     Mental Status:  Alert and awake   Hydration Status:  Euvolemic   PONV Controlled:  Controlled   Airway Patency:  Patent      Post Op Vitals Reviewed: Yes      Staff: Anesthesiologist, CRNA         There were no known notable events for this encounter.     BP   130/60   Temp  97   Pulse  85   Resp   16   SpO2   100

## 2023-08-22 NOTE — OP NOTE
OPERATIVE REPORT  PATIENT NAME: Krysta Santos    :  1950  MRN: [id number]  Pt Location: [facility] OR [facility]OM 04    SURGERY DATE: 2023    Surgeon(s) and Role:     * Milind James MD - Primary     * Westley Roberto PA-C - Assisting      Irwin Flood D.P.M., assist  Preop Diagnosis:  Personal history of breast cancer [Z85.3] status post mastectomies and reconstruction, breast asymmetry    Post-Op Diagnosis Codes:     * Personal history of breast cancer [Z85.3] as per preop diagnosis    Procedure(s):  Bilateral - AUTOLOGOUS FAT GRAFTING TO BILATERAL BREASTS, 120 cc fat transfer to right breast, 70 cc fat transfer to left breast    Specimen(s):  * No specimens in log *    Estimated Blood Loss:   Minimal    Drains:  * No LDAs found *    Anesthesia Type:   General    Operative Indications:  Personal history of breast cancer [Z85.3]  As per preop and postop diagnosis above    Operative Findings:  As above    Complications:   None    Procedure and Technique:  Lashawn Gunderson was seen preoperatively in the holding area, surgical site, her preferred areas of fat harvest, and the areas to be addressed with fat transfer were marked with her participation. I again reviewed the planned procedure, potential risk, complications and limitations. She was taken to the operating room and underwent induction of general anesthesia by the anesthesia personnel. The operative field was prepped and draped in sterile fashion and a proper timeout was performed. The areas of the planned fat harvest, essentially the anterior abdomen and flanks, that were then infiltrated with tumescent solution. Prior to the infiltration, the access sites were infiltrated with lidocaine with epinephrine, the access incisions were then created with a 15 blade. After administering the tumescent solution, and waiting an adequate of amount of time for it to become effective the fat was harvested.   Standard liposuction techniques were utilized, i.e. the "pinch" and "roll" techniques were utilized to harvest the fat from the previously determined areas. The fat was then prepared utilizing the Fan[employer] system, it was then placed into 10 cc syringes and utilizing standard fat transfer techniques, i.e. constant motion of the cannula, gentle pressure on the plunger, injecting in small aliquots, avoiding clumping, etc. were utilized to perform the fat transfer to the previously designated areas of the bilateral breasts. A total of 120 cc autologous fat was injected into the deficient areas of the left breast and 70 cc to the deficient areas of the right breast.  Access incisions were then closed with 5-0 nylon. The abdominal sites were dressed with dry sterile gauze followed by application of a well-padded abdominal binder. The grafted areas were splinted with Telfa and Tegaderm followed by application of a well-padded surgical bra. The patient was transferred to the recovery room. I was present for the entire procedure.     Patient Disposition:  PACU         SIGNATURE: Machelle Rahman MD  DATE: August 22, 2023  TIME: 2:45 PM

## 2023-08-22 NOTE — INTERVAL H&P NOTE
H&P reviewed. After examining the patient I find no changes in the patients condition since the H&P had been written.     Vitals:    08/22/23 1159   BP: 159/74   Pulse: 79   Resp: 18   Temp: (!) 97.3 °F (36.3 °C)   SpO2: 99%

## 2023-08-24 ENCOUNTER — OFFICE VISIT (OUTPATIENT)
Dept: PLASTIC SURGERY | Facility: CLINIC | Age: 73
End: 2023-08-24

## 2023-08-24 DIAGNOSIS — Z42.1 ENCOUNTER FOR BREAST RECONSTRUCTION FOLLOWING MASTECTOMY: Primary | ICD-10-CM

## 2023-08-24 NOTE — PROGRESS NOTES
Assessment/Plan:     Diagnoses and all orders for this visit:    Encounter for breast reconstruction following mastectomy  See HPI. Pt underwent autologous fat grafting to bilateral breast on 8/22 with Dr. Irina Corona. Patient complains of some discomfort but is not requiring pain medication. She has expected postop ecchymosis and swelling. I did recommend compression bra and abdominal binder at all times. She can also use anti-inflammatories and ice as needed. We will see her back in 2 weeks time for suture removal.          Subjective:      Patient ID: Tawny Garvey is a 75 y.o. female. HPI     Patient is here for postop visit. She is status post left mastectomy with reconstruction done elsewhere, implant exchange, mastopexy, fat grafting. Most recently underwent autologous fat grafting to bilateral breast on 8/22 with Dr. Irina Corona. Patient complains of some discomfort but is not requiring pain medication.     Patient Active Problem List   Diagnosis   • 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   • Multiple sclerosis ([address])   • Peripheral polyneuropathy   • Depression   • Fatigue   • Gait disturbance   • Hip pain, right   • Unilateral occipital headache   • History of bilateral hip replacements   • Hypokalemia   • Insomnia   • Solitary pulmonary nodule   • Pain of left lower leg   • Osteopenia   • Nephrolithiasis   • Tingling   • Screening mammogram, encounter for   • Encounter for breast reconstruction following mastectomy   • Abnormal stool test   • Encounter for follow-up surveillance of breast cancer   • Essential hypertension   • Abnormal finding on breast imaging   • TSH (thyroid-stimulating hormone deficiency)   • B12 deficiency   • RLS (restless legs syndrome)   • Dense breast tissue   • Subareolar lump of right breast   • Hyperlipidemia     Allergies   Allergen Reactions   • Duloxetine Hcl      Rapid Heart rate     • Iodinated Contrast Media Anaphylaxis   • Acetaminophen Other (See Comments)     Heart palpitations   • Cetirizine      Only occurs with generic, weakness in legs, off balance and couldn't walk. • Morphine Other (See Comments)     Migraine     Current Outpatient Medications on File Prior to Visit   Medication Sig   • ALPHA LIPOIC ACID PO Take by mouth   • Ampyra 10 MG TB12 1 tablet daily   • ascorbic acid (VITAMIN C) 500 mg tablet Take 500 mg by mouth daily   • baclofen 10 mg tablet 1 tab pos q am, 1 tab po q pm and 2 tabs hs   • Biotin 5000 MCG TABS    • Cholecalciferol (VITAMIN D3) 1000 units CAPS Take 5,000 Units by mouth     • clonazePAM (KlonoPIN) 0.5 mg tablet TAKE 1 AND 1/2 TABLETS BY MOUTH DAILY AT BEDTIME   • Copaxone 40 MG/ML SOSY INJECT ONE SYRINGE SUBCUTANEOUSLY THREE TIMES PER WEEK AT LEAST 48 HOURS APART. ALLOW SYRINGE TO WARM TO ROOM TEMPERATURE FOR 20 MINUTES. REFRIGERATE.    • Cranberry 125 MG TABS Take 125 mg by mouth in the morning   • cycloSPORINE (RESTASIS) 0.05 % ophthalmic emulsion Administer 1 drop to both eyes every 12 (twelve) hours   • denosumab (PROLIA) 60 mg/mL Inject under the skin   • Diclofenac Sodium (VOLTAREN) 1 % Apply 2 g topically 4 (four) times a day as needed (as needed for pain)   • docusate sodium (COLACE) 100 mg capsule Take 1 capsule by mouth every other day     • gabapentin (NEURONTIN) 300 mg capsule Take 1 capsule (300 mg total) by mouth daily at bedtime   • gabapentin (NEURONTIN) 600 MG tablet 2 tabs po tid (Patient taking differently: Take 600 mg by mouth 2 tabs in morning, 1.5 tabs in afternoon, 2-600mg, 1 300mg at bedtime (900 mg))   • Magnesium 500 MG TABS Take 500 tablets by mouth once   • solifenacin (VESICARE) 10 MG tablet Take 1 tablet (10 mg total) by mouth daily   • zonisamide (ZONEGRAN) 100 mg capsule Take 4 capsules (400 mg total) by mouth daily at bedtime     Current Facility-Administered Medications on File Prior to Visit   Medication   • bacitracin 50,000 Units, gentamicin (GARAMYCIN) 40 mg/mL 80 mg, ceFAZolin (ANCEF) 1,000 mg in sodium chloride 0.9 % 1,000 mL irrigation bottle     Family History   Problem Relation Age of Onset   • Coronary artery disease Mother    • Hyperlipidemia Mother    • Rheum arthritis Mother    • Other Father         Acute myocardial infarction   • No Known Problems Maternal Grandmother    • Hodgkin's lymphoma Maternal Grandfather         age at dx unk   • No Known Problems Paternal Grandmother    • No Known Problems Paternal Grandfather    • Stroke Son 52   • No Known Problems Son    • Cancer Maternal Aunt 79        bladder   • Heart disease Maternal Aunt    • Stroke Maternal Aunt         6 CVA before death   • Breast cancer Maternal Aunt    • Colon cancer Maternal Uncle 72   • Hodgkin's lymphoma Maternal Uncle 79   • No Known Problems Paternal Aunt    • No Known Problems Paternal Aunt    • No Known Problems Paternal Aunt      Past Medical History:   Diagnosis Date   • Allergic 1967    seasonal   • Allergic rhinitis    • Bone pain    • Breast cancer ([address]) 08/16/2012   • Breast ptosis    • Depression    • Fatigue    • Gait disturbance     uses cane at times   • Headache    • Hip fracture (HCC)    • Hip pain    • History of transfusion 1975    placenta previa s/p work accident, no rx   • Hypokalemia    • Insomnia    • Laceration of finger     right hand   • Left ankle pain    • Left knee pain    • Multiple sclerosis (HCC)    • Muscle strain     left lower leg   • Nephrolithiasis    • Osteopenia    • Osteoporosis    • Pain of left calf    • Pneumonia    • Rash    • Scoliosis birth    scoliosis   • Solitary pulmonary nodule    • SVT (supraventricular tachycardia) (HCC)    • Tingling    • Urgency of urination    • Urticaria    • Wrist fracture, left      Social History     Socioeconomic History   • Marital status: /Civil Union     Spouse name: Not on file   • Number of children: 2   • Years of education: Completed bachelor's degree   • Highest education level: Not on file   Occupational History   • Occupation: RN     Comment: Retired   Tobacco Use   • Smoking status: Former     Packs/day: 1.00     Years: 35.00     Total pack years: 35.00     Types: Cigarettes     Quit date:      Years since quittin.6   • Smokeless tobacco: Never   Vaping Use   • Vaping Use: Never used   Substance and Sexual Activity   • Alcohol use: No   • Drug use: No   • Sexual activity: Yes     Partners: Male     Birth control/protection: Post-menopausal   Other Topics Concern   • Not on file   Social History Narrative    Denied:  History of caffeine use     Social Determinants of Health     Financial Resource Strain: Medium Risk (10/22/2022)    Overall Financial Resource Strain (CARDIA)    • Difficulty of Paying Living Expenses: Somewhat hard   Food Insecurity: Not on file   Transportation Needs: No Transportation Needs (10/22/2022)    PRAPARE - Transportation    • Lack of Transportation (Medical): No    • Lack of Transportation (Non-Medical):  No   Physical Activity: Not on file   Stress: Not on file   Social Connections: Not on file   Intimate Partner Violence: Not on file   Housing Stability: Not on file     Past Surgical History:   Procedure Laterality Date   • ANKLE SURGERY     • APPENDECTOMY  2012    Dr. Madrigal Mean   • AUGMENTATION BREAST      Enlargement procedure with prosthetic implant bilateral   • AUGMENTATION MAMMAPLASTY Right 2020    implant replaced because of recall   • AUGMENTATION MAMMAPLASTY Bilateral    • BREAST BIOPSY Left 2012   • BREAST IMPLANT Right 2020    Procedure: BREAST IMPLANT EXCHANGE WITH CAPSULECTOMY;  Surgeon: Jennifer De La Cruz MD;  [facility];  Service: Plastics   • BREAST IMPLANT REMOVAL Right 2020    implant placement, implant revision, right mastopexy   • BREAST LUMPECTOMY     • CYSTOSTOMY      with basket extraction of calculus; x2   • EXCISION / BIOPSY BREAST / Barnard Trinh / DUCT Right 2020    scar revision to resuture non healing surgical wound   • EXPLORATORY LAPAROTOMY     • FL INJECTION LEFT SHOULDER (ARTHROGRAM)  7/19/2023   • FOOT SURGERY     • FRACTURE SURGERY  Lt wrist 9/2014 - Lt matthew femur 9/14   • HIP FRACTURE SURGERY Right     Subcapital   • HIP SURGERY Left    • JOINT REPLACEMENT  Rt hip 10/2012   • LEG SURGERY      Repair   • MASTECTOMY Left 08/16/2012   • VA GRAFTING OF AUTOLOGOUS FAT BY LIPO 50 CC OR LESS Bilateral 8/22/2023    Procedure: AUTOLOGOUS FAT GRAFTING TO BILATERAL BREASTS;  Surgeon: Carley Cloud MD;  [facility];  Service: Plastics   • VA MASTOPEXY Right 01/28/2020    Procedure: BREAST MASTOPEXY;  Surgeon: Carley Cloud MD;  [facility];  Service: Plastics   • VA REVISION OF RECONSTRUCTED BREAST Right 05/04/2020    Procedure: REVISION RIGHT BREAST MOUND;  Surgeon: Carley Cloud MD;  [facility];  Service: Plastics   • REDUCTION MAMMAPLASTY Right 01/28/2020    reduced to match left side   • SENTINEL LYMPH NODE BIOPSY Left 08/16/2012   • SKIN BIOPSY     • TONSILLECTOMY     • TUBAL LIGATION     • WRIST SURGERY Left          Review of Systems   All other systems reviewed and are negative. Objective: There were no vitals taken for this visit. Physical Exam  Constitutional:       Appearance: Normal appearance. She is well-developed. HENT:      Head: Normocephalic and atraumatic. Eyes:      Conjunctiva/sclera: Conjunctivae normal.   Pulmonary:      Effort: Pulmonary effort is normal.      Comments: Bilateral breast with ecchymosis and swelling; incisions are clean dry and intact  Abdominal:      Comments: Abdomen with ecchymosis and swelling, incisions are clean dry and intact   Musculoskeletal:      Cervical back: Normal range of motion. Comments: Walks with a cane   Skin:     General: Skin is warm and dry. Neurological:      Mental Status: She is alert and oriented to person, place, and time.    Psychiatric:         Mood and Affect: Mood normal. Behavior: Behavior normal.

## 2023-08-28 ENCOUNTER — APPOINTMENT (OUTPATIENT)
Dept: PHYSICAL THERAPY | Facility: CLINIC | Age: 73
End: 2023-08-28
Payer: MEDICARE

## 2023-08-29 ENCOUNTER — TELEPHONE (OUTPATIENT)
Age: 73
End: 2023-08-29

## 2023-08-29 ENCOUNTER — OFFICE VISIT (OUTPATIENT)
Dept: PLASTIC SURGERY | Facility: CLINIC | Age: 73
End: 2023-08-29

## 2023-08-29 DIAGNOSIS — Z42.1 ENCOUNTER FOR BREAST RECONSTRUCTION FOLLOWING MASTECTOMY: Primary | ICD-10-CM

## 2023-08-29 PROCEDURE — 99024 POSTOP FOLLOW-UP VISIT: CPT | Performed by: PHYSICIAN ASSISTANT

## 2023-08-29 NOTE — PROGRESS NOTES
Assessment/Plan:     Diagnoses and all orders for this visit:    Encounter for breast reconstruction following mastectomy  See HPI. Pt underwent autologous fat grafting to bilateral breast on 8/22 with Dr. Lise Connor. She calls in today complaining of bilateral leg swelling. She states it started 3 days ago & doesn't seem to be getting better. She does not typically take diuretics. She does have CKD. She doesn't have any calf or thigh tenderness. It would be unlikely to have bilateral DVTs & likely just dependent edema. I asked her to continue to monitor & if anything changes to let us know immediately. Otherwise we'll see her next week. Subjective:      Patient ID: Roger Bennett is a 75 y.o. female. HPI     Patient is here for postop visit. She is status post left mastectomy with reconstruction done elsewhere, implant exchange, mastopexy, fat grafting. Most recently underwent autologous fat grafting to bilateral breast on 8/22 with Dr. Lise Connor. She calls in today complaining of bilateral leg swelling. She states it started 3 days ago & doesn't seem to be getting better. She does not typically take diuretics. She does have CKD. She states she has been sitting at the computer a lot with her feet not elevated. She denies calf or chest pain.        Patient Active Problem List   Diagnosis   • 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   • Multiple sclerosis ([address])   • Peripheral polyneuropathy   • Depression   • Fatigue   • Gait disturbance   • Hip pain, right   • Unilateral occipital headache   • History of bilateral hip replacements   • Hypokalemia   • Insomnia   • Solitary pulmonary nodule   • Pain of left lower leg   • Osteopenia   • Nephrolithiasis   • Tingling   • Screening mammogram, encounter for   • Encounter for breast reconstruction following mastectomy   • Abnormal stool test   • Encounter for follow-up surveillance of breast cancer   • Essential hypertension   • Abnormal finding on breast imaging   • TSH (thyroid-stimulating hormone deficiency)   • B12 deficiency   • RLS (restless legs syndrome)   • Dense breast tissue   • Subareolar lump of right breast   • Hyperlipidemia     Allergies   Allergen Reactions   • Duloxetine Hcl      Rapid Heart rate     • Iodinated Contrast Media Anaphylaxis   • Acetaminophen Other (See Comments)     Heart palpitations   • Cetirizine      Only occurs with generic, weakness in legs, off balance and couldn't walk. • Morphine Other (See Comments)     Migraine     Current Outpatient Medications on File Prior to Visit   Medication Sig   • ALPHA LIPOIC ACID PO Take by mouth   • Ampyra 10 MG TB12 1 tablet daily   • ascorbic acid (VITAMIN C) 500 mg tablet Take 500 mg by mouth daily   • baclofen 10 mg tablet 1 tab pos q am, 1 tab po q pm and 2 tabs hs   • Biotin 5000 MCG TABS    • Cholecalciferol (VITAMIN D3) 1000 units CAPS Take 5,000 Units by mouth     • clonazePAM (KlonoPIN) 0.5 mg tablet TAKE 1 AND 1/2 TABLETS BY MOUTH DAILY AT BEDTIME   • Copaxone 40 MG/ML SOSY INJECT ONE SYRINGE SUBCUTANEOUSLY THREE TIMES PER WEEK AT LEAST 48 HOURS APART. ALLOW SYRINGE TO WARM TO ROOM TEMPERATURE FOR 20 MINUTES. REFRIGERATE.    • Cranberry 125 MG TABS Take 125 mg by mouth in the morning   • cycloSPORINE (RESTASIS) 0.05 % ophthalmic emulsion Administer 1 drop to both eyes every 12 (twelve) hours   • denosumab (PROLIA) 60 mg/mL Inject under the skin   • Diclofenac Sodium (VOLTAREN) 1 % Apply 2 g topically 4 (four) times a day as needed (as needed for pain)   • docusate sodium (COLACE) 100 mg capsule Take 1 capsule by mouth every other day     • gabapentin (NEURONTIN) 300 mg capsule Take 1 capsule (300 mg total) by mouth daily at bedtime   • gabapentin (NEURONTIN) 600 MG tablet 2 tabs po tid (Patient taking differently: Take 600 mg by mouth 2 tabs in morning, 1.5 tabs in afternoon, 2-600mg, 1 300mg at bedtime (900 mg))   • Magnesium 500 MG TABS Take 500 tablets by mouth once   • solifenacin (VESICARE) 10 MG tablet Take 1 tablet (10 mg total) by mouth daily   • zonisamide (ZONEGRAN) 100 mg capsule Take 4 capsules (400 mg total) by mouth daily at bedtime     Current Facility-Administered Medications on File Prior to Visit   Medication   • bacitracin 50,000 Units, gentamicin (GARAMYCIN) 40 mg/mL 80 mg, ceFAZolin (ANCEF) 1,000 mg in sodium chloride 0.9 % 1,000 mL irrigation bottle     Family History   Problem Relation Age of Onset   • Coronary artery disease Mother    • Hyperlipidemia Mother    • Rheum arthritis Mother    • Other Father         Acute myocardial infarction   • No Known Problems Maternal Grandmother    • Hodgkin's lymphoma Maternal Grandfather         age at dx unk   • No Known Problems Paternal Grandmother    • No Known Problems Paternal Grandfather    • Stroke Son 52   • No Known Problems Son    • Cancer Maternal Aunt 79        bladder   • Heart disease Maternal Aunt    • Stroke Maternal Aunt         6 CVA before death   • Breast cancer Maternal Aunt    • Colon cancer Maternal Uncle 72   • Hodgkin's lymphoma Maternal Uncle 79   • No Known Problems Paternal Aunt    • No Known Problems Paternal Aunt    • No Known Problems Paternal Aunt      Past Medical History:   Diagnosis Date   • Allergic 1967    seasonal   • Allergic rhinitis    • Bone pain    • Breast cancer ([address]) 08/16/2012   • Breast ptosis    • Depression    • Fatigue    • Gait disturbance     uses cane at times   • Headache    • Hip fracture (HCC)    • Hip pain    • History of transfusion 1975    placenta previa s/p work accident, no rx   • Hypokalemia    • Insomnia    • Laceration of finger     right hand   • Left ankle pain    • Left knee pain    • Multiple sclerosis (HCC)    • Muscle strain     left lower leg   • Nephrolithiasis    • Osteopenia    • Osteoporosis    • Pain of left calf    • Pneumonia    • Rash    • Scoliosis birth    scoliosis   • Solitary pulmonary nodule    • SVT (supraventricular tachycardia) ([address]) • Tingling    • Urgency of urination    • Urticaria    • Wrist fracture, left      Social History     Socioeconomic History   • Marital status: /Civil Union     Spouse name: Not on file   • Number of children: 2   • Years of education: Completed bachelor's degree   • Highest education level: Not on file   Occupational History   • Occupation: RN     Comment: Retired   Tobacco Use   • Smoking status: Former     Packs/day: 1.00     Years: 35.00     Total pack years: 35.00     Types: Cigarettes     Quit date:      Years since quittin.6   • Smokeless tobacco: Never   Vaping Use   • Vaping Use: Never used   Substance and Sexual Activity   • Alcohol use: No   • Drug use: No   • Sexual activity: Yes     Partners: Male     Birth control/protection: Post-menopausal   Other Topics Concern   • Not on file   Social History Narrative    Denied:  History of caffeine use     Social Determinants of Health     Financial Resource Strain: Medium Risk (10/22/2022)    Overall Financial Resource Strain (CARDIA)    • Difficulty of Paying Living Expenses: Somewhat hard   Food Insecurity: Not on file   Transportation Needs: No Transportation Needs (10/22/2022)    PRAPARE - Transportation    • Lack of Transportation (Medical): No    • Lack of Transportation (Non-Medical):  No   Physical Activity: Not on file   Stress: Not on file   Social Connections: Not on file   Intimate Partner Violence: Not on file   Housing Stability: Not on file     Past Surgical History:   Procedure Laterality Date   • ANKLE SURGERY     • APPENDECTOMY  2012    Dr. Kaitlin Linares   • AUGMENTATION BREAST      Enlargement procedure with prosthetic implant bilateral   • AUGMENTATION MAMMAPLASTY Right 2020    implant replaced because of recall   • AUGMENTATION MAMMAPLASTY Bilateral    • BREAST BIOPSY Left 2012   • BREAST IMPLANT Right 2020    Procedure: BREAST IMPLANT EXCHANGE WITH CAPSULECTOMY;  Surgeon: Jennifer De La Cruz MD;  [facility];  Service: Plastics   • BREAST IMPLANT REMOVAL Right 01/28/2020    implant placement, implant revision, right mastopexy   • BREAST LUMPECTOMY     • CYSTOSTOMY      with basket extraction of calculus; x2   • EXCISION / BIOPSY BREAST / Justice Katherine / DUCT Right 05/04/2020    scar revision to resuture non healing surgical wound   • EXPLORATORY LAPAROTOMY     • FL INJECTION LEFT SHOULDER (ARTHROGRAM)  7/19/2023   • FOOT SURGERY     • FRACTURE SURGERY  Lt wrist 9/2014 - Lt matthew femur 9/14   • HIP FRACTURE SURGERY Right     Subcapital   • HIP SURGERY Left    • JOINT REPLACEMENT  Rt hip 10/2012   • LEG SURGERY      Repair   • MASTECTOMY Left 08/16/2012   • GA GRAFTING OF AUTOLOGOUS FAT BY LIPO 50 CC OR LESS Bilateral 8/22/2023    Procedure: AUTOLOGOUS FAT GRAFTING TO BILATERAL BREASTS;  Surgeon: Antoinette Hensley MD;  [facility];  Service: Plastics   • GA MASTOPEXY Right 01/28/2020    Procedure: BREAST MASTOPEXY;  Surgeon: Antoinette Hensley MD;  [facility];  Service: Plastics   • GA REVISION OF RECONSTRUCTED BREAST Right 05/04/2020    Procedure: REVISION RIGHT BREAST MOUND;  Surgeon: Antoinette Hensley MD;  [facility];  Service: Plastics   • REDUCTION MAMMAPLASTY Right 01/28/2020    reduced to match left side   • SENTINEL LYMPH NODE BIOPSY Left 08/16/2012   • SKIN BIOPSY     • TONSILLECTOMY     • TUBAL LIGATION     • WRIST SURGERY Left          Review of Systems   All other systems reviewed and are negative. Objective: There were no vitals taken for this visit. Physical Exam  Constitutional:       Appearance: Normal appearance. She is well-developed. HENT:      Head: Normocephalic and atraumatic. Eyes:      Conjunctiva/sclera: Conjunctivae normal.   Pulmonary:      Effort: Pulmonary effort is normal.   Abdominal:      Comments: Swelling & ecchymosis   Musculoskeletal:      Cervical back: Normal range of motion. Right lower leg: Edema present. Left lower leg: Edema present. Comments: No leg TTP; Walks with cane   Skin:     General: Skin is warm and dry. Neurological:      Mental Status: She is alert and oriented to person, place, and time.    Psychiatric:         Mood and Affect: Mood normal.         Behavior: Behavior normal.

## 2023-08-29 NOTE — TELEPHONE ENCOUNTER
Patient called in this morning complaining of having discomfort, tightness and pain after her surgery that was done last week on Tuesday. Patient notes it's been more uncomfortable since she's been moving more around. Patient denies having any nausea, fever, headache, or severe post op symptoms besides leg swelling during call intake.

## 2023-08-31 ENCOUNTER — PATIENT MESSAGE (OUTPATIENT)
Dept: FAMILY MEDICINE CLINIC | Facility: OTHER | Age: 73
End: 2023-08-31

## 2023-08-31 ENCOUNTER — TELEPHONE (OUTPATIENT)
Dept: NEPHROLOGY | Facility: CLINIC | Age: 73
End: 2023-08-31

## 2023-08-31 ENCOUNTER — APPOINTMENT (OUTPATIENT)
Dept: PHYSICAL THERAPY | Facility: CLINIC | Age: 73
End: 2023-08-31
Payer: MEDICARE

## 2023-08-31 NOTE — TELEPHONE ENCOUNTER
From: Claudio Escoto  To: Jayjay Clark  Sent: 2023 1:35 PM EDT  Subject: Post op visit 2023    Dr. Marcellus Corona  I was wondering why I am scheduled to return to your office the same day I am being seen by Dr. Rashida Kaufman for my second Post Op visit to have my sutures removed. Is there some medical reason for me being seen? I was medically seen for pre op clearance which indicated an incorrect surgical procedure to be done in the notes. I did not have a rotator cuff repair. I only had the fat transfer to my mastectomy site and Dr. Rashida Kaufman decided to even the other recalled implant. I am also scheduled for my Medicare wellness exam in October and Nephrology in December. At this point the only specialist I do not see is GYN because he ! Can you let me know if this is a necessary appointment.   Thank You  Toribio Todd   1950

## 2023-08-31 NOTE — TELEPHONE ENCOUNTER
New Patient Intake Form   Patient Details   Maria A Rincon     1950     [id number]     Insurance Information   Name of [employer]   Does the patient need an insurance referral? no   If patient has Pitney Glenna, please ask if they will be using their Pitney Glenna. Appointment Information   Who is calling to schedule? If not patient, what is callers name? [address]   Referring Provider  Dr. Beba Pinto   Reason for Appt (Diagnosis) CKD3    Does Patient have labs/urine done at [facility]? If not, where do they go? List the date of last lab / urine  *Please try to get labs 2 years back if not at [address] Yes   8/2023   Has patient been hospitalized recently? If yes, list name and location of hospital they were in no   Has patient been seen by a Nephrologist before? If yes, list name, location and phone number no   Has the patient had renal imaging done? If so, list the most recent date and type of imaging no    Does patient have a history of Kidney Stones? no   Appointment Details   Is there a referral on file?  yes    Appointment Date 12/5    Location  [place] ([place]   Miscellaneous

## 2023-09-01 ENCOUNTER — PATIENT MESSAGE (OUTPATIENT)
Dept: FAMILY MEDICINE CLINIC | Facility: OTHER | Age: 73
End: 2023-09-01

## 2023-09-01 NOTE — TELEPHONE ENCOUNTER
From: Trina Mckeon  To: Colt Leungalicia  Sent: 2023 1:35 PM EDT  Subject: Post op visit 2023    Dr. Beatrice Mello  I was wondering why I am scheduled to return to your office the same day I am being seen by Dr. Maricarmen Veliz for my second Post Op visit to have my sutures removed. Is there some medical reason for me being seen? I was medically seen for pre op clearance which indicated an incorrect surgical procedure to be done in the notes. I did not have a rotator cuff repair. I only had the fat transfer to my mastectomy site and Dr. Maricarmen Veliz decided to even the other recalled implant. I am also scheduled for my Medicare wellness exam in October and Nephrology in December. At this point the only specialist I do not see is GYN because he ! Can you let me know if this is a necessary appointment.   Thank You  Charu Melvin   1950

## 2023-09-05 ENCOUNTER — OFFICE VISIT (OUTPATIENT)
Dept: FAMILY MEDICINE CLINIC | Facility: OTHER | Age: 73
End: 2023-09-05
Payer: MEDICARE

## 2023-09-05 ENCOUNTER — APPOINTMENT (OUTPATIENT)
Dept: PHYSICAL THERAPY | Facility: CLINIC | Age: 73
End: 2023-09-05
Payer: MEDICARE

## 2023-09-05 VITALS
SYSTOLIC BLOOD PRESSURE: 142 MMHG | TEMPERATURE: 98.2 F | BODY MASS INDEX: 23.9 KG/M2 | HEIGHT: 64 IN | OXYGEN SATURATION: 97 % | DIASTOLIC BLOOD PRESSURE: 78 MMHG | HEART RATE: 73 BPM | RESPIRATION RATE: 14 BRPM | WEIGHT: 140 LBS

## 2023-09-05 DIAGNOSIS — R60.0 PEDAL EDEMA: Primary | ICD-10-CM

## 2023-09-05 PROCEDURE — 99213 OFFICE O/P EST LOW 20 MIN: CPT

## 2023-09-05 RX ORDER — FUROSEMIDE 20 MG/1
10 TABLET ORAL DAILY
Qty: 15 TABLET | Refills: 0 | Status: SHIPPED | OUTPATIENT
Start: 2023-09-05 | End: 2023-10-05

## 2023-09-05 NOTE — PATIENT INSTRUCTIONS
Edema   WHAT YOU NEED TO KNOW:   Edema is swelling throughout your body. Edema is usually a sign that you are retaining fluid. The swelling may be caused by heart failure or kidney, thyroid, or liver disease. It may also be caused by medicines such as antidepressants, blood pressure medicines, or hormones. Sudden swelling around the lips or face may be a sign of a severe allergic reaction. Swelling of an arm or leg may be caused by blockage of your veins. DISCHARGE INSTRUCTIONS:   Return to the emergency department if:   You have shortness of breath at rest, especially when you lie down. You cough up pink, foamy sputum. You have chest pain. Your heartbeat is fast or uneven. Call your doctor if:   The swollen area feels cold and is pale or blue in color. The swollen area feels warm, painful, and is red in color. You have increased swelling or swelling in other parts of your body. You have questions or concerns about your condition or care. Medicines:   Medicines  help to get rid of extra body fluid. Take your medicine as directed. Contact your healthcare provider if you think your medicine is not helping or if you have side effects. Tell your provider if you are allergic to any medicine. Keep a list of the medicines, vitamins, and herbs you take. Include the amounts, and when and why you take them. Bring the list or the pill bottles to follow-up visits. Carry your medicine list with you in case of an emergency. Manage edema:   Elevate  your arms or legs as directed. Raise them above the level of your heart as often as you can. This will help decrease swelling and pain. Prop them on pillows or blankets to keep them elevated comfortably. Wear pressure stockings as directed. The stockings are tight and put pressure on your legs. This helps to keep fluid from collecting in your legs or ankles. Limit your salt intake. Salt causes your body to hold water.  Ask about any other changes to your diet. Stay active. Do not stand or sit for long periods of time. Ask your healthcare provider about the best exercise plan for you. Keep your skin moist  using lotion, cream, or ointment. Ask your healthcare provider what to use and how often to use it. Follow up with your doctor as directed:  Write down your questions so you remember to ask them during your visits. © Copyright Elisha Goltz 2022 Information is for End User's use only and may not be sold, redistributed or otherwise used for commercial purposes. The above information is an  only. It is not intended as medical advice for individual conditions or treatments. Talk to your doctor, nurse or pharmacist before following any medical regimen to see if it is safe and effective for you.

## 2023-09-05 NOTE — PROGRESS NOTES
Name: Katie Clement      : 1950      MRN: [id number]  Encounter Provider: Robbin Adam MD  Encounter Date: 2023   Encounter department: [facility]   Pt is a 75 YOF with PMH of CKD 3a, breast cancer s/p reconstruction, MS in remission, who presents c/o B/L LE  edema for last several weeks. Of note, patient recently had autologous fat transfer to bilateral breasts on  and has been wearing a thoracic and abdominal binding garment since that point in time. Patient endorses bilateral lower extremity edema which makes it difficult for her to fit in her shoes, worsened by standing and at end of day, improved by resting overnight. Patient additionally endorses going on vacation in 10 days and wanting resolution prior to this. Risks versus benefits of limited trial of 10 mg of furosemide once daily discussed with patient, who consented to limited trial of furosemide. Recheck BMP ordered for 1 week after furosemide initiation, and patient additionally cautioned to not take furosemide while on vacation in hot climates due to the risk of dehydration    1. Pedal edema  -     furosemide (LASIX) 20 mg tablet; Take 0.5 tablets (10 mg total) by mouth daily  -     Basic metabolic panel; Future; Expected date: 2023           Subjective     Edema   Patient complains of edema in both ankles and feet and both lower legs. The edema has been moderate. Onset of symptoms was several weeks ago, and patient reports symptoms have gradually worsened since that time. The edema is present all day. The patient states the problem is new. The swelling has been aggravated by dependency of involved area. The swelling has been relieved by nothing. Associated factors include: nothing. Cardiac risk factors include advanced age (older than 54 for men, 72 for women), dyslipidemia and hypertension. Review of Systems   Constitutional: Negative for activity change and fatigue. HENT: Negative for congestion and sore throat. Eyes: Negative for visual disturbance. Respiratory: Negative for cough, chest tightness, shortness of breath and wheezing. Cardiovascular: Positive for leg swelling. Negative for chest pain and palpitations. Gastrointestinal: Negative for abdominal pain, constipation, diarrhea, nausea and vomiting. Endocrine: Negative for polyuria. Genitourinary: Negative for decreased urine volume, dysuria and urgency. Musculoskeletal: Negative for arthralgias and myalgias. Skin: Negative for pallor and rash. Neurological: Negative for dizziness and weakness. Hematological: Negative for adenopathy. Psychiatric/Behavioral: Negative for agitation.        Past Medical History:   Diagnosis Date   • Allergic 1967    seasonal   • Allergic rhinitis    • Bone pain    • Breast cancer ([address]) 08/16/2012   • Breast ptosis    • Depression    • Fatigue    • Gait disturbance     uses cane at times   • Headache    • Hip fracture (HCC)    • Hip pain    • History of transfusion 1975    placenta previa s/p work accident, no rx   • Hypokalemia    • Insomnia    • Laceration of finger     right hand   • Left ankle pain    • Left knee pain    • Multiple sclerosis (HCC)    • Muscle strain     left lower leg   • Nephrolithiasis    • Osteopenia    • Osteoporosis    • Pain of left calf    • Pneumonia    • Rash    • Scoliosis birth    scoliosis   • Solitary pulmonary nodule    • SVT (supraventricular tachycardia) (HCC)    • Tingling    • Urgency of urination    • Urticaria    • Wrist fracture, left      Past Surgical History:   Procedure Laterality Date   • ANKLE SURGERY     • APPENDECTOMY  11/2012    Dr. Rama Vanessa   • AUGMENTATION BREAST      Enlargement procedure with prosthetic implant bilateral   • AUGMENTATION MAMMAPLASTY Right 01/28/2020    implant replaced because of recall   • AUGMENTATION MAMMAPLASTY Bilateral 2012   • BREAST BIOPSY Left 07/23/2012   • BREAST IMPLANT Right 01/28/2020 Procedure: BREAST IMPLANT EXCHANGE WITH CAPSULECTOMY;  Surgeon: Jennifer De La Cruz MD;  Location[facility];  Service: Plastics   • BREAST IMPLANT REMOVAL Right 01/28/2020    implant placement, implant revision, right mastopexy   • BREAST LUMPECTOMY     • CYSTOSTOMY      with basket extraction of calculus; x2   • EXCISION / BIOPSY BREAST / Barnard Trinh / DUCT Right 05/04/2020    scar revision to resuture non healing surgical wound   • EXPLORATORY LAPAROTOMY     • FL INJECTION LEFT SHOULDER (ARTHROGRAM)  7/19/2023   • FOOT SURGERY     • FRACTURE SURGERY  Lt wrist 9/2014 - Lt matthew femur 9/14   • HIP FRACTURE SURGERY Right     Subcapital   • HIP SURGERY Left    • JOINT REPLACEMENT  Rt hip 10/2012   • LEG SURGERY      Repair   • MASTECTOMY Left 08/16/2012   • HI GRAFTING OF AUTOLOGOUS FAT BY LIPO 50 CC OR LESS Bilateral 8/22/2023    Procedure: AUTOLOGOUS FAT GRAFTING TO BILATERAL BREASTS;  Surgeon: Jennifer De La Cruz MD;  [facility];  Service: Plastics   • HI MASTOPEXY Right 01/28/2020    Procedure: BREAST MASTOPEXY;  Surgeon: Jennifer De La Cruz MD;  [facility];  Service: Plastics   • HI REVISION OF RECONSTRUCTED BREAST Right 05/04/2020    Procedure: REVISION RIGHT BREAST MOUND;  Surgeon: Jennifer De La Cruz MD;  [facility];  Service: Plastics   • REDUCTION MAMMAPLASTY Right 01/28/2020    reduced to match left side   • SENTINEL LYMPH NODE BIOPSY Left 08/16/2012   • SKIN BIOPSY     • TONSILLECTOMY     • TUBAL LIGATION     • WRIST SURGERY Left      Family History   Problem Relation Age of Onset   • Coronary artery disease Mother    • Hyperlipidemia Mother    • Rheum arthritis Mother    • Other Father         Acute myocardial infarction   • No Known Problems Maternal Grandmother    • Hodgkin's lymphoma Maternal Grandfather         age at dx unk   • No Known Problems Paternal Grandmother    • No Known Problems Paternal Grandfather    • Stroke Son 52   • No Known Problems Son    • Cancer Maternal Aunt 70        bladder   • Heart disease Maternal Aunt    • Stroke Maternal Aunt         6 CVA before death   • Breast cancer Maternal Aunt    • Colon cancer Maternal Uncle 72   • Hodgkin's lymphoma Maternal Uncle 79   • No Known Problems Paternal Aunt    • No Known Problems Paternal Aunt    • No Known Problems Paternal Aunt      Social History     Socioeconomic History   • Marital status: /Civil Union     Spouse name: None   • Number of children: 2   • Years of education: Completed bachelor's degree   • Highest education level: None   Occupational History   • Occupation: RN     Comment: Retired   Tobacco Use   • Smoking status: Former     Packs/day: 1.00     Years: 35.00     Total pack years: 35.00     Types: Cigarettes     Quit date:      Years since quittin.6   • Smokeless tobacco: Never   Vaping Use   • Vaping Use: Never used   Substance and Sexual Activity   • Alcohol use: No   • Drug use: No   • Sexual activity: Yes     Partners: Male     Birth control/protection: Post-menopausal   Other Topics Concern   • None   Social History Narrative    Denied:  History of caffeine use     Social Determinants of Health     Financial Resource Strain: Medium Risk (10/22/2022)    Overall Financial Resource Strain (CARDIA)    • Difficulty of Paying Living Expenses: Somewhat hard   Food Insecurity: Not on file   Transportation Needs: No Transportation Needs (10/22/2022)    PRAPARE - Transportation    • Lack of Transportation (Medical): No    • Lack of Transportation (Non-Medical):  No   Physical Activity: Not on file   Stress: Not on file   Social Connections: Not on file   Intimate Partner Violence: Not on file   Housing Stability: Not on file     Current Outpatient Medications on File Prior to Visit   Medication Sig   • ALPHA LIPOIC ACID PO Take by mouth   • Ampyra 10 MG TB12 1 tablet daily   • ascorbic acid (VITAMIN C) 500 mg tablet Take 500 mg by mouth daily   • baclofen 10 mg tablet 1 tab pos q am, 1 tab po q pm and 2 tabs hs   • Biotin 5000 MCG TABS    • Cholecalciferol (VITAMIN D3) 1000 units CAPS Take 5,000 Units by mouth     • clonazePAM (KlonoPIN) 0.5 mg tablet TAKE 1 AND 1/2 TABLETS BY MOUTH DAILY AT BEDTIME   • Cranberry 125 MG TABS Take 125 mg by mouth in the morning   • cycloSPORINE (RESTASIS) 0.05 % ophthalmic emulsion Administer 1 drop to both eyes every 12 (twelve) hours   • denosumab (PROLIA) 60 mg/mL Inject under the skin   • docusate sodium (COLACE) 100 mg capsule Take 1 capsule by mouth every other day     • gabapentin (NEURONTIN) 300 mg capsule Take 1 capsule (300 mg total) by mouth daily at bedtime   • gabapentin (NEURONTIN) 600 MG tablet 2 tabs po tid (Patient taking differently: Take 600 mg by mouth 2 tabs in morning, 1.5 tabs in afternoon, 2-600mg, 1 300mg at bedtime (900 mg))   • Magnesium 500 MG TABS Take 500 tablets by mouth once   • solifenacin (VESICARE) 10 MG tablet Take 1 tablet (10 mg total) by mouth daily   • zonisamide (ZONEGRAN) 100 mg capsule Take 4 capsules (400 mg total) by mouth daily at bedtime   • [DISCONTINUED] Copaxone 40 MG/ML SOSY INJECT ONE SYRINGE SUBCUTANEOUSLY THREE TIMES PER WEEK AT LEAST 48 HOURS APART. ALLOW SYRINGE TO WARM TO ROOM TEMPERATURE FOR 20 MINUTES. REFRIGERATE. Allergies   Allergen Reactions   • Duloxetine Hcl      Rapid Heart rate     • Iodinated Contrast Media Anaphylaxis   • Acetaminophen Other (See Comments)     Heart palpitations   • Cetirizine      Only occurs with generic, weakness in legs, off balance and couldn't walk.    • Morphine Other (See Comments)     Migraine     Immunization History   Administered Date(s) Administered   • COVID-19 MODERNA VACC 0.5 ML IM 02/02/2021, 03/02/2021, 11/12/2021, 05/06/2022   • COVID-19 Moderna Vac BIVALENT 12 Yr+ IM (BOOSTER ONLY) 0.5 ML 10/22/2022   • INFLUENZA 11/04/2015, 10/17/2016, 10/18/2017, 10/26/2018, 10/23/2021, 10/22/2022   • Influenza Split 10/25/2020   • Influenza, high dose seasonal 0.7 mL 10/18/2020   • Influenza, seasonal, injectable 1950, 09/18/2012, 11/04/2015   • Pneumococcal Conjugate 13-Valent 10/17/2016   • Pneumococcal Polysaccharide PPV23 10/26/2018   • Tdap 08/05/2015   • Zoster 10/18/2017   • influenza, trivalent, adjuvanted 10/30/2019       Objective     /78   Pulse 73   Temp 98.2 °F (36.8 °C)   Resp 14   Ht 5' 4" (1.626 m)   Wt 63.5 kg (140 lb)   SpO2 97%   BMI 24.03 kg/m²     Physical Exam  Constitutional:       General: She is not in acute distress. Appearance: Normal appearance. HENT:      Head: Normocephalic and atraumatic. Right Ear: External ear normal.      Left Ear: External ear normal.      Nose: Nose normal.      Mouth/Throat:      Mouth: Mucous membranes are moist.      Pharynx: Oropharynx is clear. Eyes:      General: No scleral icterus. Extraocular Movements: Extraocular movements intact. Conjunctiva/sclera: Conjunctivae normal.   Cardiovascular:      Rate and Rhythm: Normal rate and regular rhythm. Pulses: Normal pulses. Heart sounds: Normal heart sounds. No murmur heard. No friction rub. No gallop. Pulmonary:      Effort: Pulmonary effort is normal.      Breath sounds: Normal breath sounds. No wheezing, rhonchi or rales. Abdominal:      General: Abdomen is flat. Palpations: Abdomen is soft. There is no mass. Tenderness: There is no abdominal tenderness. There is no guarding. Comments: Abdominal and thoracic binding garment in place post-operatively    Musculoskeletal:      Cervical back: Normal range of motion and neck supple. Right lower leg: Edema present. Left lower leg: Edema present. Comments: +2 pitting edema to mid shin present B/L   Lymphadenopathy:      Cervical: No cervical adenopathy. Skin:     Capillary Refill: Capillary refill takes less than 2 seconds. Neurological:      Mental Status: She is alert. Mental status is at baseline.    Psychiatric:         Mood and Affect: Mood normal.       Jaime Ryan MD

## 2023-09-06 DIAGNOSIS — G35 MULTIPLE SCLEROSIS (HCC): ICD-10-CM

## 2023-09-06 RX ORDER — GLATIRAMER ACETATE 40 MG/ML
INJECTION, SOLUTION SUBCUTANEOUS
Qty: 36 ML | Refills: 3 | Status: SHIPPED | OUTPATIENT
Start: 2023-09-06

## 2023-09-07 ENCOUNTER — APPOINTMENT (OUTPATIENT)
Dept: PHYSICAL THERAPY | Facility: CLINIC | Age: 73
End: 2023-09-07
Payer: MEDICARE

## 2023-09-07 ENCOUNTER — OFFICE VISIT (OUTPATIENT)
Dept: PLASTIC SURGERY | Facility: CLINIC | Age: 73
End: 2023-09-07

## 2023-09-07 DIAGNOSIS — Z42.1 ENCOUNTER FOR BREAST RECONSTRUCTION FOLLOWING MASTECTOMY: Primary | ICD-10-CM

## 2023-09-07 PROCEDURE — 99024 POSTOP FOLLOW-UP VISIT: CPT

## 2023-09-08 ENCOUNTER — TELEPHONE (OUTPATIENT)
Dept: NEUROLOGY | Facility: CLINIC | Age: 73
End: 2023-09-08

## 2023-09-08 NOTE — TELEPHONE ENCOUNTER
Let pt know I am ok with her discontinuing her copaxone injections from this time forward. Likely not need med due to immunsenescence, age of pt, long standing nature of disease. Ok to discontinue medication. We will continue to follow her closely clinically as well as radiographically. Let her know thinking of her.

## 2023-09-08 NOTE — TELEPHONE ENCOUNTER
----- Message from Chayo Raygoza RN sent at 9/7/2023  6:40 PM EDT -----  Regarding: FW: Copaxone  Contact: [phone]    ----- Message -----  From: Sydney Ulloa  Sent: 8/31/2023   3:24 PM EDT  To: Neurology [facility] Clinical Team 3  Subject: FW: Copaxone                                       ----- Message -----  From: Jessica Grant"  Sent: 8/31/2023   1:15 PM EDT  To: Neurology [facility]  Subject: Copaxone                                         Dr. Trever Verde,  I hate to continue my issues with my Copaxone. I am even more restricted since surgery. I have compression garments on from my shoulders to my toes. My thighs are very firm and I find the solution slightly leaking after injecting. At almost 74 , I've become very uncomfortable with self injection. I cannot use the abdomen as I wear an abdominal binder 24/7 and there is still a lot of free fluid in there post op. I don't want to inject anything there. I am black and blue and my stitches are still intact. I'm a medical mess. The PCP also referred me to Nephrology now! I never see the same one twice!!   You are the only doctor I trust and the only consistent physician I have. The stress is taking it's toll on my health and MS. Is there anything I can do at least about the Copaxone because M-W-F I just dread trying to figure out what to do about the injection and it then lumps up and hurts. Any words of wisdom would be greatly appreciated.   Thank You  Tiffanie Shahid

## 2023-09-11 ENCOUNTER — OFFICE VISIT (OUTPATIENT)
Dept: PHYSICAL THERAPY | Facility: CLINIC | Age: 73
End: 2023-09-11
Payer: MEDICARE

## 2023-09-11 DIAGNOSIS — S42.255D CLOSED NONDISPLACED FRACTURE OF GREATER TUBEROSITY OF LEFT HUMERUS WITH ROUTINE HEALING, SUBSEQUENT ENCOUNTER: ICD-10-CM

## 2023-09-11 DIAGNOSIS — M75.102 TEAR OF LEFT SUPRASPINATUS TENDON: Primary | ICD-10-CM

## 2023-09-11 DIAGNOSIS — M25.512 CHRONIC LEFT SHOULDER PAIN: ICD-10-CM

## 2023-09-11 DIAGNOSIS — G89.29 CHRONIC LEFT SHOULDER PAIN: ICD-10-CM

## 2023-09-11 PROCEDURE — 97110 THERAPEUTIC EXERCISES: CPT | Performed by: PHYSICAL THERAPIST

## 2023-09-11 PROCEDURE — 97112 NEUROMUSCULAR REEDUCATION: CPT | Performed by: PHYSICAL THERAPIST

## 2023-09-11 PROCEDURE — 97140 MANUAL THERAPY 1/> REGIONS: CPT | Performed by: PHYSICAL THERAPIST

## 2023-09-12 ENCOUNTER — OFFICE VISIT (OUTPATIENT)
Dept: PLASTIC SURGERY | Facility: CLINIC | Age: 73
End: 2023-09-12

## 2023-09-12 DIAGNOSIS — Z42.1 ENCOUNTER FOR BREAST RECONSTRUCTION FOLLOWING MASTECTOMY: Primary | ICD-10-CM

## 2023-09-12 PROCEDURE — 99024 POSTOP FOLLOW-UP VISIT: CPT

## 2023-09-12 NOTE — PROGRESS NOTES
[facility] SPECIALTY [facility] - [facility] Plastic and Reconstructive Surgery  [address] [place], [place] [address]see[facility] JASON GarciaCELSOEddie MONTOYA  [phone]    Patient Identification: Avril Rasmussen is a 75 y.o. female     History of Present Illness: The patient is a 75y.o.  year-old female  who presents to the office for suture removal. Patient is 21 days s/p Autologous Fat Grafting To Bilateral Breasts - Bilateral  on 8/22/2023 by Dr. Aliya Robles. Patient states she has a suture remaining within her breast grafting injection site. Pt states she is satisfied with her results so far. Patient has no other complaints at this time.     Past Medical History:   Diagnosis Date   • Allergic 1967    seasonal   • Allergic rhinitis    • Bone pain    • Breast cancer ([address]) 08/16/2012   • Breast ptosis    • Depression    • Fatigue    • Gait disturbance     uses cane at times   • Headache    • Hip fracture (HCC)    • Hip pain    • History of transfusion 1975    placenta previa s/p work accident, no rx   • Hypokalemia    • Insomnia    • Laceration of finger     right hand   • Left ankle pain    • Left knee pain    • Multiple sclerosis (HCC)    • Muscle strain     left lower leg   • Nephrolithiasis    • Osteopenia    • Osteoporosis    • Pain of left calf    • Pneumonia    • Rash    • Scoliosis birth    scoliosis   • Solitary pulmonary nodule    • SVT (supraventricular tachycardia) (HCC)    • Tingling    • Urgency of urination    • Urticaria    • Wrist fracture, left         Patient Active Problem List   Diagnosis   • 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   • Multiple sclerosis (HCC)   • Peripheral polyneuropathy   • Depression   • Fatigue   • Gait disturbance   • Hip pain, right   • Unilateral occipital headache   • History of bilateral hip replacements   • Hypokalemia   • Insomnia   • Solitary pulmonary nodule   • Pain of left lower leg   • Osteopenia   • Nephrolithiasis   • Tingling   • Screening mammogram, encounter for   • Encounter for breast reconstruction following mastectomy   • Abnormal stool test   • Encounter for follow-up surveillance of breast cancer   • Essential hypertension   • Abnormal finding on breast imaging   • TSH (thyroid-stimulating hormone deficiency)   • B12 deficiency   • RLS (restless legs syndrome)   • Dense breast tissue   • Subareolar lump of right breast   • Hyperlipidemia        Past Surgical History:   Procedure Laterality Date   • ANKLE SURGERY     • APPENDECTOMY  11/2012    Dr. Leticia Mortimer   • AUGMENTATION BREAST      Enlargement procedure with prosthetic implant bilateral   • AUGMENTATION MAMMAPLASTY Right 01/28/2020    implant replaced because of recall   • AUGMENTATION MAMMAPLASTY Bilateral 2012   • BREAST BIOPSY Left 07/23/2012   • BREAST IMPLANT Right 01/28/2020    Procedure: BREAST IMPLANT EXCHANGE WITH CAPSULECTOMY;  Surgeon: Lee Gutierrez MD;  [facility];  Service: Plastics   • BREAST IMPLANT REMOVAL Right 01/28/2020    implant placement, implant revision, right mastopexy   • BREAST LUMPECTOMY     • CYSTOSTOMY      with basket extraction of calculus; x2   • EXCISION / BIOPSY BREAST / NIPPLE / DUCT Right 05/04/2020    scar revision to resuture non healing surgical wound   • EXPLORATORY LAPAROTOMY     • FL INJECTION LEFT SHOULDER (ARTHROGRAM)  7/19/2023   • FOOT SURGERY     • FRACTURE SURGERY  Lt wrist 9/2014 - Lt matthew femur 9/14   • HIP FRACTURE SURGERY Right     Subcapital   • HIP SURGERY Left    • JOINT REPLACEMENT  Rt hip 10/2012   • LEG SURGERY      Repair   • MASTECTOMY Left 08/16/2012   • WY GRAFTING OF AUTOLOGOUS FAT BY LIPO 50 CC OR LESS Bilateral 8/22/2023    Procedure: AUTOLOGOUS FAT GRAFTING TO BILATERAL BREASTS;  Surgeon: Lee Gutierrez MD;  [facility];  Service: Plastics   • WY MASTOPEXY Right 01/28/2020    Procedure: BREAST MASTOPEXY;  Surgeon: Lee Gutierrez MD;  [facility];  Service: Plastics   • WY REVISION OF RECONSTRUCTED BREAST Right 05/04/2020 Procedure: REVISION RIGHT BREAST MOUND;  Surgeon: Lianne Rowland MD;  [facility];  Service: Plastics   • REDUCTION MAMMAPLASTY Right 01/28/2020    reduced to match left side   • SENTINEL LYMPH NODE BIOPSY Left 08/16/2012   • SKIN BIOPSY     • TONSILLECTOMY     • TUBAL LIGATION     • WRIST SURGERY Left         Allergies   Allergen Reactions   • Duloxetine Hcl      Rapid Heart rate     • Iodinated Contrast Media Anaphylaxis   • Acetaminophen Other (See Comments)     Heart palpitations   • Cetirizine      Only occurs with generic, weakness in legs, off balance and couldn't walk. • Morphine Other (See Comments)     Migraine        Current Outpatient Medications on File Prior to Visit   Medication Sig Dispense Refill   • ALPHA LIPOIC ACID PO Take by mouth     • Ampyra 10 MG TB12 1 tablet daily 90 tablet 0   • ascorbic acid (VITAMIN C) 500 mg tablet Take 500 mg by mouth daily     • baclofen 10 mg tablet 1 tab pos q am, 1 tab po q pm and 2 tabs hs 360 tablet 3   • Biotin 5000 MCG TABS      • Cholecalciferol (VITAMIN D3) 1000 units CAPS Take 5,000 Units by mouth       • clonazePAM (KlonoPIN) 0.5 mg tablet TAKE 1 AND 1/2 TABLETS BY MOUTH DAILY AT BEDTIME 135 tablet 1   • Copaxone 40 MG/ML SOSY INJECT ONE SYRINGE SUBCUTANEOUSLY THREE TIMES PER WEEK AT LEAST 48 HOURS APART. ALLOW SYRINGE TO WARM TO ROOM TEMPERATURE FOR 20 MINUTES. REFRIGERATE.  36 mL 3   • Cranberry 125 MG TABS Take 125 mg by mouth in the morning     • cycloSPORINE (RESTASIS) 0.05 % ophthalmic emulsion Administer 1 drop to both eyes every 12 (twelve) hours     • denosumab (PROLIA) 60 mg/mL Inject under the skin     • docusate sodium (COLACE) 100 mg capsule Take 1 capsule by mouth every other day       • furosemide (LASIX) 20 mg tablet Take 0.5 tablets (10 mg total) by mouth daily 15 tablet 0   • gabapentin (NEURONTIN) 300 mg capsule Take 1 capsule (300 mg total) by mouth daily at bedtime 90 capsule 3   • gabapentin (NEURONTIN) 600 MG tablet 2 tabs po tid (Patient taking differently: Take 600 mg by mouth 2 tabs in morning, 1.5 tabs in afternoon, 2-600mg, 1 300mg at bedtime (900 mg)) 540 tablet 3   • Magnesium 500 MG TABS Take 500 tablets by mouth once     • solifenacin (VESICARE) 10 MG tablet Take 1 tablet (10 mg total) by mouth daily 90 tablet 3   • zonisamide (ZONEGRAN) 100 mg capsule Take 4 capsules (400 mg total) by mouth daily at bedtime 360 capsule 3     Current Facility-Administered Medications on File Prior to Visit   Medication Dose Route Frequency Provider Last Rate Last Admin   • bacitracin 50,000 Units, gentamicin (GARAMYCIN) 40 mg/mL 80 mg, ceFAZolin (ANCEF) 1,000 mg in sodium chloride 0.9 % 1,000 mL irrigation bottle   Irrigation Once Tammy Wills MD            Tobacco Use: Medium Risk (9/12/2023)    Patient History    • Smoking Tobacco Use: Former    • Smokeless Tobacco Use: Never    • Passive Exposure: Not on file      Review of Systems  Constitutional: Denies fevers, chills. Reports minimal pain  Skin: Denies any warmth, erythema, or mucopurulent drainage. Reports minimal edema    Physical Exam   Breast: Expected amount of post-operative edema and bruising. There are no signs of infection, obvious hematoma or seroma. Remaining suture removed. Abdomen: Surgical incisions are clean, dry, and intact. Skin perfusion is intact. Expected amount of post-operative edema. There are no signs of infection. Assessment and Plan:  The patient is an 75y.o.  year-old female who presents to the office for suture removal. Patient is 21 days s/p Autologous Fat Grafting To Bilateral Breasts - Bilateral  on 8/22/2023 by Dr. Ariana Leos    -Patient may wear compression garment/spanx in place of abdominal binder.  Continue to use ice, ibuprofen/tylenol as needed for edema.   -Reviewed with patient that injected fat will settle in 3 months, in this time the fat grafting areas may have irregular contour.   -The patient is to return 3 months post op for evaluation  - The patient is to call the office with any questions or concerns. All of the patient's questions were answered at this time and they agree with the plan of care.       Edith Cerrato PA-C  [facility] Plastic and Reconstructive Surgery

## 2023-09-12 NOTE — PROGRESS NOTES
[facility] SPECIALTY [facility] Plastic and Reconstructive Surgery  [address] [place], [place] [address]see CHITO MosesSTANLEYEddie MONTOYA  [phone]    Patient Identification: Aron Doss is a 75 y.o. female     History of Present Illness: The patient is a 75y.o.  year-old female  who presents to the office for suture removal. Patient is 16 days s/p Autologous Fat Grafting To Bilateral Breasts - Bilateral  on 8/22/2023 by Dr. Marylou Victor. Patient states she is feeling well at this time, minor swelling and discomfort. She is wearing abdominal binder at all times. She denies any fevers, chills or signs of infection. Patient has no complaints at this time.     Past Medical History:   Diagnosis Date   • Allergic 1967    seasonal   • Allergic rhinitis    • Bone pain    • Breast cancer ([address]) 08/16/2012   • Breast ptosis    • Depression    • Fatigue    • Gait disturbance     uses cane at times   • Headache    • Hip fracture (HCC)    • Hip pain    • History of transfusion 1975    placenta previa s/p work accident, no rx   • Hypokalemia    • Insomnia    • Laceration of finger     right hand   • Left ankle pain    • Left knee pain    • Multiple sclerosis (HCC)    • Muscle strain     left lower leg   • Nephrolithiasis    • Osteopenia    • Osteoporosis    • Pain of left calf    • Pneumonia    • Rash    • Scoliosis birth    scoliosis   • Solitary pulmonary nodule    • SVT (supraventricular tachycardia) (HCC)    • Tingling    • Urgency of urination    • Urticaria    • Wrist fracture, left         Patient Active Problem List   Diagnosis   • 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   • Multiple sclerosis (HCC)   • Peripheral polyneuropathy   • Depression   • Fatigue   • Gait disturbance   • Hip pain, right   • Unilateral occipital headache   • History of bilateral hip replacements   • Hypokalemia   • Insomnia   • Solitary pulmonary nodule   • Pain of left lower leg   • Osteopenia   • Nephrolithiasis   • Tingling   • Screening mammogram, encounter for • Encounter for breast reconstruction following mastectomy   • Abnormal stool test   • Encounter for follow-up surveillance of breast cancer   • Essential hypertension   • Abnormal finding on breast imaging   • TSH (thyroid-stimulating hormone deficiency)   • B12 deficiency   • RLS (restless legs syndrome)   • Dense breast tissue   • Subareolar lump of right breast   • Hyperlipidemia        Past Surgical History:   Procedure Laterality Date   • ANKLE SURGERY     • APPENDECTOMY  11/2012    Dr. Butler Gilford   • AUGMENTATION BREAST      Enlargement procedure with prosthetic implant bilateral   • AUGMENTATION MAMMAPLASTY Right 01/28/2020    implant replaced because of recall   • AUGMENTATION MAMMAPLASTY Bilateral 2012   • BREAST BIOPSY Left 07/23/2012   • BREAST IMPLANT Right 01/28/2020    Procedure: BREAST IMPLANT EXCHANGE WITH CAPSULECTOMY;  Surgeon: Meenu Cowan MD;  [facility];  Service: Plastics   • BREAST IMPLANT REMOVAL Right 01/28/2020    implant placement, implant revision, right mastopexy   • BREAST LUMPECTOMY     • CYSTOSTOMY      with basket extraction of calculus; x2   • EXCISION / BIOPSY BREAST / NIPPLE / DUCT Right 05/04/2020    scar revision to resuture non healing surgical wound   • EXPLORATORY LAPAROTOMY     • FL INJECTION LEFT SHOULDER (ARTHROGRAM)  7/19/2023   • FOOT SURGERY     • FRACTURE SURGERY  Lt wrist 9/2014 - Lt matthew femur 9/14   • HIP FRACTURE SURGERY Right     Subcapital   • HIP SURGERY Left    • JOINT REPLACEMENT  Rt hip 10/2012   • LEG SURGERY      Repair   • MASTECTOMY Left 08/16/2012   • [place] GRAFTING OF AUTOLOGOUS FAT BY LIPO 50 CC OR LESS Bilateral 8/22/2023    Procedure: AUTOLOGOUS FAT GRAFTING TO BILATERAL BREASTS;  Surgeon: Meenu Cowan MD;  [facility];  Service: Plastics   • OK MASTOPEXY Right 01/28/2020    Procedure: BREAST MASTOPEXY;  Surgeon: Meenu Cowan MD;  [facility];  Service: Plastics   • OK REVISION OF RECONSTRUCTED BREAST Right 05/04/2020    Procedure: REVISION RIGHT BREAST MOUND;  Surgeon: Florence Roman MD;  [facility];  Service: Plastics   • REDUCTION MAMMAPLASTY Right 01/28/2020    reduced to match left side   • SENTINEL LYMPH NODE BIOPSY Left 08/16/2012   • SKIN BIOPSY     • TONSILLECTOMY     • TUBAL LIGATION     • WRIST SURGERY Left         Allergies   Allergen Reactions   • Duloxetine Hcl      Rapid Heart rate     • Iodinated Contrast Media Anaphylaxis   • Acetaminophen Other (See Comments)     Heart palpitations   • Cetirizine      Only occurs with generic, weakness in legs, off balance and couldn't walk. • Morphine Other (See Comments)     Migraine        Current Outpatient Medications on File Prior to Visit   Medication Sig Dispense Refill   • ALPHA LIPOIC ACID PO Take by mouth     • Ampyra 10 MG TB12 1 tablet daily 90 tablet 0   • ascorbic acid (VITAMIN C) 500 mg tablet Take 500 mg by mouth daily     • baclofen 10 mg tablet 1 tab pos q am, 1 tab po q pm and 2 tabs hs 360 tablet 3   • Biotin 5000 MCG TABS      • Cholecalciferol (VITAMIN D3) 1000 units CAPS Take 5,000 Units by mouth       • clonazePAM (KlonoPIN) 0.5 mg tablet TAKE 1 AND 1/2 TABLETS BY MOUTH DAILY AT BEDTIME 135 tablet 1   • Copaxone 40 MG/ML SOSY INJECT ONE SYRINGE SUBCUTANEOUSLY THREE TIMES PER WEEK AT LEAST 48 HOURS APART. ALLOW SYRINGE TO WARM TO ROOM TEMPERATURE FOR 20 MINUTES. REFRIGERATE.  36 mL 3   • Cranberry 125 MG TABS Take 125 mg by mouth in the morning     • cycloSPORINE (RESTASIS) 0.05 % ophthalmic emulsion Administer 1 drop to both eyes every 12 (twelve) hours     • denosumab (PROLIA) 60 mg/mL Inject under the skin     • docusate sodium (COLACE) 100 mg capsule Take 1 capsule by mouth every other day       • furosemide (LASIX) 20 mg tablet Take 0.5 tablets (10 mg total) by mouth daily 15 tablet 0   • gabapentin (NEURONTIN) 300 mg capsule Take 1 capsule (300 mg total) by mouth daily at bedtime 90 capsule 3   • gabapentin (NEURONTIN) 600 MG tablet 2 tabs po tid (Patient taking differently: Take 600 mg by mouth 2 tabs in morning, 1.5 tabs in afternoon, 2-600mg, 1 300mg at bedtime (900 mg)) 540 tablet 3   • Magnesium 500 MG TABS Take 500 tablets by mouth once     • solifenacin (VESICARE) 10 MG tablet Take 1 tablet (10 mg total) by mouth daily 90 tablet 3   • zonisamide (ZONEGRAN) 100 mg capsule Take 4 capsules (400 mg total) by mouth daily at bedtime 360 capsule 3     Current Facility-Administered Medications on File Prior to Visit   Medication Dose Route Frequency Provider Last Rate Last Admin   • bacitracin 50,000 Units, gentamicin (GARAMYCIN) 40 mg/mL 80 mg, ceFAZolin (ANCEF) 1,000 mg in sodium chloride 0.9 % 1,000 mL irrigation bottle   Irrigation Once Andre Hernandez MD            Tobacco Use: Medium Risk (9/5/2023)    Patient History    • Smoking Tobacco Use: Former    • Smokeless Tobacco Use: Never    • Passive Exposure: Not on file      Review of Systems  Constitutional: Denies fevers, chills. Reports minimal pain  Skin: Denies any warmth, erythema, or mucopurulent drainage. Reports minimal edema    Physical Exam   Breast: Expected amount of post-operative edema and bruising. There are no signs of infection, obvious hematoma or seroma. Abdomen: Surgical incisions are clean, dry, and intact, sutures in place. Skin perfusion is intact. Expected amount of post-operative edema. There are no signs of infection. Assessment and Plan:  The patient is an 75y.o.  year-old female who presents to the office for suture removal. Patient is 16 days s/p Autologous Fat Grafting To Bilateral Breasts - Bilateral  on 8/22/2023 by Dr. Neelima Fajardo    -At today's visit sutures were removed at fat grafting sites. Patient may wear compression garment/spanx in place of abdominal binder.  Continue to use ice, ibuprofen/tylenol as needed for edema.   -Fat will settle in 3 months, in this time the fat grafting areas may have irregular contour.  -The patient is to return 3 months post op for evaluation  - The patient is to call the office with any questions or concerns. All of the patient's questions were answered at this time and they agree with the plan of care.       Darrell Fragoso PA-C  [facility] Plastic and Reconstructive Surgery

## 2023-09-13 ENCOUNTER — APPOINTMENT (OUTPATIENT)
Dept: LAB | Facility: CLINIC | Age: 73
End: 2023-09-13
Payer: MEDICARE

## 2023-09-13 DIAGNOSIS — G35 MULTIPLE SCLEROSIS (HCC): ICD-10-CM

## 2023-09-13 DIAGNOSIS — R60.0 PEDAL EDEMA: ICD-10-CM

## 2023-09-13 LAB
ANION GAP SERPL CALCULATED.3IONS-SCNC: 10 MMOL/L
BUN SERPL-MCNC: 15 MG/DL (ref 5–25)
CALCIUM SERPL-MCNC: 9.3 MG/DL (ref 8.4–10.2)
CHLORIDE SERPL-SCNC: 107 MMOL/L (ref 96–108)
CO2 SERPL-SCNC: 25 MMOL/L (ref 21–32)
CREAT SERPL-MCNC: 1.09 MG/DL (ref 0.6–1.3)
GFR SERPL CREATININE-BSD FRML MDRD: 50 ML/MIN/1.73SQ M
GLUCOSE P FAST SERPL-MCNC: 94 MG/DL (ref 65–99)
POTASSIUM SERPL-SCNC: 3.9 MMOL/L (ref 3.5–5.3)
SODIUM SERPL-SCNC: 142 MMOL/L (ref 135–147)

## 2023-09-13 PROCEDURE — 80048 BASIC METABOLIC PNL TOTAL CA: CPT

## 2023-09-13 PROCEDURE — 36415 COLL VENOUS BLD VENIPUNCTURE: CPT

## 2023-09-14 ENCOUNTER — OFFICE VISIT (OUTPATIENT)
Dept: PHYSICAL THERAPY | Facility: CLINIC | Age: 73
End: 2023-09-14
Payer: MEDICARE

## 2023-09-14 ENCOUNTER — TELEPHONE (OUTPATIENT)
Dept: FAMILY MEDICINE CLINIC | Facility: OTHER | Age: 73
End: 2023-09-14

## 2023-09-14 DIAGNOSIS — G89.29 CHRONIC LEFT SHOULDER PAIN: ICD-10-CM

## 2023-09-14 DIAGNOSIS — S42.255D CLOSED NONDISPLACED FRACTURE OF GREATER TUBEROSITY OF LEFT HUMERUS WITH ROUTINE HEALING, SUBSEQUENT ENCOUNTER: ICD-10-CM

## 2023-09-14 DIAGNOSIS — M25.512 CHRONIC LEFT SHOULDER PAIN: ICD-10-CM

## 2023-09-14 DIAGNOSIS — M75.102 TEAR OF LEFT SUPRASPINATUS TENDON: Primary | ICD-10-CM

## 2023-09-14 PROCEDURE — 97110 THERAPEUTIC EXERCISES: CPT | Performed by: PHYSICAL THERAPIST

## 2023-09-14 PROCEDURE — 97112 NEUROMUSCULAR REEDUCATION: CPT | Performed by: PHYSICAL THERAPIST

## 2023-09-14 PROCEDURE — 97140 MANUAL THERAPY 1/> REGIONS: CPT | Performed by: PHYSICAL THERAPIST

## 2023-09-14 NOTE — TELEPHONE ENCOUNTER
Hi, this is [employer] I'm calling to find out what I need to do about my Lasix. My left foot is still a + 4. This morning. My right foot is still a plus one. I have my BMP. The KRISSY is up to 10, my GFR is 50. I'm going to need to find out what to do because I'm leaving on vacation. Please call me back at [phone]. Thank you.

## 2023-09-14 NOTE — PROGRESS NOTES
Daily Note     Today's date: 2023  Patient name: Nanette Duque  : 1950  MRN: [id number]  Referring provider: Susan Carcamo DO  Dx:   Encounter Diagnosis     ICD-10-CM    1. Tear of left supraspinatus tendon  M75.102       2. Chronic left shoulder pain  M25.512     G89.29       3. Closed nondisplaced fracture of greater tuberosity of left humerus with routine healing, subsequent encounter  S42.253D                      Subjective: Pt reports having some discomfort at the shoulder after last treatment. Objective: See treatment diary below      Assessment: Decreased exercise prescription to accommodate for more shoulder symptoms. Moderate stiffness present with passive elevation. Pt demonstrates excessive shoulder hiking with UT activation on the left vs the right in static position. This can also be more prominent due to her scoliotic curvature; however, this is much more present since having more shoulder symptoms. Cueing provided to decrease scapular elevation. Pt will be going on a cruise next week. Tolerated treatment well. Patient would benefit from continued PT      Plan: Continue per plan of care.       Precautions: MS, Gait dysfunction, Insomnia, Osteoporosis, Peripheral polyneuropathy, Hx of Breast CA, R IDALIA in - Dr. Treva Bauer, Left femur IM nailing      Manuals           PROM of the left shoulder 8' 10' 10'          Left scapular mobilizations 5' 3'           [address] [place] post glide in neutral    Gr II+III                        Neuro Re-Ed             Isometrics             Scap retraction              No Money TB             Serratus punch 5x c/ PT assist 20x c/ PT assist 20x c/ PT assist          Sidelying shoulder ER 10x 10x with cueing  30x           TB Rows  ytb 2x10  ytb 2x10           TB extension  ytb 2x10            TB IR/ER  2x10 ytb            Ther Ex             Table slides flexion  C/ PB x 30           Table slides abduction             Shoulder ER Stretch supine c/ cane 10x10" supine c/ cane 10x10 supine c/ cane 10x10          Shoulder extension stretch              [place]ley 5' 5'            UBE   5'                                     Ther Activity                                       Gait Training                                       Modalities                                       1:1 with PT from 1115-12pm

## 2023-09-24 DIAGNOSIS — G35 MULTIPLE SCLEROSIS (HCC): ICD-10-CM

## 2023-09-24 DIAGNOSIS — G62.9 PERIPHERAL POLYNEUROPATHY: ICD-10-CM

## 2023-09-25 ENCOUNTER — OFFICE VISIT (OUTPATIENT)
Dept: PHYSICAL THERAPY | Facility: CLINIC | Age: 73
End: 2023-09-25
Payer: MEDICARE

## 2023-09-25 DIAGNOSIS — M75.102 TEAR OF LEFT SUPRASPINATUS TENDON: Primary | ICD-10-CM

## 2023-09-25 DIAGNOSIS — M25.512 CHRONIC LEFT SHOULDER PAIN: ICD-10-CM

## 2023-09-25 DIAGNOSIS — G89.29 CHRONIC LEFT SHOULDER PAIN: ICD-10-CM

## 2023-09-25 DIAGNOSIS — S42.255D CLOSED NONDISPLACED FRACTURE OF GREATER TUBEROSITY OF LEFT HUMERUS WITH ROUTINE HEALING, SUBSEQUENT ENCOUNTER: ICD-10-CM

## 2023-09-25 PROCEDURE — 97110 THERAPEUTIC EXERCISES: CPT | Performed by: PHYSICAL THERAPIST

## 2023-09-25 PROCEDURE — 97112 NEUROMUSCULAR REEDUCATION: CPT | Performed by: PHYSICAL THERAPIST

## 2023-09-25 PROCEDURE — 97140 MANUAL THERAPY 1/> REGIONS: CPT | Performed by: PHYSICAL THERAPIST

## 2023-09-25 NOTE — PROGRESS NOTES
Daily Note     Today's date: 2023  Patient name: Scotty Rai  : 1950  MRN: [id number]  Referring provider: Prudencio Vance DO  Dx:   Encounter Diagnosis     ICD-10-CM    1. Tear of left supraspinatus tendon  M75.102       2. Chronic left shoulder pain  M25.512     G89.29       3. Closed nondisplaced fracture of greater tuberosity of left humerus with routine healing, subsequent encounter  S42.189D                      Subjective: Pt returned from week away on cruise ship. She states that her body if very fatigued from excessive walking in the heat. She also noted increased shoulder stiffness and soreness from compensating and using LUE during ambulation (holding on ). Objective: See treatment diary below      Assessment: Tolerated treatment well. Positive relief with left shoulder PROM and joint mobilizations. Patient exhibited good technique with therapeutic exercises and would benefit from continued PT. Plan: Continue per plan of care.       Precautions: MS, Gait dysfunction, Insomnia, Osteoporosis, Peripheral polyneuropathy, Hx of Breast CA, R IDALIA in - Dr. Pancho Samson, Left femur IM nailing      Manuals          PROM of the left shoulder 8' 10' 10' 10'         Left scapular mobilizations 5' 3'           [address] post glide in neutral    Gr II+III  Gr II+III                       Neuro Re-Ed             Isometrics             Scap retraction              No Money TB             Serratus punch 5x c/ PT assist 20x c/ PT assist 20x c/ PT assist 20x c/ PT assist         Sidelying shoulder ER 10x 10x with cueing  30x           TB Rows  ytb 2x10  ytb 2x10           TB extension  ytb 2x10   ytb 2x10          TB IR/ER  2x10 ytb   2x10 ytb  C/ PT cueing          Ther Ex             Table slides flexion  C/ PB x 30           Table slides abduction             Shoulder ER Stretch  supine c/ cane 10x10" supine c/ cane 10x10 supine c/ cane 10x10 supine c/ cane 10x10" Shoulder extension stretch              [place] 5' 5'            UBE   5'  7'                                   Ther Activity                                       Gait Training                                       Modalities                                       1:1 with PT from 1115-12pm

## 2023-09-25 NOTE — TELEPHONE ENCOUNTER
Patient requesting refills of:    Gabapentin 300mg - 1 cap daily @ HS  10/31/22 last rx for a one year supply    Gabapentin 600mg - 1 tabs in AM, 1.5 tabs in afternoon, 2 @ bedtime (along w/300mg tab for a total of 900mg)  10/31/22 last rx for a one year supply    zonisamide 100mg - 4 caps daily @ HS  10/31/22 last rx for a one year supply    Gabapentin 600mg rx sig updated. Dr. Islas Quiet - please sign if agreeable.

## 2023-09-25 NOTE — TELEPHONE ENCOUNTER
Please check with pt about the 600mg rx. Please check sig on how pt is taking and also if this should be a 90 day supply. All the others look like 90 day supplies? ?  Please correct and resend to me.

## 2023-09-26 RX ORDER — ZONISAMIDE 100 MG/1
400 CAPSULE ORAL
Qty: 360 CAPSULE | Refills: 3 | Status: SHIPPED | OUTPATIENT
Start: 2023-09-26

## 2023-09-26 RX ORDER — GABAPENTIN 600 MG/1
TABLET ORAL
Qty: 540 TABLET | Refills: 3 | Status: SHIPPED | OUTPATIENT
Start: 2023-09-26 | End: 2023-09-26 | Stop reason: SDUPTHER

## 2023-09-26 RX ORDER — GABAPENTIN 300 MG/1
300 CAPSULE ORAL
Qty: 90 CAPSULE | Refills: 3 | Status: SHIPPED | OUTPATIENT
Start: 2023-09-26

## 2023-09-26 RX ORDER — GABAPENTIN 600 MG/1
TABLET ORAL
Qty: 540 TABLET | Refills: 3 | Status: SHIPPED | OUTPATIENT
Start: 2023-09-26

## 2023-09-27 DIAGNOSIS — R60.0 PEDAL EDEMA: ICD-10-CM

## 2023-09-27 RX ORDER — FUROSEMIDE 20 MG/1
10 TABLET ORAL DAILY
Qty: 45 TABLET | Refills: 1 | Status: SHIPPED | OUTPATIENT
Start: 2023-09-27 | End: 2023-09-28

## 2023-09-27 NOTE — TELEPHONE ENCOUNTER
9/26 8:43    Pt left a VM re: Gabapentin. Transcribed VM: Hi Collette Fleischer, this is [employer]. I just checked my meds and it is two Gabapentin 600 mg in the morning. I thought we changed it, apparently we didn't. And so if you have any questions, give me a call back. Sorry about the confusion. I just take the pills. I don't even pay attention until I fill the boxes. Okay. Thanks a lot bye. Pt taking 2 in am, 1.5 in the afternoon and 2 at HS (5.5 pills daily). 90 day supply is 495. Dr. Janel Coronado ordered [phone] with 3 refills.

## 2023-09-28 ENCOUNTER — OFFICE VISIT (OUTPATIENT)
Dept: PHYSICAL THERAPY | Facility: CLINIC | Age: 73
End: 2023-09-28
Payer: MEDICARE

## 2023-09-28 DIAGNOSIS — M75.102 TEAR OF LEFT SUPRASPINATUS TENDON: Primary | ICD-10-CM

## 2023-09-28 DIAGNOSIS — G89.29 CHRONIC LEFT SHOULDER PAIN: ICD-10-CM

## 2023-09-28 DIAGNOSIS — S42.255D CLOSED NONDISPLACED FRACTURE OF GREATER TUBEROSITY OF LEFT HUMERUS WITH ROUTINE HEALING, SUBSEQUENT ENCOUNTER: ICD-10-CM

## 2023-09-28 DIAGNOSIS — M25.512 CHRONIC LEFT SHOULDER PAIN: ICD-10-CM

## 2023-09-28 PROCEDURE — 97110 THERAPEUTIC EXERCISES: CPT | Performed by: PHYSICAL THERAPIST

## 2023-09-28 PROCEDURE — 97140 MANUAL THERAPY 1/> REGIONS: CPT | Performed by: PHYSICAL THERAPIST

## 2023-09-28 PROCEDURE — 97112 NEUROMUSCULAR REEDUCATION: CPT | Performed by: PHYSICAL THERAPIST

## 2023-09-28 NOTE — PROGRESS NOTES
Daily Note     Today's date: 2023  Patient name: Otis Grajeda  : 1950  MRN: [id number]  Referring provider: Oj Rodriguez DO  Dx:   Encounter Diagnosis     ICD-10-CM    1. Tear of left supraspinatus tendon  M75.102       2. Chronic left shoulder pain  M25.512     G89.29       3. Closed nondisplaced fracture of greater tuberosity of left humerus with routine healing, subsequent encounter  S42.255D           Start Time: 1115  Stop Time: 1200  Total time in clinic (min): 45 minutes    Subjective: Pt reports feeling really good after last session. She notes that the shoulder mobilizations tends to feel the best in reducing pain. Objective: See treatment diary below      Assessment: Implemented L UT STM secondary to increased tone and tenderness. Enhanced difficulty of should and RTC PREs to stronger tbands. Pt apporpriately fatigued and sore throughout session; however, no adverse effects. Plan to implement more shoulder ROM exercises next session. Cueing provided to patient throughout treatment to reduce shoulder hiking and compensation patterns. Tolerated treatment well. Patient would benefit from continued PT. Plan: Continue per plan of care.       Precautions: MS, Gait dysfunction, Insomnia, Osteoporosis, Peripheral polyneuropathy, Hx of Breast CA, R IDALIA in - Dr. Josef Briceno, Left femur IM nailing      Manuals         PROM of the left shoulder 8' 10' 10' 10' 10'        Left scapular mobilizations 5' 3'           [address] [place] post glide in neutral    Gr II+III  Gr II+III  Gr II+III         L UT STM     3'        Neuro Re-Ed             Isometrics             Scap retraction              No Money TB             Serratus punch 5x c/ PT assist 20x c/ PT assist 20x c/ PT assist 20x c/ PT assist 20x c/ PT assist        Sidelying shoulder ER 10x 10x with cueing  30x           TB Rows  ytb 2x10  ytb 2x10   2x10 gtb        TB extension  ytb 2x10   ytb 2x10  rtb 2x10        TB IR/ER  2x10 ytb   2x10 ytb  C/ PT cueing  2x10 ytb  C/ PT cueing        Ther Ex             Table slides flexion  C/ PB x 30           Table slides abduction             Shoulder ER Stretch  supine c/ cane 10x10" supine c/ cane 10x10 supine c/ cane 10x10 supine c/ cane 10x10" supine c/ cane 10x10"        Shoulder extension stretch              P[place]ley 5' 5'            UBE   5'  7' 7'        Shoulder flexion     nv        Shoulder scaption     nv        Ther Activity                                       Gait Training                                       Modalities                                       1:1 with PT from 1115-12pm

## 2023-10-09 ENCOUNTER — EVALUATION (OUTPATIENT)
Dept: PHYSICAL THERAPY | Facility: CLINIC | Age: 73
End: 2023-10-09
Payer: MEDICARE

## 2023-10-09 DIAGNOSIS — M25.512 CHRONIC LEFT SHOULDER PAIN: ICD-10-CM

## 2023-10-09 DIAGNOSIS — G89.29 CHRONIC LEFT SHOULDER PAIN: ICD-10-CM

## 2023-10-09 DIAGNOSIS — M75.102 TEAR OF LEFT SUPRASPINATUS TENDON: Primary | ICD-10-CM

## 2023-10-09 DIAGNOSIS — S42.255D CLOSED NONDISPLACED FRACTURE OF GREATER TUBEROSITY OF LEFT HUMERUS WITH ROUTINE HEALING, SUBSEQUENT ENCOUNTER: ICD-10-CM

## 2023-10-09 PROCEDURE — 97140 MANUAL THERAPY 1/> REGIONS: CPT | Performed by: PHYSICAL THERAPIST

## 2023-10-09 PROCEDURE — 97112 NEUROMUSCULAR REEDUCATION: CPT | Performed by: PHYSICAL THERAPIST

## 2023-10-09 PROCEDURE — 97110 THERAPEUTIC EXERCISES: CPT | Performed by: PHYSICAL THERAPIST

## 2023-10-09 NOTE — PROGRESS NOTES
[id number]    Today's date: 10/9/2023  Patient name: Aron Doss  : 1950  MRN: [id number]  Referring provider: Kaitlyn Hatfield DO  Dx:   Encounter Diagnosis     ICD-10-CM    1. Tear of left supraspinatus tendon  M75.102       2. Chronic left shoulder pain  M25.512     G89.29       3. Closed nondisplaced fracture of greater tuberosity of left humerus with routine healing, subsequent encounter  S42.255D           Start Time: 1215  Stop Time: 1300  Total time in clinic (min): 45 minutes  Assessment  Assessment details: Aron Doss is a 75 y.o. female who presents with signs and symptoms consistent of persistent left shoulder pain after a fall at the end of April which resulted in a greater tuberosity fracture and partial thickness RTC tear. Pt is progressing with PT; however, missed treatment after her breast surgery (Autologous Fat Grafting To Bilateral Breasts - on 2023) with Dr. Marylou Victor and leisure travel for a cruise. Pt has had 6 PT sessions in total. Pt has demonstrated improvement in both active and passive ROM. Her strength in slowly improving. Her pain level have reduced to 2-3/10. FOTO has improved to 63. Patient would benefit from skilled physical therapy to address the impairments, improve their level of function, and to improve their overall quality of life. Primary movements impairments:   1) Decreased RTC motor control/weakness  2) Decreased shoulder ROM  3) Decreased scapular mobility    Impairments: abnormal gait, abnormal muscle firing, abnormal muscle tone, abnormal or restricted ROM, activity intolerance, impaired balance, impaired physical strength, lacks appropriate home exercise program, pain with function and poor posture   Understanding of Dx/Px/POC: good   Prognosis: good    Goals  Short Term Goals: to be achieved by 4 weeks  1) Patient to be independent with basic HEP.   2) Decrease pain to 2/10 at its worst.  3) Increase shoulder ROM by 5-10 degrees in all planes  4) Increase shoulder strength by 1/2 MMT grade in all deficient planes. Long Term Goals: to be achieved by discharge  1) FOTO equal to or greater than expected. 2) Patient to be independent with comprehensive HEP. 3) Patient will demonstrate maximal over head reaching  4) Increase UE strength to 5/5 MMT grade in all planes to improve a/iadls. 5) Patient to report no sleep interruption secondary to pain. Plan  Patient would benefit from: PT eval and skilled physical therapy  Planned modality interventions: low level laser therapy  Planned therapy interventions: joint mobilization, muscle pump exercises, neuromuscular re-education, patient education, therapeutic activities, therapeutic exercise and home exercise program  Frequency: 2x per week for 4-6 weeks. Treatment plan discussed with: patient        Subjective Evaluation    History of Present Illness  Mechanism of injury: History of Current Injury: Pt is s/p nondisplaced greater tuberosity fracture of the left shoulder with partial thickness (50%) RTC tear on 4/27/23 after a fall. Pt has MS with polyneuropathy. Patient also fell again in June. Pt saw Dr. Sharad Cortés and Tosha Wayne who recommended PT for left shoulder pain. Pt did have MRA and XR of shoulder. Pt is currently ambulating with a SPC. Pt does admit to stiffness at involved shoulder. She is not taking any pain medication secondary to her kidney disease. Pt also would like to incorporate balance into plan of care due to her 2 falls since April. Pain location/Descriptors: Anterior left shoulder which radiates into the biceps tendon. Pt denies numbness and tingling. Aggravating factors: Reaching, lifting, lying on shoulder, over head activities, bathing, dressing, shoulder abduction. Easing factors: rest. Pt was using Voltaren and Ice but has not used it. 24 HR pattern: Movement dependent. Imaging: MRI and XR.  Pt also has an MRI of C/S in June  Special Questions: Denies numbness/tingling  Patient goals:  Improve ROM of shoulder  Occupation: Retired. Volunteers    Patient Goals  Patient goals for therapy: increased strength, decreased pain, improved balance, increased motion and independence with ADLs/IADLs    Pain  Pain scale: mild. At worst pain ratin-3    Treatments  No previous or current treatments        Objective     Active Range of Motion   Left Shoulder   Flexion: 116 degrees   Extension: 64 degrees   Abduction: 89 degrees   External rotation 0°: 65 degrees   Internal rotation 0°: Left shoulder active internal rotation at 0 degrees: stomach. Additional Active Range of Motion Details  Poor eccentric control with lowering arm to neutral    Passive Range of Motion   Left Shoulder   Flexion: 160 degrees   Abduction: 158 degrees   External rotation 45°: 70 degrees   Internal rotation 45°: 75 degrees     Scapular Mobility   Left Shoulder   Scapular mobility: poor    Strength/Myotome Testing     Left Shoulder     Planes of Motion   Left shoulder forward flexion strength: NT.   Left shoulder abduction strength: NT.   External rotation at 0°: 4-   Internal rotation at 0°: 4-     Isolated Muscles   Biceps: 4-   Triceps: 4     MMT:  Left shoulder:  Flexion: 4-  ABD: 4-  ER: 4  IR: 4    Tests     Additional Tests Details  *Decreased scapular mobility in left   *Rib hump noted on Left secondary to scoliosis   *Increased tone in left UT (significant).           Precautions: MS, Gait dysfunction, Insomnia, Osteoporosis, Peripheral polyneuropathy, Hx of Breast CA, R IDALIA in - Dr. Sylvia Celaya, Left femur IM nailing      Manuals 8/16 9/11 9/14 9/25 9/28 10/9       PROM of the left shoulder 8' 10' 10' 10' 10' 10'       Left scapular mobilizations 5' 3'           [address] post glide in neutral    Gr II+III  Gr II+III  Gr II+III  Gr II+III       L UT STM     3' 3'       Neuro Re-Ed             Isometrics             Scap retraction              No Money TB             Serratus punch 5x c/ PT assist 20x c/ PT assist 20x c/ PT assist 20x c/ PT assist 20x c/ PT assist        Sidelying shoulder ER 10x 10x with cueing  30x           TB Rows  ytb 2x10  ytb 2x10   2x10 gtb        TB extension  ytb 2x10   ytb 2x10  rtb 2x10        TB IR/ER  2x10 ytb   2x10 ytb  C/ PT cueing  2x10 ytb  C/ PT cueing        Ther Ex             Table slides flexion  C/ PB x 30           Table slides abduction             Shoulder ER Stretch  supine c/ cane 10x10" supine c/ cane 10x10 supine c/ cane 10x10 supine c/ cane 10x10" supine c/ cane 10x10"        Shoulder extension stretch              [place] 5' 5'            UBE   5'  7' 7' 7'       Shoulder flexion     nv        Shoulder scaption     nv        Ther Activity                                       Gait Training                                       Modalities                                       1:1 with PT from 1115-12pm  Pt was re-evaluated today x 30 minutes

## 2023-10-11 ENCOUNTER — APPOINTMENT (OUTPATIENT)
Dept: RADIOLOGY | Facility: AMBULARY SURGERY CENTER | Age: 73
End: 2023-10-11
Attending: STUDENT IN AN ORGANIZED HEALTH CARE EDUCATION/TRAINING PROGRAM
Payer: MEDICARE

## 2023-10-11 ENCOUNTER — OFFICE VISIT (OUTPATIENT)
Dept: OBGYN CLINIC | Facility: CLINIC | Age: 73
End: 2023-10-11
Payer: MEDICARE

## 2023-10-11 VITALS
SYSTOLIC BLOOD PRESSURE: 159 MMHG | HEIGHT: 64 IN | HEART RATE: 76 BPM | DIASTOLIC BLOOD PRESSURE: 79 MMHG | BODY MASS INDEX: 23.9 KG/M2 | WEIGHT: 140 LBS | RESPIRATION RATE: 17 BRPM

## 2023-10-11 DIAGNOSIS — S49.92XD INJURY OF LEFT SHOULDER, SUBSEQUENT ENCOUNTER: ICD-10-CM

## 2023-10-11 DIAGNOSIS — S42.255D CLOSED NONDISPLACED FRACTURE OF GREATER TUBEROSITY OF LEFT HUMERUS WITH ROUTINE HEALING, SUBSEQUENT ENCOUNTER: Primary | ICD-10-CM

## 2023-10-11 DIAGNOSIS — M75.102 TEAR OF LEFT SUPRASPINATUS TENDON: ICD-10-CM

## 2023-10-11 PROCEDURE — 73030 X-RAY EXAM OF SHOULDER: CPT

## 2023-10-11 PROCEDURE — 99214 OFFICE O/P EST MOD 30 MIN: CPT | Performed by: STUDENT IN AN ORGANIZED HEALTH CARE EDUCATION/TRAINING PROGRAM

## 2023-10-11 NOTE — PROGRESS NOTES
Ortho Sports Medicine Shoulder Follow Up Visit     Assesment:   75 y.o. female healed left shoulder greater tuberosity fracture and partial-thickness rotator cuff tear with date of injury 4/27/2023, with minimal discomfort    Plan:  The patient's diagnosis and treatment were discussed at length today. We discussed no treatment, non-operative treatment, and operative treatment. Jennyfer Ricci returns today for follow-up regarding her left shoulder. I explained that radiographs demonstrate healed greater tuberosity fracture. She has a known partial-thickness supraspinatus tear however I am confident that we can continue to treat this nonoperatively, as the patient is not interested in any surgery at this time. We discussed possibility of corticosteroid injection in the subacromial space, however she is been feeling much better over the last several weeks and is not interested in injection at this time. I explained that we can always perform an injection in the future on an as-needed basis. For now we will continue with physical therapy for periscapular range of motion strengthening. I did place another therapy referral to incorporate her left ankle lymphedema which she states has been significant since the time of her fall. She declined a left ankle x-ray today, however has no crepitus or deformity seen on physical exam to suggest any fracture. I recommend she continue weightbearing as tolerated on the left ankle and follow-up with one of our foot and ankle specialist if she has ongoing discomfort. Conservative treatment:    Ice to shoulder 1-2 times daily, for 20 minutes at a time. PT for ROM and strengthening to shoulder, rotator cuff, scapular stabilizers. Home exercise program for shoulder, including ROM and strenthening. Instructions given to patient of what exercises to perform. Let pain guide return to activities. Imaging:     All imaging from today was reviewed by myself and explained to the patient. Injection:    May consider future corticosteroid injection depending on clinical exam/imaging. Surgery:     No surgery is recommended at this point, continue with conservative treatment plan as noted. Follow up:    Return if symptoms worsen or fail to improve. Chief Complaint   Patient presents with    Left Shoulder - Follow-up         History of Present Illness: The patient is returns for follow up of left shoulder. Since last visit she has significant improvement in her left shoulder symptoms over the last several weeks. She does have difficulty sleeping at night and at times pain can be severe, however she states this is much less over the last several weeks. She recently underwent surgery with Dr. Jacinto Paige team and is recovering from that. She has no interest to in any additional surgery at this time.     Shoulder Surgical History:  None    Past Medical, Social and Family History:  Past Medical History:   Diagnosis Date    Allergic 1967    seasonal    Allergic rhinitis     Bone pain     Breast cancer ([address]) 08/16/2012    Breast ptosis     Depression     Fatigue     Gait disturbance     uses cane at times    Headache     Hip fracture (HCC)     Hip pain     History of transfusion 1975    placenta previa s/p work accident, no rx    Hypokalemia     Insomnia     Laceration of finger     right hand    Left ankle pain     Left knee pain     Multiple sclerosis (HCC)     Muscle strain     left lower leg    Nephrolithiasis     Osteopenia     Osteoporosis     Pain of left calf     Pneumonia     Rash     Scoliosis birth    scoliosis    Solitary pulmonary nodule     SVT (supraventricular tachycardia)     Tingling     Urgency of urination     Urticaria     Wrist fracture, left      Past Surgical History:   Procedure Laterality Date    ANKLE SURGERY      APPENDECTOMY  11/2012    Dr. Marianne Barksdale      Enlargement procedure with prosthetic implant bilateral    AUGMENTATION MAMMAPLASTY Right 01/28/2020    implant replaced because of recall    AUGMENTATION MAMMAPLASTY Bilateral 2012    BREAST BIOPSY Left 07/23/2012    BREAST IMPLANT Right 01/28/2020    Procedure: BREAST IMPLANT EXCHANGE WITH CAPSULECTOMY;  Surgeon: Rajesh Bhagat MD;  Location: [facility];  Service: Plastics    BREAST IMPLANT REMOVAL Right 01/28/2020    implant placement, implant revision, right mastopexy    BREAST LUMPECTOMY      CYSTOSTOMY      with basket extraction of calculus; x2    EXCISION / BIOPSY BREAST / Graceann Hoots / DUCT Right 05/04/2020    scar revision to resuture non healing surgical wound    EXPLORATORY LAPAROTOMY      FL INJECTION LEFT SHOULDER (ARTHROGRAM)  7/19/2023    FOOT SURGERY      FRACTURE SURGERY  Lt wrist 9/2014 - Lt matthew femur 9/14    HIP FRACTURE SURGERY Right     Subcapital    HIP SURGERY Left     JOINT REPLACEMENT  Rt hip 10/2012    LEG SURGERY      Repair    MASTECTOMY Left 08/16/2012    SD GRAFTING OF AUTOLOGOUS FAT BY LIPO 50 CC OR LESS Bilateral 8/22/2023    Procedure: AUTOLOGOUS FAT GRAFTING TO BILATERAL BREASTS;  Surgeon: Rajesh Bhagat MD;  Location: [facility];  Service: Plastics    SD MASTOPEXY Right 01/28/2020    Procedure: BREAST MASTOPEXY;  Surgeon: Rajesh Bhagat MD;  Location: [facility];  Service: Plastics    SD REVISION OF RECONSTRUCTED BREAST Right 05/04/2020    Procedure: REVISION RIGHT BREAST MOUND;  Surgeon: Rajesh Bhagat MD;  [facility];  Service: Plastics    REDUCTION MAMMAPLASTY Right 01/28/2020    reduced to match left side    SENTINEL LYMPH NODE BIOPSY Left 08/16/2012    SKIN BIOPSY      TONSILLECTOMY      TUBAL LIGATION      WRIST SURGERY Left      Allergies   Allergen Reactions    Duloxetine Hcl      Rapid Heart rate      Iodinated Contrast Media Anaphylaxis    Acetaminophen Other (See Comments)     Heart palpitations    Cetirizine      Only occurs with generic, weakness in legs, off balance and couldn't walk.     Morphine Other (See Comments)     Migraine     Current Outpatient Medications on File Prior to Visit   Medication Sig Dispense Refill    ALPHA LIPOIC ACID PO Take by mouth      Ampyra 10 MG TB12 1 tablet daily 90 tablet 0    ascorbic acid (VITAMIN C) 500 mg tablet Take 500 mg by mouth daily      baclofen 10 mg tablet 1 tab pos q am, 1 tab po q pm and 2 tabs hs 360 tablet 3    Biotin 5000 MCG TABS       Cholecalciferol (VITAMIN D3) 1000 units CAPS Take 5,000 Units by mouth        clonazePAM (KlonoPIN) 0.5 mg tablet TAKE 1 AND 1/2 TABLETS BY MOUTH DAILY AT BEDTIME 135 tablet 1    Cranberry 125 MG TABS Take 125 mg by mouth in the morning      cycloSPORINE (RESTASIS) 0.05 % ophthalmic emulsion Administer 1 drop to both eyes every 12 (twelve) hours      denosumab (PROLIA) 60 mg/mL Inject under the skin      docusate sodium (COLACE) 100 mg capsule Take 1 capsule by mouth every other day        gabapentin (NEURONTIN) 300 mg capsule TAKE 1 CAPSULE DAILY AT    BEDTIME 90 capsule 3    gabapentin (NEURONTIN) 600 MG tablet Take one tablet (600mg total) in the AM, 1.5 tabs in the afternoon (900mg total) and 2 tabs at bedtime (along with one 300mg cap) 540 tablet 3    Magnesium 500 MG TABS Take 500 tablets by mouth once      solifenacin (VESICARE) 10 MG tablet Take 1 tablet (10 mg total) by mouth daily 90 tablet 3    zonisamide (ZONEGRAN) 100 mg capsule TAKE 4 CAPSULES DAILY AT   BEDTIME 360 capsule 3    Copaxone 40 MG/ML SOSY INJECT ONE SYRINGE SUBCUTANEOUSLY THREE TIMES PER WEEK AT LEAST 48 HOURS APART. ALLOW SYRINGE TO WARM TO ROOM TEMPERATURE FOR 20 MINUTES. REFRIGERATE.  36 mL 3     Current Facility-Administered Medications on File Prior to Visit   Medication Dose Route Frequency Provider Last Rate Last Admin    bacitracin 50,000 Units, gentamicin (GARAMYCIN) 40 mg/mL 80 mg, ceFAZolin (ANCEF) 1,000 mg in sodium chloride 0.9 % 1,000 mL irrigation bottle   Irrigation Once Carissa Hays MD         Social History     Socioeconomic History Marital status: /Civil Union     Spouse name: Not on file    Number of children: 2    Years of education: Completed bachelor's degree    Highest education level: Not on file   Occupational History    Occupation: RN     Comment: Retired   Tobacco Use    Smoking status: Former     Packs/day: 1.00     Years: 35.00     Total pack years: 35.00     Types: Cigarettes     Quit date:      Years since quittin.7    Smokeless tobacco: Never   Vaping Use    Vaping Use: Never used   Substance and Sexual Activity    Alcohol use: No    Drug use: No    Sexual activity: Yes     Partners: Male     Birth control/protection: Post-menopausal   Other Topics Concern    Not on file   Social History Narrative    Denied:  History of caffeine use     Social Determinants of Health     Financial Resource Strain: Medium Risk (10/22/2022)    Overall Financial Resource Strain (CARDIA)     Difficulty of Paying Living Expenses: Somewhat hard   Food Insecurity: Not on file   Transportation Needs: No Transportation Needs (10/22/2022)    PRAPARE - Transportation     Lack of Transportation (Medical): No     Lack of Transportation (Non-Medical): No   Physical Activity: Not on file   Stress: Not on file   Social Connections: Not on file   Intimate Partner Violence: Not on file   Housing Stability: Not on file       I have reviewed the past medical, surgical, social and family history, medications and allergies as documented in the EMR. Review of systems: ROS is negative other than that noted in the HPI. Constitutional: Negative for fatigue and fever. Physical Exam:    Blood pressure 159/79, pulse 76, resp. rate 17, height 5' 4" (1.626 m), weight 63.5 kg (140 lb).     General/Constitutional: NAD, well developed, well nourished  HENT: Normocephalic, atraumatic  CV: Intact distal pulses, regular rate  Resp: No respiratory distress or labored breathing  Lymphatic: No lymphadenopathy palpated  Neuro: Alert and Oriented x 3, no focal deficits  Psych: Normal mood, normal affect, normal judgement, normal behavior  Skin: Warm, dry, no rashes, no erythema      Shoulder focused exam:        Visual inspection of the shoulder demonstrates normal contour without atrophy  No evidence of scapular dyskinesia or atrophy. No scapular winging  Active and passive range of motion demonstrates forward flexion to 130, abduction to 90, external rotation is 65 with the arm the side, internal rotation to hip   Rotator cuff strength is 4+/5 to resisted forward flexion, 4+/5 resisted abduction, 4+/5 resisted external rotation, 5/5 resisted internal rotation  No tenderness to palpation at the distal clavicle, AC joint, acromion, or scapular spine  No pain with cross-body adduction  + Neer's test, + modified Mason impingement test  Negative external rotation lag sign  Negative belly press, negative lift-off  + speed's and Yergason's test  Minimal tenderness to palpation at the bicipital groove  - [place]'s test    UE NV Exam: +2 Radial pulses bilaterally  Sensation intact to light touch C5-T1 bilaterally, Radial/median/ulnar nerve motor intact    Cervical ROM is full without pain, numbness or tingling      Shoulder Imaging    New Xrays were performed today of the left shoulder and demonstrate healed greater tuberosity fracture. There was no radiology report available for review, but the report will be assessed when it is available.         Scribe Attestation      I,:  Lily Rocha am acting as a scribe while in the presence of the attending physician.:       I,:  Florecita iLnares, DO personally performed the services described in this documentation    as scribed in my presence.:

## 2023-10-12 ENCOUNTER — OFFICE VISIT (OUTPATIENT)
Dept: PHYSICAL THERAPY | Facility: CLINIC | Age: 73
End: 2023-10-12
Payer: MEDICARE

## 2023-10-12 DIAGNOSIS — G89.29 CHRONIC LEFT SHOULDER PAIN: ICD-10-CM

## 2023-10-12 DIAGNOSIS — S42.255D CLOSED NONDISPLACED FRACTURE OF GREATER TUBEROSITY OF LEFT HUMERUS WITH ROUTINE HEALING, SUBSEQUENT ENCOUNTER: ICD-10-CM

## 2023-10-12 DIAGNOSIS — M75.102 TEAR OF LEFT SUPRASPINATUS TENDON: Primary | ICD-10-CM

## 2023-10-12 DIAGNOSIS — M25.512 CHRONIC LEFT SHOULDER PAIN: ICD-10-CM

## 2023-10-12 PROCEDURE — 97110 THERAPEUTIC EXERCISES: CPT | Performed by: PHYSICAL THERAPIST

## 2023-10-12 PROCEDURE — 97140 MANUAL THERAPY 1/> REGIONS: CPT | Performed by: PHYSICAL THERAPIST

## 2023-10-12 PROCEDURE — 97112 NEUROMUSCULAR REEDUCATION: CPT | Performed by: PHYSICAL THERAPIST

## 2023-10-12 NOTE — PROGRESS NOTES
Daily Note     Today's date: 10/12/2023  Patient name: Tawny Garvey  : 1950  MRN: [id number]  Referring provider: Sarah Lopez DO  Dx:   Encounter Diagnosis     ICD-10-CM    1. Tear of left supraspinatus tendon  M75.102       2. Chronic left shoulder pain  M25.512     G89.29       3. Closed nondisplaced fracture of greater tuberosity of left humerus with routine healing, subsequent encounter  S42.255D           Start Time: 1215  Stop Time: 1300  Total time in clinic (min): 45 minutes    Subjective: Pt followed up with Dr. Ben Alvares. Fracture is now healed. Recommended continued PT for strength and ROM. Objective: See treatment diary below      Assessment: Tolerated treatment well. Progressed program with good tolerance. Pt fatigued easily with program at involved shoulder. Patient exhibited good technique with therapeutic exercises      Plan: Continue per plan of care.       Precautions: MS, Gait dysfunction, Insomnia, Osteoporosis, Peripheral polyneuropathy, Hx of Breast CA, R IDALIA in - Dr. Zaina Pierre, Left femur IM nailing      Manuals 8/16 9/11 9/14 9/25 9/28 10/9 10/12      PROM of the left shoulder 8' 10' 10' 10' 10' 10' 10'      Left scapular mobilizations 5' 3'           [facility] post glide in neutral    Gr II+III  Gr II+III  Gr II+III  Gr II+III Gr II+III      L UT STM     3' 3' 3'      Neuro Re-Ed             Isometrics             Scap retraction              No Money TB             Serratus punch 5x c/ PT assist 20x c/ PT assist 20x c/ PT assist 20x c/ PT assist 20x c/ PT assist  2x10 independent       Sidelying shoulder ER 10x 10x with cueing  30x           TB Rows  ytb 2x10  ytb 2x10   2x10 gtb  2x10 gtb      TB extension  ytb 2x10   ytb 2x10  rtb 2x10  rtb 2x10      TB IR/ER  2x10 ytb   2x10 ytb  C/ PT cueing  2x10 ytb  C/ PT cueing  2x10 ytb  C/ PT cueing      Ther Ex             Table slides flexion  C/ PB x 30           Table slides abduction             Shoulder ER Stretch  supine c/ cane 10x10" supine c/ cane 10x10 supine c/ cane 10x10 supine c/ cane 10x10" supine c/ cane 10x10"  DC      Supine shoulder flexion with dowel        2x5       Shoulder extension stretch              [address] 5' 5'            UBE   5'  7' 7' 7' 7'      Shoulder flexion     nv        Shoulder scaption     nv        Ther Activity                                       Gait Training                                       Modalities                                       1:1 with PT from 1215-1PM

## 2023-10-16 ENCOUNTER — OFFICE VISIT (OUTPATIENT)
Dept: PHYSICAL THERAPY | Facility: CLINIC | Age: 73
End: 2023-10-16
Payer: MEDICARE

## 2023-10-16 DIAGNOSIS — M75.102 TEAR OF LEFT SUPRASPINATUS TENDON: Primary | ICD-10-CM

## 2023-10-16 DIAGNOSIS — M25.512 CHRONIC LEFT SHOULDER PAIN: ICD-10-CM

## 2023-10-16 DIAGNOSIS — S42.255D CLOSED NONDISPLACED FRACTURE OF GREATER TUBEROSITY OF LEFT HUMERUS WITH ROUTINE HEALING, SUBSEQUENT ENCOUNTER: ICD-10-CM

## 2023-10-16 DIAGNOSIS — G89.29 CHRONIC LEFT SHOULDER PAIN: ICD-10-CM

## 2023-10-16 PROCEDURE — 97140 MANUAL THERAPY 1/> REGIONS: CPT | Performed by: PHYSICAL THERAPIST

## 2023-10-16 PROCEDURE — 97112 NEUROMUSCULAR REEDUCATION: CPT | Performed by: PHYSICAL THERAPIST

## 2023-10-16 PROCEDURE — 97110 THERAPEUTIC EXERCISES: CPT | Performed by: PHYSICAL THERAPIST

## 2023-10-16 NOTE — PROGRESS NOTES
Daily Note     Today's date: 10/16/2023  Patient name: Katie Clement  : 1950  MRN: [id number]  Referring provider: Dotty Villegas DO  Dx:   Encounter Diagnosis     ICD-10-CM    1. Tear of left supraspinatus tendon  M75.102       2. Chronic left shoulder pain  M25.512     G89.29       3. Closed nondisplaced fracture of greater tuberosity of left humerus with routine healing, subsequent encounter  S42.255D           Start Time: 1115  Stop Time: 1210  Total time in clinic (min): 55 minutes    Subjective: Pt reports having more soreness in left shoulder. Located more anteriorly, per patient. Objective: See treatment diary below      Assessment: Tolerated treatment fair. Pt initially struggled with AROM using wand over head; however, improved controlled and symptoms with repetitive movements. Performed finger ladder flexion to active assisted motion in this plane. No significant increase of symptoms; however, patient required tactile cueing to reduce scapular elevation. Patient demonstrated fatigue post treatment and would benefit from continued PT. Pt continues to have considerable tone in left upper trap muscle on the left. Plan: Continue per plan of care.       Precautions: MS, Gait dysfunction, Insomnia, Osteoporosis, Peripheral polyneuropathy, Hx of Breast CA, R IDALIA in - Dr. Lilo Dixon, Left femur IM nailing      Manuals 8/16 9/11 9/14 9/25 9/28 10/9 10/12 10/16     PROM of the left shoulder 8' 10' 10' 10' 10' 10' 10' 10'     Left scapular mobilizations 5' 3'           [address] [place] [address] post glide in neutral    Gr II+III  Gr II+III  Gr II+III  Gr II+III Gr II+III Gr II+III     L UT STM     3' 3' 3' 3'     Neuro Re-Ed             Isometrics             Scap retraction              No Money TB             Serratus punch 5x c/ PT assist 20x c/ PT assist 20x c/ PT assist 20x c/ PT assist 20x c/ PT assist  2x10 independent  2x10 independent      Sidelying shoulder ER 10x 10x with cueing  30x           TB Rows  ytb 2x10  ytb 2x10   2x10 gtb  2x10 gtb 2x10 gtb     TB extension  ytb 2x10   ytb 2x10  rtb 2x10  rtb 2x10 rtb 2x10     TB IR/ER  2x10 ytb   2x10 ytb  C/ PT cueing  2x10 ytb  C/ PT cueing  2x10 ytb  C/ PT cueing 2x10 ytb  C/ PT cueing     Ther Ex             Table slides flexion  C/ PB x 30           Table slides abduction             Shoulder ER Stretch  supine c/ cane 10x10" supine c/ cane 10x10 supine c/ cane 10x10 supine c/ cane 10x10" supine c/ cane 10x10"  DC      Supine shoulder flexion with dowel        2x5  10x      Shoulder extension stretch              Pulley 5' 5'            UBE   5'  7' 7' 7' 7' 7'     Shoulder flexion     nv   Wall walks/finger ladder 10x      Shoulder scaption     nv        Ther Activity                                       Gait Training                                       Modalities                                       1:1 with PT from 1115-1210pm

## 2023-10-19 ENCOUNTER — OFFICE VISIT (OUTPATIENT)
Dept: PHYSICAL THERAPY | Facility: CLINIC | Age: 73
End: 2023-10-19
Payer: MEDICARE

## 2023-10-19 DIAGNOSIS — M25.512 CHRONIC LEFT SHOULDER PAIN: ICD-10-CM

## 2023-10-19 DIAGNOSIS — M75.102 TEAR OF LEFT SUPRASPINATUS TENDON: Primary | ICD-10-CM

## 2023-10-19 DIAGNOSIS — G89.29 CHRONIC LEFT SHOULDER PAIN: ICD-10-CM

## 2023-10-19 DIAGNOSIS — N32.81 OAB (OVERACTIVE BLADDER): ICD-10-CM

## 2023-10-19 DIAGNOSIS — S42.255D CLOSED NONDISPLACED FRACTURE OF GREATER TUBEROSITY OF LEFT HUMERUS WITH ROUTINE HEALING, SUBSEQUENT ENCOUNTER: ICD-10-CM

## 2023-10-19 PROCEDURE — 97112 NEUROMUSCULAR REEDUCATION: CPT | Performed by: PHYSICAL THERAPIST

## 2023-10-19 PROCEDURE — 97140 MANUAL THERAPY 1/> REGIONS: CPT | Performed by: PHYSICAL THERAPIST

## 2023-10-19 PROCEDURE — 97110 THERAPEUTIC EXERCISES: CPT | Performed by: PHYSICAL THERAPIST

## 2023-10-19 NOTE — PROGRESS NOTES
Daily Note     Today's date: 10/19/2023  Patient name: Jelly Marshall  : 1950  MRN: [id number]  Referring provider: Nazario Villeda DO  Dx:   Encounter Diagnosis     ICD-10-CM    1. Tear of left supraspinatus tendon  M75.102       2. Chronic left shoulder pain  M25.512     G89.29       3. Closed nondisplaced fracture of greater tuberosity of left humerus with routine healing, subsequent encounter  S42.255D                      Subjective: Pt reports less stiffness and discomfort in left shoulder. Pt notes have a bad few days with her LE. She notes several episodes of LE buckling. Objective: See treatment diary below      Assessment: Tolerated treatment well. Improve joint mobility of GH and passive motion. Pt also improved motor control with active exercises. Pt continues to have considerably trigger points in UT and periscapular region. Patient demonstrated fatigue post treatment and would benefit from continued PT      Plan: Continue per plan of care.       Precautions: MS, Gait dysfunction, Insomnia, Osteoporosis, Peripheral polyneuropathy, Hx of Breast CA, R IDALIA in - Dr. Gilson Woo, Left femur IM nailing      Manuals 8/16 9/11 9/14 9/25 9/28 10/9 10/12 10/16 10/19    PROM of the left shoulder 8' 10' 10' 10' 10' 10' 10' 10' 10'    Left scapular mobilizations 5' 3'           [address] post glide in neutral    Gr II+III  Gr II+III  Gr II+III  Gr II+III Gr II+III Gr II+III Gr II+III    L UT STM     3' 3' 3' 3' 3'    Neuro Re-Ed             Isometrics             Scap retraction              No Money TB             Serratus punch 5x c/ PT assist 20x c/ PT assist 20x c/ PT assist 20x c/ PT assist 20x c/ PT assist  2x10 independent  2x10 independent  2x10 independent     Sidelying shoulder ER 10x 10x with cueing  30x           TB Rows  ytb 2x10  ytb 2x10   2x10 gtb  2x10 gtb 2x10 gtb 2x10 gtb    TB extension  ytb 2x10   ytb 2x10  rtb 2x10  rtb 2x10 rtb 2x10 2x10 rtb    TB IR/ER  2x10 ytb   2x10 ytb  C/ PT cueing  2x10 ytb  C/ PT cueing  2x10 ytb  C/ PT cueing 2x10 ytb  C/ PT cueing 2x10 ytb  C/ PT cueing    Ther Ex             Table slides flexion  C/ PB x 30           Table slides abduction             Shoulder ER Stretch  supine c/ cane 10x10" supine c/ cane 10x10 supine c/ cane 10x10 supine c/ cane 10x10" supine c/ cane 10x10"  DC      Supine shoulder flexion with dowel        2x5  10x  10x     Shoulder extension stretch              Pulley 5' 5'            UBE   5'  7' 7' 7' 7' 7' 8'    Shoulder flexion     nv   Wall walks/finger ladder 10x  Wall walks/finger ladder 10x     Shoulder scaption     nv        Ther Activity                                       Gait Training                                       Modalities                                       1:1 with PT from [id number]

## 2023-10-20 RX ORDER — SOLIFENACIN SUCCINATE 10 MG/1
10 TABLET, FILM COATED ORAL DAILY
Qty: 90 TABLET | Refills: 1 | Status: SHIPPED | OUTPATIENT
Start: 2023-10-20

## 2023-10-23 ENCOUNTER — OFFICE VISIT (OUTPATIENT)
Dept: PHYSICAL THERAPY | Facility: CLINIC | Age: 73
End: 2023-10-23
Payer: MEDICARE

## 2023-10-23 DIAGNOSIS — M25.512 CHRONIC LEFT SHOULDER PAIN: ICD-10-CM

## 2023-10-23 DIAGNOSIS — S42.255D CLOSED NONDISPLACED FRACTURE OF GREATER TUBEROSITY OF LEFT HUMERUS WITH ROUTINE HEALING, SUBSEQUENT ENCOUNTER: ICD-10-CM

## 2023-10-23 DIAGNOSIS — M75.102 TEAR OF LEFT SUPRASPINATUS TENDON: Primary | ICD-10-CM

## 2023-10-23 DIAGNOSIS — G89.29 CHRONIC LEFT SHOULDER PAIN: ICD-10-CM

## 2023-10-23 PROCEDURE — 97110 THERAPEUTIC EXERCISES: CPT

## 2023-10-23 PROCEDURE — 97112 NEUROMUSCULAR REEDUCATION: CPT

## 2023-10-23 PROCEDURE — 97140 MANUAL THERAPY 1/> REGIONS: CPT

## 2023-10-23 NOTE — PROGRESS NOTES
Daily Note     Today's date: 10/23/2023  Patient name: Trina Mckeon  : 1950  MRN: [id number]  Referring provider: Marley Flowers DO  Dx:   Encounter Diagnosis     ICD-10-CM    1. Tear of left supraspinatus tendon  M75.102       2. Chronic left shoulder pain  M25.512     G89.29       3. Closed nondisplaced fracture of greater tuberosity of left humerus with routine healing, subsequent encounter  S42.255D             Start Time: 943  Stop Time: 1038  Total time in clinic (min): 55 minutes    Subjective: Patient reports increased shoulder soreness over the weekend from helping take care of her , but otherwise notes no new complaints or major changes since last session. Objective: See treatment diary below      Assessment: Tolerated treatment well. Patient did not experience any worsening of symptoms throughout today's session. Tightness and restricted motion noted primarily with shoulder abduction throughout PROM but displayed overall good motion in all other planes. Patient demonstrated improvements with maintaining proper form during TB IR/ER today and did not require any verbal or tactile cueing throughout exercise. Muscle fatigue noted at the conclusion of session. Patient would benefit from continued PT      Plan: Continue per plan of care.       Precautions: MS, Gait dysfunction, Insomnia, Osteoporosis, Peripheral polyneuropathy, Hx of Breast CA, R IDALIA in - Dr. Arianna Valdez, Left femur IM nailing      Manuals 8/16 9/11 9/14 9/25 9/28 10/9 10/12 10/16 10/19 10/23   PROM of the left shoulder 8' 10' 10' 10' 10' 10' 10' 10' 10' 10'   Left scapular mobilizations 5' 3'           [facility] post glide in neutral    Gr II+III  Gr II+III  Gr II+III  Gr II+III Gr II+III Gr II+III Gr II+III nv   L UT STM     3' 3' 3' 3' 3' 3'   Neuro Re-Ed             Isometrics             Scap retraction              No Money TB             Serratus punch 5x c/ PT assist 20x c/ PT assist 20x c/ PT assist 20x c/ PT assist 20x c/ PT assist  2x10 independent  2x10 independent  2x10 independent  2x10 independent   Sidelying shoulder ER 10x 10x with cueing  30x           TB Rows  ytb 2x10  ytb 2x10   2x10 gtb  2x10 gtb 2x10 gtb 2x10 gtb 2x10 gtb   TB extension  ytb 2x10   ytb 2x10  rtb 2x10  rtb 2x10 rtb 2x10 2x10 rtb 2x10 rtb   TB IR/ER  2x10 ytb   2x10 ytb  C/ PT cueing  2x10 ytb  C/ PT cueing  2x10 ytb  C/ PT cueing 2x10 ytb  C/ PT cueing 2x10 ytb  C/ PT cueing 2x10 ytb   Ther Ex             Table slides flexion  C/ PB x 30           Table slides abduction             Shoulder ER Stretch  supine c/ cane 10x10" supine c/ cane 10x10 supine c/ cane 10x10 supine c/ cane 10x10" supine c/ cane 10x10"  DC      Supine shoulder flexion with dowel        2x5  10x  10x  3x10   Shoulder extension stretch              Pulley 5' 5'            UBE   5'  7' 7' 7' 7' 7' 8' 8'   Shoulder flexion     nv   Wall walks/finger ladder 10x  Wall walks/finger ladder 10x  Wall walks/finger ladder 10x    Shoulder scaption     nv        Ther Activity                                       Gait Training                                       Modalities                                             [phone]

## 2023-10-25 ENCOUNTER — OFFICE VISIT (OUTPATIENT)
Dept: PHYSICAL THERAPY | Facility: CLINIC | Age: 73
End: 2023-10-25
Payer: MEDICARE

## 2023-10-25 DIAGNOSIS — M75.102 TEAR OF LEFT SUPRASPINATUS TENDON: Primary | ICD-10-CM

## 2023-10-25 DIAGNOSIS — G89.29 CHRONIC LEFT SHOULDER PAIN: ICD-10-CM

## 2023-10-25 DIAGNOSIS — S42.255D CLOSED NONDISPLACED FRACTURE OF GREATER TUBEROSITY OF LEFT HUMERUS WITH ROUTINE HEALING, SUBSEQUENT ENCOUNTER: ICD-10-CM

## 2023-10-25 DIAGNOSIS — M25.512 CHRONIC LEFT SHOULDER PAIN: ICD-10-CM

## 2023-10-25 PROCEDURE — 97110 THERAPEUTIC EXERCISES: CPT | Performed by: PHYSICAL THERAPIST

## 2023-10-25 PROCEDURE — 97140 MANUAL THERAPY 1/> REGIONS: CPT | Performed by: PHYSICAL THERAPIST

## 2023-10-25 PROCEDURE — 97112 NEUROMUSCULAR REEDUCATION: CPT | Performed by: PHYSICAL THERAPIST

## 2023-10-25 NOTE — PROGRESS NOTES
Daily Note     Today's date: 10/25/2023  Patient name: Jannet Antonio  : 1950  MRN: [id number]  Referring provider: Amee Payan DO  Dx:   Encounter Diagnosis     ICD-10-CM    1. Tear of left supraspinatus tendon  M75.102       2. Chronic left shoulder pain  M25.512     G89.29       3. Closed nondisplaced fracture of greater tuberosity of left humerus with routine healing, subsequent encounter  S42.007D                      Subjective: Pt reports some irritation in left shoulder after last session. She believe it could have been some of the table exercises or the UBE. Objective: See treatment diary below      Assessment: Decreased use of UBE to accommodate shoulder soreness. Performed pulley's instead of UBE. Tolerated treatment fair. Modified plan today with good tolerance. Pt continues to have difficulty with overhead motion and requires assistance from dowel/cane. Pt continues to require verbal and tactile cues to reduce left upper trap tone/hiking during sitting and during movement. Pt able to self correct but needs to be cognizant. Patient would benefit from continued PT. Plan: Continue per plan of care.       Precautions: MS, Gait dysfunction, Insomnia, Osteoporosis, Peripheral polyneuropathy, Hx of Breast CA, R IDALIA in - Dr. Sara Todd, Left femur IM nailing      Manuals 10/25    9/28 10/9 10/12 10/16 10/19 10/23   PROM of the left shoulder 10'    10' 10' 10' 10' 10' 10'   Left scapular mobilizations             [facility] post glide in neutral  Gr II+III    Gr II+III  Gr II+III Gr II+III Gr II+III Gr II+III nv   L UT STM 3'    3' 3' 3' 3' 3' 3'   Neuro Re-Ed             Isometrics             Scap retraction              No Money TB             Serratus punch 2x10 c/ cueing    20x c/ PT assist  2x10 independent  2x10 independent  2x10 independent  2x10 independent   Sidelying shoulder ER             TB Rows 3x10 gtb    2x10 gtb  2x10 gtb 2x10 gtb 2x10 gtb 2x10 gtb   TB extension 3x10 rtb rtb 2x10  rtb 2x10 rtb 2x10 2x10 rtb 2x10 rtb   TB IR/ER 2x10 ytb    2x10 ytb  C/ PT cueing  2x10 ytb  C/ PT cueing 2x10 ytb  C/ PT cueing 2x10 ytb  C/ PT cueing 2x10 ytb   Ther Ex             Table slides flexion             Table slides abduction             Shoulder ER Stretch      supine c/ cane 10x10"  DC      Supine shoulder flexion with dowel  LUE supporting RUE 1x10       2x5  10x  10x  3x10   Shoulder extension stretch              Pulley 4'            UBE 2'     7' 7' 7' 7' 8' 8'   Shoulder flexion     nv   Wall walks/finger ladder 10x  Wall walks/finger ladder 10x  Wall walks/finger ladder 10x    Shoulder scaption C/ dowel 1x10 Cueing required- patient seated    nv        Ther Activity                                       Gait Training                                       Modalities                                         1:1 with PT from [phone]

## 2023-10-26 ENCOUNTER — OFFICE VISIT (OUTPATIENT)
Dept: FAMILY MEDICINE CLINIC | Facility: OTHER | Age: 73
End: 2023-10-26

## 2023-10-26 VITALS
WEIGHT: 140.6 LBS | BODY MASS INDEX: 24.01 KG/M2 | HEART RATE: 67 BPM | HEIGHT: 64 IN | TEMPERATURE: 95.6 F | OXYGEN SATURATION: 96 % | SYSTOLIC BLOOD PRESSURE: 140 MMHG | DIASTOLIC BLOOD PRESSURE: 68 MMHG | RESPIRATION RATE: 18 BRPM

## 2023-10-26 DIAGNOSIS — E78.5 HYPERLIPIDEMIA, UNSPECIFIED HYPERLIPIDEMIA TYPE: ICD-10-CM

## 2023-10-26 DIAGNOSIS — Z00.00 MEDICARE ANNUAL WELLNESS VISIT, SUBSEQUENT: Primary | ICD-10-CM

## 2023-10-26 DIAGNOSIS — E03.8 TSH (THYROID-STIMULATING HORMONE DEFICIENCY): ICD-10-CM

## 2023-10-26 PROBLEM — H40.003 OPEN-ANGLE GLAUCOMA SUSPECT OF BOTH EYES: Status: ACTIVE | Noted: 2023-01-13

## 2023-10-26 PROBLEM — M25.561 PAIN AND SWELLING OF RIGHT KNEE: Status: ACTIVE | Noted: 2023-04-27

## 2023-10-26 PROBLEM — G43.919 REFRACTORY MIGRAINE WITH AURA: Status: ACTIVE | Noted: 2020-10-27

## 2023-10-26 PROBLEM — H43.823 VITREOMACULAR ADHESION OF BOTH EYES: Status: ACTIVE | Noted: 2020-10-27

## 2023-10-26 PROBLEM — H35.379 EPIRETINAL MEMBRANE: Status: ACTIVE | Noted: 2020-10-27

## 2023-10-26 PROBLEM — M25.461 PAIN AND SWELLING OF RIGHT KNEE: Status: ACTIVE | Noted: 2023-04-27

## 2023-10-26 PROBLEM — H40.10X0 OPEN-ANGLE GLAUCOMA OF BOTH EYES: Status: ACTIVE | Noted: 2020-10-27

## 2023-10-26 PROBLEM — R19.5 ABNORMAL STOOL TEST: Status: RESOLVED | Noted: 2019-05-29 | Resolved: 2023-10-26

## 2023-10-26 PROBLEM — H26.9 BILATERAL CATARACTS: Status: ACTIVE | Noted: 2019-04-15

## 2023-10-26 PROBLEM — Z98.41 STATUS POST BILATERAL CATARACT EXTRACTION: Status: ACTIVE | Noted: 2019-04-23

## 2023-10-26 PROBLEM — H26.491 POSTERIOR CAPSULAR OPACIFICATION NON VISUALLY SIGNIFICANT OF RIGHT EYE: Status: ACTIVE | Noted: 2023-01-13

## 2023-10-26 PROBLEM — Z98.42 STATUS POST BILATERAL CATARACT EXTRACTION: Status: ACTIVE | Noted: 2019-04-23

## 2023-10-26 PROBLEM — Z96.1 BILATERAL PSEUDOPHAKIA: Status: ACTIVE | Noted: 2020-10-27

## 2023-10-26 PROBLEM — S46.212A STRAIN OF LEFT BICEPS MUSCLE: Status: ACTIVE | Noted: 2023-04-27

## 2023-10-26 PROBLEM — M75.102 TEAR OF LEFT ROTATOR CUFF: Status: ACTIVE | Noted: 2023-04-27

## 2023-10-26 NOTE — PROGRESS NOTES
Assessment and Plan:     Problem List Items Addressed This Visit       TSH (thyroid-stimulating hormone deficiency)    Relevant Orders    TSH, 3rd generation with Free T4 reflex    Hyperlipidemia    Relevant Orders    Comprehensive metabolic panel    Lipid Panel with Direct LDL reflex     Other Visit Diagnoses       Medicare annual wellness visit, subsequent    -  Primary              Depression Screening and Follow-up Plan: Their PHQ-9 score was 5. Patient assessed for underlying major depression. Brief counseling provided and recommend additional follow-up/re-evaluation next office visit. Falls Plan of Care: balance, strength, and gait training instructions were provided. Recommended assistive device to help with gait and balance. Home safety education provided. Urinary Incontinence Plan of Care: counseling topics discussed: practice Kegel (pelvic floor strengthening) exercises, use restroom every 2 hours, limit alcohol, caffeine, spicy foods, and acidic foods and preventing constipation. Preventive health issues were discussed with patient, and age appropriate screening tests were ordered as noted in patient's After Visit Summary. Personalized health advice and appropriate referrals for health education or preventive services given if needed, as noted in patient's After Visit Summary. Return in about 6 months (around 4/26/2024) for Recheck HLD, HTN, LE edema. History of Present Illness:     Patient presents for a Medicare Wellness Visit      Patient Care Team:  Serene Ozuna DO as PCP - General (Family Medicine)  MD Chrissy Tidwell MD Morris Roger, MD Exie Bach, PA-C Mattie Sax, DO Thais Rich, MD Atilano Backer, MD Jenette Brod, MD (Gastroenterology)     Review of Systems:     Review of Systems   Constitutional:  Negative for appetite change, fatigue, fever and unexpected weight change.    HENT:  Negative for congestion, dental problem, ear pain, postnasal drip, sore throat and tinnitus. Eyes:  Negative for pain, discharge and visual disturbance. Respiratory:  Negative for cough, shortness of breath and wheezing. Cardiovascular:  Positive for leg swelling. Negative for chest pain and palpitations. Gastrointestinal:  Negative for abdominal pain, constipation, diarrhea, nausea and vomiting. Endocrine: Negative for cold intolerance and heat intolerance. Genitourinary:  Negative for difficulty urinating, dysuria, flank pain and urgency. Musculoskeletal:  Negative for arthralgias, back pain, joint swelling and myalgias. Skin:  Negative for rash and wound. Allergic/Immunologic: Negative for immunocompromised state. Neurological:  Negative for dizziness, syncope, speech difficulty, weakness and numbness. Hematological:  Negative for adenopathy. Does not bruise/bleed easily. Psychiatric/Behavioral:  Negative for confusion, dysphoric mood and sleep disturbance. The patient is not nervous/anxious.          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    Multiple sclerosis (HCC)    Peripheral polyneuropathy    Depression    Fatigue    Gait disturbance    Hip pain, right    Unilateral occipital headache    History of bilateral hip replacements    Hypokalemia    Insomnia    Solitary pulmonary nodule    Pain of left lower leg    Osteopenia    Nephrolithiasis    Tingling    Screening mammogram, encounter for    Encounter for breast reconstruction following mastectomy    Encounter for follow-up surveillance of breast cancer    Essential hypertension    Abnormal finding on breast imaging    TSH (thyroid-stimulating hormone deficiency)    B12 deficiency    RLS (restless legs syndrome)    Dense breast tissue    Subareolar lump of right breast    Hyperlipidemia    Pain and swelling of right knee    Open-angle glaucoma suspect of both eyes    Bilateral cataracts    Bilateral pseudophakia Epiretinal membrane    Fx. left wrist, closed, initial encounter    H/O unilateral modified radical mastectomy, left    History of total right hip replacement    Open-angle glaucoma of both eyes    Posterior capsular opacification non visually significant of right eye    Refractory migraine with aura    Status post bilateral cataract extraction    Strain of left biceps muscle    Tear of left rotator cuff    Vitreomacular adhesion of both eyes      Past Medical and Surgical History:     Past Medical History:   Diagnosis Date    Allergic 1967    seasonal    Allergic rhinitis     Bone pain     Breast cancer ([address]) 08/16/2012    Breast ptosis     Depression     Fatigue     Gait disturbance     uses cane at times    Headache     Hip fracture (HCC)     Hip pain     History of transfusion 1975    placenta previa s/p work accident, no rx    Hypokalemia     Insomnia     Laceration of finger     right hand    Left ankle pain     Left knee pain     Multiple sclerosis ([address])     Muscle strain     left lower leg    Nephrolithiasis     Osteopenia     Osteoporosis     Pain of left calf     Pneumonia     Rash     Scoliosis birth    scoliosis    Solitary pulmonary nodule     SVT (supraventricular tachycardia)     Tingling     Urgency of urination     Urticaria     Wrist fracture, left      Past Surgical History:   Procedure Laterality Date    ANKLE SURGERY      APPENDECTOMY  11/2012    Dr. Milburn Scheuermann      Enlargement procedure with prosthetic implant bilateral    AUGMENTATION MAMMAPLASTY Right 01/28/2020    implant replaced because of recall    AUGMENTATION MAMMAPLASTY Bilateral 2012    BREAST BIOPSY Left 07/23/2012    BREAST IMPLANT Right 01/28/2020    Procedure: BREAST IMPLANT EXCHANGE WITH CAPSULECTOMY;  Surgeon: Sumit Martinez MD;  Location: [facility];  Service: Plastics    BREAST IMPLANT REMOVAL Right 01/28/2020    implant placement, implant revision, right mastopexy    BREAST LUMPECTOMY      CYSTOSTOMY with basket extraction of calculus; x2    EXCISION / BIOPSY BREAST / NIPPLE / DUCT Right 05/04/2020    scar revision to resuture non healing surgical wound    EXPLORATORY LAPAROTOMY      FL INJECTION LEFT SHOULDER (ARTHROGRAM)  7/19/2023    FOOT SURGERY      FRACTURE SURGERY  Lt wrist 9/2014 - Lt matthew femur 9/14    HIP FRACTURE SURGERY Right     Subcapital    HIP SURGERY Left     JOINT REPLACEMENT  Rt hip 10/2012    LEG SURGERY      Repair    MASTECTOMY Left 08/16/2012    NE GRAFTING OF AUTOLOGOUS FAT BY LIPO 50 CC OR LESS Bilateral 8/22/2023    Procedure: AUTOLOGOUS FAT GRAFTING TO BILATERAL BREASTS;  Surgeon: Raina Whittaker MD;  [facility];  Service: Plastics    NE MASTOPEXY Right 01/28/2020    Procedure: BREAST MASTOPEXY;  Surgeon: Raina Whittaker MD;  [facility];  Service: Plastics    NE REVISION OF RECONSTRUCTED BREAST Right 05/04/2020    Procedure: REVISION RIGHT BREAST MOUND;  Surgeon: Raina Whittaker MD;  [facility];  Service: Plastics    REDUCTION MAMMAPLASTY Right 01/28/2020    reduced to match left side    SENTINEL LYMPH NODE BIOPSY Left 08/16/2012    SKIN BIOPSY      TONSILLECTOMY      TUBAL LIGATION      WRIST SURGERY Left       Family History:     Family History   Problem Relation Age of Onset    Coronary artery disease Mother     Hyperlipidemia Mother     Rheum arthritis Mother     Other Father         Acute myocardial infarction    No Known Problems Maternal Grandmother     Hodgkin's lymphoma Maternal Grandfather         age at dx unk    No Known Problems Paternal Grandmother     No Known Problems Paternal Grandfather     Stroke Son 52    No Known Problems Son     Cancer Maternal Aunt 79        bladder    Heart disease Maternal Aunt     Stroke Maternal Aunt         6 CVA before death    Breast cancer Maternal Aunt     Colon cancer Maternal Uncle 65    Hodgkin's lymphoma Maternal Uncle 70    No Known Problems Paternal Aunt     No Known Problems Paternal Aunt     No Known Problems Paternal Aunt       Social History:     Social History     Socioeconomic History    Marital status: /Civil Union     Spouse name: None    Number of children: 2    Years of education: Completed bachelor's degree    Highest education level: None   Occupational History    Occupation: RN     Comment: Retired   Tobacco Use    Smoking status: Former     Packs/day: 1.00     Years: 35.00     Total pack years: 35.00     Types: Cigarettes     Quit date:      Years since quittin.8    Smokeless tobacco: Never   Vaping Use    Vaping Use: Never used   Substance and Sexual Activity    Alcohol use: No    Drug use: No    Sexual activity: Yes     Partners: Male     Birth control/protection: Post-menopausal   Other Topics Concern    None   Social History Narrative    Denied:  History of caffeine use     Social Determinants of Health     Financial Resource Strain: Unknown (10/19/2023)    Overall Financial Resource Strain (CARDIA)     Difficulty of Paying Living Expenses: Patient refused   Food Insecurity: Not on file   Transportation Needs: Unknown (10/19/2023)    PRAPARE - Transportation     Lack of Transportation (Medical): Patient refused     Lack of Transportation (Non-Medical): Patient refused   Physical Activity: Not on file   Stress: Not on file   Social Connections: Not on file   Intimate Partner Violence: Not on file   Housing Stability: Not on file      Medications and Allergies:     Current Outpatient Medications   Medication Sig Dispense Refill    ALPHA LIPOIC ACID PO Take by mouth      Ampyra 10 MG TB12 1 tablet daily 90 tablet 0    ascorbic acid (VITAMIN C) 500 mg tablet Take 500 mg by mouth daily      baclofen 10 mg tablet 1 tab pos q am, 1 tab po q pm and 2 tabs hs 360 tablet 3    Biotin 5000 MCG TABS       Cholecalciferol (VITAMIN D3) 1000 units CAPS Take 5,000 Units by mouth        clonazePAM (KlonoPIN) 0.5 mg tablet TAKE 1 AND 1/2 TABLETS BY MOUTH DAILY AT BEDTIME 135 tablet 1    Cranberry 125 MG TABS Take 125 mg by mouth in the morning      cycloSPORINE (RESTASIS) 0.05 % ophthalmic emulsion Administer 1 drop to both eyes every 12 (twelve) hours      denosumab (PROLIA) 60 mg/mL Inject under the skin      docusate sodium (COLACE) 100 mg capsule Take 1 capsule by mouth every other day        gabapentin (NEURONTIN) 300 mg capsule TAKE 1 CAPSULE DAILY AT    BEDTIME 90 capsule 3    gabapentin (NEURONTIN) 600 MG tablet Take one tablet (600mg total) in the AM, 1.5 tabs in the afternoon (900mg total) and 2 tabs at bedtime (along with one 300mg cap) 540 tablet 3    Magnesium 500 MG TABS Take 500 tablets by mouth once      solifenacin (VESICARE) 10 MG tablet TAKE 1 TABLET DAILY 90 tablet 1    zonisamide (ZONEGRAN) 100 mg capsule TAKE 4 CAPSULES DAILY AT   BEDTIME 360 capsule 3     No current facility-administered medications for this visit. Facility-Administered Medications Ordered in Other Visits   Medication Dose Route Frequency Provider Last Rate Last Admin    bacitracin 50,000 Units, gentamicin (GARAMYCIN) 40 mg/mL 80 mg, ceFAZolin (ANCEF) 1,000 mg in sodium chloride 0.9 % 1,000 mL irrigation bottle   Irrigation Once Raphael Villa MD         Allergies   Allergen Reactions    Duloxetine Hcl      Rapid Heart rate      Iodinated Contrast Media Anaphylaxis    Acetaminophen Other (See Comments)     Heart palpitations    Cetirizine      Only occurs with generic, weakness in legs, off balance and couldn't walk.     Morphine Other (See Comments)     Migraine      Immunizations:     Immunization History   Administered Date(s) Administered    COVID-19 MODERNA VACC 0.5 ML IM 02/02/2021, 03/02/2021, 11/12/2021, 05/06/2022    COVID-19 Moderna Vac BIVALENT 12 Yr+ IM 0.5 ML 10/22/2022    INFLUENZA 11/04/2015, 10/17/2016, 10/18/2017, 10/26/2018, 10/23/2021, 10/22/2022    Influenza Split 10/25/2020    Influenza, high dose seasonal 0.7 mL 10/18/2020    Influenza, seasonal, injectable 1949, 09/18/2012, 11/04/2015    Pneumococcal Conjugate 13-Valent 10/17/2016    Pneumococcal Polysaccharide PPV23 10/26/2018    Tdap 08/05/2015    Zoster 10/18/2017    influenza, trivalent, adjuvanted 10/30/2019      Health Maintenance:         Topic Date Due    DXA SCAN  10/28/2023    Breast Cancer Screening: Mammogram  12/12/2024    Colorectal Cancer Screening  07/19/2029    Hepatitis C Screening  Completed         Topic Date Due    COVID-19 Vaccine (6 - Moderna series) 02/22/2023    Influenza Vaccine (1) 09/01/2023      Medicare Screening Tests and Risk Assessments:     Paulo Reyes is here for her Subsequent Wellness visit. Last Medicare Wellness visit information reviewed, patient interviewed and updates made to the record today. Health Risk Assessment:   Patient rates overall health as fair. Patient feels that their physical health rating is slightly worse. Patient is satisfied with their life. Eyesight was rated as same. Hearing was rated as same. Patient feels that their emotional and mental health rating is same. Patients states they are sometimes angry. Patient states they are often unusually tired/fatigued. Pain experienced in the last 7 days has been some. Patient's pain rating has been 4/10. Patient states that she has experienced no weight loss or gain in last 6 months. Most of above symptoms secondary to MS    Depression Screening:   PHQ-9 Score: 5      Fall Risk Screening: In the past year, patient has experienced: history of falling in past year    Number of falls: 2 or more  Injured during fall?: Yes    Feels unsteady when standing or walking?: Yes    Worried about falling?: Yes      Urinary Incontinence Screening:   Patient has not leaked urine accidently in the last six months. Home Safety:  Patient has trouble with stairs inside or outside of their home. Patient has working smoke alarms and has working carbon monoxide detector. Home safety hazards include: none.      Nutrition: Current diet is Regular. Medications:   Patient is currently taking over-the-counter supplements. OTC medications include: see medication list. Patient is able to manage medications. Activities of Daily Living (ADLs)/Instrumental Activities of Daily Living (IADLs):   Walk and transfer into and out of bed and chair?: Yes  Dress and groom yourself?: Yes    Bathe or shower yourself?: Yes    Feed yourself? Yes  Do your laundry/housekeeping?: Yes  Manage your money, pay your bills and track your expenses?: Yes  Make your own meals?: Yes    Do your own shopping?: Yes    Previous Hospitalizations:   Any hospitalizations or ED visits within the last 12 months?: Yes    How many hospitalizations have you had in the last year?: 1-2    Hospitalization Comments: outpatient surgery 23    Advance Care Planning:   Living will: Yes    Durable POA for healthcare: Yes    Advanced directive: Yes      Cognitive Screening:   Provider or family/friend/caregiver concerned regarding cognition?: No    PREVENTIVE SCREENINGS      Cardiovascular Screening:    General: Screening Not Indicated and History Lipid Disorder      Diabetes Screening:     General: Screening Current      Colorectal Cancer Screening:     General: Screening Current      Breast Cancer Screening:     General: History Breast Cancer      Cervical Cancer Screening:    General: Screening Not Indicated      Osteoporosis Screening:    General: Screening Not Indicated and History Osteoporosis      Abdominal Aortic Aneurysm (AAA) Screening:        General: Screening Not Indicated      Lung Cancer Screening:     General: Screening Not Indicated      Hepatitis C Screening:    General: Screening Current    Screening, Brief Intervention, and Referral to Treatment (SBIRT)    Screening  Typical number of drinks in a day: 0  Typical number of drinks in a week: 0  Interpretation: Low risk drinking behavior.     AUDIT-C Screenin) How often did you have a drink containing alcohol in the past year? never  2) How many drinks did you have on a typical day when you were drinking in the past year? 0  3) How often did you have 6 or more drinks on one occasion in the past year? never    AUDIT-C Score: 0  Interpretation: Score 0-2 (female): Negative screen for alcohol misuse    Single Item Drug Screening:  How often have you used an illegal drug (including marijuana) or a prescription medication for non-medical reasons in the past year? never    Single Item Drug Screen Score: 0  Interpretation: Negative screen for possible drug use disorder    Brief Intervention  Alcohol & drug use screenings were reviewed. No concerns regarding substance use disorder identified. Other Counseling Topics:   Car/seat belt/driving safety, skin self-exam, sunscreen and calcium and vitamin D intake and regular weightbearing exercise. No results found. Physical Exam:     /68   Pulse 67   Temp (!) 95.6 °F (35.3 °C)   Resp 18   Ht 5' 4" (1.626 m)   Wt 63.8 kg (140 lb 9.6 oz)   SpO2 96%   BMI 24.13 kg/m²     Physical Exam  Vitals and nursing note reviewed. Constitutional:       General: She is not in acute distress. Appearance: Normal appearance. She is well-developed. She is not ill-appearing. Comments: Body mass index is 24.13 kg/m². HENT:      Head: Normocephalic and atraumatic. Right Ear: Hearing, tympanic membrane, ear canal and external ear normal.      Left Ear: Hearing, tympanic membrane, ear canal and external ear normal.      Nose: Nose normal.      Mouth/Throat:      Mouth: Mucous membranes are moist.      Pharynx: Oropharynx is clear. Uvula midline. Eyes:      General: No scleral icterus. Conjunctiva/sclera: Conjunctivae normal.      Pupils: Pupils are equal, round, and reactive to light. Neck:      Thyroid: No thyromegaly. Cardiovascular:      Rate and Rhythm: Normal rate and regular rhythm. Heart sounds: Normal heart sounds.  No murmur heard.  Pulmonary:      Effort: Pulmonary effort is normal. No respiratory distress. Breath sounds: Normal breath sounds. No wheezing. Abdominal:      General: Bowel sounds are normal. There is no distension. Palpations: Abdomen is soft. Tenderness: There is no abdominal tenderness. Musculoskeletal:         General: No tenderness. Cervical back: Normal range of motion and neck supple. Right lower leg: Edema (trace) present. Left lower leg: Edema (trace) present. Lymphadenopathy:      Cervical: No cervical adenopathy. Skin:     General: Skin is warm and dry. Coloration: Skin is not jaundiced. Findings: No erythema or rash. Neurological:      General: No focal deficit present. Mental Status: She is alert and oriented to person, place, and time. Mental status is at baseline. Cranial Nerves: No cranial nerve deficit.    Psychiatric:         Mood and Affect: Mood normal.         Behavior: Behavior normal.          Miya Calles, DO

## 2023-10-26 NOTE — PATIENT INSTRUCTIONS
Medicare Preventive Visit Patient Instructions  Thank you for completing your Welcome to Medicare Visit or Medicare Annual Wellness Visit today. Your next wellness visit will be due in one year (10/26/2024). The screening/preventive services that you may require over the next 5-10 years are detailed below. Some tests may not apply to you based off risk factors and/or age. Screening tests ordered at today's visit but not completed yet may show as past due. Also, please note that scanned in results may not display below. Preventive Screenings:  Service Recommendations Previous Testing/Comments   Colorectal Cancer Screening  * Colonoscopy    * Fecal Occult Blood Test (FOBT)/Fecal Immunochemical Test (FIT)  * Fecal DNA/Cologuard Test  * Flexible Sigmoidoscopy Age: 43-73 years old   Colonoscopy: every 10 years (may be performed more frequently if at higher risk)  OR  FOBT/FIT: every 1 year  OR  Cologuard: every 3 years  OR  Sigmoidoscopy: every 5 years  Screening may be recommended earlier than age 39 if at higher risk for colorectal cancer. Also, an individualized decision between you and your healthcare provider will decide whether screening between the ages of 77-80 would be appropriate. Colonoscopy: 07/19/2019  FOBT/FIT: Not on file  Cologuard: Not on file  Sigmoidoscopy: Not on file    Screening Current     Breast Cancer Screening Age: 36 years old  Frequency: every 1-2 years  Not required if history of left and right mastectomy Mammogram: 12/12/2022    History Breast Cancer   Cervical Cancer Screening Between the ages of 21-29, pap smear recommended once every 3 years. Between the ages of 32-69, can perform pap smear with HPV co-testing every 5 years.    Recommendations may differ for women with a history of total hysterectomy, cervical cancer, or abnormal pap smears in past. Pap Smear: 05/16/2018    Screening Not Indicated   Hepatitis C Screening Once for adults born between 1945 and 1965  More frequently in patients at high risk for Hepatitis C Hep C Antibody: 08/30/2021    Screening Current   Diabetes Screening 1-2 times per year if you're at risk for diabetes or have pre-diabetes Fasting glucose: 94 mg/dL (9/13/2023)  A1C: 4.9 % (11/2/2022)  Screening Current   Cholesterol Screening Once every 5 years if you don't have a lipid disorder. May order more often based on risk factors. Lipid panel: 11/02/2022    Screening Not Indicated  History Lipid Disorder     Other Preventive Screenings Covered by Medicare:  Abdominal Aortic Aneurysm (AAA) Screening: covered once if your at risk. You're considered to be at risk if you have a family history of AAA. Lung Cancer Screening: covers low dose CT scan once per year if you meet all of the following conditions: (1) Age 48-67; (2) No signs or symptoms of lung cancer; (3) Current smoker or have quit smoking within the last 15 years; (4) You have a tobacco smoking history of at least 20 pack years (packs per day multiplied by number of years you smoked); (5) You get a written order from a healthcare provider. Glaucoma Screening: covered annually if you're considered high risk: (1) You have diabetes OR (2) Family history of glaucoma OR (3)  aged 48 and older OR (3)  American aged 72 and older  Osteoporosis Screening: covered every 2 years if you meet one of the following conditions: (1) You're estrogen deficient and at risk for osteoporosis based off medical history and other findings; (2) Have a vertebral abnormality; (3) On glucocorticoid therapy for more than 3 months; (4) Have primary hyperparathyroidism; (5) On osteoporosis medications and need to assess response to drug therapy. Last bone density test (DXA Scan): 10/28/2021. HIV Screening: covered annually if you're between the age of 14-79. Also covered annually if you are younger than 13 and older than 72 with risk factors for HIV infection.  For pregnant patients, it is covered up to 3 times per pregnancy. Immunizations:  Immunization Recommendations   Influenza Vaccine Annual influenza vaccination during flu season is recommended for all persons aged >= 6 months who do not have contraindications   Pneumococcal Vaccine   * Pneumococcal conjugate vaccine = PCV13 (Prevnar 13), PCV15 (Vaxneuvance), PCV20 (Prevnar 20)  * Pneumococcal polysaccharide vaccine = PPSV23 (Pneumovax) Adults 41-21 yo with certain risk factors or if 69+ yo  If never received any pneumonia vaccine: recommend Prevnar 20 (PCV20)  Give PCV20 if previously received 1 dose of PCV13 or PPSV23   Hepatitis B Vaccine 3 dose series if at intermediate or high risk (ex: diabetes, end stage renal disease, liver disease)   Respiratory syncytial virus (RSV) Vaccine - COVERED BY MEDICARE PART D  * RSVPreF3 (Arexvy) CDC recommends that adults 61years of age and older may receive a single dose of RSV vaccine using shared clinical decision-making (SCDM)   Tetanus (Td) Vaccine - COST NOT COVERED BY MEDICARE PART B Following completion of primary series, a booster dose should be given every 10 years to maintain immunity against tetanus. Td may also be given as tetanus wound prophylaxis. Tdap Vaccine - COST NOT COVERED BY MEDICARE PART B Recommended at least once for all adults. For pregnant patients, recommended with each pregnancy. Shingles Vaccine (Shingrix) - COST NOT COVERED BY MEDICARE PART B  2 shot series recommended in those 19 years and older who have or will have weakened immune systems or those 50 years and older     Health Maintenance Due:      Topic Date Due   • DXA SCAN  10/28/2023   • Breast Cancer Screening: Mammogram  12/12/2024   • Colorectal Cancer Screening  07/19/2029   • Hepatitis C Screening  Completed     Immunizations Due:      Topic Date Due   • COVID-19 Vaccine (6 - Moderna series) 02/22/2023   • Influenza Vaccine (1) 09/01/2023     Advance Directives   What are advance directives?   Advance directives are legal documents that state your wishes and plans for medical care. These plans are made ahead of time in case you lose your ability to make decisions for yourself. Advance directives can apply to any medical decision, such as the treatments you want, and if you want to donate organs. What are the types of advance directives? There are many types of advance directives, and each state has rules about how to use them. You may choose a combination of any of the following:  Living will: This is a written record of the treatment you want. You can also choose which treatments you do not want, which to limit, and which to stop at a certain time. This includes surgery, medicine, IV fluid, and tube feedings. Durable power of  for [facility]): This is a written record that states who you want to make healthcare choices for you when you are unable to make them for yourself. This person, called a proxy, is usually a family member or a friend. You may choose more than 1 proxy. Do not resuscitate (DNR) order:  A DNR order is used in case your heart stops beating or you stop breathing. It is a request not to have certain forms of treatment, such as CPR. A DNR order may be included in other types of advance directives. Medical directive: This covers the care that you want if you are in a coma, near death, or unable to make decisions for yourself. You can list the treatments you want for each condition. Treatment may include pain medicine, surgery, blood transfusions, dialysis, IV or tube feedings, and a ventilator (breathing machine). Values history: This document has questions about your views, beliefs, and how you feel and think about life. This information can help others choose the care that you would choose. Why are advance directives important? An advance directive helps you control your care. Although spoken wishes may be used, it is better to have your wishes written down.  Spoken wishes can be misunderstood, or not followed. Treatments may be given even if you do not want them. An advance directive may make it easier for your family to make difficult choices about your care. Fall Prevention    Fall prevention  includes ways to make your home and other areas safer. It also includes ways you can move more carefully to prevent a fall. Health conditions that cause changes in your blood pressure, vision, or muscle strength and coordination may increase your risk for falls. Medicines may also increase your risk for falls if they make you dizzy, weak, or sleepy. Fall prevention tips:   Stand or sit up slowly. Use assistive devices as directed. Wear shoes that fit well and have soles that . Wear a personal alarm. Stay active. Manage your medical conditions. Home Safety Tips:  Add items to prevent falls in the bathroom. Keep paths clear. Install bright lights in your home. Keep items you use often on shelves within reach. Paint or place reflective tape on the edges of your stairs. © Copyright [employer] 2018 Information is for End User's use only and may not be sold, redistributed or otherwise used for commercial purposes.  All illustrations and images included in CareNotes® are the copyrighted property of [employer] Inc. or  [employer]

## 2023-10-27 ENCOUNTER — APPOINTMENT (OUTPATIENT)
Dept: PHYSICAL THERAPY | Facility: CLINIC | Age: 73
End: 2023-10-27
Payer: MEDICARE

## 2023-10-30 ENCOUNTER — OFFICE VISIT (OUTPATIENT)
Dept: PHYSICAL THERAPY | Facility: CLINIC | Age: 73
End: 2023-10-30
Payer: MEDICARE

## 2023-10-30 ENCOUNTER — HOSPITAL ENCOUNTER (OUTPATIENT)
Dept: RADIOLOGY | Facility: MEDICAL CENTER | Age: 73
Discharge: HOME | End: 2023-10-30
Payer: MEDICARE

## 2023-10-30 DIAGNOSIS — M25.512 CHRONIC LEFT SHOULDER PAIN: ICD-10-CM

## 2023-10-30 DIAGNOSIS — G89.29 CHRONIC LEFT SHOULDER PAIN: ICD-10-CM

## 2023-10-30 DIAGNOSIS — M81.0 OSTEOPOROSIS, UNSPECIFIED OSTEOPOROSIS TYPE, UNSPECIFIED PATHOLOGICAL FRACTURE PRESENCE: ICD-10-CM

## 2023-10-30 DIAGNOSIS — M75.102 TEAR OF LEFT SUPRASPINATUS TENDON: Primary | ICD-10-CM

## 2023-10-30 DIAGNOSIS — S42.255D CLOSED NONDISPLACED FRACTURE OF GREATER TUBEROSITY OF LEFT HUMERUS WITH ROUTINE HEALING, SUBSEQUENT ENCOUNTER: ICD-10-CM

## 2023-10-30 PROCEDURE — 97112 NEUROMUSCULAR REEDUCATION: CPT | Performed by: PHYSICAL THERAPIST

## 2023-10-30 PROCEDURE — 97110 THERAPEUTIC EXERCISES: CPT | Performed by: PHYSICAL THERAPIST

## 2023-10-30 PROCEDURE — 77080 DXA BONE DENSITY AXIAL: CPT

## 2023-10-30 PROCEDURE — 97140 MANUAL THERAPY 1/> REGIONS: CPT | Performed by: PHYSICAL THERAPIST

## 2023-10-30 NOTE — PROGRESS NOTES
Daily Note     Today's date: 10/30/2023  Patient name: Scott Lake  : 1950  MRN: [id number]  Referring provider: Cruz Lewis DO  Dx:   Encounter Diagnosis     ICD-10-CM    1. Tear of left supraspinatus tendon  M75.102       2. Chronic left shoulder pain  M25.512     G89.29       3. Closed nondisplaced fracture of greater tuberosity of left humerus with routine healing, subsequent encounter  S42.255D           Start Time: 1215  Stop Time: 1256  Total time in clinic (min): 41 minutes    Subjective: Pt travelled this weekend of a conference. She notes feeling really sore, anteriorly, in her left shoulder this morning. She attributes this to potential weather changes. Objective: See treatment diary below      Assessment: Resumed UBE today. Tolerated treatment fair. Pt continues to have increased tone and moderate sized trigger point in left upper trap. This reduces with STM. Continued treatment to patient's tolerance. Patient exhibited good technique with therapeutic exercises and would benefit from continued PT      Plan: Continue per plan of care.       Precautions: MS, Gait dysfunction, Insomnia, Osteoporosis, Peripheral polyneuropathy, Hx of Breast CA, R IDALIA in - Dr. Imani Bernal, Left femur IM nailing      Manuals 10/25 10/30   9/28 10/9 10/12 10/16 10/19 10/23   PROM of the left shoulder 10' 10'   10' 10' 10' 10' 10' 10'   Left scapular mobilizations             GH post glide in neutral  Gr II+III Gr II+III   Gr II+III  Gr II+III Gr II+III Gr II+III Gr II+III nv   L UT STM 3' 3'   3' 3' 3' 3' 3' 3'   Neuro Re-Ed             Isometrics             Scap retraction              No Money TB             Serratus punch 2x10 c/ cueing 2x10   20x c/ PT assist  2x10 independent  2x10 independent  2x10 independent  2x10 independent   Sidelying shoulder ER             TB Rows 3x10 gtb 2x10 gtb   2x10 gtb  2x10 gtb 2x10 gtb 2x10 gtb 2x10 gtb   TB extension 3x10 rtb 2x10 rtb   rtb 2x10  rtb 2x10 rtb 2x10 2x10 rtb 2x10 rtb   TB IR/ER 2x10 ytb 2x10 ytb    2x10 ytb  C/ PT cueing  2x10 ytb  C/ PT cueing 2x10 ytb  C/ PT cueing 2x10 ytb  C/ PT cueing 2x10 ytb   Ther Ex             Table slides flexion             Table slides abduction             Shoulder ER Stretch      supine c/ cane 10x10"  DC      Supine shoulder flexion with dowel  LUE supporting RUE 1x10       2x5  10x  10x  3x10   Shoulder extension stretch              Pulley 4' 4'           UBE 2'  7'   7' 7' 7' 7' 8' 8'   Shoulder flexion     nv   Wall walks/finger ladder 10x  Wall walks/finger ladder 10x  Wall walks/finger ladder 10x    Shoulder scaption C/ dowel 1x10 Cueing required- patient seated NP   nv        Ther Activity                                       Gait Training                                       Modalities                                         1:1 with PT from 1215-1256pm

## 2023-10-31 NOTE — PROGRESS NOTES
Daily Note     Today's date: 10/31/2023  Patient name: Aron Doss  : 1950  MRN: [id number]  Referring provider: Kaitlyn Hatfield DO  Dx: No diagnosis found. Subjective: ***      Objective: See treatment diary below      Assessment: Tolerated treatment {Tolerated treatment :[id number]}.  Patient {assessment:[id number]}      Plan: {PLAN:[id number]}     Precautions: MS, Gait dysfunction, Insomnia, Osteoporosis, Peripheral polyneuropathy, Hx of Breast CA, R IDALIA in - Dr. Shree Trinidad, Left femur IM nailing      Manuals 10/25 10/30   9/28 10/9 10/12 10/16 10/19 10/23   PROM of the left shoulder 10' 10'   10' 10' 10' 10' 10' 10'   Left scapular mobilizations             GH post glide in neutral  Gr II+III Gr II+III   Gr II+III  Gr II+III Gr II+III Gr II+III Gr II+III nv   L UT STM 3' 3'   3' 3' 3' 3' 3' 3'   Neuro Re-Ed             Isometrics             Scap retraction              No Money TB             Serratus punch 2x10 c/ cueing 2x10   20x c/ PT assist  2x10 independent  2x10 independent  2x10 independent  2x10 independent   Sidelying shoulder ER             TB Rows 3x10 gtb 2x10 gtb   2x10 gtb  2x10 gtb 2x10 gtb 2x10 gtb 2x10 gtb   TB extension 3x10 rtb 2x10 rtb   rtb 2x10  rtb 2x10 rtb 2x10 2x10 rtb 2x10 rtb   TB IR/ER 2x10 ytb 2x10 ytb    2x10 ytb  C/ PT cueing  2x10 ytb  C/ PT cueing 2x10 ytb  C/ PT cueing 2x10 ytb  C/ PT cueing 2x10 ytb   Ther Ex             Table slides flexion             Table slides abduction             Shoulder ER Stretch      supine c/ cane 10x10"  DC      Supine shoulder flexion with dowel  LUE supporting RUE 1x10       2x5  10x  10x  3x10   Shoulder extension stretch              Pulley 4' 4'           UBE 2'  7'   7' 7' 7' 7' 8' 8'   Shoulder flexion     nv   Wall walks/finger ladder 10x  Wall walks/finger ladder 10x  Wall walks/finger ladder 10x    Shoulder scaption C/ dowel 1x10 Cueing required- patient seated NP   nv        Ther Activity Gait Training                                       Modalities                                         1:1 with PT from 1215-1256pm

## 2023-11-01 ENCOUNTER — APPOINTMENT (OUTPATIENT)
Dept: PHYSICAL THERAPY | Facility: CLINIC | Age: 73
End: 2023-11-01
Payer: MEDICARE

## 2023-11-02 ENCOUNTER — APPOINTMENT (OUTPATIENT)
Dept: PHYSICAL THERAPY | Facility: CLINIC | Age: 73
End: 2023-11-02
Payer: MEDICARE

## 2023-11-02 ENCOUNTER — APPOINTMENT (OUTPATIENT)
Dept: LAB | Facility: CLINIC | Age: 73
End: 2023-11-02
Payer: MEDICARE

## 2023-11-02 DIAGNOSIS — E78.5 HYPERLIPIDEMIA, UNSPECIFIED HYPERLIPIDEMIA TYPE: ICD-10-CM

## 2023-11-02 DIAGNOSIS — E03.8 TSH (THYROID-STIMULATING HORMONE DEFICIENCY): ICD-10-CM

## 2023-11-02 LAB
ALBUMIN SERPL BCP-MCNC: 4.3 G/DL (ref 3.5–5)
ALP SERPL-CCNC: 73 U/L (ref 34–104)
ALT SERPL W P-5'-P-CCNC: 21 U/L (ref 7–52)
ANION GAP SERPL CALCULATED.3IONS-SCNC: 5 MMOL/L
AST SERPL W P-5'-P-CCNC: 16 U/L (ref 13–39)
BILIRUB SERPL-MCNC: 0.56 MG/DL (ref 0.2–1)
BUN SERPL-MCNC: 14 MG/DL (ref 5–25)
CALCIUM SERPL-MCNC: 9.3 MG/DL (ref 8.4–10.2)
CHLORIDE SERPL-SCNC: 108 MMOL/L (ref 96–108)
CHOLEST SERPL-MCNC: 244 MG/DL
CO2 SERPL-SCNC: 29 MMOL/L (ref 21–32)
CREAT SERPL-MCNC: 1.01 MG/DL (ref 0.6–1.3)
GFR SERPL CREATININE-BSD FRML MDRD: 54 ML/MIN/1.73SQ M
GLUCOSE P FAST SERPL-MCNC: 86 MG/DL (ref 65–99)
HDLC SERPL-MCNC: 73 MG/DL
LDLC SERPL CALC-MCNC: 151 MG/DL (ref 0–100)
POTASSIUM SERPL-SCNC: 3.7 MMOL/L (ref 3.5–5.3)
PROT SERPL-MCNC: 6.9 G/DL (ref 6.4–8.4)
SODIUM SERPL-SCNC: 142 MMOL/L (ref 135–147)
TRIGL SERPL-MCNC: 99 MG/DL
TSH SERPL DL<=0.05 MIU/L-ACNC: 2.15 UIU/ML (ref 0.45–4.5)

## 2023-11-02 PROCEDURE — 80061 LIPID PANEL: CPT

## 2023-11-02 PROCEDURE — 84443 ASSAY THYROID STIM HORMONE: CPT

## 2023-11-02 PROCEDURE — 80053 COMPREHEN METABOLIC PANEL: CPT

## 2023-11-06 ENCOUNTER — OFFICE VISIT (OUTPATIENT)
Dept: PHYSICAL THERAPY | Facility: CLINIC | Age: 73
End: 2023-11-06
Payer: MEDICARE

## 2023-11-06 DIAGNOSIS — M25.512 CHRONIC LEFT SHOULDER PAIN: ICD-10-CM

## 2023-11-06 DIAGNOSIS — S42.255D CLOSED NONDISPLACED FRACTURE OF GREATER TUBEROSITY OF LEFT HUMERUS WITH ROUTINE HEALING, SUBSEQUENT ENCOUNTER: ICD-10-CM

## 2023-11-06 DIAGNOSIS — M75.102 TEAR OF LEFT SUPRASPINATUS TENDON: Primary | ICD-10-CM

## 2023-11-06 DIAGNOSIS — G89.29 CHRONIC LEFT SHOULDER PAIN: ICD-10-CM

## 2023-11-06 PROCEDURE — 97112 NEUROMUSCULAR REEDUCATION: CPT | Performed by: PHYSICAL THERAPIST

## 2023-11-06 PROCEDURE — 97140 MANUAL THERAPY 1/> REGIONS: CPT | Performed by: PHYSICAL THERAPIST

## 2023-11-06 PROCEDURE — 97110 THERAPEUTIC EXERCISES: CPT | Performed by: PHYSICAL THERAPIST

## 2023-11-06 NOTE — PROGRESS NOTES
Daily Note     Today's date: 2023  Patient name: David Santo  : 1950  MRN: [id number]  Referring provider: Aretha Gutierrez DO  Dx:   Encounter Diagnosis     ICD-10-CM    1. Tear of left supraspinatus tendon  M75.102       2. Chronic left shoulder pain  M25.512     G89.29       3. Closed nondisplaced fracture of greater tuberosity of left humerus with routine healing, subsequent encounter  S42.519D                      Subjective: Pt reports feeling improved shoulder symptoms. Objective: See treatment diary below      Assessment: Less discomfort noted with GH joint mobilizations. Implemented chest press in supine position and seated shoulder flexion. Tolerated treatment well. Patient exhibited good technique with therapeutic exercises and would benefit from continued PT. Plan: Continue per plan of care.       Precautions: MS, Gait dysfunction, Insomnia, Osteoporosis, Peripheral polyneuropathy, Hx of Breast CA, R IDALIA in - Dr. Luma Dave, Left femur IM nailing      Manuals 10/25 10/30 11/6      10/12 10/16 10/19 10/23   PROM of the left shoulder 10' 10' 10'    10' 10' 10' 10'   Left scapular mobilizations             GH post glide in neutral  Gr II+III Gr II+III Gr II+III    Gr II+III Gr II+III Gr II+III nv   L UT STM 3' 3' 3'    3' 3' 3' 3'   Neuro Re-Ed             Isometrics             Scap retraction              No Money TB             Serratus punch 2x10 c/ cueing 2x10 2x10    2x10 independent  2x10 independent  2x10 independent  2x10 independent   Sidelying shoulder ER             TB Rows 3x10 gtb 2x10 gtb 3x10 gtb    2x10 gtb 2x10 gtb 2x10 gtb 2x10 gtb   TB extension 3x10 rtb 2x10 rtb 3x10 rtb    rtb 2x10 rtb 2x10 2x10 rtb 2x10 rtb   TB IR/ER 2x10 ytb 2x10 ytb  2x10 ytb    2x10 ytb  C/ PT cueing 2x10 ytb  C/ PT cueing 2x10 ytb  C/ PT cueing 2x10 ytb   Supine shoulder press   2x10 c/ cane           Ther Ex             Table slides flexion             Table slides abduction Shoulder ER Stretch        DC      Supine shoulder flexion with dowel  LUE supporting RUE 1x10       2x5  10x  10x  3x10   Shoulder extension stretch              Pulley 4' 4'           UBE 2'  7' 7'    7' 7' 8' 8'   Shoulder flexion        Wall walks/finger ladder 10x  Wall walks/finger ladder 10x  Wall walks/finger ladder 10x    Shoulder scaption C/ dowel 1x10 Cueing required- patient seated NP           Shoulder flexion   1# 2x10           Ther Activity                                       Gait Training                                       Modalities                                         1:1 with PT from [id number]

## 2023-11-07 ENCOUNTER — OFFICE VISIT (OUTPATIENT)
Dept: NEUROLOGY | Facility: CLINIC | Age: 73
End: 2023-11-07
Payer: MEDICARE

## 2023-11-07 VITALS
BODY MASS INDEX: 24.41 KG/M2 | WEIGHT: 143 LBS | HEIGHT: 64 IN | DIASTOLIC BLOOD PRESSURE: 80 MMHG | SYSTOLIC BLOOD PRESSURE: 132 MMHG | HEART RATE: 70 BPM

## 2023-11-07 DIAGNOSIS — R26.9 GAIT DISTURBANCE: ICD-10-CM

## 2023-11-07 DIAGNOSIS — Z85.3 HX OF BREAST CANCER: ICD-10-CM

## 2023-11-07 DIAGNOSIS — R25.2 SPASTICITY: ICD-10-CM

## 2023-11-07 DIAGNOSIS — G35 MULTIPLE SCLEROSIS (HCC): Primary | ICD-10-CM

## 2023-11-07 DIAGNOSIS — G35 MULTIPLE SCLEROSIS (HCC): ICD-10-CM

## 2023-11-07 PROCEDURE — 99215 OFFICE O/P EST HI 40 MIN: CPT | Performed by: PSYCHIATRY & NEUROLOGY

## 2023-11-07 RX ORDER — GABAPENTIN 600 MG/1
TABLET ORAL
Qty: 360 TABLET | Refills: 3 | Status: SHIPPED | OUTPATIENT
Start: 2023-11-07

## 2023-11-07 NOTE — ASSESSMENT & PLAN NOTE
Patient's MS is stable. She has been fine off Copaxone. She would like to further reduce neurontin dose. She forgot to decrease Ampyra from BID to daily, so this has been discussed at this appointment in regards to kidney function. Dysphagia does not appear to match an MS deficit.  Patient is open to PT for legs given increased tone and stiffness impacting gait.     -Neurontin 600 mg tablets: 1 morning, 1 afternoon, 2 at night  -Decrease Ampyra to once daily  -CMP in one month to assess creatinine clearance after decreasing Ampyra before nephrology appointment in December  -VBS to determine is patient requires ENT or GI referral for dysphagia concerns  -PT referral for lower extremities   -Follow up in 6 months

## 2023-11-07 NOTE — PROGRESS NOTES
Patient ID: Chau Morton is a 75 y.o. female. Assessment/Plan:    Multiple sclerosis ([address])  Patient's MS is stable. She has been fine off Copaxone. She would like to further reduce neurontin dose. She forgot to decrease Ampyra from BID to daily, so this has been discussed at this appointment in regards to kidney function. Dysphagia does not appear to match an MS deficit. Patient is open to PT for legs given increased tone and stiffness impacting gait.     -Neurontin 600 mg tablets: 1 morning, 1 afternoon, 2 at night  -Decrease Ampyra to once daily  -CMP in one month to assess creatinine clearance after decreasing Ampyra before nephrology appointment in December  -VBS to determine is patient requires ENT or GI referral for dysphagia concerns  -PT referral for lower extremities   -Follow up in 6 months       Diagnoses and all orders for this visit:    Multiple sclerosis ([address])    Hx of breast cancer  -     Ambulatory Referral to Neurology           Subjective:    74 yo female here for routine follow up for multiple sclerosis. Per patient, she has been better since stopping copaxone with reduced fatigue. She is doing well with the neurontin reduction to 600 am, 900 pm, 1500 bedtime doses. Patient had a fall in June with left should fracture and rotator cuff tear requiring PT, which was on hold for surgery in August to correct mastectomy site. She reports a prolonged 2 month recovery with abdominal discomfort. She has since continued PT and reports improvement. Jose Carlos Lucas No MS flares reported. She endorses fatigue as her  fell 3 times this month so she is doing everything. She reports concern with sold food feeling stuck in esophagus but not with liquids. She endorses occasional hoarse voice. Overall, MS is stable.     Bloodwork:  TSH, CBC, and CMP unremarkable  Lipid panel elevated total cholesterol and LDL    MRI Cervical Spine wo contrast 6/23/2023:  Stable demyelinating lesions at C2, C3 and upper thoracic cord.  Additional questionable demyelinating lesions versus artifact from C4-C7. Last visit 6/6/2023:  Patient is a 75year old female presenting for a follow up of her MS. Patient stated that she has had 2 falls since her last visit. Patient stated that in February her legs collapsed due to being weak from [facility]. No injuries noted from that fall. Patient was not treated for COVID with steroids. Patient fell again on April 27th while out eating with friends. Patient stated that she used her left arm to try to catch her fall which caused it to hyperextend. Patient stated that she went to [facility] and they did an xray which showed no broken bones. She stated that she has had continuous pain with her left arm. Patient stated that she is going to see ortho on Thursday her her arm. Patient denied any changes with bowel or bladder. Patient denied any vision changes and just had cataract surgery around the beginning of the year. Patient admitted to having increased fatigue since the Covid. Pt here today for neuro followup. Pt with more acute issues since last visit. Pt with 2 falls to date. One related to chanelle lower ext weakness around time of covid. Pt had a more prolonged recovery from covid ie 2 weeks. Pt more recently ie 6 weeks ago , fall on stone steps while left arm hyperextended holding onto chair with bruising of upper arm and pain. Pt notes brusing around biceps tendon but still pain per pt 24/7 even after 6 weeks. Pt given ortho referral  and rec follow up with pcp. Pt remains on copaxone as imd med. Last labs in April lfts ok, dec gfr of 50. Pt notes she was hydrated. Labs from pcp. Rev pcp notes. Will dec ampyra to once daily and also dec gabapentin by 600 mg ie to total of 3000mg daily to see if help with renal fx. Pt in agreement with plan and also rec to follow up with pcp for any other suggestions re renal labs. Pt exam remains stable. Pt to cont with copaxone.   Follow up with ortho. Eval for PT per ortho recommendations. Pt to have updated mri c spine due to some upper neck pain related to fall and follow up MS cord status. Rx in emr. Pt needs pre med closer to time of study. Pt has used ativan in past.          The following portions of the patient's history were reviewed and updated as appropriate: allergies, current medications, past family history, past medical history, past social history, past surgical history, and problem list.         Objective:    Blood pressure 132/80, pulse 70, height 5' 4" (1.626 m), weight 64.9 kg (143 lb). Physical Exam  Constitutional:       Appearance: She is not ill-appearing. HENT:      Head: Normocephalic and atraumatic. Pulmonary:      Effort: Pulmonary effort is normal.   Musculoskeletal:      Right lower leg: No edema. Left lower leg: No edema. Skin:     Coloration: Skin is not jaundiced or pale. Neurological:      Mental Status: She is alert. Deep Tendon Reflexes:      Reflex Scores:       Bicep reflexes are 1+ on the right side and 1+ on the left side. Brachioradialis reflexes are 1+ on the right side and 1+ on the left side. Patellar reflexes are 1+ on the right side and 1+ on the left side. Psychiatric:         Speech: Speech normal.         Neurological Exam  Mental Status  Alert. Oriented to person, place and time. Speech is normal. Language is fluent with no aphasia. Cranial Nerves  CN VII: Full and symmetric facial movement. CN VIII: Hearing is normal.    Motor   Increased muscle tone. Increased in lower extremities. Upper extremity bilaterally 5/5  Lower extremity R 4/5 and L 5/5.     Reflexes                                            Right                      Left  Brachioradialis                    1+                         1+  Biceps                                 1+                         1+  Patellar                                1+                         1+    Coordination  Right: Finger-to-nose normal. Rapid alternating movement normal.Left: Finger-to-nose normal. Rapid alternating movement normal.        ROS:    Review of Systems   Constitutional:  Positive for fatigue. Negative for appetite change and fever. HENT:  Positive for trouble swallowing and voice change. Negative for hearing loss and tinnitus. Eyes: Negative. Negative for photophobia, pain and visual disturbance. Respiratory: Negative. Negative for shortness of breath. Cardiovascular: Negative. Negative for palpitations. Gastrointestinal: Negative. Negative for nausea and vomiting. Endocrine: Negative. Negative for cold intolerance. Genitourinary: Negative. Negative for dysuria, frequency and urgency. Musculoskeletal:  Positive for gait problem (uses cane). Negative for back pain, myalgias and neck pain. Skin: Negative. Negative for rash. Allergic/Immunologic: Negative. Neurological:  Positive for weakness (legs uses cane), light-headedness (intermittently) and numbness (pt has neuropathy for 15 years bilateral  both hands and feet). Negative for dizziness, tremors, seizures, syncope, facial asymmetry, speech difficulty and headaches. Hematological: Negative. Does not bruise/bleed easily. Psychiatric/Behavioral: Negative. Negative for confusion, hallucinations and sleep disturbance.

## 2023-11-08 ENCOUNTER — OFFICE VISIT (OUTPATIENT)
Dept: PHYSICAL THERAPY | Facility: CLINIC | Age: 73
End: 2023-11-08
Payer: MEDICARE

## 2023-11-08 DIAGNOSIS — M25.512 CHRONIC LEFT SHOULDER PAIN: ICD-10-CM

## 2023-11-08 DIAGNOSIS — M75.102 TEAR OF LEFT SUPRASPINATUS TENDON: Primary | ICD-10-CM

## 2023-11-08 DIAGNOSIS — S42.255D CLOSED NONDISPLACED FRACTURE OF GREATER TUBEROSITY OF LEFT HUMERUS WITH ROUTINE HEALING, SUBSEQUENT ENCOUNTER: ICD-10-CM

## 2023-11-08 DIAGNOSIS — G89.29 CHRONIC LEFT SHOULDER PAIN: ICD-10-CM

## 2023-11-08 PROCEDURE — 97140 MANUAL THERAPY 1/> REGIONS: CPT | Performed by: PHYSICAL THERAPIST

## 2023-11-08 PROCEDURE — 97110 THERAPEUTIC EXERCISES: CPT | Performed by: PHYSICAL THERAPIST

## 2023-11-08 PROCEDURE — 97112 NEUROMUSCULAR REEDUCATION: CPT | Performed by: PHYSICAL THERAPIST

## 2023-11-08 NOTE — PROGRESS NOTES
Daily Note     Today's date: 2023  Patient name: Libby Han  : 1950  MRN: [id number]  Referring provider: Maxwell Epps DO  Dx:   Encounter Diagnosis     ICD-10-CM    1. Tear of left supraspinatus tendon  M75.102       2. Closed nondisplaced fracture of greater tuberosity of left humerus with routine healing, subsequent encounter  S42.255D       3. Chronic left shoulder pain  M25.512     G89.29           Start Time: 0900  Stop Time: 945  Total time in clinic (min): 45 minutes    Subjective: Pt reports getting a script for LE weakness due to MS. She would like to continue with shoulder exercises. Pt notes that certain medications were reduce at her last visit with Dr. Katherin Martinez. Objective: See treatment diary below      Assessment: Tolerated treatment well. Mild discomfort toward end range shoulder motion. Progressed shoulder flexion to 2# for 1 set of 10. Patient demonstrated fatigue post treatment and would benefit from continued PT      Plan: Continue per plan of care.       Precautions: MS, Gait dysfunction, Insomnia, Osteoporosis, Peripheral polyneuropathy, Hx of Breast CA, R IDALIA in - Dr. Rasheed Booker, Left femur IM nailing      Manuals 10/25 10/30 11/6   11/8   10/12 10/16 10/19 10/23   PROM of the left shoulder 10' 10' 10' 10'   10' 10' 10' 10'   Left scapular mobilizations             GH post glide in neutral  Gr II+III Gr II+III Gr II+III Gr III   Gr II+III Gr II+III Gr II+III nv   L UT STM 3' 3' 3' 3'   3' 3' 3' 3'   Neuro Re-Ed             Isometrics             Scap retraction              No Money TB             Serratus punch 2x10 c/ cueing 2x10 2x10 2x10 1#   2x10 independent  2x10 independent  2x10 independent  2x10 independent   Sidelying shoulder ER             TB Rows 3x10 gtb 2x10 gtb 3x10 gtb 3x10 gtb   2x10 gtb 2x10 gtb 2x10 gtb 2x10 gtb   TB extension 3x10 rtb 2x10 rtb 3x10 rtb 3x10 rtb   rtb 2x10 rtb 2x10 2x10 rtb 2x10 rtb   TB IR/ER 2x10 ytb 2x10 ytb  2x10 ytb 2x10 ytb   2x10 ytb  C/ PT cueing 2x10 ytb  C/ PT cueing 2x10 ytb  C/ PT cueing 2x10 ytb   Supine shoulder press   2x10 c/ cane  2x10 c/ cane          Ther Ex             Table slides flexion             Table slides abduction             Shoulder ER Stretch        DC      Supine shoulder flexion with dowel  LUE supporting RUE 1x10       2x5  10x  10x  3x10   Shoulder extension stretch              Pulley 4' 4'           UBE 2'  7' 7' 8'   7' 7' 8' 8'   Shoulder flexion        Wall walks/finger ladder 10x  Wall walks/finger ladder 10x  Wall walks/finger ladder 10x    Shoulder scaption C/ dowel 1x10 Cueing required- patient seated NP           Shoulder flexion   1# 2x10  2# 1x10, 1#1x10          Ther Activity                                       Gait Training                                       Modalities                                         1:1 with PT from 914m

## 2023-11-09 NOTE — PROGRESS NOTES
[id number]    Today's date: 2023  Patient name: Roger Bennett  : 1950  MRN: [id number]  Referring provider: Mandi Manjarrez DO  Dx:   Encounter Diagnosis     ICD-10-CM    1. Tear of left supraspinatus tendon  M75.102       2. Closed nondisplaced fracture of greater tuberosity of left humerus with routine healing, subsequent encounter  S42.255D       3. Chronic left shoulder pain  M25.512     G89.29                    Assessment  Assessment details: Roger Bennett is a 75 y.o. female who presents with signs and symptoms consistent of persistent left shoulder pain after a fall at the end of April which resulted in a greater tuberosity fracture and partial thickness RTC tear. Pt is progressing well with PT. We have initiated UE strengthening with good tolerance. ROM of the shoulder and glenohumeral joint mobility are both improving. Reaching and overhead activities continue to improve. However, pt continues to present with superior migration of the humeral head with overhead motions noting RTC insufficiency. Pt continues to have mobility restrictions at involved shoulder and weakness at involved RTC. Pt received a script from her neurologist to initiate PT for her MS due to weakness and balance deficits. Patient would benefit from skilled physical therapy to address the impairments, improve their level of function, and to improve their overall quality of life.     Primary movements impairments:   1) Decreased RTC motor control/weakness-Improving  2) Decreased shoulder ROM-Improving  3) Decreased scapular mobility-Improving    Impairments: abnormal gait, abnormal muscle firing, abnormal muscle tone, abnormal or restricted ROM, activity intolerance, impaired balance, impaired physical strength, lacks appropriate home exercise program, pain with function and poor posture   Understanding of Dx/Px/POC: good   Prognosis: good    Goals  Short Term Goals: to be achieved by 4 weeks  1) Patient to be independent with basic HEP.-MET  2) Decrease pain to 2/10 at its worst.-Partially met  3) Increase shoulder ROM by 5-10 degrees in all planes-Partially met  4) Increase shoulder strength by 1/2 MMT grade in all deficient planes. Long Term Goals: to be achieved by discharge  1) FOTO equal to or greater than expected. 2) Patient to be independent with comprehensive HEP. 3) Patient will demonstrate maximal over head reaching  4) Increase UE strength to 5/5 MMT grade in all planes to improve a/iadls. 5) Patient to report no sleep interruption secondary to pain. Plan  Patient would benefit from: PT eval and skilled physical therapy  Planned modality interventions: low level laser therapy  Planned therapy interventions: joint mobilization, muscle pump exercises, neuromuscular re-education, patient education, therapeutic activities, therapeutic exercise and home exercise program  Frequency: 2x per week for 4-6 weeks. Treatment plan discussed with: patient        Subjective Evaluation    History of Present Illness  Mechanism of injury: History of Current Injury: Pt is s/p nondisplaced greater tuberosity fracture of the left shoulder with partial thickness (50%) RTC tear on 4/27/23 after a fall. Pt has MS with polyneuropathy. Patient also fell again in June. Pt saw Dr. Jerilyn Rebollar and Isa Zuniga who recommended PT for left shoulder pain. Pt did have MRA and XR of shoulder. Pt is currently ambulating with a SPC. Pt does admit to stiffness at involved shoulder. She is not taking any pain medication secondary to her kidney disease. Pt also would like to incorporate balance into plan of care due to her 2 falls since April. Pain location/Descriptors: Anterior left shoulder which radiates into the biceps tendon. Pt denies numbness and tingling. Aggravating factors: Reaching, lifting, lying on shoulder, over head activities, bathing, dressing, shoulder abduction.    Easing factors: rest. Pt was using Voltaren and Ice but has not used it. 24 HR pattern: Movement dependent. Imaging: MRI and XR. Pt also has an MRI of C/S in   Special Questions: Denies numbness/tingling  Patient goals:  Improve ROM of shoulder  Occupation: Retired. Volunteers    Patient Goals  Patient goals for therapy: increased strength, decreased pain, improved balance, increased motion and independence with ADLs/IADLs    Pain  Pain scale: mild. At worst pain ratin-3    Treatments  No previous or current treatments        Objective     Active Range of Motion   Left Shoulder   Flexion: 120 degrees   Extension: 86 degrees   Abduction: 90 degrees   External rotation 0°: 65 degrees   Internal rotation 0°: Left shoulder active internal rotation at 0 degrees: stomach. Additional Active Range of Motion Details  Poor eccentric control with lowering arm to neutral    Passive Range of Motion   Left Shoulder   Flexion: 165 degrees   Abduction: 170 degrees   External rotation 45°: 70 degrees   Internal rotation 45°: 90 degrees     Scapular Mobility   Left Shoulder   Scapular mobility: poor    Strength/Myotome Testing     Left Shoulder     Planes of Motion   Left shoulder forward flexion strength: 4-   Left shoulder abduction strength: 4-  External rotation at 0°: 4+  Internal rotation at 0°: 4+    Isolated Muscles   Biceps: 4+  Triceps: 4     MMT:  Left shoulder:  Flexion: 4-  ABD: 4-  ER: 4+  IR: 4+    Tests     Additional Tests Details  *Decreased scapular mobility in left   *Rib hump noted on Left secondary to scoliosis   *Increased tone in left UT (significant).           Precautions: MS, Gait dysfunction, Insomnia, Osteoporosis, Peripheral polyneuropathy, Hx of Breast CA, R IDALIA in - Dr. Jody Torres, Left femur IM nailing      Manuals 10/25 10/30 11/6   11/8 11/13    10/19 10/23   PROM of the left shoulder 10' 10' 10' 10' 10'     10' 10'   Left scapular mobilizations             GH post glide in neutral  Gr II+III Gr II+III Gr II+III Gr III Gr III    Gr II+III nv   L UT STM 3' 3' 3' 3' 3'    3' 3'   Neuro Re-Ed             Isometrics             Scap retraction              No Money TB             Serratus punch 2x10 c/ cueing 2x10 2x10 2x10 1#     2x10 independent  2x10 independent   Sidelying shoulder ER             TB Rows 3x10 gtb 2x10 gtb 3x10 gtb 3x10 gtb     2x10 gtb 2x10 gtb   TB extension 3x10 rtb 2x10 rtb 3x10 rtb 3x10 rtb     2x10 rtb 2x10 rtb   TB IR/ER 2x10 ytb 2x10 ytb  2x10 ytb 2x10 ytb     2x10 ytb  C/ PT cueing 2x10 ytb   Supine shoulder press   2x10 c/ cane  2x10 c/ cane  2x10 3# with cane         Ther Ex             Table slides flexion             Table slides abduction             Shoulder ER Stretch              Supine shoulder flexion with dowel  LUE supporting RUE 1x10         10x  3x10   Shoulder extension stretch              Pulley 4' 4'           UBE 2'  7' 7' 8'     8' 8'   Shoulder flexion         Wall walks/finger ladder 10x  Wall walks/finger ladder 10x    Shoulder scaption C/ dowel 1x10 Cueing required- patient seated NP           Shoulder abduction     1x10 1# elbow flexed, 1x10 1# elbow straight        Shoulder flexion   1# 2x10  2# 1x10, 1#1x10  2x10 2#        Ther Activity                                       Gait Training                                       Modalities                                         1:1 with PT from [phone]  Pt was re-evaluated x 25 minutes

## 2023-11-13 ENCOUNTER — EVALUATION (OUTPATIENT)
Dept: PHYSICAL THERAPY | Facility: CLINIC | Age: 73
End: 2023-11-13
Payer: MEDICARE

## 2023-11-13 ENCOUNTER — TELEPHONE (OUTPATIENT)
Dept: NEUROLOGY | Facility: CLINIC | Age: 73
End: 2023-11-13

## 2023-11-13 DIAGNOSIS — S42.255D CLOSED NONDISPLACED FRACTURE OF GREATER TUBEROSITY OF LEFT HUMERUS WITH ROUTINE HEALING, SUBSEQUENT ENCOUNTER: ICD-10-CM

## 2023-11-13 DIAGNOSIS — M25.512 CHRONIC LEFT SHOULDER PAIN: ICD-10-CM

## 2023-11-13 DIAGNOSIS — G89.29 CHRONIC LEFT SHOULDER PAIN: ICD-10-CM

## 2023-11-13 DIAGNOSIS — M75.102 TEAR OF LEFT SUPRASPINATUS TENDON: Primary | ICD-10-CM

## 2023-11-13 PROCEDURE — 97140 MANUAL THERAPY 1/> REGIONS: CPT | Performed by: PHYSICAL THERAPIST

## 2023-11-13 PROCEDURE — 97112 NEUROMUSCULAR REEDUCATION: CPT | Performed by: PHYSICAL THERAPIST

## 2023-11-13 PROCEDURE — 97110 THERAPEUTIC EXERCISES: CPT | Performed by: PHYSICAL THERAPIST

## 2023-11-13 NOTE — TELEPHONE ENCOUNTER
Spoke w/pt. Advised her of results and recommendations below. Pt verbalized understanding. Per patient PCP is aware of gfr.

## 2023-11-13 NOTE — TELEPHONE ENCOUNTER
----- Message from Avel Marcos MD sent at 11/2/2023  3:03 PM EDT -----  Let pt know gfr 54 better than one month ago. Rec sending labs to pcp for follow up on renal function.   Lfts good

## 2023-11-14 ENCOUNTER — TELEPHONE (OUTPATIENT)
Dept: NEUROLOGY | Facility: CLINIC | Age: 73
End: 2023-11-14

## 2023-11-14 ENCOUNTER — OFFICE VISIT (OUTPATIENT)
Dept: PLASTIC SURGERY | Facility: CLINIC | Age: 73
End: 2023-11-14
Payer: MEDICARE

## 2023-11-14 DIAGNOSIS — Z42.1 AFTERCARE POSTMASTECTOMY FOR BREAST RECONSTRUCTION: Primary | ICD-10-CM

## 2023-11-14 PROCEDURE — 99214 OFFICE O/P EST MOD 30 MIN: CPT | Performed by: SURGERY

## 2023-11-14 PROCEDURE — 99024 POSTOP FOLLOW-UP VISIT: CPT | Performed by: SURGERY

## 2023-11-14 NOTE — TELEPHONE ENCOUNTER
Let pt know the ampyra is really a med that helps with walking speed.  Typically not a help with spasm or pain.  Should not be affecting sleep or add to pain with removal.  Ok to resume back on the ampyra. It may actually be the lowering of the gabapentin that is causing more issues with sleep and  possibly helping with the pain.  Only reason meds reduced is due to renal function.  I believe pt is seeing renal shortly.   Pt needs to address with pcp and renal to help best improve renal function as we only lowered meds to help her renally.  Ok to go back on the ampyra lew dosing.  Have pt have renal send us their consultation notes.  For mood, strongly recommend pt to follow up with pcp.

## 2023-11-14 NOTE — TELEPHONE ENCOUNTER
Spoke with pt and advised her of Dr. Briceno's response and recommendations. Pt verbalizes understanding.    Pt states that it's not so much the pain but more of not being able to stand up in the morning, not able to control legs in the morning. Says she is so imbalanced until the morning morning dose of Ampyra.    Pt states that her issues with mood stem from her symptoms increasing with MS. Pt says it can be very frightening and frustrating. Pt agreeable to speak follow up with PCP about this.    Pt states that she will request consultation notes be sent to Dr. Briceno after appt with Renal doctor.

## 2023-11-14 NOTE — TELEPHONE ENCOUNTER
November 13, 2023  Jihan Rodrigez   to [facility] (supporting Elidia Briceno MD)         11/13/23 12:33 PM  Dear Dr Briceno   I stopped my Ampyra at hs the evening of my appointment with you. Since then my nights and early mornings have been a living nightmare. I sleep only from 10:30 or 11 pm till between 1am to 2am and wake up suddenly with pain and spasms in my legs from the knees down. I can't fall back to sleep and if I try to get out of bed I cannot stand up due to weakness and instability of my legs. I just sit there and cry every morning. I have a headache that I can't get rid of unless I take something. I don't usually get headaches.   I can't go on like this since I have been having issues with depression before and this is exacerbating the problem. I still have responsibilities here, my  is having back surgery and I just feel overwhelmed.   I couldn't talk to you last week because we were late and I had the other appointment plus we weren't alone.   I can handle titrating down the gabapentin but not the ampyra. With reducing both I'm not sure about the gabapentin affecting me

## 2023-11-14 NOTE — PROGRESS NOTES
Assessment/Plan:    No problem-specific Assessment & Plan notes found for this encounter. There are no diagnoses linked to this encounter. Subjective: Breast reconstruction     Patient ID: Trina Mckeon is a 75 y.o. female. HPI Hank Silva presents for a follow-up visit, having most recently undergone autologous fat grafting to both breasts on August 22, 2023. She is very happy with the results of her recent fat grafting, and pleased with the overall results. She recognizes that much progress has been made since we began caring for her, she is grateful. She points out in the area of the right upper medial breast where there is a palpable firmness, per her account, this is half the size it was a month or 2 ago, it is most likely represents fat necrosis following recent fat grafting, given that it is considerably smaller than when she first noticed it we will continue to observe this clinically. She reports she is scheduled for mammogram in December. I shared with her that if any unusual findings are noted in this area on mammogram a biopsy may be necessary, but I am confident that this is consistent with fat necrosis status post autologous fat grafting. She understands. The following portions of the patient's history were reviewed and updated as appropriate: allergies, current medications, past family history, past medical history, past social history, past surgical history, and problem list.    Review of Systems   Constitutional:  Negative for chills and fever. HENT:  Negative for hearing loss. Eyes:  Negative for discharge and visual disturbance. Respiratory:  Negative for chest tightness and shortness of breath. Cardiovascular:  Negative for chest pain and leg swelling. Gastrointestinal:  Negative for blood in stool, constipation, diarrhea and nausea. Genitourinary:  Negative for dysuria. Musculoskeletal:  Positive for gait problem.         Difficulty with ambulation secondary to MS, often ambulates with cane   Skin:  Negative for rash. Allergic/Immunologic: Negative for immunocompromised state. Neurological:  Negative for seizures and headaches. Hematological:  Does not bruise/bleed easily. Psychiatric/Behavioral:  Negative for dysphoric mood. The patient is not nervous/anxious. Objective: There were no vitals taken for this visit. Physical Exam  Constitutional:       Appearance: Normal appearance. HENT:      Head: Normocephalic. Eyes:      Extraocular Movements: Extraocular movements intact. Pupils: Pupils are equal, round, and reactive to light. Cardiovascular:      Rate and Rhythm: Normal rate. Pulmonary:      Effort: Pulmonary effort is normal.   Abdominal:      Palpations: Abdomen is soft. Musculoskeletal:         General: Normal range of motion. Cervical back: Normal range of motion and neck supple. Skin:     General: Skin is warm. Comments: Bilateral reconstructed breasts with implants in good position, the breasts are soft and supple without evidence of significant capsular contracture, there are no signs of unusual inflammation or infection, palpable subcutaneous nodule at 2:00 right breast consistent with fat necrosis, decreased in size by 50% per patient over past month or 2, see photo in media   Neurological:      Mental Status: She is alert and oriented to person, place, and time.    Psychiatric:         Mood and Affect: Mood normal.

## 2023-11-16 ENCOUNTER — OFFICE VISIT (OUTPATIENT)
Dept: PHYSICAL THERAPY | Facility: CLINIC | Age: 73
End: 2023-11-16
Payer: MEDICARE

## 2023-11-16 DIAGNOSIS — G89.29 CHRONIC LEFT SHOULDER PAIN: ICD-10-CM

## 2023-11-16 DIAGNOSIS — M75.102 TEAR OF LEFT SUPRASPINATUS TENDON: Primary | ICD-10-CM

## 2023-11-16 DIAGNOSIS — M25.512 CHRONIC LEFT SHOULDER PAIN: ICD-10-CM

## 2023-11-16 DIAGNOSIS — S42.255D CLOSED NONDISPLACED FRACTURE OF GREATER TUBEROSITY OF LEFT HUMERUS WITH ROUTINE HEALING, SUBSEQUENT ENCOUNTER: ICD-10-CM

## 2023-11-16 PROCEDURE — 97140 MANUAL THERAPY 1/> REGIONS: CPT | Performed by: PHYSICAL THERAPIST

## 2023-11-16 PROCEDURE — 97112 NEUROMUSCULAR REEDUCATION: CPT | Performed by: PHYSICAL THERAPIST

## 2023-11-16 PROCEDURE — 97110 THERAPEUTIC EXERCISES: CPT | Performed by: PHYSICAL THERAPIST

## 2023-11-16 NOTE — PROGRESS NOTES
Daily Note     Today's date: 2023  Patient name: Mary Grace Carreon  : 1950  MRN: [id number]  Referring provider: Sara Fernandez DO  Dx:   Encounter Diagnosis     ICD-10-CM    1. Tear of left supraspinatus tendon  M75.102       2. Closed nondisplaced fracture of greater tuberosity of left humerus with routine healing, subsequent encounter  S42.255D       3. Chronic left shoulder pain  M25.512     G89.29                      Subjective: Pt reports nearly "pulling" her shoulder again donning her coat. She notes more soreness in left shoulder today. Objective: See treatment diary below      Assessment: Tolerated treatment well. Mild shoulder discomfort with progressive strengthening exercises. Positive benefit with manual stretching and GH joint mobilization. Continued with L shoulder strengthening to tolerance. Patient would benefit from continued PT. Plan: Continue per plan of care.       Precautions: MS, Gait dysfunction, Insomnia, Osteoporosis, Peripheral polyneuropathy, Hx of Breast CA, R IDALIA in - Dr. Jerome Valderrama, Left femur IM nailing      Manuals 10/25 10/30 11/6   11/8 11/13 11/16   10/19 10/23   PROM of the left shoulder 10' 10' 10' 10' 10'  10'   10' 10'   Left scapular mobilizations             GH post glide in neutral  Gr II+III Gr II+III Gr II+III Gr III Gr III Gr III   Gr II+III nv   L UT STM 3' 3' 3' 3' 3' 3'    3' 3'   Neuro Re-Ed             Isometrics             Scap retraction              No Money TB             Serratus punch 2x10 c/ cueing 2x10 2x10 2x10 1#     2x10 independent  2x10 independent   Sidelying shoulder ER             TB Rows 3x10 gtb 2x10 gtb 3x10 gtb 3x10 gtb  3x10 gtb   2x10 gtb 2x10 gtb   TB extension 3x10 rtb 2x10 rtb 3x10 rtb 3x10 rtb  3x10 rtb   2x10 rtb 2x10 rtb   TB IR/ER 2x10 ytb 2x10 ytb  2x10 ytb 2x10 ytb  2x10 ytb   2x10 ytb  C/ PT cueing 2x10 ytb   Supine shoulder press   2x10 c/ cane  2x10 c/ cane  2x10 3# with cane  3x10 3# with cane Ther Ex             Table slides flexion             Table slides abduction             Shoulder ER Stretch              Supine shoulder flexion with dowel  LUE supporting RUE 1x10         10x  3x10   Shoulder extension stretch              Pulley 4' 4'           UBE 2'  7' 7' 8'  7.5'   8' 8'   Shoulder flexion         Wall walks/finger ladder 10x  Wall walks/finger ladder 10x    Shoulder scaption C/ dowel 1x10 Cueing required- patient seated NP           Shoulder abduction     1x10 1# elbow flexed, 1x10 1# elbow straight 2x10 1#       Shoulder flexion   1# 2x10  2# 1x10, 1#1x10  2x10 2# 2x10 2#       Ther Activity             Shoulder overhead press      1x10 1#                    Gait Training                                       Modalities                                         1:1 with PT from [id number] Ears: no ear pain and no hearing problems. Nose: no nasal congestion and no nasal drainage. Mouth/Throat: no dysphagia, no hoarseness and no throat pain. Neck: no lumps, no pain, no stiffness and no swollen glands.

## 2023-11-20 ENCOUNTER — OFFICE VISIT (OUTPATIENT)
Dept: PHYSICAL THERAPY | Facility: CLINIC | Age: 73
End: 2023-11-20
Payer: MEDICARE

## 2023-11-20 DIAGNOSIS — G89.29 CHRONIC LEFT SHOULDER PAIN: ICD-10-CM

## 2023-11-20 DIAGNOSIS — M25.512 CHRONIC LEFT SHOULDER PAIN: ICD-10-CM

## 2023-11-20 DIAGNOSIS — M75.102 TEAR OF LEFT SUPRASPINATUS TENDON: Primary | ICD-10-CM

## 2023-11-20 DIAGNOSIS — S42.255D CLOSED NONDISPLACED FRACTURE OF GREATER TUBEROSITY OF LEFT HUMERUS WITH ROUTINE HEALING, SUBSEQUENT ENCOUNTER: ICD-10-CM

## 2023-11-20 PROCEDURE — 97112 NEUROMUSCULAR REEDUCATION: CPT | Performed by: PHYSICAL THERAPIST

## 2023-11-20 PROCEDURE — 97110 THERAPEUTIC EXERCISES: CPT | Performed by: PHYSICAL THERAPIST

## 2023-11-20 PROCEDURE — 97140 MANUAL THERAPY 1/> REGIONS: CPT | Performed by: PHYSICAL THERAPIST

## 2023-11-20 NOTE — PROGRESS NOTES
Daily Note     Today's date: 2023  Patient name: Jelly Marshall  : 1950  MRN: [id number]  Referring provider: Nazario Villeda DO  Dx:   Encounter Diagnosis     ICD-10-CM    1. Tear of left supraspinatus tendon  M75.102       2. Closed nondisplaced fracture of greater tuberosity of left humerus with routine healing, subsequent encounter  S42.255D       3. Chronic left shoulder pain  M25.512     G89.29           Start Time: 945  Stop Time:   Total time in clinic (min): 40 minutes    Subjective: Pt notes shoulder doing good. She reports weakness into her LE today but doesn't really know why. Objective: See treatment diary below      Assessment: Tolerated treatment well. Improved end range motion in left shoulder. Continued with progressive resisted exercises with good tolerance. Moderate fatigue present after exercises. Patient exhibited good technique with therapeutic exercises and would benefit from continued PT. Plan: Continue per plan of care.       Precautions: MS, Gait dysfunction, Insomnia, Osteoporosis, Peripheral polyneuropathy, Hx of Breast CA, R IDALIA in - Dr. Gilson Woo, Left femur IM nailing      Manuals 10/25 10/30 11/6   11/8 11/13 11/16 11/20      PROM of the left shoulder 10' 10' 10' 10' 10'  10' 10'      Left scapular mobilizations             GH post glide in neutral  Gr II+III Gr II+III Gr II+III Gr III Gr III Gr III Gr III      L UT STM 3' 3' 3' 3' 3' 3'        Neuro Re-Ed             Isometrics             Scap retraction              No Money TB             Serratus punch 2x10 c/ cueing 2x10 2x10 2x10 1#         Sidelying shoulder ER             TB Rows 3x10 gtb 2x10 gtb 3x10 gtb 3x10 gtb  3x10 gtb 3x10 gtb      TB extension 3x10 rtb 2x10 rtb 3x10 rtb 3x10 rtb  3x10 rtb 3x10 gtb      TB IR/ER 2x10 ytb 2x10 ytb  2x10 ytb 2x10 ytb  2x10 ytb 2x10 ytb      Supine shoulder press   2x10 c/ cane  2x10 c/ cane  2x10 3# with cane  3x10 3# with cane 3x10 3# with cane Ther Ex             Table slides flexion             Table slides abduction             Shoulder ER Stretch                           Supine shoulder flexion with dowel  LUE supporting RUE 1x10             Shoulder extension stretch              Pulley 4' 4'           UBE 2'  7' 7' 8'  7.5' 7.5'      Shoulder flexion             Shoulder scaption C/ dowel 1x10 Cueing required- patient seated NP           Shoulder abduction     1x10 1# elbow flexed, 1x10 1# elbow straight 2x10 1# 2x10 1#      Shoulder flexion   1# 2x10  2# 1x10, 1#1x10  2x10 2# 2x10 2# 2x10 2#      Ther Activity             Shoulder overhead press      1x10 1# 1x10 1#                   Gait Training                                       Modalities                                         1:1 with PT from [phone]

## 2023-11-24 ENCOUNTER — OFFICE VISIT (OUTPATIENT)
Dept: PHYSICAL THERAPY | Facility: CLINIC | Age: 73
End: 2023-11-24
Payer: MEDICARE

## 2023-11-24 DIAGNOSIS — M25.512 CHRONIC LEFT SHOULDER PAIN: ICD-10-CM

## 2023-11-24 DIAGNOSIS — M75.102 TEAR OF LEFT SUPRASPINATUS TENDON: Primary | ICD-10-CM

## 2023-11-24 DIAGNOSIS — G89.29 CHRONIC LEFT SHOULDER PAIN: ICD-10-CM

## 2023-11-24 DIAGNOSIS — S42.255D CLOSED NONDISPLACED FRACTURE OF GREATER TUBEROSITY OF LEFT HUMERUS WITH ROUTINE HEALING, SUBSEQUENT ENCOUNTER: ICD-10-CM

## 2023-11-24 PROCEDURE — 97140 MANUAL THERAPY 1/> REGIONS: CPT | Performed by: PHYSICAL THERAPIST

## 2023-11-24 PROCEDURE — 97112 NEUROMUSCULAR REEDUCATION: CPT | Performed by: PHYSICAL THERAPIST

## 2023-11-24 PROCEDURE — 97110 THERAPEUTIC EXERCISES: CPT | Performed by: PHYSICAL THERAPIST

## 2023-11-24 NOTE — PROGRESS NOTES
Daily Note     Today's date: 2023  Patient name: Chau Morton  : 1950  MRN: [id number]  Referring provider: Nieves Walker DO  Dx:   Encounter Diagnosis     ICD-10-CM    1. Tear of left supraspinatus tendon  M75.102       2. Closed nondisplaced fracture of greater tuberosity of left humerus with routine healing, subsequent encounter  S42.255D       3. Chronic left shoulder pain  M25.512     G89.29                      Subjective: Pt reports progressive improvement in shoulder function and pain. Objective: See treatment diary below      Assessment: PROM nearly full today. She continues to have shoulder fatigue with progressive resisted exercises. Increased shoulder press to 2#. FOTO has improved to 75. Tolerated treatment well. We will initiate treatment on LE next week secondary to recent leg weakness from MS. I will intermittently be working on patient's shoulder strength throughout [address] [place] [address]. Patient exhibited good technique with therapeutic exercises and would benefit from continued PT. Plan: Continue per plan of care.       Precautions: MS, Gait dysfunction, Insomnia, Osteoporosis, Peripheral polyneuropathy, Hx of Breast CA, R IDALIA in - Dr. Kalli Bird, Left femur IM nailing      Manuals 10/25 10/30 11/6   11/8 11/13 11/16 11/20 11/24     PROM of the left shoulder 10' 10' 10' 10' 10'  10' 10' 10'     Left scapular mobilizations             GH post glide in neutral  Gr II+III Gr II+III Gr II+III Gr III Gr III Gr III Gr III Gr III     L UT STM 3' 3' 3' 3' 3' 3'        Neuro Re-Ed             Isometrics             Scap retraction              No Money TB             Serratus punch 2x10 c/ cueing 2x10 2x10 2x10 1#         Sidelying shoulder ER             TB Rows 3x10 gtb 2x10 gtb 3x10 gtb 3x10 gtb  3x10 gtb 3x10 gtb 3x10 gtb     TB extension 3x10 rtb 2x10 rtb 3x10 rtb 3x10 rtb  3x10 rtb 3x10 gtb 3x10 gtb     TB IR/ER 2x10 ytb 2x10 ytb  2x10 ytb 2x10 ytb  2x10 ytb 2x10 ytb 3x10 ytb Supine shoulder press   2x10 c/ cane  2x10 c/ cane  2x10 3# with cane  3x10 3# with cane 3x10 3# with cane      Ther Ex             Table slides flexion             Table slides abduction             Shoulder ER Stretch                           Supine shoulder flexion with dowel  LUE supporting RUE 1x10             Shoulder extension stretch              Pulley 4' 4'           UBE 2'  7' 7' 8'  7.5' 7.5' 7.5'     Shoulder flexion             Shoulder scaption C/ dowel 1x10 Cueing required- patient seated NP           Shoulder abduction     1x10 1# elbow flexed, 1x10 1# elbow straight 2x10 1# 2x10 1# 2x10 1#     Shoulder flexion   1# 2x10  2# 1x10, 1#1x10  2x10 2# 2x10 2# 2x10 2# 2x10 2#     Ther Activity             Shoulder overhead press      1x10 1# 1x10 1# 2x10 2#                  Gait Training                                       Modalities                                         1:1 with PT from [id number]

## 2023-11-26 NOTE — PROGRESS NOTES
PT Evaluation     Today's date: 2023  Patient name: Stephenie Hennessy  : 1950  MRN: [id number]  Referring provider: Naun Lainez MD  Dx:   Encounter Diagnosis     ICD-10-CM    1. Multiple sclerosis ([address])  G35       2. Gait abnormality  R26.9       3. Weakness  R53.1                      Assessment  Assessment details: Stephenie Hennessy is a 75 y.o. female who presents with signs and symptoms consistent of a primary balance impairment and ambulatory dysfunction secondary to MS. Pt has had 3 falls since April and most recently last evening. Pt notes worsening LE pain since weaning from gabapentin. Patient presents with weakness in LE, deconditioning, poor static and dynamic balance. Pt currently ambulating with SPC but should consider utilizing RW due to imbalance and recent fall. Due to these impairments, Patient has difficulty ambulating for distance, performing sit to stand transfers, negotiating stairs, performing household ADLs, and is at increased risk of falls. Patient would benefit from skilled physical therapy to address the impairments, improve their level of function, and to improve their overall quality of life. Impairments: abnormal coordination, abnormal gait, abnormal or restricted ROM, abnormal movement, difficulty understanding, impaired balance, impaired physical strength, lacks appropriate home exercise program and poor posture   Understanding of Dx/Px/POC: good   Prognosis: good    Goals  Short Term Goals: to be achieved by 4 weeks  1) Patient to be independent with basic HEP. 2) Increase LE strength by 1/2 MMT grade in all deficient planes. 3) Improve TUG by 2 seconds  4) Improve 5x sit to stand by 2 seconds    Long Term Goals: to be achieved by discharge  1) FOTO equal to or greater than expected.   2) Ambulation to improve to maximal level of function  3) Stair negotiation will improve to reciprocal.  4) Sit to stand transfers will improve to maximal level of function Plan  Plan details: Initiate treatment for LE weakness and balance. Continue treatment on left shoulder on PRN basis  Patient would benefit from: PT eval and skilled physical therapy  Planned therapy interventions: manual therapy, neuromuscular re-education, patient education, strengthening, therapeutic activities, therapeutic exercise, transfer training, home exercise program and functional ROM exercises  Frequency: 2x per week for 4-6 weeks. Treatment plan discussed with: patient        Subjective Evaluation    History of Present Illness  Mechanism of injury: History of Current Injury: Pt referred to PT from neurology due to LE weakness, balance deficits, and ambulation dysfunction. Pt has MS and recently feels as though her LE weakness is worsening making ADLs and ambulation tolerate difficult. Pt did have a fall last year which resulted in proximal humerus fracture and RTC dysfunction (currently being treated by me). She has made good progress with her shoulder. Pt does report increasing LE pain and weakness. Due to CKD, patient has been weaning from gabapentin and ampyra. Pt currently ambulates with a SPC for safety. She lives at home with her , who will be undergoing spinal surgery in December. Pt has steps down to the basement but has single level living. Pt has 1-2 3" steps to enter her house. Pt admits to falling on her knees last night. She denies any injury but her knees are bothersome. Pt's also fell June and 1 time in April of this year. Pt notes that her leg somewhat gave out on her last night. Special Questions: Positive for peripheral neuropathy  Patient goals:  Reduce unsteadiness, improve LE strength, increase walking and endurance. Hobbies/Interest: Volunteers as , cooking, cleaning.    Occupation: Retired            Objective     Strength/Myotome Testing     Left Hip   Planes of Motion   Flexion: 3  Abduction: 3  Adduction: 3+  External rotation: 3-  Internal rotation: 3+    Right Hip   Planes of Motion   Flexion: 3+  Abduction: 3  Adduction: 3+  External rotation: 3-  Internal rotation: 3+    Left Knee   Flexion: 3  Extension: 3    Right Knee   Flexion: 3  Extension: 3    Left Ankle/Foot   Dorsiflexion: 3-  Plantar flexion: 3  Inversion: 3  Eversion: 3-    Right Ankle/Foot   Dorsiflexion: 3-  Plantar flexion: 3  Inversion: 3  Eversion: 3-    Ambulation     Observational Gait   Gait: antalgic and asymmetric   Decreased walking speed and stride length. Additional Observational Gait Details  SPC: Pt demonstrates slow but steady sylvia with small step length. She has tremendous difficulty turning without UE support. She does have appropriate LE clearance during swing phase. Her stance phase has shortened secondary to no push of at terminal stance. Timed up and go:  Date: 32.09 seconds. Moderate use of bilateral hands on rails for assistance. SPC    Sit to stand Transfers:   Date: Moderate use of bilateral hands on rails for assistance.  Bilateral genu valgum with transfers    5x sit to stand:   Date: 34.67 seconds seconds    2 minute walk test:   Date: NT    MCTSIB:  EO firm: 30 seconds with modate postural sway (Feet apart)   EC firm: 30 seconds with moderate postural sway (Feet apart)  EO foam: 30 seconds with moderate postural sway (Feet apart+ yellow foam)  EC foam: 11 seconds prior to LOB (Feet apart+ yellow foam )    6 minute walk test:   Date: NT                 Precautions: MS, Gait dysfunction, Insomnia, Osteoporosis, Peripheral polyneuropathy, Hx of Breast CA, R IDALIA in 2012- Dr. Arianna Valdez, Left femur IM nailing       Manuals                                                                 Neuro Re-Ed             DLS on foam             Tandem balance             Standing hip abd             Standing hip ext             Standing SLR                                       Ther Ex             Leg press             Hamstring stretching             Calf SB stretching                                                                              Ther Activity             Lateral walks             Mini squats             Step ups             Leeann walks              6 minute walk test             Gait Training                                       Modalities                                          [email or web address]  Access Code: [email or web address]

## 2023-11-27 ENCOUNTER — OFFICE VISIT (OUTPATIENT)
Dept: PHYSICAL THERAPY | Facility: CLINIC | Age: 73
End: 2023-11-27
Payer: MEDICARE

## 2023-11-27 ENCOUNTER — TELEPHONE (OUTPATIENT)
Dept: NEUROLOGY | Facility: CLINIC | Age: 73
End: 2023-11-27

## 2023-11-27 DIAGNOSIS — G35 MULTIPLE SCLEROSIS (HCC): Primary | ICD-10-CM

## 2023-11-27 DIAGNOSIS — R53.1 WEAKNESS: ICD-10-CM

## 2023-11-27 DIAGNOSIS — R26.9 GAIT ABNORMALITY: ICD-10-CM

## 2023-11-27 PROCEDURE — 97162 PT EVAL MOD COMPLEX 30 MIN: CPT | Performed by: PHYSICAL THERAPIST

## 2023-11-27 PROCEDURE — 97110 THERAPEUTIC EXERCISES: CPT | Performed by: PHYSICAL THERAPIST

## 2023-11-27 NOTE — TELEPHONE ENCOUNTER
[facility] calling to clarify orders for speech therapy and barium swallow. Please check order in computer.

## 2023-11-28 ENCOUNTER — HOSPITAL ENCOUNTER (OUTPATIENT)
Dept: RADIOLOGY | Facility: HOSPITAL | Age: 73
Discharge: HOME | End: 2023-11-28
Attending: PSYCHIATRY & NEUROLOGY
Payer: MEDICARE

## 2023-11-28 DIAGNOSIS — R13.10 DYSPHAGIA: ICD-10-CM

## 2023-11-28 DIAGNOSIS — G35 MULTIPLE SCLEROSIS (HCC): Primary | ICD-10-CM

## 2023-11-28 DIAGNOSIS — G35 MULTIPLE SCLEROSIS (HCC): ICD-10-CM

## 2023-11-28 PROCEDURE — 74220 X-RAY XM ESOPHAGUS 1CNTRST: CPT

## 2023-11-28 NOTE — TELEPHONE ENCOUNTER
[facility]  Speech Therapy calling to get clarification on a Barium study    Patient in the office now     Transferred to the [facility] office as Dr. Srinivasa Titus is there today and they need to speak to the provider to clarify the order

## 2023-11-29 ENCOUNTER — APPOINTMENT (OUTPATIENT)
Dept: LAB | Facility: CLINIC | Age: 73
End: 2023-11-29
Payer: MEDICARE

## 2023-11-29 DIAGNOSIS — G35 MULTIPLE SCLEROSIS (HCC): ICD-10-CM

## 2023-11-29 LAB
ALBUMIN SERPL BCP-MCNC: 4.4 G/DL (ref 3.5–5)
ALP SERPL-CCNC: 72 U/L (ref 34–104)
ALT SERPL W P-5'-P-CCNC: 26 U/L (ref 7–52)
ANION GAP SERPL CALCULATED.3IONS-SCNC: 10 MMOL/L
AST SERPL W P-5'-P-CCNC: 19 U/L (ref 13–39)
BILIRUB SERPL-MCNC: 0.58 MG/DL (ref 0.2–1)
BUN SERPL-MCNC: 18 MG/DL (ref 5–25)
CALCIUM SERPL-MCNC: 9.7 MG/DL (ref 8.4–10.2)
CHLORIDE SERPL-SCNC: 106 MMOL/L (ref 96–108)
CO2 SERPL-SCNC: 28 MMOL/L (ref 21–32)
CREAT SERPL-MCNC: 1.11 MG/DL (ref 0.6–1.3)
GFR SERPL CREATININE-BSD FRML MDRD: 49 ML/MIN/1.73SQ M
GLUCOSE P FAST SERPL-MCNC: 89 MG/DL (ref 65–99)
POTASSIUM SERPL-SCNC: 3.8 MMOL/L (ref 3.5–5.3)
PROT SERPL-MCNC: 7.3 G/DL (ref 6.4–8.4)
SODIUM SERPL-SCNC: 144 MMOL/L (ref 135–147)

## 2023-11-29 PROCEDURE — 36415 COLL VENOUS BLD VENIPUNCTURE: CPT

## 2023-11-29 PROCEDURE — 80053 COMPREHEN METABOLIC PANEL: CPT

## 2023-11-29 NOTE — TELEPHONE ENCOUNTER
Called pt's home number, spoke with spouse. He says pt is out getting blood work and should be home within the hour. Will call pt again at later time. ------------------------    Spoke with pt and advised her of Dr. Elias Rebollar review of results and recommendations. Pt verbalizes understanding. Nothing further at this time.

## 2023-11-29 NOTE — TELEPHONE ENCOUNTER
Kamla Garcia MD  11/28/2023  6:30 PM EST       Nursing,let pt know barium swallow non occlusive peristalsis noted with a grade 2 esophageal stasis, otherwise unremarkable. No esophageal dilation. I would recommend pt to follow up with pcp to see about possible referral to gi. I will leave that to pcp to determine.   Ccing Dr Niki Dorantes

## 2023-11-30 ENCOUNTER — OFFICE VISIT (OUTPATIENT)
Dept: PHYSICAL THERAPY | Facility: CLINIC | Age: 73
End: 2023-11-30
Payer: MEDICARE

## 2023-11-30 DIAGNOSIS — G35 MULTIPLE SCLEROSIS (HCC): Primary | ICD-10-CM

## 2023-11-30 DIAGNOSIS — R26.9 GAIT ABNORMALITY: ICD-10-CM

## 2023-11-30 DIAGNOSIS — R53.1 WEAKNESS: ICD-10-CM

## 2023-11-30 PROCEDURE — 97530 THERAPEUTIC ACTIVITIES: CPT | Performed by: PHYSICAL THERAPIST

## 2023-11-30 PROCEDURE — 97112 NEUROMUSCULAR REEDUCATION: CPT | Performed by: PHYSICAL THERAPIST

## 2023-11-30 PROCEDURE — 97110 THERAPEUTIC EXERCISES: CPT | Performed by: PHYSICAL THERAPIST

## 2023-11-30 NOTE — PROGRESS NOTES
Daily Note     Today's date: 2023  Patient name: Bob Pagan  : 1950  MRN: [id number]  Referring provider: Guerrero Whittington DO  Dx:   Encounter Diagnosis     ICD-10-CM    1. Multiple sclerosis ([address])  G35       2. Gait abnormality  R26.9       3. Weakness  R53.1           Start Time: 1690  Stop Time: 1030  Total time in clinic (min): 38 minutes    Subjective: Pt reports less stiffness in foot this morning. Pt reports continued weakness in LE. Pt has swallow study completed this week. Objective: See treatment diary below      Assessment: Implemented POC focusing on primary movement impairments Tolerated treatment fair. Pt was able to stand for 30 minutes completing exercises prior to needing a seated rest break. Appropriate fatigue with prescribed exercises. Patient would benefit from continued PT. Plan: Continue per plan of care. Precautions: MS, Gait dysfunction, Insomnia, Osteoporosis, Peripheral polyneuropathy, Hx of Breast CA, R IDALIA in - Dr. Massie Dancer, Left femur IM nailing       Manuals                                                                 Neuro Re-Ed             DLS on foam Feet together yellow foam 10 shld flex, 10 shld abd, 10x press             Tandem balance             Standing hip abd 3x10            Standing hip ext             Standing SLR                                       Ther Ex             Leg press             Hamstring stretching             Calf SB stretching             Hamstring curl  Standing 30x on R and 16x on L.  Bloster to block hip flexion                                                                Ther Activity             Lateral walks With L shld ER isometric rtb 4x10 feet             Mini squats             Step ups             Leeann walks              6 minute walk test             Standing HR 3x10            Marching 3x10             Gait Training                                       Modalities 1:1 with PT from [phone]

## 2023-12-04 ENCOUNTER — TELEPHONE (OUTPATIENT)
Dept: NEUROLOGY | Facility: CLINIC | Age: 73
End: 2023-12-04

## 2023-12-04 ENCOUNTER — APPOINTMENT (OUTPATIENT)
Dept: PHYSICAL THERAPY | Facility: CLINIC | Age: 73
End: 2023-12-04
Payer: MEDICARE

## 2023-12-04 DIAGNOSIS — G35 MULTIPLE SCLEROSIS (HCC): ICD-10-CM

## 2023-12-04 DIAGNOSIS — R26.9 GAIT DISTURBANCE: ICD-10-CM

## 2023-12-04 NOTE — TELEPHONE ENCOUNTER
----- Message from Lynda Borges MD sent at 11/29/2023  4:47 PM EST -----  Nursing,Let pt know labs ok except for continued lower gfr. Pt with gfr of 49. Pt has been ranging 47 to 54 over past 7 months. Please verify current dosing of gabapentin. Also check if pt is doing bid ampyra. Per notes from 11/14, more gait issues with reduced ampyra dosing. Let pt know I am ccing pcp as well as renal doc in prep for upcoming renal appt on dec 5th.

## 2023-12-04 NOTE — TELEPHONE ENCOUNTER
----- Message from Colleen Galicia MD sent at 11/28/2023  6:30 PM EST -----  Nursing,let pt know barium swallow non occlusive peristalsis noted with a grade 2 esophageal stasis, otherwise unremarkable. No esophageal dilation. I would recommend pt to follow up with pcp to see about possible referral to gi. I will leave that to pcp to determine.   Ccing Dr Santino Polanco

## 2023-12-05 ENCOUNTER — CONSULT (OUTPATIENT)
Dept: NEPHROLOGY | Facility: CLINIC | Age: 73
End: 2023-12-05
Payer: MEDICARE

## 2023-12-05 VITALS
BODY MASS INDEX: 26.36 KG/M2 | WEIGHT: 154.4 LBS | HEIGHT: 64 IN | SYSTOLIC BLOOD PRESSURE: 148 MMHG | DIASTOLIC BLOOD PRESSURE: 82 MMHG

## 2023-12-05 DIAGNOSIS — R94.4 ABNORMAL RENAL FUNCTION TEST: ICD-10-CM

## 2023-12-05 DIAGNOSIS — I10 ESSENTIAL HYPERTENSION: Primary | ICD-10-CM

## 2023-12-05 DIAGNOSIS — N18.31 STAGE 3A CHRONIC KIDNEY DISEASE (HCC): ICD-10-CM

## 2023-12-05 DIAGNOSIS — N20.0 NEPHROLITHIASIS: ICD-10-CM

## 2023-12-05 PROCEDURE — 99204 OFFICE O/P NEW MOD 45 MIN: CPT | Performed by: INTERNAL MEDICINE

## 2023-12-05 NOTE — PATIENT INSTRUCTIONS
1. )  Low 2 g sodium diet    2.)  Monitor weights at home    3.)  Avoid NSAIDs (ibuprofen, Motrin, Advil, Aleve, naproxen)    4.)  Monitor blood pressure at home, call if blood pressure greater than 150/90 persistently    5.) I will plan to discuss all results including blood work, and/or imaging at our next visit, unless there is an urgent indication, in which case I will call you earlier. If you have any questions or concerns about your results, please feel free to call our office.     6.) Check Renal US, repeat blood work at next visit

## 2023-12-05 NOTE — PROGRESS NOTES
NEPHROLOGY OUTPATIENT CONSULTATION   aR Bahena 75 y.o. female MRN: [id number]  Date: 12/5/2023  Reason for consultation:   Chief Complaint   Patient presents with    Follow-up     CKD3       ASSESSMENT and PLAN:    Thank you for the courtesy of this consultation. I had the pleasure of seeing Evelyne Sanchez today in the renal clinic for the initial management of abnormal renal function test with concern of underlying chronic kidney disease. Abnormal renal function test  -- Concern for underlying chronic kidney disease  -- Renal function has been quite stable for over 3 years now  -- Creatinine fluctuates anywhere from 0.8 to 1.2 mg/dL  -- Suspect to have some underlying chronic kidney disease in the setting of age-related nephron loss along with prior episode of acute kidney injury resulting in the stigmata of CKD along with chronic smoking leading to idiopathic nodular sclerosis and possible hypertension  -- Creatinine currently stable at 1.1 mg/dL  -- Check renal ultrasound  -- Check Cystatin C with EGFR  -- Screen for microalbuminuria    Hypertension  -- Blood pressure currently controlled at home without any antihypertensive agents. Patient reporting blood pressure ranges around 809 systolic at home  -- Not at target in the clinic today  -- Former smoker  -- Not on any medications. Had been on lisinopril for about 1 year but was reporting dizziness at home.     Former smoker  -- 1 pack/day for 20+ years    Multiple sclerosis    History of breast cancer  -- Status post left mastectomy, need to check blood pressure in the right arm  -- No chemotherapy      PATIENT INSTRUCTIONS:    Patient Instructions   1.)  Low 2 g sodium diet    2.)  Monitor weights at home    3.)  Avoid NSAIDs (ibuprofen, Motrin, Advil, Aleve, naproxen)    4.)  Monitor blood pressure at home, call if blood pressure greater than 150/90 persistently    5.) I will plan to discuss all results including blood work, and/or imaging at our next visit, unless there is an urgent indication, in which case I will call you earlier. If you have any questions or concerns about your results, please feel free to call our office. 6.) Check Renal US, repeat blood work at next visit      HISTORY OF PRESENT ILLNESS:  Requesting Physician: Poonam Bryson DO    Nanette Duque is a 75 y.o. female who has a history of multiple sclerosis, nephrolithiasis, who presents for reduced GFR. Her renal function over the last few years which we reviewed up until 2014 has been rather stable over the last 3 years. Creatinine fluctuates anywhere from 0.8 to 1.2 mg/dL currently at 1.1 with a GFR 49 mL/min. She had a history of hypertension and was on lisinopril for about a year but she was getting dizzy at home. Sure she stopped. No chest pain or shortness of breath. No urinary symptoms. No recent renal imaging.   Retired critical care nurse    PAST MEDICAL HISTORY:  Past Medical History:   Diagnosis Date    Allergic 1967    seasonal    Allergic rhinitis     Bone pain     Breast cancer ([address]) 08/16/2012    Breast ptosis     Depression     Fatigue     Gait disturbance     uses cane at times    Headache     Hip fracture (HCC)     Hip pain     History of transfusion 1975    placenta previa s/p work accident, no rx    Hypokalemia     Insomnia     Laceration of finger     right hand    Left ankle pain     Left knee pain     Multiple sclerosis (HCC)     Muscle strain     left lower leg    Nephrolithiasis     Osteopenia     Osteoporosis     Pain of left calf     Pneumonia     Rash     Scoliosis birth    scoliosis    Solitary pulmonary nodule     SVT (supraventricular tachycardia)     Tingling     Urgency of urination     Urticaria     Wrist fracture, left        PAST SURGICAL HISTORY:  Past Surgical History:   Procedure Laterality Date    ANKLE SURGERY      APPENDECTOMY  11/2012    Dr. Daniel Lopez      Enlargement procedure with prosthetic implant bilateral AUGMENTATION MAMMAPLASTY Right 01/28/2020    implant replaced because of recall    AUGMENTATION MAMMAPLASTY Bilateral 2012    BREAST BIOPSY Left 07/23/2012    BREAST IMPLANT Right 01/28/2020    Procedure: BREAST IMPLANT EXCHANGE WITH CAPSULECTOMY;  Surgeon: Sheila Hollins MD;  Location: [facility];  Service: Plastics    BREAST IMPLANT REMOVAL Right 01/28/2020    implant placement, implant revision, right mastopexy    BREAST LUMPECTOMY      CYSTOSTOMY      with basket extraction of calculus; x2    EXCISION / BIOPSY BREAST / Silvestre Linen / DUCT Right 05/04/2020    scar revision to resuture non healing surgical wound    EXPLORATORY LAPAROTOMY      FL INJECTION LEFT SHOULDER (ARTHROGRAM)  7/19/2023    FOOT SURGERY      FRACTURE SURGERY  Lt wrist 9/2014 - Lt matthew femur 9/14    HIP FRACTURE SURGERY Right     Subcapital    HIP SURGERY Left     JOINT REPLACEMENT  Rt hip 10/2012    LEG SURGERY      Repair    MASTECTOMY Left 08/16/2012    TN GRAFTING OF AUTOLOGOUS FAT BY LIPO 50 CC OR LESS Bilateral 8/22/2023    Procedure: AUTOLOGOUS FAT GRAFTING TO BILATERAL BREASTS;  Surgeon: Sheila Hollins MD;  Location: [facility];  Service: Plastics    TN MASTOPEXY Right 01/28/2020    Procedure: BREAST MASTOPEXY;  Surgeon: Sheila Hollins MD;  Location: [facility];  Service: Plastics    TN REVISION OF RECONSTRUCTED BREAST Right 05/04/2020    Procedure: REVISION RIGHT BREAST MOUND;  Surgeon: Sheila Hollins MD;  [facility];  Service: Plastics    REDUCTION MAMMAPLASTY Right 01/28/2020    reduced to match left side    SENTINEL LYMPH NODE BIOPSY Left 08/16/2012    SKIN BIOPSY      TONSILLECTOMY      TUBAL LIGATION      WRIST SURGERY Left        ALLERGIES:  Allergies   Allergen Reactions    Duloxetine Hcl      Rapid Heart rate      Iodinated Contrast Media Anaphylaxis     IVP dye    Acetaminophen Other (See Comments)     Heart palpitations    Cetirizine      Only occurs with generic, weakness in legs, off balance and couldn't walk.     Morphine Other (See Comments)     Migraine       SOCIAL HISTORY:  Social History     Substance and Sexual Activity   Alcohol Use No     Social History     Substance and Sexual Activity   Drug Use No     Social History     Tobacco Use   Smoking Status Former    Packs/day: 1.00    Years: 35.00    Total pack years: 35.00    Types: Cigarettes    Quit date:     Years since quittin.9   Smokeless Tobacco Never       FAMILY HISTORY:  Family History   Problem Relation Age of Onset    Coronary artery disease Mother     Hyperlipidemia Mother     Rheum arthritis Mother     Other Father         Acute myocardial infarction    No Known Problems Maternal Grandmother     Hodgkin's lymphoma Maternal Grandfather         age at dx unk    No Known Problems Paternal Grandmother     No Known Problems Paternal Grandfather     Stroke Son 52    No Known Problems Son     Cancer Maternal Aunt 79        bladder    Heart disease Maternal Aunt     Stroke Maternal Aunt         6 CVA before death    Breast cancer Maternal Aunt     Colon cancer Maternal Uncle 65    Hodgkin's lymphoma Maternal Uncle 79    No Known Problems Paternal Aunt     No Known Problems Paternal Aunt     No Known Problems Paternal Aunt        MEDICATIONS:    Current Outpatient Medications:     ALPHA LIPOIC ACID PO, Take by mouth, Disp: , Rfl:     Ampyra 10 MG TB12, 1 tablet daily, Disp: 90 tablet, Rfl: 0    ascorbic acid (VITAMIN C) 500 mg tablet, Take 500 mg by mouth daily, Disp: , Rfl:     baclofen 10 mg tablet, 1 tab pos q am, 1 tab po q pm and 2 tabs hs, Disp: 360 tablet, Rfl: 3    Biotin 5000 MCG TABS, , Disp: , Rfl:     Cholecalciferol (VITAMIN D3) 1000 units CAPS, Take 5,000 Units by mouth  , Disp: , Rfl:     clonazePAM (KlonoPIN) 0.5 mg tablet, TAKE 1 AND 1/2 TABLETS BY MOUTH DAILY AT BEDTIME, Disp: 135 tablet, Rfl: 1    Cranberry 125 MG TABS, Take 125 mg by mouth in the morning, Disp: , Rfl:     cycloSPORINE (RESTASIS) 0.05 % ophthalmic emulsion, Administer 1 drop to both eyes every 12 (twelve) hours, Disp: , Rfl:     denosumab (PROLIA) 60 mg/mL, Inject under the skin, Disp: , Rfl:     docusate sodium (COLACE) 100 mg capsule, Take 1 capsule by mouth every other day  , Disp: , Rfl:     gabapentin (NEURONTIN) 600 MG tablet, Take one tablet in the AM, 1 tabs in the afternoon  and 2 tabs at bedtime, Disp: 360 tablet, Rfl: 3    Magnesium 500 MG TABS, Take 500 tablets by mouth once, Disp: , Rfl:     solifenacin (VESICARE) 10 MG tablet, TAKE 1 TABLET DAILY, Disp: 90 tablet, Rfl: 1    zonisamide (ZONEGRAN) 100 mg capsule, TAKE 4 CAPSULES DAILY AT   BEDTIME, Disp: 360 capsule, Rfl: 3  No current facility-administered medications for this visit. Facility-Administered Medications Ordered in Other Visits:     bacitracin 50,000 Units, gentamicin (GARAMYCIN) 40 mg/mL 80 mg, ceFAZolin (ANCEF) 1,000 mg in sodium chloride 0.9 % 1,000 mL irrigation bottle, , Irrigation, Once, Wendy Nguyen MD    REVIEW OF SYSTEMS:  Review of Systems   Constitutional:  Negative for activity change and fever. Respiratory:  Negative for cough, chest tightness, shortness of breath and wheezing. Cardiovascular:  Negative for chest pain and leg swelling. Gastrointestinal:  Negative for abdominal pain, diarrhea, nausea and vomiting. Endocrine: Negative for polyuria. Genitourinary:  Negative for difficulty urinating, dysuria, flank pain, frequency and urgency. Skin:  Negative for rash. Neurological:  Negative for dizziness, syncope, light-headedness and headaches. All other systems reviewed and are negative. All the systems were reviewed and were negative except as documented on the HPI. PHYSICAL EXAM:  Current Weight: Body mass index is 26.5 kg/m². Vitals:    12/05/23 1101 12/05/23 1118   BP:  148/82   Weight: 70 kg (154 lb 6.4 oz)    Height: 5' 4" (1.626 m)        Physical Exam  Vitals and nursing note reviewed.    Constitutional:       General: She is not in acute distress. Appearance: She is well-developed. HENT:      Head: Normocephalic and atraumatic. Eyes:      General: No scleral icterus. Conjunctiva/sclera: Conjunctivae normal.      Pupils: Pupils are equal, round, and reactive to light. Cardiovascular:      Rate and Rhythm: Normal rate and regular rhythm. Heart sounds: S1 normal and S2 normal. No murmur heard. No friction rub. No gallop. Pulmonary:      Effort: Pulmonary effort is normal. No respiratory distress. Breath sounds: Normal breath sounds. No wheezing or rales. Abdominal:      General: Bowel sounds are normal.      Palpations: Abdomen is soft. Tenderness: There is no abdominal tenderness. There is no rebound. Musculoskeletal:         General: Normal range of motion. Cervical back: Normal range of motion and neck supple. Skin:     Findings: No rash. Neurological:      Mental Status: She is alert and oriented to person, place, and time. Psychiatric:         Behavior: Behavior normal.         Laboratory results:   Results for orders placed or performed in visit on 11/29/23   Comprehensive metabolic panel   Result Value Ref Range    Sodium 144 135 - 147 mmol/L    Potassium 3.8 3.5 - 5.3 mmol/L    Chloride 106 96 - 108 mmol/L    CO2 28 21 - 32 mmol/L    ANION GAP 10 mmol/L    BUN 18 5 - 25 mg/dL    Creatinine 1.11 0.60 - 1.30 mg/dL    Glucose, Fasting 89 65 - 99 mg/dL    Calcium 9.7 8.4 - 10.2 mg/dL    AST 19 13 - 39 U/L    ALT 26 7 - 52 U/L    Alkaline Phosphatase 72 34 - 104 U/L    Total Protein 7.3 6.4 - 8.4 g/dL    Albumin 4.4 3.5 - 5.0 g/dL    Total Bilirubin 0.58 0.20 - 1.00 mg/dL    eGFR 49 ml/min/1.73sq m     *Note: Due to a large number of results and/or encounters for the requested time period, some results have not been displayed. A complete set of results can be found in Results Review.

## 2023-12-07 ENCOUNTER — OFFICE VISIT (OUTPATIENT)
Dept: PHYSICAL THERAPY | Facility: CLINIC | Age: 73
End: 2023-12-07
Payer: MEDICARE

## 2023-12-07 DIAGNOSIS — G35 MULTIPLE SCLEROSIS (HCC): ICD-10-CM

## 2023-12-07 DIAGNOSIS — R53.1 WEAKNESS: ICD-10-CM

## 2023-12-07 DIAGNOSIS — R26.9 GAIT ABNORMALITY: Primary | ICD-10-CM

## 2023-12-07 PROCEDURE — 97112 NEUROMUSCULAR REEDUCATION: CPT | Performed by: PHYSICAL THERAPIST

## 2023-12-07 PROCEDURE — 97110 THERAPEUTIC EXERCISES: CPT | Performed by: PHYSICAL THERAPIST

## 2023-12-07 PROCEDURE — 97530 THERAPEUTIC ACTIVITIES: CPT | Performed by: PHYSICAL THERAPIST

## 2023-12-07 NOTE — PROGRESS NOTES
Daily Note     Today's date: 2023  Patient name: Nanette Duque  : 1950  MRN: [id number]  Referring provider: Susan Carcamo DO  Dx:   Encounter Diagnosis     ICD-10-CM    1. Gait abnormality  R26.9       2. Weakness  R53.1       3. Multiple sclerosis ([address])  G35           Start Time: 1322  Stop Time: 1025  Total time in clinic (min): 39 minutes    Subjective: Pt reports having problems with her left knee today. She feels weakness in this region. Pt also admits to doing a lot of activities at home as her  just had back surgery. Objective: See treatment diary below      Assessment: Tolerated treatment fair. Pt needed to sit after 15 minutes of exercise. She notes increasing fatigue, particularly in the left lower extremity. Pt felt mildly dizzy after standing toward the end of treatment. BP measured at 164/92. Terminated session early. Pt instructed to contact nephrologist or PCP if BP remains elevated. However, patient did complete exercises and was running around all morning. Next visit, BP will be monitored pre, during, and post exercises. Patient would benefit from continued PT. Plan: Continue per plan of care. Precautions: MS, Gait dysfunction, Insomnia, Osteoporosis, Peripheral polyneuropathy, Hx of Breast CA, R IDALIA in - Dr. Treva Bauer, Left femur IM nailing       Manuals                                                                Neuro Re-Ed             DLS on foam Feet together yellow foam 10 shld flex, 10 shld abd, 10x press  Feet together yellow foam 10 shld flex, 10 shld abd, 10x press            Tandem balance             Standing hip abd 3x10 2x10           Standing hip ext             Standing SLR                                       Ther Ex             Leg press             Hamstring stretching  Seated 5x10" B           Calf SB stretching  4x30" with PT assist           Hamstring curl  Standing 30x on R and 16x on L.  Bloster to block hip flexion Standing 20x on eac                                                               Ther Activity             Lateral walks With L shld ER isometric rtb 4x10 feet  NP           Mini squats             Step ups             Leeann walks              6 minute walk test             Standing HR 3x10 3x10           Marching 3x10  2x10           Gait Training                                       Vitals             BP  164/92 Pre, during post , nv          SpO2  97%           HR  67           1:1 with PT from [phone]

## 2023-12-08 RX ORDER — DALFAMPRIDINE 10 MG/1
TABLET, FILM COATED, EXTENDED RELEASE ORAL
Qty: 90 TABLET | Refills: 0 | Status: SHIPPED | OUTPATIENT
Start: 2023-12-08

## 2023-12-08 NOTE — TELEPHONE ENCOUNTER
Spoke with pt and advised her of Dr. Esperanza Martínez message. Pt says she is taking Gabapentin 600 mg in the AM, 600 mg in the afternoon and 1200 mg at bedtime. Pt confirms she is taking Ampyra 10 mg BID. Says since taking BID, when she gets out of bed in the morning, she has no problems. But when she was taking one tab daily, she had issues with gait. Pt also wanted to provide update for Dr Coni Schuler. Pt was seen by Nephrology on Tuesday. Says he's not really worried about GFR, said that's been kind of stable. Was told that she might be in CKD stage 3, maybe, but creatinine has been stable. However, moving forward, testing will be done on routine basis. Pt has appt January with Nephro along with  multiple lab test after US of kidney and bladder, taking place second week of January.

## 2023-12-11 ENCOUNTER — OFFICE VISIT (OUTPATIENT)
Dept: FAMILY MEDICINE CLINIC | Facility: OTHER | Age: 73
End: 2023-12-11
Payer: MEDICARE

## 2023-12-11 ENCOUNTER — APPOINTMENT (OUTPATIENT)
Dept: PHYSICAL THERAPY | Facility: CLINIC | Age: 73
End: 2023-12-11
Payer: MEDICARE

## 2023-12-11 VITALS
DIASTOLIC BLOOD PRESSURE: 76 MMHG | WEIGHT: 154.2 LBS | TEMPERATURE: 98.2 F | SYSTOLIC BLOOD PRESSURE: 142 MMHG | OXYGEN SATURATION: 99 % | BODY MASS INDEX: 26.32 KG/M2 | HEART RATE: 82 BPM | RESPIRATION RATE: 15 BRPM | HEIGHT: 64 IN

## 2023-12-11 DIAGNOSIS — I10 ESSENTIAL HYPERTENSION: Primary | ICD-10-CM

## 2023-12-11 PROCEDURE — 99213 OFFICE O/P EST LOW 20 MIN: CPT

## 2023-12-11 RX ORDER — LISINOPRIL 5 MG/1
2.5 TABLET ORAL DAILY
Qty: 45 TABLET | Refills: 1 | Status: SHIPPED | OUTPATIENT
Start: 2023-12-11

## 2023-12-11 NOTE — PROGRESS NOTES
Name: Jacy Rodrigez      : 1950      MRN: [id number]  Encounter Provider: Constance Delgado DO  Encounter Date: 2023   Encounter department: [facility]    Assessment & Plan     1. Essential hypertension  -     lisinopril (ZESTRIL) 5 mg tablet; Take 0.5 tablets (2.5 mg total) by mouth daily    Jihan presents to clinic today for evaluation of recent elevated blood pressures to 150s-160s/90s-100s measured at home.  She states this has been occurring for the past month and that her normal blood pressure tends to run around 120s/70s.  Jihan was previously taking lisinopril, however at that time she was concerned that her blood pressure had been elevated only in the setting of walking to the exam room and then having it immediately measured, as it was not consistently elevated outside of the office.  She does have some concern about potentially becoming hypotensive with medication.  She did not have side effects while previously on the lisinopril, however she has been trying to reduce the number of medications she has been taking overall.  Given these concerns, I will start her on a low dose of lisinopril 2.5mg daily and have her continue to monitor her blood pressure at home; BP log printout provided in office today.  Plan to follow up in one month to recheck blood pressure.  If she is not adequately controlled at that time with goal of <140/<90, will likely increase lisinopril to 5mg daily.  If she is well-controlled, will continue 2.5mg daily for the time being in the setting of increased stressors and may attempt to discontinue this in several months per patient preference.    Return in about 1 month (around 2024) for Recheck HTN.      Subjective     Jihan presents to clinic today for evaluation of recent elevated blood pressures measured at home.  She states she has been having elevated blood pressures for over a month and that normally her BP is in the range of 120s/70s,  but has been up to 150s-160s/90s-100s.  She has been trying to decrease her medications recently, including reducing her gabapentin and trying to reduce her ampyra but she has been unable to walk without it.  She does discuss multiple increased life stressors, including her  recently undergoing surgery and having falls up to twice a week at home.  She has been experiencing associated symptoms of palpitations and dizziness, with headache over the past few days.        Review of Systems   Constitutional:  Negative for chills and fever.   Respiratory:  Negative for shortness of breath.    Cardiovascular:  Positive for palpitations. Negative for chest pain.   Musculoskeletal:  Positive for gait problem.   Allergic/Immunologic: Negative for environmental allergies and food allergies.   Neurological:  Positive for dizziness and headaches. Negative for syncope.   All other systems reviewed and are negative.      Past Medical History:   Diagnosis Date   • Allergic 1967    seasonal   • Allergic rhinitis    • Bone pain    • Breast cancer (HCC) 08/16/2012    left   • Breast ptosis    • Depression    • Fatigue    • Gait disturbance     uses cane at times   • Headache    • Hip fracture (HCC)    • Hip pain    • History of transfusion 1975    placenta previa s/p work accident, no rx   • Hypokalemia    • Insomnia    • Laceration of finger     right hand   • Left ankle pain    • Left knee pain    • Multiple sclerosis (HCC)    • Muscle strain     left lower leg   • Nephrolithiasis    • Osteopenia    • Osteoporosis    • Pain of left calf    • Pneumonia    • Rash    • Scoliosis birth    scoliosis   • Solitary pulmonary nodule    • SVT (supraventricular tachycardia)    • Tingling    • Urgency of urination    • Urticaria    • Wrist fracture, left      Past Surgical History:   Procedure Laterality Date   • ANKLE SURGERY     • APPENDECTOMY  11/2012    Dr. Vidales   • AUGMENTATION BREAST      Enlargement procedure with prosthetic  implant bilateral   • AUGMENTATION MAMMAPLASTY Right 01/28/2020    implant replaced because of recall   • AUGMENTATION MAMMAPLASTY Bilateral 2012   • BREAST BIOPSY Left 07/23/2012   • BREAST EXCISIONAL BIOPSY Right     age 39   • BREAST IMPLANT Right 01/28/2020    Procedure: BREAST IMPLANT EXCHANGE WITH CAPSULECTOMY;  Surgeon: Da Canales MD;  Location: [facility];  Service: Plastics   • BREAST IMPLANT REMOVAL Right 01/28/2020    implant placement, implant revision, right mastopexy   • CYSTOSTOMY      with basket extraction of calculus; x2   • EXCISION / BIOPSY BREAST / NIPPLE / DUCT Right 05/04/2020    scar revision to resuture non healing surgical wound   • EXPLORATORY LAPAROTOMY     • FL INJECTION LEFT SHOULDER (ARTHROGRAM)  07/19/2023   • FOOT SURGERY     • FRACTURE SURGERY  Lt wrist 9/2014 - Lt matthew femur 9/14   • HIP FRACTURE SURGERY Right     Subcapital   • HIP SURGERY Left    • JOINT REPLACEMENT  Rt hip 10/2012   • LEG SURGERY      Repair   • MASTECTOMY Left 08/16/2012   • NE GRAFTING OF AUTOLOGOUS FAT BY LIPO 50 CC OR LESS Bilateral 08/22/2023    Procedure: AUTOLOGOUS FAT GRAFTING TO BILATERAL BREASTS;  Surgeon: Da Canales MD;  Location: [facility];  Service: Plastics   • NE MASTOPEXY Right 01/28/2020    Procedure: BREAST MASTOPEXY;  Surgeon: Da Canales MD;  Location: [facility];  Service: Plastics   • NE REVISION OF RECONSTRUCTED BREAST Right 05/04/2020    Procedure: REVISION RIGHT BREAST MOUND;  Surgeon: Da Canales MD;  [facility];  Service: Plastics   • REDUCTION MAMMAPLASTY Right 01/28/2020    reduced to match left side   • SENTINEL LYMPH NODE BIOPSY Left 08/16/2012   • SKIN BIOPSY     • TONSILLECTOMY     • TUBAL LIGATION     • WRIST SURGERY Left      Family History   Problem Relation Age of Onset   • Coronary artery disease Mother    • Hyperlipidemia Mother    • Rheum arthritis Mother    • Other Father         Acute myocardial infarction   • No Known  Problems Maternal Grandmother    • Hodgkin's lymphoma Maternal Grandfather         age at dx unk   • No Known Problems Paternal Grandmother    • No Known Problems Paternal Grandfather    • Stroke Son 49   • No Known Problems Son    • Cancer Maternal Aunt 70        bladder   • Heart disease Maternal Aunt    • Stroke Maternal Aunt         6 CVA before death   • Breast cancer Maternal Aunt    • Colon cancer Maternal Uncle 65   • Hodgkin's lymphoma Maternal Uncle 70   • No Known Problems Paternal Aunt    • No Known Problems Paternal Aunt    • No Known Problems Paternal Aunt      Social History     Socioeconomic History   • Marital status: /Civil Union     Spouse name: None   • Number of children: 2   • Years of education: Completed bachelor's degree   • Highest education level: None   Occupational History   • Occupation: RN     Comment: Retired   Tobacco Use   • Smoking status: Former     Current packs/day: 0.00     Average packs/day: 1 pack/day for 35.0 years (35.0 ttl pk-yrs)     Types: Cigarettes     Start date:      Quit date:      Years since quittin.9   • Smokeless tobacco: Never   Vaping Use   • Vaping status: Never Used   Substance and Sexual Activity   • Alcohol use: No   • Drug use: No   • Sexual activity: Yes     Partners: Male     Birth control/protection: Post-menopausal   Other Topics Concern   • None   Social History Narrative    Denied:  History of caffeine use     Social Determinants of Health     Financial Resource Strain: Patient Declined (10/19/2023)    Overall Financial Resource Strain (CARDIA)    • Difficulty of Paying Living Expenses: Patient declined   Food Insecurity: Not on file   Transportation Needs: Patient Declined (10/19/2023)    PRAPARE - Transportation    • Lack of Transportation (Medical): Patient declined    • Lack of Transportation (Non-Medical): Patient declined   Physical Activity: Not on file   Stress: Not on file   Social Connections: Not on file   Intimate  Partner Violence: Not on file   Housing Stability: Not on file     Current Outpatient Medications on File Prior to Visit   Medication Sig   • ALPHA LIPOIC ACID PO Take by mouth   • ascorbic acid (VITAMIN C) 500 mg tablet Take 500 mg by mouth daily   • baclofen 10 mg tablet 1 tab pos q am, 1 tab po q pm and 2 tabs hs   • Biotin 5000 MCG TABS    • Cholecalciferol (VITAMIN D3) 1000 units CAPS Take 5,000 Units by mouth     • clonazePAM (KlonoPIN) 0.5 mg tablet TAKE 1 AND 1/2 TABLETS BY MOUTH DAILY AT BEDTIME   • Cranberry 125 MG TABS Take 125 mg by mouth in the morning   • cycloSPORINE (RESTASIS) 0.05 % ophthalmic emulsion Administer 1 drop to both eyes every 12 (twelve) hours   • denosumab (PROLIA) 60 mg/mL Inject under the skin   • docusate sodium (COLACE) 100 mg capsule Take 1 capsule by mouth every other day     • gabapentin (NEURONTIN) 600 MG tablet Take one tablet in the AM, 1 tabs in the afternoon  and 2 tabs at bedtime   • Magnesium 500 MG TABS Take 500 tablets by mouth once   • solifenacin (VESICARE) 10 MG tablet TAKE 1 TABLET DAILY   • zonisamide (ZONEGRAN) 100 mg capsule TAKE 4 CAPSULES DAILY AT   BEDTIME     Allergies   Allergen Reactions   • Duloxetine Hcl      Rapid Heart rate     • Iodinated Contrast Media Anaphylaxis     IVP dye   • Acetaminophen Other (See Comments)     Heart palpitations   • Cetirizine      Only occurs with generic, weakness in legs, off balance and couldn't walk.   • Morphine Other (See Comments)     Migraine     Immunization History   Administered Date(s) Administered   • COVID-19 MODERNA VACC 0.5 ML IM 02/02/2021, 03/02/2021, 11/12/2021, 05/06/2022   • COVID-19 Moderna Vac BIVALENT 12 Yr+ IM 0.5 ML 10/22/2022   • INFLUENZA 11/04/2015, 10/17/2016, 10/18/2017, 10/26/2018, 10/23/2021, 10/22/2022   • Influenza Split 10/25/2020   • Influenza, high dose seasonal 0.7 mL 10/18/2020   • Influenza, seasonal, injectable 1950, 09/18/2012, 11/04/2015   • Pneumococcal Conjugate 13-Valent  "10/17/2016   • Pneumococcal Polysaccharide PPV23 10/26/2018   • Tdap 08/05/2015   • Zoster 10/18/2017   • influenza, trivalent, adjuvanted 10/30/2019       Objective     /76 (BP Location: Right arm, Patient Position: Sitting, Cuff Size: Large)   Pulse 82   Temp 98.2 °F (36.8 °C) (Temporal)   Resp 15   Ht 5' 4\" (1.626 m)   Wt 69.9 kg (154 lb 3.2 oz)   SpO2 99%   BMI 26.47 kg/m²     Physical Exam  Vitals reviewed.   Constitutional:       General: She is not in acute distress.     Appearance: Normal appearance. She is not ill-appearing, toxic-appearing or diaphoretic.   HENT:      Right Ear: External ear normal.      Left Ear: External ear normal.      Nose: Nose normal.   Eyes:      Extraocular Movements: Extraocular movements intact.      Conjunctiva/sclera: Conjunctivae normal.      Pupils: Pupils are equal, round, and reactive to light.   Cardiovascular:      Rate and Rhythm: Normal rate and regular rhythm.      Heart sounds: Normal heart sounds.   Pulmonary:      Effort: Pulmonary effort is normal. No respiratory distress.      Breath sounds: Normal breath sounds. No stridor. No wheezing, rhonchi or rales.   Abdominal:      General: Abdomen is flat. Bowel sounds are normal.      Palpations: Abdomen is soft.   Musculoskeletal:         General: Normal range of motion.   Skin:     General: Skin is warm and dry.   Neurological:      Mental Status: She is alert and oriented to person, place, and time. Mental status is at baseline.   Psychiatric:         Mood and Affect: Mood normal.         Behavior: Behavior normal.       Constance Delgado, DO    "

## 2023-12-12 DIAGNOSIS — G35 MULTIPLE SCLEROSIS (HCC): ICD-10-CM

## 2023-12-12 DIAGNOSIS — R26.9 GAIT DISTURBANCE: ICD-10-CM

## 2023-12-12 RX ORDER — DALFAMPRIDINE 10 MG/1
TABLET, FILM COATED, EXTENDED RELEASE ORAL
Qty: 180 TABLET | Refills: 3 | Status: SHIPPED | OUTPATIENT
Start: 2023-12-12

## 2023-12-12 RX ORDER — DALFAMPRIDINE 10 MG/1
TABLET, FILM COATED, EXTENDED RELEASE ORAL
Qty: 180 TABLET | Refills: 3 | Status: SHIPPED | OUTPATIENT
Start: 2023-12-12 | End: 2023-12-12 | Stop reason: SDUPTHER

## 2023-12-12 NOTE — TELEPHONE ENCOUNTER
2023  Yosef Guaman   to [facility] [address] (supporting Ben Pastrana MD)         23  1:45 PM  Dr Mario Herbert,  I just received another refill on my Ampyra and again I only received 90 pills which is enough for one/day. Since I take 2 per day (q12H) I have only had enough since I had extra from an earlier refill. I spoke with the specialty pharmacy and they said that my latest RX indicates only a daily dose. They will not verify with you and want me to ask for a new prescription. Can you please fax them an updated script so I will not run out of my Ampyra. I would very much appreciate it. Thank you  Jacob SNYDER 1950    ---------------------------------------      Dr. Mario Herbert - Pt verified in previous message that she takes Ampyra BID. Pt stating again above. Script sent to pharmacy has instructions 1 tab daily dosing. Please advise.

## 2023-12-12 NOTE — TELEPHONE ENCOUNTER
Please let pt know rx corrected. Sorry for any inconvience. The daily rx was already pre loaded. I changed to bid dosing.   Please call [facility] mail order pharmacy for pt to make sure they are aware of the corrected rx of bid dosing for 180 tabs and 3 refills

## 2023-12-13 ENCOUNTER — PATIENT MESSAGE (OUTPATIENT)
Dept: FAMILY MEDICINE CLINIC | Facility: OTHER | Age: 73
End: 2023-12-13

## 2023-12-13 ENCOUNTER — HOSPITAL ENCOUNTER (OUTPATIENT)
Dept: MAMMOGRAPHY | Facility: HOSPITAL | Age: 73
Discharge: HOME | End: 2023-12-13
Payer: MEDICARE

## 2023-12-13 VITALS — BODY MASS INDEX: 26.29 KG/M2 | HEIGHT: 64 IN | WEIGHT: 154 LBS

## 2023-12-13 DIAGNOSIS — Z12.31 VISIT FOR SCREENING MAMMOGRAM: ICD-10-CM

## 2023-12-13 PROCEDURE — 77063 BREAST TOMOSYNTHESIS BI: CPT

## 2023-12-13 PROCEDURE — 77067 SCR MAMMO BI INCL CAD: CPT

## 2023-12-14 ENCOUNTER — OFFICE VISIT (OUTPATIENT)
Dept: FAMILY MEDICINE CLINIC | Facility: OTHER | Age: 73
End: 2023-12-14

## 2023-12-14 ENCOUNTER — APPOINTMENT (OUTPATIENT)
Dept: PHYSICAL THERAPY | Facility: CLINIC | Age: 73
End: 2023-12-14
Payer: MEDICARE

## 2023-12-14 ENCOUNTER — HOSPITAL ENCOUNTER (EMERGENCY)
Facility: HOSPITAL | Age: 73
Discharge: HOME | End: 2023-12-14
Attending: EMERGENCY MEDICINE
Payer: MEDICARE

## 2023-12-14 ENCOUNTER — TELEPHONE (OUTPATIENT)
Dept: FAMILY MEDICINE CLINIC | Facility: OTHER | Age: 73
End: 2023-12-14

## 2023-12-14 VITALS
WEIGHT: 142 LBS | DIASTOLIC BLOOD PRESSURE: 78 MMHG | OXYGEN SATURATION: 97 % | HEIGHT: 64 IN | TEMPERATURE: 100.1 F | RESPIRATION RATE: 15 BRPM | HEART RATE: 88 BPM | BODY MASS INDEX: 24.24 KG/M2 | SYSTOLIC BLOOD PRESSURE: 136 MMHG

## 2023-12-14 VITALS
HEIGHT: 64 IN | OXYGEN SATURATION: 98 % | DIASTOLIC BLOOD PRESSURE: 80 MMHG | HEART RATE: 102 BPM | SYSTOLIC BLOOD PRESSURE: 159 MMHG | BODY MASS INDEX: 24.37 KG/M2 | RESPIRATION RATE: 17 BRPM | TEMPERATURE: 99.1 F

## 2023-12-14 DIAGNOSIS — R42 DIZZINESS: ICD-10-CM

## 2023-12-14 DIAGNOSIS — R11.0 NAUSEA: ICD-10-CM
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
## 2023-12-14 DIAGNOSIS — R29.6 FREQUENT FALLS: Primary | ICD-10-CM

## 2023-12-14 PROCEDURE — 99283 EMERGENCY DEPT VISIT LOW MDM: CPT | Performed by: EMERGENCY MEDICINE

## 2023-12-14 PROCEDURE — 99284 EMERGENCY DEPT VISIT MOD MDM: CPT

## 2023-12-14 NOTE — TELEPHONE ENCOUNTER
Regarding: FW: Hypertension   Contact: [phone]  Yes, please advise ED in the setting of elevated BP, dizziness, myalgias, nausea, and falls    ----- Message -----  From: Corrinne Apt  Sent: 12/14/2023   9:11 AM EST  To: Cresencio Gupta DO  Subject: Hypertension                                     ----- Message from Corrinne Apt sent at 12/14/2023  9:11 AM EST -----  Should pt proceed to ED?     ----- Message from Chika Jain to Cresencio Gupta DO sent at 12/13/2023 10:41 PM -----   Dr Thierry Santana  I was seen Monday for return of persistent hypertension with headaches and palpitations. I was placed on 2.5 mg Lisinopril. Today my BP is up only once but I fell three times dizzy hurt all over nausea. Last b/p was 168/88 pulse 100. That was after I almost fell but was able to grab a chair.    Buster Lindsay

## 2023-12-14 NOTE — PROGRESS NOTES
Name: Fay Sanches      : 1950      MRN: [id number]  Encounter Provider: Ben Cordoba DO  Encounter Date: 2023   Encounter department: [address]     1. Fall, initial encounter  -     Transfer to other facility    2. Dizziness  -     Transfer to other facility    3. Nausea  -     Transfer to other facility        Pt's presentation of multiple falls concerning for multiple possible etiologies including but not limited to: cardiac events, TIA, recent CVA, MS flare up, electrolyte disturbance, other neurologic etiology, medication side effect. Pt is recommended to seek care at ED for further evaluation of multiple falls in past 24 hours in setting of nausea, dizziness, and history of osteopenia and MS. Pt reports that she well proceed to ED today. The patient indicates understanding of these issues and agrees with the plan. [facility] ED called multiple times to notify of ED arrival for patient but was not successful connecting with provider there. Pt's care discussed with Dr. Nataly Lobato. Return for recheck dizziness/falls within 7 days of ED visit. Subjective      This is a 76 yo female who presents due to recent dizziness and falls. Started yesterday. . Reports in past 24 hrs has fallen 4 times. Most recent fall this AM onto thigh and wrists. Mike Severy yesterday on back. She now has multiple bruises. Denies head strike. Only change to routine is patient has recently started a low dose lisinopril 2.5 mg daily. Last took medication yesterday evening. Has been checking BP which has been elevated in 130s-140s/80-90s. No low Bps. Movement is painful in setting of current falls. Is now walking with walker for better stability but ordinarily can walk with cane. Yesterday tripped over her cane and when she falls, she reports that she propels forward. Has been landing on hard floor.  Reports that her injuries are soft tissue injuries and does not feel like she broke any bones. Denies head strikes or periods of forgetting or blacking out. Pt's son has recently been ill. She reports that he lives in basement and was recently ill with URI. Denies that she could have been exposed as he isolated from herself and her . Recent increased stress in setting of 's recent illness. Review of Systems   Constitutional:  Negative for activity change, appetite change, chills, diaphoresis, fatigue, fever and unexpected weight change. HENT:  Negative for congestion, sinus pain, sneezing, sore throat and trouble swallowing. Eyes:  Negative for photophobia, pain, redness and visual disturbance. Respiratory:  Negative for cough, chest tightness, shortness of breath and wheezing. Cardiovascular:  Negative for chest pain, palpitations and leg swelling. Gastrointestinal:  Negative for abdominal pain, nausea and vomiting. Genitourinary:  Negative for difficulty urinating, dysuria, flank pain, frequency, hematuria, urgency, vaginal bleeding and vaginal pain. Musculoskeletal:  Positive for arthralgias, gait problem and myalgias. Skin:  Positive for color change (bruising). Negative for pallor, rash and wound. Neurological:  Positive for dizziness. Negative for tremors, seizures, syncope, speech difficulty, weakness, light-headedness, numbness and headaches. Reports one fall due to tripping. Other falls, she is unsure but does not recall suddenly feeling weak or having legs give out. Psychiatric/Behavioral:  The patient is not nervous/anxious.          + increased stressors at home       Current Outpatient Medications on File Prior to Visit   Medication Sig    ALPHA LIPOIC ACID PO Take by mouth    Ampyra 10 MG TB12 1 tab po bid    ascorbic acid (VITAMIN C) 500 mg tablet Take 500 mg by mouth daily    baclofen 10 mg tablet 1 tab pos q am, 1 tab po q pm and 2 tabs hs    Biotin 5000 MCG TABS     Cholecalciferol (VITAMIN D3) 1000 units CAPS Take 5,000 Units by mouth      clonazePAM (KlonoPIN) 0.5 mg tablet TAKE 1 AND 1/2 TABLETS BY MOUTH DAILY AT BEDTIME    Cranberry 125 MG TABS Take 125 mg by mouth in the morning    cycloSPORINE (RESTASIS) 0.05 % ophthalmic emulsion Administer 1 drop to both eyes every 12 (twelve) hours    denosumab (PROLIA) 60 mg/mL Inject under the skin    docusate sodium (COLACE) 100 mg capsule Take 1 capsule by mouth every other day      gabapentin (NEURONTIN) 600 MG tablet Take one tablet in the AM, 1 tabs in the afternoon  and 2 tabs at bedtime    lisinopril (ZESTRIL) 5 mg tablet Take 0.5 tablets (2.5 mg total) by mouth daily    Magnesium 500 MG TABS Take 500 tablets by mouth once    solifenacin (VESICARE) 10 MG tablet TAKE 1 TABLET DAILY    zonisamide (ZONEGRAN) 100 mg capsule TAKE 4 CAPSULES DAILY AT   BEDTIME       Objective     /78   Pulse 88   Temp 100.1 °F (37.8 °C)   Resp 15   Ht 5' 4" (1.626 m)   Wt 64.4 kg (142 lb)   SpO2 97%   BMI 24.37 kg/m²     Physical Exam  Vitals reviewed. Constitutional:       General: She is not in acute distress. HENT:      Head: Normocephalic and atraumatic. Nose: No congestion. Mouth/Throat:      Mouth: Mucous membranes are moist.      Pharynx: Oropharynx is clear. Eyes:      General: No scleral icterus. Extraocular Movements: Extraocular movements intact. Conjunctiva/sclera: Conjunctivae normal.      Pupils: Pupils are equal, round, and reactive to light. Visual Fields: Right eye visual fields normal and left eye visual fields normal.   Neck:      Vascular: No carotid bruit. Cardiovascular:      Rate and Rhythm: Normal rate and regular rhythm. Pulses: Normal pulses. Heart sounds: Normal heart sounds. No murmur heard. No gallop. Pulmonary:      Effort: No respiratory distress. Breath sounds: No wheezing, rhonchi or rales. Abdominal:      General: Bowel sounds are normal.      Palpations: Abdomen is soft. Tenderness:  There is no abdominal tenderness. There is no guarding. Musculoskeletal:         General: Tenderness (right lateral thigh and lower back, wrists BL, BL patella superiorly) present. Cervical back: Neck supple. Right lower leg: No edema. Left lower leg: No edema. Skin:     Coloration: Skin is not pale. Findings: Bruising (BL wrists, hands) present. No erythema or rash. Neurological:      Mental Status: She is alert and oriented to person, place, and time. Cranial Nerves: No cranial nerve deficit. Sensory: No sensory deficit. Motor: No weakness.       Coordination: Coordination normal.      Gait: Gait abnormal.      Comments: Baseline: left leg is weaker than right and decreased sensation   Psychiatric:         Mood and Affect: Mood normal.         Behavior: Behavior normal.       Odette Stager, DO

## 2023-12-15 ENCOUNTER — TELEPHONE (OUTPATIENT)
Dept: NEUROLOGY | Facility: CLINIC | Age: 73
End: 2023-12-15

## 2023-12-15 DIAGNOSIS — G35 MULTIPLE SCLEROSIS (HCC): Primary | ICD-10-CM

## 2023-12-15 NOTE — ED PROVIDER NOTES
History  Chief Complaint   Patient presents with    Multiple Falls     Pt has hx MS, states has fallen 4 times in the last few days, states ongoing back pain, unable to stand straight, c/o L wrist/hand pain, R thigh pain, b/l knee pain. Denies head strike or LOC, GCS15. Worried about her mobility getting worse and worse over the last few days.      Jihan is a 73 year old female with a history of multiple sclerosis, presenting for evaluation for recent frequent falls.  Patient describes that over the last 1.5 days, she has fallen 4 times.  She describes that the second time she fell she was opening her back door to answer all, the door was stuck and she pushed hard and the door flung open and she fell forward onto the ground onto her back.  She endorsed back pain at that time, denies any back pain at this time, no new or worsening lower extremity weakness.  She denies any head strike during this fall or any of the other falls.  She does not take blood thinners.  The third time she fell, she describes that she lost her balance and fell onto her sofa.  She denies any injuries from this fall.  The fourth time she fell, she was getting up in the morning out of bed, she described that she wears a sort of rubber band over her right thigh for comfort, the band had fallen to her ankle and she did not realize this, when she stood and started walking she tripped over this band and fell forward.  She denies tenderness anywhere at this time.  She followed up with her PCP today who was highly concerned regarding her recent falls that she may have suffered a severe back injury, prompting patient to come to the emergency department for evaluation.      History provided by:  Patient   used: No        Prior to Admission Medications   Prescriptions Last Dose Informant Patient Reported? Taking?   ALPHA LIPOIC ACID PO  Self Yes No   Sig: Take by mouth   Ampyra 10 MG TB12   No No   Si tab po bid   Biotin 5000 MCG  TABS  Self Yes No   Cholecalciferol (VITAMIN D3) 1000 units CAPS  Self Yes No   Sig: Take 5,000 Units by mouth     Cranberry 125 MG TABS  Self Yes No   Sig: Take 125 mg by mouth in the morning   Magnesium 500 MG TABS  Self Yes No   Sig: Take 500 tablets by mouth once   ascorbic acid (VITAMIN C) 500 mg tablet  Self Yes No   Sig: Take 500 mg by mouth daily   baclofen 10 mg tablet  Self No No   Si tab pos q am, 1 tab po q pm and 2 tabs hs   clonazePAM (KlonoPIN) 0.5 mg tablet  Self No No   Sig: TAKE 1 AND 1/2 TABLETS BY MOUTH DAILY AT BEDTIME   cycloSPORINE (RESTASIS) 0.05 % ophthalmic emulsion  Self Yes No   Sig: Administer 1 drop to both eyes every 12 (twelve) hours   denosumab (PROLIA) 60 mg/mL  Self Yes No   Sig: Inject under the skin   docusate sodium (COLACE) 100 mg capsule  Self Yes No   Sig: Take 1 capsule by mouth every other day     gabapentin (NEURONTIN) 600 MG tablet   No No   Sig: Take one tablet in the AM, 1 tabs in the afternoon  and 2 tabs at bedtime   lisinopril (ZESTRIL) 5 mg tablet   No No   Sig: Take 0.5 tablets (2.5 mg total) by mouth daily   solifenacin (VESICARE) 10 MG tablet   No No   Sig: TAKE 1 TABLET DAILY   zonisamide (ZONEGRAN) 100 mg capsule  Self No No   Sig: TAKE 4 CAPSULES DAILY AT   BEDTIME      Facility-Administered Medications: None       Past Medical History:   Diagnosis Date    Allergic 1967    seasonal    Allergic rhinitis     Bone pain     Breast cancer (HCC) 2012    left    Breast ptosis     Depression     Fatigue     Gait disturbance     uses cane at times    Headache     Hip fracture (HCC)     Hip pain     History of transfusion     placenta previa s/p work accident, no rx    Hypokalemia     Insomnia     Laceration of finger     right hand    Left ankle pain     Left knee pain     Multiple sclerosis (HCC)     Muscle strain     left lower leg    Nephrolithiasis     Osteopenia     Osteoporosis     Pain of left calf     Pneumonia     Rash     Scoliosis birth     scoliosis    Solitary pulmonary nodule     SVT (supraventricular tachycardia)     Tingling     Urgency of urination     Urticaria     Wrist fracture, left        Past Surgical History:   Procedure Laterality Date    ANKLE SURGERY      APPENDECTOMY  11/2012    Dr. Vidales    AUGMENTATION BREAST      Enlargement procedure with prosthetic implant bilateral    AUGMENTATION MAMMAPLASTY Right 01/28/2020    implant replaced because of recall    AUGMENTATION MAMMAPLASTY Bilateral 2012    BREAST BIOPSY Left 07/23/2012    BREAST EXCISIONAL BIOPSY Right     age 39    BREAST IMPLANT Right 01/28/2020    Procedure: BREAST IMPLANT EXCHANGE WITH CAPSULECTOMY;  Surgeon: Da Canales MD;  Location: [facility];  Service: Plastics    BREAST IMPLANT REMOVAL Right 01/28/2020    implant placement, implant revision, right mastopexy    CYSTOSTOMY      with basket extraction of calculus; x2    EXCISION / BIOPSY BREAST / NIPPLE / DUCT Right 05/04/2020    scar revision to resuture non healing surgical wound    EXPLORATORY LAPAROTOMY      FL INJECTION LEFT SHOULDER (ARTHROGRAM)  07/19/2023    FOOT SURGERY      FRACTURE SURGERY  Lt wrist 9/2014 - Lt matthew femur 9/14    HIP FRACTURE SURGERY Right     Subcapital    HIP SURGERY Left     JOINT REPLACEMENT  Rt hip 10/2012    LEG SURGERY      Repair    MASTECTOMY Left 08/16/2012    UT GRAFTING OF AUTOLOGOUS FAT BY LIPO 50 CC OR LESS Bilateral 08/22/2023    Procedure: AUTOLOGOUS FAT GRAFTING TO BILATERAL BREASTS;  Surgeon: Da Canales MD;  Location: [facility];  Service: Plastics    UT MASTOPEXY Right 01/28/2020    Procedure: BREAST MASTOPEXY;  Surgeon: Da Canales MD;  Location: [facility];  Service: Plastics    UT REVISION OF RECONSTRUCTED BREAST Right 05/04/2020    Procedure: REVISION RIGHT BREAST MOUND;  Surgeon: Da Canales MD;  [facility];  Service: Plastics    REDUCTION MAMMAPLASTY Right 01/28/2020    reduced to match left side    SENTINEL LYMPH NODE  BIOPSY Left 2012    SKIN BIOPSY      TONSILLECTOMY      TUBAL LIGATION      WRIST SURGERY Left        Family History   Problem Relation Age of Onset    Coronary artery disease Mother     Hyperlipidemia Mother     Rheum arthritis Mother     Other Father         Acute myocardial infarction    No Known Problems Maternal Grandmother     Hodgkin's lymphoma Maternal Grandfather         age at dx unk    No Known Problems Paternal Grandmother     No Known Problems Paternal Grandfather     Stroke Son 49    No Known Problems Son     Cancer Maternal Aunt 70        bladder    Heart disease Maternal Aunt     Stroke Maternal Aunt         6 CVA before death    Breast cancer Maternal Aunt     Colon cancer Maternal Uncle 65    Hodgkin's lymphoma Maternal Uncle 70    No Known Problems Paternal Aunt     No Known Problems Paternal Aunt     No Known Problems Paternal Aunt      I have reviewed and agree with the history as documented.    E-Cigarette/Vaping    E-Cigarette Use Never User      E-Cigarette/Vaping Substances    Nicotine No     THC No     CBD No     Flavoring No     Other No     Unknown No      Social History     Tobacco Use    Smoking status: Former     Current packs/day: 0.00     Average packs/day: 1 pack/day for 35.0 years (35.0 ttl pk-yrs)     Types: Cigarettes     Start date:      Quit date:      Years since quittin.9    Smokeless tobacco: Never   Vaping Use    Vaping status: Never Used   Substance Use Topics    Alcohol use: No    Drug use: No        Review of Systems   Constitutional:  Negative for chills and fever.   HENT:  Negative for ear pain and sore throat.    Eyes:  Negative for pain and visual disturbance.   Respiratory:  Negative for cough and shortness of breath.    Cardiovascular:  Negative for chest pain and palpitations.   Gastrointestinal:  Negative for abdominal pain and vomiting.   Genitourinary:  Negative for dysuria and hematuria.   Musculoskeletal:  Positive for arthralgias and back  pain.   Skin:  Negative for color change and rash.   Neurological:  Negative for seizures and syncope.   All other systems reviewed and are negative.      Physical Exam  ED Triage Vitals [12/14/23 1900]   Temperature Pulse Respirations Blood Pressure SpO2   99.1 °F (37.3 °C) 102 17 159/80 98 %      Temp Source Heart Rate Source Patient Position - Orthostatic VS BP Location FiO2 (%)   Oral Monitor Sitting Right arm --      Pain Score       5             Orthostatic Vital Signs  Vitals:    12/14/23 1900   BP: 159/80   Pulse: 102   Patient Position - Orthostatic VS: Sitting       Physical Exam  Vitals and nursing note reviewed.   Constitutional:       General: She is not in acute distress.     Appearance: Normal appearance. She is not ill-appearing.   HENT:      Head: Normocephalic and atraumatic.      Mouth/Throat:      Mouth: Mucous membranes are moist.      Pharynx: Oropharynx is clear.   Eyes:      General: No scleral icterus.     Conjunctiva/sclera: Conjunctivae normal.   Cardiovascular:      Rate and Rhythm: Normal rate and regular rhythm.      Heart sounds: Normal heart sounds.   Pulmonary:      Effort: Pulmonary effort is normal. No respiratory distress.      Breath sounds: Normal breath sounds.   Abdominal:      General: Abdomen is flat. There is no distension.      Tenderness: There is no abdominal tenderness.   Musculoskeletal:         General: No tenderness or signs of injury.      Right wrist: Normal.      Left wrist: No tenderness or bony tenderness.      Right hand: No tenderness or bony tenderness. Normal range of motion.      Left hand: Normal.      Cervical back: Normal and neck supple. No rigidity.      Thoracic back: Normal.      Lumbar back: Normal.   Skin:     General: Skin is warm.      Coloration: Skin is not jaundiced.      Findings: Bruising present. No erythema or rash.      Comments: Patient has some ecchymoses in the area of the right second metacarpal, also some ecchymoses on the anterior  left wrist, both of these regions were nontender to palpation and she had good strength of these extremities, neurovascularly intact distally   Neurological:      General: No focal deficit present.      Mental Status: She is alert. Mental status is at baseline.   Psychiatric:         Mood and Affect: Mood normal.         Behavior: Behavior normal.         ED Medications  Medications - No data to display    Diagnostic Studies  Results Reviewed       None                   No orders to display         Procedures  Procedures      ED Course                                       Medical Decision Making  Jihan is a 73 year old female with a history of multiple sclerosis, presenting for evaluation for recent frequent falls.  Patient endorsed several incidental falls, denied sensation of feeling more off balance than her baseline given her MS.  She also denied any areas of tenderness at this time, stated that she came to the emergency department today out of an abundance of caution at the recommendation of her PCP.  Patient was well-appearing on exam and not in any acute distress, she did not have any tenderness in her extremities, normal strength in all extremities.  I did notice some ecchymoses on the right second metacarpal region, however she had good  strength and no tenderness in that area.  She also had some ecchymoses on the anterior left wrist, she had full range of motion of the left wrist and normal strength of that upper extremity, no tenderness in the region.  She did not have any spinal tenderness on examination.    Patient was well-appearing, had no physical exam findings to suggest any significant injury such as acute fractures.  Her history also was not consistent with a MS flare, many of the falls can be explained by her tripping and not necessarily just feeling off balance.  Patient would like to be discharged home, I am agreeable to this plan, advised that she follow-up with her PCP and neurologist, I  gave strict return precautions, she was understanding and agreeable to plan.    Problems Addressed:  Frequent falls: acute illness or injury          Disposition  Final diagnoses:   Frequent falls     Time reflects when diagnosis was documented in both MDM as applicable and the Disposition within this note       Time User Action Codes Description Comment    12/14/2023  8:32 PM Dany Clark Add [R29.6] Frequent falls           ED Disposition       ED Disposition   Discharge    Condition   Stable    Date/Time   Thu Dec 14, 2023  8:32 PM    Comment   Jacy Rodrigez discharge to home/self care.                   Follow-up Information       Follow up With Specialties Details Why Contact Info    Miya Calles,  Family Medicine Schedule an appointment as soon as possible for a visit  As needed [address] [place] [address]  [phone]              Discharge Medication List as of 12/14/2023  8:32 PM        CONTINUE these medications which have NOT CHANGED    Details   ALPHA LIPOIC ACID PO Take by mouth, Historical Med      Ampyra 10 MG TB12 1 tab po bid, Normal      ascorbic acid (VITAMIN C) 500 mg tablet Take 500 mg by mouth daily, Historical Med      baclofen 10 mg tablet 1 tab pos q am, 1 tab po q pm and 2 tabs hs, Normal      Biotin 5000 MCG TABS Starting Sun 10/21/2018, Historical Med      Cholecalciferol (VITAMIN D3) 1000 units CAPS Take 5,000 Units by mouth  , Historical Med      clonazePAM (KlonoPIN) 0.5 mg tablet TAKE 1 AND 1/2 TABLETS BY MOUTH DAILY AT BEDTIME, Normal      Cranberry 125 MG TABS Take 125 mg by mouth in the morning, Historical Med      cycloSPORINE (RESTASIS) 0.05 % ophthalmic emulsion Administer 1 drop to both eyes every 12 (twelve) hours, Starting Fri 4/7/2023, Historical Med      denosumab (PROLIA) 60 mg/mL Inject under the skin, Historical Med      docusate sodium (COLACE) 100 mg capsule Take 1 capsule by mouth every other day  , Starting Tue 9/1/2020, Historical Med       gabapentin (NEURONTIN) 600 MG tablet Take one tablet in the AM, 1 tabs in the afternoon  and 2 tabs at bedtime, Normal      lisinopril (ZESTRIL) 5 mg tablet Take 0.5 tablets (2.5 mg total) by mouth daily, Starting Mon 12/11/2023, Normal      Magnesium 500 MG TABS Take 500 tablets by mouth once, Historical Med      solifenacin (VESICARE) 10 MG tablet TAKE 1 TABLET DAILY, Starting Fri 10/20/2023, Normal      zonisamide (ZONEGRAN) 100 mg capsule TAKE 4 CAPSULES DAILY AT   BEDTIME, Starting Tue 9/26/2023, Normal           No discharge procedures on file.    PDMP Review         Value Time User    PDMP Reviewed  Yes 7/18/2023  3:22 PM Tito Jolley PA-C             ED Provider  Attending physically available and evaluated Jacy Rodrigez. I managed the patient along with the ED Attending.    Electronically Signed by           Dany Clark DO  12/14/23 [id number]

## 2023-12-15 NOTE — TELEPHONE ENCOUNTER
Rev notes from er and pcp.  Called pt.  Pt with 2 of the 4 falls related to accident.  Wristlet from cane dropped down and pt fell over the band.  Pt notes door getting stuck and when she pushed on it, door swung back. Spouse just had surgery 10 days ago and pt has been main caregiver. Pt notes feeling exhausted.  Sleeping when I called but very appropriate, answered all questions.  Rev with pt low threshold to go back to er if any further falls. Pt notes less po intake due to being in er.  Encouraged hydration.  Will order mri t spine to complete neuro eval. Pt currently in PT but has not gone recently due to [facility].  Rx for mri t spine in emr. Please help with setting up for pt.

## 2023-12-15 NOTE — TELEPHONE ENCOUNTER
Spoke with pt and advised her of Dr. Dudley Aguillon message. Pt says she has enough Ampyra now until pharmacy sends other half. Pt states she fell 4 times in the past 24 hours. Will address in telephone encounter.

## 2023-12-15 NOTE — DISCHARGE INSTRUCTIONS
Schedule follow-up appoint with your PCP as needed and with your neurologist, should you develop any pain or should you develop any other concerning signs or symptoms, return to the emergency department for evaluation

## 2023-12-15 NOTE — TELEPHONE ENCOUNTER
Called pt regarding different matter and pt reports that she fell four times in last 24 hours. Feels like she was run over by a truck. Notified PCP and was seen at their office yesterday where they advised her to go to the ED. Pt says she went to the ED and there was nothing they could do. Pt was discharged.      Pt says she just doesn't know why she keeps falling. Didn't really hurt herself. No injury. Was exhausted. Pt says probably due to  had spinal surgery and pt has been caring for him.  Pt says her legs are like concrete, can't even lift them.    Dr. Briceno - Please review PCP chart notes and ED notes. Please advise.

## 2023-12-15 NOTE — ED ATTENDING ATTESTATION
12/14/2023  I, Norman Dhaliwal MD, saw and evaluated the patient. I have discussed the patient with the resident/non-physician practitioner and agree with the resident's/non-physician practitioner's findings, Plan of Care, and MDM as documented in the resident's/non-physician practitioner's note, except where noted. All available labs and Radiology studies were reviewed.  I was present for key portions of any procedure(s) performed by the resident/non-physician practitioner and I was immediately available to provide assistance.       At this point I agree with the current assessment done in the Emergency Department.  I have conducted an independent evaluation of this patient a history and physical is as follows:    ED Course         Critical Care Time  Procedures

## 2023-12-18 ENCOUNTER — APPOINTMENT (OUTPATIENT)
Dept: PHYSICAL THERAPY | Facility: CLINIC | Age: 73
End: 2023-12-18
Payer: MEDICARE

## 2023-12-21 ENCOUNTER — OFFICE VISIT (OUTPATIENT)
Dept: PHYSICAL THERAPY | Facility: CLINIC | Age: 73
End: 2023-12-21
Payer: MEDICARE

## 2023-12-21 DIAGNOSIS — G35 MULTIPLE SCLEROSIS (HCC): ICD-10-CM

## 2023-12-21 DIAGNOSIS — R53.1 WEAKNESS: ICD-10-CM

## 2023-12-21 DIAGNOSIS — R26.9 GAIT ABNORMALITY: Primary | ICD-10-CM

## 2023-12-21 PROCEDURE — 97530 THERAPEUTIC ACTIVITIES: CPT | Performed by: PHYSICAL THERAPIST

## 2023-12-21 PROCEDURE — 97112 NEUROMUSCULAR REEDUCATION: CPT | Performed by: PHYSICAL THERAPIST

## 2023-12-21 PROCEDURE — 97110 THERAPEUTIC EXERCISES: CPT | Performed by: PHYSICAL THERAPIST

## 2023-12-21 NOTE — PROGRESS NOTES
Daily Note     Today's date: 2023  Patient name: Jacy Rodrigez  : 1950  MRN: [id number]  Referring provider: Patrick Naylor DO  Dx:   Encounter Diagnosis     ICD-10-CM    1. Gait abnormality  R26.9       2. Weakness  R53.1       3. Multiple sclerosis (HCC)  G35                      Subjective: Pt has been struggling the past couple of weeks with falls and LE weakness. She has experienced a total of 4 falls and is now walking with a RW. Pt went to ED on  secondary to frequent falls and was not admitted. She notes that she has a massive ecchymosis on right lateral thigh. Pt also now on lisinopril for HTN.       Objective: See treatment diary below  Objective     Strength/Myotome Testing     Left Hip   Planes of Motion   Flexion: 3  Abduction: 3  Adduction: 3+  External rotation: 3-  Internal rotation: 3+    Right Hip   Planes of Motion   Flexion: 4-  Abduction: 3+  Adduction: 4-  External rotation: 3+  Internal rotation: 3+    Left Knee   Flexion: 3  Extension: 3    Right Knee   Flexion: 3+  Extension: 3+    Left Ankle/Foot   Dorsiflexion: 3-  Plantar flexion: 3  Inversion: 3  Eversion: 3-    Right Ankle/Foot   Dorsiflexion: 3+  Plantar flexion: 3+  Inversion: 3+  Eversion: 3+    Ambulation: RW for safety.     Assessment: Pt admits to having 4 falls over the 2 weeks. She went to ED but no imaging taking. Pt does admit to having a massive ecchymosis on right lateral thigh. Upon assessment of strength, pt's right RLE is stronger than evaluation (prior to fall) but her left LE continues to be weak. Vitals appropriate for treatment. Tolerated treatment fair. Resumed LE PREs and functional exercises. Patient would benefit from continued PT.      Plan: Continue per plan of care.      Precautions: MS, Gait dysfunction, Insomnia, Osteoporosis, Peripheral polyneuropathy, Hx of Breast CA, R IDALIA in - Dr. Rushing, Left femur IM nailing       Manuals                                    "                            Neuro Re-Ed             DLS on foam Feet together yellow foam 10 shld flex, 10 shld abd, 10x press  Feet together yellow foam 10 shld flex, 10 shld abd, 10x press  Feet together airex foam 10 shld flex, 10 shld abd          Tandem balance             Standing hip abd 3x10 2x10 2x10          Standing hip ext             Standing SLR                                       Ther Ex             Leg press             Hamstring stretching  Seated 5x10\" B           Calf SB stretching  4x30\" with PT assist           Hamstring curl  Standing 30x on R and 16x on L. Bloster to block hip flexion Standing 20x on eac Standing 20x on eac          LAQ   2x10                                                 Ther Activity             Lateral walks With L shld ER isometric rtb 4x10 feet  NP           Mini squats             Step ups             Leeann walks              6 minute walk test             Standing HR/TR 3x10 3x10 3x10          Marching 3x10  2x10 3x10          Gait Training                                       Vitals             BP  164/92 142/78          SpO2  97% 99%          HR  67 60          1:1 with PT from 1030-1115am           "

## 2023-12-26 ENCOUNTER — OFFICE VISIT (OUTPATIENT)
Dept: PHYSICAL THERAPY | Facility: CLINIC | Age: 73
End: 2023-12-26
Payer: MEDICARE

## 2023-12-26 DIAGNOSIS — R26.9 GAIT ABNORMALITY: Primary | ICD-10-CM

## 2023-12-26 DIAGNOSIS — G35 MULTIPLE SCLEROSIS (HCC): ICD-10-CM

## 2023-12-26 DIAGNOSIS — R53.1 WEAKNESS: ICD-10-CM

## 2023-12-26 PROCEDURE — 97110 THERAPEUTIC EXERCISES: CPT

## 2023-12-26 PROCEDURE — 97112 NEUROMUSCULAR REEDUCATION: CPT

## 2023-12-26 NOTE — PROGRESS NOTES
"Daily Note     Today's date: 2023  Patient name: Jacy Rodrigez  : 1950  MRN: [id number]  Referring provider: Patrick Naylor DO  Dx:   Encounter Diagnosis     ICD-10-CM    1. Gait abnormality  R26.9       2. Weakness  R53.1       3. Multiple sclerosis (HCC)  G35                      Subjective: Pt reports she is doing ok, feeling a bit stiff today. The weather has been affecting her.      Objective: See treatment diary below      Assessment: Tolerated treatment well. Pt able to perform all exercises without issue, continue to progress to tolerance. Pt required occasional breaks between exercises. Pt would benefit from continued focus on endurance, strengthening, and stability exercises to increase overall function. Patient demonstrated fatigue post treatment, exhibited good technique with therapeutic exercises, and would benefit from continued PT      Plan: Continue per plan of care.      Precautions: MS, Gait dysfunction, Insomnia, Osteoporosis, Peripheral polyneuropathy, Hx of Breast CA, R IDALIA in - Dr. Rushing, Left femur IM nailing       Manuals                                                              Neuro Re-Ed             DLS on foam Feet together yellow foam 10 shld flex, 10 shld abd, 10x press  Feet together yellow foam 10 shld flex, 10 shld abd, 10x press  Feet together airex foam 10 shld flex, 10 shld abd Feet together airex foam 10 shld flex, 10 shld abd         Tandem balance             Standing hip abd 3x10 2x10 2x10 2x10         Standing hip ext             Standing SLR                                       Ther Ex             Leg press             Hamstring stretching  Seated 5x10\" B           Calf SB stretching  4x30\" with PT assist           Hamstring curl  Standing 30x on R and 16x on L. Bloster to block hip flexion Standing 20x on eac Standing 20x on eac Standing 20x on ea         LAQ   2x10 2x10                                              "   Ther Activity             Lateral walks With L shld ER isometric rtb 4x10 feet  NP           Mini squats             Step ups             Leeann walks              6 minute walk test             Standing HR/TR 3x10 3x10 3x10 3x10         Marching 3x10  2x10 3x10 3x10         Gait Training                                       Vitals             BP  164/92 142/78          SpO2  97% 99%          HR  67 60          1:1 with PT from 1030-1100a

## 2023-12-28 ENCOUNTER — APPOINTMENT (OUTPATIENT)
Dept: PHYSICAL THERAPY | Facility: CLINIC | Age: 73
End: 2023-12-28
Payer: MEDICARE

## 2024-01-02 ENCOUNTER — OFFICE VISIT (OUTPATIENT)
Dept: PHYSICAL THERAPY | Facility: CLINIC | Age: 74
End: 2024-01-02
Payer: MEDICARE

## 2024-01-02 DIAGNOSIS — R26.9 GAIT ABNORMALITY: Primary | ICD-10-CM

## 2024-01-02 DIAGNOSIS — R53.1 WEAKNESS: ICD-10-CM

## 2024-01-02 DIAGNOSIS — G35 MULTIPLE SCLEROSIS (HCC): ICD-10-CM

## 2024-01-02 PROCEDURE — 97530 THERAPEUTIC ACTIVITIES: CPT | Performed by: PHYSICAL THERAPIST

## 2024-01-02 PROCEDURE — 97110 THERAPEUTIC EXERCISES: CPT | Performed by: PHYSICAL THERAPIST

## 2024-01-02 PROCEDURE — 97112 NEUROMUSCULAR REEDUCATION: CPT | Performed by: PHYSICAL THERAPIST

## 2024-01-02 NOTE — PROGRESS NOTES
"Daily Note     Today's date: 2024  Patient name: Jacy Rodrigez  : 1950  MRN: [id number]  Referring provider: Patrick Naylor DO  Dx:   Encounter Diagnosis     ICD-10-CM    1. Gait abnormality  R26.9       2. Multiple sclerosis (HCC)  G35       3. Weakness  R53.1                      Subjective: Pt is back ambulating with SPC. She complains of distal LE feeling like lead.       Objective: See treatment diary below      Assessment: Tolerated treatment well. Pt progressed in today's appointment. She is safely ambulating with a SPC. She was challenged with idris walks and lateral walks secondary to decreased foot clearance. Patient would benefit from continued PT.       Plan: Continue per plan of care.      Precautions: MS, Gait dysfunction, Insomnia, Osteoporosis, Peripheral polyneuropathy, Hx of Breast CA, R IDALIA in - Dr. Rushing, Left femur IM nailing       Manuals                                                   Neuro Re-Ed           DLS on foam Feet together yellow foam 10 shld flex, 10 shld abd, 10x press  Feet together yellow foam 10 shld flex, 10 shld abd, 10x press  Feet together airex foam 10 shld flex, 10 shld abd Feet together airex foam 10 shld flex, 10 shld abd Feet together airex foam 20 shld flex, 20 shld abd      Tandem balance           Standing hip abd 3x10 2x10 2x10 2x10       Standing hip ext           Standing SLR                                 Ther Ex           Leg press           Hamstring stretching  Seated 5x10\" B         Calf SB stretching  4x30\" with PT assist         Hamstring curl  Standing 30x on R and 16x on L. Bloster to block hip flexion Standing 20x on eac Standing 20x on eac Standing 20x on ea Standing 20x on ea      LAQ   2x10 2x10 1# 2x10 on ea                                       Ther Activity           Lateral walks With L shld ER isometric rtb 4x10 feet  NP   4x15 feet      Mini squats     Sit to stand 10x with UE support     "   Step ups           Leeann walks      6x 10 feet 3 hurdles      6 minute walk test           Standing HR/TR 3x10 3x10 3x10 3x10 3x10      Marching 3x10  2x10 3x10 3x10 3x10      Gait Training                                 Vitals           BP  164/92 142/78  142/82      SpO2  97% 99%        HR  67 60        1:1 with PT from [id number]

## 2024-01-04 ENCOUNTER — APPOINTMENT (OUTPATIENT)
Dept: LAB | Facility: CLINIC | Age: 74
End: 2024-01-04
Payer: MEDICARE

## 2024-01-04 ENCOUNTER — OFFICE VISIT (OUTPATIENT)
Dept: PHYSICAL THERAPY | Facility: CLINIC | Age: 74
End: 2024-01-04
Payer: MEDICARE

## 2024-01-04 ENCOUNTER — TRANSCRIBE ORDERS (OUTPATIENT)
Dept: LAB | Facility: CLINIC | Age: 74
End: 2024-01-04

## 2024-01-04 DIAGNOSIS — G35 MULTIPLE SCLEROSIS (HCC): ICD-10-CM

## 2024-01-04 DIAGNOSIS — M81.0 SENILE OSTEOPOROSIS: ICD-10-CM

## 2024-01-04 DIAGNOSIS — R26.9 GAIT ABNORMALITY: Primary | ICD-10-CM

## 2024-01-04 DIAGNOSIS — R53.1 WEAKNESS: ICD-10-CM

## 2024-01-04 DIAGNOSIS — M81.0 SENILE OSTEOPOROSIS: Primary | ICD-10-CM

## 2024-01-04 LAB
25(OH)D3 SERPL-MCNC: 105.9 NG/ML (ref 30–100)
CALCIUM SERPL-MCNC: 9.5 MG/DL (ref 8.4–10.2)

## 2024-01-04 PROCEDURE — 97110 THERAPEUTIC EXERCISES: CPT | Performed by: PHYSICAL THERAPIST

## 2024-01-04 PROCEDURE — 82306 VITAMIN D 25 HYDROXY: CPT

## 2024-01-04 PROCEDURE — 82310 ASSAY OF CALCIUM: CPT

## 2024-01-04 PROCEDURE — 36415 COLL VENOUS BLD VENIPUNCTURE: CPT

## 2024-01-04 PROCEDURE — 97530 THERAPEUTIC ACTIVITIES: CPT | Performed by: PHYSICAL THERAPIST

## 2024-01-04 NOTE — PROGRESS NOTES
"[facility]  Sleep Medicine Follow up/ Established Patient Visit      Assessment/Plan:  Jihan was seen today for follow-up.    Diagnoses and all orders for this visit:    Chronic insomnia  -     Home Study; Future    Excessive daytime sleepiness  -     Home Study; Future        Jihan is a pleasant 73-year-old woman with a PMHx of multiple sclerosis (sees Dr. Valerio), HTN, history of breast cancer, B12 deficiency, HLD who presents in follow up for chronic sleep maintenance insomnia.  Certainly, she has numerous stressors, both past and present, that are likely contributing to her ongoing insomnia; however, predominantly given her age and other medications (particularly Klonopin, which has been helpful for her), I am very hesitant to start any other sleep-related medications.  I would, however, like to rule out other potential contributory causes of the insomnia, as well as alternative treatment options.    Discussed the pathophysiology behind chronic insomnia, including the 3-P model  Discussed that CBT-I is first-line for treatment of chronic insomnia, and has the best data supporting its efficacy  Referral placed for formal Cognitive Behavioral Therapy for Insomnia (CBT-I)  Information provided regarding the GO! To Sleep program through [facility], should the patient be interested in pursuing this.  I am okay with her continuing Klonopin in the interim, as she has had no major side effects and it is working well for her.  However, I did caution her that if she wishes to pursue the CBT-high or Go! to Sleep program, she should cease the Klonopin in order to obtain the best result.  She does not need refills as yet, however should she in the future if she may: And I will provide these.  In addition, I would like to obtain a home sleep apnea test to ensure there is no underlying sleep disordered breathing present, particularly given the general lack of \"classic\" sleep apnea symptoms that can " "be seen in certain patient populations, including women and anyone over the age of 65; in addition, sleep disordered breathing commonly presents more as an insomnia in the female population.  She does tell me that she is extremely claustrophobic, so we will likely need to discuss alternative treatment options to PAP therapy pending the results of the HSAT.  I will plan to see her back in ~6 months      ________________________________________________________________________________________________    Per Last Visit Note (Date: 1/11/2023):  Reason for Visit:     72 y.o.female with restless legs/PLMD and insomnia     Assessment:  The patient seems to be doing well on clonazepam 0.75 mg at bedtime.  She has more good nights than bad nights.     Plan:  Continue same.     Follow up:  1 year      Sleep Studies:  None    ________________________________________________________________________________________________      Interval History: Jacy Rodrigez is a 73 y.o. female with a PMHx of multiple sclerosis (sees Dr. Valerio), HTN, history of breast cancer, B12 deficiency, HLD who presents in follow up for restless leg syndrome and insomnia.    She tells me \"sleep is still cyclic.\" May have 1-2 weeks where she sleeps until ~0400, sometimes can fall back asleep for an hour or so.     Recently  (previously very healthy) started to fall, just had spinal surgery a month ago. \"I've worn myself ragged\" and doesn't sleep well because she's listening for him. She keeps waking up, getting out of bed, checking on him. Thus she is very tired, and it affects her MS (not keeping a schedule affects this too); this is affecting her gait.    Now, her  is back to sleeping in bed. Son(functioning autistic young man) is also living with them. Also endorses greatly diminished strength in her arms from MS and fractures.    Off Copaxone now (stopped making autoinjectors, plus she had a fractured shoulder and abdominal surgery, " "which affected her ability to inject herself). Still on gabapentin for MS nerve pain (this also wakes her at night at times).     RLS: Denies RLS symptoms. Will sometimes get \"nerve shocks.\" \"Like being tasered.\"      Insomnia:  -Current Symptom Severity/Description: See above. States it was originally started for problems staying asleep.  told her she doesn't snore. Some daytime sleepiness if she stops moving, however endorses significant daytime fatigue Denies nocturnal GERD, bruxism, palpitations.  -Current treatment: Klonopin 0.75mg (helps her fall asleep, keeps her asleep until ~0300. Usually can fall back asleep, unless  wakes her up as he wakes up earlier).   -Denies side effects.  -Prior Treatments Tried:   -Alternative medication in the past, does not recall what it was.      [place] Sleepiness Scale:  What are your chances of dozing?   0= no chance  1= slight chance  2= moderate chance  3= high chance    Sitting and reading: 3   Watching TV: 3  Sitting, inactive in a public place (e.g. a theatre or a meeting):2  As a passenger in a car for an hour without a break: 1  Lying down to rest in the afternoon when circumstances permit: 0   Sitting and talking to someone: 0  Sitting quietly after a lunch without alcohol: 0  In a car, while stopped for a few minutes in the traffic: 0       TOTAL  9/24  Greater or equal to 10 is positive for excessive daytime sleepiness        SLEEP HYGIENE QUESTIONS:  Bedtime: 2200   Time it takes to fall sleep: 30 minutes  Wake up Time: see above.   Number of times patient wakes up per night: See above  Reason (s) why patient wakes up during the night: See above   Estimated total sleep time (in a 24 hour period of time): ~5 hours   Naps: 0    Changes to PMH, PSH, SH: Fat graft to mastectomy 8/22/2023     SLEEP RELATED ROS  Review of Systems  Allergies   Allergen Reactions    Duloxetine Hcl      Rapid Heart rate      Iodinated Contrast Media Anaphylaxis     IVP dye    " "Acetaminophen Other (See Comments)     Heart palpitations    Cetirizine      Only occurs with generic, weakness in legs, off balance and couldn't walk.    Morphine Other (See Comments)     Migraine       CURRENT MEDICATIONS:  Current Outpatient Medications   Medication Instructions    ALPHA LIPOIC ACID PO Oral    Ampyra 10 MG TB12 1 tab po bid    ascorbic acid (VITAMIN C) 500 mg, Oral, Daily    baclofen 10 mg tablet 1 tab pos q am, 1 tab po q pm and 2 tabs hs    Biotin 5000 MCG TABS No dose, route, or frequency recorded.    clonazePAM (KlonoPIN) 0.5 mg tablet TAKE 1 AND 1/2 TABLETS BY MOUTH DAILY AT BEDTIME    Cranberry 125 mg, Oral, Daily    cycloSPORINE (RESTASIS) 0.05 % ophthalmic emulsion 1 drop, Both Eyes, Every 12 hours    denosumab (PROLIA) 60 mg/mL Subcutaneous    docusate sodium (COLACE) 100 mg capsule 1 capsule, Oral, Every other day    gabapentin (NEURONTIN) 600 MG tablet Take one tablet in the AM, 1 tabs in the afternoon  and 2 tabs at bedtime    lisinopril (ZESTRIL) 2.5 mg, Oral, Daily    Magnesium 500 MG TABS 500 tablets, Oral, Once    solifenacin (VESICARE) 10 mg, Oral, Daily    Vitamin D3 5,000 Units, Oral    zonisamide (ZONEGRAN) 400 mg, Daily at bedtime           PHYSICAL EXAMINATION:  Vital Signs: /80   Pulse 72   Ht 5' 4\" (1.626 m)   Wt 64.6 kg (142 lb 6.4 oz)   SpO2 98%   BMI 24.44 kg/m²     Constitutional: NAD, well appearing   Mental Status: AAOx3  Skin: Warm, dry, no rashes noted   Eyes: PERRL, normal conjunctiva  ENT: Nasal congestion absent, nasal valve incompetence absent.  Posterior Airspace:   Chaevz Tongue Position: 4  Retrognathia: absent  Overbite: absent  High Arched Palate: absent  Tongue Scalloping/Ridging: present  Uvula: normal  Chest: No evidence of respiratory distress, no accessory muscle use; no evidence of peripheral cyanosis  Abdomen: Soft, NT/ND  Extremities: No digital clubbing or pedal edema  Neuro: Strength 4/5 bilateral lower extremities, 3+/5 bilateral " lower extremities; sensation grossly intact      I have spent a total time of 20-30 minutes on 01/05/24 in caring for this patient including Prognosis, Risks and benefits of tx options, Instructions for management, Patient and family education, Importance of tx compliance, Risk factor reductions, Documenting in the medical record, Reviewing / ordering tests, medicine, procedures  , and Obtaining or reviewing history  .        Electronically signed by:    Glen Naylor DO  Board-Certified Neurology and Sleep Medicine  [facility]  01/05/24

## 2024-01-04 NOTE — PROGRESS NOTES
"Daily Note     Today's date: 2024  Patient name: Jacy Rodrigez  : 1950  MRN: [id number]  Referring provider: Patrick Naylor DO  Dx:   Encounter Diagnosis     ICD-10-CM    1. Gait abnormality  R26.9       2. Weakness  R53.1       3. Multiple sclerosis (HCC)  G35           Start Time: 1015  Stop Time: 1100  Total time in clinic (min): 45 minutes    Subjective: Pt was ok after progression of exercises last session. She notes that today is a good day. She is not as fatigued in her LE or experiencing as much soreness/pain.       Objective: See treatment diary below      Assessment: Tolerated treatment well. Pt was able to perform reciprocal idris walks in today's session. Good clearance noted during LE advancement. Patient exhibited good technique with therapeutic exercises and would benefit from continued PT.       Plan: Continue per plan of care.      Precautions: MS, Gait dysfunction, Insomnia, Osteoporosis, Peripheral polyneuropathy, Hx of Breast CA, R IDALIA in - Dr. Rushing, Left femur IM nailing       Manuals                                                  Neuro Re-Ed           DLS on foam Feet together yellow foam 10 shld flex, 10 shld abd, 10x press  Feet together yellow foam 10 shld flex, 10 shld abd, 10x press  Feet together airex foam 10 shld flex, 10 shld abd Feet together airex foam 10 shld flex, 10 shld abd Feet together airex foam 20 shld flex, 20 shld abd Feet together airex foam 20 shld flex, 20 shld abd     Tandem balance           Standing hip abd 3x10 2x10 2x10 2x10       Standing hip ext           Standing SLR                                 Ther Ex           Leg press                      Hamstring stretching  Seated 5x10\" B         Calf SB stretching  4x30\" with PT assist         Hamstring curl  Standing 30x on R and 16x on L. Bloster to block hip flexion Standing 20x on eac Standing 20x on eac Standing 20x on ea Standing 20x on ea Standing 20x " on ea     LAQ   2x10 2x10 1# 2x10 on ea 2# 2x10 ea                                      Ther Activity           Lateral walks With L shld ER isometric rtb 4x10 feet  NP   4x15 feet 4x20 feet     Mini squats     Sit to stand 10x with UE support  Sit to stand 2x10 with UE support      Step ups           Leeann walks      6x 10 feet 3 hurdles 6x 10 feet 5 hurdles-reciprocal      6 minute walk test           Standing HR/TR 3x10 3x10 3x10 3x10 3x10 3x10     Marching 3x10  2x10 3x10 3x10 3x10 3x10     Gait Training                                 Vitals           BP  164/92 142/78  142/82 140/78     SpO2  97% 99%        HR  67 60        1:1 with PT from 1015-11a

## 2024-01-05 ENCOUNTER — OFFICE VISIT (OUTPATIENT)
Dept: SLEEP CENTER | Facility: CLINIC | Age: 74
End: 2024-01-05
Payer: MEDICARE

## 2024-01-05 VITALS
SYSTOLIC BLOOD PRESSURE: 132 MMHG | WEIGHT: 142.4 LBS | HEIGHT: 64 IN | BODY MASS INDEX: 24.31 KG/M2 | OXYGEN SATURATION: 98 % | DIASTOLIC BLOOD PRESSURE: 80 MMHG | HEART RATE: 72 BPM

## 2024-01-05 DIAGNOSIS — F51.04 CHRONIC INSOMNIA: Primary | ICD-10-CM

## 2024-01-05 DIAGNOSIS — G47.19 EXCESSIVE DAYTIME SLEEPINESS: ICD-10-CM

## 2024-01-05 PROCEDURE — 99213 OFFICE O/P EST LOW 20 MIN: CPT | Performed by: STUDENT IN AN ORGANIZED HEALTH CARE EDUCATION/TRAINING PROGRAM

## 2024-01-05 NOTE — PATIENT INSTRUCTIONS
MAGDI Evaluation:    I want to rule-out sleep apnea, as we discussed.  - Sleep apnea is a serious sleep disorder that occurs when a person's breathing is interrupted during sleep. People with untreated sleep apnea stop breathing repeatedly during their sleep, sometimes hundreds of times during the night.  The most common type of sleep apnea is obstructive sleep apnea.  - If left untreated, obstructive sleep apnea can result in a number of health problems including hypertension, stroke, arrhythmias, cardiomyopathy (enlargement of the muscle tissue of the heart), heart failure, diabetes, obesity, and heart attacks. It has also been found to make chronic medical conditions (such as high blood pressure, migraine headaches, and seizures (more difficult to manage.  - Treatment options for obstructive sleep apnea may include positive airway pressure (PAP) therapy, oral appliance, hypoglossal nerve stimulator, nose/throat surgery, weight loss, side sleeping, or avoidance of medications or substances that can relax the airway muscles (alcohol, benzodiazepines, and opioids).  I have ordered a Home Sleep Apnea Test (HSAT) to evaluate for sleep apnea.  -If this is negative, I will order an in-lab polysomnogram (PSG) as discussed to be sure that sleep apnea is not present.  This test should be scheduled at your earliest convenience.   - Sleep Clinic: This should be scheduled at the  before you leave today.      - Avoid driving if drowsy. We recommend that if you are dozing off while driving, that you do not drive until your sleepiness is appropriately treated.  We encourage a healthy lifestyle with adequate sleep (7-9 hours per night), diet and exercise.        For Your Insomnia:    The gold standard of treatment for insomnia is cognitive behavioral therapy for insomnia (CBTI}. Medications come with side effects and once stopped, may no longer help the underlying sleep problem. Data on CBTI shows lasting effects of this  "intervention.     1. I am also placing a referral for Cognitive Behavioral Therapy for Insomnia (CBT-I).   2. There is also online program for CBT-I called \"GO! To Sleep\". Information is below.  3. Call in if/when you need refills on the Klonopin.      Good Sleep Hygiene    Wake up at the same time every day, even on the weekends.  Use your bed for sleep and intimacy only.  If you have been in bed awake for 30 minutes, get up and leave the bedroom. Choose a dull activity not involving a blue screen (TV, computer, handheld devices). Go back to bed when you feel sleepy.  Avoid caffeine, nicotine and alcohol before you go to bed.  Avoid large meals before you go to bed.  Avoid using screens (computers, tablets, smartphones, etc.) for at least 1 hour before bedtime  Exercise regularly, but do not exercise right before you go to bed.  Avoid daytime naps. If you do take a nap, sleep for 20-40 minutes, and not after dinner.        GO! to Sleep Instructions  Your health care provider has recommended GO! to Sleep, a six-week online program developed by experts at the [facility]. GO! to Sleep follows a similar treatment plan to those used at the [facility] and other [facility] sleep clinics.  How It Works  The program uses proven methods to help you improve your sleep from the comfort and privacy of your own home. A recent study in the journal SLEEP found that 81 percent of patients who completed a five-week online program for insomnia reported improvement in sleep.   What You Get  Starting in Lesson 1, you will begin keeping a daily online sleep log. Each day after you fill out your log, you will get a sleep score. Based on your sleep log information and sleep score, you will receive individualized feedback and daily sleep-improvement recommendations.  In addition, you will learn the basic science of sleep and why certain behaviors can be detrimental to your sleep. You will also be given " activities to help you get the sleep you need, educational articles to help you get the most out of the program, motivational tips, and personalized progress charts. Plus, you will receive six specially crafted audio relaxation practices that will help you manage stress and improve your sleep.  Who It's For  GO! to Sleep was designed for those experiencing short-term insomnia (development of insomnia in the past one to six months) as well as people with ongoing difficulties with insomnia (six months or longer). If you use sleep medication on an occasional basis, the program will help you learn techniques that will likely help you sleep better without medication.  Although GO! to Sleep was not designed for people who take prescription sleep medications on a nightly basis, you can follow the program while on sleep medication and learn tools to help you discontinue medication use. However, the program is not intended to help you implement a plan specifically to taper off your medication. If you are looking for this type of help, discuss this with your prescribing provider and/or ask for a referral to the [facility] for further assessment and an individualized treatment plan.  Additionally, if you feel that depression, anxiety or a recent traumatic event may be causing or contributing to your poor sleep, you may want to consult with a mental health professional. The [facility] offers sleep specialists who can help patients taper off medications as well as mental health experts who can help with contributing factors of insomnia. For additional information about this option, call [phone]. The program in on the URL: https://Rewind Me with the specific link being: [email or web address]://[email or web address]/collections/online-programs/products/go-to-sleep-online

## 2024-01-08 ENCOUNTER — EVALUATION (OUTPATIENT)
Dept: PHYSICAL THERAPY | Facility: CLINIC | Age: 74
End: 2024-01-08
Payer: MEDICARE

## 2024-01-08 DIAGNOSIS — R53.1 WEAKNESS: ICD-10-CM

## 2024-01-08 DIAGNOSIS — G35 MULTIPLE SCLEROSIS (HCC): ICD-10-CM

## 2024-01-08 DIAGNOSIS — R26.9 GAIT ABNORMALITY: Primary | ICD-10-CM

## 2024-01-08 PROCEDURE — 97112 NEUROMUSCULAR REEDUCATION: CPT | Performed by: PHYSICAL THERAPIST

## 2024-01-08 PROCEDURE — 97110 THERAPEUTIC EXERCISES: CPT | Performed by: PHYSICAL THERAPIST

## 2024-01-08 PROCEDURE — 97530 THERAPEUTIC ACTIVITIES: CPT | Performed by: PHYSICAL THERAPIST

## 2024-01-08 NOTE — PROGRESS NOTES
[id number]    Today's date: 2024  Patient name: Jacy Rodrigez  : 1950  MRN: [id number]  Referring provider: Elidia Briceno MD  Dx:   Encounter Diagnosis     ICD-10-CM    1. Gait abnormality  R26.9       2. Weakness  R53.1       3. Multiple sclerosis (HCC)  G35                      Assessment  Assessment details: Jacy Rodrigez is a 73 y.o. female who presents with signs and symptoms consistent of a primary balance impairment and ambulatory dysfunction secondary to MS.  Pt has attending PT for 1 month with good progress. No falls since initial evaluation. She all goals have improved at this time. She demonstrates increased LE strength despite LLE still considerable weakness. All functional tests have improved (see below). Her days still vary and fluctuate from  muscle performance, weakness, and pain. She has resumed ambulating with a SPC in a safe manner. Pt still is at risk for falls secondary to weakness, balance/proprioception deficits, and fatigue. Patient would benefit from skilled physical therapy to address the impairments, improve their level of function, and to improve their overall quality of life.      Impairments: abnormal coordination, abnormal gait, abnormal or restricted ROM, abnormal movement, difficulty understanding, impaired balance, impaired physical strength, lacks appropriate home exercise program and poor posture   Understanding of Dx/Px/POC: good   Prognosis: good    Goals  Short Term Goals: to be achieved by 4 weeks  1) Patient to be independent with basic HEP.-MET  2) Increase LE strength by 1/2 MMT grade in all deficient planes.-Partially met  3) Improve TUG by 2 seconds-Partially met  4) Improve 5x sit to stand by 2 seconds-Partially met    Long Term Goals: to be achieved by discharge  1) FOTO equal to or greater than expected.-Partially met  2) Ambulation to improve to maximal level of function-Partially met  3) Stair negotiation will improve to  "reciprocal.-Partially met  4) Sit to stand transfers will improve to maximal level of function -Partially met    Plan  Plan details: Initiate treatment for LE weakness and balance.  Continue treatment on left shoulder on PRN basis  Patient would benefit from: PT eval and skilled physical therapy  Planned therapy interventions: manual therapy, neuromuscular re-education, patient education, strengthening, therapeutic activities, therapeutic exercise, transfer training, home exercise program and functional ROM exercises  Frequency: 2x per week for 4-6 weeks.  Treatment plan discussed with: patient        Subjective Evaluation    History of Present Illness  Mechanism of injury: History of Current Injury: Pt referred to PT from neurology due to LE weakness, balance deficits, and ambulation dysfunction. Pt has MS and recently feels as though her LE weakness is worsening making ADLs and ambulation tolerate difficult. Pt did have a fall last year which resulted in proximal humerus fracture and RTC dysfunction (currently being treated by me). She has made good progress with her shoulder. Pt does report increasing LE pain and weakness. Due to CKD, patient has been weaning from gabapentin and ampyra. Pt currently ambulates with a SPC for safety. She lives at home with her , who will be undergoing spinal surgery in December.   Pt has steps down to the basement but has single level living. Pt has 1-2 3\" steps to enter her house. Pt admits to falling on her knees last night. She denies any injury but her knees are bothersome. Pt's also fell June and 1 time in April of this year. Pt notes that her leg somewhat gave out on her last night.     Special Questions: Positive for peripheral neuropathy  Patient goals:  Reduce unsteadiness, improve LE strength, increase walking and endurance.   Hobbies/Interest: Volunteers as , cooking, cleaning.   Occupation: Retired            Objective     Strength/Myotome Testing "     Left Hip   Planes of Motion   Flexion: 3+  Abduction: 3+  Adduction: 4-  External rotation: 3-  Internal rotation: 3+    Right Hip   Planes of Motion   Flexion: 4-  Abduction: 3+  Adduction: 3+  External rotation: 3+  Internal rotation: 3+    Left Knee   Flexion: 3  Extension: 3    Right Knee   Flexion: 3+  Extension: 3+    Left Ankle/Foot   Dorsiflexion: 3+  Plantar flexion: 3+  Inversion: 3  Eversion: 4-    Right Ankle/Foot   Dorsiflexion: 4-  Plantar flexion: 4-  Inversion: 3  Eversion: 3+    Ambulation     Observational Gait   Gait: antalgic and asymmetric   Decreased walking speed and stride length.     Additional Observational Gait Details  SPC: Pt demonstrates slow but steady sylvia with small step length. She has tremendous difficulty turning without UE support. She does have appropriate LE clearance during swing phase. Her stance phase has shortened secondary to no push of at terminal stance.     Timed up and go:  Date: 32.09 seconds. Moderate use of bilateral hands on rails for assistance. SPC  1/8: 15.70 seconds with Moderate use of hands and SPC    Sit to stand Transfers:   Date: Moderate use of bilateral hands on rails for assistance. Bilateral genu valgum with transfers    5x sit to stand:   Date: 34.67 seconds   1/8: 13.37 seconds with use of UE    2 minute walk test:   Date:   1/8: 187 feet with SPC     MCTSIB:  EO firm: 30 seconds with modate postural sway (Feet apart)   EC firm: 30 seconds with moderate postural sway (Feet apart)  EO foam: 30 seconds with moderate postural sway (Feet apart+ yellow foam)  EC foam: 11 seconds prior to LOB (Feet apart+ yellow foam )    1/8:   EC foam: 28.35 seconds prior to LOB (Feet apart+ yellow foam )    6 minute walk test:   Date:   1/8: 5:25 seconds for 480 with SPC         Precautions: MS, Gait dysfunction, Insomnia, Osteoporosis, Peripheral polyneuropathy, Hx of Breast CA, R IDALIA in 2012- Dr. Rushing, Left femur IM nailing                  Manuals 11/30 12/7  "12/21 12/26 1/2 1/4 1/8                                                Neuro Re-Ed           DLS on foam Feet together yellow foam 10 shld flex, 10 shld abd, 10x press  Feet together yellow foam 10 shld flex, 10 shld abd, 10x press  Feet together airex foam 10 shld flex, 10 shld abd Feet together airex foam 10 shld flex, 10 shld abd Feet together airex foam 20 shld flex, 20 shld abd Feet together airex foam 20 shld flex, 20 shld abd     Tandem balance           Standing hip abd 3x10 2x10 2x10 2x10       Standing hip ext           Standing SLR                                 Ther Ex           Leg press                      Hamstring stretching  Seated 5x10\" B         Calf SB stretching  4x30\" with PT assist         Hamstring curl  Standing 30x on R and 16x on L. Bloster to block hip flexion Standing 20x on eac Standing 20x on eac Standing 20x on ea Standing 20x on ea Standing 20x on ea     LAQ   2x10 2x10 1# 2x10 on ea 2# 2x10 ea                                      Ther Activity           Lateral walks With L shld ER isometric rtb 4x10 feet  NP   4x15 feet 4x20 feet     Mini squats     Sit to stand 10x with UE support  Sit to stand 2x10 with UE support      Step ups           Leeann walks      6x 10 feet 3 hurdles 6x 10 feet 5 hurdles-reciprocal      Turning around cones/obstacle course       Nv (consider 90 deg, 180, and 360 deg turns)     VOR x 1 viewing (Walking) with CG + Walker        nv    6 minute walk test       Performed for Re-evaluation    Standing HR/TR 3x10 3x10 3x10 3x10 3x10 3x10     Marching 3x10  2x10 3x10 3x10 3x10 3x10     Gait Training                                 Vitals           BP  164/92 142/78  142/82 140/78     SpO2  97% 99%        HR  67 60        1:1 with PT from 1030-1110am                    "

## 2024-01-09 DIAGNOSIS — G47.61 PLMD (PERIODIC LIMB MOVEMENT DISORDER): ICD-10-CM

## 2024-01-10 RX ORDER — CLONAZEPAM 0.5 MG/1
TABLET ORAL
Qty: 135 TABLET | Refills: 0 | Status: SHIPPED | OUTPATIENT
Start: 2024-01-10

## 2024-01-11 ENCOUNTER — APPOINTMENT (OUTPATIENT)
Dept: PHYSICAL THERAPY | Facility: CLINIC | Age: 74
End: 2024-01-11
Payer: MEDICARE

## 2024-01-12 ENCOUNTER — OFFICE VISIT (OUTPATIENT)
Dept: FAMILY MEDICINE CLINIC | Facility: OTHER | Age: 74
End: 2024-01-12
Payer: MEDICARE

## 2024-01-12 VITALS
WEIGHT: 140 LBS | TEMPERATURE: 97.8 F | BODY MASS INDEX: 23.9 KG/M2 | OXYGEN SATURATION: 100 % | RESPIRATION RATE: 15 BRPM | HEIGHT: 64 IN | SYSTOLIC BLOOD PRESSURE: 140 MMHG | DIASTOLIC BLOOD PRESSURE: 60 MMHG | HEART RATE: 49 BPM

## 2024-01-12 DIAGNOSIS — I10 ESSENTIAL HYPERTENSION: Primary | ICD-10-CM

## 2024-01-12 PROBLEM — Z98.41 STATUS POST BILATERAL CATARACT EXTRACTION: Status: ACTIVE | Noted: 2019-04-15

## 2024-01-12 PROBLEM — M79.662 PAIN OF LEFT LOWER LEG: Status: RESOLVED | Noted: 2017-07-25 | Resolved: 2024-01-12

## 2024-01-12 PROBLEM — Z98.42 STATUS POST BILATERAL CATARACT EXTRACTION: Status: ACTIVE | Noted: 2019-04-15

## 2024-01-12 PROCEDURE — 99213 OFFICE O/P EST LOW 20 MIN: CPT

## 2024-01-12 NOTE — ASSESSMENT & PLAN NOTE
Currently well-controlled on 2.5mg lisinopril daily, home BPs 120s-low 130s/70s.    -Continue 2.5mg lisinopril daily  -Monitor BP; if feeling lightheaded, check BP prior to taking nightly lisinopril.  -Hold daily dose of lisinopril if SBP <110.  -If consistently holding doses, reach out to clinic for sooner f/up.    RTC 3 months to recheck HTN.

## 2024-01-12 NOTE — PROGRESS NOTES
Name: Jacy Rodrigez      : 1950      MRN: [id number]  Encounter Provider: Constance Delgado DO  Encounter Date: 2024   Encounter department: [facility]    Assessment & Plan     1. Essential hypertension  Assessment & Plan:  Currently well-controlled on 2.5mg lisinopril daily, home BPs 120s-low 130s/70s.    -Continue 2.5mg lisinopril daily  -Monitor BP; if feeling lightheaded, check BP prior to taking nightly lisinopril.  -Hold daily dose of lisinopril if SBP <110.  -If consistently holding doses, reach out to clinic for sooner f/up.    RTC 3 months to recheck HTN.      Jihan's blood pressure is well-controlled at this time on her current dose of 2.5mg lisinopril.  Continue this dose at this time.  She does endorse some symptoms of lightheadedness associated with SBP <120, therefore I discussed with her today I would recommend she check her blood pressure prior to taking her nightly medications, if able, and hold her dose if her SBP is less than 110.  She expressed understanding and agreement with this plan.     Of note, she has had multiple falls recently, including four falls in one day for which she was seen at this clinic and evaluated at the ED.  In her discussion with me today, these sound mechanical in nature, due to tripping over objects, losing balance, etc., rather than due to her MS or hypotension.  I have a low suspicion for orthostatic hypotension at this time.  It is unclear to me whether her blood pressure has been elevated due to increased life stressors leading to addition of lisinopril, and she is hopeful to minimize her medications if possible.  Therefore, would continue to evaluate at regular intervals to see whether medication can be discontinued while still keeping BP at goal.       Return in about 3 months (around 2024) for Recheck HTN.      Jose Bobo presents to clinic today to follow up on elevated blood pressure, for which we previously  started her on lisinopril 2.5mg daily.  She states since starting her medication, she has been checking her blood pressure and her systolic value is generally in the mid 120s-low 130s.  The highest values will tend to be when her BP is checked at physical therapy, often immediately after walking, with values of 140/70s.  She states she does experience lightheadedness if her blood pressure drops below 120.  She is also concerned today about her vitamin D, which was elevated on her last labs ordered by rheumatology in anticipation of Prolia shot.  She states that despite having run out of D3 supplements for several weeks prior to this blood draw, her level was nearly doubled.  She now questions whether her associated symptoms, including headaches, BP irregularities, palpitations, and insomnia, might have been due to vitamin D toxicity, when she previously had attributed them to her known history of MS.       Review of Systems   Cardiovascular:  Positive for palpitations.   Skin:  Positive for color change (bruising following multiple recent falls).   Neurological:  Positive for light-headedness (with SBP <120) and headaches. Negative for syncope.   Psychiatric/Behavioral:  Positive for sleep disturbance.        Past Medical History:   Diagnosis Date    Allergic 1967    seasonal    Allergic rhinitis     Bone pain     Breast cancer (HCC) 08/16/2012    left    Breast ptosis     Depression     Fatigue     Fx. left wrist, closed, initial encounter 09/20/2014    Gait disturbance     uses cane at times    Headache     Hip fracture (HCC)     Hip pain     Hip pain, right 09/18/2012    Laterality: Right    History of transfusion 1975    placenta previa s/p work accident, no rx    Hypokalemia     Insomnia     Laceration of finger     right hand    Left ankle pain     Left knee pain     Multiple sclerosis (HCC)     Muscle strain     left lower leg    Nephrolithiasis     Osteopenia     Osteoporosis     Pain of left calf     Pain  of left lower leg 07/25/2017    Pneumonia     Rash     Scoliosis birth    scoliosis    Solitary pulmonary nodule     SVT (supraventricular tachycardia)     Tingling     Urgency of urination     Urticaria     Wrist fracture, left      Past Surgical History:   Procedure Laterality Date    ANKLE SURGERY      APPENDECTOMY  11/2012    Dr. Vidales    AUGMENTATION BREAST      Enlargement procedure with prosthetic implant bilateral    AUGMENTATION MAMMAPLASTY Right 01/28/2020    implant replaced because of recall    AUGMENTATION MAMMAPLASTY Bilateral 2012    BREAST BIOPSY Left 07/23/2012    BREAST EXCISIONAL BIOPSY Right     age 39    BREAST IMPLANT Right 01/28/2020    Procedure: BREAST IMPLANT EXCHANGE WITH CAPSULECTOMY;  Surgeon: Da Canales MD;  Location: [facility];  Service: Plastics    BREAST IMPLANT REMOVAL Right 01/28/2020    implant placement, implant revision, right mastopexy    CYSTOSTOMY      with basket extraction of calculus; x2    EXCISION / BIOPSY BREAST / NIPPLE / DUCT Right 05/04/2020    scar revision to resuture non healing surgical wound    EXPLORATORY LAPAROTOMY      FL INJECTION LEFT SHOULDER (ARTHROGRAM)  07/19/2023    FOOT SURGERY      FRACTURE SURGERY  Lt wrist 9/2014 - Lt matthew femur 9/14    HIP FRACTURE SURGERY Right     Subcapital    HIP SURGERY Left     JOINT REPLACEMENT  Rt hip 10/2012    LEG SURGERY      Repair    MASTECTOMY Left 08/16/2012    GA GRAFTING OF AUTOLOGOUS FAT BY LIPO 50 CC OR LESS Bilateral 08/22/2023    Procedure: AUTOLOGOUS FAT GRAFTING TO BILATERAL BREASTS;  Surgeon: Da Canales MD;  Location: [facility];  Service: Plastics    GA MASTOPEXY Right 01/28/2020    Procedure: BREAST MASTOPEXY;  Surgeon: Da Canales MD;  Location: [facility];  Service: Plastics    GA REVISION OF RECONSTRUCTED BREAST Right 05/04/2020    Procedure: REVISION RIGHT BREAST MOUND;  Surgeon: Da Canales MD;  [facility];  Service: Plastics    REDUCTION MAMMAPLASTY  Right 2020    reduced to match left side    SENTINEL LYMPH NODE BIOPSY Left 2012    SKIN BIOPSY      TONSILLECTOMY      TUBAL LIGATION      WRIST SURGERY Left      Family History   Problem Relation Age of Onset    Coronary artery disease Mother     Hyperlipidemia Mother     Rheum arthritis Mother     Other Father         Acute myocardial infarction    No Known Problems Maternal Grandmother     Hodgkin's lymphoma Maternal Grandfather         age at dx unk    No Known Problems Paternal Grandmother     No Known Problems Paternal Grandfather     Stroke Son 49    No Known Problems Son     Cancer Maternal Aunt 70        bladder    Heart disease Maternal Aunt     Stroke Maternal Aunt         6 CVA before death    Breast cancer Maternal Aunt     Colon cancer Maternal Uncle 65    Hodgkin's lymphoma Maternal Uncle 70    No Known Problems Paternal Aunt     No Known Problems Paternal Aunt     No Known Problems Paternal Aunt      Social History     Socioeconomic History    Marital status: /Civil Union     Spouse name: None    Number of children: 2    Years of education: Completed bachelor's degree    Highest education level: None   Occupational History    Occupation: RN     Comment: Retired   Tobacco Use    Smoking status: Former     Current packs/day: 0.00     Average packs/day: 1 pack/day for 35.0 years (35.0 ttl pk-yrs)     Types: Cigarettes     Start date:      Quit date: 2002     Years since quittin.0    Smokeless tobacco: Never   Vaping Use    Vaping status: Never Used   Substance and Sexual Activity    Alcohol use: No    Drug use: No    Sexual activity: Yes     Partners: Male     Birth control/protection: Post-menopausal   Other Topics Concern    None   Social History Narrative    Denied:  History of caffeine use     Social Determinants of Health     Financial Resource Strain: Patient Declined (10/19/2023)    Overall Financial Resource Strain (CARDIA)     Difficulty of Paying Living Expenses:  Patient declined   Food Insecurity: Not on file   Transportation Needs: Patient Declined (10/19/2023)    PRAPARE - Transportation     Lack of Transportation (Medical): Patient declined     Lack of Transportation (Non-Medical): Patient declined   Physical Activity: Not on file   Stress: Not on file   Social Connections: Not on file   Intimate Partner Violence: Not on file   Housing Stability: Not on file     Current Outpatient Medications on File Prior to Visit   Medication Sig    ALPHA LIPOIC ACID PO Take by mouth    Ampyra 10 MG TB12 1 tab po bid    ascorbic acid (VITAMIN C) 500 mg tablet Take 500 mg by mouth daily    baclofen 10 mg tablet 1 tab pos q am, 1 tab po q pm and 2 tabs hs    Biotin 5000 MCG TABS     Cholecalciferol (VITAMIN D3) 1000 units CAPS Take 5,000 Units by mouth      clonazePAM (KlonoPIN) 0.5 mg tablet TAKE 1 AND 1/2 TABLETS BY MOUTH DAILY AT BEDTIME    Cranberry 125 MG TABS Take 125 mg by mouth in the morning    cycloSPORINE (RESTASIS) 0.05 % ophthalmic emulsion Administer 1 drop to both eyes every 12 (twelve) hours    denosumab (PROLIA) 60 mg/mL Inject under the skin    docusate sodium (COLACE) 100 mg capsule Take 1 capsule by mouth every other day      gabapentin (NEURONTIN) 600 MG tablet Take one tablet in the AM, 1 tabs in the afternoon  and 2 tabs at bedtime    lisinopril (ZESTRIL) 5 mg tablet Take 0.5 tablets (2.5 mg total) by mouth daily    Magnesium 500 MG TABS Take 500 tablets by mouth once    NON FORMULARY AMPYRA 10 MG Q12 HR    solifenacin (VESICARE) 10 MG tablet TAKE 1 TABLET DAILY    zonisamide (ZONEGRAN) 100 mg capsule TAKE 4 CAPSULES DAILY AT   BEDTIME     Allergies   Allergen Reactions    Duloxetine Hcl      Rapid Heart rate      Iodinated Contrast Media Anaphylaxis     IVP dye    Acetaminophen Other (See Comments)     Heart palpitations    Cetirizine      Only occurs with generic, weakness in legs, off balance and couldn't walk.    Morphine Other (See Comments)     Migraine  "    Immunization History   Administered Date(s) Administered    COVID-19 MODERNA VACC 0.5 ML IM 02/02/2021, 03/02/2021, 11/12/2021, 05/06/2022    COVID-19 Moderna Vac BIVALENT 12 Yr+ IM 0.5 ML 10/22/2022    INFLUENZA 11/04/2015, 10/17/2016, 10/18/2017, 10/26/2018, 10/23/2021, 10/22/2022, 11/07/2023    Influenza Split 10/25/2020    Influenza, high dose seasonal 0.7 mL 10/18/2020    Influenza, seasonal, injectable 1950, 09/18/2012, 11/04/2015    Pneumococcal Conjugate 13-Valent 10/17/2016    Pneumococcal Polysaccharide PPV23 10/26/2018    Tdap 08/05/2015    Zoster 10/18/2017    influenza, trivalent, adjuvanted 10/30/2019       Objective     /60   Pulse (!) 49   Temp 97.8 °F (36.6 °C)   Resp 15   Ht 5' 4\" (1.626 m)   Wt 63.5 kg (140 lb)   SpO2 100%   BMI 24.03 kg/m²     Physical Exam  Vitals reviewed.   Constitutional:       General: She is not in acute distress.     Appearance: Normal appearance. She is not ill-appearing, toxic-appearing or diaphoretic.   HENT:      Nose: Nose normal.   Eyes:      Extraocular Movements: Extraocular movements intact.      Conjunctiva/sclera: Conjunctivae normal.   Pulmonary:      Effort: Pulmonary effort is normal.   Skin:     General: Skin is warm and dry.   Neurological:      Mental Status: She is alert and oriented to person, place, and time. Mental status is at baseline.      Gait: Gait abnormal (requires a cane for assistance).   Psychiatric:         Mood and Affect: Mood normal.         Behavior: Behavior normal.       Constance Delgado, DO    "

## 2024-01-15 ENCOUNTER — OFFICE VISIT (OUTPATIENT)
Dept: PHYSICAL THERAPY | Facility: CLINIC | Age: 74
End: 2024-01-15
Payer: MEDICARE

## 2024-01-15 ENCOUNTER — HOSPITAL ENCOUNTER (OUTPATIENT)
Dept: ULTRASOUND IMAGING | Facility: HOSPITAL | Age: 74
Discharge: HOME | End: 2024-01-15
Attending: INTERNAL MEDICINE
Payer: MEDICARE

## 2024-01-15 DIAGNOSIS — R26.9 GAIT ABNORMALITY: Primary | ICD-10-CM

## 2024-01-15 DIAGNOSIS — G35 MULTIPLE SCLEROSIS (HCC): ICD-10-CM

## 2024-01-15 DIAGNOSIS — R94.4 ABNORMAL RENAL FUNCTION TEST: ICD-10-CM

## 2024-01-15 DIAGNOSIS — R53.1 WEAKNESS: ICD-10-CM

## 2024-01-15 DIAGNOSIS — N20.0 NEPHROLITHIASIS: ICD-10-CM

## 2024-01-15 PROCEDURE — 97110 THERAPEUTIC EXERCISES: CPT

## 2024-01-15 PROCEDURE — 76775 US EXAM ABDO BACK WALL LIM: CPT

## 2024-01-15 NOTE — PROGRESS NOTES
"Daily Note     Today's date: 1/15/2024  Patient name: Jacy Rodrigez  : 1950  MRN: [id number]  Referring provider: Patrick Naylor DO  Dx:   Encounter Diagnosis     ICD-10-CM    1. Gait abnormality  R26.9       2. Weakness  R53.1       3. Multiple sclerosis (HCC)  G35                      Subjective: Pt reports that today is a good day.       Objective: See treatment diary below      Assessment: Tolerated treatment well. Patient demonstrated improved foot clearance with lateral side stepping. Patient did require a seated rest break after lateral side stepping. Patient did experience dragging of her LLE with LAQ. Patient demonstrated increased fatigue at the end of the session. Patient had increased dragging of LLE. Patient exhibited good technique with therapeutic exercises and would benefit from continued PT.       Plan: Continue per plan of care.      Precautions: MS, Gait dysfunction, Insomnia, Osteoporosis, Peripheral polyneuropathy, Hx of Breast CA, R IDALIA in - Dr. Rushing, Left femur IM nailing              Manuals 11/30 12/7 12/21 12/26 1/2 1/4 1/8 1/15                                               Neuro Re-Ed           DLS on foam Feet together yellow foam 10 shld flex, 10 shld abd, 10x press  Feet together yellow foam 10 shld flex, 10 shld abd, 10x press  Feet together airex foam 10 shld flex, 10 shld abd Feet together airex foam 10 shld flex, 10 shld abd Feet together airex foam 20 shld flex, 20 shld abd Feet together airex foam 20 shld flex, 20 shld abd  Feet together airex foam 20 shld flex, 18 shld abd   Tandem balance           Standing hip abd 3x10 2x10 2x10 2x10       Standing hip ext           Standing SLR                                 Ther Ex           Leg press                      Hamstring stretching  Seated 5x10\" B         Calf SB stretching  4x30\" with PT assist         Hamstring curl  Standing 30x on R and 16x on L. Bloster to block hip flexion Standing 20x on eac Standing " 20x on eac Standing 20x on ea Standing 20x on ea Standing 20x on ea  Standing 30x ea    LAQ   2x10 2x10 1# 2x10 on ea 2# 2x10 ea  2# 2 x 10 ea                                     Ther Activity           Lateral walks With L shld ER isometric rtb 4x10 feet  NP   4x15 feet 4x20 feet  4 x 20 feet    Mini squats     Sit to stand 10x with UE support  Sit to stand 2x10 with UE support   Sit to stands 1 x 12 with UE support    Step ups           Leeann walks      6x 10 feet 3 hurdles 6x 10 feet 5 hurdles-reciprocal      Turning around cones/obstacle course       Nv (consider 90 deg, 180, and 360 deg turns)     VOR x 1 viewing (Walking) with CG + Walker        nv    6 minute walk test       Performed for Re-evaluation    Standing HR/TR 3x10 3x10 3x10 3x10 3x10 3x10  3 x10    Marching 3x10  2x10 3x10 3x10 3x10 3x10  3 x10    Gait Training                                 Vitals           BP  164/92 142/78  142/82 140/78     SpO2  97% 99%        HR  67 60            1:1 with PT from 1015-11a

## 2024-01-16 ENCOUNTER — TELEPHONE (OUTPATIENT)
Dept: NEUROLOGY | Facility: CLINIC | Age: 74
End: 2024-01-16

## 2024-01-16 DIAGNOSIS — G35 MULTIPLE SCLEROSIS (HCC): Primary | ICD-10-CM

## 2024-01-16 RX ORDER — LORAZEPAM 0.5 MG/1
TABLET ORAL
Qty: 1 TABLET | Refills: 0 | Status: SHIPPED | OUTPATIENT
Start: 2024-01-16

## 2024-01-16 NOTE — TELEPHONE ENCOUNTER
"----- Message from Jaclyn Warner RN sent at 1/15/2024 10:43 PM EST -----  Regarding: FW: MRI  Contact: [phone]  MRI t-spine scheduled for 1/19/24 @2:30pm    ----- Message -----  From: Joleen Caballero  Sent: 1/12/2024  11:35 AM EST  To: Neurology [facility] Clinical Team 3  Subject: FW: MRI                                          Please assist   ----- Message -----  From: Jacy Rodrigez \"Jihan\"  Sent: 1/9/2024   9:46 PM EST  To: Neurology [facility]  Subject: MRI                                              Dr Briceno   Would you please call in a rx for lorazepam for a pre MRI dose. I need it due to my increasing claustrophobia.   Can you please call it into [facility] on [address] in [place]. Last time ortho ordered Valium which was ineffective.   Thank you so much.   Jihan Rodrigez      "

## 2024-01-16 NOTE — TELEPHONE ENCOUNTER
Reply sent via [facility] at pt's request.      January 16, 2024  Me   to Jihan Rodrigez CB      1/16/24 11:06 AM  Thank you for contacting [facility],     Hi Jihan,     Dr. Briceno sent a script for Ativan 0.5mg to [facility]. She advised to be cautious if your klonopin dose is near time of the MRI (2:30pm). You could check with the provider to see if it is too close to the MRI pre-med dose. We need to ensure we are not doubling up on benzodiazepines on the same day. And just a reminder that you will require a  to and from the MRI appointment.      Have a great day!     Barby ANTONIO

## 2024-01-16 NOTE — TELEPHONE ENCOUNTER
Let pt know I did write for the ativan 0.5 mg one tab 30 min prior to mri.  Please have her be cautious if dosing of klonopin is near timing of mri.  She could check dosing of the klonopin with her prescriber if close to mri pre med. Need to make sure not doubling of benzos on same day.  Hoping Mri head is early in day.  Again needs .  Rx sent to [facility] as requested

## 2024-01-16 NOTE — TELEPHONE ENCOUNTER
Spoke with pt and advised her of Dr. Briceno's response and recommendation.    Pt states that she takes 0.75 mg at HS and says this medication is monitored very well so if she takes 0.5 mg for her MRI, she would only have 0.25 mg at HS.     Pt also feels 0.5 mg will not be effective to help her relax in that tube. Says her claustrophobia is getting so bad. Also says that she gets very cold during this procedure and used to be able to bundle up with additional layers of clothing. But now there are no exceptions. Can only wear a gown and cotton pants. Says between being freezing cold and not being relaxed, pt anticipates she will not be able to get through MRI procedures.     Pt requesting if Ativan can be prescribed as one time dose but if cannot, pt willing to try 0.5 mg of Klonopin at time of procedure and 0.25 mg at HS.    Dr. Briceno - Please advise.    Pt says she will be out of the house today, please leave message in [facility] portal.

## 2024-01-16 NOTE — TELEPHONE ENCOUNTER
Pt already on klonopin from [facility].  Looks like pt already on klonopin 0.5 mg ie 1.5 mg at hs.  See if she could take one of the 0.5 mg thirty min pre mri as her pre med.  Pt would need a .

## 2024-01-18 ENCOUNTER — HOSPITAL ENCOUNTER (OUTPATIENT)
Dept: MAMMOGRAPHY | Facility: CLINIC | Age: 74
Discharge: HOME | End: 2024-01-18
Payer: MEDICARE

## 2024-01-18 ENCOUNTER — OFFICE VISIT (OUTPATIENT)
Dept: PHYSICAL THERAPY | Facility: CLINIC | Age: 74
End: 2024-01-18
Payer: MEDICARE

## 2024-01-18 VITALS — HEIGHT: 64 IN | WEIGHT: 140 LBS | BODY MASS INDEX: 23.9 KG/M2

## 2024-01-18 DIAGNOSIS — R53.1 WEAKNESS: ICD-10-CM

## 2024-01-18 DIAGNOSIS — R26.9 GAIT ABNORMALITY: Primary | ICD-10-CM

## 2024-01-18 DIAGNOSIS — G35 MULTIPLE SCLEROSIS (HCC): ICD-10-CM

## 2024-01-18 DIAGNOSIS — R92.8 ABNORMAL MAMMOGRAM: ICD-10-CM

## 2024-01-18 PROCEDURE — 97110 THERAPEUTIC EXERCISES: CPT

## 2024-01-18 PROCEDURE — 97112 NEUROMUSCULAR REEDUCATION: CPT

## 2024-01-18 PROCEDURE — 77065 DX MAMMO INCL CAD UNI: CPT

## 2024-01-18 PROCEDURE — 97530 THERAPEUTIC ACTIVITIES: CPT

## 2024-01-18 NOTE — PROGRESS NOTES
Daily Note     Today's date: 2024  Patient name: Jacy Rodrigez  : 1950  MRN: [id number]  Referring provider: Patrick Naylor DO  Dx:   Encounter Diagnosis     ICD-10-CM    1. Gait abnormality  R26.9       2. Weakness  R53.1       3. Multiple sclerosis (HCC)  G35           Start Time: 1030  Stop Time: 1110  Total time in clinic (min): 40 minutes    Subjective: Reports that she is doing okay. A little tired, nervous about the snow that is about to come. Last fall reported . Repeat mammogram lateral today. MRI tomorrow.       Objective: See treatment diary below      Assessment: Tolerated treatment well. Continues to demonstrated decreased clearance on affected side. Trialed some marching during today's session to improve clearance and volition hip contractions during R swing phase. Quick fatigue set-in around 25 reps with LAQ with quad fatigue. Good motivation to participate to improve strength and motion. Patient demonstrated fatigue post treatment, exhibited good technique with therapeutic exercises, and would benefit from continued PT      Plan: Continue per plan of care.  Progress treatment as tolerated.       Precautions: MS, Gait dysfunction, Insomnia, Osteoporosis, Peripheral polyneuropathy, Hx of Breast CA, R IDALIA in - Dr. Rushing, Left femur IM nailing              Manuals 1/18 12/7 12/21 12/26 1/2 1/4 1/8 1/15                                               Neuro Re-Ed           DLS on foam Feet together airex 20 shld flex, 20 shld abd Feet together yellow foam 10 shld flex, 10 shld abd, 10x press  Feet together airex foam 10 shld flex, 10 shld abd Feet together airex foam 10 shld flex, 10 shld abd Feet together airex foam 20 shld flex, 20 shld abd Feet together airex foam 20 shld flex, 20 shld abd  Feet together airex foam 20 shld flex, 18 shld abd   Tandem balance           Standing hip abd  2x10 2x10 2x10       Standing hip ext           Standing SLR           High knee  "marching  5x laps UUE support ballet bar                     Ther Ex           Leg press                      Hamstring stretching  Seated 5x10\" B         Calf SB stretching  4x30\" with PT assist         Hamstring curl  Standing 30x ea with grey bolster Standing 20x on eac Standing 20x on eac Standing 20x on ea Standing 20x on ea Standing 20x on ea  Standing 30x ea    LAQ 2# 3x10 ea   2x10 2x10 1# 2x10 on ea 2# 2x10 ea  2# 2 x 10 ea    Seated March  2x10 ea                                 Ther Activity           Lateral walks 4x laps BUE support NP   4x15 feet 4x20 feet  4 x 20 feet    Mini squats     Sit to stand 10x with UE support  Sit to stand 2x10 with UE support   Sit to stands 1 x 12 with UE support    Step ups           Leeann walks      6x 10 feet 3 hurdles 6x 10 feet 5 hurdles-reciprocal      Turning around cones/obstacle course       Nv (consider 90 deg, 180, and 360 deg turns)     180 degree turn fwd/bwd amb NV           VOR x 1 viewing (Walking) with CG + Walker        nv    6 minute walk test       Performed for Re-evaluation    Standing HR/TR 3x10 3x10 3x10 3x10 3x10 3x10  3 x10    Marching 3x10  2x10 3x10 3x10 3x10 3x10  3 x10    Gait Training                                 Vitals           BP  164/92 142/78  142/82 140/78     SpO2  97% 99%        HR  67 60          1on1 [id number]                 "

## 2024-01-19 ENCOUNTER — HOSPITAL ENCOUNTER (OUTPATIENT)
Dept: MRI IMAGING | Facility: HOSPITAL | Age: 74
Discharge: HOME | End: 2024-01-19
Attending: PSYCHIATRY & NEUROLOGY
Payer: MEDICARE

## 2024-01-19 DIAGNOSIS — G35 MULTIPLE SCLEROSIS (HCC): ICD-10-CM

## 2024-01-19 PROCEDURE — 72146 MRI CHEST SPINE W/O DYE: CPT

## 2024-01-19 PROCEDURE — G1004 CDSM NDSC: HCPCS

## 2024-01-22 ENCOUNTER — TELEPHONE (OUTPATIENT)
Dept: NEPHROLOGY | Facility: CLINIC | Age: 74
End: 2024-01-22

## 2024-01-22 ENCOUNTER — OFFICE VISIT (OUTPATIENT)
Dept: PHYSICAL THERAPY | Facility: CLINIC | Age: 74
End: 2024-01-22
Payer: MEDICARE

## 2024-01-22 ENCOUNTER — TELEPHONE (OUTPATIENT)
Dept: OTHER | Facility: HOSPITAL | Age: 74
End: 2024-01-22

## 2024-01-22 DIAGNOSIS — R53.1 WEAKNESS: ICD-10-CM

## 2024-01-22 DIAGNOSIS — R26.9 GAIT ABNORMALITY: Primary | ICD-10-CM

## 2024-01-22 DIAGNOSIS — G35 MULTIPLE SCLEROSIS (HCC): ICD-10-CM

## 2024-01-22 DIAGNOSIS — N28.9 RENAL LESION: Primary | ICD-10-CM

## 2024-01-22 PROCEDURE — 97112 NEUROMUSCULAR REEDUCATION: CPT | Performed by: PHYSICAL MEDICINE & REHABILITATION

## 2024-01-22 PROCEDURE — 97110 THERAPEUTIC EXERCISES: CPT | Performed by: PHYSICAL MEDICINE & REHABILITATION

## 2024-01-22 NOTE — TELEPHONE ENCOUNTER
Nephrology    Discussed renal ultrasound findings.  Bilateral echogenicity consistent with underlying chronic kidney disease.  1.5 cm exophytic lesion arising from the upper pole of the left kidney.  Possible cyst but cannot rule out a mass.    We discussed about the different imaging modalities including a CT scan with and without contrast versus MRI.    Will plan for an MRI.  Once she has the appointment set I will place Ativan 0.5 mg 30 minutes prior to the MRI for anxiety.

## 2024-01-22 NOTE — TELEPHONE ENCOUNTER
Pt called office asking for Dr. Long to please review US done on 1/15/24 and call her with results and recommendations.

## 2024-01-22 NOTE — PROGRESS NOTES
Daily Note     Today's date: 2024  Patient name: Jacy Rodrigez  : 1950  MRN: [id number]  Referring provider: Patrick Naylor DO  Dx:   Encounter Diagnosis     ICD-10-CM    1. Gait abnormality  R26.9       2. Weakness  R53.1       3. Multiple sclerosis (HCC)  G35                      Subjective: Increased overall fatigue today secondary to altered sleep schedule over the weekend.     Objective: See treatment diary below    Assessment: Tolerated treatment fair. Decreased activity this visit secondary to LE fatigue throughout. Patient demonstrated fatigue post treatment, exhibited good technique with therapeutic exercises, and would benefit from continued PT. Resume nv as able per patient tolerance.     Plan: Continue per plan of care.  Progress treatment as tolerated.       Precautions: MS, Gait dysfunction, Insomnia, Osteoporosis, Peripheral polyneuropathy, Hx of Breast CA, R IDALIA in - Dr. Rushing, Left femur IM nailing              Manuals 1/18 1/22  12/26 1/2 1/4 1/8 1/15                                               Neuro Re-Ed           DLS on foam Feet together airex 20 shld flex, 20 shld abd 16x shld flex  Feet together airex foam 10 shld flex, 10 shld abd Feet together airex foam 20 shld flex, 20 shld abd Feet together airex foam 20 shld flex, 20 shld abd  Feet together airex foam 20 shld flex, 18 shld abd   Tandem balance           Standing hip abd    2x10       Standing hip ext           Standing SLR           High knee marching  5x laps UUE support ballet bar                     Ther Ex           Leg press                      Hamstring stretching           Calf SB stretching           Hamstring curl  Standing 30x ea with grey bolster 30x ea bolster to avoid hip flexion at mirror  Standing 20x on ea Standing 20x on ea Standing 20x on ea  Standing 30x ea    LAQ 2# 3x10 ea  0# 3x10 ea  2x10 1# 2x10 on ea 2# 2x10 ea  2# 2 x 10 ea    Seated March  2x10 ea                                  Ther Activity  1/22         Lateral walks 4x laps BUE support 4 laps 1/2 mirror   4x15 feet 4x20 feet  4 x 20 feet    Mini squats     Sit to stand 10x with UE support  Sit to stand 2x10 with UE support   Sit to stands 1 x 12 with UE support    Step ups           Leeann walks      6x 10 feet 3 hurdles 6x 10 feet 5 hurdles-reciprocal      Turning around cones/obstacle course       Nv (consider 90 deg, 180, and 360 deg turns)     180 degree turn fwd/bwd amb NV           VOR x 1 viewing (Walking) with CG + Walker        nv    6 minute walk test       Performed for Re-evaluation    Standing HR/TR 3x10 3x10  3x10 3x10 3x10  3 x10    Marching 3x10  3x10 ea  3x10 3x10 3x10  3 x10    Gait Training                                 Vitals           BP     142/82 140/78     SpO2           HR

## 2024-01-23 ENCOUNTER — OFFICE VISIT (OUTPATIENT)
Dept: PODIATRY | Facility: CLINIC | Age: 74
End: 2024-01-23
Payer: MEDICARE

## 2024-01-23 VITALS
OXYGEN SATURATION: 99 % | DIASTOLIC BLOOD PRESSURE: 81 MMHG | HEART RATE: 82 BPM | HEIGHT: 64 IN | SYSTOLIC BLOOD PRESSURE: 150 MMHG | WEIGHT: 140 LBS | BODY MASS INDEX: 23.9 KG/M2

## 2024-01-23 DIAGNOSIS — G35 MULTIPLE SCLEROSIS (HCC): ICD-10-CM

## 2024-01-23 DIAGNOSIS — B35.1 ONYCHOMYCOSIS: Primary | ICD-10-CM

## 2024-01-23 DIAGNOSIS — G62.9 PERIPHERAL POLYNEUROPATHY: ICD-10-CM

## 2024-01-23 PROBLEM — G25.81 RLS (RESTLESS LEGS SYNDROME): Status: RESOLVED | Noted: 2021-05-06 | Resolved: 2024-01-23

## 2024-01-23 PROCEDURE — 99202 OFFICE O/P NEW SF 15 MIN: CPT | Performed by: PODIATRIST

## 2024-01-23 PROCEDURE — 11721 DEBRIDE NAIL 6 OR MORE: CPT | Performed by: PODIATRIST

## 2024-01-23 NOTE — PROGRESS NOTES
Assessment/Plan:     The patient's clinical examination today significant for thickened and dystrophic pedal nail plates with brittleness, discoloration, and subungual debris consistent with onychomycosis x 10.  There are mild contractures to the lesser digits.  There are no open lesions nor pretrophic changes noted to either lower extremity.  DP pulses are palpable, PT pulses are palpable but diminished bilaterally.  Capillary fill time to the toes is within normal limits.  Epicritic and vibratory sensation is diminished secondary to history of peripheral neuropathy.  The skin is thin with decreased turgor.  There is absence of hair growth in the bilateral lower extremities.    The pedal nail plates are sharply debrided with a sterile nail clipper x 10 without complication.  The nails of the mechanically reduced in thickness and girth utilizing a rotary bur without complication.  There are no other acute pedal issues noted today.    Recommend follow-up in 2 to 3 months for at risk footcare.     Diagnoses and all orders for this visit:    Onychomycosis    Peripheral polyneuropathy    Multiple sclerosis (HCC)          Subjective:     Patient ID: Jacy Rodrigez is a 73 y.o. female.    The patient presents today for her initial consultation with [facility] podiatry with a chief complaint of painful elongated pedal nail plates.  The patient has a history of MS as well as peripheral neuropathy.  She is currently on gabapentin which does help with her neuritic pain.  She ambulates with a cane.      PAST MEDICAL HISTORY:  Past Medical History:   Diagnosis Date    Allergic 1967    seasonal    Allergic rhinitis     Bone pain     Breast cancer (HCC) 08/16/2012    left    Breast ptosis     Depression     Fatigue     Fx. left wrist, closed, initial encounter 09/20/2014    Gait disturbance     uses cane at times    Headache     Hip fracture (HCC)     Hip pain     Hip pain, right 09/18/2012    Laterality: Right    History of  transfusion 1975    placenta previa s/p work accident, no rx    Hypokalemia     Insomnia     Laceration of finger     right hand    Left ankle pain     Left knee pain     Multiple sclerosis (HCC)     Muscle strain     left lower leg    Nephrolithiasis     Osteopenia     Osteoporosis     Pain of left calf     Pain of left lower leg 07/25/2017    Pneumonia     Rash     Scoliosis birth    scoliosis    Solitary pulmonary nodule     SVT (supraventricular tachycardia)     Tingling     Urgency of urination     Urticaria     Wrist fracture, left        PAST SURGICAL HISTORY:  Past Surgical History:   Procedure Laterality Date    ANKLE SURGERY      APPENDECTOMY  11/2012    Dr. Vidales    AUGMENTATION BREAST      Enlargement procedure with prosthetic implant bilateral    AUGMENTATION MAMMAPLASTY Right 01/28/2020    implant replaced because of recall    AUGMENTATION MAMMAPLASTY Bilateral 2012    BREAST BIOPSY Left 07/23/2012    BREAST EXCISIONAL BIOPSY Right     age 39    BREAST IMPLANT Right 01/28/2020    Procedure: BREAST IMPLANT EXCHANGE WITH CAPSULECTOMY;  Surgeon: Da Canales MD;  Location: [facility];  Service: Plastics    BREAST IMPLANT REMOVAL Right 01/28/2020    implant placement, implant revision, right mastopexy    CYSTOSTOMY      with basket extraction of calculus; x2    EXCISION / BIOPSY BREAST / NIPPLE / DUCT Right 05/04/2020    scar revision to resuture non healing surgical wound    EXPLORATORY LAPAROTOMY      FL INJECTION LEFT SHOULDER (ARTHROGRAM)  07/19/2023    FOOT SURGERY      FRACTURE SURGERY  Lt wrist 9/2014 - Lt matthew femur 9/14    HIP FRACTURE SURGERY Right     [facility]    HIP SURGERY Left     JOINT REPLACEMENT  Rt hip 10/2012    LEG SURGERY      Repair    MASTECTOMY Left 08/16/2012    WI GRAFTING OF AUTOLOGOUS FAT BY LIPO 50 CC OR LESS Bilateral 08/22/2023    Procedure: AUTOLOGOUS FAT GRAFTING TO BILATERAL BREASTS;  Surgeon: Da Canales MD;  Location: [facility];  Service: Plastics     MD MASTOPEXY Right 01/28/2020    Procedure: BREAST MASTOPEXY;  Surgeon: Da Canales MD;  Location: [facility];  Service: Plastics    MD REVISION OF RECONSTRUCTED BREAST Right 05/04/2020    Procedure: REVISION RIGHT BREAST MOUND;  Surgeon: Da Canales MD;  [facility];  Service: Plastics    REDUCTION MAMMAPLASTY Right 01/28/2020    reduced to match left side    SENTINEL LYMPH NODE BIOPSY Left 08/16/2012    SKIN BIOPSY      TONSILLECTOMY      TUBAL LIGATION      WRIST SURGERY Left         ALLERGIES:  Duloxetine hcl, Iodinated contrast media, Acetaminophen, Cetirizine, and Morphine    MEDICATIONS:  Current Outpatient Medications   Medication Sig Dispense Refill    ALPHA LIPOIC ACID PO Take by mouth      Ampyra 10 MG TB12 1 tab po bid 180 tablet 3    ascorbic acid (VITAMIN C) 500 mg tablet Take 500 mg by mouth daily      baclofen 10 mg tablet 1 tab pos q am, 1 tab po q pm and 2 tabs hs 360 tablet 3    Biotin 5000 MCG TABS       clonazePAM (KlonoPIN) 0.5 mg tablet TAKE 1 AND 1/2 TABLETS BY MOUTH DAILY AT BEDTIME 135 tablet 0    Cranberry 125 MG TABS Take 125 mg by mouth in the morning      cycloSPORINE (RESTASIS) 0.05 % ophthalmic emulsion Administer 1 drop to both eyes every 12 (twelve) hours      denosumab (PROLIA) 60 mg/mL Inject under the skin      docusate sodium (COLACE) 100 mg capsule Take 1 capsule by mouth every other day        gabapentin (NEURONTIN) 600 MG tablet Take one tablet in the AM, 1 tabs in the afternoon  and 2 tabs at bedtime 360 tablet 3    lisinopril (ZESTRIL) 5 mg tablet Take 0.5 tablets (2.5 mg total) by mouth daily 45 tablet 1    Magnesium 500 MG TABS Take 500 tablets by mouth once      NON FORMULARY AMPYRA 10 MG Q12 HR      solifenacin (VESICARE) 10 MG tablet TAKE 1 TABLET DAILY 90 tablet 1    zonisamide (ZONEGRAN) 100 mg capsule TAKE 4 CAPSULES DAILY AT   BEDTIME 360 capsule 3    LORazepam (Ativan) 0.5 mg tablet 1 tab po 30 min prior to mri 1 tablet 0     No  current facility-administered medications for this visit.       SOCIAL HISTORY:  Social History     Socioeconomic History    Marital status: /Civil Union     Spouse name: None    Number of children: 2    Years of education: Completed bachelor's degree    Highest education level: None   Occupational History    Occupation: RN     Comment: Retired   Tobacco Use    Smoking status: Former     Current packs/day: 0.00     Average packs/day: 1 pack/day for 35.0 years (35.0 ttl pk-yrs)     Types: Cigarettes     Start date:      Quit date:      Years since quittin.0    Smokeless tobacco: Never   Vaping Use    Vaping status: Never Used   Substance and Sexual Activity    Alcohol use: No    Drug use: No    Sexual activity: Yes     Partners: Male     Birth control/protection: Post-menopausal   Other Topics Concern    None   Social History Narrative    Denied:  History of caffeine use     Social Determinants of Health     Financial Resource Strain: Patient Declined (10/19/2023)    Overall Financial Resource Strain (CARDIA)     Difficulty of Paying Living Expenses: Patient declined   Food Insecurity: Not on file   Transportation Needs: Patient Declined (10/19/2023)    PRAPARE - Transportation     Lack of Transportation (Medical): Patient declined     Lack of Transportation (Non-Medical): Patient declined   Physical Activity: Not on file   Stress: Not on file   Social Connections: Not on file   Intimate Partner Violence: Not on file   Housing Stability: Not on file        Review of Systems   Constitutional: Negative.    HENT: Negative.     Eyes: Negative.    Respiratory: Negative.     Cardiovascular: Negative.    Endocrine: Negative.    Musculoskeletal: Negative.    Neurological: Negative.    Hematological: Negative.    Psychiatric/Behavioral: Negative.           Objective:     Physical Exam  Constitutional:       Appearance: Normal appearance.   HENT:      Head: Normocephalic and atraumatic.      Nose: Nose  normal.   Cardiovascular:      Pulses:           Dorsalis pedis pulses are 2+ on the right side and 2+ on the left side.        Posterior tibial pulses are 1+ on the right side and 1+ on the left side.   Pulmonary:      Effort: Pulmonary effort is normal.   Feet:      Right foot:      Skin integrity: Skin integrity normal.      Toenail Condition: Right toenails are abnormally thick and long. Fungal disease present.     Left foot:      Skin integrity: Skin integrity normal.      Toenail Condition: Left toenails are abnormally thick and long. Fungal disease present.     Comments: The patient's clinical examination today significant for thickened and dystrophic pedal nail plates with brittleness, discoloration, and subungual debris consistent with onychomycosis x 10.  There are mild contractures to the lesser digits.  There are no open lesions nor pretrophic changes noted to either lower extremity.  DP pulses are palpable, PT pulses are palpable but diminished bilaterally.  Capillary fill time to the toes is within normal limits.  Epicritic and vibratory sensation is diminished secondary to history of peripheral neuropathy.  The skin is thin with decreased turgor.  There is absence of hair growth in the bilateral lower extremities.  Skin:     General: Skin is warm.      Capillary Refill: Capillary refill takes less than 2 seconds.   Neurological:      General: No focal deficit present.      Mental Status: She is alert and oriented to person, place, and time.   Psychiatric:         Mood and Affect: Mood normal.         Behavior: Behavior normal.         Thought Content: Thought content normal.

## 2024-01-25 ENCOUNTER — OFFICE VISIT (OUTPATIENT)
Dept: PHYSICAL THERAPY | Facility: CLINIC | Age: 74
End: 2024-01-25
Payer: MEDICARE

## 2024-01-25 DIAGNOSIS — G35 MULTIPLE SCLEROSIS (HCC): ICD-10-CM

## 2024-01-25 DIAGNOSIS — R53.1 WEAKNESS: ICD-10-CM

## 2024-01-25 DIAGNOSIS — R26.9 GAIT ABNORMALITY: Primary | ICD-10-CM

## 2024-01-25 PROCEDURE — 97112 NEUROMUSCULAR REEDUCATION: CPT

## 2024-01-25 PROCEDURE — 97110 THERAPEUTIC EXERCISES: CPT

## 2024-01-25 NOTE — PROGRESS NOTES
Daily Note     Today's date: 2024  Patient name: Jacy Rodrigez  : 1950  MRN: [id number]  Referring provider: Patrick Naylor DO  Dx:   Encounter Diagnosis     ICD-10-CM    1. Gait abnormality  R26.9       2. Weakness  R53.1       3. Multiple sclerosis (HCC)  G35                      Subjective: Continued fatigue with poor sleep quality lately.       Objective: See treatment diary below      Assessment: Tolerated treatment fair, but patient is determined to participate in physical therapy.  Early onset fatigue with all tasks, with frequent rest breaks throughout session. NBOS balance was difficult, she is able to complete DLS in a WBOS. Decreased ambulation sylvia noted post session, with fair ground clearance, LLE>RLE. Progress as able. Patient would benefit from continued PT to improve balance, gait, and BLE strength.        Plan: Continue per plan of care.       Precautions: MS, Gait dysfunction, Insomnia, Osteoporosis, Peripheral polyneuropathy, Hx of Breast CA, R IDALIA in - Dr. Rushing, Left femur IM nailing              Manuals 1/18 1/22 1/25 12/26 1/2 1/4 1/8 1/15                                               Neuro Re-Ed          DLS on foam Feet together airex 20 shld flex, 20 shld abd 16x shld flex WBOS airex 20x abd and flex Feet together airex foam 10 shld flex, 10 shld abd Feet together airex foam 20 shld flex, 20 shld abd Feet together airex foam 20 shld flex, 20 shld abd  Feet together airex foam 20 shld flex, 18 shld abd   Tandem balance           Standing hip abd    2x10       Standing hip ext           Standing SLR           High knee marching  5x laps UUE support ballet bar                     Ther Ex          Leg press                      Hamstring stretching           Calf SB stretching           Hamstring curl  Standing 30x ea with grey bolster 30x ea bolster to avoid hip flexion at mirror 30x ea bolster to avoid hip flexion at mirror Standing 20x on ea  Standing 20x on ea Standing 20x on ea  Standing 30x ea    LAQ 2# 3x10 ea  0# 3x10 ea 1# 3x10 2x10 1# 2x10 on ea 2# 2x10 ea  2# 2 x 10 ea    Seated March  2x10 ea                                 Ther Activity  1/22 1/25        Lateral walks 4x laps BUE support 4 laps 1/2 mirror 2 laps full mirror no UE support  4x15 feet 4x20 feet  4 x 20 feet    Mini squats     Sit to stand 10x with UE support  Sit to stand 2x10 with UE support   Sit to stands 1 x 12 with UE support    Step ups           Leeann walks      6x 10 feet 3 hurdles 6x 10 feet 5 hurdles-reciprocal      Turning around cones/obstacle course       Nv (consider 90 deg, 180, and 360 deg turns)     180 degree turn fwd/bwd amb NV           VOR x 1 viewing (Walking) with CG + Walker        nv    6 minute walk test       Performed for Re-evaluation    Standing HR/TR 3x10 3x10 3x10 3x10 3x10 3x10  3 x10    Marching 3x10  3x10 ea 3x10 3x10 3x10 3x10  3 x10    Gait Training                                 Vitals           BP     142/82 140/78     SpO2           HR

## 2024-01-29 ENCOUNTER — APPOINTMENT (OUTPATIENT)
Dept: PHYSICAL THERAPY | Facility: CLINIC | Age: 74
End: 2024-01-29
Payer: MEDICARE

## 2024-01-30 ENCOUNTER — TELEPHONE (OUTPATIENT)
Dept: NEUROLOGY | Facility: CLINIC | Age: 74
End: 2024-01-30

## 2024-01-30 DIAGNOSIS — F41.9 ANXIETY: Primary | ICD-10-CM

## 2024-01-30 RX ORDER — LORAZEPAM 0.5 MG/1
0.5 TABLET ORAL
Qty: 1 TABLET | Refills: 0 | Status: SHIPPED | OUTPATIENT
Start: 2024-01-30

## 2024-01-30 NOTE — TELEPHONE ENCOUNTER
"----- Message from Ly Lucero sent at 1/30/2024  2:06 PM EST -----  Regarding: FW: COLLINS Anderson  Contact: [phone]    ----- Message -----  From: Jacy Rodrigez \"Jihan\"  Sent: 1/29/2024   2:51 PM EST  To: [facility]  Subject: COLLINS Anderson                                        Let's try this again.  I scanned it and see if the download will send.  Thank you  Jihan"

## 2024-01-30 NOTE — TELEPHONE ENCOUNTER
Let pt know we got the form.  Nursing pt only needs documentation of her diagnosis to be sent to [facility] thru their portal. Please send this week - Diagnosis is MS. Please check with pt if ok to send office note which has Dx or we can just send one sentence with confirmation of her DX thru portal. Per attachment need to send to [email or web address]

## 2024-02-01 ENCOUNTER — OFFICE VISIT (OUTPATIENT)
Dept: PHYSICAL THERAPY | Facility: CLINIC | Age: 74
End: 2024-02-01
Payer: MEDICARE

## 2024-02-01 ENCOUNTER — TELEPHONE (OUTPATIENT)
Dept: NEUROLOGY | Facility: CLINIC | Age: 74
End: 2024-02-01

## 2024-02-01 DIAGNOSIS — R53.1 WEAKNESS: Primary | ICD-10-CM

## 2024-02-01 DIAGNOSIS — R26.9 GAIT ABNORMALITY: ICD-10-CM

## 2024-02-01 DIAGNOSIS — G35 MULTIPLE SCLEROSIS (HCC): ICD-10-CM

## 2024-02-01 PROCEDURE — 97112 NEUROMUSCULAR REEDUCATION: CPT | Performed by: PHYSICAL THERAPIST

## 2024-02-01 PROCEDURE — 97530 THERAPEUTIC ACTIVITIES: CPT | Performed by: PHYSICAL THERAPIST

## 2024-02-01 NOTE — PROGRESS NOTES
Daily Note     Today's date: 2024  Patient name: Jacy Rodrigez  : 1950  MRN: [id number]  Referring provider: Patrick Naylor DO  Dx:   Encounter Diagnosis     ICD-10-CM    1. Weakness  R53.1       2. Multiple sclerosis (HCC)  G35       3. Gait abnormality  R26.9           Start Time: 1030  Stop Time: 1116  Total time in clinic (min): 46 minutes    Subjective: Pt reports right LE pain, mostly at night time. Pt states that her gabapentin was reduced by 300 mg last time she saw Dr. Briceno.  Pt will be getting MRI of abdomen to assess mass on kidney. Pt still experiencing frequent buckling of the LE making her at increase risk of falls.       Objective: See treatment diary below  MRI of T/S: No new   demyelinating plaques evident.    Assessment: Tolerated treatment Fair. Implemented gaze stabilization into plan of care. Weakness is noticeable in bilateral LE (left ankle DF with drag, right hip extension, and left hip march/hike). Frequent rest breaks required in today's session. Patient demonstrated fatigue post treatment.      Plan: Continue per plan of care.      Precautions: MS, Gait dysfunction, Insomnia, Osteoporosis, Peripheral polyneuropathy, Hx of Breast CA, R IDALIA in - Dr. Rushing, Left femur IM nailing              Manuals 1/18 1/22 1/25 2/1   1/8 1/15                                               Neuro Re-Ed          DLS on foam Feet together airex 20 shld flex, 20 shld abd 16x shld flex WBOS airex 20x abd and flex     Feet together airex foam 20 shld flex, 18 shld abd   Tandem balance           Standing hip abd    3x10       Standing hip ext    3x10       Standing SLR           High knee marching  5x laps UUE support ballet bar                     Ther Ex          Leg press                      Hamstring stretching           Calf SB stretching           Hamstring curl  Standing 30x ea with grey bolster 30x ea bolster to avoid hip flexion at mirror 30x ea bolster to  "avoid hip flexion at mirror     Standing 30x ea    LAQ 2# 3x10 ea  0# 3x10 ea 1# 3x10     2# 2 x 10 ea    Seated March  2x10 ea                                 Ther Activity  1/22 1/25        Lateral walks 4x laps BUE support 4 laps 1/2 mirror 2 laps full mirror no UE support     4 x 20 feet    Mini squats    Sit to stand 1x10 with UE support     Sit to stands 1 x 12 with UE support    Step ups           Leeann walks            Turning around cones/obstacle course       Nv (consider 90 deg, 180, and 360 deg turns)     180 degree turn fwd/bwd amb NV    6 turns (10 feet intervals)        VOR x 1 viewing (Walking) with CG + SPC    2x30\"    nv    6 minute walk test       Performed for Re-evaluation    Standing HR/TR 3x10 3x10 3x10 3x10    3 x10    Marching 3x10  3x10 ea 3x10 3x10    3 x10    Gait Training                                 Vitals           BP           SpO2           HR           1:1 with PT from [id number]             "

## 2024-02-01 NOTE — TELEPHONE ENCOUNTER
"Nursing, see my response from 1/30. \"Let pt know we got the form.  Nursing pt only needs documentation of her diagnosis to be sent to [facility] thru their portal. Please send this week - Diagnosis is MS. Please check with pt if ok to send office note which has Dx or we can just send one sentence with confirmation of her DX thru portal. Per attachment need to send to [email or web address] \"  Looks like time sensitive issue for pt. Please help.   "

## 2024-02-01 NOTE — TELEPHONE ENCOUNTER
"----- Message from Joleen Caballero sent at 2/1/2024  2:52 PM EST -----  Regarding: FW: COLLINS Anderson  Contact: [phone]    ----- Message -----  From: Jacy Rodrigez \"Jihan\"  Sent: 2/1/2024   1:47 PM EST  To: [facility]  Subject: [facility] Berhane                                        I received an email from [facility] that I have till Monday to have my diagnosis verified or they will nullify my berhane. I know that I am being a pain but they didn't give me much time.   The amount this year is already 25% less.   Thank you for anything you can do to help me   Jihan    "

## 2024-02-02 ENCOUNTER — TELEPHONE (OUTPATIENT)
Dept: NEUROLOGY | Facility: CLINIC | Age: 74
End: 2024-02-02

## 2024-02-02 NOTE — TELEPHONE ENCOUNTER
Advised pt of results and recommendations below. She verbalized understanding.      MRI t-spine report sent to current PCP.    12 /5/23 Consult w/Dr. Long-Nephrology  1/15/24 US Kidney and bladder; MRI recommended  MRI abdomen scheduled for 2/25/24      She is going to a new PCP Dr. Prashant Jonas [facility]; Consult next week

## 2024-02-02 NOTE — TELEPHONE ENCOUNTER
Letter by Jaclyn Warner RN on 2/2/2024         [facility] [place] [address] [address]   [address] [facility] [address]  Phone#  [phone]  Fax#  [phone]     February 2, 2024      Patient:            Jacy Rodrigez  YOB: 1950     [employer]  [address] [place] [address]     To Whom it May Concern:     Jacy Rodrigez is under my professional care. She was diagnosed with multiple sclerosis in 2008.      Please do not hesitate to call the office at [phone] with any questions or concerns.      Sincerely,            Elidia Briceno MD

## 2024-02-02 NOTE — TELEPHONE ENCOUNTER
Spoke w/pt. She is agreeable to letter. Advised I will send to her via [employer] portal as well as:     Emailed letter to [email or web address]   Faxed to [phone](h)    Letter generated and distributed as per above.

## 2024-02-02 NOTE — TELEPHONE ENCOUNTER
----- Message from Elidia Briceno MD sent at 1/24/2024 11:40 AM EST -----  Let pt know no new ms lesions. Pt does have scoliosis and possible renal cysts.  Send copy of report to pcp as well

## 2024-02-05 ENCOUNTER — OFFICE VISIT (OUTPATIENT)
Dept: PHYSICAL THERAPY | Facility: CLINIC | Age: 74
End: 2024-02-05
Payer: MEDICARE

## 2024-02-05 DIAGNOSIS — G35 MULTIPLE SCLEROSIS (HCC): ICD-10-CM

## 2024-02-05 DIAGNOSIS — R53.1 WEAKNESS: Primary | ICD-10-CM

## 2024-02-05 DIAGNOSIS — R26.9 GAIT ABNORMALITY: ICD-10-CM

## 2024-02-05 PROCEDURE — 97530 THERAPEUTIC ACTIVITIES: CPT | Performed by: PHYSICAL THERAPIST

## 2024-02-05 PROCEDURE — 97110 THERAPEUTIC EXERCISES: CPT | Performed by: PHYSICAL THERAPIST

## 2024-02-05 NOTE — PROGRESS NOTES
Daily Note     Today's date: 2024  Patient name: Jacy Rodrigez  : 1950  MRN: [id number]  Referring provider: Patrick Naylor DO  Dx:   Encounter Diagnosis     ICD-10-CM    1. Weakness  R53.1       2. Gait abnormality  R26.9       3. Multiple sclerosis (HCC)  G35           Start Time: 1215  Stop Time: 1300  Total time in clinic (min): 45 minutes    Subjective: Pt reports getting minimal sleep the past few nights. LE not as fatigued as last week.       Objective: See treatment diary below      Assessment: Tolerated treatment well. Increased VOR to 3 sets. Initiated idris walks with good tolerance. Resumed UQ ROM during static balance. Patient demonstrated fatigue post treatment.      Plan: Continue per plan of care.      Precautions: MS, Gait dysfunction, Insomnia, Osteoporosis, Peripheral polyneuropathy, Hx of Breast CA, R IDALIA in - Dr. Rushing, Left femur IM nailing              Manuals 1/18 1/22 1/25 2/1 2/5  1/8 1/15                                               Neuro Re-Ed          DLS on foam Feet together airex 20 shld flex, 20 shld abd 16x shld flex WBOS airex 20x abd and flex  Feet together airex foam 20 shld flex, 20x  shld abd   Feet together airex foam 20 shld flex, 18 shld abd   Tandem balance           Standing hip abd    3x10       Standing hip ext    3x10       Standing SLR           High knee marching  5x laps UUE support ballet bar                     Ther Ex          Leg press                      Hamstring stretching           Calf SB stretching           Hamstring curl  Standing 30x ea with grey bolster 30x ea bolster to avoid hip flexion at mirror 30x ea bolster to avoid hip flexion at mirror     Standing 30x ea    LAQ 2# 3x10 ea  0# 3x10 ea 1# 3x10  1# 3x10    2# 2 x 10 ea    Seated March  2x10 ea                                 Ther Activity          Lateral walks 4x laps BUE support 4 laps 1/ mirror 2 laps full mirror no UE support  4 laps 20  "feet    4 x 20 feet    Mini squats    Sit to stand 1x10 with UE support  Sit to stand 1x10 with UE support    Sit to stands 1 x 12 with UE support    Step ups           Leeann walks      8x 10 ft- 4 hurdles       Turning around cones/obstacle course       Nv (consider 90 deg, 180, and 360 deg turns)     180 degree turn fwd/bwd amb NV    6 turns (10 feet intervals)  6 turns (10 feet intervals)       VOR x 1 viewing (Walking) with CG + SPC    2x30\"  3x30\"  nv    6 minute walk test       Performed for Re-evaluation    Standing HR/TR 3x10 3x10 3x10 3x10 3x10   3 x10    Marching 3x10  3x10 ea 3x10 3x10 3x10    3 x10    Gait Training                                 Vitals           BP           SpO2           HR           1:1 with PT from 1215-1pm                "

## 2024-02-08 ENCOUNTER — CONSULT (OUTPATIENT)
Dept: DERMATOLOGY | Facility: CLINIC | Age: 74
End: 2024-02-08

## 2024-02-08 ENCOUNTER — OFFICE VISIT (OUTPATIENT)
Dept: PHYSICAL THERAPY | Facility: CLINIC | Age: 74
End: 2024-02-08
Payer: MEDICARE

## 2024-02-08 VITALS — BODY MASS INDEX: 24.24 KG/M2 | HEIGHT: 64 IN | TEMPERATURE: 98.2 F | WEIGHT: 142 LBS

## 2024-02-08 DIAGNOSIS — L81.4 LENTIGINES: ICD-10-CM

## 2024-02-08 DIAGNOSIS — G35 MULTIPLE SCLEROSIS (HCC): ICD-10-CM

## 2024-02-08 DIAGNOSIS — D48.5 NEOPLASM OF UNCERTAIN BEHAVIOR OF SKIN: ICD-10-CM

## 2024-02-08 DIAGNOSIS — D22.72 MULTIPLE BENIGN MELANOCYTIC NEVI OF UPPER AND LOWER EXTREMITIES AND TRUNK: ICD-10-CM

## 2024-02-08 DIAGNOSIS — D22.71 MULTIPLE BENIGN MELANOCYTIC NEVI OF UPPER AND LOWER EXTREMITIES AND TRUNK: ICD-10-CM

## 2024-02-08 DIAGNOSIS — L82.1 SEBORRHEIC KERATOSIS: ICD-10-CM

## 2024-02-08 DIAGNOSIS — L57.8 OTHER SKIN CHANGES DUE TO CHRONIC EXPOSURE TO NONIONIZING RADIATION: ICD-10-CM

## 2024-02-08 DIAGNOSIS — R53.1 WEAKNESS: Primary | ICD-10-CM

## 2024-02-08 DIAGNOSIS — R26.9 GAIT ABNORMALITY: ICD-10-CM

## 2024-02-08 DIAGNOSIS — D22.62 MULTIPLE BENIGN MELANOCYTIC NEVI OF UPPER AND LOWER EXTREMITIES AND TRUNK: ICD-10-CM

## 2024-02-08 DIAGNOSIS — D22.5 MULTIPLE BENIGN MELANOCYTIC NEVI OF UPPER AND LOWER EXTREMITIES AND TRUNK: ICD-10-CM

## 2024-02-08 DIAGNOSIS — D22.61 MULTIPLE BENIGN MELANOCYTIC NEVI OF UPPER AND LOWER EXTREMITIES AND TRUNK: ICD-10-CM

## 2024-02-08 DIAGNOSIS — L82.0 INFLAMED SEBORRHEIC KERATOSIS: ICD-10-CM

## 2024-02-08 DIAGNOSIS — L98.9 BENIGN SKIN LESION OF MULTIPLE SITES: ICD-10-CM

## 2024-02-08 DIAGNOSIS — L57.0 AK (ACTINIC KERATOSIS): ICD-10-CM

## 2024-02-08 DIAGNOSIS — D18.01 CHERRY ANGIOMA: Primary | ICD-10-CM

## 2024-02-08 PROCEDURE — 97530 THERAPEUTIC ACTIVITIES: CPT | Performed by: PHYSICAL THERAPIST

## 2024-02-08 PROCEDURE — 97110 THERAPEUTIC EXERCISES: CPT | Performed by: PHYSICAL THERAPIST

## 2024-02-08 NOTE — PROGRESS NOTES
"[facility] Dermatology Clinic Note     Patient Name: Jacy Rodrigez  Encounter Date: 02/08/2024     Have you been cared for by a [facility] Dermatologist in the last 3 years and, if so, which description applies to you?    NO.   I am considered a \"new\" patient and must complete all patient intake questions. I am FEMALE/of child-bearing potential.    REVIEW OF SYSTEMS:  Have you recently had or currently have any of the following? Recent fever or chills? No  Any non-healing wound? No  Are you pregnant or planning to become pregnant? No  Are you currently or planning to be nursing or breast feeding? No   PAST MEDICAL HISTORY:  Have you personally ever had or currently have any of the following?  If \"YES,\" then please provide more detail. Skin cancer (such as Melanoma, Basal Cell Carcinoma, Squamous Cell Carcinoma?  No  Tuberculosis, HIV/AIDS, Hepatitis B or C: No  Radiation Treatment No   HISTORY OF IMMUNOSUPPRESSION:   Do you have a history of any of the following:  Systemic Immunosuppression such as Diabetes, Biologic or Immunotherapy, Chemotherapy, Organ Transplantation, Bone Marrow Transplantation?  No    Answering \"YES\" requires the addition of the dotphrase \"IMMUNOSUPPRESSED\" as the first diagnosis of the patient's visit.   FAMILY HISTORY:  Any \"first degree relatives\" (parent, brother, sister, or child) with the following?    Skin Cancer, Pancreatic or Other Cancer? YES, mom had BCC   PATIENT EXPERIENCE:    Do you want the Dermatologist to perform a COMPLETE skin exam today including a clinical examination under the \"bra and underwear\" areas?  Yes  If necessary, do we have your permission to call and leave a detailed message on your Preferred Phone number that includes your specific medical information?  Yes on home phone- [phone]      Allergies   Allergen Reactions    Duloxetine Hcl      Rapid Heart rate      Iodinated Contrast Media Anaphylaxis     IVP dye    Acetaminophen Other (See Comments)     " Heart palpitations    Cetirizine      Only occurs with generic, weakness in legs, off balance and couldn't walk.    Morphine Other (See Comments)     Migraine      Current Outpatient Medications:     ALPHA LIPOIC ACID PO, Take by mouth, Disp: , Rfl:     Ampyra 10 MG TB12, 1 tab po bid, Disp: 180 tablet, Rfl: 3    ascorbic acid (VITAMIN C) 500 mg tablet, Take 500 mg by mouth daily, Disp: , Rfl:     baclofen 10 mg tablet, 1 tab pos q am, 1 tab po q pm and 2 tabs hs, Disp: 360 tablet, Rfl: 3    Biotin 5000 MCG TABS, , Disp: , Rfl:     clonazePAM (KlonoPIN) 0.5 mg tablet, TAKE 1 AND 1/2 TABLETS BY MOUTH DAILY AT BEDTIME, Disp: 135 tablet, Rfl: 0    Cranberry 125 MG TABS, Take 125 mg by mouth in the morning, Disp: , Rfl:     cycloSPORINE (RESTASIS) 0.05 % ophthalmic emulsion, Administer 1 drop to both eyes every 12 (twelve) hours, Disp: , Rfl:     denosumab (PROLIA) 60 mg/mL, Inject under the skin, Disp: , Rfl:     docusate sodium (COLACE) 100 mg capsule, Take 1 capsule by mouth every other day  , Disp: , Rfl:     gabapentin (NEURONTIN) 600 MG tablet, Take one tablet in the AM, 1 tabs in the afternoon  and 2 tabs at bedtime, Disp: 360 tablet, Rfl: 3    lisinopril (ZESTRIL) 5 mg tablet, Take 0.5 tablets (2.5 mg total) by mouth daily, Disp: 45 tablet, Rfl: 1    LORazepam (Ativan) 0.5 mg tablet, 1 tab po 30 min prior to mri, Disp: 1 tablet, Rfl: 0    LORazepam (Ativan) 0.5 mg tablet, Take 1 tablet (0.5 mg total) by mouth once in imaging for anxiety for up to 1 dose, Disp: 1 tablet, Rfl: 0    Magnesium 500 MG TABS, Take 500 tablets by mouth once, Disp: , Rfl:     NON FORMULARY, AMPYRA 10 MG Q12 HR, Disp: , Rfl:     solifenacin (VESICARE) 10 MG tablet, TAKE 1 TABLET DAILY, Disp: 90 tablet, Rfl: 1    zonisamide (ZONEGRAN) 100 mg capsule, TAKE 4 CAPSULES DAILY AT   BEDTIME, Disp: 360 capsule, Rfl: 3          Whom besides the patient is providing clinical information about today's encounter?   NO ADDITIONAL HISTORIAN (patient  "alone provided history)    Physical Exam and Assessment/Plan by Diagnosis:    CHERRY ANGIOMAS     Physical Exam:  Anatomic Location Affected:  Trunk and extremities  Morphological Description:  Scattered cherry red papules  Denies pain, itch, bleeding. No treatments tried. Present for years. Present constantly; no modifying factors which make it worse or better.     Assessment and Plan:  Based on a thorough discussion of this condition and the management approach to it (including a comprehensive discussion of the known risks, side effects and potential benefits of treatment), the patient (family) agrees to implement the following specific plan:  Reassure benign        SEBORRHEIC KERATOSIS; NON-INFLAMED     Physical Exam:  Anatomic Location Affected:  Trunk and extremities  Morphological Description:  Waxy, smooth to warty textured, yellow to brownish-grey to dark brown to blackish, discrete, \"stuck-on\" appearing papules.  Present for years. Denies pain, itch, bleeding.      Additional History of Present Condition:  Present constantly; no modifying factors which make it worse or better. No prior treatment.       Assessment and Plan:  Based on a thorough discussion of this condition and the management approach to it (including a comprehensive discussion of the known risks, side effects and potential benefits of treatment), the patient (family) agrees to implement the following specific plan:  Reassure benign  Use sun protection.  Apply SPF 30 or higher at least three times a day.  Wear sun protecting clothing and hats.        SOLAR LENTIGINES   OTHER SKIN CHANGES DUE TO CHRONIC EXPOSURE TO NONIONIZING RADIATION     Physical Exam:  Anatomic Location Affected:  Sun exposed areas of back, chest, arms, legs  Morphological Description:  Multiple scattered brown to tan evenly pigmented macules   Denies pain, itch, bleeding. No treatments tried. Present for months - years. Reports getting newer lesions with sun exposure.    " "     Assessment and Plan:  Based on a thorough discussion of this condition and the management approach to it (including a comprehensive discussion of the known risks, side effects and potential benefits of treatment), the patient (family) agrees to implement the following specific plan:  Reassure benign  Use sun protection.  Apply SPF 30 or higher at least three times a day.  Wear sun protecting clothing and hats.         MULTIPLE MELANOCYTIC NEVI (\"Moles\")     Physical Exam:  Anatomic Location Affected: Trunk and extremities  Morphological Description:  Scattered, round to ovoid, symmetrical-appearing, even bordered, skin colored to dark brown macules/papules  Denies pain, itch, bleeding. No treatments tried. Present for years. Present constantly; no modifying factors which make it worse or better. Denies actively changing or growing moles.      Assessment and Plan:  Based on a thorough discussion of this condition and the management approach to it (including a comprehensive discussion of the known risks, side effects and potential benefits of treatment), the patient (family) agrees to implement the following specific plan:  Reassure benign  Monitor for changes  Use sun protection.  Apply SPF 30 or higher at least three times a day.  Wear sun protecting clothing and hats.       Worrisome signs of skin malignancy discussed, questions answered. Regular self-skin check discussed. Advised to call or return to office if patient notices any spots of concern, rapidly growing/changing lesions, bleeding lesions, non-healing lesions. Advised regular SPF use.      NEOPLASM OF UNCERTAIN BEHAVIOR OF SKIN    Physical Exam:  (Anatomic Location); (Size and Morphological Description); (Differential Diagnosis):  Specimen A: left lateral thigh; skin; shave biopsy; 73 year old female with a 1 cm brown papule; DDX: seborrheic keratosis vs melanoma    Additional History of Present Condition:  present on exam    Assessment and Plan:  I " "have discussed with the patient that a sample of skin via a \"skin biopsy” would be potentially helpful to further make a specific diagnosis under the microscope.  Based on a thorough discussion of this condition and the management approach to it (including a comprehensive discussion of the known risks, side effects and potential benefits of treatment), the patient (family) agrees to implement the following specific plan:    Procedure:  Skin Biopsy.  After a thorough discussion of treatment options and risk/benefits/alternatives (including but not limited to local pain, scarring, dyspigmentation, blistering, possible superinfection, and inability to confirm a diagnosis via histopathology), verbal and written consent were obtained and portion of the rash was biopsied for tissue sample.  See below for consent that was obtained from patient and subsequent Procedure Note.     PROCEDURE TANGENTIAL (SHAVE) BIOPSY NOTE:    Performing Physician:   Anatomic Location; Clinical Description with size (cm); Pre-Op Diagnosis:   Specimen A: left lateral thigh; skin; shave biopsy; 73 year old female with a 1 cm brown papule; DDX: SK vs MM  Post-op diagnosis: Same     Local anesthesia: 1% xylocaine with epi      Topical anesthesia: None    Hemostasis: Aluminum chloride       After obtaining informed consent  at which time there was a discussion about the purpose of biopsy  and low risks of infection and bleeding.  The area was prepped and draped in the usual fashion. Anesthesia was obtained with 1% lidocaine with epinephrine. A shave biopsy to an appropriate sampling depth was obtained by Shave (Dermablade or 15 blade) The resulting wound was covered with surgical ointment and bandaged appropriately.     The patient tolerated the procedure well without complications and was without signs of functional compromise.      Specimen has been sent for review by Dermatopathology.    Standard post-procedure care has been explained and " "has been included in written form within the patient's copy of Informed Consent.    INFORMED CONSENT DISCUSSION AND POST-OPERATIVE INSTRUCTIONS FOR PATIENT    I.  RATIONALE FOR PROCEDURE  I understand that a skin biopsy allows the Dermatologist to test a lesion or rash under the microscope to obtain a diagnosis.  It usually involves numbing the area with numbing medication and removing a small piece of skin; sometimes the area will be closed with sutures. In this specific procedure, sutures are not usually needed.  If any sutures are placed, then they are usually need to be removed in 2 weeks or less.    I understand that my Dermatologist recommends that a skin \"shave\" biopsy be performed today.  A local anesthetic, similar to the kind that a dentist uses when filling a cavity, will be injected with a very small needle into the skin area to be sampled.  The injected skin and tissue underneath \"will go to sleep” and become numb so no pain should be felt afterwards.  An instrument shaped like a tiny \"razor blade\" (shave biopsy instrument) will be used to cut a small piece of tissue and skin from the area so that a sample of tissue can be taken and examined more closely under the microscope.  A slight amount of bleeding will occur, but it will be stopped with direct pressure and a pressure bandage and any other appropriate methods.  I understands that a scar will form where the wound was created.  Surgical ointment will be applied to help protect the wound.  Sutures are not usually needed.    II.  RISKS AND POTENTIAL COMPLICATIONS   I understand the risks and potential complications of a skin biopsy include but are not limited to the following:  Bleeding  Infection  Pain  Scar/keloid  Skin discoloration  Incomplete Removal  Recurrence  Nerve Damage/Numbness/Loss of Function  Allergic Reaction to Anesthesia  Biopsies are diagnostic procedures and based on findings additional treatment or evaluation may be required  Loss " "or destruction of specimen resulting in no additional findings    My Dermatologist has explained to me the nature of the condition, the nature of the procedure, and the benefits to be reasonably expected compared with alternative approaches.  My Dermatologist has discussed the likelihood of major risks or complications of this procedure including the specific risks listed above, such as bleeding, infection, and scarring/keloid.  I understand that a scar is expected after this procedure.  I understand that my physician cannot predict if the scar will form a \"keloid,\" which extends beyond the borders of the wound that is created.  A keloid is a thick, painful, and bumpy scar.  A keloid can be difficult to treat, as it does not always respond well to therapy, which includes injecting cortisone directly into the keloid every few weeks.  While this usually reduces the pain and size of the scar, it does not eliminate it.      I understand that photographs may be taken before and after the procedure.  These will be maintained as part of the medical providers confidential records and may not be made available to me.  I further authorize the medical provider to use the photographs for teaching purposes or to illustrate scientific papers, books, or lectures if in his/her judgment, medical research, education, or science may benefit from its use.    I have had an opportunity to fully inquire about the risks and benefits of this procedure and its alternatives.   I have been given ample time and opportunity to ask questions and to seek a second opinion if I wished to do so.  I acknowledge that there have specifically been no guarantees as to the cosmetic results from the procedure.  I am aware that with any procedure there is always the possibility of an unexpected complication.    III. POST-PROCEDURAL CARE (WHAT YOU WILL NEED TO DO \"AFTER THE BIOPSY\" TO OPTIMIZE HEALING)    Keep the area clean and dry.  Try NOT to remove the " "bandage or get it wet for the first 24 hours.    Gently clean the area and apply surgical ointment (such as Vaseline petrolatum ointment, which is available \"over the counter\" and not a prescription) to the biopsy site for up to 2 weeks straight.  This acts to protect the wound from the outside world.      Sutures are not usually placed in this procedure.  If any sutures were placed, return for suture removal as instructed (generally 1 week for the face, 2 weeks for the body).      Take Acetaminophen (Tylenol) for discomfort, if no contraindications.  Ibuprofen or aspirin could make bleeding worse.    Call our office immediately for signs of infection: fever, chills, increased redness, warmth, tenderness, discomfort/pain, or pus or foul smell coming from the wound.    WHAT TO DO IF THERE IS ANY BLEEDING?  If a small amount of bleeding is noticed, place a clean cloth over the area and apply firm pressure for ten minutes.  Check the wound after 10 minutes of direct pressure.  If bleeding persists, try one more time for an additional 10 minutes of direct pressure on the area.  If the bleeding becomes heavier or does not stop after the second attempt, or if you have any other questions about this procedure, then please call your [facility]'s Dermatologist by calling [phone] (SKIN).     I hereby acknowledge that I have reviewed and verified the site with my Dermatologist and have requested and authorized my Dermatologist to proceed with the procedure.    INFLAMED SEBORRHEIC KERATOSIS  Physical Exam:  Anatomic Location Affected:  left and right breast  Morphological Description:  slightly tan to brown stuck-on papule(s)      Assessment and Plan:  Based on a thorough discussion of this condition and the management approach to it (including a comprehensive discussion of the known risks, side effects and potential benefits of treatment), the patient (family) agrees to implement the following specific plan:  Treated with " cryotherapy today due to associated itching and because the lesion is getting irritated; written and verbal consent obtained     PROCEDURE:  DESTRUCTION OF BENIGN LESIONS  After a thorough discussion of treatment options and risk/benefits/alternatives (including but not limited to local pain, scarring, dyspigmentation, blistering, and possible superinfection), verbal and written consent were obtained and the aforementioned lesions were treated on with cryotherapy using liquid nitrogen x 3 cycles for 5-10 seconds. The patient tolerated the procedure well, and after-care instructions were provided.     TOTAL NUMBER of 10 lesion(s) were treated today on the ANATOMIC LOCATION: left and right breast      Patient instructions:  Your lesions were treated with liquid nitrogen today. The treated areas will get more red, crusted over the next few days. There might be some blistering. Apply vaseline to the treated area for the next week to help it heal fully. Do not pick at the area. Return in 3-4 weeks for another round of liquid nitrogen treatment if lesion(s)  fails to fully resolve.     Scribe Attestation      I,:  Miya Pierce am acting as a scribe while in the presence of the attending physician.:       I,:  Estefanía Field MD personally performed the services described in this documentation    as scribed in my presence.:

## 2024-02-08 NOTE — PATIENT INSTRUCTIONS
"OLIVEIRA ANGIOMAS      Assessment and Plan:  Based on a thorough discussion of this condition and the management approach to it (including a comprehensive discussion of the known risks, side effects and potential benefits of treatment), the patient (family) agrees to implement the following specific plan:  Reassure benign        SEBORRHEIC KERATOSIS; NON-INFLAMED      Assessment and Plan:  Based on a thorough discussion of this condition and the management approach to it (including a comprehensive discussion of the known risks, side effects and potential benefits of treatment), the patient (family) agrees to implement the following specific plan:  Reassure benign  Use sun protection.  Apply SPF 30 or higher at least three times a day.  Wear sun protecting clothing and hats.        SOLAR LENTIGINES   OTHER SKIN CHANGES DUE TO CHRONIC EXPOSURE TO NONIONIZING RADIATION     Assessment and Plan:  Based on a thorough discussion of this condition and the management approach to it (including a comprehensive discussion of the known risks, side effects and potential benefits of treatment), the patient (family) agrees to implement the following specific plan:  Reassure benign  Use sun protection.  Apply SPF 30 or higher at least three times a day.  Wear sun protecting clothing and hats.         MULTIPLE MELANOCYTIC NEVI (\"Moles\")      Assessment and Plan:  Based on a thorough discussion of this condition and the management approach to it (including a comprehensive discussion of the known risks, side effects and potential benefits of treatment), the patient (family) agrees to implement the following specific plan:  Reassure benign  Monitor for changes  Use sun protection.  Apply SPF 30 or higher at least three times a day.  Wear sun protecting clothing and hats.       Worrisome signs of skin malignancy discussed, questions answered. Regular self-skin check discussed. Advised to call or return to office if patient notices any " "spots of concern, rapidly growing/changing lesions, bleeding lesions, non-healing lesions. Advised regular SPF use.      NEOPLASM OF UNCERTAIN BEHAVIOR OF SKIN    Assessment and Plan:  I have discussed with the patient that a sample of skin via a \"skin biopsy” would be potentially helpful to further make a specific diagnosis under the microscope.  Based on a thorough discussion of this condition and the management approach to it (including a comprehensive discussion of the known risks, side effects and potential benefits of treatment), the patient (family) agrees to implement the following specific plan:    Procedure:  Skin Biopsy.  After a thorough discussion of treatment options and risk/benefits/alternatives (including but not limited to local pain, scarring, dyspigmentation, blistering, possible superinfection, and inability to confirm a diagnosis via histopathology), verbal and written consent were obtained and portion of the rash was biopsied for tissue sample.  See below for consent that was obtained from patient and subsequent Procedure Note.       INFORMED CONSENT DISCUSSION AND POST-OPERATIVE INSTRUCTIONS FOR PATIENT    I.  RATIONALE FOR PROCEDURE  I understand that a skin biopsy allows the Dermatologist to test a lesion or rash under the microscope to obtain a diagnosis.  It usually involves numbing the area with numbing medication and removing a small piece of skin; sometimes the area will be closed with sutures. In this specific procedure, sutures are not usually needed.  If any sutures are placed, then they are usually need to be removed in 2 weeks or less.    I understand that my Dermatologist recommends that a skin \"shave\" biopsy be performed today.  A local anesthetic, similar to the kind that a dentist uses when filling a cavity, will be injected with a very small needle into the skin area to be sampled.  The injected skin and tissue underneath \"will go to sleep” and become numb so no pain should " "be felt afterwards.  An instrument shaped like a tiny \"razor blade\" (shave biopsy instrument) will be used to cut a small piece of tissue and skin from the area so that a sample of tissue can be taken and examined more closely under the microscope.  A slight amount of bleeding will occur, but it will be stopped with direct pressure and a pressure bandage and any other appropriate methods.  I understands that a scar will form where the wound was created.  Surgical ointment will be applied to help protect the wound.  Sutures are not usually needed.    II.  RISKS AND POTENTIAL COMPLICATIONS   I understand the risks and potential complications of a skin biopsy include but are not limited to the following:  Bleeding  Infection  Pain  Scar/keloid  Skin discoloration  Incomplete Removal  Recurrence  Nerve Damage/Numbness/Loss of Function  Allergic Reaction to Anesthesia  Biopsies are diagnostic procedures and based on findings additional treatment or evaluation may be required  Loss or destruction of specimen resulting in no additional findings    My Dermatologist has explained to me the nature of the condition, the nature of the procedure, and the benefits to be reasonably expected compared with alternative approaches.  My Dermatologist has discussed the likelihood of major risks or complications of this procedure including the specific risks listed above, such as bleeding, infection, and scarring/keloid.  I understand that a scar is expected after this procedure.  I understand that my physician cannot predict if the scar will form a \"keloid,\" which extends beyond the borders of the wound that is created.  A keloid is a thick, painful, and bumpy scar.  A keloid can be difficult to treat, as it does not always respond well to therapy, which includes injecting cortisone directly into the keloid every few weeks.  While this usually reduces the pain and size of the scar, it does not eliminate it.      I understand that " "photographs may be taken before and after the procedure.  These will be maintained as part of the medical providers confidential records and may not be made available to me.  I further authorize the medical provider to use the photographs for teaching purposes or to illustrate scientific papers, books, or lectures if in his/her judgment, medical research, education, or science may benefit from its use.    I have had an opportunity to fully inquire about the risks and benefits of this procedure and its alternatives.   I have been given ample time and opportunity to ask questions and to seek a second opinion if I wished to do so.  I acknowledge that there have specifically been no guarantees as to the cosmetic results from the procedure.  I am aware that with any procedure there is always the possibility of an unexpected complication.    III. POST-PROCEDURAL CARE (WHAT YOU WILL NEED TO DO \"AFTER THE BIOPSY\" TO OPTIMIZE HEALING)    Keep the area clean and dry.  Try NOT to remove the bandage or get it wet for the first 24 hours.    Gently clean the area and apply surgical ointment (such as Vaseline petrolatum ointment, which is available \"over the counter\" and not a prescription) to the biopsy site for up to 2 weeks straight.  This acts to protect the wound from the outside world.      Sutures are not usually placed in this procedure.  If any sutures were placed, return for suture removal as instructed (generally 1 week for the face, 2 weeks for the body).      Take Acetaminophen (Tylenol) for discomfort, if no contraindications.  Ibuprofen or aspirin could make bleeding worse.    Call our office immediately for signs of infection: fever, chills, increased redness, warmth, tenderness, discomfort/pain, or pus or foul smell coming from the wound.    WHAT TO DO IF THERE IS ANY BLEEDING?  If a small amount of bleeding is noticed, place a clean cloth over the area and apply firm pressure for ten minutes.  Check the wound " after 10 minutes of direct pressure.  If bleeding persists, try one more time for an additional 10 minutes of direct pressure on the area.  If the bleeding becomes heavier or does not stop after the second attempt, or if you have any other questions about this procedure, then please call your [facility]'s Dermatologist by calling [phone] (SKIN).     I hereby acknowledge that I have reviewed and verified the site with my Dermatologist and have requested and authorized my Dermatologist to proceed with the procedure.    INFLAMED SEBORRHEIC KERATOSIS    Assessment and Plan:  Based on a thorough discussion of this condition and the management approach to it (including a comprehensive discussion of the known risks, side effects and potential benefits of treatment), the patient (family) agrees to implement the following specific plan:  Treated with cryotherapy today due to associated itching and because the lesion is getting irritated; written and verbal consent obtained     Patient instructions:  Your lesions were treated with liquid nitrogen today. The treated areas will get more red, crusted over the next few days. There might be some blistering. Apply vaseline to the treated area for the next week to help it heal fully. Do not pick at the area. Return in 3-4 weeks for another round of liquid nitrogen treatment if lesion(s)  fails to fully resolve.

## 2024-02-08 NOTE — PROGRESS NOTES
Daily Note     Today's date: 2024  Patient name: Jacy Rodrigez  : 1950  MRN: [id number]  Referring provider: Patrick Naylor DO  Dx:   Encounter Diagnosis     ICD-10-CM    1. Weakness  R53.1       2. Multiple sclerosis (HCC)  G35       3. Gait abnormality  R26.9           Start Time: 0930  Stop Time: 1020  Total time in clinic (min): 50 minutes    Subjective: Pt offers no new complaints this morning. Denies significant leg fatigue or weakness.       Objective: See treatment diary below      Assessment: Tolerated treatment well. Good performance of skilled treatment. Minor dragging noted of Left LE. Increased LAQ to 2#. Patient would benefit from continued PT.      Plan: Continue per plan of care.      Precautions: MS, Gait dysfunction, Insomnia, Osteoporosis, Peripheral polyneuropathy, Hx of Breast CA, R IDALIA in - Dr. Rushing, Left femur IM nailing              Manuals                                                  Neuro Re-Ed          DLS on foam Feet together airex 20 shld flex, 20 shld abd 16x shld flex WBOS airex 20x abd and flex  Feet together airex foam 20 shld flex, 20x  shld abd Feet together airex foam 20 shld flex, 20x  shld abd     Tandem balance           Standing hip abd    3x10       Standing hip ext    3x10       Standing SLR           High knee marching  5x laps UUE support ballet bar                     Ther Ex          Leg press                      Hamstring stretching           Calf SB stretching           Hamstring curl  Standing 30x ea with gray bolster 30x ea bolster to avoid hip flexion at mirror 30x ea bolster to avoid hip flexion at mirror   30x ea bolster to avoid hip flexion at mirror     LAQ 2# 3x10 ea  0# 3x10 ea 1# 3x10  1# 3x10  2#15x R/20x L      Seated March  2x10 ea                                 Ther Activity          Lateral walks 4x laps BUE support 4 laps 1/2 mirror 2 laps full mirror no UE support  4  "laps 20 feet  6 laps 20 feet      Mini squats    Sit to stand 1x10 with UE support  Sit to stand 1x10 with UE support  Sit to stand [id number] UE support      Step ups           Leeann walks      8x 10 ft- 4 hurdles  8x 10 ft- 4 hurdles      Turning around cones/obstacle course           180 degree turn fwd/bwd amb NV    6 turns (10 feet intervals)  6 turns (10 feet intervals)  6 turns (10 feet intervals)      VOR x 1 viewing (Walking) with CG + SPC    2x30\"  3x30\" 3x30\"     6 minute walk test           Standing HR/TR 3x10 3x10 3x10 3x10 3x10 3x10     Marching 3x10  3x10 ea 3x10 3x10 3x10  3x10      Gait Training                                 Vitals           BP           SpO2           HR           1:1 with PT from [id number]                 "

## 2024-02-12 ENCOUNTER — OFFICE VISIT (OUTPATIENT)
Dept: PHYSICAL THERAPY | Facility: CLINIC | Age: 74
End: 2024-02-12
Payer: MEDICARE

## 2024-02-12 DIAGNOSIS — G35 MULTIPLE SCLEROSIS (HCC): ICD-10-CM

## 2024-02-12 DIAGNOSIS — R26.9 GAIT ABNORMALITY: ICD-10-CM

## 2024-02-12 DIAGNOSIS — R53.1 WEAKNESS: Primary | ICD-10-CM

## 2024-02-12 PROCEDURE — 97530 THERAPEUTIC ACTIVITIES: CPT | Performed by: PHYSICAL THERAPIST

## 2024-02-12 PROCEDURE — 97112 NEUROMUSCULAR REEDUCATION: CPT | Performed by: PHYSICAL THERAPIST

## 2024-02-12 NOTE — PROGRESS NOTES
Daily Note     Today's date: 2024  Patient name: Jacy Rodrigez  : 1950  MRN: [id number]  Referring provider: Patrick Naylor DO  Dx:   Encounter Diagnosis     ICD-10-CM    1. Weakness  R53.1       2. Gait abnormality  R26.9       3. Multiple sclerosis (HCC)  G35           Start Time: 1030  Stop Time: 1119  Total time in clinic (min): 49 minutes    Subjective: Pt reports minimal sleep over the weekend.       Objective: See treatment diary below      Assessment: Tolerated treatment fair.  Implemented lateral idris walks today. Pt unable to perform sit to stand transfers in repetition due to fatigue. Also implemented 1# weights with UE movements during static balance. Patient demonstrated fatigue post treatment and would benefit from continued PT.      Plan: Continue per plan of care.      Precautions: MS, Gait dysfunction, Insomnia, Osteoporosis, Peripheral polyneuropathy, Hx of Breast CA, R IDALIA in - Dr. Rushing, Left femur IM nailing              Manuals                                                 Neuro Re-Ed          DLS on foam Feet together airex 20 shld flex, 20 shld abd 16x shld flex WBOS airex 20x abd and flex  Feet together airex foam 20 shld flex, 20x  shld abd Feet together airex foam 20 shld flex, 20x  shld abd Feet together airex foam 15x shld flex, 15x  shld abd   1#    Tandem balance           Standing hip abd    3x10       Standing hip ext    3x10       Standing SLR           High knee marching  5x laps UUE support ballet bar                     Ther Ex          Leg press                      Hamstring stretching           Calf SB stretching           Hamstring curl  Standing 30x ea with gray bolster 30x ea bolster to avoid hip flexion at mirror 30x ea bolster to avoid hip flexion at mirror   30x ea bolster to avoid hip flexion at mirror 30x ea bolster to avoid hip flexion at mirror    LAQ 2# 3x10 ea  0# 3x10 ea 1# 3x10  1#  "3x10  2#15x R/20x L  2# 30x each    Seated March  2x10 ea                                 Ther Activity  1/22 1/25        Lateral walks 4x laps BUE support 4 laps 1/2 mirror 2 laps full mirror no UE support  4 laps 20 feet  6 laps 20 feet      Mini squats    Sit to stand 1x10 with UE support  Sit to stand 1x10 with UE support  Sit to stand [id number] UE support  1x- unable to perform today d/t fatigue     Step ups           Leeann walks      8x 10 ft- 4 hurdles  8x 10 ft- 4 hurdles  8x 10 ft- 4 hurdles  forward and lateral    Turning around cones/obstacle course           180 degree turn fwd/bwd amb NV    6 turns (10 feet intervals)  6 turns (10 feet intervals)  6 turns (10 feet intervals)  6 turns (10 feet intervals)     VOR x 1 viewing (Walking) with CG + SPC    2x30\"  3x30\" 3x30\" 3x30\"     6 minute walk test           Standing HR/TR 3x10 3x10 3x10 3x10 3x10 3x10 3x10     Marching 3x10  3x10 ea 3x10 3x10 3x10  3x10  3x10    Gait Training                                 Vitals           BP           SpO2           HR           1:1 with PT from [id number]                    "

## 2024-02-14 ENCOUNTER — HOSPITAL ENCOUNTER (OUTPATIENT)
Dept: SLEEP CENTER | Facility: CLINIC | Age: 74
Discharge: HOME | End: 2024-02-14
Payer: MEDICARE

## 2024-02-14 DIAGNOSIS — F51.04 CHRONIC INSOMNIA: ICD-10-CM

## 2024-02-14 DIAGNOSIS — G47.19 EXCESSIVE DAYTIME SLEEPINESS: ICD-10-CM

## 2024-02-14 PROCEDURE — G0399 HOME SLEEP TEST/TYPE 3 PORTA: HCPCS

## 2024-02-14 NOTE — PROGRESS NOTES
Home Sleep Study Documentation    HOME STUDY DEVICE: Noxturnal no                                           Christiana G3 yes      Pre-Sleep Home Study:    Set-up and instructions performed by: Natasha    Technician performed demonstration for Patient: yes    Return demonstration performed by Patient: yes    Written instructions provided to Patient: yes    Patient signed consent form: yes        Post-Sleep Home Study:    Additional comments by Patient:       Home Sleep Study Failed:no:    Failure reason: N/A    Reported or Detected: N/A    Scored by: EZ Marshall

## 2024-02-15 ENCOUNTER — OFFICE VISIT (OUTPATIENT)
Dept: PHYSICAL THERAPY | Facility: CLINIC | Age: 74
End: 2024-02-15
Payer: MEDICARE

## 2024-02-15 DIAGNOSIS — R53.1 WEAKNESS: Primary | ICD-10-CM

## 2024-02-15 DIAGNOSIS — G35 MULTIPLE SCLEROSIS (HCC): ICD-10-CM

## 2024-02-15 DIAGNOSIS — R26.9 GAIT ABNORMALITY: ICD-10-CM

## 2024-02-15 PROCEDURE — 97112 NEUROMUSCULAR REEDUCATION: CPT | Performed by: PHYSICAL THERAPIST

## 2024-02-15 PROCEDURE — 97530 THERAPEUTIC ACTIVITIES: CPT | Performed by: PHYSICAL THERAPIST

## 2024-02-15 NOTE — PROGRESS NOTES
Daily Note     Today's date: 2/15/2024  Patient name: Jacy Rodrigez  : 1950  MRN: [id number]  Referring provider: Patrick Naylor DO  Dx:   Encounter Diagnosis     ICD-10-CM    1. Weakness  R53.1       2. Gait abnormality  R26.9       3. Multiple sclerosis (HCC)  G35                      Subjective: Pt conducted a home sleep study last night and didn't sleep well. She reports feeling exhausted.      Objective: See treatment diary below      Assessment: Tolerated treatment well. Pt fatigues quickly. Difficulty noted with clearning hurdles secondary to LE weakness and poor activation of anterior tibialis. Unable to perform vestibular exercises or turns secondary to weakness/fatigue. Patient exhibited good technique with therapeutic exercises and would benefit from continued PT.      Plan: Continue per plan of care.      Precautions: MS, Gait dysfunction, Insomnia, Osteoporosis, Peripheral polyneuropathy, Hx of Breast CA, R IDALIA in - Dr. Rushing, Left femur IM nailing              Manuals 1/18 1/22 1/25 2/1 2/5 2/8 2/12 2/15                                               Neuro Re-Ed          DLS on foam Feet together airex 20 shld flex, 20 shld abd 16x shld flex WBOS airex 20x abd and flex  Feet together airex foam 20 shld flex, 20x  shld abd Feet together airex foam 20 shld flex, 20x  shld abd Feet together airex foam 15x shld flex, 15x  shld abd   1# Feet together airex foam 20x shld flex, 20x  shld abd   1#   Tandem balance           Standing hip abd    3x10    3x10   Standing hip ext    3x10       Standing SLR           High knee marching  5x laps UUE support ballet bar                     Ther Ex          Leg press                      Hamstring stretching           Calf SB stretching           Hamstring curl  Standing 30x ea with gray bolster 30x ea bolster to avoid hip flexion at mirror 30x ea bolster to avoid hip flexion at mirror   30x ea bolster to avoid hip flexion at  "mirror 30x ea bolster to avoid hip flexion at mirror 30x ea bolster to avoid hip flexion at mirror   LAQ 2# 3x10 ea  0# 3x10 ea 1# 3x10  1# 3x10  2#15x R/20x L  2# 30x each 2# 30x each   Seated March  2x10 ea                                 Ther Activity  1/22 1/25        Lateral walks 4x laps BUE support 4 laps 1/2 mirror 2 laps full mirror no UE support  4 laps 20 feet  6 laps 20 feet      Mini squats    Sit to stand 1x10 with UE support  Sit to stand 1x10 with UE support  Sit to stand [id number] UE support  1x- unable to perform today d/t fatigue     Step ups           Leeann walks      8x 10 ft- 4 hurdles  8x 10 ft- 4 hurdles  8x 10 ft- 4 hurdles  forward and lateral 8x 10 ft- 4 hurdles  forward and lateral   Turning around cones/obstacle course           180 degree turn fwd/bwd amb NV    6 turns (10 feet intervals)  6 turns (10 feet intervals)  6 turns (10 feet intervals)  6 turns (10 feet intervals)  nv   VOR x 1 viewing (Walking) with CG + SPC    2x30\"  3x30\" 3x30\" 3x30\"  nv   6 minute walk test           Standing HR/TR 3x10 3x10 3x10 3x10 3x10 3x10 3x10  3x10   Marching 3x10  3x10 ea 3x10 3x10 3x10  3x10  3x10 3x10    Gait Training                                 Vitals           BP           SpO2           HR           1:1 with PT from [id number]                      "

## 2024-02-16 PROBLEM — G47.33 OSA (OBSTRUCTIVE SLEEP APNEA): Status: ACTIVE | Noted: 2024-02-16

## 2024-02-18 DIAGNOSIS — N32.81 OAB (OVERACTIVE BLADDER): ICD-10-CM

## 2024-02-19 ENCOUNTER — EVALUATION (OUTPATIENT)
Dept: PHYSICAL THERAPY | Facility: CLINIC | Age: 74
End: 2024-02-19
Payer: MEDICARE

## 2024-02-19 DIAGNOSIS — R53.1 WEAKNESS: ICD-10-CM

## 2024-02-19 DIAGNOSIS — R26.9 GAIT ABNORMALITY: Primary | ICD-10-CM

## 2024-02-19 DIAGNOSIS — G35 MULTIPLE SCLEROSIS (HCC): ICD-10-CM

## 2024-02-19 PROCEDURE — 97110 THERAPEUTIC EXERCISES: CPT | Performed by: PHYSICAL THERAPIST

## 2024-02-19 PROCEDURE — 97530 THERAPEUTIC ACTIVITIES: CPT | Performed by: PHYSICAL THERAPIST

## 2024-02-19 PROCEDURE — 97112 NEUROMUSCULAR REEDUCATION: CPT | Performed by: PHYSICAL THERAPIST

## 2024-02-19 RX ORDER — SOLIFENACIN SUCCINATE 10 MG/1
10 TABLET, FILM COATED ORAL DAILY
Qty: 90 TABLET | Refills: 1 | Status: SHIPPED | OUTPATIENT
Start: 2024-02-19

## 2024-02-19 NOTE — PROGRESS NOTES
"[id number]    Today's date: 2024  Patient name: Jacy Rodrigez  : 1950  MRN: [id number]  Referring provider: Elidia Briceno MD  Dx:   Encounter Diagnosis     ICD-10-CM    1. Gait abnormality  R26.9       2. Multiple sclerosis (HCC)  G35       3. Weakness  R53.1           Start Time: 1015  Stop Time: 1053  Total time in clinic (min): 38 minutes  Assessment  Assessment details: Jacy Rodrigez is a 73 y.o. female who presents with signs and symptoms consistent of a primary balance impairment and ambulatory dysfunction secondary to MS.  Pt admits that \"today is a bad day. Minimal sleep and moderate discomfort in LE.\" Pt has been experiencing fluctuating days of pain and fatigue in her LE. She is currently struggling to sleep at  night time which is affecting her muscle performance. MMT of her LE has demonstrated mild improvements in certain planes of motion in the LE. All functional tests measured with results below. Measurements have all regressed secondary to fatigue, discomfort, and poor sleep last evening. She continues to ambulate with a RW for safety. No reported falls, recently. Pt continues to experience considerable LE weakness, reduce muscle performance, and ambulation dysfunction. Patient will benefit from continuation of skilled PT.     Impairments: abnormal coordination, abnormal gait, abnormal or restricted ROM, abnormal movement, difficulty understanding, impaired balance, impaired physical strength, lacks appropriate home exercise program and poor posture   Understanding of Dx/Px/POC: good   Prognosis: good    Goals  Short Term Goals: to be achieved by 4 weeks  1) Patient to be independent with basic HEP.-MET  2) Increase LE strength by 1/2 MMT grade in all deficient planes.-Partially met  3) Improve TUG by 2 seconds-Partially met  4) Improve 5x sit to stand by 2 seconds-Partially met    Long Term Goals: to be achieved by discharge  1) FOTO equal to or greater than " "expected.-Partially met  2) Ambulation to improve to maximal level of function-Partially met  3) Stair negotiation will improve to reciprocal.-Partially met  4) Sit to stand transfers will improve to maximal level of function -Partially met    Plan  Plan details: Initiate treatment for LE weakness and balance.  Continue treatment on left shoulder on PRN basis  Patient would benefit from: PT eval and skilled physical therapy  Planned therapy interventions: manual therapy, neuromuscular re-education, patient education, strengthening, therapeutic activities, therapeutic exercise, transfer training, home exercise program and functional ROM exercises  Frequency: 2x per week for 4-6 weeks.  Treatment plan discussed with: patient        Subjective Evaluation    History of Present Illness  Mechanism of injury: History of Current Injury: Pt referred to PT from neurology due to LE weakness, balance deficits, and ambulation dysfunction. Pt has MS and recently feels as though her LE weakness is worsening making ADLs and ambulation tolerate difficult. Pt did have a fall last year which resulted in proximal humerus fracture and RTC dysfunction (currently being treated by me). She has made good progress with her shoulder. Pt does report increasing LE pain and weakness. Due to CKD, patient has been weaning from gabapentin and ampyra. Pt currently ambulates with a SPC for safety. She lives at home with her , who will be undergoing spinal surgery in December.   Pt has steps down to the basement but has single level living. Pt has 1-2 3\" steps to enter her house. Pt admits to falling on her knees last night. She denies any injury but her knees are bothersome. Pt's also fell June and 1 time in April of this year. Pt notes that her leg somewhat gave out on her last night.     Special Questions: Positive for peripheral neuropathy  Patient goals:  Reduce unsteadiness, improve LE strength, increase walking and endurance. "   Hobbies/Interest: Volunteers as , cooking, cleaning.   Occupation: Retired            Objective     Strength/Myotome Testing     Left Hip   Planes of Motion   Flexion: 3+  Abduction: 4-  Adduction: 4-  External rotation: 3-  Internal rotation: 3+    Right Hip   Planes of Motion   Flexion: 4  Abduction: 4-  Adduction: 4-  External rotation: 3+  Internal rotation: 3+    Left Knee   Flexion: 3+  Extension: 3    Right Knee   Flexion: 3+  Extension: 4-    Left Ankle/Foot   Dorsiflexion: 4-  Plantar flexion: 4-  Inversion: 3+  Eversion: 4-    Right Ankle/Foot   Dorsiflexion: 4  Plantar flexion: 4  Inversion: 3+  Eversion: 3    Ambulation     Observational Gait   Gait: antalgic and asymmetric   Decreased walking speed and stride length.     Additional Observational Gait Details  SPC: Pt demonstrates slow but steady sylvia with small step length. She has tremendous difficulty turning without UE support. She does have appropriate LE clearance during swing phase. Her stance phase has shortened secondary to no push of at terminal stance.     Timed up and go:  Date: 32.09 seconds. Moderate use of bilateral hands on rails for assistance. SPC  1/8: 15.70 seconds with Moderate use of hands and SPC  2/19: 19.72 seconds with moderate use of hands and SPC     Sit to stand Transfers:   Date: Moderate use of bilateral hands on rails for assistance. Bilateral genu valgum with transfers  2/19: Moderate use of bilateral hands on rails for assistance. Bilateral genu valgum with transfers    5x sit to stand:   Date: 34.67 seconds   1/8: 13.37 seconds with use of UE  2/19: 27.97 seconds with use of UE    2 minute walk test:   Date:   1/8: 187 feet with SPC   2/19: 140 feet with SPC    MCTSIB:  EO firm: 30 seconds with modate postural sway (Feet apart)   EC firm: 30 seconds with moderate postural sway (Feet apart)  EO foam: 30 seconds with moderate postural sway (Feet apart+ yellow foam)  EC foam: 11 seconds prior to LOB (Feet  apart+ yellow foam )    1/8:   EC foam: 28.35 seconds prior to LOB (Feet apart+ yellow foam )  2/19: 26.94 seconds prior to LOB     6 minute walk test:   Date:   1/8: 5:25 seconds for 480 with SPC  2/19: 3:42 for 260 feet with SPC prior to fatigue     Precautions: MS, Gait dysfunction, Insomnia, Osteoporosis, Peripheral polyneuropathy, Hx of Breast CA, R IDALIA in 2012- Dr. Rushing, Left femur IM nailing   Manuals 2/19  1/25 2/1 2/5 2/8 2/12 2/15                                               Neuro Re-Ed   1/25        DLS on foam   WBOS airex 20x abd and flex  Feet together airex foam 20 shld flex, 20x  shld abd Feet together airex foam 20 shld flex, 20x  shld abd Feet together airex foam 15x shld flex, 15x  shld abd   1# Feet together airex foam 20x shld flex, 20x  shld abd   1#   Tandem balance           Standing hip abd    3x10    3x10   Standing hip ext    3x10       Standing SLR           High knee marching                       Ther Ex   1/25        Leg press                      Hamstring stretching           Calf SB stretching           Hamstring curl  30x ea bolster to avoid hip flexion at mirror  30x ea bolster to avoid hip flexion at mirror   30x ea bolster to avoid hip flexion at mirror 30x ea bolster to avoid hip flexion at mirror 30x ea bolster to avoid hip flexion at mirror   LAQ   1# 3x10  1# 3x10  2#15x R/20x L  2# 30x each 2# 30x each   Seated March                                  Ther Activity   1/25        Lateral walks   2 laps full mirror no UE support  4 laps 20 feet  6 laps 20 feet      Mini squats    Sit to stand 1x10 with UE support  Sit to stand 1x10 with UE support  Sit to stand [id number] UE support  1x- unable to perform today d/t fatigue     Step ups           Leeann walks      8x 10 ft- 4 hurdles  8x 10 ft- 4 hurdles  8x 10 ft- 4 hurdles  forward and lateral 8x 10 ft- 4 hurdles  forward and lateral   Turning around cones/obstacle course           180 degree turn fwd/bwd amb    6 turns (10  "feet intervals)  6 turns (10 feet intervals)  6 turns (10 feet intervals)  6 turns (10 feet intervals)  nv   VOR x 1 viewing (Walking) with CG + SPC    2x30\"  3x30\" 3x30\" 3x30\"  nv   6 minute walk test Performed           Standing HR/TR 3x10   3x10 3x10 3x10 3x10 3x10  3x10   Marching 3x10   3x10 3x10 3x10  3x10  3x10 3x10    Gait Training                                 Vitals           BP           SpO2           HR           1:1 with PT from 1015-4583a   Pt was re-evaluated x 30 minutes                       "

## 2024-02-20 PROBLEM — G47.19 EXCESSIVE DAYTIME SLEEPINESS: Status: ACTIVE | Noted: 2024-02-20

## 2024-02-20 PROCEDURE — 95806 SLEEP STUDY UNATT&RESP EFFT: CPT | Performed by: STUDENT IN AN ORGANIZED HEALTH CARE EDUCATION/TRAINING PROGRAM

## 2024-02-22 ENCOUNTER — OFFICE VISIT (OUTPATIENT)
Dept: PHYSICAL THERAPY | Facility: CLINIC | Age: 74
End: 2024-02-22
Payer: MEDICARE

## 2024-02-22 DIAGNOSIS — R53.1 WEAKNESS: ICD-10-CM

## 2024-02-22 DIAGNOSIS — G35 MULTIPLE SCLEROSIS (HCC): ICD-10-CM

## 2024-02-22 DIAGNOSIS — R26.9 GAIT ABNORMALITY: Primary | ICD-10-CM

## 2024-02-22 PROCEDURE — 97530 THERAPEUTIC ACTIVITIES: CPT | Performed by: PHYSICAL THERAPIST

## 2024-02-22 PROCEDURE — 97110 THERAPEUTIC EXERCISES: CPT | Performed by: PHYSICAL THERAPIST

## 2024-02-22 NOTE — PROGRESS NOTES
Daily Note     Today's date: 2024  Patient name: Jacy Rodrigez  : 1950  MRN: [id number]  Referring provider: Patrick Naylor DO  Dx:   Encounter Diagnosis     ICD-10-CM    1. Gait abnormality  R26.9       2. Multiple sclerosis (HCC)  G35       3. Weakness  R53.1                      Subjective: Pt reports doing a lot of walking yesterday purchasing furniture from [employer]. Pt notes walking for longer than 15 minutes. Need to control her breathing to reduce cardiovascular fatigue.       Objective: See treatment diary below      Assessment: Significant improvement in ambulation and tolerance to movement today compared to previous session. Pt needed to take naprosyn after her walking activities. Tolerated treatment well. Resumed all exercises without adverse effects. Patient would benefit from continued PT.      Plan: Continue per plan of care.      Precautions: MS, Gait dysfunction, Insomnia, Osteoporosis, Peripheral polyneuropathy, Hx of Breast CA, R IDALIA in - Dr. Rushing, Left femur IM nailing   Manuals 2/19 2/22    2/8 2/12 2/15                                               Neuro Re-Ed           DLS on foam  Feet together airex foam 20x shld flex, 20x  shld abd   1#    Feet together airex foam 20 shld flex, 20x  shld abd Feet together airex foam 15x shld flex, 15x  shld abd   1# Feet together airex foam 20x shld flex, 20x  shld abd   1#   Tandem balance           Standing hip abd        3x10   Standing hip ext           Standing SLR           High knee marching                       Ther Ex           Leg press                      Hamstring stretching           Calf SB stretching           Hamstring curl  30x ea bolster to avoid hip flexion at mirror 2x15 ea bolster to avoid hip flexion at mirror    30x ea bolster to avoid hip flexion at mirror 30x ea bolster to avoid hip flexion at mirror 30x ea bolster to avoid hip flexion at mirror   LAQ  2#15x R/20x L     2#15x R/20x L  2# 30x each 2# 30x  "each   Seated March                                  Ther Activity           Lateral walks  6 laps 20 feet     6 laps 20 feet      Mini squats  Sit to stand [id number] UE support     Sit to stand [id number] UE support  1x- unable to perform today d/t fatigue     Step ups           Leeann walks   8x 10 ft- 4 hurdles  forward and lateral    8x 10 ft- 4 hurdles  8x 10 ft- 4 hurdles  forward and lateral 8x 10 ft- 4 hurdles  forward and lateral   Turning around cones/obstacle course           180 degree turn fwd/bwd amb  6 turns (10 feet intervals)     6 turns (10 feet intervals)  6 turns (10 feet intervals)  nv   VOR x 1 viewing (Walking) with CG + SPC  3x30\"    3x30\" 3x30\"  nv   6 minute walk test Performed           Standing HR/TR 3x10  2x10    3x10 3x10  3x10   Marching 3x10  2x10     3x10  3x10 3x10    Gait Training                                 Vitals           BP           SpO2           HR           1:1 with PT from 945-1030a                        "

## 2024-02-25 ENCOUNTER — HOSPITAL ENCOUNTER (OUTPATIENT)
Dept: MRI IMAGING | Facility: HOSPITAL | Age: 74
Discharge: HOME | End: 2024-02-25
Attending: INTERNAL MEDICINE
Payer: MEDICARE

## 2024-02-25 DIAGNOSIS — N28.9 RENAL LESION: ICD-10-CM

## 2024-02-25 PROCEDURE — G1004 CDSM NDSC: HCPCS

## 2024-02-25 PROCEDURE — 74181 MRI ABDOMEN W/O CONTRAST: CPT

## 2024-02-26 ENCOUNTER — APPOINTMENT (OUTPATIENT)
Dept: PHYSICAL THERAPY | Facility: CLINIC | Age: 74
End: 2024-02-26
Payer: MEDICARE

## 2024-02-28 NOTE — PROGRESS NOTES
"Reviewed results and instructions with mother who verbalized understanding of same. Pt has Neurology appointment 8/24.  Mother states, \"Will Neurology be calling me about her seizure?\"   Advised mother call Neurology to see if they would like to see her before August.  Mother verbalized understanding of same.   " PT-Discharge  Today's date: 8/10/2020  Patient name: Crispin Yates  : 1950  MRN: [id number]  Referring provider: Ki Araujo MD  Dx:   Encounter Diagnosis     ICD-10-CM    1  Neurologic gait dysfunction  R26 9    2  Multiple sclerosis ([address] )  G35    3  Pain of both hip joints  M25 551     M25 552        Start Time: 1530  Stop Time: 1610  Total time in clinic (min): 40 minutes    Assessment  Assessment details: Lieutenant Grossman reports good overall improvement with PT intervention since March  We have worked on multiple body parts with success  She does report intermittent fatigue levels that change from a day to day perspective with MS making progress difficult to evaluate  For example, patient states that on Saturday she was ambulating without an assisted device; however, requires it today secondary to fatigue  Vitals were taken prior to assessment and were WNL: BP today was 143/73, SpO2 was 98% and HR was 78 prior to the start of treatment  Pt was just put on lisinopril to manage HTN at last physician appointment on friday  Pt notes more fatigue today secondary to it being "later in the day " Timed up and go, 5x sit to stand, sit to stand, and strength are similar measurements to last re-evaluation  Pt did improved her endurance as her 6 minute walk test improved to 676 feet (126 feet greater than last re-evaluation)  Functional status measure is now 54  Overall, patient has made steady progress toward goals but will be discharged to HEP at this time  Pt was encouraged to maintain adherence to HEP and recommended to attend [employer] [facility] [place] to maintain functional levels  Pt verbalized agreement   Thank you for this referral        Impairments: abnormal coordination, abnormal gait, abnormal or restricted ROM, impaired balance, impaired physical strength, lacks appropriate home exercise program, pain with function, safety issue and poor posture   Understanding of Dx/Px/POC: good   Prognosis: good    Goals  Short Term Goals: to be achieved by 4 weeks  1) Patient to be independent with basic HEP - MET  2) Improve L passive SLR x 10 degrees  - Partially met  3) Increase B/L hip ROM ROM by 5-10 degrees - Partially met  4) Increase LE strength by 1/2 MMT grade in all deficient planes  Partially met  5) Improve cervical spine ROM by 10 degrees in all planes-MET  6) Improve L shoulder ROM by 10 degrees in all planes-MET    Long Term Goals: to be achieved by discharge  1) FOTO equal to or greater than expected  - Partially met  2) Ambulation to improve to maximal level of function- Partially met  3) Improve 5x sit to stand to 20 seconds-MET   4) Improve TUG to 20 seconds -MET  5) FOTO of cervical spine equal to or greater than expected-MET  6) Improve 6 minute walk test to >600 feet using SPC -MET    Plan  Frequency: Discharge to HEP  Treatment plan discussed with: patient        Subjective Evaluation    History of Present Illness  Mechanism of injury: History of Current Injury: Patient was referred to PT for multiple issues; however, she is most concerned with her symptoms of MS (balance difficulties) and bilateral chronic hip pain  Patient was diagnosed with MS in 2007  She states that her balance has progressively worsened since December when she was diagnosed with pneumonia and hospitalized  She was states she has been weak since  Pt is currently ambulating with a SPC which she has been using seen December  She denies using this prior to Dec  Pt also has a history of breast cancer and had her implant replaced in January which hasn't totally healed yet, according to patient  Pt is also complaining of chronic bilateral hip pain  She states that she has had multiple surgeries on the R hip (pinning and replacement) in 2012  Pt also has an IM Franco in femur in 2014  Pt has a history of osteoporosis which has caused multiple fractures  Pt also does have a history of peripheral polyneuropathy    Pain location/Descriptors: Anterior bilateral hip pain which seems to be exacerbated with stress, anxiety, and weather (cold)  Pt states that she has severe "nerve shocks" in BLE  She states it feels like her LE from her knees down are "on fire " She describes it as a"sunburn from hell " She states that his occurs while sitting at down at night  Aggravating factors: No clear AGGS and EASES  Eases: Neurontin, baclofen, and other medications to ease symptoms  24 HR pattern: Worse at night time  Symptoms seems to increase with stress, anxiety, and weather changes  Imaging: Brain MRI  No imaging at bilateral hips  Patient goals:  Improve balance and strength in LE  Hobbies/Interest: Cooking, baking, taking care of house  Patient lives at home with her   She states that it was built perfectly for a disabilied person  She has 4 inch step to get into the house  2 LINH in the back door with hand rail  [address]  She does have a basement which is not essential for her to go down there  She has safety bars throughout the house installed  Pain  Current pain ratin  At worst pain ratin      Diagnostic Tests  No diagnostic tests performed  Treatments  Current treatment: medication and physical therapy  Patient Goals  Patient goals for therapy: decreased pain, increased motion, improved balance, increased strength and independence with ADLs/IADLs          Objective     Tenderness     Additional Tenderness Details  Tenderness and Spasm at Left upper trap  Moderate tenderness present at posterior FA at extensor group       Neurological Testing     Sensation   Cervical/Thoracic   Left   Intact: light touch    Right   Intact: light touch    Reflexes   Left   Biceps (C5/C6): normal (2+)  Brachioradialis (C6): normal (2+)  Triceps (C7): normal (2+)  Ortiz's reflex: negative    Right   Biceps (C5/C6): normal (2+)  Brachioradialis (C6): normal (2+)  Triceps (C7): normal (2+)  Ortiz's reflex: negative    Active Range of Motion   Cervical/Thoracic Spine       Cervical    Flexion: 60 degrees  with pain  Extension: 30 degrees     with pain  Left lateral flexion: 15 degrees      Right lateral flexion: 20 degrees      Left rotation: 45 degrees with pain  Right rotation: 60 degrees    with pain  Left Shoulder   Flexion: 100 degrees with pain  External rotation 0°: 50 degrees with pain    Right Shoulder   Flexion: 140 degrees   External rotation 0°: 75 degrees     Passive Range of Motion   Left Hip   Flexion: 120 degrees   External rotation (90/90): 48 degrees   Internal rotation (90/90): 65 degrees     Right Hip   Flexion: 125 degrees   External rotation (90/90): 45 degrees   Internal rotation (90/90): 50 degrees     Additional Passive Range of Motion Details  Assessed supine: pain at end range motion    Pt unable to perform active hip flexion d/t weakness    SLR: positive L at 45- increases with ankle DF (increase symptoms in distal LE- burning)  RE: 50 degrees     R= tight hamstrings     Strength/Myotome Testing     Left Hip   Planes of Motion   Flexion: 3+  Extension: 4-  Abduction: 3+  Adduction: 4    Right Hip   Planes of Motion   Flexion: 4  Extension: 4-  Abduction: 3+  Adduction: 4    Left Knee   Flexion: 3+  Extension: 4    Right Knee   Flexion: 4-  Extension: 4    Left Ankle/Foot   Dorsiflexion: 4-  Plantar flexion: 4-  Inversion: 4  Eversion: 4    Right Ankle/Foot   Dorsiflexion: 4  Plantar flexion: 4  Inversion: 4  Eversion: 4    Additional Strength Details  Performed MMT in sitting position    Tests   Cervical   Negative alar ligament test and Sharp-Rona test      Additional Tests Details  Equivocal ULTT-A, B, C on the left d/t arm pain with movement  Ambulation     Observational Gait   Gait: antalgic and asymmetric   Decreased walking speed and stride length       Additional Observational Gait Details  Gait: Pt is utilizing a SPC with small step length, small stride length, increase MAYCOL, and a mild scissoring gait pattern  Functional Assessment        Comments  Timed Up and go: 25 seconds, no AD, small step length, ataxic  RE: 19 seconds  Challenged with turning  Use of SPC  RE2: 22 seconds without SPC, 15 seconds with SPC   RE3: 18 seconds, 15 seconds with SPC  RE4: 15 seconds with SPC, 19 without SPC    5x sit to stand: 24 seconds with bilateral use of hands  (heavy use of UE)   RE: 19 with moderate use of UE  RE2: 17 seconds with moderate use of UE  RE3: 15 seconds with moderate use of UE  RE4: 15 seconds with mod use of UE    Sit to stand without UE assistance: unable to perform   RE: able to perform half way  RE: able to perform with finger tip support and staggered stance   RE3: same as above    DLS firm EO (feet together): 30 seconds mild postural sway  RE: min sway 30 seconds  RE: no sway 30 seconds    DLS firm EC (feet together): 30 sec mod-max postural sway  RE: mod sway 30 seconds  RE2: mod sway 30+ seconds    DLS foam EO (feet together yellow foam): 30 seconds min sway  RE: 30 seconds min sway      DLS foam EC (feet together yellow foam): 6 second (best of 2) prior to LOB  RE: 12 seconds prior to LOB    Tandem (close): 13 seconds prior to LOB    6 minute walk test: 550 feet with SPC  RE: 676 feet with SPC  Precautions: MS, Depression, Fatigue, Gait dysfunction, Insomnia, Osteoporosis, Peripheral polyneuropathy, Hx of Breast CA       Manual  5/13 5/18 5/20 5/27 5/29 6/1 6/3  4/29 5/11   PROM of B/L (hip ROM) 4' B 4' B 5' B 5' B 5' B 6' B   2 5' 3'   Sciatic N glides - gentle B/L 4' B 4'B 3' 3' B 3' B 3' B   2 5' 3'   STM to L UT See below   DC     8' seating and supine with L UT pin and stretch 5'   Left scapular mobilizations Gr III 5' with L STM  Gr III 5' with L STM  Gr III 5' with L STM  DC      4' grIII   Radial N glides 30x   DC             Exercise Diary  5/11 5/13 5/18 5/20 5/27 5/29 6/1     NuStep             Standing marches   On BD foam 20x on each  On BD foam 20x on each On BD foam 20x on each  On BD foam 20x on each     Sit to stand transfers       nv      Lateral walks   4x20ft   4x15 ft   With TB      HR/TR standing  30x each 30x each   2x15 ea 2x15 ea DC      Hip extension- standing 2x15  ea 2x15 ea   2x15 ea 2x15 ea DC      Biodex LOS             Biodex Fall risk              Leeann step overs 4x15 SPC  4x15 SPC 4x15 SPC 4x15 SPC 4x15 SPC       Tandem balance              DLS on foam              Seated hip adduction             Standing hip abduction 2x15 ea    2x15 ea 2x15 ea DC      Stairs              Wobble board     S/S, F/B 2' each S/S, F/B 2' each       Seated hamstring stretch      4x20"       Obstacle course       nv 6" step up, ladders, foam, and bolsert - [id number] 6" step up, ladders, foam, and bolsert - [id number]    BIODEX Maze control       nv x2 level 10     Leg press       nv      Step ups  6" 10x on each  6" 10x on each 6" 10x on each  6" 10x on each  6" x10      -------------- ------ -------- ------ ----- ------ ------- -------- ------- ------- ------   UT/LS stretch 5x10" L side  5x10" L side 5x10" L side  DC        Pulley Hold             C/S retraction              Nerve glides- radial  10x5" 10x5" 20x5" with PT assistance 20x5" with PT assistance DC        UBE Hold  2 5'/2 5' 2 5'/2 5' 2 5'/2 5' DC        No money with scap retraction  2x10 ytb 2x10 ytb 2x10 ytb DC        TB Rows   3x10 btb 3x10 btb 3x10 btb DC            Modalities  4/27 5/11           MH to L UT 10' 10'                                       Pt was re-evaluated today x 40 minutes  Flowsheet Rows      Most Recent Value   PT/OT G-Codes   Current Score  54   Projected Score  50

## 2024-02-29 ENCOUNTER — OFFICE VISIT (OUTPATIENT)
Dept: PHYSICAL THERAPY | Facility: CLINIC | Age: 74
End: 2024-02-29
Payer: MEDICARE

## 2024-02-29 DIAGNOSIS — R26.9 GAIT ABNORMALITY: ICD-10-CM

## 2024-02-29 DIAGNOSIS — G35 MULTIPLE SCLEROSIS (HCC): Primary | ICD-10-CM

## 2024-02-29 DIAGNOSIS — R53.1 WEAKNESS: ICD-10-CM

## 2024-02-29 PROCEDURE — 97110 THERAPEUTIC EXERCISES: CPT | Performed by: PHYSICAL THERAPIST

## 2024-02-29 NOTE — PROGRESS NOTES
Daily Note     Today's date: 2024  Patient name: Jacy Rodrigez  : 1950  MRN: [id number]  Referring provider: Patrick Naylor DO  Dx:   Encounter Diagnosis     ICD-10-CM    1. Multiple sclerosis (HCC)  G35       2. Weakness  R53.1       3. Gait abnormality  R26.9           Start Time: 1030  Stop Time: 1116  Total time in clinic (min): 46 minutes    Subjective: Pt report legs feeling heavy.       Objective: See treatment diary below      Assessment: Tolerated treatment well. Good clearance with idris step overs. Implemented reactionary testing with blazepods. Patient exhibited good technique with therapeutic exercises and would benefit from continued PT.      Plan: Continue per plan of care.      Precautions: MS, Gait dysfunction, Insomnia, Osteoporosis, Peripheral polyneuropathy, Hx of Breast CA, R IDALIA in - Dr. Rushing, Left femur IM nailing   Manuals 2/19 2/22 2/29   2/8 2/12 2/15                                               Neuro Re-Ed           DLS on foam  Feet together airex foam 20x shld flex, 20x  shld abd   1# Biodex foam- ft apart- 15x forward/15x lateral 1#   Feet together airex foam 20 shld flex, 20x  shld abd Feet together airex foam 15x shld flex, 15x  shld abd   1# Feet together airex foam 20x shld flex, 20x  shld abd   1#   Tandem balance           Standing hip abd        3x10   Standing hip ext           Standing SLR           High knee marching                       Ther Ex           Leg press                      Hamstring stretching           Calf SB stretching           Hamstring curl  30x ea bolster to avoid hip flexion at mirror 2x15 ea bolster to avoid hip flexion at mirror 2x15 ea bolster to avoid hip flexion at mirror   30x ea bolster to avoid hip flexion at mirror 30x ea bolster to avoid hip flexion at mirror 30x ea bolster to avoid hip flexion at mirror   LAQ  2#15x R/20x L  2#20x R/20x L    2#15x R/20x L  2# 30x each 2# 30x each   Seated March                      "             Ther Activity           Lateral walks  6 laps 20 feet     6 laps 20 feet      Mini squats  Sit to stand [id number] UE support     Sit to stand [id number] UE support  1x- unable to perform today d/t fatigue     Step ups           Leeann walks   8x 10 ft- 4 hurdles  forward and lateral 8x 10 ft- 4 hurdles  forward and lateral   8x 10 ft- 4 hurdles  8x 10 ft- 4 hurdles  forward and lateral 8x 10 ft- 4 hurdles  forward and lateral   DLB c/ UE reaction   3x30\" (blazepods)        Turning around cones/obstacle course           180 degree turn fwd/bwd amb  6 turns (10 feet intervals)  6 turns (10 feet intervals)    6 turns (10 feet intervals)  6 turns (10 feet intervals)  nv   VOR x 1 viewing (Walking) with CG + SPC  3x30\" 3x30\"   3x30\" 3x30\"  nv   6 minute walk test Performed           Standing HR/TR 3x10  2x10 2x10   3x10 3x10  3x10   Marching 3x10  2x10  2x10    3x10  3x10 3x10    Gait Training                                 Vitals           BP           SpO2           HR           1:1 with PT from [phone]                         "

## 2024-03-04 ENCOUNTER — OFFICE VISIT (OUTPATIENT)
Dept: PHYSICAL THERAPY | Facility: CLINIC | Age: 74
End: 2024-03-04
Payer: MEDICARE

## 2024-03-04 DIAGNOSIS — G35 MULTIPLE SCLEROSIS (HCC): Primary | ICD-10-CM

## 2024-03-04 DIAGNOSIS — R53.1 WEAKNESS: ICD-10-CM

## 2024-03-04 DIAGNOSIS — R26.9 GAIT ABNORMALITY: ICD-10-CM

## 2024-03-04 PROCEDURE — 97530 THERAPEUTIC ACTIVITIES: CPT | Performed by: PHYSICAL THERAPIST

## 2024-03-04 PROCEDURE — 97110 THERAPEUTIC EXERCISES: CPT | Performed by: PHYSICAL THERAPIST

## 2024-03-04 NOTE — PROGRESS NOTES
Daily Note     Today's date: 3/4/2024  Patient name: Jacy Rodrigez  : 1950  MRN: [id number]  Referring provider: Patrick Naylor DO  Dx:   Encounter Diagnosis     ICD-10-CM    1. Multiple sclerosis (HCC)  G35       2. Weakness  R53.1       3. Gait abnormality  R26.9           Start Time: 1130  Stop Time: 1210  Total time in clinic (min): 40 minutes    Subjective: Pt reports a lot of leg pain last night. Wasn't able to sleep much. Pt notes sensitivity/pain in her shins which may her knees buckle. Started to use a cane in the house vs furniture surf.       Objective: See treatment diary below      Assessment: Tolerated treatment well. Good clearance with hurdles. Patient appropriately fatigued with prescribed exercises. Deferred weight with shoulder and LAQ secondary to mild should discomfort and LE fatigue.  Patient exhibited good technique with therapeutic exercises and would benefit from continued PT.      Plan: Continue per plan of care.      Precautions: MS, Gait dysfunction, Insomnia, Osteoporosis, Peripheral polyneuropathy, Hx of Breast CA, R IDALIA in - Dr. Rushing, Left femur IM nailing   Manuals 2/19 2/22 2/29 3/4   2/8 2/12 2/15                                               Neuro Re-Ed           DLS on foam  Feet together airex foam 20x shld flex, 20x  shld abd   1# Biodex foam- ft apart- 15x forward/15x lateral 1# Biodex foam- ft apart- 15x forward/15x lateral  AROM  Feet together airex foam 20 shld flex, 20x  shld abd Feet together airex foam 15x shld flex, 15x  shld abd   1# Feet together airex foam 20x shld flex, 20x  shld abd   1#   Tandem balance           Standing hip abd        3x10   Standing hip ext           Standing SLR           High knee marching                       Ther Ex           Leg press                      Hamstring stretching           Calf SB stretching           Hamstring curl  30x ea bolster to avoid hip flexion at mirror 2x15 ea bolster to avoid hip flexion at  "mirror 2x15 ea bolster to avoid hip flexion at mirror 2x15 ea bolster to avoid hip flexion at mirror  30x ea bolster to avoid hip flexion at mirror 30x ea bolster to avoid hip flexion at mirror 30x ea bolster to avoid hip flexion at mirror   LAQ  2#15x R/20x L  2#20x R/20x L  20x AROM  2#15x R/20x L  2# 30x each 2# 30x each   Seated March                                  Ther Activity           Lateral walks  6 laps 20 feet     6 laps 20 feet      Mini squats  Sit to stand [id number] UE support   Sit to stand [id number] UE support  Sit to stand [id number] UE support  1x- unable to perform today d/t fatigue     Step ups           Leeann walks   8x 10 ft- 4 hurdles  forward and lateral 8x 10 ft- 4 hurdles  forward and lateral 8x 10 ft- 4 hurdles  forward and lateral  8x 10 ft- 4 hurdles  8x 10 ft- 4 hurdles  forward and lateral 8x 10 ft- 4 hurdles  forward and lateral   DLB c/ UE reaction   3x30\" (blazepods) 3x30\" (blazepods)       Turning around cones/obstacle course           180 degree turn fwd/bwd amb  6 turns (10 feet intervals)  6 turns (10 feet intervals)  6 turns (10 feet intervals)  6 turns (10 feet intervals)  6 turns (10 feet intervals)  nv   VOR x 1 viewing (Walking) with CG + SPC  3x30\" 3x30\" 3x30\"  3x30\" 3x30\"  nv   6 minute walk test Performed           Standing HR/TR 3x10  2x10 2x10 2x10  3x10 3x10  3x10   Marching 3x10  2x10  2x10  2x10   3x10  3x10 3x10    Gait Training                                 Vitals           BP           SpO2           HR           1:1 with PT from 1132-1210pm                           "

## 2024-03-07 ENCOUNTER — OFFICE VISIT (OUTPATIENT)
Dept: PHYSICAL THERAPY | Facility: CLINIC | Age: 74
End: 2024-03-07
Payer: MEDICARE

## 2024-03-07 ENCOUNTER — TELEPHONE (OUTPATIENT)
Dept: SLEEP CENTER | Facility: CLINIC | Age: 74
End: 2024-03-07

## 2024-03-07 DIAGNOSIS — R53.1 WEAKNESS: ICD-10-CM

## 2024-03-07 DIAGNOSIS — G35 MULTIPLE SCLEROSIS (HCC): Primary | ICD-10-CM

## 2024-03-07 DIAGNOSIS — R26.9 GAIT ABNORMALITY: ICD-10-CM

## 2024-03-07 PROCEDURE — 97112 NEUROMUSCULAR REEDUCATION: CPT | Performed by: PHYSICAL THERAPIST

## 2024-03-07 PROCEDURE — 97110 THERAPEUTIC EXERCISES: CPT | Performed by: PHYSICAL THERAPIST

## 2024-03-07 NOTE — TELEPHONE ENCOUNTER
Home sleep study shows no MAGDI.  Left call back message for the patient along with appointment details in message

## 2024-03-07 NOTE — PROGRESS NOTES
Daily Note     Today's date: 3/7/2024  Patient name: Jacy Rdorigez  : 1950  MRN: [id number]  Referring provider: Patrick Naylor DO  Dx:   Encounter Diagnosis     ICD-10-CM    1. Multiple sclerosis (HCC)  G35       2. Weakness  R53.1       3. Gait abnormality  R26.9                      Subjective: Pt reports having severe posterior RLE pain from the buttock to the toes. + for numbness. Took some naprosyn with some relief. Pt notes having 1 floater in her eye since last treatment. She thinks it's due to the blinking blazepod lights.       Objective: See treatment diary below      Assessment: Pt seems to have developed neural tension in RLE and sciatica like symptoms. Continued with treatment as she tolerates. Tolerated treatment fair. Pt struggled with clearance with RLE over hurdles secondary to weakness. Pt notes experiencing significant energy expenditure when trying to clear idris. Modified program when applicable. Patient exhibited good technique with therapeutic exercises.       Plan: Continue per plan of care.      Precautions: MS, Gait dysfunction, Insomnia, Osteoporosis, Peripheral polyneuropathy, Hx of Breast CA, R IDALIA in - Dr. Rushing, Left femur IM nailing   Manuals 2/19 2/22 2/29 3/4  3/7                                                  Neuro Re-Ed           DLS on foam  Feet together airex foam 20x shld flex, 20x  shld abd   1# Biodex foam- ft apart- 15x forward/15x lateral 1# Biodex foam- ft apart- 15x forward/15x lateral  AROM Biodex foam- ft apart- 15x forward/15x lateral  AROM      Tandem balance           Standing hip abd           Standing hip ext           Standing SLR           High knee marching            Seated Sciatic nerve glides     10x on R with strap - heel planted on step      Ther Ex           Leg press                      Hamstring stretching           Calf SB stretching           Hamstring curl  30x ea bolster to avoid hip flexion at mirror 2x15 ea bolster to  "avoid hip flexion at mirror 2x15 ea bolster to avoid hip flexion at mirror 2x15 ea bolster to avoid hip flexion at mirror 2x15 ea bolster to avoid hip flexion at mirror      LAQ  2#15x R/20x L  2#20x R/20x L  20x AROM 20x AROM      Seated March                                  Ther Activity           Lateral walks  6 laps 20 feet          Mini squats  Sit to stand [id number] UE support   Sit to stand [id number] UE support       Step ups           Leeann walks   8x 10 ft- 4 hurdles  forward and lateral 8x 10 ft- 4 hurdles  forward and lateral 8x 10 ft- 4 hurdles  forward and lateral 4x 10 ft- 4 hurdles  forward and lateral      DLB c/ UE reaction   3x30\" (blazepods) 3x30\" (blazepods)       Turning around cones/obstacle course           180 degree turn fwd/bwd amb  6 turns (10 feet intervals)  6 turns (10 feet intervals)  6 turns (10 feet intervals) 6 turns (10 feet intervals)      VOR x 1 viewing (Walking) with CG + SPC  3x30\" 3x30\" 3x30\" 3x30\"      6 minute walk test Performed           Standing HR/TR 3x10  2x10 2x10 2x10 2x10      Marching 3x10  2x10  2x10  2x10  2x10       Gait Training                                 Vitals           BP           SpO2           HR           1:1 with PT from 1141-1210pm                             "

## 2024-03-11 ENCOUNTER — APPOINTMENT (OUTPATIENT)
Dept: PHYSICAL THERAPY | Facility: CLINIC | Age: 74
End: 2024-03-11
Payer: MEDICARE

## 2024-03-13 DIAGNOSIS — Z00.6 ENCOUNTER FOR EXAMINATION FOR NORMAL COMPARISON OR CONTROL IN CLINICAL RESEARCH PROGRAM: ICD-10-CM

## 2024-03-14 ENCOUNTER — TELEPHONE (OUTPATIENT)
Dept: HEMATOLOGY ONCOLOGY | Facility: CLINIC | Age: 74
End: 2024-03-14

## 2024-03-14 ENCOUNTER — OFFICE VISIT (OUTPATIENT)
Dept: PHYSICAL THERAPY | Facility: CLINIC | Age: 74
End: 2024-03-14
Payer: MEDICARE

## 2024-03-14 DIAGNOSIS — G35 MULTIPLE SCLEROSIS (HCC): Primary | ICD-10-CM

## 2024-03-14 DIAGNOSIS — R53.1 WEAKNESS: ICD-10-CM

## 2024-03-14 DIAGNOSIS — R26.9 GAIT ABNORMALITY: ICD-10-CM

## 2024-03-14 PROCEDURE — 97110 THERAPEUTIC EXERCISES: CPT | Performed by: PHYSICAL THERAPIST

## 2024-03-14 PROCEDURE — 97530 THERAPEUTIC ACTIVITIES: CPT | Performed by: PHYSICAL THERAPIST

## 2024-03-14 NOTE — PROGRESS NOTES
Daily Note     Today's date: 3/14/2024  Patient name: Jacy Rodrigez  : 1950  MRN: [id number]  Referring provider: Patrick Naylor DO  Dx:   Encounter Diagnosis     ICD-10-CM    1. Multiple sclerosis (HCC)  G35       2. Gait abnormality  R26.9       3. Weakness  R53.1           Start Time: 1130  Stop Time: 1217  Total time in clinic (min): 47 minutes    Subjective: Pt reports having stomach pains which is why she missed last session. Pt states that she didn't sleep for 24 hours. Admits to being very unstable with her legs this week.       Objective: See treatment diary below      Assessment:  Poor clearance of LE during idris walks. Modification of hurdles required for correct performance. Static balance activity was challenging today. She needed to change MAYCOL in order to stabilize. Tolerated treatment fair. Slower turns noted during walking turn exercise. Pt does report heaviness in LE. Patient would benefit from continued PT.       Plan: Continue per plan of care.      Precautions: MS, Gait dysfunction, Insomnia, Osteoporosis, Peripheral polyneuropathy, Hx of Breast CA, R IDALIA in - Dr. Rushing, Left femur IM nailing   Manuals 2/19 2/22 2/29 3/4  3/7 3/14                                                 Neuro Re-Ed           DLS on foam  Feet together airex foam 20x shld flex, 20x  shld abd   1# Biodex foam- ft apart- 15x forward/15x lateral 1# Biodex foam- ft apart- 15x forward/15x lateral  AROM Biodex foam- ft apart- 15x forward/15x lateral  AROM Biodex foam- ft apart- 15x forward/15x lateral  AROM     Tandem balance           Standing hip abd           Standing hip ext           Standing SLR           High knee marching            Seated Sciatic nerve glides     10x on R with strap - heel planted on step      Ther Ex           Leg press                      Hamstring stretching           Calf SB stretching           Hamstring curl  30x ea bolster to avoid hip flexion at mirror 2x15 ea bolster to  "avoid hip flexion at mirror 2x15 ea bolster to avoid hip flexion at mirror 2x15 ea bolster to avoid hip flexion at mirror 2x15 ea bolster to avoid hip flexion at mirror 2x15 ea bolster to avoid hip flexion at mirror     LAQ  2#15x R/20x L  2#20x R/20x L  20x AROM 20x AROM 20x AROM     Seated March                                  Ther Activity           Lateral walks  6 laps 20 feet          Mini squats  Sit to stand [id number] UE support   Sit to stand [id number] UE support       Step ups           Leeann walks   8x 10 ft- 4 hurdles  forward and lateral 8x 10 ft- 4 hurdles  forward and lateral 8x 10 ft- 4 hurdles  forward and lateral 4x 10 ft- 4 hurdles  forward and lateral 4x 10 ft- 4 hurdles  forward and latera     DLB c/ UE reaction   3x30\" (blazepods) 3x30\" (blazepods)       Turning around cones/obstacle course           180 degree turn fwd/bwd amb  6 turns (10 feet intervals)  6 turns (10 feet intervals)  6 turns (10 feet intervals) 6 turns (10 feet intervals) 6 turns (10 feet intervals)     VOR x 1 viewing (Walking) with CG + SPC  3x30\" 3x30\" 3x30\" 3x30\" 3x30\"     6 minute walk test Performed           Standing HR/TR 3x10  2x10 2x10 2x10 2x10 2x10     Marching 3x10  2x10  2x10  2x10  2x10  2x10      Gait Training                                 Vitals           BP           SpO2           HR           1:1 with PT from 1130-1215pm                               "

## 2024-03-14 NOTE — TELEPHONE ENCOUNTER
Appointment Change  Cancel, Reschedule, Change to Virtual      Who are you speaking with? Patient   If it is not the patient, is the caller listed on the communication consent form? N/A   Which provider is the appointment scheduled with? SARAH Cole   When was the original appointment scheduled?    Please list date and time 4/1/24 @ 10   At which location is the appointment scheduled to take place? [facility]   Was the appointment rescheduled?     Was the appointment changed from an in person visit to a virtual visit?    If so, please list the details of the change. 4/3/24 @ 11   What is the reason for the appointment change? Provider OOO       Was STAR transport scheduled? No   Does STAR transport need to be scheduled for the new visit (if applicable) No   Does the patient need an infusion appointment rescheduled? No   Does the patient have an upcoming infusion appointment scheduled? If so, when? No   Is the patient undergoing chemotherapy? No   For appointments cancelled with less than 24 hours:  Was the no-show policy reviewed? N/A

## 2024-03-15 ENCOUNTER — APPOINTMENT (OUTPATIENT)
Dept: LAB | Facility: CLINIC | Age: 74
End: 2024-03-15
Payer: MEDICARE

## 2024-03-15 ENCOUNTER — TRANSCRIBE ORDERS (OUTPATIENT)
Dept: LAB | Facility: CLINIC | Age: 74
End: 2024-03-15

## 2024-03-15 DIAGNOSIS — Z00.00 ROUTINE GENERAL MEDICAL EXAMINATION AT A HEALTH CARE FACILITY: ICD-10-CM

## 2024-03-15 DIAGNOSIS — M81.0 SENILE OSTEOPOROSIS: Primary | ICD-10-CM

## 2024-03-15 DIAGNOSIS — Z00.6 ENCOUNTER FOR EXAMINATION FOR NORMAL COMPARISON OR CONTROL IN CLINICAL RESEARCH PROGRAM: ICD-10-CM

## 2024-03-15 DIAGNOSIS — M81.0 SENILE OSTEOPOROSIS: ICD-10-CM

## 2024-03-15 LAB — 25(OH)D3 SERPL-MCNC: 85.3 NG/ML (ref 30–100)

## 2024-03-15 PROCEDURE — 82306 VITAMIN D 25 HYDROXY: CPT

## 2024-03-15 PROCEDURE — 36415 COLL VENOUS BLD VENIPUNCTURE: CPT

## 2024-03-18 ENCOUNTER — OFFICE VISIT (OUTPATIENT)
Dept: PHYSICAL THERAPY | Facility: CLINIC | Age: 74
End: 2024-03-18
Payer: MEDICARE

## 2024-03-18 DIAGNOSIS — G35 MULTIPLE SCLEROSIS (HCC): Primary | ICD-10-CM

## 2024-03-18 DIAGNOSIS — R26.9 GAIT ABNORMALITY: ICD-10-CM

## 2024-03-18 DIAGNOSIS — R53.1 WEAKNESS: ICD-10-CM

## 2024-03-18 PROCEDURE — 97530 THERAPEUTIC ACTIVITIES: CPT | Performed by: PHYSICAL THERAPIST

## 2024-03-18 PROCEDURE — 97110 THERAPEUTIC EXERCISES: CPT | Performed by: PHYSICAL THERAPIST

## 2024-03-18 NOTE — PROGRESS NOTES
Daily Note     Today's date: 3/18/2024  Patient name: Jacy Rodrigez  : 1950  MRN: [id number]  Referring provider: Patrick Naylor DO  Dx:   Encounter Diagnosis     ICD-10-CM    1. Multiple sclerosis (HCC)  G35       2. Gait abnormality  R26.9       3. Weakness  R53.1                      Subjective: Pt reports getting minimal sleep last evening. She was up since 2 am.       Objective: See treatment diary below      Assessment: Tolerated treatment fair. Pt able to tolerate 30 minutes of standing exercises prior to requiring a seated rest breaks. Clearance over hurdles was still challenging for patient, although LE control and endurance was much better than previous PT sessions.  Patient would benefit from continued PT      Plan: Continue per plan of care.      Precautions: MS, Gait dysfunction, Insomnia, Osteoporosis, Peripheral polyneuropathy, Hx of Breast CA, R IDALIA in - Dr. Rushing, Left femur IM nailing   Manuals 2/19 2/22 2/29 3/4  3/7 3/14 3/18                                                Neuro Re-Ed           DLS on foam  Feet together airex foam 20x shld flex, 20x  shld abd   1# Biodex foam- ft apart- 15x forward/15x lateral 1# Biodex foam- ft apart- 15x forward/15x lateral  AROM Biodex foam- ft apart- 15x forward/15x lateral  AROM Biodex foam- ft apart- 15x forward/15x lateral  AROM Biodex foam- ft apart- 15x forward/15x lateral  AROM    Tandem balance           Standing hip abd           Standing hip ext           Standing SLR           High knee marching            Seated Sciatic nerve glides     10x on R with strap - heel planted on step      Ther Ex           Leg press                      Hamstring stretching           Calf SB stretching           Hamstring curl  30x ea bolster to avoid hip flexion at mirror 2x15 ea bolster to avoid hip flexion at mirror 2x15 ea bolster to avoid hip flexion at mirror 2x15 ea bolster to avoid hip flexion at mirror 2x15 ea bolster to avoid hip flexion  "at mirror 2x15 ea bolster to avoid hip flexion at mirror 2x15 ea bolster to avoid hip flexion at mirror    LAQ  2#15x R/20x L  2#20x R/20x L  20x AROM 20x AROM 20x AROM 20x AROM    Seated March                                  Ther Activity           Lateral walks  6 laps 20 feet          Mini squats  Sit to stand [id number] UE support   Sit to stand [id number] UE support       Step ups           Leeann walks   8x 10 ft- 4 hurdles  forward and lateral 8x 10 ft- 4 hurdles  forward and lateral 8x 10 ft- 4 hurdles  forward and lateral 4x 10 ft- 4 hurdles  forward and lateral 4x 10 ft- 4 hurdles  forward and latera 8x 10 ft- 4 hurdles  forward and lateral    DLB c/ UE reaction   3x30\" (blazepods) 3x30\" (blazepods)       Turning around cones/obstacle course           180 degree turn fwd/bwd amb  6 turns (10 feet intervals)  6 turns (10 feet intervals)  6 turns (10 feet intervals) 6 turns (10 feet intervals) 6 turns (10 feet intervals) 6 turns (10 feet intervals)    VOR x 1 viewing (Walking) with CG + SPC  3x30\" 3x30\" 3x30\" 3x30\" 3x30\" 3x30\"    6 minute walk test Performed           Standing HR/TR 3x10  2x10 2x10 2x10 2x10 2x10 2x10    Marching 3x10  2x10  2x10  2x10  2x10  2x10  2x10     Gait Training                                 Vitals           BP           SpO2           HR           1:1 with PT from 1130-1215pm                                 "

## 2024-03-21 ENCOUNTER — APPOINTMENT (OUTPATIENT)
Dept: PHYSICAL THERAPY | Facility: CLINIC | Age: 74
End: 2024-03-21
Payer: MEDICARE

## 2024-03-22 ENCOUNTER — TELEPHONE (OUTPATIENT)
Dept: SLEEP CENTER | Facility: CLINIC | Age: 74
End: 2024-03-22

## 2024-03-22 DIAGNOSIS — G47.61 PLMD (PERIODIC LIMB MOVEMENT DISORDER): ICD-10-CM

## 2024-03-22 NOTE — TELEPHONE ENCOUNTER
"Patient's follow up needed to be pushed back due to time out of office.  She said she will not have enough meds to make it to follow up. She states sleep is \"back and forth\".  Sometimes she sleeps well, sometimes barely sleeps at all.  She doesn't necessarily want to stay on the Clonazepam 0.5mg.  She isn't sure if it is really helping or not.  If you want her to come off of it she will need another month worth to titrate off.  I told her that I will send a message to you to see what you want do.  Please advise  "

## 2024-03-25 ENCOUNTER — OFFICE VISIT (OUTPATIENT)
Dept: PHYSICAL THERAPY | Facility: CLINIC | Age: 74
End: 2024-03-25
Payer: MEDICARE

## 2024-03-25 DIAGNOSIS — R53.1 WEAKNESS: ICD-10-CM

## 2024-03-25 DIAGNOSIS — R26.9 GAIT ABNORMALITY: ICD-10-CM

## 2024-03-25 DIAGNOSIS — G35 MULTIPLE SCLEROSIS (HCC): Primary | ICD-10-CM

## 2024-03-25 PROCEDURE — 97530 THERAPEUTIC ACTIVITIES: CPT | Performed by: PHYSICAL THERAPIST

## 2024-03-25 PROCEDURE — 97110 THERAPEUTIC EXERCISES: CPT | Performed by: PHYSICAL THERAPIST

## 2024-03-25 NOTE — PROGRESS NOTES
Daily Note     Today's date: 3/25/2024  Patient name: Jacy Rodrigez  : 1950  MRN: [id number]  Referring provider: Patrick Naylor DO  Dx:   Encounter Diagnosis     ICD-10-CM    1. Multiple sclerosis (HCC)  G35       2. Gait abnormality  R26.9       3. Weakness  R53.1                      Subjective: Pt reports coming down with an URI last week which is why she missed her appointment last week. She states that her legs collapsed with fatigued and she just couldn't walk. Pt states that it took until the morning until her legs were able to recuperate. Pt states that it was also due to her night time medication.     Objective: See treatment diary below      Assessment: Due to increased right knee discomfort, patient was challenged with clearing hurdles with right knee. LE still fairly fatigued and weak this AM. Pt elected to continue balance training with Blazepods. Tolerated treatment fair. Double limb support balance was more unsteady today. Pt will be re-evaluated next session.  Patient would benefit from continued PT      Plan: Continue per plan of care.      Precautions: MS, Gait dysfunction, Insomnia, Osteoporosis, Peripheral polyneuropathy, Hx of Breast CA, R IDALIA in - Dr. Rushing, Left femur IM nailing   Manuals 2/19 2/22 2/29 3/4  3/7 3/14 3/18 3/25                                               Neuro Re-Ed           DLS on foam  Feet together airex foam 20x shld flex, 20x  shld abd   1# Biodex foam- ft apart- 15x forward/15x lateral 1# Biodex foam- ft apart- 15x forward/15x lateral  AROM Biodex foam- ft apart- 15x forward/15x lateral  AROM Biodex foam- ft apart- 15x forward/15x lateral  AROM Biodex foam- ft apart- 15x forward/15x lateral  AROM Biodex foam- ft apart- 20x forward/10fx lateral  AROM   Tandem balance           Standing hip abd           Standing hip ext           Standing SLR           High knee marching            Seated Sciatic nerve glides     10x on R with strap - heel planted  "on step      Ther Ex           Leg press                      Hamstring stretching           Calf SB stretching           Hamstring curl  30x ea bolster to avoid hip flexion at mirror 2x15 ea bolster to avoid hip flexion at mirror 2x15 ea bolster to avoid hip flexion at mirror 2x15 ea bolster to avoid hip flexion at mirror 2x15 ea bolster to avoid hip flexion at mirror 2x15 ea bolster to avoid hip flexion at mirror 2x15 ea bolster to avoid hip flexion at mirror 2x15 ea bolster to avoid hip flexion at mirror   LAQ  2#15x R/20x L  2#20x R/20x L  20x AROM 20x AROM 20x AROM 20x AROM 20x AROM   Seated March                                  Ther Activity           Lateral walks  6 laps 20 feet          Mini squats  Sit to stand [id number] UE support   Sit to stand [id number] UE support       Step ups           Leeann walks   8x 10 ft- 4 hurdles  forward and lateral 8x 10 ft- 4 hurdles  forward and lateral 8x 10 ft- 4 hurdles  forward and lateral 4x 10 ft- 4 hurdles  forward and lateral 4x 10 ft- 4 hurdles  forward and latera 8x 10 ft- 4 hurdles  forward and lateral 8x 10 ft- 4 hurdles  forward and lateral   DLB c/ UE reaction   3x30\" (blazepods) 3x30\" (blazepods)       Turning around cones/obstacle course           180 degree turn fwd/bwd amb  6 turns (10 feet intervals)  6 turns (10 feet intervals)  6 turns (10 feet intervals) 6 turns (10 feet intervals) 6 turns (10 feet intervals) 6 turns (10 feet intervals)    VOR x 1 viewing (Walking) with CG + SPC  3x30\" 3x30\" 3x30\" 3x30\" 3x30\" 3x30\" 3x30\"   6 minute walk test Performed           Standing HR/TR 3x10  2x10 2x10 2x10 2x10 2x10 2x10 2x10   Marching 3x10  2x10  2x10  2x10  2x10  2x10  2x10  2x10    Gait Training                                 Vitals           BP           SpO2           HR           1:1 with PT from 1130-1215pm                                   "

## 2024-03-26 NOTE — TELEPHONE ENCOUNTER
If she wishes to try titrating off of it, that is fine with me, however I would recommend that she wait to do so until she either tries the Go[facility] To Sleep program through [facility] or formal CBT-I through our group here. Has she been contacted regarding this?

## 2024-03-27 RX ORDER — CLONAZEPAM 0.5 MG/1
TABLET ORAL
Qty: 135 TABLET | Refills: 1 | Status: SHIPPED | OUTPATIENT
Start: 2024-03-27

## 2024-03-27 NOTE — TELEPHONE ENCOUNTER
Prescription approved; Controlled Substance Review    [place] PDMP or [place]  reviewed: No red flags were identified; safe to proceed with prescription.      Also placed a formal referral for CBT-I encase original referral was lost with the transition from emailing referrals to formal orders in epic.

## 2024-03-27 NOTE — TELEPHONE ENCOUNTER
Per patient's MyChart reply:  I have not been contacted by anyone from the behavioral science department. From what I understand from someone who has taken it the program is very successful. I am willing to try it and get rid of the meds.   Until then the rx can be called in to [facility] [address] [facility] in [place] [phone]. I take 0.5 mg 1.5 tabs at hs.   Thank you.   Jihan    ----------------------------------------------------    Dr. Naylor please advise and review clonazepam Rx and sign if appropriate.

## 2024-03-27 NOTE — PROGRESS NOTES
[id number]    Today's date: 3/28/2024  Patient name: Jacy Rodrigez  : 1950  MRN: [id number]  Referring provider: Elidia Briceno MD  Dx:   Encounter Diagnosis     ICD-10-CM    1. Multiple sclerosis (HCC)  G35       2. Gait abnormality  R26.9       3. Weakness  R53.1           Start Time: 1130  Stop Time: 1212  Total time in clinic (min): 42 minutes    Assessment  Assessment details: Jacy Rodrigez is a 73 y.o. female who presents with signs and symptoms consistent of a primary balance impairment and ambulatory dysfunction secondary to MS.  Over the course of the last month, patient has fluctuating sessions with LE discomfort, fatigue, and reduced muscle performance. More recently, patient had difficulty clearing hurdles due to lack of strength at hip and ankles. She did have an URI last week and has been getting minimal hours of sleep, which is affecting her ability to exercise. She is also experiencing acute right knee swell and discomfort after her fall last week.  All objective measurements assessed today. Pt has improved gaze stabilization during VOR exercises and has an improved ability to turn while walking. Pt demonstrated improvement in functional tests compared to February re-evaluation. Overall, due to the lack of stability of symptoms and fatigue of LE, pt is a fall risk and would benefit from continuation of skilled PT. We will re-assess in 4-6 weeks to assess stability of current state.    Impairments: abnormal coordination, abnormal gait, abnormal or restricted ROM, abnormal movement, difficulty understanding, impaired balance, impaired physical strength, lacks appropriate home exercise program and poor posture   Understanding of Dx/Px/POC: good   Prognosis: good    Goals  Short Term Goals: to be achieved by 4 weeks  1) Patient to be independent with basic HEP.-MET  2) Increase LE strength by 1/2 MMT grade in all deficient planes.-Partially met  3) Improve TUG by 2 seconds-Partially  "met  4) Improve 5x sit to stand by 2 seconds-Partially met    Long Term Goals: to be achieved by discharge  1) FOTO equal to or greater than expected.-Partially met  2) Ambulation to improve to maximal level of function-Partially met  3) Stair negotiation will improve to reciprocal.-Partially met  4) Sit to stand transfers will improve to maximal level of function -Partially met    Plan  Plan details: Initiate treatment for LE weakness and balance.  Continue treatment on left shoulder on PRN basis  Patient would benefit from: PT eval and skilled physical therapy  Planned therapy interventions: manual therapy, neuromuscular re-education, patient education, strengthening, therapeutic activities, therapeutic exercise, transfer training, home exercise program and functional ROM exercises  Frequency: 1-2x per week for 4-6 weeks.  Treatment plan discussed with: patient        Subjective Evaluation    History of Present Illness  Mechanism of injury: History of Current Injury: Pt referred to PT from neurology due to LE weakness, balance deficits, and ambulation dysfunction. Pt has MS and recently feels as though her LE weakness is worsening making ADLs and ambulation tolerate difficult. Pt did have a fall last year which resulted in proximal humerus fracture and RTC dysfunction (currently being treated by me). She has made good progress with her shoulder. Pt does report increasing LE pain and weakness. Due to CKD, patient has been weaning from gabapentin and ampyra. Pt currently ambulates with a SPC for safety. She lives at home with her , who will be undergoing spinal surgery in December.   Pt has steps down to the basement but has single level living. Pt has 1-2 3\" steps to enter her house. Pt admits to falling on her knees last night. She denies any injury but her knees are bothersome. Pt's also fell June and 1 time in April of this year. Pt notes that her leg somewhat gave out on her last night.     Special " Questions: Positive for peripheral neuropathy  Patient goals:  Reduce unsteadiness, improve LE strength, increase walking and endurance.   Hobbies/Interest: Volunteers as , cooking, cleaning.   Occupation: Retired            Objective     Strength/Myotome Testing     Left Hip   Planes of Motion   Flexion: 3+  Abduction: 4-  Adduction: 4-  External rotation: 3-  Internal rotation: 3+    Right Hip   Planes of Motion   Flexion: 4  Abduction: 4-  Adduction: 4-  External rotation: 3+  Internal rotation: 3+    Left Knee   Flexion: 3+  Extension: 3+    Right Knee   Flexion: 4-  Extension: 4-    Left Ankle/Foot   Dorsiflexion: 4-  Plantar flexion: 4-  Inversion: 3+  Eversion: 4-    Right Ankle/Foot   Dorsiflexion: 4  Plantar flexion: 4  Inversion: 4-  Eversion: 4-    Ambulation     Observational Gait   Gait: antalgic and asymmetric   Decreased walking speed and stride length.     Additional Observational Gait Details  SPC: Pt demonstrates slow but steady sylvia with small step length. She has tremendous difficulty turning without UE support. She does have appropriate LE clearance during swing phase. Her stance phase has shortened secondary to no push of at terminal stance.     Timed up and go:  Date: 32.09 seconds. Moderate use of bilateral hands on rails for assistance. SPC  1/8: 15.70 seconds with Moderate use of hands and SPC  2/19: 19.72 seconds with moderate use of hands and SPC   3/28: 18.17 seconds with mild use of hands and SPC     Sit to stand Transfers:   Date: Moderate use of bilateral hands on rails for assistance. Bilateral genu valgum with transfers  2/19: Moderate use of bilateral hands on rails for assistance. Bilateral genu valgum with transfers  3/28: Moderate use of bilateral hands on rails for assistance. Bilateral genu valgum with transfers    5x sit to stand:   Date: 34.67 seconds   1/8: 13.37 seconds with use of UE  2/19: 27.97 seconds with use of UE  3/28: 16.40 seconds with mild use of  UE    2 minute walk test:   Date:   1/8: 187 feet with SPC   2/19: 140 feet with SPC  3/28: 170 feet with SPC     MCTSIB:  EO firm: 30 seconds with modate postural sway (Feet apart)   EC firm: 30 seconds with moderate postural sway (Feet apart)  EO foam: 30 seconds with moderate postural sway (Feet apart+ yellow foam)  EC foam: 11 seconds prior to LOB (Feet apart+ yellow foam )    1/8:   EC foam: 28.35 seconds prior to LOB (Feet apart+ yellow foam )  2/19: 26.94 seconds prior to LOB   3/28: 46 seconds prior to LOB (Required PT Assist after 10 sec)     6 minute walk test:   Date:   1/8: 5:25 seconds for 480 with SPC  2/19: 3:42 for 260 feet with SPC prior to fatigue  3/28: 420 feet in 6 minutes 1 standing rest break         Precautions: MS, Gait dysfunction, Insomnia, Osteoporosis, Peripheral polyneuropathy, Hx of Breast CA, R IDALIA in 2012- Dr. Rushing, Left femur IM nailing   Manuals 2/19 2/22 2/29 3/4  3/7 3/14 3/18 3/25                                               Neuro Re-Ed           DLS on foam  Feet together airex foam 20x shld flex, 20x  shld abd   1# Biodex foam- ft apart- 15x forward/15x lateral 1# Biodex foam- ft apart- 15x forward/15x lateral  AROM Biodex foam- ft apart- 15x forward/15x lateral  AROM Biodex foam- ft apart- 15x forward/15x lateral  AROM Biodex foam- ft apart- 15x forward/15x lateral  AROM Biodex foam- ft apart- 20x forward/10fx lateral  AROM   Tandem balance           Standing hip abd           Standing hip ext           Standing SLR           High knee marching            Seated Sciatic nerve glides     10x on R with strap - heel planted on step      Ther Ex           Leg press                      Hamstring stretching           Calf SB stretching           Hamstring curl  30x ea bolster to avoid hip flexion at mirror 2x15 ea bolster to avoid hip flexion at mirror 2x15 ea bolster to avoid hip flexion at mirror 2x15 ea bolster to avoid hip flexion at mirror 2x15 ea bolster to avoid hip  "flexion at mirror 2x15 ea bolster to avoid hip flexion at mirror 2x15 ea bolster to avoid hip flexion at mirror 2x15 ea bolster to avoid hip flexion at mirror   LAQ  2#15x R/20x L  2#20x R/20x L  20x AROM 20x AROM 20x AROM 20x AROM 20x AROM   Seated March                                  Ther Activity           Lateral walks  6 laps 20 feet          Mini squats  Sit to stand [id number] UE support   Sit to stand [id number] UE support       Step ups           Leeann walks   8x 10 ft- 4 hurdles  forward and lateral 8x 10 ft- 4 hurdles  forward and lateral 8x 10 ft- 4 hurdles  forward and lateral 4x 10 ft- 4 hurdles  forward and lateral 4x 10 ft- 4 hurdles  forward and latera 8x 10 ft- 4 hurdles  forward and lateral 8x 10 ft- 4 hurdles  forward and lateral   DLB c/ UE reaction   3x30\" (blazepods) 3x30\" (blazepods)       Turning around cones/obstacle course           180 degree turn fwd/bwd amb  6 turns (10 feet intervals)  6 turns (10 feet intervals)  6 turns (10 feet intervals) 6 turns (10 feet intervals) 6 turns (10 feet intervals) 6 turns (10 feet intervals)    VOR x 1 viewing (Walking) with CG + SPC  3x30\" 3x30\" 3x30\" 3x30\" 3x30\" 3x30\" 3x30\"   6 minute walk test Performed           Standing HR/TR 3x10  2x10 2x10 2x10 2x10 2x10 2x10 2x10   Marching 3x10  2x10  2x10  2x10  2x10  2x10  2x10  2x10    Gait Training                                 Vitals           BP           SpO2           HR           1:1 with PT from [id number]  Pt was re-evaluated today x 38 minutes                                     "

## 2024-03-28 ENCOUNTER — EVALUATION (OUTPATIENT)
Dept: PHYSICAL THERAPY | Facility: CLINIC | Age: 74
End: 2024-03-28
Payer: MEDICARE

## 2024-03-28 DIAGNOSIS — G35 MULTIPLE SCLEROSIS (HCC): Primary | ICD-10-CM

## 2024-03-28 DIAGNOSIS — R26.9 GAIT ABNORMALITY: ICD-10-CM

## 2024-03-28 DIAGNOSIS — R53.1 WEAKNESS: ICD-10-CM

## 2024-03-28 PROCEDURE — 97110 THERAPEUTIC EXERCISES: CPT | Performed by: PHYSICAL THERAPIST

## 2024-03-28 PROCEDURE — 97112 NEUROMUSCULAR REEDUCATION: CPT | Performed by: PHYSICAL THERAPIST

## 2024-03-28 PROCEDURE — 97530 THERAPEUTIC ACTIVITIES: CPT | Performed by: PHYSICAL THERAPIST

## 2024-03-28 NOTE — TELEPHONE ENCOUNTER
MyChart reply sent to patient.  Notified that refill of clonazepam has been sent to pharmacy and CBT-I referral placed.

## 2024-04-02 ENCOUNTER — OFFICE VISIT (OUTPATIENT)
Dept: PHYSICAL THERAPY | Facility: CLINIC | Age: 74
End: 2024-04-02
Payer: MEDICARE

## 2024-04-02 DIAGNOSIS — R53.1 WEAKNESS: ICD-10-CM

## 2024-04-02 DIAGNOSIS — R26.9 GAIT ABNORMALITY: ICD-10-CM

## 2024-04-02 DIAGNOSIS — G35 MULTIPLE SCLEROSIS (HCC): Primary | ICD-10-CM

## 2024-04-02 LAB
APOB+LDLR+PCSK9 GENE MUT ANL BLD/T: NOT DETECTED
BRCA1+BRCA2 DEL+DUP + FULL MUT ANL BLD/T: NOT DETECTED
MLH1+MSH2+MSH6+PMS2 GN DEL+DUP+FUL M: NOT DETECTED

## 2024-04-02 PROCEDURE — 97110 THERAPEUTIC EXERCISES: CPT | Performed by: PHYSICAL THERAPIST

## 2024-04-02 PROCEDURE — 97530 THERAPEUTIC ACTIVITIES: CPT | Performed by: PHYSICAL THERAPIST

## 2024-04-02 NOTE — PROGRESS NOTES
Daily Note     Today's date: 2024  Patient name: Jacy Rodrigez  : 1950  MRN: [id number]  Referring provider: Patrick Naylor DO  Dx:   Encounter Diagnosis     ICD-10-CM    1. Multiple sclerosis (HCC)  G35       2. Gait abnormality  R26.9       3. Weakness  R53.1                      Subjective: Pt overall doing well this afternoon. She notes significant pain in LE after cooking and standing for easter.       Objective: See treatment diary below      Assessment: Tolerated treatment well. LE performed well today in terms of muscle performance and balance. Intermittent challenge with idris clearance but good for the most part. Patient would benefit from continued PT.      Plan: Continue per plan of care.      Precautions: MS, Gait dysfunction, Insomnia, Osteoporosis, Peripheral polyneuropathy, Hx of Breast CA, R IDALIA in - Dr. Rushing, Left femur IM nailing   Manuals 4/2  2/29 3/4  3/7 3/14 3/18 3/25                                               Neuro Re-Ed           DLS on foam Biodex foam- ft apart- 20x forward/20fx lateral  AROM  Biodex foam- ft apart- 15x forward/15x lateral 1# Biodex foam- ft apart- 15x forward/15x lateral  AROM Biodex foam- ft apart- 15x forward/15x lateral  AROM Biodex foam- ft apart- 15x forward/15x lateral  AROM Biodex foam- ft apart- 15x forward/15x lateral  AROM Biodex foam- ft apart- 20x forward/10fx lateral  AROM   Tandem balance           Standing hip abd           Standing hip ext           Standing SLR           High knee marching            Seated Sciatic nerve glides     10x on R with strap - heel planted on step      Ther Ex           Leg press                      Hamstring stretching           Calf SB stretching           Hamstring curl  2x15 ea bolster to avoid hip flexion at mirror  2x15 ea bolster to avoid hip flexion at mirror 2x15 ea bolster to avoid hip flexion at mirror 2x15 ea bolster to avoid hip flexion at mirror 2x15 ea bolster to avoid hip flexion  "at mirror 2x15 ea bolster to avoid hip flexion at mirror 2x15 ea bolster to avoid hip flexion at mirror   LAQ   2#20x R/20x L  20x AROM 20x AROM 20x AROM 20x AROM 20x AROM   Seated March                                  Ther Activity           Lateral walks           Mini squats    Sit to stand [id number] UE support       Step ups           Leeann walks  8x 10 ft- 4 hurdles  forward and lateral  8x 10 ft- 4 hurdles  forward and lateral 8x 10 ft- 4 hurdles  forward and lateral 4x 10 ft- 4 hurdles  forward and lateral 4x 10 ft- 4 hurdles  forward and latera 8x 10 ft- 4 hurdles  forward and lateral 8x 10 ft- 4 hurdles  forward and lateral   DLB c/ UE reaction 6x30\" (blazepods)  3x30\" (blazepods) 3x30\" (blazepods)       Turning around cones/obstacle course           180 degree turn fwd/bwd amb   6 turns (10 feet intervals)  6 turns (10 feet intervals) 6 turns (10 feet intervals) 6 turns (10 feet intervals) 6 turns (10 feet intervals)    VOR x 1 viewing (Walking) with CG + SPC 3x30\"  3x30\" 3x30\" 3x30\" 3x30\" 3x30\" 3x30\"   6 minute walk test           Standing HR/TR 2x10  2x10 2x10 2x10 2x10 2x10 2x10   Marching 2x10   2x10  2x10  2x10  2x10  2x10  2x10    Gait Training                                 Vitals           BP           SpO2           HR           1:1 with PT from 2-245pm                                       "

## 2024-04-03 ENCOUNTER — OFFICE VISIT (OUTPATIENT)
Dept: SURGICAL ONCOLOGY | Facility: CLINIC | Age: 74
End: 2024-04-03
Payer: MEDICARE

## 2024-04-03 VITALS
HEART RATE: 74 BPM | DIASTOLIC BLOOD PRESSURE: 80 MMHG | SYSTOLIC BLOOD PRESSURE: 130 MMHG | HEIGHT: 64 IN | OXYGEN SATURATION: 96 % | WEIGHT: 144 LBS | BODY MASS INDEX: 24.59 KG/M2 | TEMPERATURE: 97.2 F | RESPIRATION RATE: 16 BRPM

## 2024-04-03 DIAGNOSIS — Z85.3 ENCOUNTER FOR FOLLOW-UP SURVEILLANCE OF BREAST CANCER: Primary | ICD-10-CM

## 2024-04-03 DIAGNOSIS — Z12.31 VISIT FOR SCREENING MAMMOGRAM: ICD-10-CM

## 2024-04-03 DIAGNOSIS — Z86.000 PERSONAL HISTORY OF IN-SITU NEOPLASM OF BREAST: ICD-10-CM

## 2024-04-03 DIAGNOSIS — Z90.12 H/O UNILATERAL MODIFIED RADICAL MASTECTOMY, LEFT: ICD-10-CM

## 2024-04-03 DIAGNOSIS — Z08 ENCOUNTER FOR FOLLOW-UP SURVEILLANCE OF BREAST CANCER: Primary | ICD-10-CM

## 2024-04-03 PROCEDURE — 99213 OFFICE O/P EST LOW 20 MIN: CPT

## 2024-04-03 NOTE — PROGRESS NOTES
Surgical Oncology Follow Up       [facility] [place]  [address]  [place] [address]    Jacy MYRNA Elia  1950  [id number]  [facility] [place]  [facility] [address]    Chief Complaint   Patient presents with   • office visit       Assessment/Plan:  1. Encounter for follow-up surveillance of breast cancer  - prn    2. 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    3. H/O unilateral modified radical mastectomy, left      Discussion/Summary: Patient is a 73-year-old female presenting for 1 year follow-up for intraductal carcinoma diagnosed in 2012. She had a left breast mastectomy with sentinel node biopsy and left breast reconstruction. She had her left breast implant replaced 3 times with a right mastopexy. She has been on observation. She had a screening mammo of the right breast on 12/12/23 which was incomplete requiring dx imaging. She went for this on 1/18/24 which showed grouped calcifications lower inner right breast likely in a fat lobule favoring benign fat necrosis. 3 month f/u imaging was recommended however patient stressed that this was extremely painful for her and she does not want to have 3 month f/u imaging. She has many other co-morbidities and does not what to have the imaging scheduled this month. This is ok and we will get this cancelled. She no longer wants breast imaging. This is ok. She was instructed to call with questions or concerns in the future. All questions were answered today.     History of Present Illness:     Oncology History   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    Initial Diagnosis    Intraductal carcinoma in situ of left breast     8/16/2012 Surgery    Left breast mastectomy, SLNB. Left breast reconstruction with      4/7/2014 Surgery    Left breast implant replaced (X 3) right mastopexy.            -Interval History:  Patient is a 73-year-old female presenting for 1 year follow-up for intraductal carcinoma diagnosed in 2012. She had a screening mammo of the right breast on 12/12/23 which was incomplete requiring dx imaging. She went for this on 1/18/24 which showed grouped calcifications lower inner right breast likely in a fat lobule favoring benign fat necrosis. 3 month f/u imaging was recommended however patient stressed that this was extremely painful for her and she does not want to have 3 month f/u imaging. She no longer wants breast imaging. Patient denies changes on her breast exam. She denies persistent headaches, bone pain, back pain, shortness of breath, or abdominal pain.      Review of Systems:  Review of Systems   Constitutional:  Negative for activity change, appetite change, fatigue and unexpected weight change.   Respiratory:  Negative for cough and shortness of breath.    Cardiovascular:  Negative for chest pain.   Gastrointestinal:  Negative for abdominal pain, diarrhea, nausea and vomiting.   Endocrine: Negative for heat intolerance.   Musculoskeletal:  Positive for gait problem (cane). Negative for arthralgias, back pain and myalgias.   Skin:  Negative for rash.   Neurological:  Negative for weakness and headaches.   Hematological:  Negative for adenopathy.       Patient Active Problem List   Diagnosis   • 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   • Multiple sclerosis (HCC)   • Peripheral polyneuropathy   • Depression   • Fatigue   • Gait disturbance   • Unilateral occipital headache   • History of bilateral hip replacements   • Chronic insomnia   • Solitary pulmonary nodule   • Osteopenia   • Screening mammogram, encounter for   • Encounter for breast reconstruction following mastectomy   • Encounter for follow-up surveillance of breast cancer   • Essential hypertension   • Abnormal finding on breast imaging   • TSH (thyroid-stimulating hormone deficiency)   • B12 deficiency   • Dense breast tissue    • Subareolar lump of right breast   • Hyperlipidemia   • Pain and swelling of right knee   • Open-angle glaucoma suspect of both eyes   • Bilateral pseudophakia   • Epiretinal membrane   • H/O unilateral modified radical mastectomy, left   • Open-angle glaucoma of both eyes   • Posterior capsular opacification non visually significant of right eye   • Refractory migraine with aura   • Status post bilateral cataract extraction   • Strain of left biceps muscle   • Tear of left rotator cuff   • Vitreomacular adhesion of both eyes   • Abnormal renal function test   • MAGDI (obstructive sleep apnea)   • Excessive daytime sleepiness     Past Medical History:   Diagnosis Date   • Allergic 1967    seasonal   • Allergic rhinitis    • Bone pain    • Breast cancer (HCC) 08/16/2012    left   • Breast ptosis    • Depression    • Fatigue    • Fx. left wrist, closed, initial encounter 09/20/2014   • Gait disturbance     uses cane at times   • Headache    • Hip fracture (HCC)    • Hip pain    • Hip pain, right 09/18/2012    Laterality: Right   • History of transfusion 1975    placenta previa s/p work accident, no rx   • Hypokalemia    • Insomnia    • Laceration of finger     right hand   • Left ankle pain    • Left knee pain    • Multiple sclerosis (HCC)    • Muscle strain     left lower leg   • Nephrolithiasis    • Osteopenia    • Osteoporosis    • Pain of left calf    • Pain of left lower leg 07/25/2017   • Pneumonia    • Rash    • Scoliosis birth    scoliosis   • Solitary pulmonary nodule    • SVT (supraventricular tachycardia)    • Tingling    • Urgency of urination    • Urticaria    • Wrist fracture, left      Past Surgical History:   Procedure Laterality Date   • ANKLE SURGERY     • APPENDECTOMY  11/2012    Dr. Vidales   • AUGMENTATION BREAST      Enlargement procedure with prosthetic implant bilateral   • AUGMENTATION MAMMAPLASTY Right 01/28/2020    implant replaced because of recall   • AUGMENTATION MAMMAPLASTY Bilateral  2012   • BREAST BIOPSY Left 07/23/2012   • BREAST EXCISIONAL BIOPSY Right     age 39   • BREAST IMPLANT Right 01/28/2020    Procedure: BREAST IMPLANT EXCHANGE WITH CAPSULECTOMY;  Surgeon: Da Canales MD;  Location: [facility];  Service: Plastics   • BREAST IMPLANT REMOVAL Right 01/28/2020    implant placement, implant revision, right mastopexy   • CYSTOSTOMY      with basket extraction of calculus; x2   • EXCISION / BIOPSY BREAST / NIPPLE / DUCT Right 05/04/2020    scar revision to resuture non healing surgical wound   • EXPLORATORY LAPAROTOMY     • FL INJECTION LEFT SHOULDER (ARTHROGRAM)  07/19/2023   • FOOT SURGERY     • FRACTURE SURGERY  Lt wrist 9/2014 - Lt matthew femur 9/14   • HIP FRACTURE SURGERY Right     Subcapital   • HIP SURGERY Left    • JOINT REPLACEMENT  Rt hip 10/2012   • LEG SURGERY      Repair   • MASTECTOMY Left 08/16/2012   • IA GRAFTING OF AUTOLOGOUS FAT BY LIPO 50 CC OR LESS Bilateral 08/22/2023    Procedure: AUTOLOGOUS FAT GRAFTING TO BILATERAL BREASTS;  Surgeon: Da Canales MD;  [facility];  Service: Plastics   • IA MASTOPEXY Right 01/28/2020    Procedure: BREAST MASTOPEXY;  Surgeon: Da Canales MD;  [facility];  Service: Plastics   • IA REVISION OF RECONSTRUCTED BREAST Right 05/04/2020    Procedure: REVISION RIGHT BREAST MOUND;  Surgeon: Da Canales MD;  [facility];  Service: Plastics   • REDUCTION MAMMAPLASTY Right 01/28/2020    reduced to match left side   • SENTINEL LYMPH NODE BIOPSY Left 08/16/2012   • SKIN BIOPSY     • TONSILLECTOMY     • TUBAL LIGATION     • WRIST SURGERY Left      Family History   Problem Relation Age of Onset   • Coronary artery disease Mother    • Hyperlipidemia Mother    • Rheum arthritis Mother    • Basal cell carcinoma Mother    • Other Father         Acute myocardial infarction   • Cancer Maternal Aunt 70        bladder   • Heart disease Maternal Aunt    • Stroke Maternal Aunt         6 CVA before  death   • Breast cancer Maternal Aunt    • Colon cancer Maternal Uncle 65   • Hodgkin's lymphoma Maternal Uncle 70   • No Known Problems Paternal Aunt    • No Known Problems Paternal Aunt    • No Known Problems Paternal Aunt    • No Known Problems Maternal Grandmother    • Hodgkin's lymphoma Maternal Grandfather         age at dx unk   • No Known Problems Paternal Grandmother    • No Known Problems Paternal Grandfather    • Stroke Son 49   • No Known Problems Son      Social History     Socioeconomic History   • Marital status: /Civil Union     Spouse name: Not on file   • Number of children: 2   • Years of education: Completed bachelor's degree   • Highest education level: Not on file   Occupational History   • Occupation: RN     Comment: Retired   Tobacco Use   • Smoking status: Former     Current packs/day: 0.00     Average packs/day: 1 pack/day for 35.0 years (35.0 ttl pk-yrs)     Types: Cigarettes     Start date:      Quit date:      Years since quittin.2   • Smokeless tobacco: Never   Vaping Use   • Vaping status: Never Used   Substance and Sexual Activity   • Alcohol use: No   • Drug use: No   • Sexual activity: Yes     Partners: Male     Birth control/protection: Post-menopausal   Other Topics Concern   • Not on file   Social History Narrative    Denied:  History of caffeine use     Social Determinants of Health     Financial Resource Strain: Patient Declined (10/19/2023)    Overall Financial Resource Strain (CARDIA)    • Difficulty of Paying Living Expenses: Patient declined   Food Insecurity: Not on file   Transportation Needs: Patient Declined (10/19/2023)    PRAPARE - Transportation    • Lack of Transportation (Medical): Patient declined    • Lack of Transportation (Non-Medical): Patient declined   Physical Activity: Not on file   Stress: Not on file   Social Connections: Not on file   Intimate Partner Violence: Not on file   Housing Stability: Not on file       Current Outpatient  Medications:   •  ALPHA LIPOIC ACID PO, Take by mouth, Disp: , Rfl:   •  Ampyra 10 MG TB12, 1 tab po bid, Disp: 180 tablet, Rfl: 3  •  ascorbic acid (VITAMIN C) 500 mg tablet, Take 500 mg by mouth daily, Disp: , Rfl:   •  baclofen 10 mg tablet, 1 tab pos q am, 1 tab po q pm and 2 tabs hs, Disp: 360 tablet, Rfl: 3  •  Biotin 5000 MCG TABS, , Disp: , Rfl:   •  clonazePAM (KlonoPIN) 0.5 mg tablet, TAKE 1 AND 1/2 TABLETS BY MOUTH DAILY AT BEDTIME, Disp: 135 tablet, Rfl: 1  •  Cranberry 125 MG TABS, Take 125 mg by mouth in the morning, Disp: , Rfl:   •  cycloSPORINE (RESTASIS) 0.05 % ophthalmic emulsion, Administer 1 drop to both eyes every 12 (twelve) hours, Disp: , Rfl:   •  denosumab (PROLIA) 60 mg/mL, Inject under the skin, Disp: , Rfl:   •  docusate sodium (COLACE) 100 mg capsule, Take 1 capsule by mouth every other day  , Disp: , Rfl:   •  gabapentin (NEURONTIN) 600 MG tablet, Take one tablet in the AM, 1 tabs in the afternoon  and 2 tabs at bedtime, Disp: 360 tablet, Rfl: 3  •  lisinopril (ZESTRIL) 5 mg tablet, Take 0.5 tablets (2.5 mg total) by mouth daily, Disp: 45 tablet, Rfl: 1  •  Magnesium 500 MG TABS, Take 500 tablets by mouth once, Disp: , Rfl:   •  solifenacin (VESICARE) 10 MG tablet, TAKE 1 TABLET DAILY, Disp: 90 tablet, Rfl: 1  •  zonisamide (ZONEGRAN) 100 mg capsule, TAKE 4 CAPSULES DAILY AT   BEDTIME, Disp: 360 capsule, Rfl: 3  •  LORazepam (Ativan) 0.5 mg tablet, 1 tab po 30 min prior to mri, Disp: 1 tablet, Rfl: 0  •  LORazepam (Ativan) 0.5 mg tablet, Take 1 tablet (0.5 mg total) by mouth once in imaging for anxiety for up to 1 dose, Disp: 1 tablet, Rfl: 0  •  NON FORMULARY, AMPYRA 10 MG Q12 HR (Patient not taking: Reported on 2/8/2024), Disp: , Rfl:   Allergies   Allergen Reactions   • Duloxetine Hcl      Rapid Heart rate     • Iodinated Contrast Media Anaphylaxis     IVP dye   • Acetaminophen Other (See Comments)     Heart palpitations   • Cetirizine      Only occurs with generic, weakness in  legs, off balance and couldn't walk.   • Morphine Other (See Comments)     Migraine     Vitals:    04/03/24 1059   BP: 130/80   Pulse: 74   Resp: 16   Temp: (!) 97.2 °F (36.2 °C)   SpO2: 96%       Physical Exam  Constitutional:       General: She is not in acute distress.     Appearance: Normal appearance.   Cardiovascular:      Rate and Rhythm: Normal rate and regular rhythm.      Pulses: Normal pulses.      Heart sounds: Normal heart sounds.   Pulmonary:      Effort: Pulmonary effort is normal.      Breath sounds: Normal breath sounds.   Chest:      Chest wall: No mass.   Breasts:     Right: No swelling, bleeding, inverted nipple, mass, nipple discharge, skin change or tenderness.      Left: No swelling, bleeding, inverted nipple, mass, nipple discharge, skin change or tenderness.   Abdominal:      General: Abdomen is flat.      Palpations: Abdomen is soft.   Lymphadenopathy:      Upper Body:      Right upper body: No supraclavicular, axillary or pectoral adenopathy.      Left upper body: No supraclavicular, axillary or pectoral adenopathy.   Skin:     General: Skin is warm.   Neurological:      General: No focal deficit present.      Mental Status: She is alert and oriented to person, place, and time.   Psychiatric:         Mood and Affect: Mood normal.         Behavior: Behavior normal.           Results:    Imaging  No results found.    I reviewed the above imaging data.      Advance Care Planning/Advance Directives:  Discussed disease status, cancer treatment plans and/or cancer treatment goals with the patient.

## 2024-04-04 ENCOUNTER — OFFICE VISIT (OUTPATIENT)
Dept: PHYSICAL THERAPY | Facility: CLINIC | Age: 74
End: 2024-04-04
Payer: MEDICARE

## 2024-04-04 DIAGNOSIS — G35 MULTIPLE SCLEROSIS (HCC): Primary | ICD-10-CM

## 2024-04-04 DIAGNOSIS — R26.9 GAIT ABNORMALITY: ICD-10-CM

## 2024-04-04 DIAGNOSIS — R53.1 WEAKNESS: ICD-10-CM

## 2024-04-04 PROCEDURE — 97530 THERAPEUTIC ACTIVITIES: CPT

## 2024-04-04 PROCEDURE — 97110 THERAPEUTIC EXERCISES: CPT

## 2024-04-04 NOTE — PROGRESS NOTES
Daily Note     Today's date: 2024  Patient name: Jacy Rodrigez  : 1950  MRN: [id number]  Referring provider: Patrick Naylor DO  Dx:   Encounter Diagnosis     ICD-10-CM    1. Multiple sclerosis (HCC)  G35       2. Gait abnormality  R26.9       3. Weakness  R53.1                      Subjective: Pt reports no new complaints today.       Objective: See treatment diary below      Assessment: Tolerated treatment well. Patient exhibited good technique with therapeutic exercises and would benefit from continued PT.  No adverse effects noted w/ VOR.  Fatigued by hurdles. Good tolerance to DLS on foam.      Plan: Progress treatment as tolerated.       Precautions: MS, Gait dysfunction, Insomnia, Osteoporosis, Peripheral polyneuropathy, Hx of Breast CA, R IDALIA in - Dr. Rushing, Left femur IM nailing     1:1 9:45-10:30am  Manuals 4/2 4/4  3/4  3/7 3/14 3/18 3/25                                               Neuro Re-Ed           DLS on foam Biodex foam- ft apart- 20x forward/20fx lateral  AROM Biodex foam-ft apart-  20x fwd/20x lateral AROM  Biodex foam- ft apart- 15x forward/15x lateral  AROM Biodex foam- ft apart- 15x forward/15x lateral  AROM Biodex foam- ft apart- 15x forward/15x lateral  AROM Biodex foam- ft apart- 15x forward/15x lateral  AROM Biodex foam- ft apart- 20x forward/10fx lateral  AROM   Tandem balance           Standing hip abd           Standing hip ext           Standing SLR           High knee marching            Seated Sciatic nerve glides     10x on R with strap - heel planted on step      Ther Ex           Leg press                      Hamstring stretching           Calf SB stretching           Hamstring curl  2x15 ea bolster to avoid hip flexion at mirror 2x15 ea bolster to avoid hip flexion at mirror  2x15 ea bolster to avoid hip flexion at mirror 2x15 ea bolster to avoid hip flexion at mirror 2x15 ea bolster to avoid hip flexion at mirror 2x15 ea bolster to avoid hip flexion  "at mirror 2x15 ea bolster to avoid hip flexion at mirror   LAQ  2x10 AROM  20x AROM 20x AROM 20x AROM 20x AROM 20x AROM   Seated March                                  Ther Activity           Lateral walks           Mini squats    Sit to stand [id number] UE support       Step ups           Leeann walks  8x 10 ft- 4 hurdles  forward and lateral [id number] hurdles forward and lateral  8x 10 ft- 4 hurdles  forward and lateral 4x 10 ft- 4 hurdles  forward and lateral 4x 10 ft- 4 hurdles  forward and latera 8x 10 ft- 4 hurdles  forward and lateral 8x 10 ft- 4 hurdles  forward and lateral   DLB c/ UE reaction 6x30\" (blazepods) NV  3x30\" (blazepods)       Turning around cones/obstacle course           180 degree turn fwd/bwd amb    6 turns (10 feet intervals) 6 turns (10 feet intervals) 6 turns (10 feet intervals) 6 turns (10 feet intervals)    VOR x 1 viewing (Walking) with CG + SPC 3x30\" 3x30\"  3x30\" 3x30\" 3x30\" 3x30\" 3x30\"   6 minute walk test           Standing HR/TR 2x10 2x10  2x10 2x10 2x10 2x10 2x10   Marching 2x10  2x10  2x10  2x10  2x10  2x10  2x10    Gait Training                                 Vitals           BP           SpO2           HR                                                    "

## 2024-04-08 ENCOUNTER — PATIENT MESSAGE (OUTPATIENT)
Dept: NEUROLOGY | Facility: CLINIC | Age: 74
End: 2024-04-08

## 2024-04-09 ENCOUNTER — OFFICE VISIT (OUTPATIENT)
Dept: PHYSICAL THERAPY | Facility: CLINIC | Age: 74
End: 2024-04-09
Payer: MEDICARE

## 2024-04-09 DIAGNOSIS — R26.9 GAIT ABNORMALITY: ICD-10-CM

## 2024-04-09 DIAGNOSIS — G35 MULTIPLE SCLEROSIS (HCC): Primary | ICD-10-CM

## 2024-04-09 PROCEDURE — 97530 THERAPEUTIC ACTIVITIES: CPT | Performed by: PHYSICAL THERAPIST

## 2024-04-09 PROCEDURE — 97110 THERAPEUTIC EXERCISES: CPT | Performed by: PHYSICAL THERAPIST

## 2024-04-09 NOTE — PROGRESS NOTES
"Daily Note     Today's date: 2024  Patient name: Jacy Rodrigez  : 1950  MRN: [id number]  Referring provider: Patrick Naylor DO  Dx:   Encounter Diagnosis     ICD-10-CM    1. Multiple sclerosis (HCC)  G35       2. Gait abnormality  R26.9           Start Time: 1445  Stop Time: 1515  Total time in clinic (min): 30 minutes    Subjective: Pt reports poor sleep last evening (maybe 2 hours), burning neuropathy in feet, reduced clearance of LE during ambulation, and LE weakness.       Objective: See treatment diary below      Assessment: Modified treatment today secondary to weakness throughout LE. She is unable to perform idris walks as her foot would not clear the idris. Implemented sciatic nerve glides. Pt too fatigue to complete rest of POC today. Pt denies any falling or injury. Pt has experienced episodes similar to this before, however, not as limiting. Plan to resume exercise next session as tolerated. Patient would benefit from continued PT as stabilization of symptoms have not occurred.       Plan: Continue per plan of care.      Precautions: MS, Gait dysfunction, Insomnia, Osteoporosis, Peripheral polyneuropathy, Hx of Breast CA, R IDALIA in - Dr. Rushing, Left femur IM nailing     1:1 9:45-10:30am  Manuals 4/2 4/4 4/9    3/18 3/25                                               Neuro Re-Ed           DLS on foam Biodex foam- ft apart- 20x forward/20fx lateral  AROM Biodex foam-ft apart-  20x fwd/20x lateral AROM     Biodex foam- ft apart- 15x forward/15x lateral  AROM Biodex foam- ft apart- 20x forward/10fx lateral  AROM   Tandem balance           Standing hip abd           Standing hip ext           Standing SLR           High knee marching            Seated Sciatic nerve glides   10x5\" B with step and strap        Ther Ex           Leg press                      Hamstring stretching           Calf SB stretching           Hamstring curl  2x15 ea bolster to avoid hip flexion at mirror 2x15 ea " "bolster to avoid hip flexion at mirror 2x15 ea bolster to avoid hip flexion at mirror    2x15 ea bolster to avoid hip flexion at mirror 2x15 ea bolster to avoid hip flexion at mirror   LAQ  2x10 AROM 3x10 AROM    20x AROM 20x AROM   Seated March                                  Ther Activity           Lateral walks           Mini squats           Step ups           Leeann walks  8x 10 ft- 4 hurdles  forward and lateral [id number] hurdles forward and lateral Unable to perform     8x 10 ft- 4 hurdles  forward and lateral 8x 10 ft- 4 hurdles  forward and lateral   DLB c/ UE reaction 6x30\" (blazepods) NV         Turning around cones/obstacle course           180 degree turn fwd/bwd amb       6 turns (10 feet intervals)    VOR x 1 viewing (Walking) with CG + SPC 3x30\" 3x30\"     3x30\" 3x30\"   6 minute walk test           Standing HR/TR 2x10 2x10 2x10    2x10 2x10   Marching 2x10  2x10 2x10    2x10  2x10    Gait Training                                 Vitals           BP           SpO2           HR             1:1 with PT from 245-315pm                                           "

## 2024-04-10 DIAGNOSIS — G35 MULTIPLE SCLEROSIS (HCC): ICD-10-CM

## 2024-04-10 RX ORDER — BACLOFEN 10 MG/1
TABLET ORAL
Qty: 360 TABLET | Refills: 0 | Status: SHIPPED | OUTPATIENT
Start: 2024-04-10

## 2024-04-10 NOTE — TELEPHONE ENCOUNTER
Baclofen 10 mg    Next OV 5/7/2024 with Dr. Briceno.    Last script sent 10/31/2023. Quantity: 360 tablets. Refills: 3.    Dr. Briceno - Rx entered. Please review and sign if in agreement.

## 2024-04-10 NOTE — TELEPHONE ENCOUNTER
2024  Jihan Rodrigez   to  [facility] (supporting Elidia Briceno MD)         24  6:45 PM  Dr Briceno   I just went to put out my meds for the next month and realized I am completely out of baclofen. I usually have a few bottles on the side and went through all and it isn't even listed at least for the last six months on my med list in [facility]. I don't know how this happened. So much going on here I guess I let it slip.   Would you please fax [facility] an order for Baclofen 10 mg tid. with an additional 10 mg hs. I would be very grateful.   Thanks so much. Any questions please call me. Jihan Rodrigez    48

## 2024-04-12 ENCOUNTER — OFFICE VISIT (OUTPATIENT)
Dept: PHYSICAL THERAPY | Facility: CLINIC | Age: 74
End: 2024-04-12
Payer: MEDICARE

## 2024-04-12 DIAGNOSIS — R26.9 GAIT ABNORMALITY: ICD-10-CM

## 2024-04-12 DIAGNOSIS — R53.1 WEAKNESS: ICD-10-CM

## 2024-04-12 DIAGNOSIS — G35 MULTIPLE SCLEROSIS (HCC): Primary | ICD-10-CM

## 2024-04-12 PROCEDURE — 97112 NEUROMUSCULAR REEDUCATION: CPT

## 2024-04-12 PROCEDURE — 97530 THERAPEUTIC ACTIVITIES: CPT

## 2024-04-12 PROCEDURE — 97110 THERAPEUTIC EXERCISES: CPT

## 2024-04-12 NOTE — PROGRESS NOTES
"Daily Note     Today's date: 2024  Patient name: Jacy Rodrigez  : 1950  MRN: [id number]  Referring provider: Patrick Naylor DO  Dx:   Encounter Diagnosis     ICD-10-CM    1. Multiple sclerosis (HCC)  G35       2. Gait abnormality  R26.9       3. Weakness  R53.1                      Subjective: Pt states she is doing better than the other day; states she had a couple of bad days due to neuropathy (bilaterally, R > L).        Objective: See treatment diary below      Assessment: Tolerated treatment well. Patient exhibited good technique with therapeutic exercises and would benefit from continued PT.  Pt notes less incidence of cramps w/ addition of seated sciatic nerve glides.  R LE fatigue > L w/ DLS on foam. Pt defers hurdles today due to LE fatigue; attempt in beginning of session nv as able.       Plan: Continue per plan of care.      Precautions: MS, Gait dysfunction, Insomnia, Osteoporosis, Peripheral polyneuropathy, Hx of Breast CA, R IDALIA in - Dr. Rushing, Left femur IM nailing     Manuals 4/2 4/4 4/9 4/12   3/18 3/25                                               Neuro Re-Ed           DLS on foam Biodex foam- ft apart- 20x forward/20fx lateral  AROM Biodex foam-ft apart-  20x fwd/20x lateral AROM  Air jarod foam-ft apart-15x fwd/15x lat AROM   Biodex foam- ft apart- 15x forward/15x lateral  AROM Biodex foam- ft apart- 20x forward/10fx lateral  AROM   Tandem balance           Standing hip abd           Standing hip ext           Standing SLR           High knee marching            Seated Sciatic nerve glides   10x5\" B with step and strap 10x5\" B w/ step and strap       Ther Ex           Leg press                      Hamstring stretching           Calf SB stretching           Hamstring curl  2x15 ea bolster to avoid hip flexion at mirror 2x15 ea bolster to avoid hip flexion at mirror 2x15 ea bolster to avoid hip flexion at mirror 23xL/25x R ea bolster to avoid hip flexion at mirror   2x15 " "ea bolster to avoid hip flexion at mirror 2x15 ea bolster to avoid hip flexion at mirror   LAQ  2x10 AROM 3x10 AROM 3x10 AROM   20x AROM 20x AROM   Seated March                                  Ther Activity           Lateral walks           Mini squats           Step ups           Leeann walks  8x 10 ft- 4 hurdles  forward and lateral [id number] hurdles forward and lateral Unable to perform  Hold (*try in beginning of session nv)   8x 10 ft- 4 hurdles  forward and lateral 8x 10 ft- 4 hurdles  forward and lateral   DLB c/ UE reaction 6x30\" (blazepods) NV         Turning around cones/obstacle course           180 degree turn fwd/bwd amb       6 turns (10 feet intervals)    VOR x 1 viewing (Walking) with CG + SPC 3x30\" 3x30\"     3x30\" 3x30\"   6 minute walk test           Standing HR/TR 2x10 2x10 2x10 2x10 ea   2x10 2x10   Marching 2x10  2x10 2x10 2x10 ea   2x10  2x10    Gait Training                                 Vitals           BP           SpO2           HR                                                          "

## 2024-04-15 ENCOUNTER — TELEMEDICINE (OUTPATIENT)
Age: 74
End: 2024-04-15

## 2024-04-15 DIAGNOSIS — F51.04 INSOMNIA, PSYCHOPHYSIOLOGICAL: Primary | ICD-10-CM

## 2024-04-15 NOTE — PSYCH
Behavioral Health Psychotherapy Assessment    Date of Initial Psychotherapy Assessment: 04/15/24  Referral Source: Glen Naylor, DO  Has a release of information been signed for the referral source? No    Preferred Name: Jihan Rodrigez  Preferred Pronouns: She/her  YOB: 1950 Age: 74 y.o.  Sex assigned at birth: female   Gender Identity:   Race:   Preferred Language: English    Emergency Contact:  Full Name:   Relationship to Client:   Contact information:     Primary Care Physician:  Prashant Jonas MD  [address] [place] [address]  [phone]  Has a release of information been signed? No    Physical Health History:  Past surgical procedures: 7 surgeries related to MS   Do you have a history of any of the following: other Multiple Sclerois, Dx'ed 17 years ago   Do you have any mobility issues? Yes, describe: Some leg mobility; usually able to arise from bed if cautious and follows set movements through house     Relevant Family History:  Adult son has autism and live with client but is independent and does not interfere with her sleep opportunity     Presenting Problem (What brings you in?)  Middle and early awakenings due to need to go to the bathroom; leg pain; and unknown causes. Ultimately wishes to be able to go to bed at the same time as spouse 10/10:30 p.m. and to arise around 6 p.m. May sleep 20 minutes prior to bedtime; denies extensive daytime napping; erratic rise time.     Mental Health Advance Directive:  Do you currently have a Mental Health Advance Directive?no    Diagnosis:   Diagnosis ICD-10-CM Associated Orders   1. Insomnia, psychophysiological  F51.04           Initial Assessment:     Current Mental Status:    Appearance: appropriate      Behavior/Manner: cooperative      Affect/Mood:  Euthymic    Speech:  Normal    Sleep:  Insomnia    Oriented to: oriented to self, oriented to place and oriented to time       Clinical Symptoms    Depression: yes      Depression  Symptoms: depressed mood and insomnia      Have you ever been assaultive to others or the environment: No      Have you ever been self-injurious: No      Counseling History:  Previous Counseling or Treatment  (Mental Health or Drug & Alcohol): Yes    Previous Counseling Details:  Saw a psychologist who was a childhood friend during her divorce from first spouse; found this helpful and appreciated being given direction rather than having questions be met with questions.   Have you previously taken psychiatric medications: Yes    Previous Medications Attempted:  Ativan (d/c) Clonazepam (beatriz, would like to d/c)    Suicide Risk Assessment  Have you ever had a suicide attempt: No    Have you had incidents of suicidal ideation: No      Substance Abuse/Addiction Assessment:  Alcohol: No    Marijuana: No      Disordered Eating History:  Do you have a history of disordered eating: No      Social Determinants of Health:    SDOH:  None    Trauma and Abuse History:    Have you ever been abused: No      Legal History:    Have you ever been arrested  or had a DUI: No      Relationship History:    Current marital status:       Relationship History:  Spouse     Employment History    Are you currently employed: No      Currently seeking employment: No      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

## 2024-04-16 ENCOUNTER — OFFICE VISIT (OUTPATIENT)
Dept: PHYSICAL THERAPY | Facility: CLINIC | Age: 74
End: 2024-04-16
Payer: MEDICARE

## 2024-04-16 DIAGNOSIS — G35 MULTIPLE SCLEROSIS (HCC): Primary | ICD-10-CM

## 2024-04-16 DIAGNOSIS — R26.9 GAIT ABNORMALITY: ICD-10-CM

## 2024-04-16 DIAGNOSIS — R53.1 WEAKNESS: ICD-10-CM

## 2024-04-16 PROCEDURE — 97112 NEUROMUSCULAR REEDUCATION: CPT | Performed by: PHYSICAL THERAPIST

## 2024-04-16 PROCEDURE — 97110 THERAPEUTIC EXERCISES: CPT | Performed by: PHYSICAL THERAPIST

## 2024-04-16 PROCEDURE — 97530 THERAPEUTIC ACTIVITIES: CPT | Performed by: PHYSICAL THERAPIST

## 2024-04-16 NOTE — PROGRESS NOTES
"Daily Note     Today's date: 2024  Patient name: Jacy Rodrigez  : 1950  MRN: [id number]  Referring provider: Patrick Naylor DO  Dx:   Encounter Diagnosis     ICD-10-CM    1. Multiple sclerosis (HCC)  G35       2. Gait abnormality  R26.9       3. Weakness  R53.1           Start Time: 1400  Stop Time: 1445  Total time in clinic (min): 45 minutes    Subjective: Pt reports doing better today then last week. However, she was very stressed doing taxes on  which caused her legs to give way on her.       Objective: See treatment diary below      Assessment: Improved motor control in LE today compared to last week. However, still did not perform idris exercises due to poor clearance of BLE and ankles. Tolerated treatment fair with extended rest. Pt notes significant weakness when sleep is off. Reports getting 3 hours of sleep last evening. Attempted CBT for insomnia but concerned with it's effectiveness given her MS disease process. Pt will not be in until next week as she will be [place] for a conference. Discussed the role of  stress on muscle performance. Patient would benefit from continued PT.      Plan: Continue per plan of care.      Precautions: MS, Gait dysfunction, Insomnia, Osteoporosis, Peripheral polyneuropathy, Hx of Breast CA, R IDALIA in - Dr. Rushing, Left femur IM nailing     Manuals                                                   Neuro Re-Ed           DLS on foam Biodex foam- ft apart- 20x forward/20fx lateral  AROM Biodex foam-ft apart-  20x fwd/20x lateral AROM  Air jarod foam-ft apart-15x fwd/15x lat AROM Air jarod foam-ft apart-20x fwd/20x lat AROM      Tandem balance           Standing hip abd           Standing hip ext           Standing SLR           High knee marching            Seated Sciatic nerve glides   10x5\" B with step and strap 10x5\" B w/ step and strap       Ther Ex           Leg press                      Hamstring stretching         " "  Calf SB stretching           Hamstring curl  2x15 ea bolster to avoid hip flexion at mirror 2x15 ea bolster to avoid hip flexion at mirror 2x15 ea bolster to avoid hip flexion at mirror 23xL/25x R ea bolster to avoid hip flexion at mirror 20xL/25x R ea bolster to avoid hip flexion at mirror      LAQ  2x10 AROM 3x10 AROM 3x10 AROM 3x10 AROM      Seated March                                  Ther Activity           Lateral walks           Mini squats           Step ups           Leeann walks  8x 10 ft- 4 hurdles  forward and lateral [id number] hurdles forward and lateral Unable to perform  Hold (*try in beginning of session nv)       Tandem walk on foam     4x 15 feet blue foam       DLB c/ UE reaction 6x30\" (blazepods) NV         Turning around cones/obstacle course           180 degree turn fwd/bwd amb           VOR x 1 viewing (Walking) with CG + SPC 3x30\" 3x30\"         6 minute walk test           Standing HR/TR 2x10 2x10 2x10 2x10 ea 2x10 ea      Marching 2x10  2x10 2x10 2x10 ea 2x10 ea      Gait Training                                 Vitals           BP           SpO2           HR             1:1 with PT from 2-245pm                                               "

## 2024-04-18 ENCOUNTER — APPOINTMENT (OUTPATIENT)
Dept: PHYSICAL THERAPY | Facility: CLINIC | Age: 74
End: 2024-04-18
Payer: MEDICARE

## 2024-04-22 ENCOUNTER — TELEMEDICINE (OUTPATIENT)
Age: 74
End: 2024-04-22
Payer: MEDICARE

## 2024-04-22 DIAGNOSIS — F51.04 INSOMNIA, PSYCHOPHYSIOLOGICAL: Primary | ICD-10-CM

## 2024-04-22 PROCEDURE — 90834 PSYTX W PT 45 MINUTES: CPT | Performed by: SOCIAL WORKER

## 2024-04-22 NOTE — PSYCH
"Behavioral Health Psychotherapy Progress Note    Psychotherapy Provided: Individual Psychotherapy     1. Insomnia, psychophysiological            Goals addressed in session: N/A; did not write tx plan and client is not continuing in treatment    DATA:   Ms. Rodrigez attempted a 11-5 SRT and found this very difficulty including worsening of her left leg neuropathy. We agreed that her sleep concerns are not significant enough (10:30-6 schedule with some cyclical/erratic interference) to move forward with treatment. She did agree to stop using a clock during sleep hours. During this session, this clinician used the following therapeutic modalities: Cognitive Behavioral Therapy    Substance Abuse was not addressed during this session. If the client is diagnosed with a co-occurring substance use disorder, please indicate any changes in the frequency or amount of use: . Stage of change for addressing substance use diagnoses: No substance use/Not applicable    ASSESSMENT:  Jihan Rodrigez presents with a Euthymic/ normal mood.     her affect is Normal range and intensity, which is congruent, with her mood and the content of the session. The client has not made progress on their goals.     Jihan Rodrigez presents with a none risk of suicide, none risk of self-harm, and none risk of harm to others.    For any risk assessment that surpasses a \"low\" rating, a safety plan must be developed.    A safety plan was indicated: no  If yes, describe in detail     PLAN: Between sessions, Jihan Rodrigez will N/A; discharged.     Behavioral Health Treatment Plan and Discharge Planning: Jihan Rodrigez is aware of and agrees to continue to work on their treatment plan. They have identified and are working toward their discharge goals. noPsychotherapy Discharge Summary    Preferred Name: Jihan Rodrigez  YOB: 1950    Admission date to psychotherapy: 4/15/24    Referred by: Dr. Naylor    Presenting Problem: Sleep " interruption    Course of treatment included : individual therapy     Progress/Outcome of Treatment Goals (brief summary of course of treatment) None; decided not to pursue treatment    Treatment Complications (if any):     Treatment Progress: N/A    Current [facility] Psychiatric Provider:     Discharge Medications include:     Discharge Date: 4/22    Discharge Diagnosis:   1. Insomnia, psychophysiological            Criteria for Discharge:  Declined to pursue treatment    Aftercare recommendations include (include specific referral names and phone numbers, if appropriate):     Prognosis: fair      Visit start and stop times:    04/22/24  Start Time: 1308  Stop Time: 1350  Total Visit Time: 42 minutes

## 2024-04-23 ENCOUNTER — OFFICE VISIT (OUTPATIENT)
Dept: PHYSICAL THERAPY | Facility: CLINIC | Age: 74
End: 2024-04-23
Payer: MEDICARE

## 2024-04-23 DIAGNOSIS — R26.9 GAIT ABNORMALITY: ICD-10-CM

## 2024-04-23 DIAGNOSIS — G35 MULTIPLE SCLEROSIS (HCC): Primary | ICD-10-CM

## 2024-04-23 DIAGNOSIS — R53.1 WEAKNESS: ICD-10-CM

## 2024-04-23 PROCEDURE — 97112 NEUROMUSCULAR REEDUCATION: CPT | Performed by: PHYSICAL THERAPIST

## 2024-04-23 PROCEDURE — 97110 THERAPEUTIC EXERCISES: CPT | Performed by: PHYSICAL THERAPIST

## 2024-04-23 PROCEDURE — 97530 THERAPEUTIC ACTIVITIES: CPT | Performed by: PHYSICAL THERAPIST

## 2024-04-23 NOTE — PROGRESS NOTES
"Daily Note     Today's date: 2024  Patient name: Jacy Rodrigez  : 1950  MRN: [id number]  Referring provider: Patrick Naylor DO  Dx:   Encounter Diagnosis     ICD-10-CM    1. Multiple sclerosis (HCC)  G35       2. Weakness  R53.1       3. Gait abnormality  R26.9           Start Time: 1400  Stop Time: 1445  Total time in clinic (min): 45 minutes    Subjective: Pt reports having a terrible time sleeping. She reports no benefit from CBT for insomnia. Pain and discomfort is worse distal in LE which is interrupting sleeping.       Objective: See treatment diary below      Assessment: Tolerated treatment fair. Pt returned back from a few day trip to [place]. Denies any falls at the hotel while away.  Pt still struggling with LE clearance and defers idris walks at this time.  Implemented SB gastroc stretching today. Patient would benefit from continued PT      Plan: Continue per plan of care.      Precautions: MS, Gait dysfunction, Insomnia, Osteoporosis, Peripheral polyneuropathy, Hx of Breast CA, R IDALIA in - Dr. Rushing, Left femur IM nailing     Manuals                                                  Neuro Re-Ed           DLS on foam Biodex foam- ft apart- 20x forward/20fx lateral  AROM Biodex foam-ft apart-  20x fwd/20x lateral AROM  Air jarod foam-ft apart-15x fwd/15x lat AROM Air jarod foam-ft apart-20x fwd/20x lat AROM Air jarod foam-ft apart-20x fwd/12x lat AROM     Tandem balance           Standing hip abd           Standing hip ext           Standing SLR           High knee marching            Seated Sciatic nerve glides   10x5\" B with step and strap 10x5\" B w/ step and strap       Ther Ex           Leg press                      Hamstring stretching           SB stretch       5x10\" B      Calf SB stretching           Hamstring curl  2x15 ea bolster to avoid hip flexion at mirror 2x15 ea bolster to avoid hip flexion at mirror 2x15 ea bolster to avoid hip flexion at " "mirror 23xL/25x R ea bolster to avoid hip flexion at mirror 20xL/25x R ea bolster to avoid hip flexion at mirror 30xL/30x R ea bolster to avoid hip flexion at mirror     LAQ  2x10 AROM 3x10 AROM 3x10 AROM 3x10 AROM 3x10 AROM     Seated March                                  Ther Activity           Lateral walks           Mini squats           Step ups           Leeann walks  8x 10 ft- 4 hurdles  forward and lateral [id number] hurdles forward and lateral Unable to perform  Hold (*try in beginning of session nv)  Defers     Tandem walk on foam     4x 15 feet blue foam  4x 15 feet blue foam      DLB c/ UE reaction 6x30\" (blazepods) NV         Turning around cones/obstacle course           180 degree turn fwd/bwd amb           VOR x 1 viewing (Walking) with CG + SPC 3x30\" 3x30\"         6 minute walk test           Standing HR/TR 2x10 2x10 2x10 2x10 ea 2x10 ea 2x10 ea     Marching 2x10  2x10 2x10 2x10 ea 2x10 ea 2x10 ea     Gait Training                                 Vitals           BP           SpO2           HR             1:1 with PT from 2-245pm                                                 "

## 2024-04-29 ENCOUNTER — TELEPHONE (OUTPATIENT)
Dept: NEUROLOGY | Facility: CLINIC | Age: 74
End: 2024-04-29

## 2024-04-29 NOTE — TELEPHONE ENCOUNTER
CASEYMM to confirm your upcoming appt, 5/7/24 @ 9:00am (8:45am) at the [place] office, to confirm/RS if you are unable to keep this appt please call [phone]

## 2024-04-30 ENCOUNTER — OFFICE VISIT (OUTPATIENT)
Dept: PHYSICAL THERAPY | Facility: CLINIC | Age: 74
End: 2024-04-30
Payer: MEDICARE

## 2024-04-30 DIAGNOSIS — R53.1 WEAKNESS: ICD-10-CM

## 2024-04-30 DIAGNOSIS — G35 MULTIPLE SCLEROSIS (HCC): Primary | ICD-10-CM

## 2024-04-30 DIAGNOSIS — R26.9 GAIT ABNORMALITY: ICD-10-CM

## 2024-04-30 PROCEDURE — 97535 SELF CARE MNGMENT TRAINING: CPT | Performed by: PHYSICAL THERAPIST

## 2024-04-30 PROCEDURE — 97530 THERAPEUTIC ACTIVITIES: CPT | Performed by: PHYSICAL THERAPIST

## 2024-04-30 NOTE — PROGRESS NOTES
PT-Treatment/Hold    Today's date: 2024  Patient name: Jacy Rodrigez  : 1950  MRN: [id number]  Referring provider: Patrick Naylor DO  Dx:   Encounter Diagnosis     ICD-10-CM    1. Multiple sclerosis (HCC)  G35       2. Gait abnormality  R26.9       3. Weakness  R53.1                      Subjective: Pt reports having severe neuropathy in LE which is causing pain and inability to sleep. She is very frustrated with her current state of health. Her MS fatigue, LE pain, and sleep is all tracking in a downward regression. She will follow up with Dr. Briceno next week to discuss her concerns. She also recently switched PCPs.       Objective: See treatment diary below    Gait: SPC- low clearance of foot in shuffling type pattern.    Strength/Myotome Testing     Left Hip   Planes of Motion   Flexion: 3+  Abduction: 4-  Adduction: 4-  External rotation: 3-  Internal rotation: 3+    Right Hip   Planes of Motion   Flexion: 4-  Abduction: 4-  Adduction: 4-  External rotation: 3+  Internal rotation: 3+    Left Knee   Flexion: 3+  Extension: 3+    Right Knee   Flexion: 4-  Extension: 4-    Left Ankle/Foot   Dorsiflexion: 3+  Plantar flexion: 3+  Inversion: 3  Eversion: 3+    Right Ankle/Foot   Dorsiflexion: 3+  Plantar flexion: 3+  Inversion: 3  Eversion: 3    Assessment: Pt requests discharge from PT at this time. She is having increased pain in her LE, weakness, and poor sleep. She is very frustrated with her medical state at this time. She is currently working with behavior health for insomnia, however, Jihan finds limited benefit from this. Deferred treatment today. Assessed LE strength which identified more weakness in LE, although no active foot drop present. Recommend patient return to rolling walker vs SPC to help stabilize her gait. All of treatment today was spent consulting.       Plan: D/C at patient's request. She is welcome to return when she feels ready.      Precautions: MS, Gait dysfunction,  "Insomnia, Osteoporosis, Peripheral polyneuropathy, Hx of Breast CA, R IDALIA in 2012- Dr. Rushing, Left femur IM nailing     Manuals 4/2 4/4 4/9 4/12 4/16 4/23 4/30                                                Neuro Re-Ed           DLS on foam Biodex foam- ft apart- 20x forward/20fx lateral  AROM Biodex foam-ft apart-  20x fwd/20x lateral AROM  Air jarod foam-ft apart-15x fwd/15x lat AROM Air jarod foam-ft apart-20x fwd/20x lat AROM Air jarod foam-ft apart-20x fwd/12x lat AROM     Tandem balance           Standing hip abd           Standing hip ext           Standing SLR           High knee marching            Seated Sciatic nerve glides   10x5\" B with step and strap 10x5\" B w/ step and strap       Ther Ex           Leg press                      Hamstring stretching           SB stretch       5x10\" B      Calf SB stretching           Hamstring curl  2x15 ea bolster to avoid hip flexion at mirror 2x15 ea bolster to avoid hip flexion at mirror 2x15 ea bolster to avoid hip flexion at mirror 23xL/25x R ea bolster to avoid hip flexion at mirror 20xL/25x R ea bolster to avoid hip flexion at mirror 30xL/30x R ea bolster to avoid hip flexion at mirror     LAQ  2x10 AROM 3x10 AROM 3x10 AROM 3x10 AROM 3x10 AROM     Seated March                                  Ther Activity           Lateral walks           Mini squats           Step ups           Leeann walks  8x 10 ft- 4 hurdles  forward and lateral [id number] hurdles forward and lateral Unable to perform  Hold (*try in beginning of session nv)  Defers     Tandem walk on foam     4x 15 feet blue foam  4x 15 feet blue foam      DLB c/ UE reaction 6x30\" (blazepods) NV         Turning around cones/obstacle course           180 degree turn fwd/bwd amb           VOR x 1 viewing (Walking) with CG + SPC 3x30\" 3x30\"         6 minute walk test           Standing HR/TR 2x10 2x10 2x10 2x10 ea 2x10 ea 2x10 ea     Marching 2x10  2x10 2x10 2x10 ea 2x10 ea 2x10 ea     Gait Training                 "                 Vitals           BP           SpO2           HR             1:1 with PT from 2-230m                                                    Birth Control Pills Pregnancy And Lactation Text: This medication should be avoided if pregnant and for the first 30 days post-partum.

## 2024-05-07 ENCOUNTER — TELEPHONE (OUTPATIENT)
Dept: SLEEP CENTER | Facility: CLINIC | Age: 74
End: 2024-05-07

## 2024-05-07 ENCOUNTER — OFFICE VISIT (OUTPATIENT)
Dept: NEUROLOGY | Facility: CLINIC | Age: 74
End: 2024-05-07
Payer: MEDICARE

## 2024-05-07 ENCOUNTER — TELEPHONE (OUTPATIENT)
Dept: NEUROLOGY | Facility: CLINIC | Age: 74
End: 2024-05-07

## 2024-05-07 VITALS
HEART RATE: 73 BPM | DIASTOLIC BLOOD PRESSURE: 86 MMHG | TEMPERATURE: 97.7 F | BODY MASS INDEX: 24.41 KG/M2 | SYSTOLIC BLOOD PRESSURE: 120 MMHG | HEIGHT: 64 IN | WEIGHT: 143 LBS

## 2024-05-07 DIAGNOSIS — F41.9 ANXIETY: ICD-10-CM

## 2024-05-07 DIAGNOSIS — G35 MULTIPLE SCLEROSIS (HCC): Primary | ICD-10-CM

## 2024-05-07 PROCEDURE — 99215 OFFICE O/P EST HI 40 MIN: CPT | Performed by: PSYCHIATRY & NEUROLOGY

## 2024-05-07 RX ORDER — LORAZEPAM 0.5 MG/1
0.5 TABLET ORAL
Qty: 1 TABLET | Refills: 0 | Status: SHIPPED | OUTPATIENT
Start: 2024-05-07

## 2024-05-07 NOTE — ASSESSMENT & PLAN NOTE
75 yo F with history of MS last seen on 11/7/2023, stable at that time. Off Copaxone. Taking gabapentin 600 mg AM, 600 mg afternoon, 1200 mg PM. Taking Ampyra 10 mg BID as she states she did not tolerate lower dose. Attending physical therapy for lower extremities but put on hold on 5/2/2024 as she did not feel she was improving and wanted to be evaluated by neurology first.  Denies difficulty swallowing. Using cane with ambulation.   4 falls on 12/13/2024: 1st fall was when her cane caught on her slipper, the 2nd fall was when the garage door swung open abruptly due to wind causing her to fall forward, the 3rd fall occurred while in the kitchen after turning around too quickly to face her , and she cannot recall the details of the 4th fall.  Patient states she believes they were not related to her MS  Notes difficulty with urinary voiding while on vesicare which improved since it's discontinuation  CMP (11/29/2023): Cr 1.11, GFR 49  VBS (11/28/2024) - Nonocclusive peristalsis noted with grade 2 esophageal stasis, no esophageal dilation  MRI T-spine wo contrast (1/19/2024) - no new lesions, complex scoliosis, 2 nonspecific right renal masses suspicious for benign cysts  Exam notable for 4/5 LUE abduction, 3+/5 left hip flexion, inability to rise from seated position without using arms, short steps, multiple steps to turn around    Plan  Will obtain MRI brain MS wo contrast, lorazepam ordered for MRI  Ordered for urinalysis, CBC, CK, and CMP  Continue gabapentin 600 mg x1 AM, x1 afternoon, x2 at night (2400 mg total)  Continue Ampyra twice daily  Will not start steroids at this time  Follow up with nephrology and physical therapy as scheduled  Follow up in 2-3 months

## 2024-05-07 NOTE — PROGRESS NOTES
Patient ID: Jacy Rodrigez is a 74 y.o. female.    Assessment/Plan:    Multiple sclerosis (HCC)  75 yo F with history of MS last seen on 11/7/2023, stable at that time. Off Copaxone. Taking gabapentin 600 mg AM, 600 mg afternoon, 1200 mg PM. Taking Ampyra 10 mg BID as she states she did not tolerate lower dose. Attending physical therapy for lower extremities but put on hold on 5/2/2024 as she did not feel she was improving and wanted to be evaluated by neurology first.  Denies difficulty swallowing. Using cane with ambulation.   4 falls on 12/13/2024: 1st fall was when her cane caught on her slipper, the 2nd fall was when the garage door swung open abruptly due to wind causing her to fall forward, the 3rd fall occurred while in the kitchen after turning around too quickly to face her , and she cannot recall the details of the 4th fall.  Patient states she believes they were not related to her MS  Notes difficulty with urinary voiding while on vesicare which improved since it's discontinuation  CMP (11/29/2023): Cr 1.11, GFR 49  VBS (11/28/2024) - Nonocclusive peristalsis noted with grade 2 esophageal stasis, no esophageal dilation  MRI T-spine wo contrast (1/19/2024) - no new lesions, complex scoliosis, 2 nonspecific right renal masses suspicious for benign cysts  Exam notable for 4/5 LUE abduction, 3+/5 left hip flexion, inability to rise from seated position without using arms, short steps, multiple steps to turn around    Plan  Will obtain MRI brain MS wo contrast, lorazepam ordered for MRI  Ordered for urinalysis, CBC, CK, and CMP  Continue gabapentin 600 mg x1 AM, x1 afternoon, x2 at night (2400 mg total)  Continue Ampyra twice daily  Will not start steroids at this time  Follow up with nephrology and physical therapy as scheduled  Follow up in 2-3 months       Diagnoses and all orders for this visit:    Multiple sclerosis (HCC)       Subjective:    HOWARD Bobo is a 74 year old female who presents for  "MS follow up. She reports that since her last visit on 11/7/2023 she had 4 falls, all on 12/13/2024. Patient states that the 1st fall was when her cane caught on her slipper, the 2nd fall was when the garage door swung open abruptly due to wind causing her to fall forward, the 3rd fall occurred while in the kitchen after turning around too quickly to face her , and she cannot recall the details of the 4th fall. She notes that she has had multiple stressors in her life including her 's recent back surgery on 12/3/2024, her child going to the ED for pneumonia, and upper respiratory infections going around in the family. Patient states that with these stressors she has noticed her left leg drags more, her neuropathy from her left knee to foot has worsened, and at times she has difficulty moving her left toes. She informs that she continues to take gabapentin 600 mg AM, 600 mg afternoon, and 1200 mg at night, and Ampyra 10 mg twice daily.     She reports that since her last visit she has not had difficulty swallowing solids or liquids. Patient notes that she completed physical therapy for her upper extremities and began therapy for her lower extremities which she put on hold 5/2/2024 as she did not feel that she was improving.     Patient informs that she had her right hip replaced in 2012, a matthew in her left femur, and a left arm wedge after radial fracture.     The following portions of the patient's history were reviewed and updated as appropriate: allergies, current medications, past family history, past medical history, past social history, past surgical history, and problem list.    Objective:    Blood pressure 120/86, pulse 73, temperature 97.7 °F (36.5 °C), height 5' 4\" (1.626 m), weight 64.9 kg (143 lb).    Physical Exam  Vitals and nursing note reviewed.   Constitutional:       General: She is not in acute distress.     Appearance: Normal appearance. She is not ill-appearing, toxic-appearing or " diaphoretic.   HENT:      Head: Normocephalic and atraumatic.      Right Ear: Hearing and external ear normal.      Left Ear: Hearing and external ear normal.      Nose: Nose normal.      Mouth/Throat:      Mouth: Mucous membranes are moist.   Eyes:      General: Lids are normal.      Extraocular Movements: Extraocular movements intact.      Pupils: Pupils are equal, round, and reactive to light.   Cardiovascular:      Rate and Rhythm: Normal rate and regular rhythm.      Heart sounds: No murmur heard.  Pulmonary:      Effort: Pulmonary effort is normal. No respiratory distress.      Breath sounds: Normal breath sounds. No wheezing, rhonchi or rales.   Abdominal:      General: Bowel sounds are normal.      Tenderness: There is no abdominal tenderness.   Musculoskeletal:      Cervical back: Neck supple.      Right lower leg: No edema.      Left lower leg: No edema.   Skin:     General: Skin is warm and dry.   Neurological:      Mental Status: She is alert.      Deep Tendon Reflexes:      Reflex Scores:       Bicep reflexes are 2+ on the right side and 2+ on the left side.       Brachioradialis reflexes are 2+ on the right side and 2+ on the left side.       Patellar reflexes are 2+ on the right side and 2+ on the left side.  Psychiatric:         Speech: Speech normal.         Neurological Exam  Mental Status  Alert. Speech is normal. Language is fluent with no aphasia.    Cranial Nerves  CN II: Visual fields full to confrontation.  CN III, IV, VI: Extraocular movements intact bilaterally. Normal lids and orbits bilaterally. Pupils equal round and reactive to light bilaterally.  CN V:  Right: Facial sensation is normal.  Left: Facial sensation is normal on the left.  CN VII:  Right: There is no facial weakness.  Left: There is no facial weakness.  CN VIII:  Right: Hearing is normal.  Left: Hearing is normal.  CN IX, X: Palate elevates symmetrically  CN XI:  Right: Sternocleidomastoid strength is normal. Trapezius  strength is normal.  Left: Sternocleidomastoid strength is normal. Trapezius strength is normal.  CN XII: Tongue midline without atrophy or fasciculations.    Motor  Normal muscle bulk throughout. No fasciculations present. Normal muscle tone. No abnormal involuntary movements. No pronator drift.                                             Right                     Left   Shoulder abduction               5                          4  Elbow flexion                         5                          5  Elbow extension                    5                          5  Hip flexion                              5                          3+  Knee flexion                           5                          5  Knee extension                      5                          5  Plantarflexion                         5                          5  Dorsiflexion                            5                          5    Sensory  Light touch is normal in upper and lower extremities.     Reflexes                                            Right                      Left  Brachioradialis                    2+                         2+  Biceps                                 2+                         2+  Patellar                                2+                         2+    Right pathological reflexes: Libra's absent.  Left pathological reflexes: Libra's absent.    Coordination  Right: Finger-to-nose normal. Rapid alternating movement normal.Left: Finger-to-nose normal. Rapid alternating movement normal.    Gait   Unable to rise from chair without using arms.  Short steps, not dragging, multiple steps to turn around.        ROS:    Review of Systems   Constitutional:  Negative for appetite change, fatigue and fever.   HENT: Negative.  Negative for hearing loss, tinnitus, trouble swallowing and voice change.    Eyes: Negative.  Negative for photophobia, pain and visual disturbance.   Respiratory: Negative.  Negative for  shortness of breath.    Cardiovascular: Negative.  Negative for palpitations.   Gastrointestinal: Negative.  Negative for nausea and vomiting.   Endocrine: Negative.  Negative for cold intolerance.   Genitourinary: Negative.  Negative for dysuria, frequency and urgency.   Musculoskeletal:  Negative for back pain, gait problem, myalgias, neck pain and neck stiffness.   Skin: Negative.  Negative for rash.   Allergic/Immunologic: Negative.    Neurological:  Positive for weakness. Negative for dizziness, tremors, seizures, syncope, facial asymmetry, speech difficulty, light-headedness, numbness and headaches.   Hematological: Negative.  Does not bruise/bleed easily.   Psychiatric/Behavioral: Negative.  Negative for confusion, hallucinations and sleep disturbance.    All other systems reviewed and are negative.    I have reviewed and agree with ROS input by NEREYDA Gordon DO  [facility]  Neurology Residency PGY-I

## 2024-05-07 NOTE — TELEPHONE ENCOUNTER
"Patient left message stating her pharmacy now carries a brand of klonopin that she can't take.  It keeps her awake instead of helping her sleep.  The pharmacy is unable to get the brand she wants.  Asking if she needs to wean off the medication.    Called [facility] on [address] and spoke to lead pharmacy technician Marnei CAMERON and discussed above.    Per Marnie, patient has been receiving TEVA brand klonopin for all refills in the past year except for her October 2023 refill which was Solco brand.  The last 90-day refill patient received on 3/27/24 was TEVA brand.  If patient wants Solco, she can try to order but can't guarantee if and when they would get it.  The [facility] controls how much medication they receive and they just received a shipment of klonopin.  Also, it would be an issue with insurance covering the medication since she just received a refill.    Called patient and discussed above.  She understands that she's taken TEVA brand in the past but for some reason, the recent batch of medication is now causing palpitations and anxiety.  Makes her feel \"hyped-up\".  She understands that even if [facility] can get Solco brand in stock, insurance won't pay for it since it's too soon for a refill.  She wants to wean off the medication regardless.  She already decreased her dose by 1/2 tablet starting last night.      Patient also stating she started CBT-I.  The woman who contacted her made her wait over 30 minutes for her appointment, asked her to fill out 16 pages of paperwork and then never even looked at it.      Dr. Naylor, please advise if you are agreeable for patient to wean off klonopin.  If so, please advise a weaning schedule. She is scheduled for follow up on 8/16/24.    "

## 2024-05-07 NOTE — TELEPHONE ENCOUNTER
Let pt know I sent order for ativan as pre med for mri to pharmacy. Remind pt to not take klonopin on same day as the ativan.

## 2024-05-08 ENCOUNTER — TELEPHONE (OUTPATIENT)
Age: 74
End: 2024-05-08

## 2024-05-08 NOTE — TELEPHONE ENCOUNTER
Pt called to schedule 1 year f/u on 8/12/24, next avail.  Pt also placed on wait list.  Pt sts that her MD placed UA for pt to complete, which she sts she will have done tomorrow.  Pt sts that she is having urinary frequency and would like to know if she should be seen sooner. Please review and advise.    Pt call back: [phone]

## 2024-05-08 NOTE — TELEPHONE ENCOUNTER
"May 8, 2024  Jihan armendariz  [facility] (supporting Elidia Briceno MD)         5/8/24  8:44 AM  Just a FYI. My test is scheduled for May 24th.   I will have my labs done tomorrow. I only slept from about 11 to 1:20. I could not find a spot for my right leg. Paid attention to what was going on. Pain from knee to foot. Severe shocks in sole. Ankle and toes felt stiff.   Got out of bed and stayed on lazy boy in sunroom and dozed off and on after taking Naproxen.   Thank you for \"hearing \" me yesterday. My progression of my MS is really scaring me.   Thanks   Jihan"

## 2024-05-08 NOTE — TELEPHONE ENCOUNTER
Spoke with pt and advised her of Dr. Briceno's instructions re: Ativan. Pt verbalizes understanding. Says that is why she scheduled the MRI for late in day at 9:30 PM.    Pt says she is not sure what they are going do about the Klonipoin. Says she called Dr. Naylor's office and is awaiting call back from them.     Pt also informs that Urology cannot get her in with Dr. Beavers until August.    Dr. Briceno - Please see [place] message below. Pt sent [facility] message as FYI.

## 2024-05-08 NOTE — TELEPHONE ENCOUNTER
Good afternoon,    If she wishes to wean off of the Klonopin, I would be in agreement with this, particularly if she does continue to pursue CBT-I.  If she is already on 1 tablet (0.5 mg) after decreasing her dose by one half a tablet (she was previously on 0.75 mg, or 1.5 tablets nightly), I recommend she remain on 1 tablet nightly for 1 week, then decrease to 1/2 tablet (0.25 mg) nightly for 1 week, then cease.     Could we please also look into the CBT-I?  Her description of what happened does not sound promising, and I wanted to ensure that this is being performed appropriately.

## 2024-05-08 NOTE — TELEPHONE ENCOUNTER
Please see if you can help pt with getting a fit in with urology due to MS and urinary retention. Please speak to manager at office.

## 2024-05-09 ENCOUNTER — APPOINTMENT (OUTPATIENT)
Dept: LAB | Facility: CLINIC | Age: 74
End: 2024-05-09
Payer: MEDICARE

## 2024-05-09 DIAGNOSIS — G35 MULTIPLE SCLEROSIS (HCC): ICD-10-CM

## 2024-05-09 LAB
ALBUMIN SERPL BCP-MCNC: 4.5 G/DL (ref 3.5–5)
ALP SERPL-CCNC: 70 U/L (ref 34–104)
ALT SERPL W P-5'-P-CCNC: 27 U/L (ref 7–52)
ANION GAP SERPL CALCULATED.3IONS-SCNC: 6 MMOL/L (ref 4–13)
AST SERPL W P-5'-P-CCNC: 17 U/L (ref 13–39)
BASOPHILS # BLD AUTO: 0.08 THOUSANDS/ÂΜL (ref 0–0.1)
BASOPHILS NFR BLD AUTO: 2 % (ref 0–1)
BILIRUB SERPL-MCNC: 0.65 MG/DL (ref 0.2–1)
BILIRUB UR QL STRIP: NEGATIVE
BUN SERPL-MCNC: 20 MG/DL (ref 5–25)
CALCIUM SERPL-MCNC: 9.2 MG/DL (ref 8.4–10.2)
CHLORIDE SERPL-SCNC: 108 MMOL/L (ref 96–108)
CK SERPL-CCNC: 127 U/L (ref 26–192)
CLARITY UR: CLEAR
CO2 SERPL-SCNC: 26 MMOL/L (ref 21–32)
COLOR UR: COLORLESS
CREAT SERPL-MCNC: 0.85 MG/DL (ref 0.6–1.3)
EOSINOPHIL # BLD AUTO: 0.39 THOUSAND/ÂΜL (ref 0–0.61)
EOSINOPHIL NFR BLD AUTO: 8 % (ref 0–6)
ERYTHROCYTE [DISTWIDTH] IN BLOOD BY AUTOMATED COUNT: 12.6 % (ref 11.6–15.1)
GFR SERPL CREATININE-BSD FRML MDRD: 67 ML/MIN/1.73SQ M
GLUCOSE P FAST SERPL-MCNC: 87 MG/DL (ref 65–99)
GLUCOSE UR STRIP-MCNC: NEGATIVE MG/DL
HCT VFR BLD AUTO: 42.4 % (ref 34.8–46.1)
HGB BLD-MCNC: 13.7 G/DL (ref 11.5–15.4)
HGB UR QL STRIP.AUTO: NEGATIVE
IMM GRANULOCYTES # BLD AUTO: 0.03 THOUSAND/UL (ref 0–0.2)
IMM GRANULOCYTES NFR BLD AUTO: 1 % (ref 0–2)
KETONES UR STRIP-MCNC: NEGATIVE MG/DL
LEUKOCYTE ESTERASE UR QL STRIP: NEGATIVE
LYMPHOCYTES # BLD AUTO: 1.56 THOUSANDS/ÂΜL (ref 0.6–4.47)
LYMPHOCYTES NFR BLD AUTO: 30 % (ref 14–44)
MCH RBC QN AUTO: 30.9 PG (ref 26.8–34.3)
MCHC RBC AUTO-ENTMCNC: 32.3 G/DL (ref 31.4–37.4)
MCV RBC AUTO: 96 FL (ref 82–98)
MONOCYTES # BLD AUTO: 0.39 THOUSAND/ÂΜL (ref 0.17–1.22)
MONOCYTES NFR BLD AUTO: 8 % (ref 4–12)
NEUTROPHILS # BLD AUTO: 2.68 THOUSANDS/ÂΜL (ref 1.85–7.62)
NEUTS SEG NFR BLD AUTO: 51 % (ref 43–75)
NITRITE UR QL STRIP: NEGATIVE
NRBC BLD AUTO-RTO: 0 /100 WBCS
PH UR STRIP.AUTO: 7.5 [PH]
PLATELET # BLD AUTO: 242 THOUSANDS/UL (ref 149–390)
PMV BLD AUTO: 10.2 FL (ref 8.9–12.7)
POTASSIUM SERPL-SCNC: 3.8 MMOL/L (ref 3.5–5.3)
PROT SERPL-MCNC: 7.2 G/DL (ref 6.4–8.4)
PROT UR STRIP-MCNC: NEGATIVE MG/DL
RBC # BLD AUTO: 4.43 MILLION/UL (ref 3.81–5.12)
SODIUM SERPL-SCNC: 140 MMOL/L (ref 135–147)
SP GR UR STRIP.AUTO: 1.01 (ref 1–1.03)
UROBILINOGEN UR STRIP-ACNC: <2 MG/DL
WBC # BLD AUTO: 5.13 THOUSAND/UL (ref 4.31–10.16)

## 2024-05-09 PROCEDURE — 80053 COMPREHEN METABOLIC PANEL: CPT

## 2024-05-09 PROCEDURE — 36415 COLL VENOUS BLD VENIPUNCTURE: CPT

## 2024-05-09 PROCEDURE — 81003 URINALYSIS AUTO W/O SCOPE: CPT

## 2024-05-09 PROCEDURE — 85025 COMPLETE CBC W/AUTO DIFF WBC: CPT

## 2024-05-09 PROCEDURE — 82550 ASSAY OF CK (CPK): CPT

## 2024-05-09 NOTE — TELEPHONE ENCOUNTER
Clerical - please see Dr. Briceno note below and assist in getting pt scheduled w/Urology asap. Thank you!

## 2024-05-10 ENCOUNTER — PATIENT MESSAGE (OUTPATIENT)
Dept: SLEEP CENTER | Facility: CLINIC | Age: 74
End: 2024-05-10

## 2024-05-10 ENCOUNTER — TELEPHONE (OUTPATIENT)
Dept: NEUROLOGY | Facility: CLINIC | Age: 74
End: 2024-05-10

## 2024-05-10 NOTE — TELEPHONE ENCOUNTER
Let pt know urine analysis good.  No culture due to normal UA.  Send cbc diff to pcp. Have pt follow up with pcp for abn eos and basos.

## 2024-05-10 NOTE — TELEPHONE ENCOUNTER
"----- Message from Jaclyn Warner RN sent at 5/10/2024  4:53 PM EDT -----  Regarding: FW: Labs  Contact: [phone]  Dr. Briceno - sherry  ----- Message -----  From: Jaclyn Newell  Sent: 5/9/2024   2:51 PM EDT  To: [facility] 3  Subject: FW: Labs                                         Please review and advise, thank you  ----- Message -----  From: Genie Haddad  Sent: 5/9/2024   2:27 PM EDT  To: Jaclyn Newell  Subject: Labs                                             Sherry        ----- Message -----  From: Jacy Rodrigez \"Jihan\"  Sent: 5/9/2024   2:00 PM EDT  To: [facility]  Subject: Labs                                             Two of the reports are back. UA ok. Culture not back. Eos and basos are high. My seasonal allergies have been rampant right now and haven't really stopped totally all year.   Thanks   Jihan"

## 2024-05-10 NOTE — TELEPHONE ENCOUNTER
Called patient to discuss klonopin titration and left message advising I will send a MyChart message with details of medication titration.  Provided nurse line number to call if any questions.

## 2024-05-17 NOTE — TELEPHONE ENCOUNTER
May 17, 2024  Me   to Jihan Rodrigez CB      5/17/24  5:38 PM  Dr. Janak lLamas reviewed your results. Your urine analysis was normal. A urine culture was not performed as the UA (urine analysis) was normal.      Please follow up with your PCP regarding your elevated eosinophils and basophils. We have sent the lab results to Dr. Jonas.      Have a wonderful weekend!!     Barby ANTONIO

## 2024-05-24 ENCOUNTER — HOSPITAL ENCOUNTER (OUTPATIENT)
Dept: MRI IMAGING | Facility: HOSPITAL | Age: 74
Discharge: HOME | End: 2024-05-24
Attending: PSYCHIATRY & NEUROLOGY
Payer: MEDICARE

## 2024-05-24 DIAGNOSIS — G35 MULTIPLE SCLEROSIS (HCC): ICD-10-CM

## 2024-05-24 PROCEDURE — 70551 MRI BRAIN STEM W/O DYE: CPT

## 2024-05-29 ENCOUNTER — APPOINTMENT (OUTPATIENT)
Dept: LAB | Facility: CLINIC | Age: 74
End: 2024-05-29
Payer: MEDICARE

## 2024-05-31 ENCOUNTER — TELEPHONE (OUTPATIENT)
Dept: NEUROLOGY | Facility: CLINIC | Age: 74
End: 2024-05-31

## 2024-05-31 NOTE — TELEPHONE ENCOUNTER
st)May 30, 2024  Jihan GOFF [facility] (supporting Elidia Briceno MD)     5/30/24  7:41 AM  Dr Briceno I wanted to let you know that I am having consistent 7+ level pain in my rll especially at night when I'm finally sitting down for the evening. It interferes with trying to fall asleep.   Dr Naylor again extended my follow up visit till September now. That is six months after my initial f/u.   I see nephrology in two weeks. My GFR is up to 69.  Do you have any suggestions for my leg pain as nephrology prefers me not to take any NAIDS. I will take them sparingly.   Thank you   Jihan

## 2024-05-31 NOTE — TELEPHONE ENCOUNTER
Pt did not have right leg pain when we saw her.  Could this be orthopedic?  Recommend speaking to her pcp about meds to help with pain/ sleep.  Also see about cancellation list with dr milan.

## 2024-06-02 DIAGNOSIS — I10 ESSENTIAL HYPERTENSION: ICD-10-CM

## 2024-06-03 RX ORDER — LISINOPRIL 5 MG/1
2.5 TABLET ORAL DAILY
Qty: 15 TABLET | Refills: 0 | Status: SHIPPED | OUTPATIENT
Start: 2024-06-03

## 2024-06-03 RX ORDER — AMOXICILLIN 500 MG/1
TABLET, FILM COATED ORAL
COMMUNITY
Start: 2024-03-11

## 2024-06-03 RX ORDER — LISINOPRIL 5 MG/1
2.5 TABLET ORAL DAILY
Qty: 45 TABLET | Refills: 1 | Status: SHIPPED | OUTPATIENT
Start: 2024-06-03 | End: 2024-06-03 | Stop reason: CLARIF

## 2024-06-03 NOTE — TELEPHONE ENCOUNTER
Patient is overdue for follow-up appointment for a blood pressure recheck.  I have sent a courtesy 30 day refill.     Additionally, her PCP listed in Epic as of 2/12/24 is not a provider in our practice.  Please call the patient to ask if she has switched to another office, and if so that office will need to complete refills in the future.  If she wishes to continue to follow with our practice, she needs an appointment within the next 30 days.    Constance Delgado, DO  
Statement Selected

## 2024-06-07 NOTE — TELEPHONE ENCOUNTER
Call placed to patient. Informed patient of Dr. Briceno's message below. Pt stated that she has her Nephrology appt on Tuesday. Stated she will discuss with Nephrology on Tuesday. Stated she has been trying OTC cream with no relief and has been utilizing a rice bag that she heats up. No further questions at this time. Will contact the office next week after her appt with updates if needed.

## 2024-06-11 ENCOUNTER — OFFICE VISIT (OUTPATIENT)
Dept: NEPHROLOGY | Facility: CLINIC | Age: 74
End: 2024-06-11
Payer: MEDICARE

## 2024-06-11 VITALS
SYSTOLIC BLOOD PRESSURE: 128 MMHG | HEIGHT: 64 IN | WEIGHT: 141 LBS | BODY MASS INDEX: 24.07 KG/M2 | DIASTOLIC BLOOD PRESSURE: 78 MMHG | HEART RATE: 72 BPM

## 2024-06-11 DIAGNOSIS — I12.9 BENIGN HYPERTENSION WITH CKD (CHRONIC KIDNEY DISEASE), STAGE II: Primary | ICD-10-CM

## 2024-06-11 DIAGNOSIS — N18.2 BENIGN HYPERTENSION WITH CKD (CHRONIC KIDNEY DISEASE), STAGE II: Primary | ICD-10-CM

## 2024-06-11 DIAGNOSIS — N28.1 RENAL CYST: ICD-10-CM

## 2024-06-11 PROBLEM — N18.31 STAGE 3A CHRONIC KIDNEY DISEASE (HCC): Status: ACTIVE | Noted: 2024-06-11

## 2024-06-11 PROBLEM — N18.31 STAGE 3A CHRONIC KIDNEY DISEASE (HCC): Status: RESOLVED | Noted: 2024-06-11 | Resolved: 2024-06-11

## 2024-06-11 PROBLEM — R94.4 ABNORMAL RENAL FUNCTION TEST: Status: RESOLVED | Noted: 2023-12-05 | Resolved: 2024-06-11

## 2024-06-11 PROCEDURE — 99214 OFFICE O/P EST MOD 30 MIN: CPT | Performed by: INTERNAL MEDICINE

## 2024-06-11 NOTE — PROGRESS NOTES
NEPHROLOGY OFFICE VISIT   Jacy Rodrigez 74 y.o. female MRN: [id number]  6/11/2024    Reason for Visit: Chronic kidney disease stage II    History of Present Illness (HPI):    I had the pleasure of seeing Jihan today in the renal clinic for the continued management of chronic kidney disease stage II and hypertension. Since our last visit, there has been no ER visits or hospitilizations.  She currently has no complaints at this time and is feeling well. Patient denies any chest pain, shortness of breath and swelling. The last blood work was done on May 29 and May 9 which included a CMP, Cystatin C, microalbumin/creatinine ratio, which we have reviewed together.    I spoken to her about her renal ultrasound results back on January 22 which showed a lesion in the left kidney which cannot rule out a mass so we subsequently ordered an MRI for better imaging for which I gave her Ativan prior to the MRI for anxiety.    Her MRI showed no renal mass.  She had a benign slightly complicated 70 mm left renal cyst containing some internal layering hemorrhage debris.  I personally reviewed the MRI myself.  I offered her a repeat ultrasound next year though I do not suspect this to be any major concern she would like to hold off on repeating the imaging.    The ultrasound did confirm some bilateral echogenicity consistent with underlying chronic kidney disease.    Her creatinine and Cystatin C EGFR is greater than 60 mill per minute consistent with stage II chronic kidney disease.  Cystatin C was 1.04 with a GFR of 65.  Sodium is normal at 140 with a normal potassium 4.2.  Creatinine 0.8 with a GFR greater than 60 mill per minute.    No evidence of proteinuria on the micral/creatinine ratio.    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  MRI of the abdomen showed a benign slightly complicated renal cyst.  No masses.  Both ultrasound and MRI personally reviewed by myself.  Offered repeat ultrasound for a year but she declined and I suspect this will likely not cause any major issue.  --Cystatin C 1.04 with a GFR 65 mill per minute.  Combined creatinine and Cystatin C GFR is greater than 60 mill per minute  --Continue RAAS blockade with lisinopril.     Hypertension  --Target blood pressure less than 140/80  --Blood pressure at target and euvolemic  -- Former smoker  -- Continue lisinopril 2.5 mg daily     Former smoker  -- 1 pack/day for 20+ years     Multiple sclerosis     History of breast cancer  -- Status post left mastectomy, need to check blood pressure in the right arm  -- No chemotherapy    Renal cyst  -- Benign with no renal masses.      PATIENT INSTRUCTIONS:    Patient Instructions   1.)  Low 2 g sodium diet    2.)  Monitor weights at home    3.)  Avoid NSAIDs (ibuprofen, Motrin, Advil, Aleve, naproxen)    4.)  Monitor blood pressure at home, call if blood pressure greater than 150/90 persistently    5.) I will plan to discuss all results including blood work, and/or imaging at our next visit, unless there is an urgent indication, in which case I will call you earlier. If you have any questions or concerns about your results, please feel free to call our office.            Orders Placed This Encounter   Procedures    Albumin / creatinine urine ratio     Standing Status:   Future     Standing Expiration Date:   6/11/2025    Comprehensive metabolic panel     This is a patient instruction: Patient fasting for 8 hours or longer recommended.     Standing Status:   Future     Standing Expiration Date:  "  6/11/2025       OBJECTIVE:  Current Weight: Weight - Scale: 64 kg (141 lb)  Vitals:    06/11/24 1425 06/11/24 1442   BP:  128/78   Pulse:  72   Weight: 64 kg (141 lb)    Height: 5' 4\" (1.626 m)     Body mass index is 24.2 kg/m².      REVIEW OF SYSTEMS:    Review of Systems   Constitutional:  Negative for activity change and fever.   Respiratory:  Negative for cough, chest tightness, shortness of breath and wheezing.    Cardiovascular:  Negative for chest pain and leg swelling.   Gastrointestinal:  Negative for abdominal pain, diarrhea, nausea and vomiting.   Endocrine: Negative for polyuria.   Genitourinary:  Negative for difficulty urinating, dysuria, flank pain, frequency and urgency.   Skin:  Negative for rash.   Neurological:  Negative for dizziness, syncope, light-headedness and headaches.       PHYSICAL EXAM:      Physical Exam  Vitals and nursing note reviewed.   Constitutional:       General: She is not in acute distress.     Appearance: She is well-developed.   HENT:      Head: Normocephalic and atraumatic.   Eyes:      General: No scleral icterus.     Conjunctiva/sclera: Conjunctivae normal.      Pupils: Pupils are equal, round, and reactive to light.   Cardiovascular:      Rate and Rhythm: Normal rate and regular rhythm.      Heart sounds: S1 normal and S2 normal. No murmur heard.     No friction rub. No gallop.   Pulmonary:      Effort: Pulmonary effort is normal. No respiratory distress.      Breath sounds: Normal breath sounds. No wheezing or rales.   Abdominal:      General: Bowel sounds are normal.      Palpations: Abdomen is soft.      Tenderness: There is no abdominal tenderness. There is no rebound.   Musculoskeletal:         General: Normal range of motion.      Cervical back: Normal range of motion and neck supple.   Skin:     Findings: No rash.   Neurological:      Mental Status: She is alert and oriented to person, place, and time.   Psychiatric:         Behavior: Behavior normal. " "        Medications:    Current Outpatient Medications:     ALPHA LIPOIC ACID PO, Take by mouth, Disp: , Rfl:     Ampyra 10 MG TB12, 1 tab po bid, Disp: 180 tablet, Rfl: 3    ascorbic acid (VITAMIN C) 500 mg tablet, Take 500 mg by mouth daily, Disp: , Rfl:     baclofen 10 mg tablet, 1 tab pos q am, 1 tab po q pm and 2 tabs hs, Disp: 360 tablet, Rfl: 0    Biotin 5000 MCG TABS, , Disp: , Rfl:     clonazePAM (KlonoPIN) 0.5 mg tablet, TAKE 1 AND 1/2 TABLETS BY MOUTH DAILY AT BEDTIME, Disp: 135 tablet, Rfl: 1    Cranberry 125 MG TABS, Take 125 mg by mouth in the morning, Disp: , Rfl:     cycloSPORINE (RESTASIS) 0.05 % ophthalmic emulsion, Administer 1 drop to both eyes if needed, Disp: , Rfl:     denosumab (PROLIA) 60 mg/mL, Inject under the skin, Disp: , Rfl:     gabapentin (NEURONTIN) 600 MG tablet, Take one tablet in the AM, 1 tabs in the afternoon  and 2 tabs at bedtime, Disp: 360 tablet, Rfl: 3    lisinopril (ZESTRIL) 5 mg tablet, Take 0.5 tablets (2.5 mg total) by mouth daily, Disp: 15 tablet, Rfl: 0    Magnesium 500 MG TABS, Take 500 tablets by mouth once, Disp: , Rfl:     zonisamide (ZONEGRAN) 100 mg capsule, TAKE 4 CAPSULES DAILY AT   BEDTIME, Disp: 360 capsule, Rfl: 3    amoxicillin (AMOXIL) 500 MG tablet, TAKE 4 TABLETS BY MOUTH 1 HOUR PRIOR TO DENTAL VISIT, Disp: , Rfl:     docusate sodium (COLACE) 100 mg capsule, Take 1 capsule by mouth every other day   (Patient not taking: Reported on 5/7/2024), Disp: , Rfl:     LORazepam (Ativan) 0.5 mg tablet, Take 1 tablet (0.5 mg total) by mouth once in imaging for anxiety for up to 1 dose, Disp: 1 tablet, Rfl: 0    NON FORMULARY, AMPYRA 10 MG Q12 HR (Patient not taking: Reported on 2/8/2024), Disp: , Rfl:     solifenacin (VESICARE) 10 MG tablet, TAKE 1 TABLET DAILY, Disp: 90 tablet, Rfl: 1    Laboratory Results:        Invalid input(s): \"ALBUMIN\"    Results for orders placed or performed in visit on 05/09/24   UA w Reflex to Microscopic w Reflex to Culture    " Specimen: Urine, Clean Catch   Result Value Ref Range    Color, UA Colorless     Clarity, UA Clear     Specific Gravity, UA 1.011 1.003 - 1.030    pH, UA 7.5 4.5, 5.0, 5.5, 6.0, 6.5, 7.0, 7.5, 8.0    Leukocytes, UA Negative Negative    Nitrite, UA Negative Negative    Protein, UA Negative Negative mg/dl    Glucose, UA Negative Negative mg/dl    Ketones, UA Negative Negative mg/dl    Urobilinogen, UA <2.0 <2.0 mg/dl mg/dl    Bilirubin, UA Negative Negative    Occult Blood, UA Negative Negative   CK   Result Value Ref Range    Total  26 - 192 U/L   Comprehensive metabolic panel   Result Value Ref Range    Sodium 140 135 - 147 mmol/L    Potassium 3.8 3.5 - 5.3 mmol/L    Chloride 108 96 - 108 mmol/L    CO2 26 21 - 32 mmol/L    ANION GAP 6 4 - 13 mmol/L    BUN 20 5 - 25 mg/dL    Creatinine 0.85 0.60 - 1.30 mg/dL    Glucose, Fasting 87 65 - 99 mg/dL    Calcium 9.2 8.4 - 10.2 mg/dL    AST 17 13 - 39 U/L    ALT 27 7 - 52 U/L    Alkaline Phosphatase 70 34 - 104 U/L    Total Protein 7.2 6.4 - 8.4 g/dL    Albumin 4.5 3.5 - 5.0 g/dL    Total Bilirubin 0.65 0.20 - 1.00 mg/dL    eGFR 67 ml/min/1.73sq m   CBC and differential   Result Value Ref Range    WBC 5.13 4.31 - 10.16 Thousand/uL    RBC 4.43 3.81 - 5.12 Million/uL    Hemoglobin 13.7 11.5 - 15.4 g/dL    Hematocrit 42.4 34.8 - 46.1 %    MCV 96 82 - 98 fL    MCH 30.9 26.8 - 34.3 pg    MCHC 32.3 31.4 - 37.4 g/dL    RDW 12.6 11.6 - 15.1 %    MPV 10.2 8.9 - 12.7 fL    Platelets 242 149 - 390 Thousands/uL    nRBC 0 /100 WBCs    Segmented % 51 43 - 75 %    Immature Grans % 1 0 - 2 %    Lymphocytes % 30 14 - 44 %    Monocytes % 8 4 - 12 %    Eosinophils Relative 8 (H) 0 - 6 %    Basophils Relative 2 (H) 0 - 1 %    Absolute Neutrophils 2.68 1.85 - 7.62 Thousands/µL    Absolute Immature Grans 0.03 0.00 - 0.20 Thousand/uL    Absolute Lymphocytes 1.56 0.60 - 4.47 Thousands/µL    Absolute Monocytes 0.39 0.17 - 1.22 Thousand/µL    Eosinophils Absolute 0.39 0.00 - 0.61 Thousand/µL     Basophils Absolute 0.08 0.00 - 0.10 Thousands/µL     *Note: Due to a large number of results and/or encounters for the requested time period, some results have not been displayed. A complete set of results can be found in Results Review.

## 2024-06-24 ENCOUNTER — OFFICE VISIT (OUTPATIENT)
Dept: PLASTIC SURGERY | Facility: CLINIC | Age: 74
End: 2024-06-24
Payer: MEDICARE

## 2024-06-24 DIAGNOSIS — Z85.3 HX OF BREAST CANCER: ICD-10-CM

## 2024-06-24 DIAGNOSIS — Z42.1 AFTERCARE POSTMASTECTOMY FOR BREAST RECONSTRUCTION: Primary | ICD-10-CM

## 2024-06-24 PROCEDURE — 99214 OFFICE O/P EST MOD 30 MIN: CPT | Performed by: SURGERY

## 2024-06-24 NOTE — PROGRESS NOTES
Assessment/Plan: Please see HPI.  We discussed the potential to address the (greatly improved) minor breast asymmetry, autologous fat grafting, as well as having her seen by our/bra clinic.  At this point she is happy with the results and content to leave well enough alone.  We did discuss the importance of surveillance MRI to assess the integrity of the left breast implant.  Given that it was placed on January 28, 2020, she will be due for MRI surveillance on or after January 28, 2025.  We have placed a referral for an MRI, she will schedule the appointment, and I will see Jihan in February or March 2025, sooner should the need arise.     Diagnoses and all orders for this visit:    Aftercare postmastectomy for breast reconstruction  -     MRI breast wo con silicone implant eval only left; Future    Hx of breast cancer  -     MRI breast wo con silicone implant eval only left; Future          Subjective: Breast reconstruction     Patient ID: Jacy Rodrigez is a 74 y.o. female.    HPI Jihan presents in follow-up, briefly she had undergone mastectomy and reconstruction elsewhere, in January 202020 I replaced her existing left breast implant with a silicone moderate profile gel implant, we have performed breast mound revisions, fat grafting, right breast mastopexy, etc.  Symmetry is vastly improved, and she remains quite pleased with the results.  We have discussed options for addressing the minor asymmetry, she would prefer to avoid additional procedures and is happy with the results.    Review of Systems   Constitutional:  Negative for chills and fever.   HENT:  Negative for hearing loss.    Eyes:  Positive for visual disturbance. Negative for discharge.   Respiratory:  Negative for chest tightness and shortness of breath.    Cardiovascular:  Negative for chest pain and leg swelling.   Gastrointestinal:  Negative for constipation, diarrhea, nausea and rectal pain.   Genitourinary:  Negative for dysuria.    Musculoskeletal:  Positive for gait problem.   Skin:  Negative for rash.   Allergic/Immunologic: Negative for immunocompromised state.   Neurological:  Negative for seizures and headaches.   Hematological:  Does not bruise/bleed easily.   Psychiatric/Behavioral:  Negative for dysphoric mood. The patient is not nervous/anxious.        Objective:     Physical Exam  Constitutional:       Appearance: Normal appearance.   HENT:      Head: Normocephalic and atraumatic.   Eyes:      Extraocular Movements: Extraocular movements intact.      Pupils: Pupils are equal, round, and reactive to light.   Cardiovascular:      Rate and Rhythm: Normal rate.   Pulmonary:      Effort: Pulmonary effort is normal.   Abdominal:      Palpations: Abdomen is soft.   Musculoskeletal:         General: Normal range of motion.      Cervical back: Normal range of motion.   Skin:     General: Skin is warm.      Comments: Breast examination reveals reconstructed left breast with implant in good position, the breast is soft, supple without evidence of significant capsular contracture, there is the anticipated minor contour deficit/rippling, the right breast demonstrates well-healed mastopexy, the breast is soft, supple without evidence of capsular contracture, the right breast is slightly larger than the left, see photo in media   Neurological:      Mental Status: She is alert and oriented to person, place, and time.   Psychiatric:         Mood and Affect: Mood normal.

## 2024-06-27 ENCOUNTER — HOSPITAL ENCOUNTER (OUTPATIENT)
Dept: RADIOLOGY | Facility: HOSPITAL | Age: 74
Discharge: HOME | End: 2024-06-27
Payer: MEDICARE

## 2024-06-27 ENCOUNTER — HOSPITAL ENCOUNTER (OUTPATIENT)
Dept: VASCULAR ULTRASOUND | Facility: HOSPITAL | Age: 74
Discharge: HOME | End: 2024-06-27
Payer: MEDICARE

## 2024-06-27 ENCOUNTER — TELEPHONE (OUTPATIENT)
Age: 74
End: 2024-06-27

## 2024-06-27 DIAGNOSIS — M25.561 RIGHT KNEE PAIN, UNSPECIFIED CHRONICITY: ICD-10-CM

## 2024-06-27 DIAGNOSIS — R22.41 LOCALIZED SWELLING, MASS AND LUMP, RIGHT LOWER LIMB: ICD-10-CM

## 2024-06-27 PROCEDURE — 73562 X-RAY EXAM OF KNEE 3: CPT

## 2024-06-27 PROCEDURE — 93971 EXTREMITY STUDY: CPT | Performed by: INTERNAL MEDICINE

## 2024-06-27 PROCEDURE — 93971 EXTREMITY STUDY: CPT

## 2024-06-27 NOTE — TELEPHONE ENCOUNTER
Marilyn from [facility] calling re: Ampyra medication, asking if pt can take Generic instead of Brand Name.    Routed to provider to Advise.     Call Marilyn Back at  to advise Provider's decision. Thank you

## 2024-06-27 NOTE — TELEPHONE ENCOUNTER
Ok from my standpoint but I would double check with pt if ok with her and provide notification of pharmacy change

## 2024-06-27 NOTE — TELEPHONE ENCOUNTER
--Called and spoke with patient. Informed patient that her [facility] is requesting patient to have generic Brand of Ampyra. Pt agreeable to the medication switching to generic brand, Dalfampridine. Pt stated that she expected to receive the Ampyra today.  Informed pt that CTS will contact pt's pharmacy to make them aware pt is agreeable to medication change and will check on status of delivery of medication. Pt reported that medication being paid by a ber[facility]ane.    --Pt asked to notify Dr. Briceno that pt is scheduled for an ultrasound to check for DVT. Will sent message to provider.     --Call placed to [facility] and spoke with Val the Pharmacist. Val wanted to verify that medication was okay to change to generic. Informed Val that Dr. Briceno and patient both agreed to change the medication to the generic. Asked Val about pt expected to receive her medication today. Val stated that the pt should be receiving the medication today. Stated that there is 1 more refill left on the medication currently. Pt will receive the Ampyra until end of November when a new Rx for Dalfampridine will need to be sent in to the pharmacy for refill. Thanked Val for her time.     --Called and informed patient that I spoke with the pharmacy and pt will be receiving her medication today and that she has one refill left and then at the end of November a new Rx for the Dalfampridine will need to be requested. Pt verbalized understanding and no further questions at this time.

## 2024-06-29 DIAGNOSIS — G35 MULTIPLE SCLEROSIS (HCC): ICD-10-CM

## 2024-07-01 RX ORDER — BACLOFEN 10 MG/1
TABLET ORAL
Qty: 360 TABLET | Refills: 0 | Status: SHIPPED | OUTPATIENT
Start: 2024-07-01

## 2024-07-08 ENCOUNTER — TELEPHONE (OUTPATIENT)
Dept: NEUROLOGY | Facility: CLINIC | Age: 74
End: 2024-07-08

## 2024-07-08 ENCOUNTER — PATIENT MESSAGE (OUTPATIENT)
Dept: NEUROLOGY | Facility: CLINIC | Age: 74
End: 2024-07-08

## 2024-07-08 NOTE — TELEPHONE ENCOUNTER
Message from provider addressed in patient portal message dated 7/8/24. No further action is required.

## 2024-07-08 NOTE — PATIENT COMMUNICATION
Elidia Briceno MD    7/8/24 12:18 PM  Patient already completed my labs.  No additional at this time

## 2024-07-08 NOTE — PATIENT COMMUNICATION
LOV - 5/7/24  F/U - 7/23/24 w/Dr. Janak Briceno - please advise if you would like any labwork completed prior to pt's f/u appt (see pt msg). Thank you.

## 2024-07-12 ENCOUNTER — TELEPHONE (OUTPATIENT)
Dept: NEUROLOGY | Facility: CLINIC | Age: 74
End: 2024-07-12

## 2024-07-14 DIAGNOSIS — G35 MULTIPLE SCLEROSIS (HCC): ICD-10-CM

## 2024-07-23 ENCOUNTER — OFFICE VISIT (OUTPATIENT)
Dept: NEUROLOGY | Facility: CLINIC | Age: 74
End: 2024-07-23
Payer: MEDICARE

## 2024-07-23 VITALS
WEIGHT: 143 LBS | DIASTOLIC BLOOD PRESSURE: 66 MMHG | HEART RATE: 85 BPM | SYSTOLIC BLOOD PRESSURE: 124 MMHG | HEIGHT: 64 IN | TEMPERATURE: 97.6 F | BODY MASS INDEX: 24.41 KG/M2

## 2024-07-23 DIAGNOSIS — G35 MULTIPLE SCLEROSIS (HCC): Primary | ICD-10-CM

## 2024-07-23 PROCEDURE — 99215 OFFICE O/P EST HI 40 MIN: CPT | Performed by: PSYCHIATRY & NEUROLOGY

## 2024-07-23 RX ORDER — LISINOPRIL 2.5 MG/1
2.5 TABLET ORAL DAILY
COMMUNITY
Start: 2024-06-20

## 2024-07-23 RX ORDER — ZONISAMIDE 100 MG/1
400 CAPSULE ORAL
Qty: 360 CAPSULE | Refills: 3 | Status: SHIPPED | OUTPATIENT
Start: 2024-07-23

## 2024-07-23 RX ORDER — MELOXICAM 7.5 MG/1
7.5 TABLET ORAL DAILY
COMMUNITY
Start: 2024-06-28

## 2024-07-23 NOTE — PROGRESS NOTES
Patient ID: Jacy Rodrigez is a 74 y.o. female.  Attestation:Pt seen with Dr Bronson Ma. Patient seen in the office today for neuro follow-up.  Patient was last seen in May.  Patient here today for MS follow-up.  Since last visit, patient notes no falls or trips.  Patient does feel overall increased fatigue worsening as each day progresses.  Notes that she is currently off physical therapy as she needed a break for the summer.  Patient completed upper extremity therapy and did very well.  Patient will resume starting in the fall.  Patient had updated MRI of the head done since her last visit.  Study was done on May 24, 2024.  Few scattered supra and infratentorial white matter lesions as well as upper cervical cord lesion similar to prior study consistent with MS.  No definite new or progressive demyelinating plaques.  No acute stroke hemorrhage or mass seen.  Study was reviewed in detail with the patient.  Of note, patient's labs from May 29, 2024 GFR was 69 with significant improvement in her renal function.  Patient notes some increased allodynia of the lower extremities bilaterally contributing to pain in her legs especially with touch.  This is sporadic.  Patient notes some days better than others.  Patient is using her cane.  I also strongly encouraged her to use her walker especially with distance.  Patient will be attending a conference near [place] over the next 4 days.  Again I encouraged her to use her walker and or transport chair at times to help with her fatigue.  Patient also has an upcoming appointment with sleep medicine.  I would like patient to also address risk benefits of the use of Provigil.  Also need to exclude any underlying sleep disorders.  Patient had been using cappuccino's which added to palpitations.  Neuroexam remains stable.  However due to the increased allodynia seen this morning, I have recommended patient to use as needed additional 300 mg of gabapentin in addition to her  total of 2400 mg daily.  Patient is to use the additional 300 mg dosing on days where she has increased sensitivity.  Patient is also following up with urology in August.  Agree with resumption of physical therapy come September.  Patient to also discuss the use of Klonopin with Dr. Glen Naylor to see if could also contributory to increased fatigue.  Patient to follow-up in 4 months.  Patient to continue off IMD.  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    Elidia Briceno MD        Assessment/Plan:  This is a 74 years old with history of MS returns for follow-up.  Her recent MRI demonstrated similar demyelinating burden.  Her today's exam demonstrated bilateral symmetric weakness in the lower extremities, allodynia of the right lower leg, and some fine motor deficits on the left compared to the right.  Her gabapentin was previously decreased due to her impaired renal functions, which have normalized.  We have discussed to resume and increase her gabapentin for her worsening neuropathy and allodynia.  Disease modifying drugs were discussed, to consider trial of Provigil depending upon follow up with sleep med. Pt also following up with urology.    - additions of gabapentin 300 mg to her current evening dose.   - Addition of Provigil   - return for follow up        Subjective:    This is a 74 years old with history of MS presented for follow-up.     She presented with complaints of worsening of her prior symptoms, and discussion of possible initiation of disease modifying therapy.  She has had a flare shortly after prior visits, characterized as fatigue and unable to get out of the bed, lasted about a week. Without requirement of hospitalization or steroids therapy.  She characterizes this flareup episode is typical for her MS flares with fatigue and shortness of breath. She also noted swelling of her right knee during the interim, x-ray and echo did not find structural damages or blood clots.    She denies any bowel or  "bladder issues, new motor or sensory deficits, color desaturation, ocular pain with movements, dysphagia, or recent viral illnesses.    She complains of worsening of balance issues, no falls during the interval since her last visit. Lightheaded sensation associated positional change, denies any hearing loss, dysarthria, facial weakness and vertigo.  She currently uses a cane.     Complaints of worsening of neuropathic pain in the bilateral lower extremities, that is constant and exacerbates with touch.     Her recent MRI demonstrated similar demyelinating burden as compared to previously. Currently on Amypra, denies any UTI symptoms, spasticity, abnormal body movements.                     The following portions of the patient's history were reviewed and updated as appropriate: allergies, current medications, past family history, past medical history, past social history, past surgical history, and problem list and med rec and ros rev.         Objective:    Blood pressure 124/66, pulse 85, temperature 97.6 °F (36.4 °C), height 5' 4\" (1.626 m), weight 64.9 kg (143 lb).    Physical Exam  Eyes:      General: Lids are normal.      Extraocular Movements: Extraocular movements intact.      Pupils: Pupils are equal, round, and reactive to light.   Neurological:      Deep Tendon Reflexes:      Reflex Scores:       Brachioradialis reflexes are 2+ on the right side and 2+ on the left side.       Patellar reflexes are 2+ on the right side and 2+ on the left side.  Psychiatric:         Mood and Affect: Mood is depressed. Affect is blunt.         Neurological Exam  Mental Status   Oriented to person, place and time.    Cranial Nerves  CN II: Visual acuity is normal. Visual fields full to confrontation.  CN III, IV, VI: Extraocular movements intact bilaterally. Normal lids and orbits bilaterally. Pupils equal round and reactive to light bilaterally.  CN V: Facial sensation is normal.  CN VII: Full and symmetric facial " movement.  CN VIII: Hearing is normal.  CN IX, X: Palate elevates symmetrically. Normal gag reflex.  CN XI: Shoulder shrug strength is normal.  CN XII: Tongue midline without atrophy or fasciculations.    Motor    Normal exam on the upper extremities.  4/5 in hip flexion, knee flexion/extension     .    Sensory  Decreased sensation to touch from feet to hip  Allodynia in right ankle to the knee.    Reflexes                                            Right                      Left  Brachioradialis                    2+                         2+  Patellar                                2+                         2+    Coordination    Mild fine motor decrement in the left compared to right..        ROS:    Review of Systems   Constitutional:  Positive for fatigue. Negative for appetite change and fever.   HENT: Negative.  Negative for hearing loss, tinnitus, trouble swallowing and voice change.    Eyes: Negative.  Negative for photophobia, pain and visual disturbance.   Respiratory: Negative.  Negative for shortness of breath.    Cardiovascular: Negative.  Negative for palpitations.   Gastrointestinal: Negative.  Negative for nausea and vomiting.   Endocrine: Negative.  Negative for cold intolerance.   Genitourinary: Negative.  Negative for dysuria, frequency and urgency.   Musculoskeletal:  Positive for gait problem. Negative for back pain, myalgias, neck pain and neck stiffness.        Very unsteady, legs very painful R worse then L, No falls. Swollen R worse then L   Skin: Negative.  Negative for rash.   Allergic/Immunologic: Negative.    Neurological:  Negative for dizziness, tremors, seizures, syncope, facial asymmetry, speech difficulty, weakness, light-headedness, numbness and headaches.   Hematological: Negative.  Does not bruise/bleed easily.   Psychiatric/Behavioral: Negative.  Negative for confusion, hallucinations and sleep disturbance.    All other systems reviewed and are negative.

## 2024-07-31 ENCOUNTER — TRANSCRIBE ORDERS (OUTPATIENT)
Dept: LAB | Facility: CLINIC | Age: 74
End: 2024-07-31

## 2024-07-31 ENCOUNTER — APPOINTMENT (OUTPATIENT)
Dept: LAB | Facility: CLINIC | Age: 74
End: 2024-07-31
Payer: MEDICARE

## 2024-07-31 DIAGNOSIS — E67.3 HIGH VITAMIN D LEVEL: ICD-10-CM

## 2024-07-31 DIAGNOSIS — M81.0 SENILE OSTEOPOROSIS: ICD-10-CM

## 2024-07-31 DIAGNOSIS — M81.0 SENILE OSTEOPOROSIS: Primary | ICD-10-CM

## 2024-07-31 LAB
25(OH)D3 SERPL-MCNC: 66.6 NG/ML (ref 30–100)
CALCIUM SERPL-MCNC: 9.4 MG/DL (ref 8.4–10.2)

## 2024-07-31 PROCEDURE — 36415 COLL VENOUS BLD VENIPUNCTURE: CPT

## 2024-07-31 PROCEDURE — 82306 VITAMIN D 25 HYDROXY: CPT

## 2024-08-12 ENCOUNTER — OFFICE VISIT (OUTPATIENT)
Dept: UROLOGY | Facility: AMBULATORY SURGERY CENTER | Age: 74
End: 2024-08-12
Payer: MEDICARE

## 2024-08-12 VITALS — HEIGHT: 64 IN | BODY MASS INDEX: 24.41 KG/M2 | WEIGHT: 143 LBS

## 2024-08-12 DIAGNOSIS — N32.81 OAB (OVERACTIVE BLADDER): Primary | ICD-10-CM

## 2024-08-12 LAB
POST-VOID RESIDUAL VOLUME, ML POC: 13 ML
SL AMB  POCT GLUCOSE, UA: NORMAL
SL AMB LEUKOCYTE ESTERASE,UA: NORMAL
SL AMB POCT BILIRUBIN,UA: NORMAL
SL AMB POCT BLOOD,UA: NORMAL
SL AMB POCT CLARITY,UA: NORMAL
SL AMB POCT COLOR,UA: YELLOW
SL AMB POCT KETONES,UA: NORMAL
SL AMB POCT NITRITE,UA: NORMAL
SL AMB POCT PH,UA: 5
SL AMB POCT SPECIFIC GRAVITY,UA: 1.01
SL AMB POCT URINE PROTEIN: NORMAL
SL AMB POCT UROBILINOGEN: 0.2

## 2024-08-12 PROCEDURE — 99213 OFFICE O/P EST LOW 20 MIN: CPT

## 2024-08-12 PROCEDURE — 81002 URINALYSIS NONAUTO W/O SCOPE: CPT

## 2024-08-12 PROCEDURE — 51798 US URINE CAPACITY MEASURE: CPT

## 2024-08-12 NOTE — ASSESSMENT & PLAN NOTE
PVR performed in the office today found to be 13 mL  Patient previously controlled on Vesicare 10 mg for her lower urinary tract symptoms.  Patient was previously on the medication for the last 15 years, but within the last year started experiencing side effects of urinary retention.  The patient was concerned for her kidney function and therefore stopped the Vesicare.  We discussed more conservative approaches to treating her lower urinary tract symptoms including pelvic floor physical therapy and PTNS.  Additionally, we discussed we could trial another anticholinergic such as trospium chloride.  However, the patient defers pharmacotherapy at this time.  The patient will trial pelvic floor physical therapy and return in the office in 3 to 4 months for a PVR and symptom reassessment.

## 2024-08-12 NOTE — PROGRESS NOTES
8/12/2024      Assessment and Plan    74 y.o. female managed by our office    OAB (overactive bladder)  PVR performed in the office today found to be 13 mL  Patient previously controlled on Vesicare 10 mg for her lower urinary tract symptoms.  Patient was previously on the medication for the last 15 years, but within the last year started experiencing side effects of urinary retention.  The patient was concerned for her kidney function and therefore stopped the Vesicare.  We discussed more conservative approaches to treating her lower urinary tract symptoms including pelvic floor physical therapy and PTNS.  Additionally, we discussed we could trial another anticholinergic such as trospium chloride.  However, the patient defers pharmacotherapy at this time.  The patient will trial pelvic floor physical therapy and return in the office in 3 to 4 months for a PVR and symptom reassessment.        History of Present Illness  Jacy Rodrigez is a 74 y.o. female here for evaluation of urinary urgency and history of nephrolithiasis.  Patient was last seen via telehealth 2/21/2023.  Patient has urinary urgency secondary to multiple sclerosis.  Patient's lower urinary tract symptoms previously well-controlled on Vesicare 10 mg.  Today, the patient reports that she stopped taking the Vesicare due to not being able to go to the bathroom while on the medication.  The patient states that she started to develop worsening kidney function which was recorded on 11/29/2023 with a GFR of 49 and a creatinine of 1.11.  The patient notes that since stopping the medication her renal function has improved and she has been voiding better.  However, her symptoms of urinary urgency and occasional urinary incontinence have returned.  A PVR was performed in the office today and found to be 13 mL.  Otherwise, the patient denies any worsening urinary frequency, dysuria, hematuria, flank pain, or feelings of incomplete bladder  "emptying.        Review of Systems   Constitutional:  Negative for chills and fever.   HENT:  Negative for ear pain and sore throat.    Eyes:  Negative for pain and visual disturbance.   Respiratory:  Negative for cough and shortness of breath.    Cardiovascular:  Negative for chest pain and palpitations.   Gastrointestinal:  Negative for abdominal pain and vomiting.   Genitourinary:  Positive for urgency. Negative for difficulty urinating, dysuria, flank pain, frequency, hematuria and pelvic pain.        Intermittent urinary incontinence   Musculoskeletal:  Negative for arthralgias and back pain.   Skin:  Negative for color change and rash.   Neurological:  Negative for seizures and syncope.   All other systems reviewed and are negative.               Vitals  Vitals:    08/12/24 1453   Weight: 64.9 kg (143 lb)   Height: 5' 4\" (1.626 m)       Physical Exam  Vitals reviewed.   Constitutional:       General: She is not in acute distress.     Appearance: Normal appearance. She is not ill-appearing.   HENT:      Head: Normocephalic and atraumatic.      Nose: Nose normal.   Eyes:      General: No scleral icterus.  Pulmonary:      Effort: No respiratory distress.   Abdominal:      General: Abdomen is flat. There is no distension.      Palpations: Abdomen is soft.      Tenderness: There is no abdominal tenderness.   Musculoskeletal:         General: Normal range of motion.      Cervical back: Normal range of motion.   Skin:     Coloration: Skin is not jaundiced.   Neurological:      Mental Status: She is alert and oriented to person, place, and time.      Gait: Gait abnormal (Uses cane).   Psychiatric:         Mood and Affect: Mood normal.         Behavior: Behavior normal.           Past History  Past Medical History:   Diagnosis Date    Allergic 1967    seasonal    Allergic rhinitis     Bone pain     Breast cancer (HCC) 08/16/2012    left    Breast ptosis     Depression     Fatigue     Fx. left wrist, closed, initial " encounter 2014    Gait disturbance     uses cane at times    Headache     Hip fracture (HCC)     Hip pain     Hip pain, right 2012    Laterality: Right    History of transfusion 1975    placenta previa s/p work accident, no rx    Hypokalemia     Insomnia     Laceration of finger     right hand    Left ankle pain     Left knee pain     Multiple sclerosis (HCC)     Muscle strain     left lower leg    Nephrolithiasis     Osteopenia     Osteoporosis     Pain of left calf     Pain of left lower leg 2017    Pneumonia     Rash     Scoliosis birth    scoliosis    Solitary pulmonary nodule     SVT (supraventricular tachycardia)     Tingling     Urgency of urination     Urticaria     Wrist fracture, left      Social History     Socioeconomic History    Marital status: /Civil Union     Spouse name: None    Number of children: 2    Years of education: Completed bachelor's degree    Highest education level: None   Occupational History    Occupation: RN     Comment: Retired   Tobacco Use    Smoking status: Former     Current packs/day: 0.00     Average packs/day: 1 pack/day for 35.0 years (35.0 ttl pk-yrs)     Types: Cigarettes     Start date:      Quit date:      Years since quittin.6     Passive exposure: Never    Smokeless tobacco: Never   Vaping Use    Vaping status: Never Used   Substance and Sexual Activity    Alcohol use: No    Drug use: No    Sexual activity: Yes     Partners: Male     Birth control/protection: Post-menopausal   Other Topics Concern    None   Social History Narrative    Denied:  History of caffeine use     Social Determinants of Health     Financial Resource Strain: Patient Declined (10/19/2023)    Overall Financial Resource Strain (CARDIA)     Difficulty of Paying Living Expenses: Patient declined   Food Insecurity: Not on file   Transportation Needs: Patient Declined (10/19/2023)    PRAPARE - Transportation     Lack of Transportation (Medical): Patient declined      Lack of Transportation (Non-Medical): Patient declined   Physical Activity: Not on file   Stress: Not on file   Social Connections: Not on file   Intimate Partner Violence: Not on file   Housing Stability: Not on file     Social History     Tobacco Use   Smoking Status Former    Current packs/day: 0.00    Average packs/day: 1 pack/day for 35.0 years (35.0 ttl pk-yrs)    Types: Cigarettes    Start date:     Quit date:     Years since quittin.6    Passive exposure: Never   Smokeless Tobacco Never     Family History   Problem Relation Age of Onset    Coronary artery disease Mother     Hyperlipidemia Mother     Rheum arthritis Mother     Basal cell carcinoma Mother     Other Father         Acute myocardial infarction    Cancer Maternal Aunt 70        bladder    Heart disease Maternal Aunt     Stroke Maternal Aunt         6 CVA before death    Breast cancer Maternal Aunt     Colon cancer Maternal Uncle 65    Hodgkin's lymphoma Maternal Uncle 70    No Known Problems Paternal Aunt     No Known Problems Paternal Aunt     No Known Problems Paternal Aunt     No Known Problems Maternal Grandmother     Hodgkin's lymphoma Maternal Grandfather         age at dx unk    No Known Problems Paternal Grandmother     No Known Problems Paternal Grandfather     Stroke Son 49    No Known Problems Son        The following portions of the patient's history were reviewed and updated as appropriate: allergies, current medications, past medical history, past social history, past surgical history and problem list.    Results  Recent Results (from the past 1 hour(s))   POCT urine dip    Collection Time: 24  3:07 PM   Result Value Ref Range    LEUKOCYTE ESTERASE,UA moderate     NITRITE,UA -     SL AMB POCT UROBILINOGEN 0.2     POCT URINE PROTEIN trace      PH,UA 5.0     BLOOD,UA -     SPECIFIC GRAVITY,UA 1.015     KETONES,UA small     BILIRUBIN,UA -     GLUCOSE, UA -      COLOR,UA yellow     CLARITY,UA cloudy    POCT Measure PVR  "   Collection Time: 08/12/24  3:09 PM   Result Value Ref Range    POST-VOID RESIDUAL VOLUME, ML POC 13 mL   ]  No results found for: \"PSA\"  Lab Results   Component Value Date    GLUCOSE 95 01/08/2015    CALCIUM 9.4 07/31/2024     04/04/2017    K 4.2 05/29/2024    CO2 23 05/29/2024     05/29/2024    BUN 11 05/29/2024    CREATININE 0.83 05/29/2024     Lab Results   Component Value Date    WBC 5.13 05/09/2024    HGB 13.7 05/09/2024    HCT 42.4 05/09/2024    MCV 96 05/09/2024     05/09/2024      "

## 2024-08-13 ENCOUNTER — HOSPITAL ENCOUNTER (OUTPATIENT)
Dept: VASCULAR ULTRASOUND | Facility: HOSPITAL | Age: 74
Discharge: HOME | End: 2024-08-13
Payer: MEDICARE

## 2024-08-13 DIAGNOSIS — M79.604 RIGHT LEG PAIN: ICD-10-CM

## 2024-08-13 PROCEDURE — 93925 LOWER EXTREMITY STUDY: CPT | Performed by: SURGERY

## 2024-08-13 PROCEDURE — 93925 LOWER EXTREMITY STUDY: CPT

## 2024-08-13 PROCEDURE — 93922 UPR/L XTREMITY ART 2 LEVELS: CPT | Performed by: SURGERY

## 2024-08-13 PROCEDURE — 93923 UPR/LXTR ART STDY 3+ LVLS: CPT

## 2024-08-18 ENCOUNTER — TELEPHONE (OUTPATIENT)
Dept: NEUROLOGY | Facility: CLINIC | Age: 74
End: 2024-08-18

## 2024-08-18 DIAGNOSIS — G35 MULTIPLE SCLEROSIS (HCC): Primary | ICD-10-CM

## 2024-08-18 DIAGNOSIS — G62.9 PERIPHERAL POLYNEUROPATHY: ICD-10-CM

## 2024-08-19 NOTE — TELEPHONE ENCOUNTER
Called patient to make aware of order, she thanked me for the call  
Let pt know rx for PT in emr  
pt noted little bump on her bottom of feet. no swelling noted. pt c/o itchiness. denies fever. pt is alert awake and orientedx3. no pmh, IUTD. apical HR auscultated.

## 2024-08-26 NOTE — PROGRESS NOTES
PT Evaluation     Today's date: 2024  Patient name: Jacy Rodrigez  : 1950  MRN: [id number]  Referring provider: Elidia Briceno MD  Dx:   Encounter Diagnosis     ICD-10-CM    1. Multiple sclerosis (HCC)  G35 Ambulatory Referral to Physical Therapy      2. Peripheral polyneuropathy  G62.9 Ambulatory Referral to Physical Therapy      3. 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 Risk for falls  Z91.81           Start Time: 0900  Stop Time: 945  Total time in clinic (min): 45 minutes    Assessment  Impairments: abnormal coordination, abnormal gait, abnormal or restricted ROM, abnormal movement, difficulty understanding, impaired balance, impaired physical strength, lacks appropriate home exercise program and poor posture     Assessment details: Jacy Rodrigez is a 74 y.o. female who presents with signs and symptoms consistent with progressive multiple sclerosis resulting in LE weakness, fatigue, ambulation dysfunction, and balance impairment. Pt also experiencing allodynia of bilateral LE, seemingly neuropathic in nature. Pt does present with altered neurodynamics with positive slump and SLR. Pt is currently ambulating with a SPC. Her gait sylvia is slow along with step length.  Patient presents with weakness in LE, pain in bilateral LE, deconditioning, poor static and dynamic balance. Due to these impairments, Patient has difficulty ambulating for distance, performing sit to stand transfers, negotiating stairs, performing household ADLs, and is at increased risk of falls. Patient would benefit from skilled physical therapy to address the impairments, improve their level of function, and to improve their overall quality of life.  Understanding of Dx/Px/POC: good     Prognosis: good    Goals  Short Term Goals: to be achieved by 4 weeks  1) Patient to be independent with basic HEP.  2) Decrease pain to moderate at its worst.  3) Increase SLR ROM by 5-10 degrees   4) Increase LE strength by 1/2 MMT  "grade in all deficient planes.    Long Term Goals: to be achieved by discharge  1) FOTO equal to or greater than expected.  2) Ambulation to improve to maximal level of function  3) Improve TUG by 5 seconds  4) Sit to stand transfers will improve to maximal level of function   5) Improve 5x sit to stand by 5 seconds    Plan  Patient would benefit from: PT eval and skilled physical therapy    Planned therapy interventions: manual therapy, neuromuscular re-education, patient education, strengthening, therapeutic activities, therapeutic exercise, transfer training, home exercise program and functional ROM exercises    Frequency: 2x per week for 8-12 weeks.  Treatment plan discussed with: patient        Subjective Evaluation    History of Present Illness  Mechanism of injury: History of Current Injury: Pt referred to PT secondary to progressive MS and allodynia in bilateral LE. She was treated by me last year through April of 2024. Her lower extremity symptoms were worsening at that time along with weakness throughout antigravity muscles. Pt notes that her strength, fatigue, and pain continue to worsen.  Due to increasing lower extremity pain, patient received US of bilateral LE and increased Gabapentin dosage. No evidence of significant lower extremity arterial occlusive disease on US of bilateral LE. XR of right knee was negative for osseous pathology. Pt reports frequent 4+ pitting edema in bilateral LE. Pt reports compression stockings don't seem to help her.     Pt reports a walking tolerance of max 5 minutes.     Pt has steps down to the basement but has single level living. Pt has 1-2, 3\" steps to enter her house.   Pain location/Descriptors: Severe LE pain from knees to feet (anterior and posterior). + cramping. R worse than L/   Aggravating factors: Constantly present (24/7) sometimes worse than others.   Easing factors: Rest, gabapentin  24 HR pattern: Worse that night time.   XR of R knee: normal   Special " Questions: Positive for peripheral neuropathy  Patient goals:  Reduce unsteadiness, improve LE strength, increase walking and endurance.   Hobbies/Interest: Volunteers as , cooking, cleaning    Occupation: Retired       Pain  Pain scale at highest: Severe.      Diagnostic Tests  Ultrasound findings: normal      Objective     Strength/Myotome Testing     Left Hip   Planes of Motion   Flexion: 3-  Abduction: 3-  Adduction: 3+    Right Hip   Planes of Motion   Flexion: 3  Abduction: 3-  Adduction: 3+    Left Knee   Flexion: 3+  Extension: 3    Right Knee   Flexion: 3  Extension: 3    Left Ankle/Foot   Dorsiflexion: 3-  Plantar flexion: 3    Right Ankle/Foot   Dorsiflexion: 3-  Plantar flexion: 3+    Tests     Additional Tests Details  Timed up and go:  Date: 33.18 seconds. Excessive use of hands on rails for assistance     Sit to stand Transfers:   Date: Excessive use of hands on rails for assistance    5x sit to stand:   Date: 27.54 seconds with excessive use of hands    2 minute walk test:   Date: NT    MCTSIB:  EO firm: 30 seconds with mild postural sway   EC firm: 30 seconds with moderate postural sway  EO foam: 30 seconds with mild postural sway  EC foam: 12 seconds prior to LOB    6 minute walk test:   Date: NT      *Swelling present in bilateral ankles/feet and right knee vs left knee  *Sensitivity present in distal LE to light touch, especially over anterior tibia.     Positive SLUMP for adverse neural tension bilaterally   Positive SLR at 40 degrees on R/30 degrees on L       Ambulation     Observational Gait     Additional Observational Gait Details  SPC: slow sylvia, dynamic valgus in stance, small step length with femoral adduction              Precautions: MS, Gait dysfunction, Insomnia, Osteoporosis, Peripheral polyneuropathy, Hx of Breast CA, R IDALIA in 2012- Dr. Rushing, Left femur IM nailing       Manuals                                                                 Neuro Re-Ed              Swapnaump nerve per             Sciatic nerve glides             LAQ             Standing hip abduction             Standing march             Standing hip extension             EC on foam              Ther Ex             Hip flexion stretch with strap             Nustep/bike             Hamstring curl              Piriformis stretch             SB stretch                                                     Ther Activity             Standing HR/TR             Sit to stand squat             Lateral walks             Leeann walks             [place] backward pull             Gait Training                                       Modalities                                        HEP: Seated slump nerve glide

## 2024-08-28 ENCOUNTER — EVALUATION (OUTPATIENT)
Dept: PHYSICAL THERAPY | Facility: CLINIC | Age: 74
End: 2024-08-28
Payer: MEDICARE

## 2024-08-28 DIAGNOSIS — R26.9 NEUROLOGIC GAIT DYSFUNCTION: ICD-10-CM

## 2024-08-28 DIAGNOSIS — G35 MULTIPLE SCLEROSIS (HCC): Primary | ICD-10-CM

## 2024-08-28 DIAGNOSIS — Z91.81 RISK FOR FALLS: ICD-10-CM

## 2024-08-28 DIAGNOSIS — G62.9 PERIPHERAL POLYNEUROPATHY: ICD-10-CM

## 2024-08-28 PROCEDURE — 97110 THERAPEUTIC EXERCISES: CPT | Performed by: PHYSICAL THERAPIST

## 2024-08-28 PROCEDURE — 97162 PT EVAL MOD COMPLEX 30 MIN: CPT | Performed by: PHYSICAL THERAPIST

## 2024-08-30 ENCOUNTER — OFFICE VISIT (OUTPATIENT)
Dept: PHYSICAL THERAPY | Facility: CLINIC | Age: 74
End: 2024-08-30
Payer: MEDICARE

## 2024-08-30 DIAGNOSIS — Z91.81 RISK FOR FALLS: ICD-10-CM

## 2024-08-30 DIAGNOSIS — R26.9 NEUROLOGIC GAIT DYSFUNCTION: ICD-10-CM

## 2024-08-30 DIAGNOSIS — G62.9 PERIPHERAL POLYNEUROPATHY: ICD-10-CM

## 2024-08-30 DIAGNOSIS — G35 MULTIPLE SCLEROSIS (HCC): Primary | ICD-10-CM

## 2024-08-30 PROCEDURE — 97140 MANUAL THERAPY 1/> REGIONS: CPT | Performed by: PHYSICAL THERAPIST

## 2024-08-30 PROCEDURE — 97112 NEUROMUSCULAR REEDUCATION: CPT | Performed by: PHYSICAL THERAPIST

## 2024-08-30 PROCEDURE — 97110 THERAPEUTIC EXERCISES: CPT | Performed by: PHYSICAL THERAPIST

## 2024-08-30 NOTE — PROGRESS NOTES
"Daily Note     Today's date: 2024  Patient name: Jacy Rodrigez  : 1950  MRN: [id number]  Referring provider: Elidia Briceno MD  Dx:   Encounter Diagnosis     ICD-10-CM    1. Multiple sclerosis (HCC)  G35       2. Peripheral polyneuropathy  G62.9       3. Risk for falls  Z91.81       4. Neurologic gait dysfunction  R26.9                      Subjective: Pt reports severe sharp pain in posterior R LE near popliteal fold. R knee nearly buckled while negotiating stairs. Pt gets no relief from meloxicam.       Objective: See treatment diary below      Assessment: Tolerated treatment well. Initiated treatment focusing on primary movement impairments identified at IE. Sciatic nerve mobilizations implemented secondary to nerve sensitization. Pt felt better at her LE after treatment today, in terms of pain and sensitivity.  Patient would benefit from continued PT.      Plan: Continue per plan of care.      Precautions: MS, Gait dysfunction, Insomnia, Osteoporosis, Peripheral polyneuropathy, Hx of Breast CA, R IDALIA in - Dr. Rushing, Left femur IM nailing       Manuals             Sciatic nerve mobilizations B 8' total                                                   Neuro Re-Ed             Slump nerve glides Seated 10x on ea            Sciatic nerve glides             LAQ             Standing hip abduction             Standing march             Standing hip extension             EC on foam              Bridge 2x10             Ther Ex             Hip flexion stretch with strap             Nustep/bike 10'            Hamstring curl              Piriformis stretch             SB stretch  10x10\" B (knee flexed)                                                   Ther Activity             Standing HR/TR             Sit to stand squat             Lateral walks             Leeann walks             [place] backward pull             Gait Training                                       Modalities                      "                  1:1 with PT from [phone]

## 2024-09-03 ENCOUNTER — OFFICE VISIT (OUTPATIENT)
Dept: PHYSICAL THERAPY | Facility: CLINIC | Age: 74
End: 2024-09-03
Payer: MEDICARE

## 2024-09-03 DIAGNOSIS — R26.9 NEUROLOGIC GAIT DYSFUNCTION: ICD-10-CM

## 2024-09-03 DIAGNOSIS — G62.9 PERIPHERAL POLYNEUROPATHY: ICD-10-CM

## 2024-09-03 DIAGNOSIS — Z91.81 RISK FOR FALLS: ICD-10-CM

## 2024-09-03 DIAGNOSIS — G35 MULTIPLE SCLEROSIS (HCC): Primary | ICD-10-CM

## 2024-09-03 PROCEDURE — 97140 MANUAL THERAPY 1/> REGIONS: CPT | Performed by: PHYSICAL THERAPIST

## 2024-09-03 PROCEDURE — 97110 THERAPEUTIC EXERCISES: CPT | Performed by: PHYSICAL THERAPIST

## 2024-09-03 PROCEDURE — 97112 NEUROMUSCULAR REEDUCATION: CPT | Performed by: PHYSICAL THERAPIST

## 2024-09-03 NOTE — PROGRESS NOTES
"Daily Note     Today's date: 9/3/2024  Patient name: Jacy Rodrigez  : 1950  MRN: [id number]  Referring provider: Elidia Briceno MD  Dx:   Encounter Diagnosis     ICD-10-CM    1. Multiple sclerosis (HCC)  G35       2. Peripheral polyneuropathy  G62.9       3. Risk for falls  Z91.81       4. Neurologic gait dysfunction  R26.9                      Subjective: Pt reports feeling really uncomfortable this weekend. However, felt really good for a while after last session.       Objective: See treatment diary below      Assessment: Tolerated treatment well. R quad spasm present during initial sciatic nerve glides. Able to return to nerve glides after spasm stopped. Initiated standing LE activation exercises.  Pt challenged with gluteal hip abduction exercise. Patient would benefit from continued PT.      Plan: Continue per plan of care.      Precautions: MS, Gait dysfunction, Insomnia, Osteoporosis, Peripheral polyneuropathy, Hx of Breast CA, R IDALIA in - Dr. Rushing, Left femur IM nailing       Manuals 8/30 9/3           Sciatic nerve mobilizations B 8' total B 8' total                                                  Neuro Re-Ed             Slump nerve glides Seated 10x on ea Seated 10x on ea           Sciatic nerve glides             LAQ             Standing hip abduction  10x           Standing march  10x           Standing hip extension             EC on foam              Bridge 2x10  2x10            Ther Ex             Hip flexion stretch with strap             Nustep/bike 10' 10'            Hamstring curl              Piriformis stretch             SB stretch  10x10\" B (knee flexed) 10x10\" B (knee flexed)                                                  Ther Activity             Standing HR/TR  10x           Sit to stand squat             Lateral walks             Leeann walks             [place] backward pull             Gait Training                                       Modalities                      "                  1:1 with PT from 915-10am

## 2024-09-04 ENCOUNTER — OFFICE VISIT (OUTPATIENT)
Dept: SLEEP CENTER | Facility: CLINIC | Age: 74
End: 2024-09-04
Payer: MEDICARE

## 2024-09-04 VITALS
WEIGHT: 143 LBS | BODY MASS INDEX: 24.41 KG/M2 | DIASTOLIC BLOOD PRESSURE: 80 MMHG | HEIGHT: 64 IN | SYSTOLIC BLOOD PRESSURE: 124 MMHG

## 2024-09-04 DIAGNOSIS — F51.04 CHRONIC INSOMNIA: Primary | ICD-10-CM

## 2024-09-04 DIAGNOSIS — G62.9 NEUROPATHY: ICD-10-CM

## 2024-09-04 DIAGNOSIS — G47.19 EXCESSIVE DAYTIME SLEEPINESS: ICD-10-CM

## 2024-09-04 DIAGNOSIS — G47.61 PLMD (PERIODIC LIMB MOVEMENT DISORDER): ICD-10-CM

## 2024-09-04 DIAGNOSIS — G35 MULTIPLE SCLEROSIS (HCC): ICD-10-CM

## 2024-09-04 PROCEDURE — 99215 OFFICE O/P EST HI 40 MIN: CPT | Performed by: STUDENT IN AN ORGANIZED HEALTH CARE EDUCATION/TRAINING PROGRAM

## 2024-09-04 PROCEDURE — G2211 COMPLEX E/M VISIT ADD ON: HCPCS | Performed by: STUDENT IN AN ORGANIZED HEALTH CARE EDUCATION/TRAINING PROGRAM

## 2024-09-04 RX ORDER — CLONAZEPAM 0.5 MG/1
0.75 TABLET ORAL
Qty: 135 TABLET | Refills: 1 | Status: SHIPPED | OUTPATIENT
Start: 2024-09-04

## 2024-09-04 NOTE — PATIENT INSTRUCTIONS
For Your Insomnia:     The gold standard of treatment for insomnia is cognitive behavioral therapy for insomnia (CBTI}. Medications come with side effects and once stopped, may no longer help the underlying sleep problem. Data on CBTI shows lasting effects of this intervention.      PLAN:  Practice good Sleep Hygiene, as outlined below. For you, focus on:  Keeping bed for sleep and intimacy only  Avoiding daytime naps  Avoiding screens ~1 hour before bed and overnight  I am also placing a referral for Cognitive Behavioral Therapy for Insomnia (CBT-I).  Continue Klonopin 0.75mg nightly for your insomnia  When/As you undergo CBT-I, recommend weaning of of the sleep medication per the direction of the Behavioral Sleep Medicine provider  As a preview of what to expect with CBT-I: to help re-train your brain and allow it to re-associate your bed with sleep, we typically utilize an approach known as Sleep Restriction coupled with Stimulus Control:  Sleep Restriction: Keep the same wake time every day, and do not go to bed until you're tired. The idea behind this, particularly in conjunction with Stimulus control therapy, is to get your body to re-recognize your drive for sleep.  At the same time, practice Stimulus Control as outlined below:  Do not go to bed until you are sleepy  Keep bed primarily for sleep and intimacy ONLY (not for reading, watching television, eating, or worrying).   You should not spend more than 20 minutes in bed awake. If you are awake after 20 minutes, leave the bedroom and engage in a relaxing activity, such as reading or listening to soothing music. DO NOT engage in activities that stimulate you or reward you for being awake in the middle of the night, such as eating or watching television.   Do not return to bed until you are tired and feel ready to sleep.   If you return to bed and still cannot sleep within 20 minutes, the process should be repeated.   An alarm clock should be set to wake the  patient at the same time every morning (see Sleep Restriction above), including weekends.  The idea behind this is that patients with insomnia commonly associate their bed and bedroom with the fear of not sleeping, or it has simply become a stimulating environment and their brain no longer associates it with sleep. The longer one stays in bed trying to sleep, the stronger the association becomes, which then perpetuates the difficulty falling asleep.  Stimulus control therapy then is a strategy whose purpose is to disrupt this association and re-train your brain to associate your bedroom with sleep, thus enhancing the likelihood of sleep.        Good Sleep Hygiene  Wake up at the same time every day, even on the weekends.  Use your bed for sleep and intimacy only.  If you have been in bed awake for 30 minutes, get up and leave the bedroom. Choose a dull activity not involving a blue screen (TV, computer, handheld devices). Go back to bed when you feel sleepy.  Avoid caffeine, nicotine and alcohol before you go to bed.  Avoid large meals before you go to bed.  Avoid using screens (computers, tablets, smartphones, etc.) for at least 1 hour before bedtime  Exercise regularly, but do not exercise right before you go to bed.  Avoid daytime naps. If you do take a nap, sleep for 20-40 minutes, and not after dinner.

## 2024-09-04 NOTE — PROGRESS NOTES
[facility]  Sleep Medicine Follow up/ Established Patient Visit      Assessment/Plan:  1. Chronic insomnia        2. Excessive daytime sleepiness        3. Multiple sclerosis (HCC)        4. Neuropathy        5. PLMD (periodic limb movement disorder)  clonazePAM (KlonoPIN) 0.5 mg tablet          Jihan is a pleasant 73-year-old woman with a PMHx of multiple sclerosis (sees Dr. Valerio), HTN, history of breast cancer, B12 deficiency, HLD who presents in follow up for chronic sleep maintenance insomnia and RLS.  Reassuringly, she tells me that the RLS is a nonissue at this time, however the neuropathic pain from her other conditions remains significantly impactful.  Certainly, this pain could be having a substantial role is likely having a substantial role on her sleep maintenance, however I do suspect some degree of a behavioral contribution.  Unfortunately, it sounds as though she was fairly reluctant and had difficulties with understanding and conceptualizing the CBT-I techniques, in part (understandably) due to her concerns surrounding her other conditions, most notably MS.    Discussed the pathophysiology behind chronic insomnia, including the 3-P model  Discussed that CBT-I is first-line for treatment of chronic insomnia, and has the best data supporting its efficacy  Reviewed the combination of sleep restriction along with stimulus control very extensively with the patient today, emphasizing the reasoning behind these techniques and the overall temporary nature of CBT-I.  Referral placed for formal Cognitive Behavioral Therapy for Insomnia (CBT-I).  Will reach out to our behavioral sleep medicine specialist to discuss the patient's concerns.  Will continue Klonopin at 0.75 mg nightly for insomnia at this time.  Reviewed that she should try to wean off of the Klonopin if able, as CBT-I starts to work.  I do recommend that she continue gabapentin, as this sounds to likely be helping with her  history of RLS symptoms, however will defer dosing and any necessary adjustments to her neurologist.  Recommended that she establish good sleep hygiene as a bedrock for CBT-I to build upon.  I will plan to see her back in ~6 months      ________________________________________________________________________________________________    Per Last Visit Note (Date: 1/11/2023):  Jihan is a pleasant 73-year-old woman with a PMHx of multiple sclerosis (sees Dr. Valerio), HTN, history of breast cancer, B12 deficiency, HLD who presents in follow up for chronic sleep maintenance insomnia.  Certainly, she has numerous stressors, both past and present, that are likely contributing to her ongoing insomnia; however, predominantly given her age and other medications (particularly Klonopin, which has been helpful for her), I am very hesitant to start any other sleep-related medications.  I would, however, like to rule out other potential contributory causes of the insomnia, as well as alternative treatment options.     Discussed the pathophysiology behind chronic insomnia, including the 3-P model  Discussed that CBT-I is first-line for treatment of chronic insomnia, and has the best data supporting its efficacy  Referral placed for formal Cognitive Behavioral Therapy for Insomnia (CBT-I)  Information provided regarding the GO! To Sleep program through [facility], should the patient be interested in pursuing this.  I am okay with her continuing Klonopin in the interim, as she has had no major side effects and it is working well for her.  However, I did caution her that if she wishes to pursue the CBT-high or Go! to Sleep program, she should cease the Klonopin in order to obtain the best result.  She does not need refills as yet, however should she in the future if she may: And I will provide these.  In addition, I would like to obtain a home sleep apnea test to ensure there is no underlying sleep disordered breathing present,  "particularly given the general lack of \"classic\" sleep apnea symptoms that can be seen in certain patient populations, including women and anyone over the age of 65; in addition, sleep disordered breathing commonly presents more as an insomnia in the female population.  She does tell me that she is extremely claustrophobic, so we will likely need to discuss alternative treatment options to PAP therapy pending the results of the HSAT.  I will plan to see her back in ~6 months      Sleep Studies:  -HSAT 2/16/2024: .3 minutes, ELISEO 3.2, O2 severiano 91%    ________________________________________________________________________________________________      Interval History: Jacy Rodrigez is a 74 y.o. female with a PMHx of multiple sclerosis (sees Dr. Valerio, on Ampyra, baclofen, gabapentin), HTN, history of breast cancer, B12 deficiency, HLD who presents in follow up for restless leg syndrome and insomnia.    Tried to wean off Klonopin, could not sleep even with decrease to 0.5mg nightly, had to go back up to the 0.75mg.     Has also endorsed increased leg swelling since ~June. Neurology and PCP have done tests, all of which have been negative. Does also have severe pain down right leg, also affecting her ability to sleep (severe pain).    -Gone back to PT, trying to \"stretch some of the nerves to increase mobility.\"     Neurologist prescribes gabapentin; 600 in morning, 600 in afternoon, 1200 at night; down from in the past (concerned based on GFR).     RLS: Denies RLS symptoms - just the \"burning/searing neuropathic pain.\"       Still sleeps in cycles:   WITH Klonopin:  -3-4 days: Bed 2200, asleep in 30 minutes, up ~4 during the night, sleep until 0600.  -Then 5-6 nights where will go to bed at 2200, asleep at 2230, then wake at 0200 and doze off/on rest of the evening or stay awake. Of note, on these days her legs don't work due to inadequate sleep.   -Occasional night where \"my eyeballs just don't want to " "close.\"     -Does watch TV until starts dozing, then shuts it off.    Insomnia:  -Current Symptom Severity/Description: See above. States it was originally started for problems staying asleep.  told her she doesn't snore. Some daytime sleepiness if she stops moving, however endorses significant daytime fatigue Denies nocturnal GERD, bruxism, palpitations.  -CBT-I: \"Kept me waiting for 1 hour,\" rescheduled for a later time, then kept waiting for 30 minutes. Then went through whole thing. Had Sleep Restriction explained to her, and was very reluctant, stating \"if I only sleep 6 hours, my legs just don't work.\" Also was very resistant to Stimulus Control, doing crossword puzzles, not sleeping with TV on, etc.     -Current treatment: Klonopin 0.75mg (helps her fall asleep, keeps her asleep until ~0300. Usually can fall back asleep, unless  wakes her up as he wakes up earlier).   -Denies side effects.  -Prior Treatments Tried:   -Lorazepam (per gynecologist)      [place] Sleepiness Scale:  What are your chances of dozing? 0= no chance  1= slight chance  2= moderate chance  3= high chance    Sitting and readin  Watching TV: 2  Sitting, inactive in a public place (e.g. a theatre or a meeting): 1  As a passenger in a car for an hour without a break: 1  Lying down to rest in the afternoon when circumstances permit: 1  Sitting and talking to someone: 0  Sitting quietly after a lunch without alcohol: 1  In a car, while stopped for a few minutes in the traffic: 0      TOTAL  8/24  Greater or equal to 10 is positive for excessive daytime sleepiness          SLEEP HYGIENE QUESTIONS:  See above        SLEEP RELATED ROS  Review of Systems  Allergies   Allergen Reactions    Duloxetine Hcl      Rapid Heart rate      Iodinated Contrast Media Anaphylaxis     IVP dye    Acetaminophen Other (See Comments)     Heart palpitations    Cetirizine      Only occurs with generic, weakness in legs, off balance and couldn't walk. " "   Morphine Other (See Comments)     Migraine       CURRENT MEDICATIONS:  Current Outpatient Medications   Medication Instructions    ALPHA LIPOIC ACID PO Oral    amoxicillin (AMOXIL) 500 MG tablet TAKE 4 TABLETS BY MOUTH 1 HOUR PRIOR TO DENTAL VISIT    Ampyra 10 MG TB12 1 tab po bid    ascorbic acid (VITAMIN C) 500 mg, Oral, Daily    baclofen 10 mg tablet 1 tab pos q am, 1 tab po q pm and 2 tabs hs    Biotin 5000 MCG TABS No dose, route, or frequency recorded.    clonazePAM (KlonoPIN) 0.5 mg tablet TAKE 1 AND 1/2 TABLETS BY MOUTH DAILY AT BEDTIME    Cranberry 125 mg, Oral, Daily    cycloSPORINE (RESTASIS) 0.05 % ophthalmic emulsion 1 drop, Both Eyes, As needed    denosumab (PROLIA) 60 mg/mL Subcutaneous    docusate sodium (COLACE) 100 mg capsule 1 capsule, Oral, Every other day    gabapentin (NEURONTIN) 600 MG tablet Take one tablet in the AM, 1 tabs in the afternoon  and 2 tabs at bedtime    lisinopril (ZESTRIL) 2.5 mg, Oral, Daily    Magnesium 500 MG TABS 500 tablets, Oral, Once    meloxicam (MOBIC) 7.5 mg, Oral, Daily    zonisamide (ZONEGRAN) 400 mg, Daily at bedtime           PHYSICAL EXAMINATION:  Vital Signs: /80   Ht 5' 4\" (1.626 m)   Wt 64.9 kg (143 lb)   BMI 24.55 kg/m²     Constitutional: NAD, well appearing   Mental Status: AAOx3  Skin: Warm, dry, no rashes noted   Eyes: PERRL, normal conjunctiva  ENT: Nasal congestion absent, nasal valve incompetence absent.  Posterior Airspace:   Chavez Tongue Position: 4  Retrognathia: absent  Overbite: absent  High Arched Palate: absent  Tongue Scalloping/Ridging: present  Uvula: normal  Chest: No evidence of respiratory distress, no accessory muscle use; no evidence of peripheral cyanosis  Abdomen: Soft, NT/ND  Extremities: No digital clubbing or pedal edema  Neuro: Strength 4/5 bilateral lower extremities, 3+/5 bilateral lower extremities; sensation grossly intact      I have spent a total time of 40-45 minutes on 09/04/24 in caring for this patient " including Prognosis, Risks and benefits of tx options, Instructions for management, Patient and family education, Importance of tx compliance, Risk factor reductions, Documenting in the medical record, Reviewing / ordering tests, medicine, procedures  , and Obtaining or reviewing history  .        Electronically signed by:    Glen Naylor DO  Board-Certified Neurology and Sleep Medicine  [facility]  09/04/24

## 2024-09-05 ENCOUNTER — OFFICE VISIT (OUTPATIENT)
Dept: PHYSICAL THERAPY | Facility: CLINIC | Age: 74
End: 2024-09-05
Payer: MEDICARE

## 2024-09-05 DIAGNOSIS — G62.9 PERIPHERAL POLYNEUROPATHY: ICD-10-CM

## 2024-09-05 DIAGNOSIS — R26.9 NEUROLOGIC GAIT DYSFUNCTION: ICD-10-CM

## 2024-09-05 DIAGNOSIS — Z91.81 RISK FOR FALLS: ICD-10-CM

## 2024-09-05 DIAGNOSIS — G35 MULTIPLE SCLEROSIS (HCC): Primary | ICD-10-CM

## 2024-09-05 PROCEDURE — 97112 NEUROMUSCULAR REEDUCATION: CPT | Performed by: PHYSICAL THERAPIST

## 2024-09-05 PROCEDURE — 97140 MANUAL THERAPY 1/> REGIONS: CPT | Performed by: PHYSICAL THERAPIST

## 2024-09-05 PROCEDURE — 97110 THERAPEUTIC EXERCISES: CPT | Performed by: PHYSICAL THERAPIST

## 2024-09-05 NOTE — PROGRESS NOTES
"Daily Note     Today's date: 2024  Patient name: Jacy Rodrigez  : 1950  MRN: [id number]  Referring provider: Elidia Briceno MD  Dx:   Encounter Diagnosis     ICD-10-CM    1. Multiple sclerosis (HCC)  G35       2. Peripheral polyneuropathy  G62.9       3. Risk for falls  Z91.81       4. Neurologic gait dysfunction  R26.9                      Subjective: Pt reports improvement in leg pain. She was able to sleep well last night. She reports the ability to keep left foot straight on nustep.       Objective: See treatment diary below      Assessment: Tolerated treatment well. Flexibility in gastroc soleus complex improving. Pt able to maintain full knee extension during the stretch. Pt still challenged with hip abduction exercise. R sciatic nerve glides required modification due to discomfort vs left. Patient would benefit from continued PT.       Plan: Continue per plan of care.      Precautions: MS, Gait dysfunction, Insomnia, Osteoporosis, Peripheral polyneuropathy, Hx of Breast CA, R IDALIA in - Dr. Rushing, Left femur IM nailing       Manuals 8/30 9/3 9/5          Sciatic nerve mobilizations B 8' total B 8' total B 8' total- modification to R d/t pain                                                  Neuro Re-Ed             Slump nerve glides Seated 10x on ea Seated 10x on ea Seated 10x on ea          Sciatic nerve glides             LAQ             Standing hip abduction  10x 10x          Standing march  10x 10x          Standing hip extension             EC on foam              Bridge 2x10  2x10  2x10           Ther Ex             Hip flexion stretch with strap             Nustep/bike 10' 10'  10' L2 - 525 steps           Hamstring curl              Piriformis stretch             SB stretch  10x10\" B (knee flexed) 10x10\" B (knee flexed) 10x10\" B (knee flexed)                                                 Ther Activity             Standing HR/TR  10x 10x          Sit to stand squat           "   Lateral walks             Leeann walks             Brandon backward pull             Gait Training                                       Modalities                                       1:1 with PT from [id number]

## 2024-09-06 ENCOUNTER — PATIENT MESSAGE (OUTPATIENT)
Dept: NEUROLOGY | Facility: CLINIC | Age: 74
End: 2024-09-06

## 2024-09-06 ENCOUNTER — TELEPHONE (OUTPATIENT)
Dept: NEUROLOGY | Facility: CLINIC | Age: 74
End: 2024-09-06

## 2024-09-06 NOTE — TELEPHONE ENCOUNTER
Received via mail in the [place] [facility] recall notification for Gabapentin from [employer].  Notification scanned into .

## 2024-09-06 NOTE — TELEPHONE ENCOUNTER
Called pt and made her aware of below.   States that she has a lot of gabapentin, she has old tabs.    I recommended that she contact pharm and give them any info that she has in regards to the bottles that she has at home and they can advise.      She will contact the pharm

## 2024-09-06 NOTE — TELEPHONE ENCOUNTER
Patient has a fever and is wheezing September 6, 2024  Jihan Rodrigez   to [facility] (supporting Elidia Briceno MD)         9/6/24  1:27 PM  I phoned the pharmacy and was told that the recall did not affect me. Any recalled meds are removed prior to being sent to mail order patients. The fax was sent for information only.   Jihan

## 2024-09-06 NOTE — TELEPHONE ENCOUNTER
Please alert pt today that there was an fda recall of gabapentin 600 mg tabs.  Just alerted by [facility].  Pt must hold on taking until verify with her pharmacy what is next steps.

## 2024-09-09 ENCOUNTER — TELEPHONE (OUTPATIENT)
Age: 74
End: 2024-09-09

## 2024-09-09 NOTE — TELEPHONE ENCOUNTER
"Behavioral Health Integration Screening Questionnaire     Are you aware of the referred from your [facility] Provider  : Yes     Please advise interviewee that they need to answer all questions truthfully to allow for best care, and any misrepresentations of information may affect their ability to be seen at this clinic   => Was this discussed? Yes     If Minor Child (under age 18)    Who is/are the legal guardian(s) of the child?     Is there a custody agreement? No     If \"YES\"- Custody orders must be obtained prior to scheduling the first appointment  In addition, Consent to Treatment must be signed by all legal guardians prior to scheduling the first appointment    If \"NO\"- Consent to Treatment must be signed by all legal guardians prior to scheduling the first appointment    Behavioral Health Outpatient Intake History -     Presenting Problem (in patient's own words): insomnia    Are there any communication barriers for this patient?     No                                               If yes, please describe barriers:       Are you taking any psychiatric medications? No     If \"YES\" -What are they         If \"YES\" -Who prescribes?     Has the Patient abused alcohol or other substances in the last 6 months ? No  No concerns of substance abuse are reported.     If \"YES\" -What substance, How much, How often?     If illegal substance: Refer to [facility] (for NOLAN) or [facility].   If Alcohol in excess of 10 drinks per week:  Refer to [facility] (for NOLAN) or [facility] Off[facility]    ACCEPTED as a patient Yes  If \"Yes\" Appointment Date: 10/4/2024 at 12:00 pm    Referred Elsewhere? No  If “Yes” - (Where? Ex: Therapy Anywhere; [facility];  [facility], etc.)       Name of Insurance Co: Medicare A & B  Insurance ID# [id number]  Insurance Phone # [phone]  If ins is primary or secondary? Primary  If patient is a minor, parents information such as Name, D.O.B of guarantor.  "

## 2024-09-10 ENCOUNTER — APPOINTMENT (OUTPATIENT)
Dept: PHYSICAL THERAPY | Facility: CLINIC | Age: 74
End: 2024-09-10
Payer: MEDICARE

## 2024-09-12 ENCOUNTER — OFFICE VISIT (OUTPATIENT)
Dept: PHYSICAL THERAPY | Facility: CLINIC | Age: 74
End: 2024-09-12
Payer: MEDICARE

## 2024-09-12 DIAGNOSIS — G35 MULTIPLE SCLEROSIS (HCC): Primary | ICD-10-CM

## 2024-09-12 DIAGNOSIS — R26.9 NEUROLOGIC GAIT DYSFUNCTION: ICD-10-CM

## 2024-09-12 DIAGNOSIS — G62.9 PERIPHERAL POLYNEUROPATHY: ICD-10-CM

## 2024-09-12 DIAGNOSIS — Z91.81 RISK FOR FALLS: ICD-10-CM

## 2024-09-12 PROCEDURE — 97140 MANUAL THERAPY 1/> REGIONS: CPT | Performed by: PHYSICAL THERAPIST

## 2024-09-12 PROCEDURE — 97112 NEUROMUSCULAR REEDUCATION: CPT | Performed by: PHYSICAL THERAPIST

## 2024-09-12 PROCEDURE — 97110 THERAPEUTIC EXERCISES: CPT | Performed by: PHYSICAL THERAPIST

## 2024-09-12 NOTE — PROGRESS NOTES
"Daily Note     Today's date: 2024  Patient name: Jacy Rodrigez  : 1950  MRN: [id number]  Referring provider: Elidia Briceno MD  Dx:   Encounter Diagnosis     ICD-10-CM    1. Multiple sclerosis (HCC)  G35       2. Peripheral polyneuropathy  G62.9       3. Risk for falls  Z91.81       4. Neurologic gait dysfunction  R26.9                      Subjective: Pt reports experiencing fever/chills earlier this week and didn't leave the lazy-boy. She notes having a lot of sharp pain behind the right popliteal fold.       Objective: See treatment diary below      Assessment: Continued with LE flexibility and nerve glides due to sensitivity in LE. Pain is preventing patient from moving well which is compromising her balance and ambulation. Her recent illness also fatigued her LE. She's frustrated that she can't spend time cooking due to the ability to prolong stand. Tolerated treatment fair. Patient would benefit from continued PT      Plan: Continue per plan of care.      Precautions: MS, Gait dysfunction, Insomnia, Osteoporosis, Peripheral polyneuropathy, Hx of Breast CA, R IDALIA in - Dr. Rushing, Left femur IM nailing       Manuals 8/30 9/3 9/5 9/12         Sciatic nerve mobilizations B 8' total B 8' total B 8' total- modification to R d/t pain  B 8' total- modification to R d/t pain                                                 Neuro Re-Ed             Slump nerve glides Seated 10x on ea Seated 10x on ea Seated 10x on ea          Sciatic nerve glides             LAQ             Standing hip abduction  10x 10x          Standing march  10x 10x          Standing hip extension             EC on foam              Bridge 2x10  2x10  2x10  2x10          Ther Ex             Hip flexion stretch with strap             Nustep/bike 10' 10'  10' L2 - 525 steps  8' L2 - 397steps         Hamstring curl              Piriformis stretch             SB stretch  10x10\" B (knee flexed) 10x10\" B (knee flexed) 10x10\" B (knee " "flexed) 10x10\" B (knee flexed)                                                Ther Activity             Standing HR/TR  10x 10x 10x         Sit to stand squat             Lateral walks             Leeann walks             Brandon backward pull             Gait Training                                       Modalities                                       1:1 with PT from [id number]             "

## 2024-09-17 ENCOUNTER — OFFICE VISIT (OUTPATIENT)
Dept: PHYSICAL THERAPY | Facility: CLINIC | Age: 74
End: 2024-09-17
Payer: MEDICARE

## 2024-09-17 DIAGNOSIS — G62.9 PERIPHERAL POLYNEUROPATHY: ICD-10-CM

## 2024-09-17 DIAGNOSIS — G35 MULTIPLE SCLEROSIS (HCC): Primary | ICD-10-CM

## 2024-09-17 DIAGNOSIS — Z91.81 RISK FOR FALLS: ICD-10-CM

## 2024-09-17 DIAGNOSIS — R26.9 NEUROLOGIC GAIT DYSFUNCTION: ICD-10-CM

## 2024-09-17 PROCEDURE — 97110 THERAPEUTIC EXERCISES: CPT | Performed by: PHYSICAL THERAPIST

## 2024-09-17 PROCEDURE — 97140 MANUAL THERAPY 1/> REGIONS: CPT | Performed by: PHYSICAL THERAPIST

## 2024-09-17 NOTE — PROGRESS NOTES
"Daily Note     Today's date: 2024  Patient name: Jacy Rodrigez  : 1950  MRN: [id number]  Referring provider: Elidia Briceno MD  Dx:   Encounter Diagnosis     ICD-10-CM    1. Multiple sclerosis (HCC)  G35       2. Peripheral polyneuropathy  G62.9       3. 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 Risk for falls  Z91.81                      Subjective: Pt travelled over the weekend to a wedding in [place]. Pt notes difficulty with driving secondary to leg discomfort.       Objective: See treatment diary below      Assessment: Tolerated treatment well. Improved performance on Nustep today. Sensitivity continues in left LE > than R LE. More control present with hip strengthening. Patient exhibited good technique with therapeutic exercises and would benefit from continued PT.       Plan: Continue per plan of care.      Precautions: MS, Gait dysfunction, Insomnia, Osteoporosis, Peripheral polyneuropathy, Hx of Breast CA, R IDALIA in - Dr. Rushing, Left femur IM nailing       Manuals 8/30 9/3 9/5 9/12 9/17        Sciatic nerve mobilizations B 8' total B 8' total B 8' total- modification to R d/t pain  B 8' total- modification to R d/t pain  B 8' total- modification to R d/t pain                                                Neuro Re-Ed             Slump nerve glides Seated 10x on ea Seated 10x on ea Seated 10x on ea  Seated 10x on ea        Sciatic nerve glides             LAQ             Standing hip abduction  10x 10x  15x        Standing march  10x 10x  10x        Standing hip extension             EC on foam              Bridge 2x10  2x10  2x10  2x10  2x10        Ther Ex             Hip flexion stretch with strap             Nustep/bike 10' 10'  10' L2 - 525 steps  8' L2 - 397steps 10' L1 - 600 steps        Hamstring curl              Piriformis stretch             SB stretch  10x10\" B (knee flexed) 10x10\" B (knee flexed) 10x10\" B (knee flexed) 10x10\" B (knee flexed) 10x10\" B (knee flexed)                "                                Ther Activity             Standing HR/TR  10x 10x 10x 10x        Sit to stand squat             Lateral walks             Leeann walks             Brandon backward pull             Gait Training                                       Modalities                                       1:1 with PT from [id number]

## 2024-09-19 ENCOUNTER — OFFICE VISIT (OUTPATIENT)
Dept: PHYSICAL THERAPY | Facility: CLINIC | Age: 74
End: 2024-09-19
Payer: MEDICARE

## 2024-09-19 DIAGNOSIS — G62.9 PERIPHERAL POLYNEUROPATHY: ICD-10-CM

## 2024-09-19 DIAGNOSIS — Z91.81 RISK FOR FALLS: ICD-10-CM

## 2024-09-19 DIAGNOSIS — R26.9 NEUROLOGIC GAIT DYSFUNCTION: ICD-10-CM

## 2024-09-19 DIAGNOSIS — G35 MULTIPLE SCLEROSIS (HCC): Primary | ICD-10-CM

## 2024-09-19 PROCEDURE — 97112 NEUROMUSCULAR REEDUCATION: CPT | Performed by: PHYSICAL THERAPIST

## 2024-09-19 PROCEDURE — 97110 THERAPEUTIC EXERCISES: CPT | Performed by: PHYSICAL THERAPIST

## 2024-09-19 PROCEDURE — 97140 MANUAL THERAPY 1/> REGIONS: CPT | Performed by: PHYSICAL THERAPIST

## 2024-09-19 NOTE — PROGRESS NOTES
Daily Note     Today's date: 2024  Patient name: Jacy Rodrigez  : 1950  MRN: [id number]  Referring provider: Elidia Briceno MD  Dx:   Encounter Diagnosis     ICD-10-CM    1. Multiple sclerosis (HCC)  G35       2. Peripheral polyneuropathy  G62.9       3. 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 Risk for falls  Z91.81           Start Time: 1030  Stop Time: 1115  Total time in clinic (min): 45 minutes    Subjective: Pt frustrated with current pain state. She notes that her pain in her legs are worsening. She slept only 1 hour last night.       Objective: See treatment diary below      Assessment: Tolerated treatment fair. RLE much worse than LLE in term of discomfort. Pt has difficulty tolerating passive nerve glides. Continued with gentle stretching and hip AROM. Progression of exercise is difficult at this time secondary to LE neuropathic pain. Patient would benefit from continued PT.       Plan: Continue per plan of care.      Precautions: MS, Gait dysfunction, Insomnia, Osteoporosis, Peripheral polyneuropathy, Hx of Breast CA, R IDALIA in - Dr. Rushing, Left femur IM nailing       Manuals 8/30 9/3 9/5 9/12 9/17 9/19       Sciatic nerve mobilizations B 8' total B 8' total B 8' total- modification to R d/t pain  B 8' total- modification to R d/t pain  B 8' total- modification to R d/t pain  B 8' total- modification to R d/t pain                                               Neuro Re-Ed             Slump nerve glides Seated 10x on ea Seated 10x on ea Seated 10x on ea  Seated 10x on ea Seated 10x on ea       Sciatic nerve glides             LAQ             Standing hip abduction  10x 10x  15x defers       Standing march  10x 10x  10x defers       Standing hip extension             EC on foam              Bridge 2x10  2x10  2x10  2x10  2x10 2x10        Ther Ex             Hip flexion stretch with strap             Nustep/bike 10' 10'  10' L2 - 525 steps  8' L2 - 397steps 10' L1 - 600 steps 10' L1 -  "541 steps       Hamstring curl              Piriformis stretch             SB stretch  10x10\" B (knee flexed) 10x10\" B (knee flexed) 10x10\" B (knee flexed) 10x10\" B (knee flexed) 10x10\" B (knee flexed) 10x10\" B knee extended                                              Ther Activity             Standing HR/TR  10x 10x 10x 10x 10x       Sit to stand squat             Lateral walks             Leeann walks             Brandon backward pull             Gait Training                                       Modalities                                       1:1 with PT from [id number]                 "

## 2024-09-21 DIAGNOSIS — G35 MULTIPLE SCLEROSIS (HCC): ICD-10-CM

## 2024-09-23 RX ORDER — BACLOFEN 10 MG/1
TABLET ORAL
Qty: 360 TABLET | Refills: 0 | Status: SHIPPED | OUTPATIENT
Start: 2024-09-23

## 2024-09-23 RX ORDER — GABAPENTIN 600 MG/1
TABLET ORAL
Qty: 360 TABLET | Refills: 5 | Status: SHIPPED | OUTPATIENT
Start: 2024-09-23

## 2024-09-24 ENCOUNTER — OFFICE VISIT (OUTPATIENT)
Dept: PHYSICAL THERAPY | Facility: CLINIC | Age: 74
End: 2024-09-24
Payer: MEDICARE

## 2024-09-24 DIAGNOSIS — Z91.81 RISK FOR FALLS: ICD-10-CM

## 2024-09-24 DIAGNOSIS — G35 MULTIPLE SCLEROSIS (HCC): Primary | ICD-10-CM

## 2024-09-24 DIAGNOSIS — G62.9 PERIPHERAL POLYNEUROPATHY: ICD-10-CM

## 2024-09-24 DIAGNOSIS — R26.9 NEUROLOGIC GAIT DYSFUNCTION: ICD-10-CM

## 2024-09-24 PROCEDURE — 97110 THERAPEUTIC EXERCISES: CPT | Performed by: PHYSICAL THERAPIST

## 2024-09-24 PROCEDURE — 97112 NEUROMUSCULAR REEDUCATION: CPT | Performed by: PHYSICAL THERAPIST

## 2024-09-24 PROCEDURE — 97530 THERAPEUTIC ACTIVITIES: CPT | Performed by: PHYSICAL THERAPIST

## 2024-09-24 NOTE — PROGRESS NOTES
Daily Note     Today's date: 2024  Patient name: Jacy Rodrigez  : 1950  MRN: [id number]  Referring provider: Elidia Briceno MD  Dx:   Encounter Diagnosis     ICD-10-CM    1. Multiple sclerosis (HCC)  G35       2. Peripheral polyneuropathy  G62.9       3. 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 Risk for falls  Z91.81                      Subjective: Pt reports continued LE discomfort right worse than left. Slept fine after 1:30 but took time to fall asleep last night.       Objective: See treatment diary below      Assessment: Nerve excursion and flexibility in posterior LE much improved. She continues to have neuropathic pain throughout her LE. Tolerated treatment fair. Implemented mini squats. Cueing provided to reduce knee dominant strategy. Patient would benefit from continued PT.      Plan: Continue per plan of care.      Precautions: MS, Gait dysfunction, Insomnia, Osteoporosis, Peripheral polyneuropathy, Hx of Breast CA, R IDALIA in - Dr. Rushing, Left femur IM nailing       Manuals 8/30 9/3 9/5 9/12 9/17 9/19 9/24      Sciatic nerve mobilizations B 8' total B 8' total B 8' total- modification to R d/t pain  B 8' total- modification to R d/t pain  B 8' total- modification to R d/t pain  B 8' total- modification to R d/t pain  B 8' in total (R more sensitive than L)                                             Neuro Re-Ed             Slump nerve glides Seated 10x on ea Seated 10x on ea Seated 10x on ea  Seated 10x on ea Seated 10x on ea Seated 10x on ea      Sciatic nerve glides             LAQ             Standing hip abduction  10x 10x  15x defers       Standing march  10x 10x  10x defers       Standing hip extension             EC on foam              Bridge 2x10  2x10  2x10  2x10  2x10 2x10  2x10       Ther Ex             Hip flexion stretch with strap             Nustep/bike 10' 10'  10' L2 - 525 steps  8' L2 - 397steps 10' L1 - 600 steps 10' L1 - 541 steps 10' 664 steps L1     "  Hamstring curl              Piriformis stretch             SB stretch  10x10\" B (knee flexed) 10x10\" B (knee flexed) 10x10\" B (knee flexed) 10x10\" B (knee flexed) 10x10\" B (knee flexed) 10x10\" B knee extended 10x10\" B knee extended                                             Ther Activity             Standing HR/TR  10x 10x 10x 10x 10x 10x      Sit to stand squat       Mini squat x10 with cueing       Lateral walks             Leeann walks             [place] backward pull             Gait Training                                       Modalities                                       1:1 with PT from [id number]                   "

## 2024-10-01 ENCOUNTER — EVALUATION (OUTPATIENT)
Dept: PHYSICAL THERAPY | Facility: CLINIC | Age: 74
End: 2024-10-01
Payer: MEDICARE

## 2024-10-01 DIAGNOSIS — G35 MULTIPLE SCLEROSIS (HCC): Primary | ICD-10-CM

## 2024-10-01 DIAGNOSIS — G62.9 PERIPHERAL POLYNEUROPATHY: ICD-10-CM

## 2024-10-01 DIAGNOSIS — Z91.81 RISK FOR FALLS: ICD-10-CM

## 2024-10-01 DIAGNOSIS — R26.9 NEUROLOGIC GAIT DYSFUNCTION: ICD-10-CM

## 2024-10-01 PROCEDURE — 97530 THERAPEUTIC ACTIVITIES: CPT | Performed by: PHYSICAL THERAPIST

## 2024-10-01 PROCEDURE — 97110 THERAPEUTIC EXERCISES: CPT | Performed by: PHYSICAL THERAPIST

## 2024-10-01 NOTE — PROGRESS NOTES
[id number]     Today's date: 10/1/2024  Patient name: Jacy Rodrigez  : 1950  MRN: [id number]  Referring provider: Elidia Briceno MD  Dx:   Encounter Diagnosis     ICD-10-CM    1. Multiple sclerosis (HCC)  G35       2. Peripheral polyneuropathy  G62.9       3. 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 Risk for falls  Z91.81           Start Time: 1000  Stop Time: 1034  Total time in clinic (min): 34 minutes    Assessment  Impairments: abnormal coordination, abnormal gait, abnormal or restricted ROM, abnormal movement, impaired balance, impaired physical strength, poor posture     Assessment details: Jacy Rodrigez is a 74 y.o. female who presents with signs and symptoms consistent with progressive multiple sclerosis resulting in LE weakness, fatigue, ambulation dysfunction, and balance impairment. Pt also experiencing allodynia of bilateral LE, seemingly neuropathic in nature. Patient has attended 9 PT sessions. Progression of exercise has been slow secondary to symptoms in LE and sleep. Pt still not sleeping well which effects overall physical status and pain at involved LE. However, she notes improvement in sleep the past 3 days which has energized her. Pt admits to being dizzy the past few days but hasn't fallen. She denies any dizziness currently. Overall, patient has improved in all domains of treatment. Timed up and go, 5x sit to stand, transfers, strength, and neural mobility has all improved.  Pt was able to perform 260 feet of walking in 4:10 using a SPC. Patient would benefit from skilled physical therapy to address the impairments, improve their level of function, and to improve their overall quality of life.      Understanding of Dx/Px/POC: good     Prognosis: good    Goals  Short Term Goals: to be achieved by 4 weeks  1) Patient to be independent with basic HEP.-Partially met  2) Decrease pain to moderate at its worst.-Partially met  3) Increase SLR ROM by 5-10 degrees -Partially  "met  4) Increase LE strength by 1/2 MMT grade in all deficient planes.-Partially met    Long Term Goals: to be achieved by discharge  1) FOTO equal to or greater than expected.-Partially met  2) Ambulation to improve to maximal level of function-Partially met  3) Improve TUG by 5 seconds-Partially met  4) Sit to stand transfers will improve to maximal level of function -Partially met  5) Improve 5x sit to stand by 5 seconds-Partially met    Plan  Patient would benefit from: PT eval and skilled physical therapy    Planned therapy interventions: manual therapy, neuromuscular re-education, patient education, strengthening, therapeutic activities, therapeutic exercise, transfer training, home exercise program and functional ROM exercises    Frequency: 2x per week for 6-8 weeks.  Treatment plan discussed with: patient        Subjective Evaluation    History of Present Illness  Mechanism of injury: History of Current Injury: Pt referred to PT secondary to progressive MS and allodynia in bilateral LE. She was treated by me last year through April of 2024. Her lower extremity symptoms were worsening at that time along with weakness throughout antigravity muscles. Pt notes that her strength, fatigue, and pain continue to worsen.  Due to increasing lower extremity pain, patient received US of bilateral LE and increased Gabapentin dosage. No evidence of significant lower extremity arterial occlusive disease on US of bilateral LE. XR of right knee was negative for osseous pathology. Pt reports frequent 4+ pitting edema in bilateral LE. Pt reports compression stockings don't seem to help her.     Pt reports a walking tolerance of max 5 minutes.     Pt has steps down to the basement but has single level living. Pt has 1-2, 3\" steps to enter her house.   Pain location/Descriptors: Severe LE pain from knees to feet (anterior and posterior). + cramping. R worse than L/   Aggravating factors: Constantly present (24/7) sometimes worse " than others.   Easing factors: Rest, gabapentin  24 HR pattern: Worse that night time.   XR of R knee: normal   Special Questions: Positive for peripheral neuropathy  Patient goals:  Reduce unsteadiness, improve LE strength, increase walking and endurance.   Hobbies/Interest: Volunteers as , cooking, cleaning    Occupation: Retired       Pain  Pain scale at highest: Severe.      Diagnostic Tests  Ultrasound findings: normal      Objective     Strength/Myotome Testing     Left Hip   Planes of Motion   Flexion: 3+  Abduction: 3  Adduction: 3+    Right Hip   Planes of Motion   Flexion: 3  Abduction: 3  Adduction: 3+    Left Knee   Flexion: 3+  Extension: 3+    Right Knee   Flexion: 3+  Extension: 3+    Left Ankle/Foot   Dorsiflexion: 3+  Plantar flexion: 4-    Right Ankle/Foot   Dorsiflexion: 3+  Plantar flexion: 4-    Tests     Additional Tests Details  Timed up and go:  Date: 33.18 seconds. Excessive use of hands on rails for assistance   10/1: 22.21 seconds. Moderate use of hands on rails. SPC. >3 second turn    Sit to stand Transfers:   Date: Excessive use of hands on rails for assistance  10/1: Moderate use of hands on rails    5x sit to stand:   Date: 27.54 seconds with excessive use of hands  10/1: 19.40 seconds with moderate use of hands    2 minute walk test:   Date: NT  10/1: 130 feet    MCTSIB:  EO firm: 30 seconds with mild postural sway   EC firm: 30 seconds with moderate postural sway  EO foam: 30 seconds with mild postural sway  EC foam: 12 seconds prior to LOB    6 minute walk test:   Date: 260 feet in 4:10      *Swelling present in bilateral ankles/feet and right knee vs left knee- This continues but improved  *Sensitivity present in distal LE to light touch, especially over anterior tibia. -This continues     Positive SLUMP for adverse neural tension bilaterally -This continues  Positive SLR at 45 degrees on R/35 degrees on L -5 deg of progress (10/1)      Ambulation     Observational Gait  "    Additional Observational Gait Details  SPC: slow sylvia, dynamic valgus in stance, small step length with femoral adduction            Precautions: MS, Gait dysfunction, Insomnia, Osteoporosis, Peripheral polyneuropathy, Hx of Breast CA, R IDALIA in 2012- Dr. Rushing, Left femur IM nailing       Manuals 8/30 9/3 9/5 9/12 9/17 9/19 9/24 10/1     Sciatic nerve mobilizations B 8' total B 8' total B 8' total- modification to R d/t pain  B 8' total- modification to R d/t pain  B 8' total- modification to R d/t pain  B 8' total- modification to R d/t pain  B 8' in total (R more sensitive than L)                                             Neuro Re-Ed             Slump nerve glides Seated 10x on ea Seated 10x on ea Seated 10x on ea  Seated 10x on ea Seated 10x on ea Seated 10x on ea      Sciatic nerve glides             LAQ             Standing hip abduction  10x 10x  15x defers       Standing march  10x 10x  10x defers       Standing hip extension             EC on foam              Bridge 2x10  2x10  2x10  2x10  2x10 2x10  2x10       Ther Ex             Hip flexion stretch with strap             Nustep/bike 10' 10'  10' L2 - 525 steps  8' L2 - 397steps 10' L1 - 600 steps 10' L1 - 541 steps 10' 664 steps L1      Hamstring curl              Piriformis stretch             SB stretch  10x10\" B (knee flexed) 10x10\" B (knee flexed) 10x10\" B (knee flexed) 10x10\" B (knee flexed) 10x10\" B (knee flexed) 10x10\" B knee extended 10x10\" B knee extended                                             Ther Activity             Standing HR/TR  10x 10x 10x 10x 10x 10x      Sit to stand squat       Mini squat x10 with cueing       Lateral walks             Leeann walks             [place] backward pull             Gait Training                                       Modalities                                       1:1 with PT from 10-1034am  Pt was re-evaluated today x 34 minutes                     "

## 2024-10-03 ENCOUNTER — OFFICE VISIT (OUTPATIENT)
Dept: PHYSICAL THERAPY | Facility: CLINIC | Age: 74
End: 2024-10-03
Payer: MEDICARE

## 2024-10-03 DIAGNOSIS — G35 MULTIPLE SCLEROSIS (HCC): Primary | ICD-10-CM

## 2024-10-03 DIAGNOSIS — Z91.81 RISK FOR FALLS: ICD-10-CM

## 2024-10-03 DIAGNOSIS — R26.9 NEUROLOGIC GAIT DYSFUNCTION: ICD-10-CM

## 2024-10-03 DIAGNOSIS — G62.9 PERIPHERAL POLYNEUROPATHY: ICD-10-CM

## 2024-10-03 PROCEDURE — 97530 THERAPEUTIC ACTIVITIES: CPT | Performed by: PHYSICAL THERAPIST

## 2024-10-03 PROCEDURE — 97110 THERAPEUTIC EXERCISES: CPT | Performed by: PHYSICAL THERAPIST

## 2024-10-03 NOTE — PROGRESS NOTES
Daily Note     Today's date: 10/3/2024  Patient name: Jacy Rodrigez  : 1950  MRN: [id number]  Referring provider: Elidia Briceno MD  Dx:   Encounter Diagnosis     ICD-10-CM    1. Multiple sclerosis (HCC)  G35       2. Peripheral polyneuropathy  G62.9       3. 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 Risk for falls  Z91.81                      Subjective: Pt reports feeling off since her appointment with opthalmology. Feels dizzy but reports normal BP readings.        Objective: See treatment diary below      Assessment: Tolerated treatment well. Left sciatic nerve glide more restricted than right today. This is typically opposite. Implemented foam step up (4 in) into POC.  Patient would benefit from continued PT.       Plan: Continue per plan of care.      Precautions: MS, Gait dysfunction, Insomnia, Osteoporosis, Peripheral polyneuropathy, Hx of Breast CA, R IDALIA in - Dr. Rushing, Left femur IM nailing       Manuals 8/30 9/3 9/5 9/12 9/17 9/19 9/24 10/3     Sciatic nerve mobilizations B 8' total B 8' total B 8' total- modification to R d/t pain  B 8' total- modification to R d/t pain  B 8' total- modification to R d/t pain  B 8' total- modification to R d/t pain  B 8' in total (R more sensitive than L) B 8' in total (R more sensitive than L)                                            Neuro Re-Ed             Slump nerve glides Seated 10x on ea Seated 10x on ea Seated 10x on ea  Seated 10x on ea Seated 10x on ea Seated 10x on ea Seated 10x on ea     Sciatic nerve glides             LAQ             Standing hip abduction  10x 10x  15x defers       Standing march  10x 10x  10x defers       Standing hip extension             EC on foam              Bridge 2x10  2x10  2x10  2x10  2x10 2x10  2x10  2x10      Ther Ex             Hip flexion stretch with strap             Nustep/bike 10' 10'  10' L2 - 525 steps  8' L2 - 397steps 10' L1 - 600 steps 10' L1 - 541 steps 10' 664 steps L1 10' L2 584     Hamstring  "curl              Piriformis stretch             SB stretch  10x10\" B (knee flexed) 10x10\" B (knee flexed) 10x10\" B (knee flexed) 10x10\" B (knee flexed) 10x10\" B (knee flexed) 10x10\" B knee extended 10x10\" B knee extended 10x10\" B knee extended                                            Ther Activity             Standing HR/TR  10x 10x 10x 10x 10x 10x 10x     Sit to stand squat       Mini squat x10 with cueing       Lateral walks             Leeann walks             Step up         Foam 20x on ea     [place] backward pull             Gait Training                                       Modalities                                       1:1 with PT from [id number]                       "

## 2024-10-04 ENCOUNTER — TELEMEDICINE (OUTPATIENT)
Age: 74
End: 2024-10-04
Payer: MEDICARE

## 2024-10-04 DIAGNOSIS — F51.04 INSOMNIA, PSYCHOPHYSIOLOGICAL: Primary | ICD-10-CM

## 2024-10-04 DIAGNOSIS — G47.61 PLMD (PERIODIC LIMB MOVEMENT DISORDER): ICD-10-CM

## 2024-10-04 PROCEDURE — 90791 PSYCH DIAGNOSTIC EVALUATION: CPT | Performed by: SOCIAL WORKER

## 2024-10-04 NOTE — PSYCH
" Behavioral Health Psychotherapy Assessment    Date of Initial Psychotherapy Assessment: 10/04/24  Referral Source: Glen Naylor, DO   Has a release of information been signed for the referral source? No; I requested patient to fill one out prior to our next appt and have placed request to Mary KEEN To send it; I also asked patient to sign Virtual  consent form in [facility].     Preferred Name: Jihan Rodrigez  Preferred Pronouns: She/her  YOB: 1950 Age: 74 y.o.  Sex assigned at birth: female   Gender Identity:   Race:   Preferred Language: English    Emergency Contact:  Full Name:   Relationship to Client:   Contact information:     Primary Care Physician:  Prashant Jonas MD  [address] [place] [address]  [phone]  Has a release of information been signed? No    Physical Health History:  Past surgical procedures:   Do you have a history of any of the following: other Multiple Sclerosis  Do you have any mobility issues? Yes - leg pain, immobility, and numbness that correlates with sleep quantity and quality. I also observed in this session and in her prior evaluation, Ms. Rodrigez also may have some impairment in cognition/communication I.e. I provide an instruction and she will respond with an unrelated detail; currently I am repeating the instruction and checking for understanding. I intentionally shortened today's evaluation to avoid providing too much information/aggravating this concern.     Relevant Family History:      Presenting Problem (What brings you in?)  Ongoing poor sleep; however, at the start of today's session client indicated that for the past 10 days she has slept remarkably well; her belief this is a combination of cooler weather, beginning PT, and beginning a new anti-inflammatory. I requested she keep a daily sleep log and reiterated the importance of this; I suggested our treatment focus on a \"plan B\" if her current experience of good sleep erodes. This plan " will include a return to getting out of bed if unable to sleep, etc.     Mental Health Advance Directive:  Do you currently have a Mental Health Advance Directive?    Diagnosis:   Diagnosis ICD-10-CM Associated Orders   1. Insomnia, psychophysiological  F51.04       2. PLMD (periodic limb movement disorder)  G47.61 Ambulatory referral to Psych Services          Initial Assessment:     Current Mental Status:    Appearance: appropriate      Affect/Mood:  Euthymic and irritable    Speech:  Normal    Sleep:  Interrupted    Oriented to: oriented to self, oriented to place and oriented to time        Counseling History:  Previous Counseling or Treatment  (Mental Health or Drug & Alcohol): Yes    Previous Counseling Details:  Was evaluated for CBT-I earlier this year but did not continue at that time and expressed some confusion and frustration; was referred a second time by her Sleep Medicine physician in September.   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    Recommended Treatment:     Psychotherapy:  Individual sessions    Frequency:  1 time    Session frequency:  Weekly      Visit start and stop times:    10/04/24  Start Time: 1155  Stop Time: 1222  Total Visit Time: 27 minutes    Virtual Regular Visit    Verification of patient location:    Patient is located at Home in the following state in which I hold an active license [place]      Assessment/Plan:    Problem List Items Addressed This Visit    None  Visit Diagnoses       Insomnia,  psychophysiological    -  Primary    PLMD (periodic limb movement disorder)                Goals addressed in session: Goal 1          Reason for visit is   Chief Complaint   Patient presents with    Virtual Regular Visit          Encounter provider Chanelle Villegas LCSW      Recent Visits  No visits were found meeting these conditions.  Showing recent visits within past 7 days and meeting all other requirements  Today's Visits  Date Type Provider Dept   10/04/24 Telemedicine Chanelle Villegas LCSW [facility]   Showing today's visits and meeting all other requirements  Future Appointments  No visits were found meeting these conditions.  Showing future appointments within next 150 days and meeting all other requirements       The patient was identified by name and date of birth. Jacy Rodrigez was informed that this is a telemedicine visit and that the visit is being conducted throughthe Epic Embedded platform. She agrees to proceed..  My office door was closed. No one else was in the room.  She acknowledged consent and understanding of privacy and security of the video platform. The patient has agreed to participate and understands they can discontinue the visit at any time.    Patient is aware this is a billable service.     Subjective  Jacy Rodrigez is a 74 y.o. female  .      HPI     Past Medical History:   Diagnosis Date    Allergic 1967    seasonal    Allergic rhinitis     Bone pain     Breast cancer (HCC) 08/16/2012    left    Breast ptosis     Depression     Fatigue     Fx. left wrist, closed, initial encounter 09/20/2014    Gait disturbance     uses cane at times    Headache     Hip fracture (HCC)     Hip pain     Hip pain, right 09/18/2012    Laterality: Right    History of transfusion 1975    placenta previa s/p work accident, no rx    Hypokalemia     Insomnia     Laceration of finger     right hand    Left ankle pain     Left knee pain     Multiple  sclerosis (HCC)     Muscle strain     left lower leg    Nephrolithiasis     Osteopenia     Osteoporosis     Pain of left calf     Pain of left lower leg 07/25/2017    Pneumonia     Rash     Scoliosis birth    scoliosis    Solitary pulmonary nodule     SVT (supraventricular tachycardia) (HCC)     Tingling     Urgency of urination     Urticaria     Wrist fracture, left        Past Surgical History:   Procedure Laterality Date    ANKLE SURGERY      APPENDECTOMY  11/2012    Dr. Vidales    AUGMENTATION BREAST      Enlargement procedure with prosthetic implant bilateral    AUGMENTATION MAMMAPLASTY Right 01/28/2020    implant replaced because of recall    AUGMENTATION MAMMAPLASTY Bilateral 2012    BREAST BIOPSY Left 07/23/2012    BREAST EXCISIONAL BIOPSY Right     age 39    BREAST IMPLANT Right 01/28/2020    Procedure: BREAST IMPLANT EXCHANGE WITH CAPSULECTOMY;  Surgeon: Da Canales MD;  Location: [facility];  Service: Plastics    BREAST IMPLANT REMOVAL Right 01/28/2020    implant placement, implant revision, right mastopexy    CYSTOSTOMY      with basket extraction of calculus; x2    EXCISION / BIOPSY BREAST / NIPPLE / DUCT Right 05/04/2020    scar revision to resuture non healing surgical wound    EXPLORATORY LAPAROTOMY      FL INJECTION LEFT SHOULDER (ARTHROGRAM)  07/19/2023    FOOT SURGERY      FRACTURE SURGERY  Lt wrist 9/2014 - Lt matthew femur 9/14    HIP FRACTURE SURGERY Right     Subcapital    HIP SURGERY Left     JOINT REPLACEMENT  Rt hip 10/2012    LEG SURGERY      Repair    MASTECTOMY Left 08/16/2012    IA GRAFTING OF AUTOLOGOUS FAT BY LIPO 50 CC OR LESS Bilateral 08/22/2023    Procedure: AUTOLOGOUS FAT GRAFTING TO BILATERAL BREASTS;  Surgeon: Da Canales MD;  Location: [facility];  Service: Plastics    IA MASTOPEXY Right 01/28/2020    Procedure: BREAST MASTOPEXY;  Surgeon: Da Canales MD;  Location: [facility];  Service: Plastics    IA REVISION OF RECONSTRUCTED BREAST Right 05/04/2020     Procedure: REVISION RIGHT BREAST MOUND;  Surgeon: Da Canales MD;  [facility];  Service: Plastics    REDUCTION MAMMAPLASTY Right 01/28/2020    reduced to match left side    SENTINEL LYMPH NODE BIOPSY Left 08/16/2012    SKIN BIOPSY      TONSILLECTOMY      TUBAL LIGATION      WRIST SURGERY Left        Current Outpatient Medications   Medication Sig Dispense Refill    ALPHA LIPOIC ACID PO Take by mouth      amoxicillin (AMOXIL) 500 MG tablet TAKE 4 TABLETS BY MOUTH 1 HOUR PRIOR TO DENTAL VISIT      Ampyra 10 MG TB12 1 tab po bid 180 tablet 3    ascorbic acid (VITAMIN C) 500 mg tablet Take 500 mg by mouth daily      baclofen 10 mg tablet TAKE 1 TABLET EVERY MORNING, 1 TABLET IN THE EVENING  AND 2 TABLETS AT BEDTIME 360 tablet 0    Biotin 5000 MCG TABS       clonazePAM (KlonoPIN) 0.5 mg tablet Take 1.5 tablets (0.75 mg total) by mouth daily at bedtime TAKE 1 AND 1/2 TABLETS BY MOUTH DAILY AT BEDTIME 135 tablet 1    Cranberry 125 MG TABS Take 125 mg by mouth in the morning      cycloSPORINE (RESTASIS) 0.05 % ophthalmic emulsion Administer 1 drop to both eyes if needed      denosumab (PROLIA) 60 mg/mL Inject under the skin      docusate sodium (COLACE) 100 mg capsule Take 1 capsule by mouth every other day      gabapentin (NEURONTIN) 600 MG tablet TAKE 1 TABLET EVERY        MORNING, 1 TABLET IN THE   AFTERNOON, AND 2 TABLETS ATBEDTIME 360 tablet 5    lisinopril (ZESTRIL) 2.5 mg tablet Take 2.5 mg by mouth daily      Magnesium 500 MG TABS Take 500 tablets by mouth once      meloxicam (MOBIC) 7.5 mg tablet Take 7.5 mg by mouth daily      zonisamide (ZONEGRAN) 100 mg capsule TAKE 4 CAPSULES DAILY AT   BEDTIME 360 capsule 3     No current facility-administered medications for this visit.        Allergies   Allergen Reactions    Duloxetine Hcl      Rapid Heart rate      Iodinated Contrast Media Anaphylaxis     IVP dye    Acetaminophen Other (See Comments)     Heart palpitations    Cetirizine      Only occurs  with generic, weakness in legs, off balance and couldn't walk.    Morphine Other (See Comments)     Migraine       Review of Systems    Video Exam    There were no vitals filed for this visit.    Physical Exam     Visit Time    Visit Start Time: 1155  Visit Stop Time: 1222  Total Visit Duration:  27 minutes

## 2024-10-08 ENCOUNTER — OFFICE VISIT (OUTPATIENT)
Dept: PHYSICAL THERAPY | Facility: CLINIC | Age: 74
End: 2024-10-08
Payer: MEDICARE

## 2024-10-08 DIAGNOSIS — G62.9 PERIPHERAL POLYNEUROPATHY: ICD-10-CM

## 2024-10-08 DIAGNOSIS — R26.9 NEUROLOGIC GAIT DYSFUNCTION: ICD-10-CM

## 2024-10-08 DIAGNOSIS — G35 MULTIPLE SCLEROSIS (HCC): Primary | ICD-10-CM

## 2024-10-08 PROCEDURE — 97530 THERAPEUTIC ACTIVITIES: CPT | Performed by: PHYSICAL THERAPIST

## 2024-10-08 PROCEDURE — 97112 NEUROMUSCULAR REEDUCATION: CPT | Performed by: PHYSICAL THERAPIST

## 2024-10-08 PROCEDURE — 97110 THERAPEUTIC EXERCISES: CPT | Performed by: PHYSICAL THERAPIST

## 2024-10-08 NOTE — PROGRESS NOTES
Daily Note     Today's date: 10/8/2024  Patient name: Jacy Rodrigez  : 1950  MRN: [id number]  Referring provider: Elidia Briceno MD  Dx:   Encounter Diagnosis     ICD-10-CM    1. Multiple sclerosis (HCC)  G35       2. Neurologic gait dysfunction  R26.9       3. Peripheral polyneuropathy  G62.9                      Subjective: Pt reports severe sharp right knee pain over the weekend. It stopped her in her tracks. She had to sit for 15 minutes for the pain to subside. She felt the knee would buckle if she were to walk.       Objective: See treatment diary below      Assessment: Pt's knee pain seems neuropathic in nature vs mechanical. Overall, she is doing better, no reported knee pain today. Bilateral sciatic nerve tension is much less sensitive than previous sessions. Tolerated treatment well. Pt too challenged with foam step up. She lost balance after 3 step ups and required PT contact guard for safety. We will resume step ups when she feels comfortable resuming this exercise. Patient would benefit from continued PT.       Plan: Continue per plan of care.      Precautions: MS, Gait dysfunction, Insomnia, Osteoporosis, Peripheral polyneuropathy, Hx of Breast CA, R IDALIA in - Dr. Rushing, Left femur IM nailing       Manuals 8/30 9/3 9/5 9/12 9/17 9/19 9/24 10/3 10/8     Sciatic nerve mobilizations B 8' total B 8' total B 8' total- modification to R d/t pain  B 8' total- modification to R d/t pain  B 8' total- modification to R d/t pain  B 8' total- modification to R d/t pain  B 8' in total (R more sensitive than L) B 8' in total (R more sensitive than L) B 8' in total                                            Neuro Re-Ed             Slump nerve glides Seated 10x on ea Seated 10x on ea Seated 10x on ea  Seated 10x on ea Seated 10x on ea Seated 10x on ea Seated 10x on ea Seated 10x on ea    Sciatic nerve glides             LAQ             Standing hip abduction  10x 10x  15x defers       Standing march   "10x 10x  10x defers       Standing hip extension             EC on foam              Bridge 2x10  2x10  2x10  2x10  2x10 2x10  2x10  2x10  2x10     Ther Ex             Hip flexion stretch with strap             Nustep/bike 10' 10'  10' L2 - 525 steps  8' L2 - 397steps 10' L1 - 600 steps 10' L1 - 541 steps 10' 664 steps L1 10' L2 584 10' 676 steps L2    Hamstring curl              Piriformis stretch             SB stretch  10x10\" B (knee flexed) 10x10\" B (knee flexed) 10x10\" B (knee flexed) 10x10\" B (knee flexed) 10x10\" B (knee flexed) 10x10\" B knee extended 10x10\" B knee extended 10x10\" B knee extended 10x10\" B knee extended                                           Ther Activity             Standing HR/TR  10x 10x 10x 10x 10x 10x 30x 30x    Sit to stand squat       Mini squat x10 with cueing       Lateral walks             Leeann walks             Step up         Foam 20x on ea Foam 3x (terminated due to LOB)    Brandon backward pull             Gait Training                                       Modalities                                       1:1 with PT from [id number]                         "

## 2024-10-10 ENCOUNTER — OFFICE VISIT (OUTPATIENT)
Dept: PHYSICAL THERAPY | Facility: CLINIC | Age: 74
End: 2024-10-10
Payer: MEDICARE

## 2024-10-10 DIAGNOSIS — Z91.81 RISK FOR FALLS: ICD-10-CM

## 2024-10-10 DIAGNOSIS — R26.9 NEUROLOGIC GAIT DYSFUNCTION: ICD-10-CM

## 2024-10-10 DIAGNOSIS — G62.9 PERIPHERAL POLYNEUROPATHY: ICD-10-CM

## 2024-10-10 DIAGNOSIS — G35 MULTIPLE SCLEROSIS (HCC): Primary | ICD-10-CM

## 2024-10-10 PROCEDURE — 97110 THERAPEUTIC EXERCISES: CPT | Performed by: PHYSICAL THERAPIST

## 2024-10-10 PROCEDURE — 97530 THERAPEUTIC ACTIVITIES: CPT | Performed by: PHYSICAL THERAPIST

## 2024-10-10 NOTE — PROGRESS NOTES
Daily Note     Today's date: 10/10/2024  Patient name: Jacy Rodrigez  : 1950  MRN: [id number]  Referring provider: Elidia Briceno MD  Dx:   Encounter Diagnosis     ICD-10-CM    1. Multiple sclerosis (HCC)  G35       2. Neurologic gait dysfunction  R26.9       3. Risk for falls  Z91.81       4. Peripheral polyneuropathy  G62.9                      Subjective: Pt doesn't feel the Voltaren is helping much. She notes increased cramping in her LE while taking the medication. She plans on waiting 1 more night to give it a full week. She notes more soreness in her calf after last treatment. Felt it could have been from the stretching.       Objective: See treatment diary below      Assessment: Tolerated treatment fair. Decreased intensity of nerve glides/stretching. Pt able to perform 20 steps on each leg. Contact guard provided during steps. Patient demonstrated fatigue post treatment and would benefit from continued PT. Max fatigue noted post exercise.       Plan: Continue per plan of care.      Precautions: MS, Gait dysfunction, Insomnia, Osteoporosis, Peripheral polyneuropathy, Hx of Breast CA, R IDALIA in - Dr. Rushing, Left femur IM nailing       Manuals 8/30 9/3 9/5 9/12 9/17 9/19 9/24 10/3 10/8  10/10    Sciatic nerve mobilizations B 8' total B 8' total B 8' total- modification to R d/t pain  B 8' total- modification to R d/t pain  B 8' total- modification to R d/t pain  B 8' total- modification to R d/t pain  B 8' in total (R more sensitive than L) B 8' in total (R more sensitive than L) B 8' in total  B 8' in total                                           Neuro Re-Ed             Slump nerve glides Seated 10x on ea Seated 10x on ea Seated 10x on ea  Seated 10x on ea Seated 10x on ea Seated 10x on ea Seated 10x on ea Seated 10x on ea Seated 10x on ea   Sciatic nerve glides             LAQ             Standing hip abduction  10x 10x  15x defers       Standing march  10x 10x  10x defers       Standing  "hip extension             EC on foam              Bridge 2x10  2x10  2x10  2x10  2x10 2x10  2x10  2x10  2x10  2x10   Ther Ex             Hip flexion stretch with strap             Nustep/bike 10' 10'  10' L2 - 525 steps  8' L2 - 397steps 10' L1 - 600 steps 10' L1 - 541 steps 10' 664 steps L1 10' L2 584 10' 676 steps L2 10' 771 steps L5   Hamstring curl              Piriformis stretch             SB stretch  10x10\" B (knee flexed) 10x10\" B (knee flexed) 10x10\" B (knee flexed) 10x10\" B (knee flexed) 10x10\" B (knee flexed) 10x10\" B knee extended 10x10\" B knee extended 10x10\" B knee extended 10x10\" B knee extended                                           Ther Activity             Standing HR/TR  10x 10x 10x 10x 10x 10x 30x 30x 30x   Sit to stand squat       Mini squat x10 with cueing       Lateral walks             Leeann walks             Step up         Foam 20x on ea Foam 3x (terminated due to LOB) Foam 20x on ea   Brandon backward pull             Gait Training                                       Modalities                                       1:1 with PT from 0700-11                           "

## 2024-10-11 ENCOUNTER — TELEMEDICINE (OUTPATIENT)
Age: 74
End: 2024-10-11
Payer: MEDICARE

## 2024-10-11 DIAGNOSIS — F51.04 INSOMNIA, PSYCHOPHYSIOLOGICAL: Primary | ICD-10-CM

## 2024-10-11 PROCEDURE — 90832 PSYTX W PT 30 MINUTES: CPT | Performed by: SOCIAL WORKER

## 2024-10-11 NOTE — PSYCH
"Behavioral Health Psychotherapy Progress Note    Psychotherapy Provided: Individual Psychotherapy     1. Insomnia, psychophysiological            Goals addressed in session: Will write Safety and Tx plan in next session; Ms. Rodrigez has signed virtual BH form as of this morning per her conversation with [facility] but it continues to appear as Not Received.     DATA:   During this session, this clinician used the following therapeutic modalities: Cognitive Behavioral Therapy to review sleep log and learn that sleep is largely depending on whether client has nerve shocks in the night; these occurred twice since our prior session and she otherwise generally has a high SE (10:30-6:30 SRT with 6.5 hours sleep). However this is with the aid of Klonopin which Dr. Naylor would like her to discontinue; we agreed that she will form a titration plan with her prescriber; initiate this and we will follow up October 23 to determine if it has been problematic for her sleep. At that time we will determine whether to proceed with CBT-I; I did email her a new PARAG so I can consult with Dr. Naylor as she did not receive one yet through the mail.     Substance Abuse was not addressed during this session. If the client is diagnosed with a co-occurring substance use disorder, please indicate any changes in the frequency or amount of use: . Stage of change for addressing substance use diagnoses: No substance use/Not applicable    ASSESSMENT:  Jihan Rodrigez presents with a Euthymic/ normal mood.     her affect is Normal range and intensity, which is congruent, with her mood and the content of the session. The client has not made progress on their goals.     Jihan Rodrigez presents with a none risk of suicide, none risk of self-harm, and none risk of harm to others.    For any risk assessment that surpasses a \"low\" rating, a safety plan must be developed.    A safety plan was indicated: no  If yes, describe in detail     PLAN: " Between sessions, Jihan Rodrigez will follow the above plan. At the next session, the therapist will use Cognitive Behavioral Therapy to address the result of these efforts.    Behavioral Health Treatment Plan and Discharge Planning: Jihan Rodrigez is aware of and agrees to continue to work on their treatment plan. They have identified and are working toward their discharge goals. yes    Visit start and stop times:    10/11/24  Start Time: 0805  Stop Time: 0840  Total Visit Time: 35 minutes  Virtual Regular Visit    Verification of patient location:    Patient is located at Home in the following state in which I hold an active license [place]      Assessment/Plan:    Problem List Items Addressed This Visit    None  Visit Diagnoses       Insomnia, psychophysiological    -  Primary            Goals addressed in session: Goal 1          Reason for visit is   Chief Complaint   Patient presents with    Virtual Regular Visit          Encounter provider Chanelle Villegas LCSW      Recent Visits  Date Type Provider Dept   10/04/24 Telemedicine Chanelle Villegas LCSW [facility]   Showing recent visits within past 7 days and meeting all other requirements  Today's Visits  Date Type Provider Dept   10/11/24 Telemedicine Chanelle Villegas LCSW [facility]   Showing today's visits and meeting all other requirements  Future Appointments  No visits were found meeting these conditions.  Showing future appointments within next 150 days and meeting all other requirements       The patient was identified by name and date of birth. Jacy Rodrigez was informed that this is a telemedicine visit and that the visit is being conducted throughthe Epic Embedded platform. She agrees to proceed..  My office door was closed. No one else was in the room.  She acknowledged consent and understanding of privacy and security of the video platform. The patient has agreed to  participate and understands they can discontinue the visit at any time.    Patient is aware this is a billable service.     Subjective  Jacy Rodrigez is a 74 y.o. female  .      HPI     Past Medical History:   Diagnosis Date    Allergic 1967    seasonal    Allergic rhinitis     Bone pain     Breast cancer (HCC) 08/16/2012    left    Breast ptosis     Depression     Fatigue     Fx. left wrist, closed, initial encounter 09/20/2014    Gait disturbance     uses cane at times    Headache     Hip fracture (HCC)     Hip pain     Hip pain, right 09/18/2012    Laterality: Right    History of transfusion 1975    placenta previa s/p work accident, no rx    Hypokalemia     Insomnia     Laceration of finger     right hand    Left ankle pain     Left knee pain     Multiple sclerosis (HCC)     Muscle strain     left lower leg    Nephrolithiasis     Osteopenia     Osteoporosis     Pain of left calf     Pain of left lower leg 07/25/2017    Pneumonia     Rash     Scoliosis birth    scoliosis    Solitary pulmonary nodule     SVT (supraventricular tachycardia) (HCC)     Tingling     Urgency of urination     Urticaria     Wrist fracture, left        Past Surgical History:   Procedure Laterality Date    ANKLE SURGERY      APPENDECTOMY  11/2012    Dr. Vidales    AUGMENTATION BREAST      Enlargement procedure with prosthetic implant bilateral    AUGMENTATION MAMMAPLASTY Right 01/28/2020    implant replaced because of recall    AUGMENTATION MAMMAPLASTY Bilateral 2012    BREAST BIOPSY Left 07/23/2012    BREAST EXCISIONAL BIOPSY Right     age 39    BREAST IMPLANT Right 01/28/2020    Procedure: BREAST IMPLANT EXCHANGE WITH CAPSULECTOMY;  Surgeon: Da Canales MD;  Location: [facility];  Service: Plastics    BREAST IMPLANT REMOVAL Right 01/28/2020    implant placement, implant revision, right mastopexy    CYSTOSTOMY      with basket extraction of calculus; x2    EXCISION / BIOPSY BREAST / NIPPLE / DUCT Right 05/04/2020    scar  revision to resuture non healing surgical wound    EXPLORATORY LAPAROTOMY      FL INJECTION LEFT SHOULDER (ARTHROGRAM)  07/19/2023    FOOT SURGERY      FRACTURE SURGERY  Lt wrist 9/2014 - Lt matthew femur 9/14    HIP FRACTURE SURGERY Right     Subcapital    HIP SURGERY Left     JOINT REPLACEMENT  Rt hip 10/2012    LEG SURGERY      Repair    MASTECTOMY Left 08/16/2012    NE GRAFTING OF AUTOLOGOUS FAT BY LIPO 50 CC OR LESS Bilateral 08/22/2023    Procedure: AUTOLOGOUS FAT GRAFTING TO BILATERAL BREASTS;  Surgeon: Da Canales MD;  [facility];  Service: Plastics    NE MASTOPEXY Right 01/28/2020    Procedure: BREAST MASTOPEXY;  Surgeon: Da Canales MD;  [facility];  Service: Plastics    NE REVISION OF RECONSTRUCTED BREAST Right 05/04/2020    Procedure: REVISION RIGHT BREAST MOUND;  Surgeon: Da Canales MD;  [facility];  Service: Plastics    REDUCTION MAMMAPLASTY Right 01/28/2020    reduced to match left side    SENTINEL LYMPH NODE BIOPSY Left 08/16/2012    SKIN BIOPSY      TONSILLECTOMY      TUBAL LIGATION      WRIST SURGERY Left        Current Outpatient Medications   Medication Sig Dispense Refill    ALPHA LIPOIC ACID PO Take by mouth      amoxicillin (AMOXIL) 500 MG tablet TAKE 4 TABLETS BY MOUTH 1 HOUR PRIOR TO DENTAL VISIT      Ampyra 10 MG TB12 1 tab po bid 180 tablet 3    ascorbic acid (VITAMIN C) 500 mg tablet Take 500 mg by mouth daily      baclofen 10 mg tablet TAKE 1 TABLET EVERY MORNING, 1 TABLET IN THE EVENING  AND 2 TABLETS AT BEDTIME 360 tablet 0    Biotin 5000 MCG TABS       clonazePAM (KlonoPIN) 0.5 mg tablet Take 1.5 tablets (0.75 mg total) by mouth daily at bedtime TAKE 1 AND 1/2 TABLETS BY MOUTH DAILY AT BEDTIME 135 tablet 1    Cranberry 125 MG TABS Take 125 mg by mouth in the morning      cycloSPORINE (RESTASIS) 0.05 % ophthalmic emulsion Administer 1 drop to both eyes if needed      denosumab (PROLIA) 60 mg/mL Inject under the skin      docusate  sodium (COLACE) 100 mg capsule Take 1 capsule by mouth every other day      gabapentin (NEURONTIN) 600 MG tablet TAKE 1 TABLET EVERY        MORNING, 1 TABLET IN THE   AFTERNOON, AND 2 TABLETS ATBEDTIME 360 tablet 5    lisinopril (ZESTRIL) 2.5 mg tablet Take 2.5 mg by mouth daily      Magnesium 500 MG TABS Take 500 tablets by mouth once      meloxicam (MOBIC) 7.5 mg tablet Take 7.5 mg by mouth daily      zonisamide (ZONEGRAN) 100 mg capsule TAKE 4 CAPSULES DAILY AT   BEDTIME 360 capsule 3     No current facility-administered medications for this visit.        Allergies   Allergen Reactions    Duloxetine Hcl      Rapid Heart rate      Iodinated Contrast Media Anaphylaxis     IVP dye    Acetaminophen Other (See Comments)     Heart palpitations    Cetirizine      Only occurs with generic, weakness in legs, off balance and couldn't walk.    Morphine Other (See Comments)     Migraine       Review of Systems    Video Exam    There were no vitals filed for this visit.    Physical Exam     Visit Time    Visit Start Time: 0805  Visit Stop Time: 0840  Total Visit Duration:  35 minutes

## 2024-10-11 NOTE — PSYCH
"Behavioral Health Psychotherapy Progress Note    Psychotherapy Provided: {PSYCHOTHERAPY:95906}    No diagnosis found.    Goals addressed in session: {GOALS:31178}     DATA: ****Ms. Rodrigez has signed virtual BH form as of the start of our session; system appears not to have updated but I will check again later today.   During this session, this clinician used the following therapeutic modalities: {Therapeutic Modalities:18170}    Substance Abuse {Was/was not:53779} addressed during this session. If the client is diagnosed with a co-occurring substance use disorder, please indicate any changes in the frequency or amount of use: ***. Stage of change for addressing substance use diagnoses: {Psych Substance Abuse Stage of Change:87860}    ASSESSMENT:  Jihan Rodrigez presents with a {Psych/BH Mood:91938::\"Euthymic/ normal\"} mood.     her affect is {Psych/BH Affect:51198::\"Normal range and intensity\"}, which is {Congruent/Non Congruent:32988}, with her mood and the content of the session. The client {HAS/HAS NOT:20194} made progress on their goals.    *** Jihan Rodrigez presents with a {PSYCH Suicide/Homicide Risk:88106} risk of suicide, {PSYCH Suicide/Homicide Risk:60364} risk of self-harm, and {PSYCH Suicide/Homicide Risk:01336} risk of harm to others.    For any risk assessment that surpasses a \"low\" rating, a safety plan must be developed.    A safety plan was indicated: {YES/NO:20200}  If yes, describe in detail ***    PLAN: Between sessions, Jihan Rodrigez will ***. At the next session, the therapist will use {Therapeutic Modalities:93604} to address ***.    Behavioral Health Treatment Plan and Discharge Planning: Jihan Rodrigez is aware of and agrees to continue to work on their treatment plan. They have identified and are working toward their discharge goals. {YES/NO:20200}    Visit start and stop times:    10/11/24     "

## 2024-10-15 ENCOUNTER — OFFICE VISIT (OUTPATIENT)
Dept: PHYSICAL THERAPY | Facility: CLINIC | Age: 74
End: 2024-10-15
Payer: MEDICARE

## 2024-10-15 DIAGNOSIS — Z91.81 RISK FOR FALLS: ICD-10-CM

## 2024-10-15 DIAGNOSIS — R26.9 NEUROLOGIC GAIT DYSFUNCTION: ICD-10-CM

## 2024-10-15 DIAGNOSIS — G62.9 PERIPHERAL POLYNEUROPATHY: ICD-10-CM

## 2024-10-15 DIAGNOSIS — G35 MULTIPLE SCLEROSIS (HCC): Primary | ICD-10-CM

## 2024-10-15 PROCEDURE — 97112 NEUROMUSCULAR REEDUCATION: CPT

## 2024-10-15 PROCEDURE — 97110 THERAPEUTIC EXERCISES: CPT

## 2024-10-15 PROCEDURE — 97140 MANUAL THERAPY 1/> REGIONS: CPT

## 2024-10-15 NOTE — PROGRESS NOTES
Daily Note     Today's date: 10/15/2024  Patient name: Jacy Rodrigez  : 1950  MRN: [id number]  Referring provider: Elidia Briceno MD  Dx:   Encounter Diagnosis     ICD-10-CM    1. Multiple sclerosis (HCC)  G35       2. Neurologic gait dysfunction  R26.9       3. Risk for falls  Z91.81       4. Peripheral polyneuropathy  G62.9           Start Time: 1028  Stop Time: 1108  Total time in clinic (min): 40 minutes    Subjective: Notes that she is not doing very well at the moment. Had a bad night last night with a lot of lower leg pain.       Objective: See treatment diary below      Assessment: Tolerated treatment well. Reduced overall activity due to patient's symptomology and discomfort for today's session. Return as able to previous volume dependent on patient's tolerance. Patient demonstrated fatigue post treatment, exhibited good technique with therapeutic exercises, and would benefit from continued PT      Plan: Continue per plan of care.  Progress treatment as tolerated.       Precautions: MS, Gait dysfunction, Insomnia, Osteoporosis, Peripheral polyneuropathy, Hx of Breast CA, R IDALIA in - Dr. Rushing, Left femur IM nailing       Manuals 10/15 9/3 9/5 9/12 9/17 9/19 9/24 10/3 10/8  10/10    Sciatic nerve mobilizations B 8' in total  B 8' total B 8' total- modification to R d/t pain  B 8' total- modification to R d/t pain  B 8' total- modification to R d/t pain  B 8' total- modification to R d/t pain  B 8' in total (R more sensitive than L) B 8' in total (R more sensitive than L) B 8' in total  B 8' in total                                           Neuro Re-Ed             Slump nerve glides Seated 10x ea  Seated 10x on ea Seated 10x on ea  Seated 10x on ea Seated 10x on ea Seated 10x on ea Seated 10x on ea Seated 10x on ea Seated 10x on ea   Sciatic nerve glides             LAQ             Standing hip abduction  10x 10x  15x defers       Standing march  10x 10x  10x defers       Standing hip  "extension             EC on foam              Bridge 2x10  2x10  2x10  2x10  2x10 2x10  2x10  2x10  2x10  2x10   Ther Ex             Hip flexion stretch with strap             Nustep/bike 10' L2  692 10'  10' L2 - 525 steps  8' L2 - 397steps 10' L1 - 600 steps 10' L1 - 541 steps 10' 664 steps L1 10' L2 584 10' 676 steps L2 10' 771 steps L5   Hamstring curl              Piriformis stretch Manual ea              SB stretch   10x10\" B (knee flexed) 10x10\" B (knee flexed) 10x10\" B (knee flexed) 10x10\" B (knee flexed) 10x10\" B knee extended 10x10\" B knee extended 10x10\" B knee extended 10x10\" B knee extended                                           Ther Activity             Standing HR/TR 30x  10x 10x 10x 10x 10x 10x 30x 30x 30x   Sit to stand squat       Mini squat x10 with cueing       Lateral walks             Leeann walks             Step up  Foam 20x ea        Foam 20x on ea Foam 3x (terminated due to LOB) Foam 20x on ea   [place] backward pull             Gait Training                                       Modalities                                                                  "

## 2024-10-17 ENCOUNTER — OFFICE VISIT (OUTPATIENT)
Dept: PHYSICAL THERAPY | Facility: CLINIC | Age: 74
End: 2024-10-17
Payer: MEDICARE

## 2024-10-17 DIAGNOSIS — G62.9 PERIPHERAL POLYNEUROPATHY: ICD-10-CM

## 2024-10-17 DIAGNOSIS — R26.9 NEUROLOGIC GAIT DYSFUNCTION: ICD-10-CM

## 2024-10-17 DIAGNOSIS — Z91.81 RISK FOR FALLS: ICD-10-CM

## 2024-10-17 DIAGNOSIS — G35 MULTIPLE SCLEROSIS (HCC): Primary | ICD-10-CM

## 2024-10-17 PROCEDURE — 97110 THERAPEUTIC EXERCISES: CPT | Performed by: PHYSICAL THERAPIST

## 2024-10-17 PROCEDURE — 97112 NEUROMUSCULAR REEDUCATION: CPT | Performed by: PHYSICAL THERAPIST

## 2024-10-17 PROCEDURE — 97140 MANUAL THERAPY 1/> REGIONS: CPT | Performed by: PHYSICAL THERAPIST

## 2024-10-17 NOTE — PROGRESS NOTES
Daily Note     Today's date: 10/17/2024  Patient name: Jacy Rodrigez  : 1950  MRN: [id number]  Referring provider: Elidia Briceno MD  Dx:   Encounter Diagnosis     ICD-10-CM    1. Multiple sclerosis (HCC)  G35       2. Neurologic gait dysfunction  R26.9       3. Risk for falls  Z91.81       4. Peripheral polyneuropathy  G62.9                      Subjective: Pt reports overall doing ok today. Her session on Monday went well. She is getting 6 hours a sleep per night.       Objective: See treatment diary below      Assessment: Tolerated treatment well. Improved mobility with sciatic nerve mobility and hamstring length. Tender points present in popliteal fold at bilateral LE- Pt requests different hand placement during manual sciatic nerve glides. Initiated sit to stand exercise with foam. Attempted retro walk at jessica; however, patient was to uncomfortable and unsteady with this activity.   Patient would benefit from continued PT.       Plan: Continue per plan of care.      Precautions: MS, Gait dysfunction, Insomnia, Osteoporosis, Peripheral polyneuropathy, Hx of Breast CA, R IDALIA in - Dr. Rushing, Left femur IM nailing       Manuals 10/15 10/17   9/17 9/19 9/24 10/3 10/8  10/10    Sciatic nerve mobilizations B 8' in total  B 8' in total    B 8' total- modification to R d/t pain  B 8' total- modification to R d/t pain  B 8' in total (R more sensitive than L) B 8' in total (R more sensitive than L) B 8' in total  B 8' in total                                           Neuro Re-Ed             Slump nerve glides Seated 10x ea  Seated 10x ea   Seated 10x on ea Seated 10x on ea Seated 10x on ea Seated 10x on ea Seated 10x on ea Seated 10x on ea   Sciatic nerve glides             LAQ             Standing hip abduction     15x defers       Standing march     10x defers       Standing hip extension             EC on foam              Bridge 2x10  2x10    2x10 2x10  2x10  2x10  2x10  2x10   Ther Ex            "  Hip flexion stretch with strap             Nustep/bike 10' L2  692 10' L2 731   10' L1 - 600 steps 10' L1 - 541 steps 10' 664 steps L1 10' L2 584 10' 676 steps L2 10' 771 steps L5   Hamstring curl              Piriformis stretch Manual ea MH             SB stretch   Hold    10x10\" B (knee flexed) 10x10\" B knee extended 10x10\" B knee extended 10x10\" B knee extended 10x10\" B knee extended                                           Ther Activity             Standing HR/TR 30x  30x   10x 10x 10x 30x 30x 30x   Sit to stand squat  10x with airex foam to increase seat height      Mini squat x10 with cueing       Lateral walks             Leeann walks             Step up  Foam 20x ea  Foam 20x ea       Foam 20x on ea Foam 3x (terminated due to LOB) Foam 20x on ea   Brandon backward pull  1x- pt too uncomfortable (hold)            Gait Training                                       Modalities                                         1:1 with PT from [id number]                           "

## 2024-10-23 ENCOUNTER — TELEMEDICINE (OUTPATIENT)
Age: 74
End: 2024-10-23
Payer: MEDICARE

## 2024-10-23 DIAGNOSIS — F51.04 INSOMNIA, PSYCHOPHYSIOLOGICAL: Primary | ICD-10-CM

## 2024-10-23 PROCEDURE — 90832 PSYTX W PT 30 MINUTES: CPT | Performed by: SOCIAL WORKER

## 2024-10-23 NOTE — PSYCH
"Behavioral Health Psychotherapy Progress Note    Psychotherapy Provided: Individual Psychotherapy     1. Insomnia, psychophysiological            Goals addressed in session: Goal 1     DATA:   During this session, this clinician used the following therapeutic modalities: Cognitive Behavioral Therapy to learn that client's sleep has been poor during warm weather as this worsens leg pain. She has received instructions from Dr. Naylor for Lorazapam titration and agrees to begin this tonight in conjunction with an 11 p.m. bedtime; her current SOL varies from 15-45 minutes with a 10:15 p.m. bedtime.     Substance Abuse was not addressed during this session. If the client is diagnosed with a co-occurring substance use disorder, please indicate any changes in the frequency or amount of use: . Stage of change for addressing substance use diagnoses: No substance use/Not applicable    ASSESSMENT:  Jihan Rodrigez presents with a Euthymic/ normal mood.     her affect is Normal range and intensity, which is congruent, with her mood and the content of the session. The client has not made progress on their goals.     Jihan Rodrigez presents with a none risk of suicide, none risk of self-harm, and none risk of harm to others.    For any risk assessment that surpasses a \"low\" rating, a safety plan must be developed.    A safety plan was indicated: no  If yes, describe in detail     PLAN: Between sessions, Jihan Rodrigez will follow the above SRT and medication plan. 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    Behavioral Health Treatment Plan and Discharge Planning: Jihan Rodrigez is aware of and agrees to continue to work on their treatment plan. They have identified and are working toward their discharge goals. " Massage Therapy Visit:     Saumya Sutton was self-referred.    Condition of Client Subjective :  Patient ID: Saumya Sutton is a 69 y.o. female who presents for reason for visit of Muscle tension release.  HPI    Session Information  Visit Type: Follow-up visit  Description of present complaint: Stress, Discomfort, Muscle tension, Range of motion (ROM), Scar tissue, Gait issues, Chronic pain      Review of Systems    Objective   Pre-treatment Assessment  Condition of Client Note: Chair massage requested for Lumbar, sacroiliac, back muscles.. table massage for supine approach to thigh muscles, calf muscles.    Physical Exam    Actions Assessment/Plan :    Massage Treatment  Patient Position: Table, Supine, Seated  Positioning Assistance: Pillow(s)/bolster under knees while supine, Did not need assistance  Massage Technique: Therapeutic massage, Stretching, Relaxation massage, Myofascial release  Area/Body Region: Lumbar, sacroiliac, quads.  Pressure Scale: 2 - Mild pressure, 3 - Medium pressure, 4 - Moderate pressure  Action Note: Cervical/upper body, kneading, stripping, palming, effleurage, petrissage, cross fiber, on UE, Deltoids, Biceps, Triceps, forearm muscles, hand muscles.  LE, kneading, stripping, palming, knuckle, effleurage, petrissage, cross fiber, stretching, on Vastus group, TFL, lateral approach to Hip Flexors, Hamstrings, Sartorius, Gracilis, Gastrocnemius, Soleus, Tibialis.    Chair massage, kneading, stripping, palming, knuckle, effleurage, petrissage, cross fiber, stretching, on SI joint, upper gluteus jovita/medius, erectors, Longissimus, QL, Obliques, paraspinals, SITS.    Response:  Post-treatment Assessment  Patient Fell Asleep During Treatment: No  Patient Noted Improvement of the Following Symptoms: Muscle tension, ROM  Response Note: Discussed different ways to stretch the thigh muscles, sacroiliac joint, hip flexors, specifically upon waking up.    Evaluation:   Massage Therapy  Evaluation / Recommendation(s) / Follow-up  Post-Treatment Recommendation: Increase hydration  Follow-up: Follow up scheduled.         yes    Visit start and stop times:    10/23/24  Start Time: 1202  Stop Time: 1230  Total Visit Time: 28 minutes  Virtual Regular Visit    Verification of patient location:    Patient is located at Home in the following state in which I hold an active license [place]      Assessment/Plan:    Problem List Items Addressed This Visit    None  Visit Diagnoses       Insomnia, psychophysiological    -  Primary            Goals addressed in session: Goal 1          Reason for visit is   Chief Complaint   Patient presents with    Virtual Regular Visit          Encounter provider Chanelle Villegas LCSW      Recent Visits  No visits were found meeting these conditions.  Showing recent visits within past 7 days and meeting all other requirements  Today's Visits  Date Type Provider Dept   10/23/24 Telemedicine Chanelle Villegas LCSW [facility]   Showing today's visits and meeting all other requirements  Future Appointments  No visits were found meeting these conditions.  Showing future appointments within next 150 days and meeting all other requirements       The patient was identified by name and date of birth. Jacy Rodrigez was informed that this is a telemedicine visit and that the visit is being conducted throughthe Epic Embedded platform. She agrees to proceed..  My office door was closed. No one else was in the room.  She acknowledged consent and understanding of privacy and security of the video platform. The patient has agreed to participate and understands they can discontinue the visit at any time.    Patient is aware this is a billable service.     Subjective  Jacy Rodrigez is a 74 y.o. female  .      HPI     Past Medical History:   Diagnosis Date    Allergic 1967    seasonal    Allergic rhinitis     Bone pain     Breast cancer (HCC) 08/16/2012    left    Breast ptosis     Depression     Fatigue     Fx. left wrist, closed, initial encounter 09/20/2014    Gait disturbance      uses cane at times    Headache     Hip fracture (HCC)     Hip pain     Hip pain, right 09/18/2012    Laterality: Right    History of transfusion 1975    placenta previa s/p work accident, no rx    Hypokalemia     Insomnia     Laceration of finger     right hand    Left ankle pain     Left knee pain     Multiple sclerosis (HCC)     Muscle strain     left lower leg    Nephrolithiasis     Osteopenia     Osteoporosis     Pain of left calf     Pain of left lower leg 07/25/2017    Pneumonia     Rash     Scoliosis birth    scoliosis    Solitary pulmonary nodule     SVT (supraventricular tachycardia) (HCC)     Tingling     Urgency of urination     Urticaria     Wrist fracture, left        Past Surgical History:   Procedure Laterality Date    ANKLE SURGERY      APPENDECTOMY  11/2012    Dr. Vidales    AUGMENTATION BREAST      Enlargement procedure with prosthetic implant bilateral    AUGMENTATION MAMMAPLASTY Right 01/28/2020    implant replaced because of recall    AUGMENTATION MAMMAPLASTY Bilateral 2012    BREAST BIOPSY Left 07/23/2012    BREAST EXCISIONAL BIOPSY Right     age 39    BREAST IMPLANT Right 01/28/2020    Procedure: BREAST IMPLANT EXCHANGE WITH CAPSULECTOMY;  Surgeon: Da Canales MD;  Location: [facility];  Service: Plastics    BREAST IMPLANT REMOVAL Right 01/28/2020    implant placement, implant revision, right mastopexy    CYSTOSTOMY      with basket extraction of calculus; x2    EXCISION / BIOPSY BREAST / NIPPLE / DUCT Right 05/04/2020    scar revision to resuture non healing surgical wound    EXPLORATORY LAPAROTOMY      FL INJECTION LEFT SHOULDER (ARTHROGRAM)  07/19/2023    FOOT SURGERY      FRACTURE SURGERY  Lt wrist 9/2014 - Lt matthew femur 9/14    HIP FRACTURE SURGERY Right     Subcapital    HIP SURGERY Left     JOINT REPLACEMENT  Rt hip 10/2012    LEG SURGERY      Repair    MASTECTOMY Left 08/16/2012    NV GRAFTING OF AUTOLOGOUS FAT BY LIPO 50 CC OR LESS Bilateral 08/22/2023    Procedure:  AUTOLOGOUS FAT GRAFTING TO BILATERAL BREASTS;  Surgeon: Da Canales MD;  Location: [facility];  Service: Plastics    IA MASTOPEXY Right 01/28/2020    Procedure: BREAST MASTOPEXY;  Surgeon: Da Canales MD;  Location: [facility];  Service: Plastics    IA REVISION OF RECONSTRUCTED BREAST Right 05/04/2020    Procedure: REVISION RIGHT BREAST MOUND;  Surgeon: Da Canales MD;  [facility];  Service: Plastics    REDUCTION MAMMAPLASTY Right 01/28/2020    reduced to match left side    SENTINEL LYMPH NODE BIOPSY Left 08/16/2012    SKIN BIOPSY      TONSILLECTOMY      TUBAL LIGATION      WRIST SURGERY Left        Current Outpatient Medications   Medication Sig Dispense Refill    ALPHA LIPOIC ACID PO Take by mouth      amoxicillin (AMOXIL) 500 MG tablet TAKE 4 TABLETS BY MOUTH 1 HOUR PRIOR TO DENTAL VISIT      Ampyra 10 MG TB12 1 tab po bid 180 tablet 3    ascorbic acid (VITAMIN C) 500 mg tablet Take 500 mg by mouth daily      baclofen 10 mg tablet TAKE 1 TABLET EVERY MORNING, 1 TABLET IN THE EVENING  AND 2 TABLETS AT BEDTIME 360 tablet 0    Biotin 5000 MCG TABS       clonazePAM (KlonoPIN) 0.5 mg tablet Take 1.5 tablets (0.75 mg total) by mouth daily at bedtime TAKE 1 AND 1/2 TABLETS BY MOUTH DAILY AT BEDTIME 135 tablet 1    Cranberry 125 MG TABS Take 125 mg by mouth in the morning      cycloSPORINE (RESTASIS) 0.05 % ophthalmic emulsion Administer 1 drop to both eyes if needed      denosumab (PROLIA) 60 mg/mL Inject under the skin      docusate sodium (COLACE) 100 mg capsule Take 1 capsule by mouth every other day      gabapentin (NEURONTIN) 600 MG tablet TAKE 1 TABLET EVERY        MORNING, 1 TABLET IN THE   AFTERNOON, AND 2 TABLETS ATBEDTIME 360 tablet 5    lisinopril (ZESTRIL) 2.5 mg tablet Take 2.5 mg by mouth daily      Magnesium 500 MG TABS Take 500 tablets by mouth once      meloxicam (MOBIC) 7.5 mg tablet Take 7.5 mg by mouth daily      zonisamide (ZONEGRAN) 100 mg capsule TAKE 4  CAPSULES DAILY AT   BEDTIME 360 capsule 3     No current facility-administered medications for this visit.        Allergies   Allergen Reactions    Duloxetine Hcl      Rapid Heart rate      Iodinated Contrast Media Anaphylaxis     IVP dye    Acetaminophen Other (See Comments)     Heart palpitations    Cetirizine      Only occurs with generic, weakness in legs, off balance and couldn't walk.    Morphine Other (See Comments)     Migraine       Review of Systems    Video Exam    There were no vitals filed for this visit.    Physical Exam     Visit Time    Visit Start Time: 1202  Visit Stop Time: 1230   Total Visit Duration:  28 minutes

## 2024-10-24 ENCOUNTER — OFFICE VISIT (OUTPATIENT)
Dept: PHYSICAL THERAPY | Facility: CLINIC | Age: 74
End: 2024-10-24
Payer: MEDICARE

## 2024-10-24 DIAGNOSIS — R26.9 NEUROLOGIC GAIT DYSFUNCTION: ICD-10-CM

## 2024-10-24 DIAGNOSIS — G62.9 PERIPHERAL POLYNEUROPATHY: ICD-10-CM

## 2024-10-24 DIAGNOSIS — G35 MULTIPLE SCLEROSIS (HCC): Primary | ICD-10-CM

## 2024-10-24 DIAGNOSIS — Z91.81 RISK FOR FALLS: ICD-10-CM

## 2024-10-24 PROCEDURE — 97530 THERAPEUTIC ACTIVITIES: CPT | Performed by: PHYSICAL THERAPIST

## 2024-10-24 PROCEDURE — 97112 NEUROMUSCULAR REEDUCATION: CPT | Performed by: PHYSICAL THERAPIST

## 2024-10-24 PROCEDURE — 97110 THERAPEUTIC EXERCISES: CPT | Performed by: PHYSICAL THERAPIST

## 2024-10-24 NOTE — PROGRESS NOTES
Daily Note     Today's date: 10/24/2024  Patient name: Jacy Rodrigez  : 1950  MRN: [id number]  Referring provider: Elidia Briceno MD  Dx:   Encounter Diagnosis     ICD-10-CM    1. Multiple sclerosis (HCC)  G35       2. Neurologic gait dysfunction  R26.9       3. Risk for falls  Z91.81       4. Peripheral polyneuropathy  G62.9                      Subjective: Pt reports having terrible leg pain over the past 4 days. She notes that she slept last night but wasn't able to sleep the nights prior. Pt did start to titrate her klonopin at night time.       Objective: See treatment diary below      Assessment: Tolerated treatment fair. Moderate to severe restrictions in bilateral hamstrings. However, she tolerated nerve glides well. Pt challenged with steps up, especially on left. She terminated the exercise after 7 reps due to the instability of the knee and lack of quadriceps support. She was able to perform 10 on the R.   Initiated ankle DF in seated position. Left side did not active as well as right. Resumed standing marches. Weakness more present on L than R. Patient would benefit from continued PT.       Plan: Continue per plan of care.      Precautions: MS, Gait dysfunction, Insomnia, Osteoporosis, Peripheral polyneuropathy, Hx of Breast CA, R IDALAI in - Dr. Rushing, Left femur IM nailing       Manuals 10/15 10/17 10/24   9/17 9/19 9/24 10/3 10/8  10/10    Sciatic nerve mobilizations B 8' in total  B 8' in total  B 8' in total   B 8' total- modification to R d/t pain  B 8' total- modification to R d/t pain  B 8' in total (R more sensitive than L) B 8' in total (R more sensitive than L) B 8' in total  B 8' in total                                           Neuro Re-Ed             Slump nerve glides Seated 10x ea  Seated 10x ea Seated 10x ea  Seated 10x on ea Seated 10x on ea Seated 10x on ea Seated 10x on ea Seated 10x on ea Seated 10x on ea   Sciatic nerve glides             LAQ             Standing hip  "abduction     15x defers       Standing march   10x on ea  10x defers       Standing hip extension             EC on foam              Bridge 2x10  2x10  2x10   2x10 2x10  2x10  2x10  2x10  2x10   Ther Ex             Hip flexion stretch with strap             Nustep/bike 10' L2  692 10' L2 731 10' L3   10' L1 - 600 steps 10' L1 - 541 steps 10' 664 steps L1 10' L2 584 10' 676 steps L2 10' 771 steps L5   Hamstring curl              Piriformis stretch Manual ea MH             SB stretch   Hold    10x10\" B (knee flexed) 10x10\" B knee extended 10x10\" B knee extended 10x10\" B knee extended 10x10\" B knee extended    Seated ankle DF   10x                                    Ther Activity             Standing HR/TR 30x  30x 30x  10x 10x 10x 30x 30x 30x   Sit to stand squat  10x with airex foam to increase seat height  10x with airex foam to increase seat height     Mini squat x10 with cueing       Lateral walks             Leeann walks             Step up  Foam 20x ea  Foam 20x ea  10x on R/7 on L     Foam 20x on ea Foam 3x (terminated due to LOB) Foam 20x on ea   [place] backward pull  1x- pt too uncomfortable (hold)            Gait Training                                       Modalities                                         1:1 with PT from [id number]                             "

## 2024-10-28 ENCOUNTER — DOCUMENTATION (OUTPATIENT)
Age: 74
End: 2024-10-28

## 2024-10-29 ENCOUNTER — OFFICE VISIT (OUTPATIENT)
Dept: PHYSICAL THERAPY | Facility: CLINIC | Age: 74
End: 2024-10-29
Payer: MEDICARE

## 2024-10-29 DIAGNOSIS — R26.9 NEUROLOGIC GAIT DYSFUNCTION: ICD-10-CM

## 2024-10-29 DIAGNOSIS — G35 MULTIPLE SCLEROSIS (HCC): Primary | ICD-10-CM

## 2024-10-29 DIAGNOSIS — Z91.81 RISK FOR FALLS: ICD-10-CM

## 2024-10-29 PROCEDURE — 97110 THERAPEUTIC EXERCISES: CPT | Performed by: PHYSICAL THERAPIST

## 2024-10-29 PROCEDURE — 97112 NEUROMUSCULAR REEDUCATION: CPT | Performed by: PHYSICAL THERAPIST

## 2024-10-29 PROCEDURE — 97530 THERAPEUTIC ACTIVITIES: CPT | Performed by: PHYSICAL THERAPIST

## 2024-10-29 NOTE — PROGRESS NOTES
Daily Note     Today's date: 10/29/2024  Patient name: Jacy Rodrigez  : 1950  MRN: [id number]  Referring provider: Elidia Briceno MD  Dx:   Encounter Diagnosis     ICD-10-CM    1. Multiple sclerosis (HCC)  G35       2. Neurologic gait dysfunction  R26.9       3. Risk for falls  Z91.81                      Subjective: Pt reports running on no sleep the past few nights. She is weaning from her medication. She notes having nerve shocks in her legs since 8pm last night. She did a lot of ironing yesterday and attributes this to partially causing symptoms      Objective: See treatment diary below      Assessment: Tolerated treatment fair. Pt frustrated with the lack of sleep she is experiencing.  She feels CBT is not working, especially since she has MS and struggles to move at night time. Resumed gastroc SB stretch secondary to distal tightness. Left more restricted than right today. Verbal cueing provided to improved left knee control during step up. Good carry over present. Patient would benefit from continued PT focusing on progressing exercises as tolerated.       Plan: Continue per plan of care.      Precautions: MS, Gait dysfunction, Insomnia, Osteoporosis, Peripheral polyneuropathy, Hx of Breast CA, R IDALIA in - Dr. Rushing, Left femur IM nailing       Manuals 10/15 10/17 10/24  10/29     10/8  10/10    Sciatic nerve mobilizations B 8' in total  B 8' in total  B 8' in total  B 8' in total     B 8' in total  B 8' in total                                           Neuro Re-Ed             Slump nerve glides Seated 10x ea  Seated 10x ea Seated 10x ea Seated 10x ea     Seated 10x on ea Seated 10x on ea   Sciatic nerve glides             LAQ             Standing hip abduction             Standing march   10x on ea          Standing hip extension             EC on foam              Bridge 2x10  2x10  2x10  2x10      2x10  2x10   Ther Ex             Hip flexion stretch with strap             Nustep/bike 10'  "L2  692 10' L2 731 10' L3  10' L3 669     10' 676 steps L2 10' 771 steps L5   Hamstring curl              Piriformis stretch Manual ea MH             SB stretch   Hold   10x10\": B with knee extended     10x10\" B knee extended    Seated ankle DF   10x                                    Ther Activity             Standing HR/TR 30x  30x 30x      30x 30x   Sit to stand squat  10x with airex foam to increase seat height  10x with airex foam to increase seat height           Lateral walks             Leeann walks             Step up  Foam 20x ea  Foam 20x ea  10x on R/7 on L Foam 10x on ea     Foam 3x (terminated due to LOB) Foam 20x on ea   [place] backward pull  1x- pt too uncomfortable (hold)            Gait Training                                       Modalities                                         1:1 with PT from 2-245pm                               "

## 2024-10-31 ENCOUNTER — OFFICE VISIT (OUTPATIENT)
Dept: PHYSICAL THERAPY | Facility: CLINIC | Age: 74
End: 2024-10-31
Payer: MEDICARE

## 2024-10-31 DIAGNOSIS — Z91.81 RISK FOR FALLS: ICD-10-CM

## 2024-10-31 DIAGNOSIS — R26.9 NEUROLOGIC GAIT DYSFUNCTION: ICD-10-CM

## 2024-10-31 DIAGNOSIS — G35 MULTIPLE SCLEROSIS (HCC): Primary | ICD-10-CM

## 2024-10-31 DIAGNOSIS — G62.9 PERIPHERAL POLYNEUROPATHY: ICD-10-CM

## 2024-10-31 PROCEDURE — 97110 THERAPEUTIC EXERCISES: CPT | Performed by: PHYSICAL THERAPIST

## 2024-10-31 PROCEDURE — 97112 NEUROMUSCULAR REEDUCATION: CPT | Performed by: PHYSICAL THERAPIST

## 2024-10-31 PROCEDURE — 97140 MANUAL THERAPY 1/> REGIONS: CPT | Performed by: PHYSICAL THERAPIST

## 2024-10-31 NOTE — PROGRESS NOTES
Daily Note     Today's date: 10/31/2024  Patient name: Jacy Rodrigez  : 1950  MRN: [id number]  Referring provider: Elidia Briceno MD  Dx:   Encounter Diagnosis     ICD-10-CM    1. Multiple sclerosis (HCC)  G35       2. Neurologic gait dysfunction  R26.9       3. Risk for falls  Z91.81       4. Peripheral polyneuropathy  G62.9                      Subjective: Pt reports having nearly 1 week without sleep. She is currently weaning from her sleep medication. Her legs are very fatigued. She is very frustrated with her current sleeping state. Pt also states that she was very fatigued after last treatment session.       Objective: See treatment diary below      Assessment: Green strap utilized for mild over pressure during slump nerve glides. Modified treatment to accommodate for fatigue in LE. Tolerated treatment fair. She required PT assistance during sit to supine transfers due to leg weakness. Patient would benefit from continued PT. Resume all exercises as tolerated.       Plan: Continue per plan of care.      Precautions: MS, Gait dysfunction, Insomnia, Osteoporosis, Peripheral polyneuropathy, Hx of Breast CA, R IDALIA in - Dr. Rushing, Left femur IM nailing       Manuals 10/15 10/17 10/24  10/29 10/31    10/8  10/10    Sciatic nerve mobilizations B 8' in total  B 8' in total  B 8' in total  B 8' in total B 8' in total    B 8' in total  B 8' in total                                           Neuro Re-Ed             Slump nerve glides Seated 10x ea  Seated 10x ea Seated 10x ea Seated 10x ea Seated 10x ea using green strap for over pressure    Seated 10x on ea Seated 10x on ea   Sciatic nerve glides             LAQ             Standing hip abduction             Standing march   10x on ea          Standing hip extension             EC on foam              Bridge 2x10  2x10  2x10  2x10  2x10     2x10  2x10   Ther Ex             Hip flexion stretch with strap             Nustep/bike 10' L2  692 10' L2 731 10'  "L3  10' L3 669 9:40 L3 556     10' 676 steps L2 10' 771 steps L5   Hamstring curl              Piriformis stretch Manual ea MH             SB stretch   Hold   10x10\": B with knee extended     10x10\" B knee extended    Seated ankle DF   10x                                    Ther Activity             Standing HR/TR 30x  30x 30x  30x     30x 30x   Sit to stand squat  10x with airex foam to increase seat height  10x with airex foam to increase seat height           Lateral walks             Leeann walks             Step up  Foam 20x ea  Foam 20x ea  10x on R/7 on L Foam 10x on ea     Foam 3x (terminated due to LOB) Foam 20x on ea   [place] backward pull  1x- pt too uncomfortable (hold)            Gait Training                                       Modalities                                         1:1 with PT from 1009-1047am                                 "

## 2024-11-05 ENCOUNTER — OFFICE VISIT (OUTPATIENT)
Dept: PHYSICAL THERAPY | Facility: CLINIC | Age: 74
End: 2024-11-05
Payer: MEDICARE

## 2024-11-05 DIAGNOSIS — Z91.81 RISK FOR FALLS: ICD-10-CM

## 2024-11-05 DIAGNOSIS — R26.9 NEUROLOGIC GAIT DYSFUNCTION: ICD-10-CM

## 2024-11-05 DIAGNOSIS — G35 MULTIPLE SCLEROSIS (HCC): Primary | ICD-10-CM

## 2024-11-05 DIAGNOSIS — G62.9 PERIPHERAL POLYNEUROPATHY: ICD-10-CM

## 2024-11-05 PROCEDURE — 97140 MANUAL THERAPY 1/> REGIONS: CPT | Performed by: PHYSICAL THERAPIST

## 2024-11-05 PROCEDURE — 97112 NEUROMUSCULAR REEDUCATION: CPT | Performed by: PHYSICAL THERAPIST

## 2024-11-05 PROCEDURE — 97110 THERAPEUTIC EXERCISES: CPT | Performed by: PHYSICAL THERAPIST

## 2024-11-05 NOTE — PROGRESS NOTES
Daily Note     Today's date: 2024  Patient name: Jacy Rodrigez  : 1950  MRN: [id number]  Referring provider: Elidia Briceno MD  Dx:   Encounter Diagnosis     ICD-10-CM    1. Multiple sclerosis (HCC)  G35       2. Risk for falls  Z91.81       3. Peripheral polyneuropathy  G62.9       4. Neurologic gait dysfunction  R26.9                      Subjective: Pt reports having much more sciatic pain in the right leg. She notes that pain is usually in her knees to ankle but since the weekend she has been feeling symptoms from the hip to the ankle. Pt notes her sleep has improved since going back on medication.      Objective: See treatment diary below      Assessment: Implemented bilateral hip ROM due to flexibility restrictions. This seemed to really help her symptoms. Pt notes that parethesias improved in her leg after hip stretching and ROM but she continued to feel numbness in her right foot. Pt's LE was note as fatigued and weak today. She attributes this to improved sleep. Continued to utilize green strap for additional over pressure during slump nerve glide Tolerated treatment well. Patient would benefit from continued PT.       Plan: Continue per plan of care.      Precautions: MS, Gait dysfunction, Insomnia, Osteoporosis, Peripheral polyneuropathy, Hx of Breast CA, R IDALIA in - Dr. Rushing, Left femur IM nailing       Manuals 10/15 10/17 10/24  10/29 10/31 11/5   10/8  10/10    Sciatic nerve mobilizations B 8' in total  B 8' in total  B 8' in total  B 8' in total B 8' in total 5'   B 8' in total  B 8' in total    Hip ROM: flexion, ER      5'                                 Neuro Re-Ed             Slump nerve glides Seated 10x ea  Seated 10x ea Seated 10x ea Seated 10x ea Seated 10x ea using green strap for over pressure Seated 10x ea using green strap for over pressure   Seated 10x on ea Seated 10x on ea   Sciatic nerve glides             LAQ             Standing hip abduction             Standing  "march   10x on ea          Standing hip extension             EC on foam              Bridge 2x10  2x10  2x10  2x10  2x10  2x10    2x10  2x10   Ther Ex             Hip flexion stretch with strap             Nustep/bike 10' L2  692 10' L2 731 10' L3  10' L3 669 9:40 L3 556  10' L3   10' 676 steps L2 10' 771 steps L5   Hamstring curl              Piriformis stretch Manual ea MH             SB stretch   Hold   10x10\": B with knee extended     10x10\" B knee extended    Seated ankle DF   10x                                    Ther Activity             Standing HR/TR 30x  30x 30x  30x  30x   30x 30x   Sit to stand squat  10x with airex foam to increase seat height  10x with airex foam to increase seat height           Lateral walks             Leeann walks             Step up  Foam 20x ea  Foam 20x ea  10x on R/7 on L Foam 10x on ea  Foam 10x on ea   Foam 3x (terminated due to LOB) Foam 20x on ea   [place] backward pull  1x- pt too uncomfortable (hold)            Gait Training                                       Modalities                                         1:1 with PT from [id number]                                   "

## 2024-11-06 NOTE — PROGRESS NOTES
[id number]     Today's date: 2024  Patient name: Jacy Rodrigez  : 1950  MRN: [id number]  Referring provider: Elidia Briceno MD  Dx:   Encounter Diagnosis     ICD-10-CM    1. Multiple sclerosis (HCC)  G35       2. Risk for falls  Z91.81       3. 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 Peripheral polyneuropathy  G62.9                      Assessment  Jacy Rodrigez is a 74 y.o. female who presents with signs and symptoms consistent with progressive multiple sclerosis resulting in LE weakness, fatigue, ambulation dysfunction, and balance impairment. Pt also experiencing allodynia of bilateral LE, seemingly neuropathic in nature. As of recent, patient has been struggling with insomina secondary to weaning from sleep medication. She has been in CBT for sleep. Her LE fatigue and muscle performance seem directly related with the lack of sleep. Progression over this past month has been difficulty due to the weakness in extremities and extreme fatigue. However, since returning on her medication (back to the original dosage) her sleep has improved and so has muscle performance in her LE. Pt continues to ambulate safely using a SPC with slow sylvia. All functional tests measured below. Improvements present in 6 minute walk test.  Due to the progressive nature of MS and her current functional state, I am recommending continued treatment. Patient would benefit from skilled physical therapy to address the impairments, improve their level of function, and to improve their overall quality of life.      Impairments: abnormal coordination, abnormal gait, abnormal or restricted ROM, abnormal movement, impaired balance, impaired physical strength, poor posture   Understanding of Dx/Px/POC: good     Prognosis: good    Goals  Short Term Goals: to be achieved by 4 weeks  1) Patient to be independent with basic HEP.-Partially met  2) Decrease pain to moderate at its worst.-Partially met  3) Increase SLR ROM  "by 5-10 degrees -Partially met  4) Increase LE strength by 1/2 MMT grade in all deficient planes.-Partially met    Long Term Goals: to be achieved by discharge  1) FOTO equal to or greater than expected.-Partially met  2) Ambulation to improve to maximal level of function-Partially met  3) Improve TUG by 5 seconds-Partially met  4) Sit to stand transfers will improve to maximal level of function -Partially met  5) Improve 5x sit to stand by 5 seconds-Partially met    Plan  Patient would benefit from: PT eval and skilled physical therapy    Planned therapy interventions: manual therapy, neuromuscular re-education, patient education, strengthening, therapeutic activities, therapeutic exercise, transfer training, home exercise program and functional ROM exercises    Frequency: 2x per week for 6-8 weeks.  Treatment plan discussed with: patient        Subjective Evaluation    History of Present Illness  Mechanism of injury: History of Current Injury: Pt referred to PT secondary to progressive MS and allodynia in bilateral LE. She was treated by me last year through April of 2024. Her lower extremity symptoms were worsening at that time along with weakness throughout antigravity muscles. Pt notes that her strength, fatigue, and pain continue to worsen.  Due to increasing lower extremity pain, patient received US of bilateral LE and increased Gabapentin dosage. No evidence of significant lower extremity arterial occlusive disease on US of bilateral LE. XR of right knee was negative for osseous pathology. Pt reports frequent 4+ pitting edema in bilateral LE. Pt reports compression stockings don't seem to help her.     Pt reports a walking tolerance of max 5 minutes.     Pt has steps down to the basement but has single level living. Pt has 1-2, 3\" steps to enter her house.   Pain location/Descriptors: Severe LE pain from knees to feet (anterior and posterior). + cramping. R worse than L/   Aggravating factors: Constantly " present (24/7) sometimes worse than others.   Easing factors: Rest, gabapentin  24 HR pattern: Worse that night time.   XR of R knee: normal   Special Questions: Positive for peripheral neuropathy  Patient goals:  Reduce unsteadiness, improve LE strength, increase walking and endurance.   Hobbies/Interest: Volunteers as , cooking, cleaning    Occupation: Retired       Pain  Pain scale at highest: Severe.      Diagnostic Tests  Ultrasound findings: normal      Objective     Strength/Myotome Testing     Left Hip   Planes of Motion   Flexion: 3+  Abduction: 3  Adduction: 3+    Right Hip   Planes of Motion   Flexion: 3+  Abduction: 3  Adduction: 3+    Left Knee   Flexion: 3+  Extension: 3+    Right Knee   Flexion: 3+  Extension: 3+    Left Ankle/Foot   Dorsiflexion: 3+  Plantar flexion: 4-  Eversion: 3  Inversion: 3    Right Ankle/Foot   Dorsiflexion: 3+  Plantar flexion: 4-  Eversion: 3  Inversion: 3    Tests     Additional Tests Details  Timed up and go:  Date: 33.18 seconds. Excessive use of hands on rails for assistance   10/1: 22.21 seconds. Moderate use of hands on rails. SPC. >3 second turn  11/7: 23.99 seconds. Moderate use of hands on rails. SPC. Turn in 2.8 seconds    Sit to stand Transfers:   Date: Excessive use of hands on rails for assistance  10/1: Moderate use of hands on rails  11/7: Moderate use of hands on raisl     5x sit to stand:   Date: 27.54 seconds with excessive use of hands  10/1: 19.40 seconds with moderate use of hands  11/7: 19.69 seconds with moderate use of hands    2 minute walk test:   Date: NT  10/1: 130 feet  11/7: NT    MCTSIB:  EO firm: 30 seconds with mild postural sway   EC firm: 30 seconds with moderate postural sway  EO foam: 30 seconds with mild postural sway  EC foam: 17 seconds prior to LOB    6 minute walk test:   Date: 260 feet in 4:10  11/7: 423 feet in 6 minutes using SPC    *Swelling present in bilateral ankles/feet and right knee vs left knee- This continues but  "improved  *Sensitivity present in distal LE to light touch, especially over anterior tibia. -This continues     Positive SLUMP for adverse neural tension bilaterally -This continues  Positive SLR at 45 degrees on R/35 degrees on L -5 deg of progress (10/1)      Ambulation     Observational Gait     Additional Observational Gait Details  SPC: slow sylvia, dynamic valgus in stance, small step length with femoral adduction          Precautions: MS, Gait dysfunction, Insomnia, Osteoporosis, Peripheral polyneuropathy, Hx of Breast CA, R IDALIA in 2012- Dr. Rushing, Left femur IM nailing       Manuals 10/15 10/17 10/24  10/29 10/31 11/5 11/7  10/8  10/10    Sciatic nerve mobilizations B 8' in total  B 8' in total  B 8' in total  B 8' in total B 8' in total 5' 4'  B 8' in total  B 8' in total    Hip ROM: flexion, ER      5' 4'                                Neuro Re-Ed             Slump nerve glides Seated 10x ea  Seated 10x ea Seated 10x ea Seated 10x ea Seated 10x ea using green strap for over pressure Seated 10x ea using green strap for over pressure   Seated 10x on ea Seated 10x on ea   Sciatic nerve glides             LAQ             Standing hip abduction             Standing march   10x on ea          Standing hip extension             EC on foam              Bridge 2x10  2x10  2x10  2x10  2x10  2x10  2x10   2x10  2x10   Ther Ex             Hip flexion stretch with strap             Nustep/bike 10' L2  692 10' L2 731 10' L3  10' L3 669 9:40 L3 556  10' L3   10' 676 steps L2 10' 771 steps L5   Hamstring curl              Piriformis stretch Manual ea MH             SB stretch   Hold   10x10\": B with knee extended     10x10\" B knee extended    Seated ankle DF   10x                                    Ther Activity             Standing HR/TR 30x  30x 30x  30x  30x   30x 30x   Sit to stand squat  10x with airex foam to increase seat height  10x with airex foam to increase seat height           Lateral walks           "   Leenan walks             Step up  Foam 20x ea  Foam 20x ea  10x on R/7 on L Foam 10x on ea  Foam 10x on ea   Foam 3x (terminated due to LOB) Foam 20x on ea   Brandon backward pull  1x- pt too uncomfortable (hold)            Gait Training                                       Modalities                                         1:1 with PT from [id number]  Pt was re-evaluated x 20 mintues

## 2024-11-07 ENCOUNTER — EVALUATION (OUTPATIENT)
Dept: PHYSICAL THERAPY | Facility: CLINIC | Age: 74
End: 2024-11-07
Payer: MEDICARE

## 2024-11-07 DIAGNOSIS — Z91.81 RISK FOR FALLS: ICD-10-CM

## 2024-11-07 DIAGNOSIS — G35 MULTIPLE SCLEROSIS (HCC): Primary | ICD-10-CM

## 2024-11-07 DIAGNOSIS — G62.9 PERIPHERAL POLYNEUROPATHY: ICD-10-CM

## 2024-11-07 DIAGNOSIS — R26.9 NEUROLOGIC GAIT DYSFUNCTION: ICD-10-CM

## 2024-11-07 PROCEDURE — 97530 THERAPEUTIC ACTIVITIES: CPT | Performed by: PHYSICAL THERAPIST

## 2024-11-07 PROCEDURE — 97112 NEUROMUSCULAR REEDUCATION: CPT | Performed by: PHYSICAL THERAPIST

## 2024-11-12 ENCOUNTER — OFFICE VISIT (OUTPATIENT)
Dept: PHYSICAL THERAPY | Facility: CLINIC | Age: 74
End: 2024-11-12
Payer: MEDICARE

## 2024-11-12 DIAGNOSIS — G62.9 PERIPHERAL POLYNEUROPATHY: ICD-10-CM

## 2024-11-12 DIAGNOSIS — G35 MULTIPLE SCLEROSIS (HCC): Primary | ICD-10-CM

## 2024-11-12 DIAGNOSIS — R26.9 NEUROLOGIC GAIT DYSFUNCTION: ICD-10-CM

## 2024-11-12 DIAGNOSIS — Z91.81 RISK FOR FALLS: ICD-10-CM

## 2024-11-12 PROCEDURE — 97110 THERAPEUTIC EXERCISES: CPT | Performed by: PHYSICAL THERAPIST

## 2024-11-12 PROCEDURE — 97112 NEUROMUSCULAR REEDUCATION: CPT | Performed by: PHYSICAL THERAPIST

## 2024-11-12 NOTE — PROGRESS NOTES
Daily Note     Today's date: 2024  Patient name: Jacy Rodrigez  : 1950  MRN: [id number]  Referring provider: Eldiia Briceno MD  Dx:   Encounter Diagnosis     ICD-10-CM    1. Multiple sclerosis (HCC)  G35       2. Risk for falls  Z91.81       3. 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 Peripheral polyneuropathy  G62.9                      Subjective: Pt reports having an incredibly busy weekend with ceremonies from 's day. She had to walk up and extended incline yesterday, which fatigued LE. She notes poor sleep last night from 1 AM on.       Objective: See treatment diary below      Assessment: Due to level of fatigue, we did not complete all of prescribed exercises. Implemented piriformis stretching as patient notes pain levels in LE reducing with hip stretching. Tolerated treatment fair with modification. She needed min assist with sit to supine tranfers secondary to LE fatigue and weakness. Plan to resume all exercises as tolerated next session.  Patient would benefit from continued PT      Plan: Continue per plan of care.      Precautions: MS, Gait dysfunction, Insomnia, Osteoporosis, Peripheral polyneuropathy, Hx of Breast CA, R IDALIA in - Dr. Rushing, Left femur IM nailing       Manuals 10/15 10/17 10/24  10/29 10/31 11/5 11/7 11/12     Sciatic nerve mobilizations B 8' in total  B 8' in total  B 8' in total  B 8' in total B 8' in total 5' 4' 5'     Hip ROM: flexion, ER      5' 4' 5'                               Neuro Re-Ed             Slump nerve glides Seated 10x ea  Seated 10x ea Seated 10x ea Seated 10x ea Seated 10x ea using green strap for over pressure Seated 10x ea using green strap for over pressure  Seated 10x ea using green strap for over pressure     Sciatic nerve glides             LAQ             Standing hip abduction             Standing march   10x on ea          Standing hip extension             EC on foam              Bridge 2x10  2x10  2x10  2x10  2x10  2x10   "2x10  2x10     Ther Ex             Hip flexion stretch with strap             Nustep/bike 10' L2  692 10' L2 731 10' L3  10' L3 669 9:40 L3 556  10' L3  10' L2 771 steps     Hamstring curl              Piriformis stretch Manual ea MH        10x10\"      SB stretch   Hold   10x10\": B with knee extended    10x10\": B with knee extended     Seated ankle DF   10x                                    Ther Activity             Standing HR/TR 30x  30x 30x  30x  30x  30x     Sit to stand squat  10x with airex foam to increase seat height  10x with airex foam to increase seat height           Lateral walks             Leeann walks             Step up  Foam 20x ea  Foam 20x ea  10x on R/7 on L Foam 10x on ea  Foam 10x on ea       [place] backward pull  1x- pt too uncomfortable (hold)            Gait Training                                       Modalities                                         1:1 with PT from 1016-1045pm                                       "

## 2024-11-13 ENCOUNTER — TELEMEDICINE (OUTPATIENT)
Age: 74
End: 2024-11-13
Payer: MEDICARE

## 2024-11-13 DIAGNOSIS — F51.04 INSOMNIA, PSYCHOPHYSIOLOGICAL: Primary | ICD-10-CM

## 2024-11-13 PROCEDURE — 90832 PSYTX W PT 30 MINUTES: CPT | Performed by: SOCIAL WORKER

## 2024-11-13 NOTE — PSYCH
Psychotherapy Discharge Summary    Preferred Name: Jihan Rodrigez  YOB: 1950    Admission date to psychotherapy: 4/15/2024    Referred by: Glen Naylor DO     Presenting Problem: Insomnia related to leg pain     Course of treatment included : individual therapy     Progress/Outcome of Treatment Goals (brief summary of course of treatment) Met weekly and complete sleep log/attempted behavioral change commensurate with Klonopin titration; however this was not successful and she has returned to full dosage with physician's knowledge     Treatment Complications (if any):     Treatment Progress: fair    Current [facility] Psychiatric Provider:     Discharge Medications include: Klonazapam     Discharge Date: 11/13/2024    Discharge Diagnosis:   1. Insomnia, psychophysiological            Criteria for Discharge:  Poor response to CBT-I intervention; has returned to sleep medication to mitigate middle awakenings     Aftercare recommendations include (include specific referral names and phone numbers, if appropriate):     Prognosis: good      Virtual Regular Visit    Verification of patient location:    Patient is located at Home in the following state in which I hold an active license [place]      Assessment/Plan:    Problem List Items Addressed This Visit    None  Visit Diagnoses         Insomnia, psychophysiological    -  Primary            Goals addressed in session: Goal 1          Reason for visit is   Chief Complaint   Patient presents with    Virtual Regular Visit          Encounter provider Chanelle Villegas LCSW      Recent Visits  No visits were found meeting these conditions.  Showing recent visits within past 7 days and meeting all other requirements  Today's Visits  Date Type Provider Dept   11/13/24 Telemedicine Chanelle Villegas LCSW [facility]   Showing today's visits and meeting all other requirements  Future Appointments  No visits were found meeting these  conditions.  Showing future appointments within next 150 days and meeting all other requirements       The patient was identified by name and date of birth. Jacy Rodrigez was informed that this is a telemedicine visit and that the visit is being conducted throughthe Epic Embedded platform. She agrees to proceed..  My office door was closed. No one else was in the room.  She acknowledged consent and understanding of privacy and security of the video platform. The patient has agreed to participate and understands they can discontinue the visit at any time.    Patient is aware this is a billable service.     Subjective  Jacy Rodrigez is a 74 y.o. female  .      HPI     Past Medical History:   Diagnosis Date    Allergic 1967    seasonal    Allergic rhinitis     Bone pain     Breast cancer (HCC) 08/16/2012    left    Breast ptosis     Depression     Fatigue     Fx. left wrist, closed, initial encounter 09/20/2014    Gait disturbance     uses cane at times    Headache     Hip fracture (HCC)     Hip pain     Hip pain, right 09/18/2012    Laterality: Right    History of transfusion 1975    placenta previa s/p work accident, no rx    Hypokalemia     Insomnia     Laceration of finger     right hand    Left ankle pain     Left knee pain     Multiple sclerosis (HCC)     Muscle strain     left lower leg    Nephrolithiasis     Osteopenia     Osteoporosis     Pain of left calf     Pain of left lower leg 07/25/2017    Pneumonia     Rash     Scoliosis birth    scoliosis    Solitary pulmonary nodule     SVT (supraventricular tachycardia) (HCC)     Tingling     Urgency of urination     Urticaria     Wrist fracture, left        Past Surgical History:   Procedure Laterality Date    ANKLE SURGERY      APPENDECTOMY  11/2012    Dr. Vidales    AUGMENTATION BREAST      Enlargement procedure with prosthetic implant bilateral    AUGMENTATION MAMMAPLASTY Right 01/28/2020    implant replaced because of recall    AUGMENTATION MAMMAPLASTY  Bilateral 2012    BREAST BIOPSY Left 07/23/2012    BREAST EXCISIONAL BIOPSY Right     age 39    BREAST IMPLANT Right 01/28/2020    Procedure: BREAST IMPLANT EXCHANGE WITH CAPSULECTOMY;  Surgeon: Da Canales MD;  Location: [facility];  Service: Plastics    BREAST IMPLANT REMOVAL Right 01/28/2020    implant placement, implant revision, right mastopexy    CYSTOSTOMY      with basket extraction of calculus; x2    EXCISION / BIOPSY BREAST / NIPPLE / DUCT Right 05/04/2020    scar revision to resuture non healing surgical wound    EXPLORATORY LAPAROTOMY      FL INJECTION LEFT SHOULDER (ARTHROGRAM)  07/19/2023    FOOT SURGERY      FRACTURE SURGERY  Lt wrist 9/2014 - Lt amtthew femur 9/14    HIP FRACTURE SURGERY Right     Subcapital    HIP SURGERY Left     JOINT REPLACEMENT  Rt hip 10/2012    LEG SURGERY      Repair    MASTECTOMY Left 08/16/2012    CT GRAFTING OF AUTOLOGOUS FAT BY LIPO 50 CC OR LESS Bilateral 08/22/2023    Procedure: AUTOLOGOUS FAT GRAFTING TO BILATERAL BREASTS;  Surgeon: Da Canales MD;  Location: [facility];  Service: Plastics    CT MASTOPEXY Right 01/28/2020    Procedure: BREAST MASTOPEXY;  Surgeon: Da Canales MD;  Location: [facility];  Service: Plastics    CT REVISION OF RECONSTRUCTED BREAST Right 05/04/2020    Procedure: REVISION RIGHT BREAST MOUND;  Surgeon: Da Canales MD;  [facility];  Service: Plastics    REDUCTION MAMMAPLASTY Right 01/28/2020    reduced to match left side    SENTINEL LYMPH NODE BIOPSY Left 08/16/2012    SKIN BIOPSY      TONSILLECTOMY      TUBAL LIGATION      WRIST SURGERY Left        Current Outpatient Medications   Medication Sig Dispense Refill    ALPHA LIPOIC ACID PO Take by mouth      amoxicillin (AMOXIL) 500 MG tablet TAKE 4 TABLETS BY MOUTH 1 HOUR PRIOR TO DENTAL VISIT      Ampyra 10 MG TB12 1 tab po bid 180 tablet 3    ascorbic acid (VITAMIN C) 500 mg tablet Take 500 mg by mouth daily      baclofen 10 mg tablet TAKE 1 TABLET  EVERY MORNING, 1 TABLET IN THE EVENING  AND 2 TABLETS AT BEDTIME 360 tablet 0    Biotin 5000 MCG TABS       clonazePAM (KlonoPIN) 0.5 mg tablet Take 1.5 tablets (0.75 mg total) by mouth daily at bedtime TAKE 1 AND 1/2 TABLETS BY MOUTH DAILY AT BEDTIME 135 tablet 1    Cranberry 125 MG TABS Take 125 mg by mouth in the morning      cycloSPORINE (RESTASIS) 0.05 % ophthalmic emulsion Administer 1 drop to both eyes if needed      denosumab (PROLIA) 60 mg/mL Inject under the skin      docusate sodium (COLACE) 100 mg capsule Take 1 capsule by mouth every other day      gabapentin (NEURONTIN) 600 MG tablet TAKE 1 TABLET EVERY        MORNING, 1 TABLET IN THE   AFTERNOON, AND 2 TABLETS ATBEDTIME 360 tablet 5    lisinopril (ZESTRIL) 2.5 mg tablet Take 2.5 mg by mouth daily      Magnesium 500 MG TABS Take 500 tablets by mouth once      meloxicam (MOBIC) 7.5 mg tablet Take 7.5 mg by mouth daily      zonisamide (ZONEGRAN) 100 mg capsule TAKE 4 CAPSULES DAILY AT   BEDTIME 360 capsule 3     No current facility-administered medications for this visit.        Allergies   Allergen Reactions    Duloxetine Hcl      Rapid Heart rate      Iodinated Contrast Media Anaphylaxis     IVP dye    Acetaminophen Other (See Comments)     Heart palpitations    Cetirizine      Only occurs with generic, weakness in legs, off balance and couldn't walk.    Morphine Other (See Comments)     Migraine       Review of Systems    Video Exam    There were no vitals filed for this visit.    Physical Exam     Visit Time    Visit Start Time: 1200  Visit Stop Time: 1228  Total Visit Duration:  28 minutes

## 2024-11-14 ENCOUNTER — APPOINTMENT (OUTPATIENT)
Dept: PHYSICAL THERAPY | Facility: CLINIC | Age: 74
End: 2024-11-14
Payer: MEDICARE

## 2024-11-14 DIAGNOSIS — G35 MULTIPLE SCLEROSIS (HCC): ICD-10-CM

## 2024-11-15 RX ORDER — BACLOFEN 10 MG/1
TABLET ORAL
Qty: 360 TABLET | Refills: 0 | Status: SHIPPED | OUTPATIENT
Start: 2024-11-15

## 2024-11-19 ENCOUNTER — OFFICE VISIT (OUTPATIENT)
Dept: PHYSICAL THERAPY | Facility: CLINIC | Age: 74
End: 2024-11-19
Payer: MEDICARE

## 2024-11-19 DIAGNOSIS — R26.9 NEUROLOGIC GAIT DYSFUNCTION: ICD-10-CM

## 2024-11-19 DIAGNOSIS — Z91.81 RISK FOR FALLS: ICD-10-CM

## 2024-11-19 DIAGNOSIS — G35 MULTIPLE SCLEROSIS (HCC): Primary | ICD-10-CM

## 2024-11-19 PROCEDURE — 97110 THERAPEUTIC EXERCISES: CPT | Performed by: PHYSICAL THERAPIST

## 2024-11-19 PROCEDURE — 97112 NEUROMUSCULAR REEDUCATION: CPT | Performed by: PHYSICAL THERAPIST

## 2024-11-19 PROCEDURE — 97140 MANUAL THERAPY 1/> REGIONS: CPT | Performed by: PHYSICAL THERAPIST

## 2024-11-19 NOTE — PROGRESS NOTES
Daily Note     Today's date: 2024  Patient name: Jacy Rodrigez  : 1950  MRN: [id number]  Referring provider: Elidia Briceno MD  Dx:   Encounter Diagnosis     ICD-10-CM    1. Multiple sclerosis (HCC)  G35       2. Risk for falls  Z91.81       3. Neurologic gait dysfunction  R26.9                      Subjective: Pt reports having right medial knee pain recently. Denies any trauma, falls, or injury. Pt notes that her legs seem to want to buckle on her recently. She notes that last 5 days she's noticed buckling.       Objective: See treatment diary below      Assessment: Knee pain likely due to weakness in hips and quadriceps. Discussed transitioning to RW for safety.  Discussed activity modification to prevent falls. Tolerated treatment fair. Patient demonstrated fatigue post treatment and would benefit from continued PT.       Plan: Continue per plan of care.      Precautions: MS, Gait dysfunction, Insomnia, Osteoporosis, Peripheral polyneuropathy, Hx of Breast CA, R IDALIA in - Dr. Rushing, Left femur IM nailing       Manuals 10/15 10/17 10/24  10/29 10/31 11/5 11/7 11/12 11/19    Sciatic nerve mobilizations B 8' in total  B 8' in total  B 8' in total  B 8' in total B 8' in total 5' 4' 5' 5'    Hip ROM: flexion, ER      5' 4' 5' 8'                              Neuro Re-Ed             Slump nerve glides Seated 10x ea  Seated 10x ea Seated 10x ea Seated 10x ea Seated 10x ea using green strap for over pressure Seated 10x ea using green strap for over pressure  Seated 10x ea using green strap for over pressure Seated 10x ea using green strap for over pressure    Sciatic nerve glides             LAQ             Standing hip abduction             Standing march   10x on ea          Standing hip extension             EC on foam              Bridge 2x10  2x10  2x10  2x10  2x10  2x10  2x10  2x10 2x10     Ther Ex             Hip flexion stretch with strap             Nustep/bike 10' L2  692 10' L2 731 10' L3  " 10' L3 669 9:40 L3 556  10' L3  10' L2 771 steps 6:40' L2 (unable to go past this d/t R knee pain     Hamstring curl              Piriformis stretch Manual ea MH        10x10\"      SB stretch   Hold   10x10\": B with knee extended    10x10\": B with knee extended     Seated ankle DF   10x                                    Ther Activity             Standing HR/TR 30x  30x 30x  30x  30x  30x 30x    Sit to stand squat  10x with airex foam to increase seat height  10x with airex foam to increase seat height           Lateral walks             Leeann walks             Step up  Foam 20x ea  Foam 20x ea  10x on R/7 on L Foam 10x on ea  Foam 10x on ea       [place] backward pull  1x- pt too uncomfortable (hold)            Gait Training                                       Modalities                                         1:1 with PT from [id number]                                         "

## 2024-11-21 ENCOUNTER — OFFICE VISIT (OUTPATIENT)
Dept: PHYSICAL THERAPY | Facility: CLINIC | Age: 74
End: 2024-11-21
Payer: MEDICARE

## 2024-11-21 DIAGNOSIS — R26.9 NEUROLOGIC GAIT DYSFUNCTION: ICD-10-CM

## 2024-11-21 DIAGNOSIS — Z91.81 RISK FOR FALLS: ICD-10-CM

## 2024-11-21 DIAGNOSIS — G35 MULTIPLE SCLEROSIS (HCC): Primary | ICD-10-CM

## 2024-11-21 PROCEDURE — 97110 THERAPEUTIC EXERCISES: CPT | Performed by: PHYSICAL THERAPIST

## 2024-11-21 PROCEDURE — 97140 MANUAL THERAPY 1/> REGIONS: CPT | Performed by: PHYSICAL THERAPIST

## 2024-11-21 NOTE — PROGRESS NOTES
Daily Note     Today's date: 2024  Patient name: Jacy Rodrigez  : 1950  MRN: [id number]  Referring provider: Elidia Briceno MD  Dx:   Encounter Diagnosis     ICD-10-CM    1. Multiple sclerosis (HCC)  G35       2. Risk for falls  Z91.81       3. 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 She saw her Rheumatologist this week and noticed the weakness in her legs.        Objective: See treatment diary below      Assessment: She notes medial knee pain at about the 8 minute sharif on the nustep. Muscle cramping present in bilateral calf as she was lying hooklying. Tolerated treatment fair. Pt still has considerable amount of sensitivity in  distal LE and R hip limiting movement and progression of exercise.  Implemented hip abduction isometric, hip adduction isometric, LAQ with weights, and marching. Patient would benefit from continued PT.       Plan: Continue per plan of care.      Precautions: MS, Gait dysfunction, Insomnia, Osteoporosis, Peripheral polyneuropathy, Hx of Breast CA, R IDALIA in - Dr. Rushing, Left femur IM nailing       Manuals 10/15 10/17 10/24  10/29 10/31 11/5 11/7 11/12 11/19 11/21   Sciatic nerve mobilizations B 8' in total  B 8' in total  B 8' in total  B 8' in total B 8' in total 5' 4' 5' 5' 4'   Hip ROM: flexion, ER      5' 4' 5' 8' 8'                             Neuro Re-Ed             Slump nerve glides Seated 10x ea  Seated 10x ea Seated 10x ea Seated 10x ea Seated 10x ea using green strap for over pressure Seated 10x ea using green strap for over pressure  Seated 10x ea using green strap for over pressure Seated 10x ea using green strap for over pressure    Sciatic nerve glides             LAQ          10x 2#    Standing hip abduction             Standing march   10x on ea          Standing hip extension             EC on foam              Marching in hooklying          20x  "on ea   Bridge 2x10  2x10  2x10  2x10  2x10  2x10  2x10  2x10 2x10     Hip add isometric          20x5\"   Hip abd isometric in hooklying          20x 5\"    Ther Ex             Hip flexion stretch with strap             Nustep/bike 10' L2  692 10' L2 731 10' L3  10' L3 669 9:40 L3 556  10' L3  10' L2 771 steps 6:40' L2 (unable to go past this d/t R knee pain  10' L1   Hamstring curl              Piriformis stretch Manual ea MH        10x10\"      SB stretch   Hold   10x10\": B with knee extended    10x10\": B with knee extended     Seated ankle DF   10x                                    Ther Activity             Standing HR/TR 30x  30x 30x  30x  30x  30x 30x    Sit to stand squat  10x with airex foam to increase seat height  10x with airex foam to increase seat height           Lateral walks             Leeann walks             Step up  Foam 20x ea  Foam 20x ea  10x on R/7 on L Foam 10x on ea  Foam 10x on ea       Spreckels backward pull  1x- pt too uncomfortable (hold)            Gait Training                                       Modalities                                         1:1 with PT from [id number]                                             "

## 2024-11-25 DIAGNOSIS — R26.9 GAIT DISTURBANCE: ICD-10-CM

## 2024-11-25 DIAGNOSIS — G35 MULTIPLE SCLEROSIS (HCC): ICD-10-CM

## 2024-11-26 ENCOUNTER — OFFICE VISIT (OUTPATIENT)
Dept: PHYSICAL THERAPY | Facility: CLINIC | Age: 74
End: 2024-11-26
Payer: MEDICARE

## 2024-11-26 DIAGNOSIS — R26.9 NEUROLOGIC GAIT DYSFUNCTION: ICD-10-CM

## 2024-11-26 DIAGNOSIS — G35 MULTIPLE SCLEROSIS (HCC): Primary | ICD-10-CM

## 2024-11-26 DIAGNOSIS — Z91.81 RISK FOR FALLS: ICD-10-CM

## 2024-11-26 PROCEDURE — 97110 THERAPEUTIC EXERCISES: CPT | Performed by: PHYSICAL THERAPIST

## 2024-11-26 PROCEDURE — 97112 NEUROMUSCULAR REEDUCATION: CPT | Performed by: PHYSICAL THERAPIST

## 2024-11-26 PROCEDURE — 97140 MANUAL THERAPY 1/> REGIONS: CPT | Performed by: PHYSICAL THERAPIST

## 2024-11-26 RX ORDER — DALFAMPRIDINE 10 MG/1
10 TABLET, FILM COATED, EXTENDED RELEASE ORAL 2 TIMES DAILY
Qty: 180 TABLET | Refills: 0 | Status: SHIPPED | OUTPATIENT
Start: 2024-11-26

## 2024-11-26 NOTE — PROGRESS NOTES
"Daily Note     Today's date: 2024  Patient name: Jacy Rodrigez  : 1950  MRN: [id number]  Referring provider: Elidia Briceno MD  Dx:   Encounter Diagnosis     ICD-10-CM    1. Multiple sclerosis (HCC)  G35       2. Risk for falls  Z91.81       3. Neurologic gait dysfunction  R26.9                      Subjective: Pt reports the ability to put her socks on this morning. She notes feeling better this morning has she has had consistent sleep for 8 hours.       Objective: See treatment diary below      Assessment: Tolerated treatment well. LAQ with repetitions was bothersome for right knee. Left quad weaker than right. Positive effects of manual stretching and nerve glides. Continued with hooklying strengthening exercises. Patient would benefit from continued PT.       Plan: Continue per plan of care.      Precautions: MS, Gait dysfunction, Insomnia, Osteoporosis, Peripheral polyneuropathy, Hx of Breast CA, R IDALIA in - Dr. Rushing, Left femur IM nailing       Manuals 11/26    10/31 11/5 11/7 11/12 11/19 11/21   Sciatic nerve mobilizations 4'    B 8' in total 5' 4' 5' 5' 4'   Hip ROM: flexion, ER 8'      5' 4' 5' 8' 8'                             Neuro Re-Ed             Slump nerve glides     Seated 10x ea using green strap for over pressure Seated 10x ea using green strap for over pressure  Seated 10x ea using green strap for over pressure Seated 10x ea using green strap for over pressure    Sciatic nerve glides             LAQ 20x on R/15L 2#          10x 2#    Standing hip abduction             Standing march             Standing hip extension             EC on foam              Marching in hooklying 20x on ea         20x on ea   Bridge     2x10  2x10  2x10  2x10 2x10     Hip add isometric 20x5\"         20x5\"   Hip abd isometric in hooklying 20x5\"         20x 5\"    Ther Ex             Hip flexion stretch with strap             Nustep/bike 10' L2    9:40 L3 556  10' L3  10' L2 771 steps 6:40' L2 " "(unable to go past this d/t R knee pain  10' L1   Hamstring curl              Piriformis stretch        10x10\"      SB stretch         10x10\": B with knee extended     Seated ankle DF                                       Ther Activity             Standing HR/TR 30x      30x  30x  30x 30x    Sit to stand squat             Lateral walks             Leeann walks             Step up       Foam 10x on babita Taylor backward pull             Gait Training                                       Modalities                                         1:1 with PT from 10-1038a                                                 "

## 2024-12-03 ENCOUNTER — OFFICE VISIT (OUTPATIENT)
Dept: PHYSICAL THERAPY | Facility: CLINIC | Age: 74
End: 2024-12-03
Payer: MEDICARE

## 2024-12-03 DIAGNOSIS — G35 MULTIPLE SCLEROSIS (HCC): Primary | ICD-10-CM

## 2024-12-03 DIAGNOSIS — Z91.81 RISK FOR FALLS: ICD-10-CM

## 2024-12-03 DIAGNOSIS — R26.9 NEUROLOGIC GAIT DYSFUNCTION: ICD-10-CM

## 2024-12-03 PROCEDURE — 97112 NEUROMUSCULAR REEDUCATION: CPT | Performed by: PHYSICAL THERAPIST

## 2024-12-03 PROCEDURE — 97140 MANUAL THERAPY 1/> REGIONS: CPT | Performed by: PHYSICAL THERAPIST

## 2024-12-03 PROCEDURE — 97110 THERAPEUTIC EXERCISES: CPT | Performed by: PHYSICAL THERAPIST

## 2024-12-03 NOTE — PROGRESS NOTES
Daily Note     Today's date: 12/3/2024  Patient name: Jacy Rodrigez  : 1950  MRN: [id number]  Referring provider: Elidia Briceno MD  Dx:   Encounter Diagnosis     ICD-10-CM    1. Multiple sclerosis (HCC)  G35       2. Risk for falls  Z91.81       3. Neurologic gait dysfunction  R26.9                      Subjective: Pt reports not feeling too bad today. She spent time this weekend relaxing. Overall, sleep is improving as well.       Objective: See treatment diary below      Assessment: Tolerated treatment well. Improved motor control present during hooklying hip strengthening exercise, especially during . Moderate stiffness present in bilateral hips, gluteals, and hamstrings. MT provides pain modulating relief at LE.  Pt experienced cramping in proximal gastroc/soleus with hip abduction isometrics. Introduced seated hamstring curls into POC for LE strengthening.  Patient exhibited good technique with therapeutic exercises and would benefit from continued PT.       Plan: Continue per plan of care.      Precautions: MS, Gait dysfunction, Insomnia, Osteoporosis, Peripheral polyneuropathy, Hx of Breast CA, R IDALIA in - Dr. Rushing, Left femur IM nailing       Manuals 11/26 12/3   10/31 11/5 11/7 11/12 11/19 11/21   Sciatic nerve mobilizations 4' 4'   B 8' in total 5' 4' 5' 5' 4'   Hip ROM: flexion, ER 8'  8'    5' 4' 5' 8' 8'                             Neuro Re-Ed             Slump nerve glides     Seated 10x ea using green strap for over pressure Seated 10x ea using green strap for over pressure  Seated 10x ea using green strap for over pressure Seated 10x ea using green strap for over pressure    Sciatic nerve glides             LAQ 20x on R/15L 2#  20x on R/15L 2#         10x 2#    Standing hip abduction             Standing march             Standing hip extension             EC on foam              Marching in hooklying 20x on ea 20x on ea        20x on ea   Hamstring curls  Ytb 10x on ea        "    Bridge  2x10    2x10  2x10  2x10  2x10 2x10     Hip add isometric 20x5\" 20x5\"        20x5\"   Hip abd isometric in hooklying 20x5\" 20x5\"        20x 5\"    Ther Ex             Hip flexion stretch with strap             Nustep/bike 10' L2 10' L2   9:40 L3 556  10' L3  10' L2 771 steps 6:40' L2 (unable to go past this d/t R knee pain  10' L1   Hamstring curl              Piriformis stretch        10x10\"      SB stretch         10x10\": B with knee extended     Seated ankle DF                                       Ther Activity             Standing HR/TR 30x   30x   30x  30x  30x 30x    Sit to stand squat             Lateral walks             Leeann walks             Step up       Foam 10x on babita Taylor backward pull             Gait Training                                       Modalities                                         1:1 with PT from 2-240pm                                                   "

## 2024-12-05 ENCOUNTER — OFFICE VISIT (OUTPATIENT)
Dept: PHYSICAL THERAPY | Facility: CLINIC | Age: 74
End: 2024-12-05
Payer: MEDICARE

## 2024-12-05 DIAGNOSIS — R26.9 NEUROLOGIC GAIT DYSFUNCTION: ICD-10-CM

## 2024-12-05 DIAGNOSIS — G35 MULTIPLE SCLEROSIS (HCC): Primary | ICD-10-CM

## 2024-12-05 DIAGNOSIS — G62.9 PERIPHERAL POLYNEUROPATHY: ICD-10-CM

## 2024-12-05 DIAGNOSIS — Z91.81 RISK FOR FALLS: ICD-10-CM

## 2024-12-05 PROCEDURE — 97112 NEUROMUSCULAR REEDUCATION: CPT

## 2024-12-05 PROCEDURE — 97110 THERAPEUTIC EXERCISES: CPT

## 2024-12-05 PROCEDURE — 97140 MANUAL THERAPY 1/> REGIONS: CPT

## 2024-12-05 NOTE — PROGRESS NOTES
"Daily Note     Today's date: 2024  Patient name: Jacy Rodrigez  : 1950  MRN: [id number]  Referring provider: Elidia Briceno MD  Dx:   Encounter Diagnosis     ICD-10-CM    1. Multiple sclerosis (HCC)  G35       2. Risk for falls  Z91.81       3. 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 Peripheral polyneuropathy  G62.9                      Subjective: Pt states \"I'm not awake yet\".      Objective: See treatment diary below      Assessment: Tolerated treatment well. Patient exhibited good technique with therapeutic exercises and would benefit from continued PT.  No cramping noted w/ adductor squeezes today.  Good tolerance to manual stretching; no adverse effects noted.        Plan: Continue per plan of care.      Precautions: MS, Gait dysfunction, Insomnia, Osteoporosis, Peripheral polyneuropathy, Hx of Breast CA, R IDALIA in - Dr. Rushing, Left femur IM nailing       Manuals 11/26 12/3 12/5  10/31 11/5 11/7 11/12 11/19 11/21   Sciatic nerve mobilizations 4' 4' LM  B 8' in total 5' 4' 5' 5' 4'   Hip ROM: flexion, ER 8'  8' LM   5' 4' 5' 8' 8'                             Neuro Re-Ed             Slump nerve glides     Seated 10x ea using green strap for over pressure Seated 10x ea using green strap for over pressure  Seated 10x ea using green strap for over pressure Seated 10x ea using green strap for over pressure    Sciatic nerve glides             LAQ 20x on R/15L 2#  20x on R/15L 2#  2# 2x10 ea       10x 2#    Standing hip abduction             Standing march             Standing hip extension             EC on foam              Marching in hooklying 20x on ea 20x on ea 20x on ea       20x on ea   Hamstring curls  Ytb 10x on ea           Bridge  2x10  2x10  2x10  2x10  2x10  2x10 2x10     Hip add isometric 20x5\" 20x5\" 20x5\"       20x5\"   Hip abd isometric in hooklying 20x5\" 20x5\" 20x5\"       20x 5\"    Ther Ex             Hip flexion stretch with strap             Nustep/bike 10' L2 10' L2 10' L2  " "9:40 L3 556  10' L3  10' L2 771 steps 6:40' L2 (unable to go past this d/t R knee pain  10' L1   Hamstring curl              Piriformis stretch        10x10\"      SB stretch         10x10\": B with knee extended     Seated ankle DF                                       Ther Activity             Standing HR/TR 30x   30x   30x  30x  30x 30x    Sit to stand squat             Lateral walks             Leeann walks             Step up       Foam 10x on babita Taylor backward pull             Gait Training                                       Modalities                                                                                            "

## 2024-12-09 DIAGNOSIS — Z42.1 ENCOUNTER FOR BREAST RECONSTRUCTION FOLLOWING MASTECTOMY: Primary | ICD-10-CM

## 2024-12-09 RX ORDER — LORAZEPAM 0.5 MG/1
TABLET ORAL
Qty: 2 TABLET | Refills: 0 | Status: SHIPPED | OUTPATIENT
Start: 2024-12-09

## 2024-12-10 ENCOUNTER — APPOINTMENT (OUTPATIENT)
Dept: PHYSICAL THERAPY | Facility: CLINIC | Age: 74
End: 2024-12-10
Payer: MEDICARE

## 2024-12-12 ENCOUNTER — OFFICE VISIT (OUTPATIENT)
Dept: PHYSICAL THERAPY | Facility: CLINIC | Age: 74
End: 2024-12-12
Payer: MEDICARE

## 2024-12-12 DIAGNOSIS — Z91.81 RISK FOR FALLS: ICD-10-CM

## 2024-12-12 DIAGNOSIS — G35 MULTIPLE SCLEROSIS (HCC): Primary | ICD-10-CM

## 2024-12-12 DIAGNOSIS — R26.9 NEUROLOGIC GAIT DYSFUNCTION: ICD-10-CM

## 2024-12-12 PROCEDURE — 97140 MANUAL THERAPY 1/> REGIONS: CPT | Performed by: PHYSICAL THERAPIST

## 2024-12-12 PROCEDURE — 97112 NEUROMUSCULAR REEDUCATION: CPT | Performed by: PHYSICAL THERAPIST

## 2024-12-12 PROCEDURE — 97110 THERAPEUTIC EXERCISES: CPT | Performed by: PHYSICAL THERAPIST

## 2024-12-12 NOTE — PROGRESS NOTES
"Daily Note     Today's date: 2024  Patient name: Jacy Rodrigez  : 1950  MRN: [id number]  Referring provider: Elidia Briceno MD  Dx:   Encounter Diagnosis     ICD-10-CM    1. Multiple sclerosis (HCC)  G35       2. Risk for falls  Z91.81       3. Neurologic gait dysfunction  R26.9                      Subjective: Pt reports having a lot of RLE pain. Denies any CLINTON or inciting event.  Notes difficulty with sleeping. Reports more swelling in bilateral LE. Admits to being sick on Tuesday this week.       Objective: See treatment diary below      Assessment: Tolerated treatment fair. Modified treatment secondary to pain and discomfort in LE. Focusing mainly and stretching in today's session. Patient would benefit from continued PT. Resume motor control and strengthening next treatment.       Plan: Continue per plan of care.      Precautions: MS, Gait dysfunction, Insomnia, Osteoporosis, Peripheral polyneuropathy, Hx of Breast CA, R IDALIA in - Dr. Rushing, Left femur IM nailing       Manuals 11/26 12/3 12/5 12/12   11/7 11/12 11/19 11/21   Sciatic nerve mobilizations 4' 4' LM 4'   4' 5' 5' 4'   Hip ROM: flexion, ER 8'  8' LM 8'   4' 5' 8' 8'                             Neuro Re-Ed             Slump nerve glides        Seated 10x ea using green strap for over pressure Seated 10x ea using green strap for over pressure    Sciatic nerve glides    10x5\" B          LAQ 20x on R/15L 2#  20x on R/15L 2#  2# 2x10 ea       10x 2#    Standing hip abduction             Standing march             Standing hip extension             EC on foam              Marching in hooklying 20x on ea 20x on ea 20x on ea       20x on ea   Hamstring curls  Ytb 10x on ea           Bridge  2x10  2x10    2x10  2x10 2x10     Hip add isometric 20x5\" 20x5\" 20x5\"       20x5\"   Hip abd isometric in hooklying 20x5\" 20x5\" 20x5\"       20x 5\"    Ther Ex             Hip flexion stretch with strap             Nustep/bike 10' L2 10' L2 10' L2 10' " "L1 611 steps    10' L2 771 steps 6:40' L2 (unable to go past this d/t R knee pain  10' L1   Hamstring curl              Piriformis stretch    10x10\" B    10x10\"      SB stretch         10x10\": B with knee extended     Seated ankle DF                                       Ther Activity             Standing HR/TR 30x   30x 30x 30x    30x 30x    Sit to stand squat             Lateral walks             Leeann walks             Step up              Boley backward pull             Gait Training                                       Modalities                                       1:1 with PT from 1021-11am                                                       "

## 2024-12-17 ENCOUNTER — OFFICE VISIT (OUTPATIENT)
Dept: PHYSICAL THERAPY | Facility: CLINIC | Age: 74
End: 2024-12-17
Payer: MEDICARE

## 2024-12-17 DIAGNOSIS — G35 MULTIPLE SCLEROSIS (HCC): Primary | ICD-10-CM

## 2024-12-17 DIAGNOSIS — R26.9 NEUROLOGIC GAIT DYSFUNCTION: ICD-10-CM

## 2024-12-17 DIAGNOSIS — Z91.81 RISK FOR FALLS: ICD-10-CM

## 2024-12-17 PROCEDURE — 97112 NEUROMUSCULAR REEDUCATION: CPT | Performed by: PHYSICAL THERAPIST

## 2024-12-17 PROCEDURE — 97110 THERAPEUTIC EXERCISES: CPT | Performed by: PHYSICAL THERAPIST

## 2024-12-17 PROCEDURE — 97140 MANUAL THERAPY 1/> REGIONS: CPT | Performed by: PHYSICAL THERAPIST

## 2024-12-17 NOTE — PROGRESS NOTES
"Daily Note     Today's date: 2024  Patient name: Jacy Rodrigez  : 1950  MRN: [id number]  Referring provider: Elidia Briceno MD  Dx:   Encounter Diagnosis     ICD-10-CM    1. Multiple sclerosis (HCC)  G35       2. Neurologic gait dysfunction  R26.9       3. Risk for falls  Z91.81                      Subjective: Pt reports having a lot of sensitivity throughout left lower leg today. Also reports intermittent leg cramps or nerve shocks preventing sleep.       Objective: See treatment diary below      Assessment: Significant stiffness present throughout bilateral lower extremity. Tolerated treatment fair. Resumed hooklying strengthening exercises for hips. Pt experience short burst leg cramps during exercise program right worse than left. Patient exhibited good technique with therapeutic exercises and would benefit from continued PT.       Plan: Continue per plan of care.      Precautions: MS, Gait dysfunction, Insomnia, Osteoporosis, Peripheral polyneuropathy, Hx of Breast CA, R IDALIA in - Dr. Rushing, Left femur IM nailing       Manuals 11/26 12/3 12/5 12/12 12/17  11/7 11/12 11/19 11/21   Sciatic nerve mobilizations 4' 4' LM 4' 4'  4' 5' 5' 4'   Hip ROM: flexion, ER 8'  8' LM 8' 8'  4' 5' 8' 8'                             Neuro Re-Ed             Slump nerve glides        Seated 10x ea using green strap for over pressure Seated 10x ea using green strap for over pressure    Sciatic nerve glides    10x5\" B  10x5\" B        LAQ 20x on R/15L 2#  20x on R/15L 2#  2# 2x10 ea       10x 2#    Standing hip abduction             Standing march             Standing hip extension             EC on foam              Marching in hooklying 20x on ea 20x on ea 20x on ea       20x on ea   Hamstring curls  Ytb 10x on ea           Bridge  2x10  2x10  2x10   2x10  2x10 2x10     Hip add isometric 20x5\" 20x5\" 20x5\"  20x5\"     20x5\"   Hip abd isometric in hooklying 20x5\" 20x5\" 20x5\"  20x5\"     20x 5\"    Ther Ex           " "  Hip flexion stretch with strap             Nustep/bike 10' L2 10' L2 10' L2 10' L1 611 steps 10' L1   10' L2 771 steps 6:40' L2 (unable to go past this d/t R knee pain  10' L1   Hamstring curl              Piriformis stretch    10x10\" B    10x10\"      SB stretch         10x10\": B with knee extended     Seated ankle DF                                       Ther Activity             Standing HR/TR 30x   30x 30x 30x 30x   30x 30x    Sit to stand squat             Lateral walks             Leeann walks             Step up              Brandon backward pull             Gait Training                                       Modalities                                       1:1 with PT from 853-391am                                                         "

## 2024-12-19 ENCOUNTER — EVALUATION (OUTPATIENT)
Dept: PHYSICAL THERAPY | Facility: CLINIC | Age: 74
End: 2024-12-19
Payer: MEDICARE

## 2024-12-19 DIAGNOSIS — G35 MULTIPLE SCLEROSIS (HCC): Primary | ICD-10-CM

## 2024-12-19 DIAGNOSIS — R26.9 NEUROLOGIC GAIT DYSFUNCTION: ICD-10-CM

## 2024-12-19 DIAGNOSIS — Z91.81 RISK FOR FALLS: ICD-10-CM

## 2024-12-19 PROCEDURE — 97110 THERAPEUTIC EXERCISES: CPT | Performed by: PHYSICAL THERAPIST

## 2024-12-19 PROCEDURE — 97140 MANUAL THERAPY 1/> REGIONS: CPT | Performed by: PHYSICAL THERAPIST

## 2024-12-19 PROCEDURE — 97112 NEUROMUSCULAR REEDUCATION: CPT | Performed by: PHYSICAL THERAPIST

## 2024-12-19 NOTE — PROGRESS NOTES
[id number]    Today's date: 2024  Patient name: Jacy Rodrigez  : 1950  MRN: [id number]  Referring provider: Elidia Briceno MD  Dx:   Encounter Diagnosis     ICD-10-CM    1. Multiple sclerosis (HCC)  G35       2. Neurologic gait dysfunction  R26.9       3. Risk for falls  Z91.81                    Assessment  Jacy Rodrigez is a 74 y.o. female who presents with signs and symptoms consistent with progressive multiple sclerosis resulting in LE weakness, fatigue, ambulation dysfunction, and balance impairment. Pt also experiencing allodynia of bilateral LE, seemingly neuropathic in nature. Due to LE neuropathic pain and weakness, progression of exercise has been challenging. We have been focusing mainly on LE flexibility and mat strengthening exercises in order to progress to standing exercises once tolerated. Pt is still ambulating with SPC. Her sylvia is slow with small step length. Assessed function today and Jihan has improved in most functional tests. TUG, 5x sit to stand, 2 minute walk, and 6 minute walk tests. Pt feels that she has much more stamina and stability since starting PT. She is also reporting improved sleep. LE strength largely remains the same. Due to the progressive nature of MS and her current functional state, I am recommending continued treatment. Patient would benefit from skilled physical therapy to address the impairments, improve their level of function, and to improve their overall quality of life.    Impairments: abnormal coordination, abnormal gait, abnormal or restricted ROM, abnormal movement, impaired balance, impaired physical strength, poor posture   Understanding of Dx/Px/POC: good     Prognosis: good    Goals  Short Term Goals: to be achieved by 4 weeks  1) Patient to be independent with basic HEP.-Partially met  2) Decrease pain to moderate at its worst.-Partially met  3) Increase SLR ROM by 5-10 degrees -Partially met  4) Increase LE strength by 1/2 MMT  "grade in all deficient planes.-Partially met    Long Term Goals: to be achieved by discharge  1) FOTO equal to or greater than expected.-Partially met  2) Ambulation to improve to maximal level of function-Partially met  3) Improve TUG by 5 seconds-Partially met  4) Sit to stand transfers will improve to maximal level of function -Partially met  5) Improve 5x sit to stand by 5 seconds-Partially met    Plan  Patient would benefit from: PT eval and skilled physical therapy    Planned therapy interventions: manual therapy, neuromuscular re-education, patient education, strengthening, therapeutic activities, therapeutic exercise, transfer training, home exercise program and functional ROM exercises    Frequency: 2x per week for 6-8 weeks.  Treatment plan discussed with: patient        Subjective Evaluation    History of Present Illness  Mechanism of injury: History of Current Injury: Pt referred to PT secondary to progressive MS and allodynia in bilateral LE. She was treated by me last year through April of 2024. Her lower extremity symptoms were worsening at that time along with weakness throughout antigravity muscles. Pt notes that her strength, fatigue, and pain continue to worsen.  Due to increasing lower extremity pain, patient received US of bilateral LE and increased Gabapentin dosage. No evidence of significant lower extremity arterial occlusive disease on US of bilateral LE. XR of right knee was negative for osseous pathology. Pt reports frequent 4+ pitting edema in bilateral LE. Pt reports compression stockings don't seem to help her.     Pt reports a walking tolerance of max 5 minutes.     Pt has steps down to the basement but has single level living. Pt has 1-2, 3\" steps to enter her house.   Pain location/Descriptors: Severe LE pain from knees to feet (anterior and posterior). + cramping. R worse than L/   Aggravating factors: Constantly present (24/7) sometimes worse than others.   Easing factors: Rest, " gabapentin  24 HR pattern: Worse that night time.   XR of R knee: normal   Special Questions: Positive for peripheral neuropathy  Patient goals:  Reduce unsteadiness, improve LE strength, increase walking and endurance.   Hobbies/Interest: Volunteers as , cooking, cleaning    Occupation: Retired       Pain  Pain scale at highest: Severe.      Diagnostic Tests  Ultrasound findings: normal      Objective     Strength/Myotome Testing     Left Hip   Planes of Motion   Flexion: 3  Abduction: 3  Adduction: 3    Right Hip   Planes of Motion   Flexion: 3+  Abduction: 3  Adduction: 3+    Left Knee   Flexion: 3+  Extension: 3+    Right Knee   Flexion: 4-  Extension: 4-    Left Ankle/Foot   Dorsiflexion: 3+  Plantar flexion: 4-  Eversion: 3+  Inversion: 3+    Right Ankle/Foot   Dorsiflexion: 3+  Plantar flexion: 4-  Eversion: 3+  Inversion: 3    Tests     Additional Tests Details  Timed up and go:  Date: 33.18 seconds. Excessive use of hands on rails for assistance   10/1: 22.21 seconds. Moderate use of hands on rails. SPC. >3 second turn  11/7: 23.99 seconds. Moderate use of hands on rails. SPC. Turn in 2.8 seconds  12/19: 22.98 seconds. Moderate use of hands on rails. SPC. Turn in 3 seconds    Sit to stand Transfers:   Date: Excessive use of hands on rails for assistance  10/1: Moderate use of hands on rails  11/7: Moderate use of hands on rails   12/19: Moderate use of hands on rails.    5x sit to stand:   Date: 27.54 seconds with excessive use of hands  10/1: 19.40 seconds with moderate use of hands  11/7: 19.69 seconds with moderate use of hands  12/19: 14.03  seconds with moderate use of hands    2 minute walk test:   Date: NT  10/1: 130 feet  11/7: NT  12/19: 162 feet    MCTSIB:  EO firm: 30 seconds with mild postural sway   EC firm: 30 seconds with moderate postural sway  EO foam: 30 seconds with mild postural sway  EC foam: 17 seconds prior to LOB    6 minute walk test:   Date: 260 feet in 4:10  11/7: 423  "feet in 6 minutes using SPC  12/19: 454 feet in 6 minutes using SPC    *Swelling present in bilateral ankles/feet and right knee vs left knee- This continues but improved  *Sensitivity present in distal LE to light touch, especially over anterior tibia. -This continues but improving    Positive SLUMP for adverse neural tension bilaterally -This continues  Positive SLR at 45 degrees on R/35 degrees on L -5 deg of progress (10/1)      Ambulation     Observational Gait     Additional Observational Gait Details  SPC: slow sylvia, dynamic valgus in stance, small step length with femoral adduction          Precautions: MS, Gait dysfunction, Insomnia, Osteoporosis, Peripheral polyneuropathy, Hx of Breast CA, R IDALIA in 2012- Dr. Rushing, Left femur IM nailing       Manuals 11/26 12/3 12/5 12/12 12/17 12/19    11/21   Sciatic nerve mobilizations 4' 4' LM 4' 4' 4'    4'   Hip ROM: flexion, ER 8'  8' LM 8' 8' 8'    8'                             Neuro Re-Ed             Slump nerve glides             Sciatic nerve glides    10x5\" B  10x5\" B        LAQ 20x on R/15L 2#  20x on R/15L 2#  2# 2x10 ea       10x 2#    Standing hip abduction             Standing march             Standing hip extension             EC on foam              Marching in hooklying 20x on ea 20x on ea 20x on ea       20x on ea   Hamstring curls  Ytb 10x on ea           Bridge  2x10  2x10  2x10         Hip add isometric 20x5\" 20x5\" 20x5\"  20x5\"     20x5\"   Hip abd isometric in hooklying 20x5\" 20x5\" 20x5\"  20x5\"     20x 5\"    Ther Ex             Hip flexion stretch with strap             Nustep/bike 10' L2 10' L2 10' L2 10' L1 611 steps 10' L1     10' L1   Hamstring curl              Piriformis stretch    10x10\" B         SB stretch              Seated ankle DF                                       Ther Activity             Standing HR/TR 30x   30x 30x 30x 30x        Sit to stand squat             Lateral walks             Leeann walks             Step up       "        Brandon backward pull             Gait Training                                       Modalities                                       1:1 with PT from 1015-11a  Pt was re-evaluated today x 30 minutes

## 2024-12-31 ENCOUNTER — APPOINTMENT (OUTPATIENT)
Dept: PHYSICAL THERAPY | Facility: CLINIC | Age: 74
End: 2024-12-31
Payer: MEDICARE

## 2025-01-02 ENCOUNTER — APPOINTMENT (OUTPATIENT)
Dept: PHYSICAL THERAPY | Facility: CLINIC | Age: 75
End: 2025-01-02
Payer: MEDICARE

## 2025-01-07 ENCOUNTER — OFFICE VISIT (OUTPATIENT)
Dept: PHYSICAL THERAPY | Facility: CLINIC | Age: 75
End: 2025-01-07
Payer: MEDICARE

## 2025-01-07 ENCOUNTER — PATIENT MESSAGE (OUTPATIENT)
Dept: NEUROLOGY | Facility: CLINIC | Age: 75
End: 2025-01-07

## 2025-01-07 DIAGNOSIS — G35 MULTIPLE SCLEROSIS (HCC): Primary | ICD-10-CM

## 2025-01-07 PROCEDURE — 97110 THERAPEUTIC EXERCISES: CPT | Performed by: PHYSICAL THERAPIST

## 2025-01-07 PROCEDURE — 97140 MANUAL THERAPY 1/> REGIONS: CPT | Performed by: PHYSICAL THERAPIST

## 2025-01-07 PROCEDURE — 97112 NEUROMUSCULAR REEDUCATION: CPT | Performed by: PHYSICAL THERAPIST

## 2025-01-07 NOTE — PROGRESS NOTES
"Daily Note     Today's date: 2025  Patient name: Jacy Rodrigez  : 1950  MRN: [id number]  Referring provider: Elidia Briceno MD  Dx:   Encounter Diagnosis     ICD-10-CM    1. Multiple sclerosis (HCC)  G35                      Subjective: Pt missed last week secondary to illness. She feels really stiff and fatigued this afternoon.       Objective: See treatment diary below      Assessment: Considerable restrictions present in bilateral LE. Good within session change with flexibility of bilateral hips and sciatic nerve glides. Resumed hooklying hip activation exercises.  Tolerated treatment fair. Mild cramping present in left hamstring.  Patient would benefit from continued PT.      Plan: Continue per plan of care. Implement balance and functional exercises next session.      Precautions: MS, Gait dysfunction, Insomnia, Osteoporosis, Peripheral polyneuropathy, Hx of Breast CA, R IDALIA in - Dr. Rushing, Left femur IM nailing       Manuals 11/26 12/3 12/5 12/12 12/17 12/19 1/7   11/21   Sciatic nerve mobilizations 4' 4' LM 4' 4' 4' 4'   4'   Hip ROM: flexion, ER 8'  8' LM 8' 8' 8' 8'    8'                             Neuro Re-Ed             Slump nerve glides             Sciatic nerve glides    10x5\" B  10x5\" B  10x5\"      LAQ 20x on R/15L 2#  20x on R/15L 2#  2# 2x10 ea       10x 2#    Standing hip abduction             Standing march             Standing hip extension             EC on foam              Marching in hooklying 20x on ea 20x on ea 20x on ea       20x on ea   Hamstring curls  Ytb 10x on ea           Bridge  2x10  2x10  2x10   2x10       Hip add isometric 20x5\" 20x5\" 20x5\"  20x5\"  20x5\"   20x5\"   Hip abd isometric in hooklying 20x5\" 20x5\" 20x5\"  20x5\"  20x5\"    20x 5\"    Ther Ex             Hip flexion stretch with strap             Nustep/bike 10' L2 10' L2 10' L2 10' L1 611 steps 10' L1  10' L1   10' L1   Hamstring curl              Piriformis stretch    10x10\" B         SB stretch        "       Seated ankle DF                                       Ther Activity             Standing HR/TR 30x   30x 30x 30x 30x  30x      Sit to stand squat             Lateral walks       nv      Leeann walks       nv      Step up              [place] backward pull             Gait Training                                       Modalities                                       1:1 with PT from 2-238pm

## 2025-01-07 NOTE — LETTER
January 8, 2025     Patient:Jacy Rodrigez  YOB: 1950    [employer]  [address] [place] [address]    To Whom it May Concern:    Jacy Rodrigez is under my professional care. She was diagnosed with multiple sclerosis in 2008.     Please do not hesitate to call the office at [phone] with any questions or concerns.     Sincerely,        Elidia Briceno MD

## 2025-01-08 ENCOUNTER — HOSPITAL ENCOUNTER (OUTPATIENT)
Dept: RADIOLOGY | Facility: HOSPITAL | Age: 75
Discharge: HOME | End: 2025-01-08
Attending: SURGERY
Payer: MEDICARE

## 2025-01-08 ENCOUNTER — PATIENT MESSAGE (OUTPATIENT)
Dept: NEUROLOGY | Facility: CLINIC | Age: 75
End: 2025-01-08

## 2025-01-08 DIAGNOSIS — Z85.3 HX OF BREAST CANCER: ICD-10-CM

## 2025-01-08 DIAGNOSIS — Z42.1 AFTERCARE POSTMASTECTOMY FOR BREAST RECONSTRUCTION: ICD-10-CM

## 2025-01-08 PROCEDURE — 77047 MRI BREAST C- BILATERAL: CPT

## 2025-01-08 NOTE — PATIENT COMMUNICATION
MS Justin letter last sent on 2/2/24.   Placed updated letter with diagnosis code and faxed to [employer] at [phone] (w).

## 2025-01-09 ENCOUNTER — OFFICE VISIT (OUTPATIENT)
Dept: PHYSICAL THERAPY | Facility: CLINIC | Age: 75
End: 2025-01-09
Payer: MEDICARE

## 2025-01-09 DIAGNOSIS — R26.9 NEUROLOGIC GAIT DYSFUNCTION: ICD-10-CM

## 2025-01-09 DIAGNOSIS — Z91.81 RISK FOR FALLS: ICD-10-CM

## 2025-01-09 DIAGNOSIS — G35 MULTIPLE SCLEROSIS (HCC): Primary | ICD-10-CM

## 2025-01-09 PROCEDURE — 97140 MANUAL THERAPY 1/> REGIONS: CPT | Performed by: PHYSICAL THERAPIST

## 2025-01-09 PROCEDURE — 97110 THERAPEUTIC EXERCISES: CPT | Performed by: PHYSICAL THERAPIST

## 2025-01-09 NOTE — PROGRESS NOTES
"Daily Note     Today's date: 2025  Patient name: Jacy Rodrigez  : 1950  MRN: [id number]  Referring provider: Elidia Briceno MD  Dx:   Encounter Diagnosis     ICD-10-CM    1. Multiple sclerosis (HCC)  G35       2. Neurologic gait dysfunction  R26.9       3. Risk for falls  Z91.81                      Subjective: Pt had MRI of the chest yesterday and had to be positioned awkwardly. She notes increased discomfort throughout the body secondary to positioning.       Objective: See treatment diary below      Assessment: Left hamstring was very restricted.However, I was able to progress her program to standing dynamic strength and balance.  Tolerated treatment fair. Pt was challenged with idris step overs. Bilateral hips fatigue very quickly with new exercises. Patient exhibited good technique with therapeutic exercises and would benefit from continued PT.      Plan: Continue per plan of care. Progress standing balance and endurance exercises to improve gait and function.      Precautions: MS, Gait dysfunction, Insomnia, Osteoporosis, Peripheral polyneuropathy, Hx of Breast CA, R IDALIA in - Dr. Rushing, Left femur IM nailing       Manuals 11/26 12/3 12/5 12/12 12/17 12/19 1/7 1/9  11/21   Sciatic nerve mobilizations 4' 4' LM 4' 4' 4' 4' 4'  4'   Hip ROM: flexion, ER 8'  8' LM 8' 8' 8' 8'  8'  8'                             Neuro Re-Ed             Slump nerve glides             Sciatic nerve glides    10x5\" B  10x5\" B  10x5\" 10x5\" B     LAQ 20x on R/15L 2#  20x on R/15L 2#  2# 2x10 ea       10x 2#    Standing hip abduction             Standing march             Standing hip extension             EC on foam              Marching in hooklying 20x on ea 20x on ea 20x on ea       20x on ea   Hamstring curls  Ytb 10x on ea           Bridge  2x10  2x10  2x10   2x10       Hip add isometric 20x5\" 20x5\" 20x5\"  20x5\"  20x5\"   20x5\"   Hip abd isometric in hooklying 20x5\" 20x5\" 20x5\"  20x5\"  20x5\"    20x 5\"    Ther Ex " "            Hip flexion stretch with strap             Nustep/bike 10' L2 10' L2 10' L2 10' L1 611 steps 10' L1  10' L1 10' L1  10' L1   Hamstring curl              Piriformis stretch    10x10\" B         SB stretch              Seated ankle DF                                       Ther Activity             Standing HR/TR 30x   30x 30x 30x 30x  30x      Sit to stand squat             Lateral walks       nv Blue step over 10x on ea     Leeann walks       nv Blue step over 10x on ea     Step up              [place] backward pull             Gait Training                                       Modalities                                       1:1 with PT from 1016-1045pm                                                               "

## 2025-01-14 ENCOUNTER — OFFICE VISIT (OUTPATIENT)
Dept: PHYSICAL THERAPY | Facility: CLINIC | Age: 75
End: 2025-01-14
Payer: MEDICARE

## 2025-01-14 DIAGNOSIS — G35 MULTIPLE SCLEROSIS (HCC): Primary | ICD-10-CM

## 2025-01-14 DIAGNOSIS — R26.9 NEUROLOGIC GAIT DYSFUNCTION: ICD-10-CM

## 2025-01-14 DIAGNOSIS — Z91.81 RISK FOR FALLS: ICD-10-CM

## 2025-01-14 PROCEDURE — 97140 MANUAL THERAPY 1/> REGIONS: CPT

## 2025-01-14 PROCEDURE — 97110 THERAPEUTIC EXERCISES: CPT

## 2025-01-14 PROCEDURE — 97112 NEUROMUSCULAR REEDUCATION: CPT

## 2025-01-14 NOTE — PROGRESS NOTES
"Daily Note     Today's date: 2025  Patient name: Jacy Rodrigez  : 1950  MRN: [id number]  Referring provider: Elidia Briceno MD  Dx:   Encounter Diagnosis     ICD-10-CM    1. Multiple sclerosis (HCC)  G35       2. Neurologic gait dysfunction  R26.9       3. Risk for falls  Z91.81                      Subjective: Pt reports no new complaints today.       Objective: See treatment diary below      Assessment: Tolerated treatment well. Patient exhibited good technique with therapeutic exercises and would benefit from continued PT.  Most challenged by forward/backward step over idris, jesus w/ L LE. Pt defers bridges today. No adverse effects noted w/ manual techniques.       Plan: Continue per plan of care.      Precautions: MS, Gait dysfunction, Insomnia, Osteoporosis, Peripheral polyneuropathy, Hx of Breast CA, R IDALIA in - Dr. Rushing, Left femur IM nailing     1:1 10:03-10:41am  Manuals 11/26 12/3 12/5 12/12 12/17 12/19 1/7 1/9 1/14 11/21   Sciatic nerve mobilizations 4' 4' LM 4' 4' 4' 4' 4' 4' 4'   Hip ROM: flexion, ER 8'  8' LM 8' 8' 8' 8'  8' 8' 8'                             Neuro Re-Ed             Slump nerve glides             Sciatic nerve glides    10x5\" B  10x5\" B  10x5\" 10x5\" B 10x5\" B seated    LAQ 20x on R/15L 2#  20x on R/15L 2#  2# 2x10 ea       10x 2#    Standing hip abduction             Standing march             Standing hip extension             EC on foam              Marching in hooklying 20x on ea 20x on ea 20x on ea       20x on ea   Hamstring curls  Ytb 10x on ea           Bridge  2x10  2x10  2x10   2x10   defers    Hip add isometric 20x5\" 20x5\" 20x5\"  20x5\"  20x5\"  20x5\" 20x5\"   Hip abd isometric in hooklying 20x5\" 20x5\" 20x5\"  20x5\"  20x5\"   20x5\" 20x 5\"    Ther Ex             Hip flexion stretch with strap             Nustep/bike 10' L2 10' L2 10' L2 10' L1 611 steps 10' L1  10' L1 10' L1 10' lv 1 10' L1   Hamstring curl              Piriformis stretch    10x10\" B       "   SB stretch              Seated ankle DF                                       Ther Activity             Standing HR/TR 30x   30x 30x 30x 30x  30x      Sit to stand squat             Lateral walks       nv Blue step over 10x on ea Blue step over 10x ea    Leeann walks       nv Blue step over 10x on ea Blue step over x10 ea    Step up              [place] backward pull             Gait Training                                       Modalities

## 2025-01-16 ENCOUNTER — OFFICE VISIT (OUTPATIENT)
Dept: PHYSICAL THERAPY | Facility: CLINIC | Age: 75
End: 2025-01-16
Payer: MEDICARE

## 2025-01-16 DIAGNOSIS — Z91.81 RISK FOR FALLS: ICD-10-CM

## 2025-01-16 DIAGNOSIS — G35 MULTIPLE SCLEROSIS (HCC): Primary | ICD-10-CM

## 2025-01-16 DIAGNOSIS — R26.9 NEUROLOGIC GAIT DYSFUNCTION: ICD-10-CM

## 2025-01-16 PROCEDURE — 97140 MANUAL THERAPY 1/> REGIONS: CPT | Performed by: PHYSICAL THERAPIST

## 2025-01-16 PROCEDURE — 97530 THERAPEUTIC ACTIVITIES: CPT | Performed by: PHYSICAL THERAPIST

## 2025-01-16 PROCEDURE — 97112 NEUROMUSCULAR REEDUCATION: CPT | Performed by: PHYSICAL THERAPIST

## 2025-01-16 NOTE — PROGRESS NOTES
"Daily Note     Today's date: 2025  Patient name: Jacy Rodrigez  : 1950  MRN: [id number]  Referring provider: Elidia Briceno MD  Dx:   Encounter Diagnosis     ICD-10-CM    1. Multiple sclerosis (HCC)  G35       2. Neurologic gait dysfunction  R26.9       3. Risk for falls  Z91.81           Start Time: 1445  Stop Time: 1530  Total time in clinic (min): 45 minutes    Subjective: Pt reports feeling really stiff secondary to the cold weather.       Objective: See treatment diary below      Assessment: Tolerated treatment well. Implemented step ups on foam for quad stability and strength. L quad control is less stable than R noticed during step ups. Pt able to clear LE during idris step overs but fatigue very quickly. Patient demonstrated fatigue post treatment and would benefit from continued PT.       Plan: Continue per plan of care.      Precautions: MS, Gait dysfunction, Insomnia, Osteoporosis, Peripheral polyneuropathy, Hx of Breast CA, R IDALIA in - Dr. Rushing, Left femur IM nailing     1:1 10:03-10:41am  Manuals 11/26 12/3 12/5 12/12 12/17 12/19 1/7 1/9 1/14 1/16   Sciatic nerve mobilizations 4' 4' LM 4' 4' 4' 4' 4' 4' 4'   Hip ROM: flexion, ER 8'  8' LM 8' 8' 8' 8'  8' 8' 8'                             Neuro Re-Ed             Slump nerve glides             Sciatic nerve glides    10x5\" B  10x5\" B  10x5\" 10x5\" B 10x5\" B seated 10x5\" B seated   LAQ 20x on R/15L 2#  20x on R/15L 2#  2# 2x10 ea          Standing hip abduction             Standing march             Standing hip extension             EC on foam              Marching in hooklying 20x on ea 20x on ea 20x on ea          Hamstring curls  Ytb 10x on ea           Bridge  2x10  2x10  2x10   2x10   defers 2x10    Hip add isometric 20x5\" 20x5\" 20x5\"  20x5\"  20x5\"  20x5\"    Hip abd isometric in hooklying 20x5\" 20x5\" 20x5\"  20x5\"  20x5\"   20x5\"    Ther Ex             Hip flexion stretch with strap             Nustep/bike 10' L2 10' L2 10' L2 10' " "L1 611 steps 10' L1  10' L1 10' L1 10' lv 1    Hamstring curl              Piriformis stretch    10x10\" B         SB stretch              Seated ankle DF                                       Ther Activity             Standing HR/TR 30x   30x 30x 30x 30x  30x      Sit to stand squat             Lateral walks       nv Blue step over 10x on ea Blue step over 10x ea Red step over 10x ea   Leeann walks       nv Blue step over 10x on ea Blue step over x10 ea Red step over 10x ea   Step up           10x on ea biodex foam    [employer] backward pull             Gait Training                                       Modalities                                       1:1 with 245-330PM                                                                   "

## 2025-01-20 ENCOUNTER — OFFICE VISIT (OUTPATIENT)
Dept: PHYSICAL THERAPY | Facility: CLINIC | Age: 75
End: 2025-01-20
Payer: MEDICARE

## 2025-01-20 DIAGNOSIS — R26.9 NEUROLOGIC GAIT DYSFUNCTION: ICD-10-CM

## 2025-01-20 DIAGNOSIS — Z91.81 RISK FOR FALLS: ICD-10-CM

## 2025-01-20 DIAGNOSIS — G35 MULTIPLE SCLEROSIS (HCC): Primary | ICD-10-CM

## 2025-01-20 PROCEDURE — 97530 THERAPEUTIC ACTIVITIES: CPT

## 2025-01-20 PROCEDURE — 97140 MANUAL THERAPY 1/> REGIONS: CPT | Performed by: PHYSICAL THERAPIST

## 2025-01-20 PROCEDURE — 97112 NEUROMUSCULAR REEDUCATION: CPT | Performed by: PHYSICAL THERAPIST

## 2025-01-20 NOTE — PROGRESS NOTES
"Daily Note     Today's date: 2025  Patient name: Jacy Rodrigez  : 1950  MRN: [id number]  Referring provider: Elidia Briceno MD  Dx:   Encounter Diagnosis     ICD-10-CM    1. Multiple sclerosis (HCC)  G35       2. Neurologic gait dysfunction  R26.9       3. Risk for falls  Z91.81                      Subjective: Pt reports doing well overall today.       Objective: See treatment diary below      Assessment: Tolerated treatment well. Progressed standing exercises to 2x10 with good tolerance. Left hamstring/piriformis was more restricted today than previous session. Good response to stretching and flexibility exercises. Patient exhibited good technique with therapeutic exercises and would benefit from continued PT.       Plan: Continue per plan of care.      Precautions: MS, Gait dysfunction, Insomnia, Osteoporosis, Peripheral polyneuropathy, Hx of Breast CA, R IDALIA in - Dr. Rushing, Left femur IM nailing       Manuals 1/20 12/3 12/5 12/12 12/17 12/19 1/7 1/9 1/14 1/16   Sciatic nerve mobilizations 4' 4' LM 4' 4' 4' 4' 4' 4' 4'   Hip ROM: flexion, ER 8' 8' LM 8' 8' 8' 8'  8' 8' 8'                             Neuro Re-Ed             Slump nerve glides             Sciatic nerve glides 10x5\" B seated4'   10x5\" B  10x5\" B  10x5\" 10x5\" B 10x5\" B seated 10x5\" B seated   LAQ  20x on R/15L 2#  2# 2x10 ea          Standing hip abduction             Standing march             Standing hip extension             EC on foam              Marching in hooklying  20x on ea 20x on ea          Hamstring curls  Ytb 10x on ea           Bridge  2x10  2x10  2x10   2x10   defers 2x10    Hip add isometric  20x5\" 20x5\"  20x5\"  20x5\"  20x5\"    Hip abd isometric in hooklying  20x5\" 20x5\"  20x5\"  20x5\"   20x5\"    Ther Ex             Hip flexion stretch with strap             Nustep/bike  10' L2 10' L2 10' L1 611 steps 10' L1  10' L1 10' L1 10' lv 1    Hamstring curl              Piriformis stretch    10x10\" B         SB stretch  "             Seated ankle DF                                       Ther Activity             Standing HR/TR 30x  30x 30x 30x 30x  30x      Sit to stand squat             Lateral walks Red step over 20x ea      nv Blue step over 10x on ea Blue step over 10x ea Red step over 10x ea   Leeann walks Red step over 20x ea      nv Blue step over 10x on ea Blue step over x10 ea Red step over 10x ea   Step up  20x on ea biodex foam          10x on ea biodex foam    Brandon backward pull             Gait Training                                       Modalities                                         1:1 with  with Pawan Maloney DPT & 1020-1040am with TER[facility] VIVEROS.

## 2025-01-21 ENCOUNTER — OFFICE VISIT (OUTPATIENT)
Dept: NEUROLOGY | Facility: CLINIC | Age: 75
End: 2025-01-21
Payer: MEDICARE

## 2025-01-21 VITALS
HEIGHT: 64 IN | WEIGHT: 144 LBS | DIASTOLIC BLOOD PRESSURE: 88 MMHG | HEART RATE: 80 BPM | TEMPERATURE: 97.1 F | BODY MASS INDEX: 24.59 KG/M2 | SYSTOLIC BLOOD PRESSURE: 140 MMHG

## 2025-01-21 DIAGNOSIS — G47.00 INSOMNIA: ICD-10-CM

## 2025-01-21 DIAGNOSIS — G35 MULTIPLE SCLEROSIS (HCC): Primary | ICD-10-CM

## 2025-01-21 DIAGNOSIS — R26.2 AMBULATORY DYSFUNCTION: ICD-10-CM

## 2025-01-21 PROCEDURE — 99214 OFFICE O/P EST MOD 30 MIN: CPT | Performed by: PSYCHIATRY & NEUROLOGY

## 2025-01-21 RX ORDER — DICLOFENAC SODIUM 75 MG/1
1 TABLET, DELAYED RELEASE ORAL 2 TIMES DAILY
COMMUNITY
Start: 2024-12-29

## 2025-01-21 NOTE — PROGRESS NOTES
Name: Jacy Rodrigez      : 1950      MRN: [id number]  Encounter Provider: Elidia Briceno MD  Encounter Date: 2025   Encounter department: [facility]  :  Assessment & Plan  Multiple sclerosis (HCC)  74-year-old female past medical history significant for multiple sclerosis currently not on DMT.  Per patient, she has been off Copaxone for about a year and denies any MS relapses or new symptomology. Currently not interested in pursuing DMT   Continues to have left-sided weakness and uses a walker or cane to ambulate  Participates in physical therapy 2 times per week and reports significant improvement in balance and flexibility    PLAN:  Follow up in 6 months   Continue PT 2x per week   Recommend Maffo brace (see ambulatory dysfunction)        Ambulatory dysfunction  Uses walker/cane at baseline.  Right upper and right lower extremity weakness from MS  Follows with PT 2 times per week and reports significant improvement in both balance and lacks ability    PLAN:  Recommended Maffo brace.  Asked patient to discuss brace with her physical therapist and will place referral depending on recommendations by PT       Insomnia  Patient seen and evaluated via sleep study and was found not to have obstructive sleep apnea.  Was initially referred for CBT-I but stated it was not helpful  Patient was told she was okay to continue her Klonopin per sleep physician  States she has not had to take additional doses of gabapentin to improve her quality of sleep    PLAN:  Continue current medication regimen for sleep.  Patient in better spirits compared to prior and states she feels more rested without residual fatigue.             History of Present Illness   HPI    74-year-old female with past medical history significant for MS who presents for follow-up.  Last visit, patient experienced significant fatigue and was instructed to obtain a sleep study and discuss Klonopin use with her sleep  medicine physician.  Patient was also encouraged to use an additional dose of gabapentin if she continues to have excessive fatigue.  Since last visit, patient was seen and evaluated by a sleep medicine specialist who states she did not have MAGDI but did reports she had chronic insomnia and recommended CBT-I therapy.  Patient did not like CBT-I therapy but was told it was okay for her to continue Klonopin.  States she did not have to take any additional doses of gabapentin.  Reports that she currently has a much improved sleep regimen and is satisfied with current amount of sleep per night.    In terms of ambulatory dysfunction, patient continues to ambulate with a cane and/or walker and completes physical therapy 2 times per week with Jonathon.  Patient reports significant improvement with physical therapy and states she has less stiffness.    Review of Systems   Constitutional:  Negative for appetite change, fatigue and fever.   HENT: Negative.  Negative for hearing loss, tinnitus, trouble swallowing and voice change.    Eyes: Negative.  Negative for photophobia, pain and visual disturbance.   Respiratory: Negative.  Negative for shortness of breath.    Cardiovascular: Negative.  Negative for palpitations.   Gastrointestinal: Negative.  Negative for nausea and vomiting.   Endocrine: Negative.  Negative for cold intolerance.   Genitourinary: Negative.  Negative for dysuria, frequency and urgency.   Musculoskeletal:  Negative for back pain, gait problem, myalgias, neck pain and neck stiffness.   Skin: Negative.  Negative for rash.   Allergic/Immunologic: Negative.    Neurological: Negative.  Negative for dizziness, tremors, seizures, syncope, facial asymmetry, speech difficulty, weakness, light-headedness, numbness and headaches.   Hematological: Negative.  Does not bruise/bleed easily.   Psychiatric/Behavioral: Negative.  Negative for confusion, hallucinations and sleep disturbance.    All other systems reviewed and  are negative.   I have personally reviewed the MA's review of systems and made changes as necessary.  No new issues to address       Objective   There were no vitals taken for this visit.    Physical Exam  Constitutional:       General: She is not in acute distress.  HENT:      Head: Normocephalic and atraumatic.      Nose: No congestion.   Eyes:      General: Lids are normal.      Extraocular Movements: Extraocular movements intact.      Pupils: Pupils are equal, round, and reactive to light.   Cardiovascular:      Rate and Rhythm: Normal rate.      Pulses: Normal pulses.   Pulmonary:      Effort: No respiratory distress.   Abdominal:      General: There is no distension.   Musculoskeletal:         General: No deformity.      Cervical back: Normal range of motion and neck supple.      Right lower leg: No edema.      Left lower leg: No edema.   Skin:     General: Skin is warm and dry.   Neurological:      General: No focal deficit present.      Deep Tendon Reflexes:      Reflex Scores:       Bicep reflexes are 2+ on the right side and 2+ on the left side.       Brachioradialis reflexes are 2+ on the right side and 2+ on the left side.       Patellar reflexes are 2+ on the right side and 2+ on the left side.  Psychiatric:         Speech: Speech normal.       Neurological Exam  Mental Status  Awake, alert and oriented to person, place and time. Oriented to person, place, time and situation. Speech is normal. Language is fluent with no aphasia.    Cranial Nerves  CN II: Visual acuity is normal. Visual fields full to confrontation.  CN III, IV, VI: Extraocular movements intact bilaterally. Normal lids and orbits bilaterally. Pupils equal round and reactive to light bilaterally.  CN V: Facial sensation is normal.  CN VII: Full and symmetric facial movement.  CN VIII: Hearing is normal.  CN IX, X: Palate elevates symmetrically. Normal gag reflex.  CN XI: Shoulder shrug strength is normal.  CN XII: Tongue midline without  atrophy or fasciculations.    Motor   Strength is 5/5 in all four extremities except as noted.                                             Right                     Left  Knee flexion                                                    4  Knee extension                                               4  Plantarflexion                                                  4  Dorsiflexion                                                     4    Sensory  Sensation: Decreased sensation to light touch, pinprick, temperature, and vibration on left side compared to right.  Chronic..     Reflexes                                            Right                      Left  Brachioradialis                    2+                         2+  Biceps                                 2+                         2+  Patellar                                2+                         2+    Right pathological reflexes: Libra's absent.  Left pathological reflexes: Libra's absent.    Coordination  Right: Finger-to-nose normal.    Gait  Casual gait is normal including stance, stride, and arm swing.  Walks with cane during time of exam.

## 2025-01-21 NOTE — ASSESSMENT & PLAN NOTE
74-year-old female past medical history significant for multiple sclerosis currently not on DMT.  Per patient, she has been off Copaxone for about a year and denies any MS relapses or new symptomology. Currently not interested in pursuing DMT   Continues to have left-sided weakness and uses a walker or cane to ambulate  Participates in physical therapy 2 times per week and reports significant improvement in balance and flexibility    PLAN:  Follow up in 6 months   Continue PT 2x per week   Recommend Maffo brace (see ambulatory dysfunction)

## 2025-01-23 ENCOUNTER — OFFICE VISIT (OUTPATIENT)
Dept: PHYSICAL THERAPY | Facility: CLINIC | Age: 75
End: 2025-01-23
Payer: MEDICARE

## 2025-01-23 DIAGNOSIS — G35 MULTIPLE SCLEROSIS (HCC): Primary | ICD-10-CM

## 2025-01-23 DIAGNOSIS — Z91.81 RISK FOR FALLS: ICD-10-CM

## 2025-01-23 DIAGNOSIS — R26.9 NEUROLOGIC GAIT DYSFUNCTION: ICD-10-CM

## 2025-01-23 PROCEDURE — 97530 THERAPEUTIC ACTIVITIES: CPT | Performed by: PHYSICAL THERAPIST

## 2025-01-23 PROCEDURE — 97112 NEUROMUSCULAR REEDUCATION: CPT | Performed by: PHYSICAL THERAPIST

## 2025-01-23 PROCEDURE — 97140 MANUAL THERAPY 1/> REGIONS: CPT | Performed by: PHYSICAL THERAPIST

## 2025-01-23 NOTE — PROGRESS NOTES
"Daily Note     Today's date: 2025  Patient name: Jacy Rodrigez  : 1950  MRN: [id number]  Referring provider: Elidia Briceno MD  Dx:   Encounter Diagnosis     ICD-10-CM    1. Multiple sclerosis (HCC)  G35       2. Neurologic gait dysfunction  R26.9       3. Risk for falls  Z91.81                      Subjective: Pt reports having 2 nights of bad sleep. She saw Dr. Briceno this week who is considering ordering Jihan a MAFO for her left ankle      Objective: See treatment diary below      Assessment: Evaluated left ankle strength. Pt has 3+/5 ankle strength and has clearance of foot against gravity. Recommend patient trial over the counter MAFO with less amount of material to help assist ankle during ambulation when fatigued. Patient can order this directly at a low cost. If weakness worsens, she then would benefit from custom made MAFO with more stability. Current custom MAFOs for foot drop tend to have more material for stability. Pt is in agreement with POC.  Tolerated treatment fair. Pt farily restricted today with LE flexibility.  Patient exhibited good technique with therapeutic exercises and would benefit from continued PT.      Plan: Continue per plan of care.      Precautions: MS, Gait dysfunction, Insomnia, Osteoporosis, Peripheral polyneuropathy, Hx of Breast CA, R IDALIA in - Dr. Rushing, Left femur IM nailing       Manuals    Sciatic nerve mobilizations 4' 4'   4' 4' 4' 4' 4' 4'   Hip ROM: flexion, ER 8' 8'   8' 8' 8'  8' 8' 8'                             Neuro Re-Ed             Slump nerve glides             Sciatic nerve glides 10x5\" B seated4' 10x5\" B seated   10x5\" B  10x5\" 10x5\" B 10x5\" B seated 10x5\" B seated   LAQ             Standing hip abduction             Standing march             Standing hip extension             EC on foam              Marching in hooklying             Hamstring curls             Bridge  2x10    2x10   2x10   defers " "2x10    Hip add isometric     20x5\"  20x5\"  20x5\"    Hip abd isometric in hooklying     20x5\"  20x5\"   20x5\"    Ther Ex             Hip flexion stretch with strap             Nustep/bike     10' L1  10' L1 10' L1 10' lv 1    Hamstring curl              Piriformis stretch             SB stretch              Seated ankle DF                                       Ther Activity             Standing HR/TR 30x  30x   30x  30x      Sit to stand squat             Lateral walks Red step over 20x ea Red step over 10x ea     nv Blue step over 10x on ea Blue step over 10x ea Red step over 10x ea   Leeann walks Red step over 20x ea Red step over 10x ea     nv Blue step over 10x on ea Blue step over x10 ea Red step over 10x ea   Step up  20x on ea biodex foam  20x on ea biodex foam         10x on ea biodex foam    [place] backward pull             Gait Training                                       Modalities                                         1:1 with PT from 2569-1912am                                                                       "

## 2025-01-27 ENCOUNTER — OFFICE VISIT (OUTPATIENT)
Dept: PHYSICAL THERAPY | Facility: CLINIC | Age: 75
End: 2025-01-27
Payer: MEDICARE

## 2025-01-27 DIAGNOSIS — Z91.81 RISK FOR FALLS: ICD-10-CM

## 2025-01-27 DIAGNOSIS — R26.9 NEUROLOGIC GAIT DYSFUNCTION: ICD-10-CM

## 2025-01-27 DIAGNOSIS — G35 MULTIPLE SCLEROSIS (HCC): Primary | ICD-10-CM

## 2025-01-27 PROCEDURE — 97112 NEUROMUSCULAR REEDUCATION: CPT | Performed by: PHYSICAL THERAPIST

## 2025-01-27 PROCEDURE — 97140 MANUAL THERAPY 1/> REGIONS: CPT | Performed by: PHYSICAL THERAPIST

## 2025-01-27 NOTE — PROGRESS NOTES
"Daily Note     Today's date: 2025  Patient name: Jacy Rodrigez  : 1950  MRN: [id number]  Referring provider: Elidia Briceno MD  Dx:   Encounter Diagnosis     ICD-10-CM    1. Multiple sclerosis (HCC)  G35       2. Risk for falls  Z91.81       3. Neurologic gait dysfunction  R26.9                      Subjective: Pt reports increase of pedal edema with increased lower extremity pain over the last 24 hours. Denies any changes to diet or medications. Pt also reports having altered gait with left foot (over pronatory positioning) since the swelling.       Objective: See treatment diary below      Assessment: Pt was fairly restricted in gluteals and hamstring bilaterally. Decreased exercise prescription today.  Her swelling reduced with nustep and stretching. She does report having some dizziness upon standing. 2 BP measures taken: 154/82 and 124/74. Opted out of most standing exercises today. Pt is going to assess BP on a regular basis secondary to swelling and dizziness. Plan to resume all standing exercises next session as tolerated.       Plan: Continue per plan of care.      Precautions: MS, Gait dysfunction, Insomnia, Osteoporosis, Peripheral polyneuropathy, Hx of Breast CA, R IDALIA in - Dr. Rushing, Left femur IM nailing       Manuals    Sciatic nerve mobilizations 4' 4' 4'  4' 4' 4' 4' 4' 4'   Hip ROM: flexion, ER 8' 8' 8'   8' 8' 8'  8' 8' 8'                             Neuro Re-Ed             Slump nerve glides             Sciatic nerve glides 10x5\" B seated4' 10x5\" B seated   10x5\" B  10x5\" 10x5\" B 10x5\" B seated 10x5\" B seated   LAQ             Standing hip abduction             Standing march             Standing hip extension             EC on foam              Marching in hooklying             Hamstring curls             Bridge  2x10    2x10   2x10   defers 2x10    Hip add isometric     20x5\"  20x5\"  20x5\"    Hip abd isometric in hooklying     " "20x5\"  20x5\"   20x5\"    Ther Ex             Hip flexion stretch with strap             Nustep/bike   5'   10' L1  10' L1 10' L1 10' lv 1    Hamstring curl              Piriformis stretch             SB stretch              Seated ankle DF                                       Ther Activity             Standing HR/TR 30x  30x 30x  30x  30x      Sit to stand squat             Lateral walks Red step over 20x ea Red step over 10x ea     nv Blue step over 10x on ea Blue step over 10x ea Red step over 10x ea   Leeann walks Red step over 20x ea Red step over 10x ea     nv Blue step over 10x on ea Blue step over x10 ea Red step over 10x ea   Step up  20x on ea biodex foam  20x on ea biodex foam         10x on ea biodex foam    Brandon backward pull             Gait Training                                       Vitals             BP   152/82 mmhg                          1:1 with PT from 10-1045am                                                                         "

## 2025-01-30 ENCOUNTER — TRANSCRIBE ORDERS (OUTPATIENT)
Dept: LAB | Facility: CLINIC | Age: 75
End: 2025-01-30

## 2025-01-30 ENCOUNTER — OFFICE VISIT (OUTPATIENT)
Dept: PHYSICAL THERAPY | Facility: CLINIC | Age: 75
End: 2025-01-30
Payer: MEDICARE

## 2025-01-30 ENCOUNTER — APPOINTMENT (OUTPATIENT)
Dept: LAB | Facility: CLINIC | Age: 75
End: 2025-01-30
Payer: MEDICARE

## 2025-01-30 DIAGNOSIS — Z91.81 RISK FOR FALLS: ICD-10-CM

## 2025-01-30 DIAGNOSIS — R26.9 NEUROLOGIC GAIT DYSFUNCTION: ICD-10-CM

## 2025-01-30 DIAGNOSIS — M81.0 SENILE OSTEOPOROSIS: ICD-10-CM

## 2025-01-30 DIAGNOSIS — M81.0 SENILE OSTEOPOROSIS: Primary | ICD-10-CM

## 2025-01-30 DIAGNOSIS — G35 MULTIPLE SCLEROSIS (HCC): Primary | ICD-10-CM

## 2025-01-30 LAB
25(OH)D3 SERPL-MCNC: 64.4 NG/ML (ref 30–100)
CALCIUM SERPL-MCNC: 9.9 MG/DL (ref 8.4–10.2)
PTH-INTACT SERPL-MCNC: 29.8 PG/ML (ref 12–88)

## 2025-01-30 PROCEDURE — 83970 ASSAY OF PARATHORMONE: CPT

## 2025-01-30 PROCEDURE — 82306 VITAMIN D 25 HYDROXY: CPT

## 2025-01-30 PROCEDURE — 97530 THERAPEUTIC ACTIVITIES: CPT | Performed by: PHYSICAL THERAPIST

## 2025-01-30 PROCEDURE — 97140 MANUAL THERAPY 1/> REGIONS: CPT | Performed by: PHYSICAL THERAPIST

## 2025-01-30 PROCEDURE — 36415 COLL VENOUS BLD VENIPUNCTURE: CPT

## 2025-01-30 NOTE — PROGRESS NOTES
"Daily Note     Today's date: 2025  Patient name: Jacy Rodrigez  : 1950  MRN: [id number]  Referring provider: Elidia Briceno MD  Dx:   Encounter Diagnosis     ICD-10-CM    1. Multiple sclerosis (HCC)  G35       2. Risk for falls  Z91.81       3. Neurologic gait dysfunction  R26.9                      Subjective: Pt reports not sleeping much last night. She had a tooth pulled at the dentist and has some facial discomfort.       Objective: See treatment diary below      Assessment: Tolerated treatment fair. LE flexibility not as restricted today compared to previous session. Pt's LE fatigue with WB strength exercises.  Pt would like to purchase ASO for ankle stability held at neutral. This may be a plausible solution as patient still have active dorsiflexion during ambulation. If ASO does not provide enough stability, patient will consider MAFO in the future. However, patient only plans on wearing ASO toward the end of the day when she fatigues. Patient would benefit from continued PT      Plan: Continue per plan of care.  Re-evaluate next session.      Precautions: MS, Gait dysfunction, Insomnia, Osteoporosis, Peripheral polyneuropathy, Hx of Breast CA, R IDALIA in - Dr. Rushing, Left femur IM nailing       Manuals    Sciatic nerve mobilizations 4' 4' 4' 4'   4' 4' 4' 4'   Hip ROM: flexion, ER 8' 8' 8'  8'   8'  8' 8' 8'                             Neuro Re-Ed             Slump nerve glides             Sciatic nerve glides 10x5\" B seated4' 10x5\" B seated     10x5\" 10x5\" B 10x5\" B seated 10x5\" B seated   LAQ             Standing hip abduction             Standing march             Standing hip extension             EC on foam              Marching in hooklying             Hamstring curls             Bridge  2x10      2x10   defers 2x10    Hip add isometric       20x5\"  20x5\"    Hip abd isometric in hooklying       20x5\"   20x5\"    Ther Ex             Hip flexion " stretch with strap             Nustep/bike   5'     10' L1 10' L1 10' lv 1    Hamstring curl              Piriformis stretch             SB stretch              Seated ankle DF                                       Ther Activity             Standing HR/TR 30x  30x 30x 30x    30x      Sit to stand squat             Lateral walks Red step over 20x ea Red step over 10x ea  Blue step over 6x on left and 10x right    nv Blue step over 10x on ea Blue step over 10x ea Red step over 10x ea   Leeann walks Red step over 20x ea Red step over 10x ea  Blue step over 10x ea   nv Blue step over 10x on ea Blue step over x10 ea Red step over 10x ea   Step up  20x on ea biodex foam  20x on ea biodex foam   10x on biodex foam ea      10x on ea biodex foam    Br[place] backward pull             Gait Training                                       Vitals             BP   152/82 mmhg

## 2025-02-03 ENCOUNTER — EVALUATION (OUTPATIENT)
Dept: PHYSICAL THERAPY | Facility: CLINIC | Age: 75
End: 2025-02-03
Payer: MEDICARE

## 2025-02-03 DIAGNOSIS — R26.9 NEUROLOGIC GAIT DYSFUNCTION: ICD-10-CM

## 2025-02-03 DIAGNOSIS — Z91.81 RISK FOR FALLS: ICD-10-CM

## 2025-02-03 DIAGNOSIS — G35 MULTIPLE SCLEROSIS (HCC): Primary | ICD-10-CM

## 2025-02-03 PROCEDURE — 97140 MANUAL THERAPY 1/> REGIONS: CPT | Performed by: PHYSICAL THERAPIST

## 2025-02-03 PROCEDURE — 97530 THERAPEUTIC ACTIVITIES: CPT | Performed by: PHYSICAL THERAPIST

## 2025-02-03 PROCEDURE — 97110 THERAPEUTIC EXERCISES: CPT | Performed by: PHYSICAL THERAPIST

## 2025-02-03 NOTE — PROGRESS NOTES
[id number]    Today's date: 2/3/2025  Patient name: Jacy Rodrigez  : 1950  MRN: [id number]  Referring provider: Elidia Briceno MD  Dx:   Encounter Diagnosis     ICD-10-CM    1. Multiple sclerosis (HCC)  G35       2. Risk for falls  Z91.81       3. Neurologic gait dysfunction  R26.9                    Assessment  Jacy Rodrigez is a 74 y.o. female who presents with signs and symptoms consistent with progressive multiple sclerosis resulting in LE weakness, fatigue, ambulation dysfunction, and balance impairment. Over the last month, we have been able to progress Jihan to more standing and proprioception exercises. She continues to have sensitization & neuropathy in distal LE; however, her pain levels and flexibility have been improving with formal PT. At her last visit with Dr. Briceno, she suggested a MAFO for help control patient's left ankle weakness. During ambulation, patient's left foot seems to over pronate and she has difficulty with foot clearance only when fatigued. She was able to ambulate for 6 minutes with normal foot clearance today, but this is not consistent. She does have weakness with eversion and inversion which could be contributing to this. Recommend patient trial OTC brace for ankle stability prior to getting fitted with formal MAFO. If weakness worsens, she will schedule an appointment with orthotist for MAFO fabrication. Otherwise, patient is still ambulating with a SPC safely. Pt continues to progress with PT intervention. Due to the progressive nature of MS and her current functional state, I am recommending continued treatment. Patient would benefit from skilled physical therapy to address the impairments, improve their level of function, and to improve their overall quality of life.    Patient's BP has been elevated, although not consistently. She also has been experiencing pedal edema. Recommend she return to PCP regarding this issue.  Impairments: abnormal coordination,  abnormal gait, abnormal or restricted ROM, abnormal movement, impaired balance, impaired physical strength, poor posture   Understanding of Dx/Px/POC: good     Prognosis: good    Goals  Short Term Goals: to be achieved by 4 weeks  1) Patient to be independent with basic HEP.-Partially met  2) Decrease pain to moderate at its worst.-Partially met  3) Increase SLR ROM by 5-10 degrees -Partially met  4) Increase LE strength by 1/2 MMT grade in all deficient planes.-Partially met    Long Term Goals: to be achieved by discharge  1) FOTO equal to or greater than expected.-Partially met  2) Ambulation to improve to maximal level of function-Partially met  3) Improve TUG by 5 seconds-Partially met  4) Sit to stand transfers will improve to maximal level of function -Partially met  5) Improve 5x sit to stand by 5 seconds-Partially met    Plan  Patient would benefit from: PT eval and skilled physical therapy    Planned therapy interventions: manual therapy, neuromuscular re-education, patient education, strengthening, therapeutic activities, therapeutic exercise, transfer training, home exercise program and functional ROM exercises    Frequency: 2x per week for 6-8 weeks.  Treatment plan discussed with: patient        Subjective Evaluation    History of Present Illness  Mechanism of injury: History of Current Injury: Pt referred to PT secondary to progressive MS and allodynia in bilateral LE. She was treated by me last year through April of 2024. Her lower extremity symptoms were worsening at that time along with weakness throughout antigravity muscles. Pt notes that her strength, fatigue, and pain continue to worsen.  Due to increasing lower extremity pain, patient received US of bilateral LE and increased Gabapentin dosage. No evidence of significant lower extremity arterial occlusive disease on US of bilateral LE. XR of right knee was negative for osseous pathology. Pt reports frequent 4+ pitting edema in bilateral LE. Pt  "reports compression stockings don't seem to help her.     Pt reports a walking tolerance of max 5 minutes.     Pt has steps down to the basement but has single level living. Pt has 1-2, 3\" steps to enter her house.   Pain location/Descriptors: Severe LE pain from knees to feet (anterior and posterior). + cramping. R worse than L/   Aggravating factors: Constantly present (24/7) sometimes worse than others.   Easing factors: Rest, gabapentin  24 HR pattern: Worse that night time.   XR of R knee: normal   Special Questions: Positive for peripheral neuropathy  Patient goals:  Reduce unsteadiness, improve LE strength, increase walking and endurance.   Hobbies/Interest: Volunteers as , cooking, cleaning    Occupation: Retired       Pain  Pain scale at highest: Severe.      Diagnostic Tests  Ultrasound findings: normal      Objective     Strength/Myotome Testing     Left Hip   Planes of Motion   Flexion: 3  Abduction: 3+  Adduction: 3+    Right Hip   Planes of Motion   Flexion: 4-  Abduction: 3+  Adduction: 3+    Left Knee   Flexion: 4-  Extension: 4-    Right Knee   Flexion: 4  Extension: 4    Left Ankle/Foot   Dorsiflexion: 4-  Plantar flexion: 4  Eversion: 3+  Inversion: 3+    Right Ankle/Foot   Dorsiflexion: 4-  Plantar flexion: 4  Eversion: 3+  Inversion: 3    Tests     Additional Tests Details  Timed up and go:  Date: 33.18 seconds. Excessive use of hands on rails for assistance   10/1: 22.21 seconds. Moderate use of hands on rails. SPC. >3 second turn  11/7: 23.99 seconds. Moderate use of hands on rails. SPC. Turn in 2.8 seconds  12/19: 22.98 seconds. Moderate use of hands on rails. SPC. Turn in 3 seconds  2/3: 17.46 seconds. Moderate use of hands. SPC. Turn less than 2 seconds.    Sit to stand Transfers:   Date: Excessive use of hands on rails for assistance  10/1: Moderate use of hands on rails  11/7: Moderate use of hands on rails   12/19: Moderate use of hands on rails.  2/3: Moderate use of hands on " "rails    5x sit to stand:   Date: 27.54 seconds with excessive use of hands  10/1: 19.40 seconds with moderate use of hands  11/7: 19.69 seconds with moderate use of hands  12/19: 14.03  seconds with moderate use of hands  2/3: 16.06 seconds with moderate use of hands    2 minute walk test:   Date: NT  10/1: 130 feet  11/7: NT  12/19: 162 feet  2/3: 161 feet    MCTSIB:  EO firm: 30 seconds with mild postural sway   EC firm: 30 seconds with moderate postural sway  EO foam: 30 seconds with mild postural sway  EC foam: 17 seconds prior to LOB    6 minute walk test:   Date: 260 feet in 4:10  11/7: 423 feet in 6 minutes using SPC  12/19: 454 feet in 6 minutes using SPC  2/3: 457 feet in 6 minutes using SPC    *Swelling present in bilateral ankles/feet and right knee vs left knee- This continues but improved  *Sensitivity present in distal LE to light touch, especially over anterior tibia. -This continues but improving    Positive SLUMP for adverse neural tension bilaterally -This continues  Positive SLR at 55 degrees on R/55 degrees on L (improving)      Ambulation     Observational Gait     Additional Observational Gait Details  SPC: slow sylvia, dynamic valgus in stance, small step length with femoral adduction            Precautions: MS, Gait dysfunction, Insomnia, Osteoporosis, Peripheral polyneuropathy, Hx of Breast CA, R IDALIA in 2012- Dr. Rushing, Left femur IM nailing       Manuals 1/20 1/23 1/27 1/30 2/3  1/7 1/9 1/14 1/16   Sciatic nerve mobilizations 4' 4' 4' 4' 4'  4' 4' 4' 4'   Hip ROM: flexion, ER 8' 8' 8'  8' 8'  8'  8' 8' 8'                             Neuro Re-Ed             Slump nerve glides             Sciatic nerve glides 10x5\" B seated4' 10x5\" B seated     10x5\" 10x5\" B 10x5\" B seated 10x5\" B seated   LAQ             Standing hip abduction             Standing march             Standing hip extension             EC on foam              Marching in hooklying             Hamstring curls           " "  Bridge  2x10      2x10   defers 2x10    Hip add isometric       20x5\"  20x5\"    Hip abd isometric in hooklying       20x5\"   20x5\"    Ther Ex             Hip flexion stretch with strap             Nustep/bike   5'     10' L1 10' L1 10' lv 1    Hamstring curl              Piriformis stretch             SB stretch              Seated ankle DF                                       Ther Activity             Standing HR/TR 30x  30x 30x 30x    30x      Sit to stand squat             Lateral walks Red step over 20x ea Red step over 10x ea  Blue step over 6x on left and 10x right    nv Blue step over 10x on ea Blue step over 10x ea Red step over 10x ea   Leeann walks Red step over 20x ea Red step over 10x ea  Blue step over 10x ea   nv Blue step over 10x on ea Blue step over x10 ea Red step over 10x ea   Step up  20x on ea biodex foam  20x on ea biodex foam   10x on biodex foam ea      10x on ea biodex foam    [place] backward pull             Gait Training                                       Vitals             BP   152/82 mmhg   158/78 mmhg                       1:1 with PT from 10-1053am  PT was re-evaluated today x 40 minutes                                                                         "

## 2025-02-06 ENCOUNTER — APPOINTMENT (OUTPATIENT)
Dept: PHYSICAL THERAPY | Facility: CLINIC | Age: 75
End: 2025-02-06
Payer: MEDICARE

## 2025-02-07 ENCOUNTER — TELEPHONE (OUTPATIENT)
Dept: NEPHROLOGY | Facility: CLINIC | Age: 75
End: 2025-02-07

## 2025-02-07 NOTE — TELEPHONE ENCOUNTER
Left msg with pt to call back and reschedule 6/2/25 appt with Dr. Long since he will no longer be in the office. Please transfer to EO for scheduling.

## 2025-02-08 ENCOUNTER — PATIENT MESSAGE (OUTPATIENT)
Dept: NEUROLOGY | Facility: CLINIC | Age: 75
End: 2025-02-08

## 2025-02-10 ENCOUNTER — TELEPHONE (OUTPATIENT)
Age: 75
End: 2025-02-10

## 2025-02-10 ENCOUNTER — APPOINTMENT (OUTPATIENT)
Dept: PHYSICAL THERAPY | Facility: CLINIC | Age: 75
End: 2025-02-10
Payer: MEDICARE

## 2025-02-10 NOTE — TELEPHONE ENCOUNTER
Patient calling in regards to rescheduled appointment.    States she has labs but her appointment needed to be rescheduled a month later than original appointment date.    Patients lab orders have expected of 6/1/25-6/11/25.   Patients appointment is now for 7/30/25.    Please adjust orders for patient to have completed in July.

## 2025-02-11 ENCOUNTER — PATIENT MESSAGE (OUTPATIENT)
Dept: NEUROLOGY | Facility: CLINIC | Age: 75
End: 2025-02-11

## 2025-02-11 DIAGNOSIS — G35 MULTIPLE SCLEROSIS (HCC): Primary | ICD-10-CM

## 2025-02-11 DIAGNOSIS — G35 MULTIPLE SCLEROSIS (HCC): ICD-10-CM

## 2025-02-11 DIAGNOSIS — R26.9 GAIT DISTURBANCE: ICD-10-CM

## 2025-02-11 RX ORDER — DALFAMPRIDINE 10 MG/1
10 TABLET, FILM COATED, EXTENDED RELEASE ORAL 2 TIMES DAILY
Qty: 180 TABLET | Refills: 0 | Status: SHIPPED | OUTPATIENT
Start: 2025-02-11

## 2025-02-13 ENCOUNTER — OFFICE VISIT (OUTPATIENT)
Dept: PHYSICAL THERAPY | Facility: CLINIC | Age: 75
End: 2025-02-13
Payer: MEDICARE

## 2025-02-13 DIAGNOSIS — G35 MULTIPLE SCLEROSIS (HCC): Primary | ICD-10-CM

## 2025-02-13 DIAGNOSIS — Z91.81 RISK FOR FALLS: ICD-10-CM

## 2025-02-13 DIAGNOSIS — R26.9 NEUROLOGIC GAIT DYSFUNCTION: ICD-10-CM

## 2025-02-13 PROCEDURE — 97535 SELF CARE MNGMENT TRAINING: CPT | Performed by: PHYSICAL THERAPIST

## 2025-02-13 PROCEDURE — 97140 MANUAL THERAPY 1/> REGIONS: CPT | Performed by: PHYSICAL THERAPIST

## 2025-02-13 NOTE — PROGRESS NOTES
"Daily Note     Today's date: 2025  Patient name: Jacy Rodrigez  : 1950  MRN: [id number]  Referring provider: Elidia Briceno MD  Dx:   Encounter Diagnosis     ICD-10-CM    1. Multiple sclerosis (HCC)  G35       2. Risk for falls  Z91.81       3. Neurologic gait dysfunction  R26.9                      Subjective: Pt reports falling on Wednesday last week and missed PT earlier this week. She notes falling forward but doesn't know exactly how it happened. Her  son was home and helped her up. She brought in foot drop braces to review today. Pt denies any bony injury. She reports mainly soft tissue contusion and discomfort.       Objective: See treatment diary below  Pt notes abrasion/contusion on right knee. No isolated tenderness to patellar or fibular head. Pt able to flex knees beyond 90 degrees.     Assessment: Reviewed neoprene foot drop brace at length. This provides minimal support for patient. She feels better with anti pronation footwear which she will use for now. If foot drop worsens, she should consult orthotist. She may also try ASO to provide more medial and lateral ankle stability. Pt was more swollen in distal LLE than RLE. She is also very sensitivity to touch to anterior tibia.  Only focused on on stretching and mobility exercises today. Recommend patient get evaluated if knee and LE symptoms persist. Plan to resume exercises next session if symptoms allow.       Plan: Continue per plan of care.      Precautions: MS, Gait dysfunction, Insomnia, Osteoporosis, Peripheral polyneuropathy, Hx of Breast CA, R IDALIA in - Dr. Rushing, Left femur IM nailing       Manuals 1/20 1/23 1/27 1/30 2/3 2/13   1/14 1/16   Sciatic nerve mobilizations 4' 4' 4' 4' 4' 4'   4' 4'   Hip ROM: flexion, ER 8' 8' 8'  8' 8' 8'    8' 8'                             Neuro Re-Ed             Slump nerve glides             Sciatic nerve glides 10x5\" B seated4' 10x5\" B seated       10x5\" B seated 10x5\" B seated   LAQ     " "        Standing hip abduction             Standing march             Standing hip extension             EC on foam              Marching in hooklying             Hamstring curls             Bridge  2x10        defers 2x10    Hip add isometric         20x5\"    Hip abd isometric in hooklying         20x5\"    Ther Ex             Hip flexion stretch with strap             Nustep/bike   5'       10' lv 1    Hamstring curl              Piriformis stretch             SB stretch              Seated ankle DF                                       Ther Activity             Standing HR/TR 30x  30x 30x 30x          Sit to stand squat             Lateral walks Red step over 20x ea Red step over 10x ea  Blue step over 6x on left and 10x right      Blue step over 10x ea Red step over 10x ea   Leeann walks Red step over 20x ea Red step over 10x ea  Blue step over 10x ea     Blue step over x10 ea Red step over 10x ea   Step up  20x on ea biodex foam  20x on ea biodex foam   10x on biodex foam ea      10x on ea biodex foam    [place] backward pull             Self management      15' Foot drop brace application                                  Vitals             BP   152/82 mmhg   158/78 mmhg                       1:1 with PT from 10-1030am                                                                           "

## 2025-02-17 ENCOUNTER — OFFICE VISIT (OUTPATIENT)
Dept: PHYSICAL THERAPY | Facility: CLINIC | Age: 75
End: 2025-02-17
Payer: MEDICARE

## 2025-02-17 DIAGNOSIS — R26.9 NEUROLOGIC GAIT DYSFUNCTION: ICD-10-CM

## 2025-02-17 DIAGNOSIS — Z91.81 RISK FOR FALLS: ICD-10-CM

## 2025-02-17 DIAGNOSIS — G35 MULTIPLE SCLEROSIS (HCC): Primary | ICD-10-CM

## 2025-02-17 PROCEDURE — 97530 THERAPEUTIC ACTIVITIES: CPT | Performed by: PHYSICAL THERAPIST

## 2025-02-17 PROCEDURE — 97140 MANUAL THERAPY 1/> REGIONS: CPT | Performed by: PHYSICAL THERAPIST

## 2025-02-17 PROCEDURE — 97112 NEUROMUSCULAR REEDUCATION: CPT | Performed by: PHYSICAL THERAPIST

## 2025-02-17 RX ORDER — BACLOFEN 10 MG/1
TABLET ORAL
Qty: 360 TABLET | Refills: 0 | Status: SHIPPED | OUTPATIENT
Start: 2025-02-17

## 2025-02-17 NOTE — PROGRESS NOTES
"Daily Note     Today's date: 2025  Patient name: Jacy Rodrigez  : 1950  MRN: [id number]  Referring provider: Elidia Briceno MD  Dx:   Encounter Diagnosis     ICD-10-CM    1. Multiple sclerosis (HCC)  G35       2. Risk for falls  Z91.81       3. Neurologic gait dysfunction  R26.9           Start Time: 955  Stop Time: 1039  Total time in clinic (min): 44 minutes    Subjective: Pt reports having sensitivity to her distal LE to the touch.       Objective: See treatment diary below      Assessment: Pt's LE flexibility was much less restricted today. Resumed hooklying ER to reduce femoral IR during ambulation. Tolerated treatment well. Returned to standing exercises. Performed lateral and forward walks with marching motion to improve hip ROM and activation. Pt required handrail for balance and safety. Continue to progress as tolerated. Patient would benefit from continued PT.       Plan: Continue per plan of care.      Precautions: MS, Gait dysfunction, Insomnia, Osteoporosis, Peripheral polyneuropathy, Hx of Breast CA, R IDALIA in - Dr. Rushing, Left femur IM nailing       Manuals 1/20 1/23 1/27 1/30 2/3 2/13 2/17  1/14 1/16   Sciatic nerve mobilizations 4' 4' 4' 4' 4' 4' 4'  4' 4'   Hip ROM: flexion, ER 8' 8' 8'  8' 8' 8'  8'  8' 8'                             Neuro Re-Ed             Slump nerve glides             Sciatic nerve glides 10x5\" B seated4' 10x5\" B seated     10x5\" B seated  10x5\" B seated 10x5\" B seated   LAQ             Standing hip abduction             Standing march             Standing hip extension             EC on foam              Marching in hooklying             Hamstring curls             Bridge  2x10        defers 2x10    Hip add isometric         20x5\"    Hip abd isometric in hooklying       2x10  20x5\"    Ther Ex             Hip flexion stretch with strap             Nustep/bike   5'       10' lv 1    Hamstring curl              Piriformis stretch             SB stretch       "        Seated ankle DF                                       Ther Activity             Standing HR/TR 30x  30x 30x 30x    30x      Sit to stand squat             Lateral walks Red step over 20x ea Red step over 10x ea  Blue step over 6x on left and 10x right    Marching with lateral walking  Blue step over 10x ea Red step over 10x ea   Forward walks       Marching while forward walking for hip ROM      Leeann walks Red step over 20x ea Red step over 10x ea  Blue step over 10x ea     Blue step over x10 ea Red step over 10x ea   Step up  20x on ea biodex foam  20x on ea biodex foam   10x on biodex foam ea      10x on ea biodex foam    [place] backward pull             Self management      15' Foot drop brace application                                  Vitals             BP   152/82 mmhg   158/78 mmhg                       1:1 with PT from [id number]

## 2025-02-18 ENCOUNTER — OFFICE VISIT (OUTPATIENT)
Age: 75
End: 2025-02-18
Payer: MEDICARE

## 2025-02-18 DIAGNOSIS — Z42.1 AFTERCARE POSTMASTECTOMY FOR BREAST RECONSTRUCTION: Primary | ICD-10-CM

## 2025-02-18 PROCEDURE — 99214 OFFICE O/P EST MOD 30 MIN: CPT | Performed by: SURGERY

## 2025-02-18 NOTE — PROGRESS NOTES
Jihan presents today for a routine, follow-up visit.  Briefly, she is status post left mastectomy and reconstruction, done elsewhere, she was not pleased with the results, and we have performed multiple procedures and have obtained  better symmetry/results, overall, she has been quite pleased with the results.  MRI from Jan 8, 2025 shows findings consistent with left implant intracapsular rupture, no extracapsular rupture on the left.  On the right there is no intracapsular or extracapsular rupture identified.  We discussed removal of the leaking implant, and replacement, her goal would be to have a similarly sized implant, perhaps with more upper pole projection.  We talked about the procedure, need to obtain information regarding implant currently in place, and we will have her return in 1 to 2 months to discuss implant exchange.  For the time being, she is content to leave well enough alone, although she would be interested in the referral to the bra clinic, a referral has been placed.  This visit lasted 35 minutes, including records review, physical exam, etc. time spent  face-to-face with patient, greater than 50%

## 2025-02-19 ENCOUNTER — OFFICE VISIT (OUTPATIENT)
Dept: PHYSICAL THERAPY | Facility: CLINIC | Age: 75
End: 2025-02-19
Payer: MEDICARE

## 2025-02-19 ENCOUNTER — TELEPHONE (OUTPATIENT)
Dept: OBGYN CLINIC | Facility: OTHER | Age: 75
End: 2025-02-19

## 2025-02-19 DIAGNOSIS — Z91.81 RISK FOR FALLS: ICD-10-CM

## 2025-02-19 DIAGNOSIS — G35 MULTIPLE SCLEROSIS (HCC): Primary | ICD-10-CM

## 2025-02-19 DIAGNOSIS — R26.9 NEUROLOGIC GAIT DYSFUNCTION: ICD-10-CM

## 2025-02-19 PROCEDURE — 97112 NEUROMUSCULAR REEDUCATION: CPT | Performed by: PHYSICAL THERAPIST

## 2025-02-19 PROCEDURE — 97140 MANUAL THERAPY 1/> REGIONS: CPT | Performed by: PHYSICAL THERAPIST

## 2025-02-19 PROCEDURE — 97530 THERAPEUTIC ACTIVITIES: CPT | Performed by: PHYSICAL THERAPIST

## 2025-02-19 NOTE — TELEPHONE ENCOUNTER
I called patient and left message to schedule fitting in [facility]. Call back #: [phone].

## 2025-02-19 NOTE — PROGRESS NOTES
"Daily Note     Today's date: 2025  Patient name: Jacy Rodrigez  : 1950  MRN: [id number]  Referring provider: Elidia Briceno MD  Dx:   Encounter Diagnosis     ICD-10-CM    1. Multiple sclerosis (HCC)  G35       2. Risk for falls  Z91.81       3. Neurologic gait dysfunction  R26.9                      Subjective: Pt offers no new complaints today.       Objective: See treatment diary below      Assessment: Good tolerance and performance of exercises. Re-introduced idris step overs into POC. LE flexibility less restricted and less painful upon palpation, including at distal LE/ankles. Left knee hyperextension present with step ups on the left. Tolerated treatment well. Patient would benefit from continued PT.       Plan: Continue per plan of care.      Precautions: MS, Gait dysfunction, Insomnia, Osteoporosis, Peripheral polyneuropathy, Hx of Breast CA, R IDALIA in - Dr. Rushing, Left femur IM nailing       Manuals 1/20 1/23 1/27 1/30 2/3 2/13 2/17 2/17     Sciatic nerve mobilizations 4' 4' 4' 4' 4' 4' 4' 4'     Hip ROM: flexion, ER 8' 8' 8'  8' 8' 8'  8' 8'                               Neuro Re-Ed             Slump nerve glides             Sciatic nerve glides 10x5\" B seated4' 10x5\" B seated     10x5\" B seated 10x5\" B seated     LAQ             Standing hip abduction             Standing march             Standing hip extension             EC on foam              Marching in hooklying             Hamstring curls             Bridge  2x10            Hip add isometric             Hip abd isometric in hooklying       2x10 Seated 2x10      Ther Ex             Hip flexion stretch with strap             Nustep/bike   5'           Hamstring curl              Piriformis stretch             SB stretch              Seated ankle DF                                       Ther Activity             Standing HR/TR 30x  30x 30x 30x    30x 30x     Sit to stand squat             Lateral walks Red step over 20x ea Red " step over 10x ea  Blue step over 6x on left and 10x right    Marching with lateral walking      Forward walks       Marching while forward walking for hip ROM      Leeann walks Red step over 20x ea Red step over 10x ea  Blue step over 10x ea   Blue step over 20x ea Blue step over 20x ea30     Step up  20x on ea biodex foam  20x on ea biodex foam   10x on biodex foam ea    10x on biodex foam ea     [place] backward pull             Self management      15' Foot drop brace application                                  Vitals             BP   152/82 mmhg   158/78 mmhg                       1:1 with PT from 915-958am

## 2025-02-21 LAB
DME PARACHUTE DELIVERY DATE REQUESTED: NORMAL
DME PARACHUTE ITEM DESCRIPTION: NORMAL
DME PARACHUTE ORDER STATUS: NORMAL
DME PARACHUTE SUPPLIER NAME: NORMAL
DME PARACHUTE SUPPLIER PHONE: NORMAL

## 2025-02-24 ENCOUNTER — OFFICE VISIT (OUTPATIENT)
Dept: PHYSICAL THERAPY | Facility: CLINIC | Age: 75
End: 2025-02-24
Payer: MEDICARE

## 2025-02-24 DIAGNOSIS — Z91.81 RISK FOR FALLS: ICD-10-CM

## 2025-02-24 DIAGNOSIS — R26.9 NEUROLOGIC GAIT DYSFUNCTION: ICD-10-CM

## 2025-02-24 DIAGNOSIS — G35 MULTIPLE SCLEROSIS (HCC): Primary | ICD-10-CM

## 2025-02-24 PROCEDURE — 97112 NEUROMUSCULAR REEDUCATION: CPT | Performed by: PHYSICAL THERAPIST

## 2025-02-24 PROCEDURE — 97140 MANUAL THERAPY 1/> REGIONS: CPT | Performed by: PHYSICAL THERAPIST

## 2025-02-24 NOTE — PROGRESS NOTES
"Daily Note     Today's date: 2025  Patient name: Jacy Rodrigez  : 1950  MRN: [id number]  Referring provider: Elidia Briceno MD  Dx:   Encounter Diagnosis     ICD-10-CM    1. Multiple sclerosis (HCC)  G35       2. Risk for falls  Z91.81       3. Neurologic gait dysfunction  R26.9                      Subjective: Pt reports having fatigue this morning. She reports normal vitals but attended PT primarily for mobility work. Pt purchased OTC arch supports for anti-pronation control.       Objective: See treatment diary below      Assessment: Tolerated treatment well. Focused primarily on stretching and motion.  Pt notes improved pronation with over the counter arch supports during ambulation. Plan to resume LE strength, conditioning, and balance next visit as tolerated. Patient would benefit from continued PT.       Plan: Continue per plan of care.      Precautions: MS, Gait dysfunction, Insomnia, Osteoporosis, Peripheral polyneuropathy, Hx of Breast CA, R IDALIA in - Dr. Rushing, Left femur IM nailing       Manuals 1/20 1/23 1/27 1/30 2/3 2/13 2/17 2/17 2/24    Sciatic nerve mobilizations 4' 4' 4' 4' 4' 4' 4' 4' 4'    Hip ROM: flexion, ER 8' 8' 8'  8' 8' 8'  8' 8' 8'                              Neuro Re-Ed             Slump nerve glides             Sciatic nerve glides 10x5\" B seated4' 10x5\" B seated     10x5\" B seated 10x5\" B seated 15x5\" with green strap seated.     LAQ             Standing hip abduction             Standing march             Standing hip extension             EC on foam              Marching in hooklying             Hamstring curls             Bridge  2x10            Hip add isometric             Hip abd isometric in hooklying       2x10 Seated 2x10  Seated 2x10    Ther Ex             Hip flexion stretch with strap             Nustep/bike   5'           Hamstring curl              Piriformis stretch             SB stretch              Seated ankle DF                                  "      Ther Activity             Standing HR/TR 30x  30x 30x 30x    30x 30x 30x    Sit to stand squat             Lateral walks Red step over 20x ea Red step over 10x ea  Blue step over 6x on left and 10x right    Marching with lateral walking      Forward walks       Marching while forward walking for hip ROM      Leeann walks Red step over 20x ea Red step over 10x ea  Blue step over 10x ea   Blue step over 20x ea Blue step over 20x ea30     Step up  20x on ea biodex foam  20x on ea biodex foam   10x on biodex foam ea    10x on biodex foam ea     [place] backward pull             Self management      15' Foot drop brace application                                  Vitals             BP   152/82 mmhg   158/78 mmhg                       1:1 with PT from 945-1015am

## 2025-02-25 ENCOUNTER — OFFICE VISIT (OUTPATIENT)
Dept: DERMATOLOGY | Facility: CLINIC | Age: 75
End: 2025-02-25
Payer: MEDICARE

## 2025-02-25 ENCOUNTER — TELEPHONE (OUTPATIENT)
Dept: OBGYN CLINIC | Facility: OTHER | Age: 75
End: 2025-02-25

## 2025-02-25 VITALS — BODY MASS INDEX: 24.65 KG/M2 | WEIGHT: 144.4 LBS | HEIGHT: 64 IN | TEMPERATURE: 97.7 F

## 2025-02-25 DIAGNOSIS — D22.62 MULTIPLE BENIGN MELANOCYTIC NEVI OF UPPER AND LOWER EXTREMITIES AND TRUNK: ICD-10-CM

## 2025-02-25 DIAGNOSIS — D22.61 MULTIPLE BENIGN MELANOCYTIC NEVI OF UPPER AND LOWER EXTREMITIES AND TRUNK: ICD-10-CM

## 2025-02-25 DIAGNOSIS — D22.72 MULTIPLE BENIGN MELANOCYTIC NEVI OF UPPER AND LOWER EXTREMITIES AND TRUNK: ICD-10-CM

## 2025-02-25 DIAGNOSIS — D18.01 CHERRY ANGIOMA: ICD-10-CM

## 2025-02-25 DIAGNOSIS — D22.5 MULTIPLE BENIGN MELANOCYTIC NEVI OF UPPER AND LOWER EXTREMITIES AND TRUNK: ICD-10-CM

## 2025-02-25 DIAGNOSIS — L81.4 LENTIGINES: ICD-10-CM

## 2025-02-25 DIAGNOSIS — D22.71 MULTIPLE BENIGN MELANOCYTIC NEVI OF UPPER AND LOWER EXTREMITIES AND TRUNK: ICD-10-CM

## 2025-02-25 DIAGNOSIS — L82.1 SEBORRHEIC KERATOSIS: Primary | ICD-10-CM

## 2025-02-25 PROCEDURE — 17110 DESTRUCTION B9 LES UP TO 14: CPT | Performed by: NURSE PRACTITIONER

## 2025-02-25 PROCEDURE — 99214 OFFICE O/P EST MOD 30 MIN: CPT | Performed by: NURSE PRACTITIONER

## 2025-02-25 NOTE — PATIENT INSTRUCTIONS
"MELANOCYTIC NEVI (\"Moles\")    Assessment and Plan:  Based on a thorough discussion of this condition and the management approach to it (including a comprehensive discussion of the known risks, side effects and potential benefits of treatment), the patient (family) agrees to implement the following specific plan:  When outside we recommend using a wide brim hat, sunglasses, long sleeve and pants, sunscreen with SPF 30+ with reapplication every 2 hours, or SPF specific clothing   Benign, reassured  Annual skin check     Melanocytic Nevi  Melanocytic nevi (\"moles\") are tan or brown, raised or flat areas of the skin which have an increased number of melanocytes. Melanocytes are the cells in our body which make pigment and account for skin color.    Some moles are present at birth (I.e., \"congenital nevi\"), while others come up later in life (i.e., \"acquired nevi\").  The sun can stimulate the body to make more moles.  Sunburns are not the only thing that triggers more moles.  Chronic sun exposure can do it too.     Clinically distinguishing a healthy mole from melanoma may be difficult, even for experienced dermatologists. The \"ABCDE's\" of moles have been suggested as a means of helping to alert a person to a suspicious mole and the possible increased risk of melanoma.  The suggestions for raising alert are as follows:    Asymmetry: Healthy moles tend to be symmetric, while melanomas are often asymmetric.  Asymmetry means if you draw a line through the mole, the two halves do not match in color, size, shape, or surface texture. Asymmetry can be a result of rapid enlargement of a mole, the development of a raised area on a previously flat lesion, scaling, ulceration, bleeding or scabbing within the mole.  Any mole that starts to demonstrate \"asymmetry\" should be examined promptly by a board certified dermatologist.     Border: Healthy moles tend to have discrete, even borders.  The border of a melanoma often blends into " "the normal skin and does not sharply delineate the mole from normal skin.  Any mole that starts to demonstrate \"uneven borders\" should be examined promptly by a board certified dermatologist.     Color: Healthy moles tend to be one color throughout.  Melanomas tend to be made up of different colors ranging from dark black, blue, white, or red.  Any mole that demonstrates a color change should be examined promptly by a board certified dermatologist.     Diameter: Healthy moles tend to be smaller than 0.6 cm in size; an exception are \"congenital nevi\" that can be larger.  Melanomas tend to grow and can often be greater than 0.6 cm (1/4 of an inch, or the size of a pencil eraser). This is only a guideline, and many normal moles may be larger than 0.6 cm without being unhealthy.  Any mole that starts to change in size (small to bigger or bigger to smaller) should be examined promptly by a board certified dermatologist.     Evolving: Healthy moles tend to \"stay the same.\"  Melanomas may often show signs of change or evolution such as a change in size, shape, color, or elevation.  Any mole that starts to itch, bleed, crust, burn, hurt, or ulcerate or demonstrate a change or evolution should be examined promptly by a board certified dermatologist.      LENTIGO      Assessment and Plan:  Based on a thorough discussion of this condition and the management approach to it (including a comprehensive discussion of the known risks, side effects and potential benefits of treatment), the patient (family) agrees to implement the following specific plan:  When outside we recommend using a wide brim hat, sunglasses, long sleeve and pants, sunscreen with SPF 30+ with reapplication every 2 hours, or SPF specific clothing     What is a lentigo?  A lentigo is a pigmented flat or slightly raised lesion with a clearly defined edge. Unlike an ephelis (freckle), it does not fade in the winter months. There are several kinds of lentigo.  The " name lentigo originally referred to its appearance resembling a small lentil. The plural of lentigo is lentigines, although “lentigos” is also in common use.    Who gets lentigines?  Lentigines can affect males and females of all ages and races. Solar lentigines are especially prevalent in fair skinned adults. Lentigines associated with syndromes are present at birth or arise during childhood.    What causes lentigines?  Common forms of lentigo are due to exposure to ultraviolet radiation:  Sun damage including sunburn   Indoor tanning   Phototherapy, especially photochemotherapy (PUVA)    Ionizing radiation, eg radiation therapy, can also cause lentigines.  Several familial syndromes associated with widespread lentigines originate from mutations in Jack-MAP kinase, mTOR signaling and PTEN pathways.    What is the treatment for lentigines?  Most lentigines are left alone. Attempts to lighten them may not be successful. The following approaches are used:  SPF 50+ broad-spectrum sunscreen   Hydroquinone bleaching cream   Alpha hydroxy acids   Vitamin C   Retinoids   Azelaic acid   Chemical peels  Individual lesions can be permanently removed using:  Cryotherapy   Intense pulsed light   Pigment lasers    How can lentigines be prevented?  Lentigines associated with exposure ultraviolet radiation can be prevented by very careful sun protection. Clothing is more successful at preventing new lentigines than are sunscreens.    What is the outlook for lentigines?  Lentigines usually persist. They may increase in number with age and sun exposure. Some in sun-protected sites may fade and disappear.    OLIVEIRA ANGIOMAS      Assessment and Plan:  Based on a thorough discussion of this condition and the management approach to it (including a comprehensive discussion of the known risks, side effects and potential benefits of treatment), the patient (family) agrees to implement the following specific plan:  Monitor for  "changes  Benign, reassured    Assessment and Plan:    Cherry angioma, also known as [place] spots, are benign vascular skin lesions. A \"cherry angioma\" is a firm red, blue or purple papule, 0.1-1 cm in diameter. When thrombosed, they can appear black in colour until evaluated with a dermatoscope when the red or purple colour is more easily seen. Cherry angioma may develop on any part of the body but most often appear on the scalp, face, lips and trunk.  An angioma is due to proliferating endothelial cells; these are the cells that line the inside of a blood vessel.    Angiomas can arise in early life or later in life; the most common type of angioma is a cherry angioma.  Cherry angiomas are very common in males and females of any age or race. They are more noticeable in white skin than in skin of colour. They markedly increase in number from about the age of 40. There may be a family history of similar lesions. Eruptive cherry angiomas have been rarely reported to be associated with internal malignancy. The cause of angiomas is unknown. Genetic analysis of cherry angiomas has shown that they frequently carry specific somatic missense mutations in the GNAQ and GNA11 (Q209H) genes, which are involved in other vascular and melanocytic proliferations.      SEBORRHEIC KERATOSIS; NON-INFLAMED      Assessment and Plan:  Based on a thorough discussion of this condition and the management approach to it (including a comprehensive discussion of the known risks, side effects and potential benefits of treatment), the patient (family) agrees to implement the following specific plan:  Monitor for changes  Benign, reassured    Seborrheic Keratosis  A seborrheic keratosis is a harmless warty spot that appears during adult life as a common sign of skin aging.  Seborrheic keratoses can arise on any area of skin, covered or uncovered, with the usual exception of the palms and soles. They do not arise from mucous membranes. " "Seborrheic keratoses can have highly variable appearance.      Seborrheic keratoses are extremely common. It has been estimated that over 90% of adults over the age of 60 years have one or more of them. They occur in males and females of all races, typically beginning to erupt in the 30s or 40s. They are uncommon under the age of 20 years.  The precise cause of seborrhoeic keratoses is not known.  Seborrhoeic keratoses are considered degenerative in nature. As time goes by, seborrheic keratoses tend to become more numerous. Some people inherit a tendency to develop a very large number of them; some people may have hundreds of them.      There is no easy way to remove multiple lesions on a single occasion.  Unless a specific lesion is \"inflamed\" and is causing pain or stinging/burning or is bleeding, most insurance companies do not authorize treatment.    SEBORRHEIC KERATOSIS- INFLAMED    Assessment and Plan:  Based on a thorough discussion of this condition and the management approach to it (including a comprehensive discussion of the known risks, side effects and potential benefits of treatment), the patient (family) agrees to implement the following specific plan:  Monitor for changes  Benign, reassured    Seborrheic Keratosis  A seborrheic keratosis is a harmless warty spot that appears during adult life as a common sign of skin aging.  Seborrheic keratoses can arise on any area of skin, covered or uncovered, with the usual exception of the palms and soles. They do not arise from mucous membranes. Seborrheic keratoses can have highly variable appearance.      Seborrheic keratoses are extremely common. It has been estimated that over 90% of adults over the age of 60 years have one or more of them. They occur in males and females of all races, typically beginning to erupt in the 30s or 40s. They are uncommon under the age of 20 years.  The precise cause of seborrhoeic keratoses is not known.  Seborrhoeic keratoses " "are considered degenerative in nature. As time goes by, seborrheic keratoses tend to become more numerous. Some people inherit a tendency to develop a very large number of them; some people may have hundreds of them.      There is no easy way to remove multiple lesions on a single occasion.  Unless a specific lesion is \"inflamed\" and is causing pain or stinging/burning or is bleeding, most insurance companies do not authorize treatment.  PROCEDURE:  DESTRUCTION OF PRE-MALIGNANT LESIONS  After a thorough discussion of treatment options and risk/benefits/alternatives (including but not limited to local pain, scarring, dyspigmentation, blistering, and possible superinfection), verbal and written consent were obtained and the aforementioned lesions were treated on with cryotherapy using liquid nitrogen x 1 cycle for 5-10 seconds.    TOTAL NUMBER of 1 pre-malignant lesions were treated today on the ANATOMIC LOCATION: Right braline.     The patient tolerated the procedure well, and after-care instructions were provided.   "

## 2025-02-25 NOTE — PROGRESS NOTES
"[facility] Dermatology Clinic Note     Patient Name: Jacy Rodrigez  Encounter Date: 2/25/25     Have you been cared for by a [facility] Dermatologist in the last 3 years and, if so, which description applies to you?    NO.   I am considered a \"new\" patient and must complete all patient intake questions. I am FEMALE/of child-bearing potential.    REVIEW OF SYSTEMS:  Have you recently had or currently have any of the following? Recent fever or chills? No  Any non-healing wound? No  Are you pregnant or planning to become pregnant? No  Are you currently or planning to be nursing or breast feeding? No   PAST MEDICAL HISTORY:  Have you personally ever had or currently have any of the following?  If \"YES,\" then please provide more detail. Skin cancer (such as Melanoma, Basal Cell Carcinoma, Squamous Cell Carcinoma?  No  Tuberculosis, HIV/AIDS, Hepatitis B or C: No  Radiation Treatment No   HISTORY OF IMMUNOSUPPRESSION:   Do you have a history of any of the following:  Systemic Immunosuppression such as Diabetes, Biologic or Immunotherapy, Chemotherapy, Organ Transplantation, Bone Marrow Transplantation or Prednsione?  YES, MS, Auto-immune    Answering \"YES\" requires the addition of the dotphrase \"IMMUNOSUPPRESSED\" as the first diagnosis of the patient's visit.   FAMILY HISTORY:  Any \"first degree relatives\" (parent, brother, sister, or child) with the following?    Skin Cancer, Pancreatic or Other Cancer? No   PATIENT EXPERIENCE:    Do you want the Dermatologist to perform a COMPLETE skin exam today including a clinical examination under the \"bra and underwear\" areas?  Yes  If necessary, do we have your permission to call and leave a detailed message on your Preferred Phone number that includes your specific medical information?  Yes      Allergies   Allergen Reactions    Duloxetine Hcl      Rapid Heart rate      Iodinated Contrast Media Anaphylaxis     IVP dye    Acetaminophen Other (See Comments)     Heart " palpitations    Cetirizine      Only occurs with generic, weakness in legs, off balance and couldn't walk.    Morphine Other (See Comments)     Migraine      Current Outpatient Medications:     ALPHA LIPOIC ACID PO, Take by mouth, Disp: , Rfl:     amoxicillin (AMOXIL) 500 MG tablet, TAKE 4 TABLETS BY MOUTH 1 HOUR PRIOR TO DENTAL VISIT, Disp: , Rfl:     Ampyra 10 MG TB12, TAKE 1 TABLET BY MOUTH 2 TIMES A DAY, Disp: 180 tablet, Rfl: 0    ascorbic acid (VITAMIN C) 500 mg tablet, Take 500 mg by mouth daily, Disp: , Rfl:     baclofen 10 mg tablet, TAKE 1 TABLET EVERY MORNING, TAKE 1 TABLET IN THE EVENING AND TAKE 2 TABLETS AT BEDTIME, Disp: 360 tablet, Rfl: 0    Biotin 5000 MCG TABS, , Disp: , Rfl:     clonazePAM (KlonoPIN) 0.5 mg tablet, Take 1.5 tablets (0.75 mg total) by mouth daily at bedtime TAKE 1 AND 1/2 TABLETS BY MOUTH DAILY AT BEDTIME, Disp: 135 tablet, Rfl: 1    Cranberry 125 MG TABS, Take 125 mg by mouth in the morning, Disp: , Rfl:     denosumab (PROLIA) 60 mg/mL, Inject under the skin, Disp: , Rfl:     diclofenac (VOLTAREN) 75 mg EC tablet, Take 1 tablet by mouth 2 (two) times a day, Disp: , Rfl:     gabapentin (NEURONTIN) 600 MG tablet, TAKE 1 TABLET EVERY        MORNING, 1 TABLET IN THE   AFTERNOON, AND 2 TABLETS ATBEDTIME, Disp: 360 tablet, Rfl: 5    lisinopril (ZESTRIL) 2.5 mg tablet, Take 2.5 mg by mouth daily, Disp: , Rfl:     Magnesium 500 MG TABS, Take 500 tablets by mouth once, Disp: , Rfl:     zonisamide (ZONEGRAN) 100 mg capsule, TAKE 4 CAPSULES DAILY AT   BEDTIME, Disp: 360 capsule, Rfl: 3    cycloSPORINE (RESTASIS) 0.05 % ophthalmic emulsion, Administer 1 drop to both eyes if needed (Patient not taking: Reported on 1/21/2025), Disp: , Rfl:     docusate sodium (COLACE) 100 mg capsule, Take 1 capsule by mouth every other day, Disp: , Rfl:     LORazepam (Ativan) 0.5 mg tablet, Take 1 pill PO 1 hr prior to procedure, take another 20mins before if needed., Disp: 2 tablet, Rfl: 0    meloxicam (MOBIC)  "7.5 mg tablet, Take 7.5 mg by mouth daily, Disp: , Rfl:           Whom besides the patient is providing clinical information about today's encounter?   NO ADDITIONAL HISTORIAN (patient alone provided history)    Physical Exam and Assessment/Plan by Diagnosis: Patient last evaluated by Dr. Cornell on 2/8/24 for skin check.  Denies any personal or family history of skin cancer.  Has history of MS and breast cancer.    MELANOCYTIC NEVI (\"Moles\")    Physical Exam:  Anatomic Location Affected:   Mostly on sun-exposed areas of the trunk and extremities  Morphological Description:  Scattered, 1-4mm round to ovoid, symmetrical-appearing, even bordered, skin colored to dark brown macules/papules, mostly in sun-exposed areas  Pertinent Positives:  Pertinent Negatives:    Additional History of Present Condition:  New patient presents today for a skin exam. She has a  spot of concern that causing pain at times on the right flank closer to bra line. Also has a spot on her Right temple area. Denies pain or itch to that area.     Assessment and Plan:  Based on a thorough discussion of this condition and the management approach to it (including a comprehensive discussion of the known risks, side effects and potential benefits of treatment), the patient (family) agrees to implement the following specific plan:  When outside we recommend using a wide brim hat, sunglasses, long sleeve and pants, sunscreen with SPF 30+ with reapplication every 2 hours, or SPF specific clothing   Benign, reassured  Annual skin check    LENTIGO    Physical Exam:  Anatomic Location Affected:  trunk, arms  Morphological Description:  Light brown macules  Pertinent Positives:  Pertinent Negatives:    Additional History of Present Condition: Present on exam    Assessment and Plan:  Based on a thorough discussion of this condition and the management approach to it (including a comprehensive discussion of the known risks, side effects and potential benefits of " "treatment), the patient (family) agrees to implement the following specific plan:  When outside we recommend using a wide brim hat, sunglasses, long sleeve and pants, sunscreen with SPF 30+ with reapplication every 2 hours, or SPF specific clothing     OLIVEIRA ANGIOMAS    Physical Exam:  Anatomic Location Affected:  Mostly on sun-exposed areas of the trunk and extremities  Morphological Description:  Scattered cherry red, 1-4 mm papules.  Pertinent Positives:  Pertinent Negatives:    Additional History of Present Condition: Present on exam    Assessment and Plan:  Based on a thorough discussion of this condition and the management approach to it (including a comprehensive discussion of the known risks, side effects and potential benefits of treatment), the patient (family) agrees to implement the following specific plan:  Monitor for changes  Benign, reassured    SEBORRHEIC KERATOSIS; NON-INFLAMED    Physical Exam:  Anatomic Location Affected: Mostly on sun-exposed areas of the trunk and extremities  Morphological Description:  Flat and raised, waxy, smooth to warty textured, yellow to brownish-grey to dark brown to blackish, discrete, \"stuck-on\" appearing papules.  Pertinent Positives:  Pertinent Negatives:    Additional History of Present Condition: Present on exam    Assessment and Plan:  Based on a thorough discussion of this condition and the management approach to it (including a comprehensive discussion of the known risks, side effects and potential benefits of treatment), the patient (family) agrees to implement the following specific plan:  Monitor for changes  Benign, reassured    SEBORRHEIC KERATOSIS- INFLAMED    Physical Exam:  Anatomic Location Affected: Right torso  Morphological Description:  Flat and raised, waxy, smooth to warty textured, yellow to brownish-grey to dark brown to blackish, discrete, \"stuck-on\" appearing papules.  Pertinent Positives:  Pertinent Negatives:    Additional History of " "Present Condition: Present on exam    Assessment and Plan:  Based on a thorough discussion of this condition and the management approach to it (including a comprehensive discussion of the known risks, side effects and potential benefits of treatment), the patient (family) agrees to implement the following specific plan:  Monitor for changes  Benign, reassured    There is no easy way to remove multiple lesions on a single occasion.  Unless a specific lesion is \"inflamed\" and is causing pain or stinging/burning or is bleeding, most insurance companies do not authorize treatment.    PROCEDURE:  DESTRUCTION OF BENIGN LESIONS  After a thorough discussion of treatment options and risk/benefits/alternatives (including but not limited to local pain, scarring, dyspigmentation, blistering, and possible superinfection), verbal and written consent were obtained and the aforementioned lesions were treated on with cryotherapy using liquid nitrogen x 1 cycle for 5-10 seconds.    TOTAL NUMBER of 1 pre-malignant lesions were treated today on the ANATOMIC LOCATION: Right torso.     The patient tolerated the procedure well, and after-care instructions were provided.        Scribe Attestation      I,:  Lashae Pierre am acting as a scribe while in the presence of the attending physician.:       I,:  SARAH Leyva personally performed the services described in this documentation    as scribed in my presence.:            "

## 2025-02-27 ENCOUNTER — OFFICE VISIT (OUTPATIENT)
Dept: PHYSICAL THERAPY | Facility: CLINIC | Age: 75
End: 2025-02-27
Payer: MEDICARE

## 2025-02-27 DIAGNOSIS — Z91.81 RISK FOR FALLS: ICD-10-CM

## 2025-02-27 DIAGNOSIS — R26.9 NEUROLOGIC GAIT DYSFUNCTION: ICD-10-CM

## 2025-02-27 DIAGNOSIS — G35 MULTIPLE SCLEROSIS (HCC): Primary | ICD-10-CM

## 2025-02-27 PROCEDURE — 97112 NEUROMUSCULAR REEDUCATION: CPT | Performed by: PHYSICAL THERAPIST

## 2025-02-27 PROCEDURE — 97140 MANUAL THERAPY 1/> REGIONS: CPT | Performed by: PHYSICAL THERAPIST

## 2025-02-27 PROCEDURE — 97530 THERAPEUTIC ACTIVITIES: CPT | Performed by: PHYSICAL THERAPIST

## 2025-02-27 NOTE — PROGRESS NOTES
"Daily Note     Today's date: 2025  Patient name: Jacy Rodrigez  : 1950  MRN: [id number]  Referring provider: Elidia Briceno MD  Dx:   Encounter Diagnosis     ICD-10-CM    1. Multiple sclerosis (HCC)  G35       2. Risk for falls  Z91.81       3. Neurologic gait dysfunction  R26.9                      Subjective: Pt reports having a rough day, especially in the afternoon. Increased fatigue present but denies foot drop or foot drag.       Objective: See treatment diary below      Assessment: Tolerated treatment fair. Pt more fatigued in LE since her appointment was scheduled in the afternoon. Pt does much better with functional activities in the morning. Pt notes numbness/tingling in anterior ankles and distal LE. Continued with step ups and hip abduction isometrics.  Patient would benefit from continued PT. Minimal swelling in distal LE today.       Plan: Continue per plan of care.      Precautions: MS, Gait dysfunction, Insomnia, Osteoporosis, Peripheral polyneuropathy, Hx of Breast CA, R IDALIA in - Dr. Rushing, Left femur IM nailing       Manuals 1/20 1/23 1/27 1/30 2/3 2/13 2/17 2/17 2/24 2/27   Sciatic nerve mobilizations 4' 4' 4' 4' 4' 4' 4' 4' 4' 4'   Hip ROM: flexion, ER 8' 8' 8'  8' 8' 8'  8' 8' 8' 8'                             Neuro Re-Ed             Slump nerve glides             Sciatic nerve glides 10x5\" B seated4' 10x5\" B seated     10x5\" B seated 10x5\" B seated 15x5\" with green strap seated.  15x5\" with green strap seated.    LAQ             Standing hip abduction             Standing march             Standing hip extension             EC on foam              Marching in hooklying             Hamstring curls             Bridge  2x10            Hip add isometric             Hip abd isometric in hooklying       2x10 Seated 2x10  Seated 2x10 Supine 30x5\"   Ther Ex             Hip flexion stretch with strap             Nustep/bike   5'           Hamstring curl              Piriformis " stretch             SB stretch              Seated ankle DF                                       Ther Activity             Standing HR/TR 30x  30x 30x 30x    30x 30x 30x 30x   Sit to stand squat             Lateral walks Red step over 20x ea Red step over 10x ea  Blue step over 6x on left and 10x right    Marching with lateral walking      Forward walks       Marching while forward walking for hip ROM      Leeann walks Red step over 20x ea Red step over 10x ea  Blue step over 10x ea   Blue step over 20x ea Blue step over 20x ea30     Step up  20x on ea biodex foam  20x on ea biodex foam   10x on biodex foam ea    10x on biodex foam ea  15x on biodex foam ea   [place] backward pull             Self management      15' Foot drop brace application                                  Vitals             BP   152/82 mmhg   158/78 mmhg                       1:1 with PT from 330-415pm

## 2025-03-03 ENCOUNTER — APPOINTMENT (OUTPATIENT)
Dept: PHYSICAL THERAPY | Facility: CLINIC | Age: 75
End: 2025-03-03
Payer: MEDICARE

## 2025-03-06 ENCOUNTER — OFFICE VISIT (OUTPATIENT)
Dept: PHYSICAL THERAPY | Facility: CLINIC | Age: 75
End: 2025-03-06
Payer: MEDICARE

## 2025-03-06 DIAGNOSIS — Z91.81 RISK FOR FALLS: ICD-10-CM

## 2025-03-06 DIAGNOSIS — R26.9 NEUROLOGIC GAIT DYSFUNCTION: ICD-10-CM

## 2025-03-06 DIAGNOSIS — G35 MULTIPLE SCLEROSIS (HCC): Primary | ICD-10-CM

## 2025-03-06 PROCEDURE — 97112 NEUROMUSCULAR REEDUCATION: CPT | Performed by: PHYSICAL THERAPIST

## 2025-03-06 PROCEDURE — 97530 THERAPEUTIC ACTIVITIES: CPT | Performed by: PHYSICAL THERAPIST

## 2025-03-06 PROCEDURE — 97140 MANUAL THERAPY 1/> REGIONS: CPT | Performed by: PHYSICAL THERAPIST

## 2025-03-06 NOTE — PROGRESS NOTES
"Daily Note     Today's date: 3/6/2025  Patient name: Jacy Rodrigez  : 1950  MRN: [id number]  Referring provider: Elidia Briceno MD  Dx:   Encounter Diagnosis     ICD-10-CM    1. Multiple sclerosis (HCC)  G35       2. Risk for falls  Z91.81       3. Neurologic gait dysfunction  R26.9                      Subjective: Pt accidentally took her evening pills in the morning the other day. Side effects lasted about 24 hours. Mainly felt tired and sluggish. She didn't go to ED as she knew the dosage of current meds was not unsafe. Pt is now back on her normal dosage of medications.       Objective: See treatment diary below      Assessment: Tolerated treatment fair. Pt's LE strength and endurance is doing well. Implemented TKE with yellow TB to improve quad control. Left quad control is weaker than right as her knee tends to buckle during step ups. However, patient is able to prevent this from happening if she is more cognizant.  Resumed hurdles step overs (lateral). Patient exhibited good technique with therapeutic exercises and would benefit from continued PT.       Plan: Continue per plan of care.      Precautions: MS, Gait dysfunction, Insomnia, Osteoporosis, Peripheral polyneuropathy, Hx of Breast CA, R IDALIA in - Dr. Rushing, Left femur IM nailing       Manuals 3/6   1/30 2/3 2/13 2/17 2/17 2/24 2/27   Sciatic nerve mobilizations 4'   4' 4' 4' 4' 4' 4' 4'   Hip ROM: flexion, ER 8'   8' 8' 8'  8' 8' 8' 8'                             Neuro Re-Ed             Slump nerve glides             Sciatic nerve glides 15x5\" with green strap seated.       10x5\" B seated 10x5\" B seated 15x5\" with green strap seated.  15x5\" with green strap seated.    LAQ             Standing hip abduction             Standing march             Standing hip extension             EC on foam              Marching in hooklying             Hamstring curls             Bridge             Hip add isometric             TKE in standing  YTB 15x " "ea            Hip abd isometric in hooklying Supine 20x5\"       2x10 Seated 2x10  Seated 2x10 Supine 30x5\"   Ther Ex             Hip flexion stretch with strap             Nustep/bike             Hamstring curl              Piriformis stretch             SB stretch              Seated ankle DF                                       Ther Activity             Standing HR/TR 30x   30x    30x 30x 30x 30x   Sit to stand squat             Lateral walks    Blue step over 6x on left and 10x right    Marching with lateral walking      Forward walks       Marching while forward walking for hip ROM      Idris walks 20x ea way 1 idris (Laterally)    Blue step over 10x ea   Blue step over 20x ea Blue step over 20x ea30     Step up  15x on biodex foam ea   10x on biodex foam ea    10x on biodex foam ea  15x on biodex foam ea   [place] backward pull             Self management      15' Foot drop brace application                                  Vitals             BP     158/78 mmhg                       1:1 with PT from 1030-1115am                                                                                     "

## 2025-03-10 ENCOUNTER — OFFICE VISIT (OUTPATIENT)
Dept: PHYSICAL THERAPY | Facility: CLINIC | Age: 75
End: 2025-03-10
Payer: MEDICARE

## 2025-03-10 DIAGNOSIS — G35 MULTIPLE SCLEROSIS (HCC): Primary | ICD-10-CM

## 2025-03-10 DIAGNOSIS — R26.9 NEUROLOGIC GAIT DYSFUNCTION: ICD-10-CM

## 2025-03-10 DIAGNOSIS — Z91.81 RISK FOR FALLS: ICD-10-CM

## 2025-03-10 PROCEDURE — 97530 THERAPEUTIC ACTIVITIES: CPT | Performed by: PHYSICAL THERAPIST

## 2025-03-10 PROCEDURE — 97112 NEUROMUSCULAR REEDUCATION: CPT | Performed by: PHYSICAL THERAPIST

## 2025-03-10 PROCEDURE — 97140 MANUAL THERAPY 1/> REGIONS: CPT | Performed by: PHYSICAL THERAPIST

## 2025-03-10 NOTE — PROGRESS NOTES
"Daily Note     Today's date: 3/10/2025  Patient name: Jacy Rodrigez  : 1950  MRN: [id number]  Referring provider: Elidia Briceno MD  Dx:   Encounter Diagnosis     ICD-10-CM    1. Multiple sclerosis (HCC)  G35       2. Risk for falls  Z91.81       3. Neurologic gait dysfunction  R26.9                      Subjective: Pt reports feeling more control in her LE, especially with lying supine. Her leg is not positioned in femoral IR as she has been. However, she did have 1 unstable episode over the weekend and landed on her right knee. She denies much trauma or contusion but slipped near her hot tub.       Objective: See treatment diary below      Assessment: Flexibility in LE improved from previous session. Pt also has more control of RLE today, although has difficulty feeling a powerful muscle contract during hip abduction isometrics. Tolerated treatment well with less fatigue during functional strengthening. Patient would benefit from continued PT.       Plan: Continue per plan of care.      Precautions: MS, Gait dysfunction, Insomnia, Osteoporosis, Peripheral polyneuropathy, Hx of Breast CA, R IDALIA in - Dr. Rushing, Left femur IM nailing       Manuals 3/6 3/10     2/17 2/17 2/24 2/27   Sciatic nerve mobilizations 4' 4'     4' 4' 4' 4'   Hip ROM: flexion, ER 8' 8'     8' 8' 8' 8'                             Neuro Re-Ed             Slump nerve glides             Sciatic nerve glides 15x5\" with green strap seated.  15x5\" with green strap seated.     10x5\" B seated 10x5\" B seated 15x5\" with green strap seated.  15x5\" with green strap seated.    LAQ             Standing hip abduction             Standing march             Standing hip extension             EC on foam              Marching in hooklying             Hamstring curls             Bridge             Hip add isometric             TKE in standing  YTB 15x ea YTB 15x ea           Hip abd isometric in hooklying Supine 20x5\"  Supine 20x5\"     2x10 Seated " "2x10  Seated 2x10 Supine 30x5\"   Ther Ex             Hip flexion stretch with strap             Nustep/bike             Hamstring curl              Piriformis stretch             SB stretch              Seated ankle DF                                       Ther Activity             Standing HR/TR 30x 30x     30x 30x 30x 30x   Sit to stand squat             Lateral walks       Marching with lateral walking      Forward walks       Marching while forward walking for hip ROM      Idris walks 20x ea way 1 idris (Laterally)  10x ea way 1 idris (Laterally)      Blue step over 20x ea Blue step over 20x ea30     Step up  15x on biodex foam ea 10 on biodex foam ea      10x on biodex foam ea  15x on biodex foam ea   [place] backward pull             Self management                                       Vitals             BP                            1:1 with PT from 1020-1105am                                                                                       "

## 2025-03-12 ENCOUNTER — OFFICE VISIT (OUTPATIENT)
Dept: SLEEP CENTER | Facility: CLINIC | Age: 75
End: 2025-03-12
Payer: MEDICARE

## 2025-03-12 VITALS
WEIGHT: 145.4 LBS | DIASTOLIC BLOOD PRESSURE: 68 MMHG | HEIGHT: 64 IN | BODY MASS INDEX: 24.82 KG/M2 | SYSTOLIC BLOOD PRESSURE: 120 MMHG

## 2025-03-12 DIAGNOSIS — G47.61 PLMD (PERIODIC LIMB MOVEMENT DISORDER): ICD-10-CM

## 2025-03-12 DIAGNOSIS — G62.9 NEUROPATHY: ICD-10-CM

## 2025-03-12 DIAGNOSIS — F51.04 CHRONIC INSOMNIA: Primary | ICD-10-CM

## 2025-03-12 PROCEDURE — 99214 OFFICE O/P EST MOD 30 MIN: CPT | Performed by: STUDENT IN AN ORGANIZED HEALTH CARE EDUCATION/TRAINING PROGRAM

## 2025-03-12 PROCEDURE — G2211 COMPLEX E/M VISIT ADD ON: HCPCS | Performed by: STUDENT IN AN ORGANIZED HEALTH CARE EDUCATION/TRAINING PROGRAM

## 2025-03-12 RX ORDER — CLONAZEPAM 0.5 MG/1
0.75 TABLET ORAL
Qty: 135 TABLET | Refills: 1 | Status: SHIPPED | OUTPATIENT
Start: 2025-03-12

## 2025-03-12 NOTE — PATIENT INSTRUCTIONS
-Continue Klonopin 0.75mg nightly  -Continue proper sleep hygiene  -Continue other medications per the appropriate providers      Good Sleep Hygiene  Wake up at the same time every day, even on the weekends.  Use your bed for sleep and intimacy only.  If you have been in bed awake for 30 minutes, get up and leave the bedroom. Choose a dull activity not involving a blue screen (TV, computer, handheld devices). Go back to bed when you feel sleepy.  Avoid caffeine, nicotine and alcohol before you go to bed.  Avoid large meals before you go to bed.  Avoid using screens (computers, tablets, smartphones, etc.) for at least 1 hour before bedtime  Exercise regularly, but do not exercise right before you go to bed.  Avoid daytime naps. If you do take a nap, sleep for 20-40 minutes, and not after 2pm.

## 2025-03-12 NOTE — ASSESSMENT & PLAN NOTE
Jihan is a very pleasant 74-year-old woman with a PMHx of multiple sclerosis, HTN, history of breast cancer, vitamin B12 deficiency, HLD, neuropathy who presents in follow up for chronic sleep maintenance insomnia and history of RLS.  She did trial CBT-I extensively with behavioral sleep medicine, however ultimately was unable to obtain any benefit.  As such, it is likely that her other medical conditions are playing a larger role in her insomnia than a psychophysiologic component.    Will continue Klonopin 0.75 mg nightly for insomnia.  It appears as though this may also be treating a component of PLMD that have been diagnosed in the past  Recommended that she utilize good sleep hygiene  Recommend continued management of her neuropathy and multiple sclerosis per her other providers, although as noted, this is likely playing a significant role in her insomnia

## 2025-03-12 NOTE — PROGRESS NOTES
Name: Jacy Rodrigez      : 1950      MRN: [id number]  Encounter Provider: Glen Naylor DO  Encounter Date: 3/12/2025   Encounter department: [facility] SLEEP [facility]IC[facility] [place]  :  Assessment & Plan  Chronic insomnia  Jihan is a very pleasant 74-year-old woman with a PMHx of multiple sclerosis, HTN, history of breast cancer, vitamin B12 deficiency, HLD, neuropathy who presents in follow up for chronic sleep maintenance insomnia and history of RLS.  She did trial CBT-I extensively with behavioral sleep medicine, however ultimately was unable to obtain any benefit.  As such, it is likely that her other medical conditions are playing a larger role in her insomnia than a psychophysiologic component.    Will continue Klonopin 0.75 mg nightly for insomnia.  It appears as though this may also be treating a component of PLMD that have been diagnosed in the past  Recommended that she utilize good sleep hygiene  Recommend continued management of her neuropathy and multiple sclerosis per her other providers, although as noted, this is likely playing a significant role in her insomnia       Neuropathy  See chronic insomnia       PLMD (periodic limb movement disorder)  See chronic insomnia  Orders:    clonazePAM (KlonoPIN) 0.5 mg tablet; Take 1.5 tablets (0.75 mg total) by mouth daily at bedtime TAKE 1 AND 1/2 TABLETS BY MOUTH DAILY AT BEDTIME        Follow-up:  She will Return in about 6 months (around 2025).      ________________________________________________________________________________________________    Per Last Visit Note (Date: 2024):  Jihan is a pleasant 73-year-old woman with a PMHx of multiple sclerosis (sees Dr. Valerio), HTN, history of breast cancer, B12 deficiency, HLD who presents in follow up for chronic sleep maintenance insomnia and RLS.  Reassuringly, she tells me that the RLS is a nonissue at this time, however the neuropathic pain from her other conditions remains significantly  impactful.  Certainly, this pain could be having a substantial role is likely having a substantial role on her sleep maintenance, however I do suspect some degree of a behavioral contribution.  Unfortunately, it sounds as though she was fairly reluctant and had difficulties with understanding and conceptualizing the CBT-I techniques, in part (understandably) due to her concerns surrounding her other conditions, most notably MS.     Discussed the pathophysiology behind chronic insomnia, including the 3-P model  Discussed that CBT-I is first-line for treatment of chronic insomnia, and has the best data supporting its efficacy  Reviewed the combination of sleep restriction along with stimulus control very extensively with the patient today, emphasizing the reasoning behind these techniques and the overall temporary nature of CBT-I.  Referral placed for formal Cognitive Behavioral Therapy for Insomnia (CBT-I).  Will reach out to our behavioral sleep medicine specialist to discuss the patient's concerns.  Will continue Klonopin at 0.75 mg nightly for insomnia at this time.  Reviewed that she should try to wean off of the Klonopin if able, as CBT-I starts to work.  I do recommend that she continue gabapentin, as this sounds to likely be helping with her history of RLS symptoms, however will defer dosing and any necessary adjustments to her neurologist.  Recommended that she establish good sleep hygiene as a bedrock for CBT-I to build upon.  I will plan to see her back in ~6 months      Sleep Studies:  -HSAT 2/16/2024: .3 minutes, ELISEO 3.2, O2 severiano 91%      ________________________________________________________________________________________________      Interval History: Jacy Rodrigez is a 74 y.o. female with a PMHx of multiple sclerosis, HTN, history of breast cancer, vitamin B12 deficiency, HLD, neuropathy who presents in follow up for chronic sleep maintenance insomnia and history of  "RLS.      Insomnia:  -Current Symptom Severity/Description: She states that things are \"much better.\" Able to fall asleep within 20 minutes, not really waking up a lot (will wake up, but for the most part will roll over and go back to sleep, or will only be up for <5 minutes and fall back asleep).    -Does admit that she HAS to maintain a schedule.  -CBT-I?  -Discharged from [facility] CBT-I in November 2024, stating that it was not effective.   -Current Treatments:   -Klonopin 0.75mg nightly (has tried to wean off in the past, but had severe withdrawal with weaning down farther than this; multiple attempts).   -Prior Treatments Tried:   -Lorazepam (per gynecologist)   -Other Pertinent Medications:   -Voltaren 75mg BID (helps with leg pain/discomfort)   -Gabapentin 600mg in am, 600mg in afternoon, 1200mg at night (for MS pain)   -Ampyra 10mg BID   -Zonegran 400mg qhs (for MS pain)        SLEEP SCHEDULE:  Bedtime: 2200, in bed by 2220   Time it takes to fall sleep: 16-30 minutes  Wake up Time: 0600/0630   Number of times patient wakes up per night: 1-2, only for a minute or two, or at least has no difficulties retransitioning.  Naps: 0  Estimated total sleep time ( in a 24 hour period of time): ~7-8 hours. Neurology has told her they want 8-10, because \"that's optimal for MS patients.\"       Changes to PMH, PSH, SH: Fell in February, first time in 14 months. Does need breast surgery.           Sitting and reading: (Patient-Rptd) (P) Moderate chance of dozing  Watching TV: (Patient-Rptd) (P) Slight chance of dozing  Sitting, inactive in a public place (e.g. a theatre or a meeting): (Patient-Rptd) (P) Slight chance of dozing  As a passenger in a car for an hour without a break: (Patient-Rptd) (P) Slight chance of dozing  Lying down to rest in the afternoon when circumstances permit: (Patient-Rptd) (P) Would never doze  Sitting and talking to someone: (Patient-Rptd) (P) Would never doze  Sitting quietly after a lunch without " "alcohol: (Patient-Rptd) (P) Would never doze  In a car, while stopped for a few minutes in traffic: (Patient-Rptd) (P) Would never doze  Total score: (Patient-Rptd) (P) 5     Review of Systems  Pertinent positives/negatives included in HPI and also as noted:     Current Outpatient Medications on File Prior to Visit   Medication Sig Dispense Refill    ALPHA LIPOIC ACID PO Take by mouth      amoxicillin (AMOXIL) 500 MG tablet TAKE 4 TABLETS BY MOUTH 1 HOUR PRIOR TO DENTAL VISIT      Ampyra 10 MG TB12 TAKE 1 TABLET BY MOUTH 2 TIMES A  tablet 0    ascorbic acid (VITAMIN C) 500 mg tablet Take 500 mg by mouth daily      baclofen 10 mg tablet TAKE 1 TABLET EVERY MORNING, TAKE 1 TABLET IN THE EVENING AND TAKE 2 TABLETS AT BEDTIME 360 tablet 0    Biotin 5000 MCG TABS       Cranberry 125 MG TABS Take 125 mg by mouth in the morning      denosumab (PROLIA) 60 mg/mL Inject under the skin      diclofenac (VOLTAREN) 75 mg EC tablet Take 1 tablet by mouth 2 (two) times a day      gabapentin (NEURONTIN) 600 MG tablet TAKE 1 TABLET EVERY        MORNING, 1 TABLET IN THE   AFTERNOON, AND 2 TABLETS ATBEDTIME 360 tablet 5    lisinopril (ZESTRIL) 2.5 mg tablet Take 2.5 mg by mouth daily      Magnesium 500 MG TABS Take 500 tablets by mouth once      zonisamide (ZONEGRAN) 100 mg capsule TAKE 4 CAPSULES DAILY AT   BEDTIME 360 capsule 3    cycloSPORINE (RESTASIS) 0.05 % ophthalmic emulsion Administer 1 drop to both eyes if needed (Patient not taking: Reported on 1/21/2025)      docusate sodium (COLACE) 100 mg capsule Take 1 capsule by mouth every other day      LORazepam (Ativan) 0.5 mg tablet Take 1 pill PO 1 hr prior to procedure, take another 20mins before if needed. 2 tablet 0    meloxicam (MOBIC) 7.5 mg tablet Take 7.5 mg by mouth daily       No current facility-administered medications on file prior to visit.      Objective   /68   Ht 5' 4\" (1.626 m)   Wt 66 kg (145 lb 6.4 oz)   BMI 24.96 kg/m²        Physical " "Exam  PHYSICAL EXAMINATION:  Vital Signs: /68   Ht 5' 4\" (1.626 m)   Wt 66 kg (145 lb 6.4 oz)   BMI 24.96 kg/m²     Constitutional: NAD, well appearing   Mental Status: AAOx3  Skin: Warm, dry, no rashes noted   Eyes: PERRL, normal conjunctiva  Chest: No evidence of respiratory distress, no accessory muscle use; no evidence of peripheral cyanosis  Abdomen: Soft, NT/ND  Extremities: No digital clubbing or pedal edema      Data  Lab Results   Component Value Date    HGB 13.7 05/09/2024    HCT 42.4 05/09/2024    MCV 96 05/09/2024      Lab Results   Component Value Date    GLUCOSE 95 01/08/2015    CALCIUM 9.9 01/30/2025     04/04/2017    K 4.2 05/29/2024    CO2 23 05/29/2024     05/29/2024    BUN 11 05/29/2024    CREATININE 0.83 05/29/2024     Lab Results   Component Value Date    FERRITIN 67 05/14/2021     Lab Results   Component Value Date    AST 24 05/29/2024    ALT 32 05/29/2024         Electronically signed by:    Glen Naylor DO  Board-Certified Neurology and Sleep Medicine  [facility]  03/12/25  "

## 2025-03-13 ENCOUNTER — OFFICE VISIT (OUTPATIENT)
Dept: PHYSICAL THERAPY | Facility: CLINIC | Age: 75
End: 2025-03-13
Payer: MEDICARE

## 2025-03-13 DIAGNOSIS — Z91.81 RISK FOR FALLS: ICD-10-CM

## 2025-03-13 DIAGNOSIS — G35 MULTIPLE SCLEROSIS (HCC): Primary | ICD-10-CM

## 2025-03-13 DIAGNOSIS — R26.9 NEUROLOGIC GAIT DYSFUNCTION: ICD-10-CM

## 2025-03-13 PROCEDURE — 97140 MANUAL THERAPY 1/> REGIONS: CPT

## 2025-03-13 PROCEDURE — 97112 NEUROMUSCULAR REEDUCATION: CPT

## 2025-03-13 NOTE — PROGRESS NOTES
"Daily Note     Today's date: 3/13/2025  Patient name: Jacy Rodrigez  : 1950  MRN: [id number]  Referring provider: Elidia Briceno MD  Dx:   Encounter Diagnosis     ICD-10-CM    1. Multiple sclerosis (HCC)  G35       2. Risk for falls  Z91.81       3. Neurologic gait dysfunction  R26.9                      Subjective: Pt states \"my legs feel like concrete today\" (R>L). Pt feels she has been \"overdoing it\" w/ too many appts this week.       Objective: See treatment diary below      Assessment: Tolerated treatment well. Patient exhibited good technique with therapeutic exercises and would benefit from continued PT. Pt defers standing exercises today due to current sx. Pt notes increased flexibility post tx.  No adverse effects w/ manual techniques.      Plan: Progress treatment as tolerated.       Precautions: MS, Gait dysfunction, Insomnia, Osteoporosis, Peripheral polyneuropathy, Hx of Breast CA, R IDALIA in - Dr. Rushing, Left femur IM nailing       Manuals 3/6 3/10 3/13    2/17 2/17 2/24 2/27   Sciatic nerve mobilizations 4' 4' 4'    4' 4' 4' 4'   Hip ROM: flexion, ER 8' 8' 8'    8' 8' 8' 8'                             Neuro Re-Ed             Slump nerve glides             Sciatic nerve glides 15x5\" with green strap seated.  15x5\" with green strap seated. 15x5\" w/ green strap seated    10x5\" B seated 10x5\" B seated 15x5\" with green strap seated.  15x5\" with green strap seated.    LAQ             Standing hip abduction             Standing march             Standing hip extension             EC on foam              Marching in hooklying             Hamstring curls             Bridge             Hip add isometric             TKE in standing  YTB 15x ea YTB 15x ea nv          Hip abd isometric in hooklying Supine 20x5\"  Supine 20x5\" Supine 20x5\"    2x10 Seated 2x10  Seated 2x10 Supine 30x5\"   Ther Ex             Hip flexion stretch with strap             Nustep/bike             Hamstring curl            "   Piriformis stretch             SB stretch              Seated ankle DF                                       Ther Activity             Standing HR/TR 30x 30x nv    30x 30x 30x 30x   Sit to stand squat             Lateral walks       Marching with lateral walking      Forward walks       Marching while forward walking for hip ROM      [place]ris walks 20x ea way 1 idris (Laterally)  10x ea way 1 idris (Laterally)  nv    Blue step over 20x ea Blue step over 20x ea30     Step up  15x on biodex foam ea 10 on biodex foam ea nv     10x on biodex foam ea  15x on biodex foam ea   [place] backward pull             Self management                                       Vitals             BP

## 2025-03-17 ENCOUNTER — TELEPHONE (OUTPATIENT)
Dept: OBGYN CLINIC | Facility: OTHER | Age: 75
End: 2025-03-17

## 2025-03-18 ENCOUNTER — CLINICAL SUPPORT (OUTPATIENT)
Dept: OBGYN CLINIC | Facility: OTHER | Age: 75
End: 2025-03-18

## 2025-03-18 ENCOUNTER — OFFICE VISIT (OUTPATIENT)
Dept: PHYSICAL THERAPY | Facility: CLINIC | Age: 75
End: 2025-03-18
Payer: MEDICARE

## 2025-03-18 DIAGNOSIS — R26.9 NEUROLOGIC GAIT DYSFUNCTION: ICD-10-CM

## 2025-03-18 DIAGNOSIS — Z85.3 HISTORY OF BREAST CANCER: Primary | ICD-10-CM

## 2025-03-18 DIAGNOSIS — G35 MULTIPLE SCLEROSIS (HCC): Primary | ICD-10-CM

## 2025-03-18 DIAGNOSIS — Z91.81 RISK FOR FALLS: ICD-10-CM

## 2025-03-18 PROCEDURE — 97530 THERAPEUTIC ACTIVITIES: CPT | Performed by: PHYSICAL THERAPIST

## 2025-03-18 PROCEDURE — 97140 MANUAL THERAPY 1/> REGIONS: CPT | Performed by: PHYSICAL THERAPIST

## 2025-03-18 PROCEDURE — 97112 NEUROMUSCULAR REEDUCATION: CPT | Performed by: PHYSICAL THERAPIST

## 2025-03-18 NOTE — PROGRESS NOTES
Mastectomy Bra Fitting Order Details    Jacy Rodrigez  1950  [id number]    Reason For Visit  Mastectomy bra    Precautions   History of breast cancer     Subjective  Jihan underwent a left radical mastectomy in 2012. She has had several surgeries since due to complications. States that her implant is leaking and she is having it replaced. she does not have this scheduled at this time    Surgery Type: Mastectomy    Lymph node removal yes    Date of surgery 8/2012    Surgical side left  Objective: Spoke to Jihan regarding balancers and post op garment. Decision to wait until after surgery and implant settles for prosthesis is needed. Advised to call in the future if needed and reviewed post op surgical bra options.

## 2025-03-18 NOTE — PROGRESS NOTES
"Daily Note     Today's date: 3/18/2025  Patient name: Jacy Rodrigez  : 1950  MRN: [id number]  Referring provider: Elidia Briceno MD  Dx:   Encounter Diagnosis     ICD-10-CM    1. Multiple sclerosis (HCC)  G35       2. Risk for falls  Z91.81       3. Neurologic gait dysfunction  R26.9           Start Time: 1445  Stop Time: 1530  Total time in clinic (min): 45 minutes    Subjective: Pt reports having increased leg pain since standing at [employer] for several hours over the weekend.       Objective: See treatment diary below      Assessment: Increased LE stiffness present today. Pt cancelled Thursday's session as she will be traveling to a conference to [place] on Friday and will walking extended periods. Pt lacks proprioception of left LE during steps ups. Requires visual input for safe stair negotiation. Tolerated treatment fair. Patient would benefit from continued PT.       Plan: Continue per plan of care.      Precautions: MS, Gait dysfunction, Insomnia, Osteoporosis, Peripheral polyneuropathy, Hx of Breast CA, R IDALIA in - Dr. Rushing, Left femur IM nailing       Manuals 3/6 3/10 3/13 3/18   2/17 2/17 2/24 2/27   Sciatic nerve mobilizations 4' 4' 4' 4'   4' 4' 4' 4'   Hip ROM: flexion, ER 8' 8' 8' 8'    8' 8' 8' 8'                             Neuro Re-Ed             Slump nerve glides             Sciatic nerve glides 15x5\" with green strap seated.  15x5\" with green strap seated. 15x5\" w/ green strap seated 15x5\" w/ green strap seated   10x5\" B seated 10x5\" B seated 15x5\" with green strap seated.  15x5\" with green strap seated.    LAQ             Standing hip abduction             Standing march             Standing hip extension             EC on foam              Marching in hooklying             Hamstring curls             Bridge             Hip add isometric             TKE in standing  YTB 15x ea YTB 15x ea nv Ytb x15         Hip abd isometric in hooklying Supine 20x5\"  Supine 20x5\" Supine 20x5\" " "Supine 20x5\"   2x10 Seated 2x10  Seated 2x10 Supine 30x5\"   Ther Ex             Hip flexion stretch with strap             Nustep/bike             Hamstring curl              Piriformis stretch             SB stretch              Seated ankle DF                                       Ther Activity             Standing HR/TR 30x 30x nv 30x   30x 30x 30x 30x   Sit to stand squat             Lateral walks       Marching with lateral walking      Forward walks       Marching while forward walking for hip ROM      Idris walks 20x ea way 1 idris (Laterally)  10x ea way 1 [place]ris (Laterally)  nv    Blue step over 20x ea Blue step over 20x ea30     Step up  15x on biodex foam ea 10 on biodex foam ea nv 10x on biodex foam     10x on biodex foam ea  15x on biodex foam ea   [place] backward pull             Self management                                       Vitals             BP                          1:1 with PT from 245-330pm                                                                                             "

## 2025-03-20 ENCOUNTER — APPOINTMENT (OUTPATIENT)
Dept: PHYSICAL THERAPY | Facility: CLINIC | Age: 75
End: 2025-03-20
Payer: MEDICARE

## 2025-03-25 ENCOUNTER — APPOINTMENT (OUTPATIENT)
Dept: PHYSICAL THERAPY | Facility: CLINIC | Age: 75
End: 2025-03-25
Payer: MEDICARE

## 2025-03-25 ENCOUNTER — HOSPITAL ENCOUNTER (EMERGENCY)
Dept: VASCULAR ULTRASOUND | Facility: HOSPITAL | Age: 75
Discharge: HOME | End: 2025-03-25
Payer: MEDICARE

## 2025-03-25 ENCOUNTER — HOSPITAL ENCOUNTER (EMERGENCY)
Facility: HOSPITAL | Age: 75
Discharge: HOME | End: 2025-03-25
Attending: EMERGENCY MEDICINE
Payer: MEDICARE

## 2025-03-25 ENCOUNTER — APPOINTMENT (EMERGENCY)
Dept: RADIOLOGY | Facility: HOSPITAL | Age: 75
End: 2025-03-25
Payer: MEDICARE

## 2025-03-25 VITALS
RESPIRATION RATE: 18 BRPM | HEART RATE: 74 BPM | OXYGEN SATURATION: 98 % | DIASTOLIC BLOOD PRESSURE: 74 MMHG | TEMPERATURE: 97.8 F | SYSTOLIC BLOOD PRESSURE: 174 MMHG

## 2025-03-25 DIAGNOSIS — M25.561 ACUTE PAIN OF RIGHT KNEE: ICD-10-CM

## 2025-03-25 DIAGNOSIS — M79.661 RIGHT CALF PAIN: Primary | ICD-10-CM

## 2025-03-25 LAB
ALBUMIN SERPL BCG-MCNC: 4.1 G/DL (ref 3.5–5)
ALP SERPL-CCNC: 63 U/L (ref 34–104)
ALT SERPL W P-5'-P-CCNC: 31 U/L (ref 7–52)
ANION GAP SERPL CALCULATED.3IONS-SCNC: 8 MMOL/L (ref 4–13)
APTT PPP: 24 SECONDS (ref 23–34)
AST SERPL W P-5'-P-CCNC: 17 U/L (ref 13–39)
ATRIAL RATE: 67 BPM
BASOPHILS # BLD AUTO: 0.05 THOUSANDS/ÂΜL (ref 0–0.1)
BASOPHILS NFR BLD AUTO: 1 % (ref 0–1)
BILIRUB SERPL-MCNC: 0.49 MG/DL (ref 0.2–1)
BUN SERPL-MCNC: 22 MG/DL (ref 5–25)
CALCIUM SERPL-MCNC: 8.5 MG/DL (ref 8.4–10.2)
CARDIAC TROPONIN I PNL SERPL HS: <2 NG/L (ref ?–50)
CARDIAC TROPONIN I PNL SERPL HS: <2 NG/L (ref ?–50)
CHLORIDE SERPL-SCNC: 110 MMOL/L (ref 96–108)
CO2 SERPL-SCNC: 21 MMOL/L (ref 21–32)
CREAT SERPL-MCNC: 0.83 MG/DL (ref 0.6–1.3)
D DIMER PPP FEU-MCNC: 0.75 UG/ML FEU
EOSINOPHIL # BLD AUTO: 0.3 THOUSAND/ÂΜL (ref 0–0.61)
EOSINOPHIL NFR BLD AUTO: 6 % (ref 0–6)
ERYTHROCYTE [DISTWIDTH] IN BLOOD BY AUTOMATED COUNT: 12.8 % (ref 11.6–15.1)
GFR SERPL CREATININE-BSD FRML MDRD: 69 ML/MIN/1.73SQ M
GLUCOSE SERPL-MCNC: 92 MG/DL (ref 65–140)
HCT VFR BLD AUTO: 40.6 % (ref 34.8–46.1)
HGB BLD-MCNC: 13 G/DL (ref 11.5–15.4)
IMM GRANULOCYTES # BLD AUTO: 0.03 THOUSAND/UL (ref 0–0.2)
IMM GRANULOCYTES NFR BLD AUTO: 1 % (ref 0–2)
INR PPP: 0.97 (ref 0.85–1.19)
LYMPHOCYTES # BLD AUTO: 1.3 THOUSANDS/ÂΜL (ref 0.6–4.47)
LYMPHOCYTES NFR BLD AUTO: 27 % (ref 14–44)
MCH RBC QN AUTO: 30.9 PG (ref 26.8–34.3)
MCHC RBC AUTO-ENTMCNC: 32 G/DL (ref 31.4–37.4)
MCV RBC AUTO: 96 FL (ref 82–98)
MONOCYTES # BLD AUTO: 0.48 THOUSAND/ÂΜL (ref 0.17–1.22)
MONOCYTES NFR BLD AUTO: 10 % (ref 4–12)
NEUTROPHILS # BLD AUTO: 2.7 THOUSANDS/ÂΜL (ref 1.85–7.62)
NEUTS SEG NFR BLD AUTO: 55 % (ref 43–75)
NRBC BLD AUTO-RTO: 0 /100 WBCS
P AXIS: 64 DEGREES
PLATELET # BLD AUTO: 210 THOUSANDS/UL (ref 149–390)
PMV BLD AUTO: 9.8 FL (ref 8.9–12.7)
POTASSIUM SERPL-SCNC: 4.1 MMOL/L (ref 3.5–5.3)
PR INTERVAL: 146 MS
PROT SERPL-MCNC: 6.7 G/DL (ref 6.4–8.4)
PROTHROMBIN TIME: 13.6 SECONDS (ref 12.3–15)
QRS AXIS: 61 DEGREES
QRSD INTERVAL: 70 MS
QT INTERVAL: 420 MS
QTC INTERVAL: 443 MS
RBC # BLD AUTO: 4.21 MILLION/UL (ref 3.81–5.12)
SODIUM SERPL-SCNC: 139 MMOL/L (ref 135–147)
T WAVE AXIS: 55 DEGREES
VENTRICULAR RATE: 67 BPM
WBC # BLD AUTO: 4.86 THOUSAND/UL (ref 4.31–10.16)

## 2025-03-25 PROCEDURE — 73564 X-RAY EXAM KNEE 4 OR MORE: CPT

## 2025-03-25 PROCEDURE — 99285 EMERGENCY DEPT VISIT HI MDM: CPT | Performed by: EMERGENCY MEDICINE

## 2025-03-25 PROCEDURE — 85025 COMPLETE CBC W/AUTO DIFF WBC: CPT

## 2025-03-25 PROCEDURE — 93971 EXTREMITY STUDY: CPT | Performed by: SURGERY

## 2025-03-25 PROCEDURE — 80053 COMPREHEN METABOLIC PANEL: CPT

## 2025-03-25 PROCEDURE — 93005 ELECTROCARDIOGRAM TRACING: CPT

## 2025-03-25 PROCEDURE — 85730 THROMBOPLASTIN TIME PARTIAL: CPT

## 2025-03-25 PROCEDURE — 85379 FIBRIN DEGRADATION QUANT: CPT

## 2025-03-25 PROCEDURE — 36415 COLL VENOUS BLD VENIPUNCTURE: CPT

## 2025-03-25 PROCEDURE — 84484 ASSAY OF TROPONIN QUANT: CPT

## 2025-03-25 PROCEDURE — 99284 EMERGENCY DEPT VISIT MOD MDM: CPT

## 2025-03-25 PROCEDURE — 71045 X-RAY EXAM CHEST 1 VIEW: CPT

## 2025-03-25 PROCEDURE — 93010 ELECTROCARDIOGRAM REPORT: CPT | Performed by: INTERNAL MEDICINE

## 2025-03-25 PROCEDURE — 85610 PROTHROMBIN TIME: CPT

## 2025-03-25 PROCEDURE — 93971 EXTREMITY STUDY: CPT

## 2025-03-25 NOTE — ED PROVIDER NOTES
Time reflects when diagnosis was documented in both MDM as applicable and the Disposition within this note       Time User Action Codes Description Comment    3/25/2025  1:42 PM Raphael, Olegario Add [M25.561] Acute pain of right knee     3/25/2025  1:42 PM Raphael, Olegario Add [M79.661] Right calf pain     3/25/2025  1:43 PM Raphael, Olegario Modify [M25.561] Acute pain of right knee     3/25/2025  1:43 PM Raphael, Olegario Modify [M79.661] Right calf pain           ED Disposition       ED Disposition   Discharge    Condition   Stable    Date/Time   Tue Mar 25, 2025  1:56 PM    Comment   Jacy Rodrigez discharge to home/self care.                   Assessment & Plan       Medical Decision Making  Jacy Rodrigez is a 74 y.o. female with a past medical history of multiple sclerosis, DVT/SVT, breast cancer being evaluated for right-sided knee and calf pain    Differential diagnoses include but not limited to: DVT/PE, ACS, cardiac arrhythmia, muscle strain/sprain    Initial workup to include: CBC, CMP, troponin, EKG, chest x-ray, D-dimer    See ED Course for data/imaging interpretation and further MDM.    Disposition: Reassuring lab workup in ED.  D-dimer of 0.75 age-adjusted is negative.  No CT PE indicated at this time.  Duplex ultrasound of right lower extremity negative for DVT.  Suspect musculoskeletal pain as etiology of symptoms.  Pain management at home discussed with patient.  Return precaution discussed the patient discharged home in stable condition.            Amount and/or Complexity of Data Reviewed  Labs: ordered. Decision-making details documented in ED Course.  Radiology: ordered and independent interpretation performed. Decision-making details documented in ED Course.        ED Course as of 03/25/25 1406   Tue Mar 25, 2025   1206 EKG sinus rhythm, normal intervals, no ST or T wave changes concerning for ischemia   1216 CBC and differential  Within normal limits   1248 Comprehensive metabolic panel(!)  Within normal  limits   1253 HS Troponin 0hr (reflex protocol)  Within normal limits   1255 XR knee 4+ vw right injury  No acute osseous abnormalities noted per my interpretation     1310 D-Dimer, Quant(!): 0.75  Negative age-adjusted D-dimer, pending duplex ultrasound   1325 XR chest 1 view portable  No distinct areas of focal consolidation noted concerning for pneumonia per my interpretation   1342 VAS VENOUS DUPLEX -LOWER LIMB UNILATERAL  Negative for deep vein thrombosis per vascular tech       Medications - No data to display    ED Risk Strat Scores   HEART Risk Score      Flowsheet Row Most Recent Value   Heart Score Risk Calculator    History 0 Filed at: 03/25/2025 1346   ECG 0 Filed at: 03/25/2025 1346   Age 2 Filed at: 03/25/2025 1346   Risk Factors 1 Filed at: 03/25/2025 1346   Troponin 0 Filed at: 03/25/2025 1346   HEART Score 3 Filed at: 03/25/2025 1346          HEART Risk Score      Flowsheet Row Most Recent Value   Heart Score Risk Calculator    History 0 Filed at: 03/25/2025 1346   ECG 0 Filed at: 03/25/2025 1346   Age 2 Filed at: 03/25/2025 1346   Risk Factors 1 Filed at: 03/25/2025 1346   Troponin 0 Filed at: 03/25/2025 1346   HEART Score 3 Filed at: 03/25/2025 1346                                  Wells' Criteria for PE      Flowsheet Row Most Recent Value   Wells' Criteria for PE    Clinical signs and symptoms of DVT 3 Filed at: 03/25/2025 1159   PE is primary diagnosis or equally likely 0 Filed at: 03/25/2025 1159   HR >100 0 Filed at: 03/25/2025 1159   Immobilization at least 3 days or Surgery in the previous 4 weeks 0 Filed at: 03/25/2025 1159   Previous, objectively diagnosed PE or DVT 1.5 Filed at: 03/25/2025 1159   Hemoptysis 0 Filed at: 03/25/2025 1159   Malignancy with treatment within 6 months or palliative 0 Filed at: 03/25/2025 1159   Jan' Criteria Total 4.5 Filed at: 03/25/2025 1159          Wells' Criteria for DVT      Flowsheet Row Most Recent Value   Wells' Criteria for DVT    Active cancer  Treatment or palliation within 6 months 0 Filed at: 03/25/2025 1154   Bedridden recently >3 days or major surgery within 12 weeks 0 Filed at: 03/25/2025 1154   Calf swelling >3 cm compared to the other leg 0 Filed at: 03/25/2025 1154   Entire leg swollen 0 Filed at: 03/25/2025 1154   Collateral (nonvaricose) superficial veins present 1 Filed at: 03/25/2025 1154   Localized tenderness along the deep venous system 1 Filed at: 03/25/2025 1154   Pitting edema, confined to symptomatic leg 0 Filed at: 03/25/2025 1154   Paralysis, paresis, or recent plaster immobilization of the lower extremity 0 Filed at: 03/25/2025 1154   Previously documented DVT 1 Filed at: 03/25/2025 1154   Alternative diagnosis to DVT as likely or more likely 0 Filed at: 03/25/2025 1154   Wells DVT Critera Score 3 Filed at: 03/25/2025 1154                      History of Present Illness       Chief Complaint   Patient presents with    Knee Pain     Right knee pain and calf pain. Pain started after a fall last week. Pt did notice some SOB recently with exertion as well. Hx m/s       Past Medical History:   Diagnosis Date    Allergic 1967    seasonal    Allergic rhinitis     Bone pain     Breast cancer (HCC) 08/16/2012    left    Breast ptosis     Depression     Fatigue     Fx. left wrist, closed, initial encounter 09/20/2014    Gait disturbance     uses cane at times    Headache     Hip fracture (HCC)     Hip pain     Hip pain, right 09/18/2012    Laterality: Right    History of transfusion 1975    placenta previa s/p work accident, no rx    Hypokalemia     Insomnia     Laceration of finger     right hand    Left ankle pain     Left knee pain     Multiple sclerosis (HCC)     Muscle strain     left lower leg    Nephrolithiasis     Osteopenia     Osteoporosis     Pain of left calf     Pain of left lower leg 07/25/2017    Pneumonia     Rash     Scoliosis birth    scoliosis    Solitary pulmonary nodule     SVT (supraventricular tachycardia) ([facility])      Tingling     Urgency of urination     Urticaria     Wrist fracture, left       Past Surgical History:   Procedure Laterality Date    ANKLE SURGERY      APPENDECTOMY  11/2012    Dr. Vidales    AUGMENTATION BREAST      Enlargement procedure with prosthetic implant bilateral    AUGMENTATION MAMMAPLASTY Right 01/28/2020    implant replaced because of recall    AUGMENTATION MAMMAPLASTY Bilateral 2012    BREAST BIOPSY Left 07/23/2012    BREAST EXCISIONAL BIOPSY Right     age 39    BREAST IMPLANT Right 01/28/2020    Procedure: BREAST IMPLANT EXCHANGE WITH CAPSULECTOMY;  Surgeon: Da Canales MD;  Location: [facility];  Service: Plastics    BREAST IMPLANT REMOVAL Right 01/28/2020    implant placement, implant revision, right mastopexy    CYSTOSTOMY      with basket extraction of calculus; x2    EXCISION / BIOPSY BREAST / NIPPLE / DUCT Right 05/04/2020    scar revision to resuture non healing surgical wound    EXPLORATORY LAPAROTOMY      FL INJECTION LEFT SHOULDER (ARTHROGRAM)  07/19/2023    FOOT SURGERY      FRACTURE SURGERY  Lt wrist 9/2014 - Lt matthew femur 9/14    HIP FRACTURE SURGERY Right     Subcapital    HIP SURGERY Left     JOINT REPLACEMENT  Rt hip 10/2012    LEG SURGERY      Repair    MASTECTOMY Left 08/16/2012    MS GRAFTING OF AUTOLOGOUS FAT BY LIPO 50 CC OR LESS Bilateral 08/22/2023    Procedure: AUTOLOGOUS FAT GRAFTING TO BILATERAL BREASTS;  Surgeon: Da Canales MD;  Location: [facility];  Service: Plastics    MS MASTOPEXY Right 01/28/2020    Procedure: BREAST MASTOPEXY;  Surgeon: Da Canales MD;  [facility];  Service: Plastics    MS REVISION OF RECONSTRUCTED BREAST Right 05/04/2020    Procedure: REVISION RIGHT BREAST MOUND;  Surgeon: Da Canales MD;  [facility];  Service: Plastics    REDUCTION MAMMAPLASTY Right 01/28/2020    reduced to match left side    SENTINEL LYMPH NODE BIOPSY Left 08/16/2012    SKIN BIOPSY      TONSILLECTOMY      TUBAL LIGATION       WRIST SURGERY Left       Family History   Problem Relation Age of Onset    Coronary artery disease Mother     Hyperlipidemia Mother     Rheum arthritis Mother     Basal cell carcinoma Mother     Other Father         Acute myocardial infarction    Cancer Maternal Aunt 70        bladder    Heart disease Maternal Aunt     Stroke Maternal Aunt         6 CVA before death    Breast cancer Maternal Aunt     Colon cancer Maternal Uncle 65    Hodgkin's lymphoma Maternal Uncle 70    No Known Problems Paternal Aunt     No Known Problems Paternal Aunt     No Known Problems Paternal Aunt     No Known Problems Maternal Grandmother     Hodgkin's lymphoma Maternal Grandfather         age at dx unk    No Known Problems Paternal Grandmother     No Known Problems Paternal Grandfather     Stroke Son 49    No Known Problems Son       Social History     Tobacco Use    Smoking status: Former     Current packs/day: 0.00     Average packs/day: 1 pack/day for 35.0 years (35.0 ttl pk-yrs)     Types: Cigarettes     Start date:      Quit date:      Years since quittin.2     Passive exposure: Never    Smokeless tobacco: Never   Vaping Use    Vaping status: Never Used   Substance Use Topics    Alcohol use: No    Drug use: No      E-Cigarette/Vaping    E-Cigarette Use Never User       E-Cigarette/Vaping Substances    Nicotine No     THC No     CBD No     Flavoring No     Other No     Unknown No       I have reviewed and agree with the history as documented.     Jacy Rodrigez is a 74 y.o. female with a past medical history of multiple sclerosis, DVT/SVT, breast cancer being evaluated for right-sided knee and calf pain.  Patient states that she took 2-hour car ride to a convention 3 days ago when she had acute onset right-sided calf pain.  She walked several miles during convention with worsening of pain over the past 2 days.  States that she now has new shortness of breath without any associated chest pain.  Denies any prior  history of ACS, heart failure, or cardiac arrhythmias.  She states that she has had DVT and SVT greater than 10 years ago and is not on any anticoagulation. At present denies any headaches, dizziness, fever, chills, sore throat, cough, chest pain, abdominal pain, nausea, vomiting, diarrhea, dysuria, hematuria.              Knee Pain      Review of Systems   Respiratory:  Positive for shortness of breath.    Musculoskeletal:         Right leg pain in posterior calf   All other systems reviewed and are negative.          Objective       ED Triage Vitals [03/25/25 1124]   Temperature Pulse Blood Pressure Respirations SpO2 Patient Position - Orthostatic VS   97.8 °F (36.6 °C) 74 (!) 174/74 18 98 % --      Temp Source Heart Rate Source BP Location FiO2 (%) Pain Score    Oral Monitor Right arm -- --      Vitals      Date and Time Temp Pulse SpO2 Resp BP Pain Score FACES Pain Rating User   03/25/25 1124 97.8 °F (36.6 °C) 74 98 % 18 174/74 -- -- SG            Physical Exam  Constitutional:       Appearance: Normal appearance.   Eyes:      Extraocular Movements: Extraocular movements intact.      Conjunctiva/sclera: Conjunctivae normal.      Pupils: Pupils are equal, round, and reactive to light.   Cardiovascular:      Rate and Rhythm: Normal rate and regular rhythm.      Pulses: Normal pulses.      Heart sounds: Normal heart sounds. No murmur heard.     No friction rub. No gallop.   Pulmonary:      Effort: Pulmonary effort is normal. No respiratory distress.      Breath sounds: Normal breath sounds. No stridor. No wheezing, rhonchi or rales.   Abdominal:      General: Abdomen is flat. Bowel sounds are normal. There is no distension.      Palpations: Abdomen is soft.      Tenderness: There is no abdominal tenderness. There is no right CVA tenderness, left CVA tenderness, guarding or rebound.   Musculoskeletal:      Right lower leg: No edema.      Left lower leg: No edema.      Comments: No evidence of right lower extremity  swelling or edema appreciated.  No discoloration.  Tender to palpation in posterior calf and popliteal fossa.  Neurovascularly intact       Skin:     General: Skin is warm and dry.      Capillary Refill: Capillary refill takes less than 2 seconds.   Neurological:      General: No focal deficit present.      Mental Status: She is alert and oriented to person, place, and time. Mental status is at baseline.      Comments: Decreased sensation and muscle strength in left lower extremity at baseline secondary to multiple sclerosis         Results Reviewed       Procedure Component Value Units Date/Time    HS Troponin I 2hr [[id number]] Collected: 03/25/25 1356    Lab Status: In process Specimen: Blood from Arm, Right Updated: 03/25/25 1404    D-Dimer [[id number]]  (Abnormal) Collected: 03/25/25 1158    Lab Status: Final result Specimen: Blood from Arm, Right Updated: 03/25/25 1241     D-Dimer, Quant 0.75 ug/ml FEU     Narrative:      In the evaluation for possible pulmonary embolism, in the appropriate (Well's Score of 4 or less) patient, the age adjusted d-dimer cutoff for this patient can be calculated as:    Age x 0.01 (in ug/mL) for Age-adjusted D-dimer exclusion threshold for a patient over 50 years.    HS Troponin 0hr (reflex protocol) [[id number]]  (Normal) Collected: 03/25/25 1155    Lab Status: Final result Specimen: Blood from Arm, Right Updated: 03/25/25 1230     hs TnI 0hr <2 ng/L     HS Troponin I 4hr [[id number]]     Lab Status: No result Specimen: Blood     Comprehensive metabolic panel [[id number]]  (Abnormal) Collected: 03/25/25 1155    Lab Status: Final result Specimen: Blood from Arm, Right Updated: 03/25/25 1229     Sodium 139 mmol/L      Potassium 4.1 mmol/L      Chloride 110 mmol/L      CO2 21 mmol/L      ANION GAP 8 mmol/L      BUN 22 mg/dL      Creatinine 0.83 mg/dL      Glucose 92 mg/dL      Calcium 8.5 mg/dL      AST 17 U/L      ALT 31 U/L      Alkaline Phosphatase 63 U/L      Total Protein 6.7 g/dL       Albumin 4.1 g/dL      Total Bilirubin 0.49 mg/dL      eGFR 69 ml/min/1.73sq m     Narrative:      National Kid[employer] Foundation guidelines for Chronic Kidney Disease (CKD):     Stage 1 with normal or high GFR (GFR > 90 mL/min/1.73 square meters)    Stage 2 Mild CKD (GFR = 60-89 mL/min/1.73 square meters)    Stage 3A Moderate CKD (GFR = 45-59 mL/min/1.73 square meters)    Stage 3B Moderate CKD (GFR = 30-44 mL/min/1.73 square meters)    Stage 4 Severe CKD (GFR = 15-29 mL/min/1.73 square meters)    Stage 5 End Stage CKD (GFR <15 mL/min/1.73 square meters)  Note: GFR calculation is accurate only with a steady state creatinine    Protime-INR [[id number]]  (Normal) Collected: 03/25/25 1158    Lab Status: Final result Specimen: Blood from Arm, Right Updated: 03/25/25 1223     Protime 13.6 seconds      INR 0.97    Narrative:      INR Therapeutic Range    Indication                                             INR Range      Atrial Fibrillation                                               2.0-3.0  Hypercoagulable State                                    2.0.2.3  Left Ventricular Asist Device                            2.0-3.0  Mechanical Heart Valve                                  -    Aortic(with afib, MI, embolism, HF, LA enlargement,    and/or coagulopathy)                                     2.0-3.0 (2.5-3.5)     Mitral                                                             2.5-3.5  Prosthetic/Bioprosthetic Heart Valve               2.0-3.0  Venous thromboembolism (VTE: VT, PE        2.0-3.0    APTT [[id number]]  (Normal) Collected: 03/25/25 1158    Lab Status: Final result Specimen: Blood from Arm, Right Updated: 03/25/25 1223     PTT 24 seconds     CBC and differential [[id number]] Collected: 03/25/25 1155    Lab Status: Final result Specimen: Blood from Arm, Right Updated: 03/25/25 1212     WBC 4.86 Thousand/uL      RBC 4.21 Million/uL      Hemoglobin 13.0 g/dL      Hematocrit 40.6 %      MCV 96 fL       MCH 30.9 pg      MCHC 32.0 g/dL      RDW 12.8 %      MPV 9.8 fL      Platelets 210 Thousands/uL      nRBC 0 /100 WBCs      Segmented % 55 %      Immature Grans % 1 %      Lymphocytes % 27 %      Monocytes % 10 %      Eosinophils Relative 6 %      Basophils Relative 1 %      Absolute Neutrophils 2.70 Thousands/µL      Absolute Immature Grans 0.03 Thousand/uL      Absolute Lymphocytes 1.30 Thousands/µL      Absolute Monocytes 0.48 Thousand/µL      Eosinophils Absolute 0.30 Thousand/µL      Basophils Absolute 0.05 Thousands/µL             XR knee 4+ vw right injury   ED Interpretation by Olegario Lim DO (03/25 7375)   No acute osseous abnormalities noted per my interpretation      XR chest 1 view portable   ED Interpretation by Olegario Lim DO (03/25 9785)   No distinct areas of focal consolidation noted concerning for pneumonia per my interpretation      VAS VENOUS DUPLEX -LOWER LIMB UNILATERAL    (Results Pending)       ECG 12 Lead Documentation Only    Date/Time: 3/25/2025 12:19 PM    Performed by: Olegario Lim DO  Authorized by: Olegario Lim DO    Patient location:  ED  Previous ECG:     Previous ECG:  Compared to current    Similarity:  No change  Interpretation:     Interpretation: normal    Rate:     ECG rate assessment: normal    Rhythm:     Rhythm: sinus rhythm    Ectopy:     Ectopy: none    QRS:     QRS axis:  Normal    QRS intervals:  Normal  Conduction:     Conduction: normal    ST segments:     ST segments:  Normal  T waves:     T waves: normal        ED Medication and Procedure Management   Prior to Admission Medications   Prescriptions Last Dose Informant Patient Reported? Taking?   ALPHA LIPOIC ACID PO  Self Yes No   Sig: Take by mouth   Ampyra 10 MG TB12   No No   Sig: TAKE 1 TABLET BY MOUTH 2 TIMES A DAY   Biotin 5000 MCG TABS  Self Yes No   Cranberry 125 MG TABS  Self Yes No   Sig: Take 125 mg by mouth in the morning   LORazepam (Ativan) 0.5 mg tablet   No No   Sig: Take 1 pill PO 1 hr prior to  procedure, take another 20mins before if needed.   Magnesium 500 MG TABS  Self Yes No   Sig: Take 500 tablets by mouth once   amoxicillin (AMOXIL) 500 MG tablet  Self Yes No   Sig: TAKE 4 TABLETS BY MOUTH 1 HOUR PRIOR TO DENTAL VISIT   ascorbic acid (VITAMIN C) 500 mg tablet  Self Yes No   Sig: Take 500 mg by mouth daily   baclofen 10 mg tablet   No No   Sig: TAKE 1 TABLET EVERY MORNING, TAKE 1 TABLET IN THE EVENING AND TAKE 2 TABLETS AT BEDTIME   clonazePAM (KlonoPIN) 0.5 mg tablet   No No   Sig: Take 1.5 tablets (0.75 mg total) by mouth daily at bedtime TAKE 1 AND 1/2 TABLETS BY MOUTH DAILY AT BEDTIME   cycloSPORINE (RESTASIS) 0.05 % ophthalmic emulsion  Self Yes No   Sig: Administer 1 drop to both eyes if needed   Patient not taking: Reported on 1/21/2025   denosumab (PROLIA) 60 mg/mL  Self Yes No   Sig: Inject under the skin   diclofenac (VOLTAREN) 75 mg EC tablet   Yes No   Sig: Take 1 tablet by mouth 2 (two) times a day   docusate sodium (COLACE) 100 mg capsule  Self Yes No   Sig: Take 1 capsule by mouth every other day   gabapentin (NEURONTIN) 600 MG tablet   No No   Sig: TAKE 1 TABLET EVERY        MORNING, 1 TABLET IN THE   AFTERNOON, AND 2 TABLETS ATBEDTIME   lisinopril (ZESTRIL) 2.5 mg tablet  Self Yes No   Sig: Take 2.5 mg by mouth daily   meloxicam (MOBIC) 7.5 mg tablet  Self Yes No   Sig: Take 7.5 mg by mouth daily   zonisamide (ZONEGRAN) 100 mg capsule  Self No No   Sig: TAKE 4 CAPSULES DAILY AT   BEDTIME      Facility-Administered Medications: None     Patient's Medications   Discharge Prescriptions    No medications on file     No discharge procedures on file.  ED SEPSIS DOCUMENTATION   Time reflects when diagnosis was documented in both MDM as applicable and the Disposition within this note       Time User Action Codes Description Comment    3/25/2025  1:42 PM Olegario Lmi [M25.561] Acute pain of right knee     3/25/2025  1:42 PM Olegario Lim [M79.661] Right calf pain     3/25/2025  1:43 PM  Olegario Lim [M25.561] Acute pain of right knee     3/25/2025  1:43 PM Olegario Lim [M79.661] Right calf pain                  Olegario Lim DO  03/25/25 1406

## 2025-03-25 NOTE — DISCHARGE INSTRUCTIONS
"Patient Education     Managing acute pain at home   The Basics   Written by the doctors and editors at [facility]   What is acute pain? -- This means pain that lasts for a short period of time. For example, it can happen after an injury, surgery, or medical or dental procedure. It can be mild, moderate, or severe.  Acute pain is different from chronic pain. \"Chronic\" means pain that lasts longer than a few months. It can be related to many different conditions, like arthritis, back pain, or nerve pain from diabetes. Chronic pain is managed differently.  How is acute pain treated? -- The goal of treatment is to get your pain to a manageable level. It is not always possible to make pain go away completely. Your doctor will work with you to make a plan for treating your pain at home. This will depend on the cause of your pain and how severe it is, as well as your individual situation.  There are different ways to manage pain. Doctors often use more than 1 of these at a time. They include:   Non-medicine techniques - For example, it might help to put ice or heat on the painful part of your body. Resting and keeping the painful part elevated can also help. Some people use relaxation exercises or meditation to help them manage pain.   Non-opioid pain medicine - These include:   NSAIDs such as ibuprofen (sample brand names: Advil, Motrin) or naproxen (sample brand name: Aleve)   Acetaminophen, also known as paracetamol (sample brand name: Tylenol)  Doctors often recommend taking both an NSAID and acetaminophen on a regular schedule. This can help better control pain. Talk to your doctor about what medicines you should take and how much. People with some medical conditions should not take NSAIDs.   Local anesthetics - These are medicines that numb the painful part of the body. A \"nerve block\" is when the medicine is injected near certain nerves, which reduces pain in the area. Sometimes, the medicine is given as just 1 shot. " "Other times, it is given continuously through a small tube called a \"catheter.\" The catheter attaches to a pump that can continue to give medicine after going home from the hospital.   Opioids - Opioids are a group of prescription medicines that relieve pain. They are sometimes used when other types of pain medicine do not help enough. But they come with risks. For this reason, doctors often try other ways of treating pain first.  What should I know about opioids? -- If your doctor prescribes opioids for your pain, there are some things that are important to know:   Opioids have side effects. For example, if you take too much of an opioid, it can cause serious problems like not breathing enough.   For treating acute pain, opioids are often prescribed as \"immediate-release\" pills. These work quickly. They are also available as liquids, suppositories, and shots for people who cannot swallow pills. The table shows different opioids used to treat acute pain (table 1).   Doctors usually prescribe the lowest dose possible, for the shortest amount of time possible. This usually means a few days or a week.   If your doctor prescribes an opioid, they will usually tell you to take an NSAID or acetaminophen as well. This is to help keep your pain under control so you can use less of the opioid. Some opioids come combined with acetaminophen or an NSAID in the same pill. If your medicine has both, do not take any extra NSAIDs or acetaminophen without talking to your doctor first.   If you are pregnant, talk with your doctor about your options for treating your pain. There are risks to taking opioids during pregnancy, especially if it is for more than a few days.   In some cases, taking opioids can lead to misuse or opioid use disorder:   \"Opioid misuse\" means taking an opioid in a way that is different than how your doctor prescribed. An example of misuse is taking the opioid when you do not need it for pain. If you take too " "much at once, or take opioids with alcohol or certain other drugs, it can cause serious harm or even death from overdose.   \"Opioid use disorder\" is the medical term for when a person can't control their use of the opioid. A person with opioid use disorder might use more medicine than they planned to. They might need higher doses to get the same effect that they used to get with fewer pills or a lower dose. Or they might want to stop or use opioids less often, but not be able to.   Opioids come with side effects. Some are just bothersome, and some can be dangerous. Information about what side effects to watch for, and when to call the doctor, is below.  There are things you can do to stay safe if you need to take an opioid medicine. These things help protect yourself and others:   Follow your treatment plan carefully. Take only the amount of medicine that your doctor prescribes, and only as often as they tell you to. Different people need different doses. Never take opioids that were not prescribed to you. Talk to your doctor or nurse if you think that your opioids are not helping enough with your pain.   Do not drink alcohol while you are taking opioids.   Do not take opioids with medicines that make you sleepy, unless your doctor tells you to. Examples include \"benzodiazepines\" like diazepam (sample brand name: Valium) or alprazolam (sample brand name: Xanax), muscle relaxants like baclofen or cyclobenzaprine, or sleeping pills like zolpidem (sample brand name: Ambien).   Do not drive a car, use dangerous machinery, or do other risky activities while taking an opioid medicine. Opioids can make you feel tired or have trouble thinking clearly.   Store your opioids in a safe place, such as a locked cabinet. This will prevent children, teens, or anyone else from getting to them.   Follow your doctor's instructions about how to stop taking your opioid once your pain has improved. This usually involves reducing the dose " "gradually. This is called \"tapering.\"   When your pain gets better, get rid of any leftover medicines. Your doctor, nurse, or pharmacist can suggest ways to get rid of them. This might involve flushing them down the toilet or mixing them with something like dirt or cat litter, then putting the mixture in a sealed container in the trash. Some police stations and pharmacies also take leftover medicines.   Try to get all of your pain medicines from the same doctor. If that is not possible, make sure that all of your doctors know every medicine you take, even those that are non-prescription. Bring a complete list of all of your pain medicines and other medicines with you whenever you go to a doctor, nurse, dentist, or pharmacist. Ask your doctor or pharmacist if it is safe to take your other medicines with your pain medicines.  When should I call for help? -- If you are taking an opioid, it's important to be aware of possible side effects and when to get help.  Call for an ambulance (in the [place] and [place], call 9-1-1) if you took too much of an opioid medicine or think that someone is having a drug overdose. Signs of an opioid overdose include:   Extreme sleepiness   Slow breathing, or no breathing at all   Very small pupils (the black circles in the center of the eyes)   Very slow heartbeat  An opioid overdose can be treated with a medicine called \"naloxone.\" This can save the person's life. But it needs to be given as soon as possible.  Call your doctor or nurse if you have side effects that bother you, such as:   Constipation - Your doctor or nurse might suggest that you take a laxative to prevent or treat constipation. If your bowel movements are hard and dry, a stool softener might help. Drink plenty of water.   Mild nausea or stomach discomfort - Taking the medicine with or after food can help with this.   Severe nausea, vomiting, or itchiness - If you have any of these problems, your doctor might be able to switch " "you to a different medicine.   Dry mouth   Feeling dizzy or sleepy, or having trouble thinking clearly   Vision problems   Being clumsy or falling down  If you suddenly stop taking your opioid after taking it on a regular schedule for several days, you might have unpleasant symptoms, called \"withdrawal.\" These can include a stomach ache, diarrhea, or shakes. Doctors usually recommend reducing your dose gradually to help avoid withdrawal.  Tell your doctor or nurse if you are having trouble managing your pain or have questions about how to take your medicine.  How can I learn more about my medicine? -- For more detailed information about your medicines, ask your doctor or nurse for the patient drug information handout from [employer] It explains how to use each medicine, describes its possible side effects, and lists other medicines or foods that can affect how it works.  All topics are updated as new evidence becomes available and our peer review process is complete.  This topic retrieved from [employer] on: Apr 13, 2024.  Topic 73582 Version 32.0  Release: 32.3.2 - C32.102  © 2024 [employer]Date, Inc. and/or its affiliates. All rights reserved.  table 1: Opioid medicines commonly used to treat acute pain  Medicine name  Forms    Oxycodone (brand names: Oxaydo, Roxicodone)  Oxycodone with acetaminophen (brand names: Endocet, Nalocet, Percocet, Prolate) Pills  Liquids   Hydrocodone  Hydrocodone with acetaminophen (brand name: Lortab)  Hydrocodone with ibuprofen Pills  Liquids   Hydromorphone (brand name: Dilaudid) Pills  Liquids  Shots  Suppositories (small amounts of medicine that go into the rectum)   Morphine Pills  Liquids  Shots  Suppositories (small amounts of medicine that go into the rectum)   Tramadol (brand name: Qdolo)  Tramadol with acetaminophen Pills   Codeine  Codeine with acetaminophen Pills  Liquids   These are some of the opioid medicines doctors prescribe to treat acute pain. \"Acute\" means pain that lasts a " "short period of time, such as after surgery or an injury. There are other opioids, too, as well as other forms. But those are used more often for treating pain in people with long-term or \"chronic\" pain.  All of the medicines in this list come as \"generics.\" Sample [place] brand names are also listed when available.  Most of the time, doctors prescribe \"immediate-release\" opioid pills for acute pain. These work fast to relieve pain. Some opioids also come as \"extended-release\" pills. These release medicine into the body more slowly over time. Extended-release pills are not usually prescribed to treat acute pain.  For more detailed information, ask your doctor or nurse for the patient drug information handout from [employer]. It explains how to use each medicine, describes its possible side effects, and lists other medicines or foods that can affect how it works.  Graphic 336949 Version 4.0  Consumer Information Use and Disclaimer   Disclaimer: This generalized information is a limited summary of diagnosis, treatment, and/or medication information. It is not meant to be comprehensive and should be used as a tool to help the user understand and/or assess potential diagnostic and treatment options. It does NOT include all information about conditions, treatments, medications, side effects, or risks that may apply to a specific patient. It is not intended to be medical advice or a substitute for the medical advice, diagnosis, or treatment of a health care provider based on the health care provider's examination and assessment of a patient's specific and unique circumstances. Patients must speak with a health care provider for complete information about their health, medical questions, and treatment options, including any risks or benefits regarding use of medications. This information does not endorse any treatments or medications as safe, effective, or approved for treating a specific patient. Up[employer], Inc. and its " affiliates disclaim any warranty or liability relating to this information or the use thereof.The use of this information is governed by the Terms of Use, available at https://www.wolterskluwer.com/en/know/clinical-effectiveness-terms. 2024© [employer] and its affiliates and/or licensors. All rights reserved.  Copyright   © 2024 [employer] and/or its affiliates. All rights reserved.

## 2025-03-27 ENCOUNTER — APPOINTMENT (OUTPATIENT)
Dept: PHYSICAL THERAPY | Facility: CLINIC | Age: 75
End: 2025-03-27
Payer: MEDICARE

## 2025-03-28 NOTE — ED ATTENDING ATTESTATION
3/25/2025  I, Kasia Neely MD, saw and evaluated the patient. I have discussed the patient with the resident/non-physician practitioner and agree with the resident's/non-physician practitioner's findings, Plan of Care, and MDM as documented in the resident's/non-physician practitioner's note, except where noted. All available labs and Radiology studies were reviewed.  I was present for key portions of any procedure(s) performed by the resident/non-physician practitioner and I was immediately available to provide assistance.       At this point I agree with the current assessment done in the Emergency Department.  I have conducted an independent evaluation of this patient a history and physical is as follows:    Mrs. Rodrigez is a 74-year-old female presents to the emergency department for evaluation with right posterior knee and calf discomfort.  Onset 3 days ago after a 2-hour car ride.  The area feels tight and sore with movement.  She has also noticed some mild shortness of breath since the same time without chest pain, cough, nasal congestion or fever.  Approximately a week prior she fell onto her knees.  At that time she had only mild discomfort.    History of MS.  Legs are always very sensitive to palpation.  She has not appreciated any discoloration.  No new numbness or paresthesias.  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    On exam she is well-appearing with unlabored respirations and absence of hypoxia.  She is afebrile.  Heart rate normal.  Heart sounds regular.  Lungs clear to auscultation diffusely with good air movement.  Legs diffusely sensitive to light touch.  No appreciable swelling, discoloration or temperature discrepancy.  +2 PT pulses present.  Right posterior calf and popliteal fossa are more tender than other regions.  There is some circumferential knee tenderness without appreciable swelling or discoloration.  She is able to range the knee with  discomfort.    Differential diagnosis includes but is not limited to DVT, SVT, contusion, popliteal cyst +/- rupture, PE.  Doubt ACS or onset of respiratory infection.    ED Course         Critical Care Time  Procedures

## 2025-04-01 ENCOUNTER — EVALUATION (OUTPATIENT)
Dept: PHYSICAL THERAPY | Facility: CLINIC | Age: 75
End: 2025-04-01
Payer: MEDICARE

## 2025-04-01 DIAGNOSIS — G35 MULTIPLE SCLEROSIS (HCC): Primary | ICD-10-CM

## 2025-04-01 DIAGNOSIS — Z91.81 RISK FOR FALLS: ICD-10-CM

## 2025-04-01 DIAGNOSIS — R26.9 NEUROLOGIC GAIT DYSFUNCTION: ICD-10-CM

## 2025-04-01 PROCEDURE — 97140 MANUAL THERAPY 1/> REGIONS: CPT | Performed by: PHYSICAL THERAPIST

## 2025-04-01 PROCEDURE — 97530 THERAPEUTIC ACTIVITIES: CPT | Performed by: PHYSICAL THERAPIST

## 2025-04-01 NOTE — PROGRESS NOTES
[id number]    Today's date: 2025  Patient name: Jacy Rodrigez  : 1950  MRN: [id number]  Referring provider: Elidia Briceno MD  Dx:   Encounter Diagnosis     ICD-10-CM    1. Multiple sclerosis (HCC)  G35       2. Risk for falls  Z91.81       3. Neurologic gait dysfunction  R26.9                      Assessment  Jacy Rodrigez is a 74 y.o. female who presents with signs and symptoms consistent with progressive multiple sclerosis resulting in LE weakness, fatigue, ambulation dysfunction, and balance impairment. Unfortunately, patient had to cancel the past week with PT as she has severe posterior RLE pain. She went to ED for medical workup and had a doppler which was negative. Last week she had a conference and had to travel to [place] which may be the reason for her increase in symptoms. Treatment was limited today. She was unable to complete all functional testing but TUG and 5x sit to stand regressed. Her strength in LE is demonstrates the ability to complete the range against gravity; however, with no resistance (3/5 in most planes). My plan is to assess 2 & 6 minute walk tests next session as tolerated. We should resumed the Nustep next visit to encourage blood flow to the LE for desensitization. Her flexibility was more restricted today, including sciatic nerve glide/LLTT. Due to the regression of symptoms, patient will benefit from continuation of skilled PT until she reaches stabilization. Pt is in agreement with POC. Thank you for this referral.     Patient's BP has been elevated, although not consistently. She also has been experiencing pedal edema. Recommend she return to PCP regarding this issue.  Impairments: abnormal coordination, abnormal gait, abnormal or restricted ROM, abnormal movement, impaired balance, impaired physical strength, poor posture   Understanding of Dx/Px/POC: good     Prognosis: good    Goals  Short Term Goals: to be achieved by 4 weeks  1) Patient to be  "independent with basic HEP.-Partially met  2) Decrease pain to moderate at its worst.-Partially met  3) Increase SLR ROM by 5-10 degrees -Not met  4) Increase LE strength by 1/2 MMT grade in all deficient planes.-Regressed    Long Term Goals: to be achieved by discharge  1) FOTO equal to or greater than expected.-Partially met  2) Ambulation to improve to maximal level of function-Regressed  3) Improve TUG by 5 seconds-Regressed  4) Sit to stand transfers will improve to maximal level of function-regressed  5) Improve 5x sit to stand by 5 seconds-Regressed     Plan  Patient would benefit from: PT eval and skilled physical therapy    Planned therapy interventions: manual therapy, neuromuscular re-education, patient education, strengthening, therapeutic activities, therapeutic exercise, transfer training, home exercise program and functional ROM exercises    Frequency: 2x per week for 6-8 weeks.  Treatment plan discussed with: patient        Subjective Evaluation    History of Present Illness  Mechanism of injury: History of Current Injury: Pt referred to PT secondary to progressive MS and allodynia in bilateral LE. She was treated by me last year through April of 2024. Her lower extremity symptoms were worsening at that time along with weakness throughout antigravity muscles. Pt notes that her strength, fatigue, and pain continue to worsen.  Due to increasing lower extremity pain, patient received US of bilateral LE and increased Gabapentin dosage. No evidence of significant lower extremity arterial occlusive disease on US of bilateral LE. XR of right knee was negative for osseous pathology. Pt reports frequent 4+ pitting edema in bilateral LE. Pt reports compression stockings don't seem to help her.     Pt reports a walking tolerance of max 5 minutes.     Pt has steps down to the basement but has single level living. Pt has 1-2, 3\" steps to enter her house.   Pain location/Descriptors: Severe LE pain from knees to " feet (anterior and posterior). + cramping. R worse than L/   Aggravating factors: Constantly present (24/7) sometimes worse than others.   Easing factors: Rest, gabapentin  24 HR pattern: Worse that night time.   XR of R knee: normal   Special Questions: Positive for peripheral neuropathy  Patient goals:  Reduce unsteadiness, improve LE strength, increase walking and endurance.   Hobbies/Interest: Volunteers as , cooking, cleaning    Occupation: Retired       Pain  Pain scale at highest: Severe.      Diagnostic Tests  Ultrasound findings: normal      Objective     Strength/Myotome Testing     Left Hip   Planes of Motion   Flexion: 3-  Abduction: 3  Adduction: 3    Right Hip   Planes of Motion   Flexion: 3  Abduction: 3  Adduction: 3    Left Knee   Flexion: 3  Extension: 3    Right Knee   Flexion: 3  Extension: 3    Left Ankle/Foot   Dorsiflexion: 3  Plantar flexion: 3  Eversion: 3  Inversion: 3    Right Ankle/Foot   Dorsiflexion: 3  Plantar flexion: 3  Eversion: 3  Inversion: 3    Tests     Additional Tests Details  Timed up and go:  Date: 33.18 seconds. Excessive use of hands on rails for assistance   10/1: 22.21 seconds. Moderate use of hands on rails. SPC. >3 second turn  11/7: 23.99 seconds. Moderate use of hands on rails. SPC. Turn in 2.8 seconds  12/19: 22.98 seconds. Moderate use of hands on rails. SPC. Turn in 3 seconds  2/3: 17.46 seconds. Moderate use of hands. SPC. Turn less than 2 seconds.  4/1: 22.59 seconds. Moderate use of hands. SPC. Turn in 3 seconds.    Sit to stand Transfers:   Date: Excessive use of hands on rails for assistance  10/1: Moderate use of hands on rails  11/7: Moderate use of hands on rails   12/19: Moderate use of hands on rails.  2/3: Moderate use of hands on rails  4/1: Moderate use of hands on rails     5x sit to stand:   Date: 27.54 seconds with excessive use of hands  10/1: 19.40 seconds with moderate use of hands  11/7: 19.69 seconds with moderate use of hands  12/19:  "14.03  seconds with moderate use of hands  2/3: 16.06 seconds with moderate use of hands  4/1: 25.39 seconds with 75% use of hands on rails    2 minute walk test:   Date: NT  10/1: 130 feet  11/7: NT  12/19: 162 feet  2/3: 161 feet  4/1: NT d/t pain    MCTSIB:  EO firm: 30 seconds with mild postural sway   EC firm: 30 seconds with moderate postural sway  EO foam: 30 seconds with mild postural sway  EC foam: 17 seconds prior to LOB    6 minute walk test:   Date: 260 feet in 4:10  11/7: 423 feet in 6 minutes using SPC  12/19: 454 feet in 6 minutes using SPC  2/3: 457 feet in 6 minutes using SPC  4/1: NT d/t pain    *Swelling present in bilateral ankles/feet and right knee vs left knee- This has increased  *Sensitivity present in distal LE to light touch, especially over anterior tibia. -This has increased    Positive SLUMP for adverse neural tension bilaterally -This continues  Positive SLR at 55 degrees on R/55 degrees on L (Declined)      Ambulation     Observational Gait     Additional Observational Gait Details  SPC: slow sylvia, dynamic valgus in stance, small step length with femoral adduction              Precautions: MS, Gait dysfunction, Insomnia, Osteoporosis, Peripheral polyneuropathy, Hx of Breast CA, R IDALIA in 2012- Dr. Rushing, Left femur IM nailing       Manuals 3/6 3/10 3/13 3/18 4/1  2/17 2/17 2/24 2/27   Sciatic nerve mobilizations 4' 4' 4' 4' 4'  4' 4' 4' 4'   Hip ROM: flexion, ER 8' 8' 8' 8'  8'  8' 8' 8' 8'                             Neuro Re-Ed             Slump nerve glides             Sciatic nerve glides 15x5\" with green strap seated.  15x5\" with green strap seated. 15x5\" w/ green strap seated 15x5\" w/ green strap seated 15x5\" w/ green strap seated  10x5\" B seated 10x5\" B seated 15x5\" with green strap seated.  15x5\" with green strap seated.    LAQ             Standing hip abduction             Standing march             Standing hip extension             EC on foam              Marching in " "hooklying             Hamstring curls             Bridge             Hip add isometric             TKE in standing  YTB 15x ea YTB 15x ea nv Ytb x15         Hip abd isometric in hooklying Supine 20x5\"  Supine 20x5\" Supine 20x5\" Supine 20x5\"   2x10 Seated 2x10  Seated 2x10 Supine 30x5\"   Ther Ex             Hip flexion stretch with strap             Nustep/bike             Hamstring curl              Piriformis stretch             SB stretch              Seated ankle DF                                       Ther Activity             Standing HR/TR 30x 30x nv 30x   30x 30x 30x 30x   Sit to stand squat             Lateral walks       Marching with lateral walking      Forward walks       Marching while forward walking for hip ROM      Idris walks 20x ea way 1 idris (Laterally)  10x ea way 1 idris (Laterally)  nv    Blue step over 20x ea Blue step over 20x ea30     Step up  15x on biodex foam ea 10 on biodex foam ea nv 10x on biodex foam     10x on biodex foam ea  15x on biodex foam ea   Brandon backward pull             Self management                                       Vitals             BP                          1:1 with PT from [id number]  Pt was re-evaluated today x 15 minutes                                                                                               "

## 2025-04-03 ENCOUNTER — OFFICE VISIT (OUTPATIENT)
Dept: PHYSICAL THERAPY | Facility: CLINIC | Age: 75
End: 2025-04-03
Payer: MEDICARE

## 2025-04-03 DIAGNOSIS — G35 MULTIPLE SCLEROSIS (HCC): Primary | ICD-10-CM

## 2025-04-03 DIAGNOSIS — R26.9 NEUROLOGIC GAIT DYSFUNCTION: ICD-10-CM

## 2025-04-03 DIAGNOSIS — Z91.81 RISK FOR FALLS: ICD-10-CM

## 2025-04-03 PROCEDURE — 97140 MANUAL THERAPY 1/> REGIONS: CPT | Performed by: PHYSICAL THERAPIST

## 2025-04-03 PROCEDURE — 97112 NEUROMUSCULAR REEDUCATION: CPT | Performed by: PHYSICAL THERAPIST

## 2025-04-03 PROCEDURE — 97110 THERAPEUTIC EXERCISES: CPT | Performed by: PHYSICAL THERAPIST

## 2025-04-03 NOTE — PROGRESS NOTES
"Daily Note     Today's date: 4/3/2025  Patient name: Jacy Rodrigez  : 1950  MRN: [id number]  Referring provider: Elidia Briceno MD  Dx:   Encounter Diagnosis     ICD-10-CM    1. Multiple sclerosis (HCC)  G35       2. Risk for falls  Z91.81       3. Neurologic gait dysfunction  R26.9                      Subjective: Pt reports having a hard time stabilizing legs today. She notes that her legs keep caving inward and left foot is dragging.  She attends PT ambulating with SPC       Objective: See treatment diary below      Assessment: Continued with LE stretching. Pt still fairly weak in her LE with muscle inhibition and fatigue. Likely due to her MS and increased LE pain that drove her to the ED last week.  Pt felt dizzy when transferring from supine to sit. Vitals assessed 163/76, 77 bpm. No significant change in BP or HR with sitting to standing transfer. Dizziness unlikely orthostatic hypotension but can be vestibular in nature. Recommend patient monitor vitals and discuss HTN with PCP if continues. However, this could be do to her current pain state. Implemented nustep for LE ROM and blood flow. Plan to continue this next session.  Patient would benefit from continued PT.       Plan: Continue per plan of care. Re-assess 6 minute and 2 minute walk test next session as tolerated. Progress standing and weight bearing exercises as tolerated.      Precautions: MS, Gait dysfunction, Insomnia, Osteoporosis, Peripheral polyneuropathy, Hx of Breast CA, R IDALIA in - Dr. Rushing, Left femur IM nailing       Manuals 3/6 3/10 3/13 3/18 4/1 4/3    2/27   Sciatic nerve mobilizations 4' 4' 4' 4' 4' 4'    4'   Hip ROM: flexion, ER 8' 8' 8' 8'  8' 8'    8'                             Neuro Re-Ed             Slump nerve glides             Sciatic nerve glides 15x5\" with green strap seated.  15x5\" with green strap seated. 15x5\" w/ green strap seated 15x5\" w/ green strap seated 15x5\" w/ green strap seated 15x5\" w/ green " "strap seated    15x5\" with green strap seated.    LAQ             Standing hip abduction             Standing march             Standing hip extension             EC on foam              Marching in hooklying             Hamstring curls             Bridge             Hip add isometric             TKE in standing  YTB 15x ea YTB 15x ea nv Ytb x15         Hip abd isometric in hooklying Supine 20x5\"  Supine 20x5\" Supine 20x5\" Supine 20x5\"  Supine 20x5\"    Supine 30x5\"   Ther Ex             Hip flexion stretch with strap             Nustep/bike      8' L0        Hamstring curl              Piriformis stretch             SB stretch              Seated ankle DF                                       Ther Activity             Standing HR/TR 30x 30x nv 30x      30x   Sit to stand squat             Lateral walks             Forward walks             Idris walks 20x ea way 1 idris (Laterally)  10x ea way 1 idris (Laterally)  nv          Step up  15x on biodex foam ea 10 on biodex foam ea nv 10x on biodex foam       15x on biodex foam ea   [place] backward pull             Self management                                       Vitals             BP                                                                                                                           "

## 2025-04-08 ENCOUNTER — OFFICE VISIT (OUTPATIENT)
Dept: PHYSICAL THERAPY | Facility: CLINIC | Age: 75
End: 2025-04-08
Payer: MEDICARE

## 2025-04-08 DIAGNOSIS — Z91.81 RISK FOR FALLS: ICD-10-CM

## 2025-04-08 DIAGNOSIS — R26.9 NEUROLOGIC GAIT DYSFUNCTION: ICD-10-CM

## 2025-04-08 DIAGNOSIS — G35 MULTIPLE SCLEROSIS (HCC): Primary | ICD-10-CM

## 2025-04-08 PROCEDURE — 97110 THERAPEUTIC EXERCISES: CPT | Performed by: PHYSICAL THERAPIST

## 2025-04-08 PROCEDURE — 97140 MANUAL THERAPY 1/> REGIONS: CPT | Performed by: PHYSICAL THERAPIST

## 2025-04-08 PROCEDURE — 97112 NEUROMUSCULAR REEDUCATION: CPT | Performed by: PHYSICAL THERAPIST

## 2025-04-08 NOTE — PROGRESS NOTES
"Daily Note     Today's date: 2025  Patient name: Jacy Rodrigez  : 1950  MRN: [id number]  Referring provider: Elidia Briceno MD  Dx:   Encounter Diagnosis     ICD-10-CM    1. Multiple sclerosis (HCC)  G35       2. Risk for falls  Z91.81       3. Neurologic gait dysfunction  R26.9                      Subjective: Pt feeling better with pain in her LE and stability since last week.      Objective: See treatment diary below      Assessment: Gait more stable today. Tolerated treatment well. Improvement movement tolerance and flexibility in LE. Pain considerably reduced. Patient would benefit from continued PT.       Plan: Continue per plan of care. 6 minute walk test next session.      Precautions: MS, Gait dysfunction, Insomnia, Osteoporosis, Peripheral polyneuropathy, Hx of Breast CA, R IDALIA in - Dr. Rushing, Left femur IM nailing       Manuals 3/6 3/10 3/13 3/18 4/1 4/3 4/8      Sciatic nerve mobilizations 4' 4' 4' 4' 4' 4' 4'      Hip ROM: flexion, ER 8' 8' 8' 8'  8' 8' 8'                                Neuro Re-Ed             Slump nerve glides             Sciatic nerve glides 15x5\" with green strap seated.  15x5\" with green strap seated. 15x5\" w/ green strap seated 15x5\" w/ green strap seated 15x5\" w/ green strap seated 15x5\" w/ green strap seated 15x5\" w/ green strap seated      LAQ             Standing hip abduction             Standing march             Standing hip extension             EC on foam              Marching in hooklying             Hamstring curls             Bridge             Hip add isometric             TKE in standing  YTB 15x ea YTB 15x ea nv Ytb x15   20x ytb       Hip abd isometric in hooklying Supine 20x5\"  Supine 20x5\" Supine 20x5\" Supine 20x5\"  Supine 20x5\" Supine 20x5\"      Ther Ex             Hip flexion stretch with strap             Nustep/bike      8' L0  9' L1      Hamstring curl              Piriformis stretch             SB stretch              Seated ankle DF    "                                    Ther Activity             Standing HR/TR 30x 30x nv 30x         Sit to stand squat             Lateral walks             Forward walks             Idris walks 20x ea way 1 idris (Laterally)  10x ea way 1 idris (Laterally)  nv          Step up  15x on biodex foam ea 10 on biodex foam ea nv 10x on biodex foam          Brandon backward pull             Self management                                       Vitals             BP                            1:1 with PT from 917-10am

## 2025-04-10 ENCOUNTER — OFFICE VISIT (OUTPATIENT)
Dept: PHYSICAL THERAPY | Facility: CLINIC | Age: 75
End: 2025-04-10
Payer: MEDICARE

## 2025-04-10 DIAGNOSIS — Z91.81 RISK FOR FALLS: ICD-10-CM

## 2025-04-10 DIAGNOSIS — G35 MULTIPLE SCLEROSIS (HCC): Primary | ICD-10-CM

## 2025-04-10 DIAGNOSIS — R26.9 NEUROLOGIC GAIT DYSFUNCTION: ICD-10-CM

## 2025-04-10 PROCEDURE — 97112 NEUROMUSCULAR REEDUCATION: CPT | Performed by: PHYSICAL THERAPIST

## 2025-04-10 PROCEDURE — 97140 MANUAL THERAPY 1/> REGIONS: CPT | Performed by: PHYSICAL THERAPIST

## 2025-04-10 PROCEDURE — 97110 THERAPEUTIC EXERCISES: CPT | Performed by: PHYSICAL THERAPIST

## 2025-04-10 NOTE — PROGRESS NOTES
"Daily Note     Today's date: 4/10/2025  Patient name: Jacy Rodrigez  : 1950  MRN: [id number]  Referring provider: Elidia Briceno MD  Dx:   Encounter Diagnosis     ICD-10-CM    1. Multiple sclerosis (HCC)  G35       2. Risk for falls  Z91.81       3. Neurologic gait dysfunction  R26.9                      Subjective: Pt reports seeing  family physician who increased her BP measure. She also note persistent \"puffiness\" to her right knee. Pt reports having leg fatigue/weakness this morning.       Objective: See treatment diary below      Assessment: Tolerated treatment fair. Good response to ROM and flexibility exercises. Pt did have discomfort in right knee and hip at 6 minutes on the Nustep but was able to complete the exercise to 10 minutes. Patient would benefit from continued PT.       Plan: Continue per plan of care. Plan to resume LE strengthening next session     Precautions: MS, Gait dysfunction, Insomnia, Osteoporosis, Peripheral polyneuropathy, Hx of Breast CA, R IDALIA in - Dr. Rushing, Left femur IM nailing       Manuals 3/6 3/10 3/13 3/18 4/1 4/3 4/8 4/10     Sciatic nerve mobilizations 4' 4' 4' 4' 4' 4' 4' 4'     Hip ROM: flexion, ER 8' 8' 8' 8'  8' 8' 8' 8'                               Neuro Re-Ed             Slump nerve glides             Sciatic nerve glides 15x5\" with green strap seated.  15x5\" with green strap seated. 15x5\" w/ green strap seated 15x5\" w/ green strap seated 15x5\" w/ green strap seated 15x5\" w/ green strap seated 15x5\" w/ green strap seated 15x5\" w/ green strap seated     LAQ             Standing hip abduction             Standing march             Standing hip extension             EC on foam              Marching in hooklying             Hamstring curls             Bridge             Hip add isometric             TKE in standing  YTB 15x ea YTB 15x ea nv Ytb x15   20x ytb       Hip abd isometric in hooklying Supine 20x5\"  Supine 20x5\" Supine 20x5\" Supine 20x5\"  Supine " "20x5\" Supine 20x5\" Supine 20x5\"     Ther Ex             Hip flexion stretch with strap             Nustep/bike      8' L0  9' L1 10' L1 discomfort in R knee at 6'      Hamstring curl              Piriformis stretch             SB stretch              Seated ankle DF                                       Ther Activity             Standing HR/TR 30x 30x nv 30x         Sit to stand squat             Lateral walks             Forward walks             Idris walks 20x ea way 1 idris (Laterally)  10x ea way 1 idris (Laterally)  nv          Step up  15x on biodex foam ea 10 on biodex foam ea nv 10x on biodex foam          Brandon backward pull             Self management                                       Vitals             BP                            1:1 with PT from 2944-1636                                                                                                   "

## 2025-04-15 ENCOUNTER — APPOINTMENT (OUTPATIENT)
Dept: LAB | Facility: CLINIC | Age: 75
End: 2025-04-15
Payer: MEDICARE

## 2025-04-15 ENCOUNTER — TRANSCRIBE ORDERS (OUTPATIENT)
Dept: LAB | Facility: CLINIC | Age: 75
End: 2025-04-15

## 2025-04-15 ENCOUNTER — OFFICE VISIT (OUTPATIENT)
Dept: PHYSICAL THERAPY | Facility: CLINIC | Age: 75
End: 2025-04-15
Payer: MEDICARE

## 2025-04-15 DIAGNOSIS — Z00.00 ROUTINE GENERAL MEDICAL EXAMINATION AT A HEALTH CARE FACILITY: ICD-10-CM

## 2025-04-15 DIAGNOSIS — I10 ESSENTIAL HYPERTENSION, MALIGNANT: ICD-10-CM

## 2025-04-15 DIAGNOSIS — E55.9 VITAMIN D DEFICIENCY: ICD-10-CM

## 2025-04-15 DIAGNOSIS — E78.5 HYPERLIPIDEMIA, UNSPECIFIED HYPERLIPIDEMIA TYPE: ICD-10-CM

## 2025-04-15 DIAGNOSIS — Z91.81 RISK FOR FALLS: ICD-10-CM

## 2025-04-15 DIAGNOSIS — G35 MULTIPLE SCLEROSIS (HCC): Primary | ICD-10-CM

## 2025-04-15 DIAGNOSIS — R26.9 NEUROLOGIC GAIT DYSFUNCTION: ICD-10-CM

## 2025-04-15 DIAGNOSIS — Z00.00 ROUTINE GENERAL MEDICAL EXAMINATION AT A HEALTH CARE FACILITY: Primary | ICD-10-CM

## 2025-04-15 LAB
25(OH)D3 SERPL-MCNC: 61.1 NG/ML (ref 30–100)
CHOLEST SERPL-MCNC: 245 MG/DL (ref ?–200)
HDLC SERPL-MCNC: 74 MG/DL
LDLC SERPL CALC-MCNC: 152 MG/DL (ref 0–100)
MAGNESIUM SERPL-MCNC: 2.5 MG/DL (ref 1.9–2.7)
NONHDLC SERPL-MCNC: 171 MG/DL
TRIGL SERPL-MCNC: 94 MG/DL (ref ?–150)

## 2025-04-15 PROCEDURE — 36415 COLL VENOUS BLD VENIPUNCTURE: CPT

## 2025-04-15 PROCEDURE — 83735 ASSAY OF MAGNESIUM: CPT

## 2025-04-15 PROCEDURE — 97110 THERAPEUTIC EXERCISES: CPT | Performed by: PHYSICAL THERAPIST

## 2025-04-15 PROCEDURE — 82306 VITAMIN D 25 HYDROXY: CPT

## 2025-04-15 PROCEDURE — 97140 MANUAL THERAPY 1/> REGIONS: CPT | Performed by: PHYSICAL THERAPIST

## 2025-04-15 PROCEDURE — 97112 NEUROMUSCULAR REEDUCATION: CPT | Performed by: PHYSICAL THERAPIST

## 2025-04-15 PROCEDURE — 80061 LIPID PANEL: CPT

## 2025-04-15 NOTE — PROGRESS NOTES
"Daily Note     Today's date: 4/15/2025  Patient name: Jacy Rodrigez  : 1950  MRN: [id number]  Referring provider: Elidia Briceno MD  Dx:   Encounter Diagnosis     ICD-10-CM    1. Multiple sclerosis (HCC)  G35       2. Risk for falls  Z91.81       3. Neurologic gait dysfunction  R26.9                      Subjective: PT reports having the stomach bug over the weekend. She had a hard time sleeping last evening and feels stiff/weak today.       Objective: See treatment diary below      Assessment: Focused on flexibility of LE secondary to fatigue from recent viral illness. Pt notes having less motor control in RLE. She demonstrates the inability to externally rotate the right hip in supine. She is positioned in femoral IR while lying supine. Due to the lack of motor control and weakness at right hip, she seems to have developed right knee pain (distal to patella). Tolerated treatment fair. Plan to resume all exercises next session.      Plan: Continue per plan of care.      Precautions: MS, Gait dysfunction, Insomnia, Osteoporosis, Peripheral polyneuropathy, Hx of Breast CA, R IDALIA in - Dr. Rushing, Left femur IM nailing       Manuals 3/6 3/10 3/13 3/18 4/1 4/3 4/8 4/10 4/15    Sciatic nerve mobilizations 4' 4' 4' 4' 4' 4' 4' 4' 4'    Hip ROM: flexion, ER 8' 8' 8' 8'  8' 8' 8' 8' 8'                              Neuro Re-Ed             Slump nerve glides             Sciatic nerve glides 15x5\" with green strap seated.  15x5\" with green strap seated. 15x5\" w/ green strap seated 15x5\" w/ green strap seated 15x5\" w/ green strap seated 15x5\" w/ green strap seated 15x5\" w/ green strap seated 15x5\" w/ green strap seated 15x5\" w/ green strap seated    LAQ             Standing hip abduction             Standing march             Standing hip extension             EC on foam              Marching in hooklying             Hamstring curls             Bridge             Hip add isometric             TKE in standing  " "YTB 15x ea YTB 15x ea nv Ytb x15   20x ytb       Hip abd isometric in hooklying Supine 20x5\"  Supine 20x5\" Supine 20x5\" Supine 20x5\"  Supine 20x5\" Supine 20x5\" Supine 20x5\" Seated 20x5\"     Ther Ex             Hip flexion stretch with strap             Nustep/bike      8' L0  9' L1 10' L1 discomfort in R knee at 6'  10' L1    Hamstring curl              Piriformis stretch             SB stretch              Seated ankle DF                                       Ther Activity             Standing HR/TR 30x 30x nv 30x         Sit to stand squat             Lateral walks             Forward walks             Idris walks 20x ea way 1 idris (Laterally)  10x ea way 1 idris (Laterally)  nv          Step up  15x on biodex foam ea 10 on biodex foam ea nv 10x on biodex foam          Brandon backward pull             Self management                                       Vitals             BP                            1:1 with PT from 915-10am                                                                                                     "

## 2025-04-16 NOTE — RESULT NOTES
" LOS: 2 days   Patient Care Team:  Luz Maria White APRN as PCP - General (Family Medicine)    Chief Complaint: CAD    Subjective  \"Feeling okay\"    Vital Signs  Temp:  [97.4 °F (36.3 °C)-98.4 °F (36.9 °C)] 98.3 °F (36.8 °C)  Heart Rate:  [102-109] 106  Resp:  [18] 18  BP: ()/(57-82) 101/65  Body mass index is 39.35 kg/m².    Intake/Output Summary (Last 24 hours) at 4/16/2025 0927  Last data filed at 4/15/2025 2327  Gross per 24 hour   Intake 240 ml   Output 2350 ml   Net -2110 ml     No intake/output data recorded.           04/15/25  0325 04/16/25  0324 04/16/25  0525   Weight: 119 kg (263 lb 3.7 oz) 120 kg (264 lb 5.3 oz) 121 kg (266 lb 9.6 oz)         Objective:  Vital signs: (most recent): Blood pressure 101/65, pulse 106, temperature 98.3 °F (36.8 °C), temperature source Oral, resp. rate 18, height 175.3 cm (69.02\"), weight 121 kg (266 lb 9.6 oz), SpO2 95%.                Physical Exam:   General Appearance: awake and alert, no acute distress   Lungs: clear to auscultation, respirations regular, respirations even, and respirations unlabored,     Heart: tachycardic   Abdomen: soft or nontender, active bowel sounds    Skin: clean and dry   Neuro: alert and oriented, no focal deficits.     Results Review:        WBC WBC   Date Value Ref Range Status   04/16/2025 8.67 3.40 - 10.80 10*3/mm3 Final   04/15/2025 10.45 3.40 - 10.80 10*3/mm3 Final   04/14/2025 11.31 (H) 3.40 - 10.80 10*3/mm3 Final      HGB Hemoglobin   Date Value Ref Range Status   04/16/2025 12.8 (L) 13.0 - 17.7 g/dL Final   04/15/2025 13.7 13.0 - 17.7 g/dL Final   04/14/2025 15.3 12.0 - 17.0 g/dL Final   04/14/2025 15.1 13.0 - 17.7 g/dL Final      HCT Hematocrit   Date Value Ref Range Status   04/16/2025 40.9 37.5 - 51.0 % Final   04/15/2025 45.1 37.5 - 51.0 % Final   04/14/2025 45 38 - 51 % Final   04/14/2025 47.3 37.5 - 51.0 % Final      Platelets Platelets   Date Value Ref Range Status   04/16/2025 249 140 - 450 10*3/mm3 Final " Verified Results  * MRI ANKLE/HEEL LEFT  WO CONTRAST [id number] 08:23PM Ludin Arredondo     Test Name Result Flag Reference   MRI ANKLE/HEEL LEFT  WO CONTRAST (Report)     This is a summary report  The complete report is available in the patient's medical record  If you cannot access the medical record, please contact the sending organization for a detailed fax or copy  MRI LEFT ANKLE - WITHOUT CONTRAST     INDICATION: [id number]: Pain in left ankle and joints of left foot  History taken directly from the electronic ordering system  Status post fall with ankle pain  COMPARISON: Plain film dated 7/17/2017     TECHNIQUE:  MR sequences were obtained of the left ankle including: Localizers, coronal STIR, coronal T1, axial T2 fat sat, axial PD, sagittal T2 fat sat, sagittal T1 sequences were obtained  Images were acquired on a1 5 Hilaria Unit  Gadolinium was not    used  FINDINGS:     SUBCUTANEOUS TISSUES: Mild subcutaneous edema about the ankle consistent with nonspecific cellulitis, possibly posttraumatic  JOINT EFFUSION: None  TENDONS:   Achilles tendon: Normal    Peroneus brevis and longus: Normal    Posterior tibialis, flexor digitorum longus, flexor hallucis longus: Normal    Anterior tibialis, extensor digitorum longus, extensor hallucis longus: Normal      LIGAMENTS:   Lateral collateral ligament complex: Normal    Distal tibiofibular syndesmosis: Normal    Medial collateral ligament complex: Normal      PLANTAR FASCIA: Normal      ARTICULAR SURFACES: Intact  SINUS TARSI: Normal      Tarsal Tunnel: Unremarkable  BONE MARROW SIGNAL: Normal  Accessory navicular ossicle without evidence of instability  MUSCULATURE: Normal        IMPRESSION:   Mild subcutaneous edema about the ankle consistent with nonspecific cellulitis, possibly posttraumatic given the provided clinical history  No evidence of fracture  No acute ligamentous or tendinous injury         Workstation performed: [id number] "  04/15/2025 306 140 - 450 10*3/mm3 Final   04/14/2025 311 140 - 450 10*3/mm3 Final        PT/INR:  No results found for: \"PROTIME\"/No results found for: \"INR\"    Sodium Sodium   Date Value Ref Range Status   04/16/2025 137 136 - 145 mmol/L Final   04/15/2025 134 (L) 136 - 145 mmol/L Final   04/14/2025 135 (L) 136 - 145 mmol/L Final      Potassium Potassium   Date Value Ref Range Status   04/16/2025 3.0 (L) 3.5 - 5.2 mmol/L Final   04/15/2025 3.8 3.5 - 5.2 mmol/L Final   04/14/2025 3.8 3.5 - 5.2 mmol/L Final      Chloride Chloride   Date Value Ref Range Status   04/16/2025 98 98 - 107 mmol/L Final   04/15/2025 101 98 - 107 mmol/L Final   04/14/2025 96 (L) 98 - 107 mmol/L Final      Bicarbonate CO2   Date Value Ref Range Status   04/16/2025 25.4 22.0 - 29.0 mmol/L Final   04/15/2025 21.0 (L) 22.0 - 29.0 mmol/L Final   04/14/2025 23.4 22.0 - 29.0 mmol/L Final      BUN BUN   Date Value Ref Range Status   04/16/2025 18 6 - 20 mg/dL Final   04/15/2025 20 6 - 20 mg/dL Final   04/14/2025 21 (H) 6 - 20 mg/dL Final      Creatinine Creatinine   Date Value Ref Range Status   04/16/2025 1.09 0.76 - 1.27 mg/dL Final   04/15/2025 1.20 0.76 - 1.27 mg/dL Final   04/14/2025 0.87 0.60 - 1.30 mg/dL Final   04/14/2025 1.16 0.76 - 1.27 mg/dL Final      Calcium Calcium   Date Value Ref Range Status   04/16/2025 8.6 8.6 - 10.5 mg/dL Final   04/15/2025 8.9 8.6 - 10.5 mg/dL Final   04/14/2025 9.6 8.6 - 10.5 mg/dL Final      Magnesium No results found for: \"MG\"       aspirin, 81 mg, Oral, Daily  atorvastatin, 80 mg, Oral, Daily  famotidine, 40 mg, Oral, Daily  furosemide, 80 mg, Intravenous, BID Diuretics  insulin glargine, 10 Units, Subcutaneous, Daily  insulin lispro, 2-9 Units, Subcutaneous, 4x Daily AC & at Bedtime  potassium chloride, 30 mEq, Oral, BID With Meals  potassium chloride ER, 40 mEq, Oral, Q4H  sodium chloride, 10 mL, Intravenous, Q12H      milrinone, 0.25 mcg/kg/min, Last Rate: 0.25 mcg/kg/min (04/16/25 2674)              " Signed by:   Rosa Montenegro MD   8/9/17     * MRI KNEE LEFT  WO CONTRAST [id number] 08:22PM Samara Victor Hugo     Test Name Result Flag Reference   MRI KNEE LEFT  WO CONTRAST (Report)     This is a summary report  The complete report is available in the patient's medical record  If you cannot access the medical record, please contact the sending organization for a detailed fax or copy  MRI LEFT KNEE     INDICATION: [id number]: Pain in left knee  History taken directly from the electronic ordering system  Status post fall with knee pain  COMPARISON: Plain film dated 7/17/2017     TECHNIQUE:  MR sequences were obtained of the left knee including: Localizer, axial T2 fat sat, coronal T1/T2 fat sat, sagittal PD/T2 fat sat  Images were acquired on a 1 5 Hilaria unit  Gadolinium was not used  FINDINGS: Femoral intramedullary matthew incompletely evaluated but resulting nonhomogeneous fat saturation and susceptibility artifacts  SUBCUTANEOUS TISSUES: Anterolateral heterogeneous collection consistent with hematoma measuring up to 4 8 x 0 7 x 5 9 cm  JOINT EFFUSION: None  BAKER'S CYST: None  MENISCI: Intact  CRUCIATE LIGAMENTS: Intact  EXTENSOR APPARATUS: Intact  COLLATERAL LIGAMENTS: Intact  ARTICULAR SURFACES: Intact  BONE MARROW: Nondisplaced oblique patellar fracture with exuberant reactive marrow edema  MUSCULATURE: Intact  IMPRESSION:   1  Nondisplaced patellar fracture with prominent reactive marrow edema  2  Anterolateral complex subcutaneous collection consistent with hematoma formation as above  3  No evidence of meniscal tear or further internal derangement         ##imslh##imslh       Workstation performed: [id number]     Signed by:   Rosa Montenegro MD   8/9/17 CHF (congestive heart failure), NYHA class IV, acute, systolic      Assessment & Plan    - Severe multi-vessel CAD   - Acute systolic heart failure, EF approximately 18% -- milrinone started   - DM II -- hgb A1c 8.4 in February   - HTN  - HLD  - Frequent PVC's -- AC with eliquis     Remains on milrinone. Unable for GDMT at this time, he is tachycardic this morning.  IV lasix on going, will change to 6a 6p timing as he was up a lot last night.  His wait is up and he states he feels like his urine output has decreased.  His albumin 3.2 after last check, he may respond better to bumex.  Will discuss with Dr. Betancourt.  Plan to continue to medically optimize prior to high risk PCI.    Samia Neal, ENRIKE  04/16/25  09:27 EDT

## 2025-04-17 ENCOUNTER — OFFICE VISIT (OUTPATIENT)
Dept: PHYSICAL THERAPY | Facility: CLINIC | Age: 75
End: 2025-04-17
Payer: MEDICARE

## 2025-04-17 DIAGNOSIS — R26.9 NEUROLOGIC GAIT DYSFUNCTION: ICD-10-CM

## 2025-04-17 DIAGNOSIS — G35 MULTIPLE SCLEROSIS (HCC): Primary | ICD-10-CM

## 2025-04-17 DIAGNOSIS — Z91.81 RISK FOR FALLS: ICD-10-CM

## 2025-04-17 PROCEDURE — 97112 NEUROMUSCULAR REEDUCATION: CPT | Performed by: PHYSICAL THERAPIST

## 2025-04-17 PROCEDURE — 97110 THERAPEUTIC EXERCISES: CPT | Performed by: PHYSICAL THERAPIST

## 2025-04-17 PROCEDURE — 97140 MANUAL THERAPY 1/> REGIONS: CPT | Performed by: PHYSICAL THERAPIST

## 2025-04-22 ENCOUNTER — OFFICE VISIT (OUTPATIENT)
Age: 75
End: 2025-04-22
Payer: MEDICARE

## 2025-04-22 ENCOUNTER — OFFICE VISIT (OUTPATIENT)
Dept: PHYSICAL THERAPY | Facility: CLINIC | Age: 75
End: 2025-04-22
Payer: MEDICARE

## 2025-04-22 VITALS
WEIGHT: 143 LBS | DIASTOLIC BLOOD PRESSURE: 84 MMHG | SYSTOLIC BLOOD PRESSURE: 148 MMHG | TEMPERATURE: 98.1 F | HEIGHT: 64 IN | BODY MASS INDEX: 24.41 KG/M2 | HEART RATE: 73 BPM

## 2025-04-22 DIAGNOSIS — G35 MULTIPLE SCLEROSIS (HCC): Primary | ICD-10-CM

## 2025-04-22 DIAGNOSIS — R26.9 NEUROLOGIC GAIT DYSFUNCTION: ICD-10-CM

## 2025-04-22 DIAGNOSIS — Z91.81 RISK FOR FALLS: ICD-10-CM

## 2025-04-22 DIAGNOSIS — Z42.1 AFTERCARE POSTMASTECTOMY FOR BREAST RECONSTRUCTION: Primary | ICD-10-CM

## 2025-04-22 PROCEDURE — 99215 OFFICE O/P EST HI 40 MIN: CPT | Performed by: SURGERY

## 2025-04-22 PROCEDURE — 97140 MANUAL THERAPY 1/> REGIONS: CPT | Performed by: PHYSICAL THERAPIST

## 2025-04-22 PROCEDURE — 97112 NEUROMUSCULAR REEDUCATION: CPT | Performed by: PHYSICAL THERAPIST

## 2025-04-22 PROCEDURE — 97110 THERAPEUTIC EXERCISES: CPT | Performed by: PHYSICAL THERAPIST

## 2025-04-22 NOTE — PROGRESS NOTES
"Daily Note     Today's date: 2025  Patient name: Jacy Rodrigez  : 1950  MRN: [id number]  Referring provider: Elidia Briceno MD  Dx:   Encounter Diagnosis     ICD-10-CM    1. Multiple sclerosis (HCC)  G35       2. Risk for falls  Z91.81       3. Neurologic gait dysfunction  R26.9           Start Time: 09  Stop Time: 1000  Total time in clinic (min): 40 minutes    Subjective: Pt reports having a tough morning. Her legs are really painful. She doesn't reminder the last time her legs hurt this badly. She didn't sleep well last evening and feels more stressed  due to her upcoming surgical consultation appointment with Dr. Canales.       Objective: See treatment diary below      Assessment: Pt terminated nustep early at 8 minutes secondary to painful clicking in right knee. She also notes that she felt more challenge on L1 resistance today than previous sessions. Almost as if she was struggling to pedal. Notes only able to work at 38 SPM when she typically is at 78 SPM. Continued flexibility and stretching.  Due to increased pain in bilateral LE, considerable flexibility restrictions present with potential muscle guarding. Unable to perform most standing exercises due to pain. Pt feels that this could be due to her level of stress as she denies any falls or [place]. Plan to resume exercises next session as tolerated.       Plan: Continue per plan of care.      Precautions: MS, Gait dysfunction, Insomnia, Osteoporosis, Peripheral polyneuropathy, Hx of Breast CA, R IDALIA in - Dr. Rushing, Left femur IM nailing       Manuals 4/22   3/18 4/1 4/3 4/8 4/10 4/15 4/17   Sciatic nerve mobilizations 4'   4' 4' 4' 4' 4' 4' 4'   Hip ROM: flexion, ER 8'   8'  8' 8' 8' 8' 8' 8'                             Neuro Re-Ed             Slump nerve glides             Sciatic nerve glides 15x5\" w/ green strap seated   15x5\" w/ green strap seated 15x5\" w/ green strap seated 15x5\" w/ green strap seated 15x5\" w/ green strap " "seated 15x5\" w/ green strap seated 15x5\" w/ green strap seated 15x5\" w/ green strap seated   LAQ             Standing hip abduction             Standing march             Standing hip extension             EC on foam              Marching in hooklying             Hamstring curls             Bridge             Hip add isometric             TKE in standing     Ytb x15   20x ytb       Hip abd isometric in hooklying Supine 20x5'   Supine 20x5\"  Supine 20x5\" Supine 20x5\" Supine 20x5\" Seated 20x5\"  Seated 20x5\"    Ther Ex             Hip flexion stretch with strap             Nustep/bike 8' lv1     8' L0  9' L1 10' L1 discomfort in R knee at 6'  10' L1 10' L1   Hamstring curl              Piriformis stretch             SB stretch              Seated ankle DF                                       Ther Activity             Standing HR/TR 30x   30x         Sit to stand squat             Lateral walks             Forward walks             Leeann walks             Step up     10x on biodex foam      10x on boidex foam bilaterally.     Brandon backward pull             Self management                                       Vitals             BP                            1:1 with PT from 915-10am                                                                                                         "

## 2025-04-22 NOTE — PROGRESS NOTES
:  Assessment & Plan  Aftercare postmastectomy for breast reconstruction         Please see HPI.  Jihan is prepared to undergo removal of the deflated left breast implant with replacement, similarly sized silicone gel implant.  She would prefer a round implant, with greater upper pole projection than the existing implant.  She is not interested in addressing the opposite breast at this time.  We discussed removal of the left breast implant, capsulectomy, and delayed reconstruction utilizing a silicone gel implant.  I discussed the procedure, how it is performed, as well as potential risks, complications and limitations including, but not limited to infection, bleeding, scarring, asymmetry, contour deformity, implant related problems such as migration, deflation, capsular contracture, rupture, rippling, need for MRI surveillance, she is aware that implants are not lifelong devices, etc. etc.  Her questions have been answered to her satisfaction, and consent has been obtained.  She will work with our coordinator to arrange a date for the procedure, she is aware that she is to stop her Voltaren for 2 weeks prior to the surgery, and that medical clearance will be obtained prior to her undergoing surgery.    History of Present Illness     Jacy Rodrigez is a 75 y.o. female   HPI Jihan presents today to discuss removal of the ruptured left breast implant (MRI from January 8, 2025 reveals left implant intracapsular rupture, no evidence of extracapsular rupture on the left.  The right demonstrates no intra or extracapsular rupture).  She is interested in removal of the existing implant, replacement with a silicone gel implant, similarly sized, without addressing the right breast, she is aware that she may benefit from additional surgical procedures for symmetry in the future.  Review of Systems   Constitutional:  Negative for chills and fever.   HENT:  Negative for hearing loss.    Eyes:  Positive for visual disturbance.  "Negative for discharge.   Respiratory:  Positive for shortness of breath. Negative for chest tightness.         Shortness of breath with exertion   Cardiovascular:  Negative for chest pain and leg swelling.   Gastrointestinal:  Negative for constipation and diarrhea.   Endocrine: Negative for cold intolerance and heat intolerance.   Genitourinary:  Negative for dysuria.   Musculoskeletal:  Positive for gait problem.        Ambulates with cane or walker   Skin:  Negative for rash.   Allergic/Immunologic: Negative for immunocompromised state.   Neurological:  Negative for seizures and headaches.   Hematological:  Does not bruise/bleed easily.   Psychiatric/Behavioral:  Positive for dysphoric mood. The patient is nervous/anxious.      Objective   /84 (BP Location: Right arm, Patient Position: Sitting, Cuff Size: Standard)   Pulse 73   Temp 98.1 °F (36.7 °C) (Temporal)   Ht 5' 4\" (1.626 m)   Wt 64.9 kg (143 lb)   BMI 24.55 kg/m²      Physical Exam  HENT:      Head: Normocephalic and atraumatic.   Eyes:      Extraocular Movements: Extraocular movements intact.      Pupils: Pupils are equal, round, and reactive to light.   Cardiovascular:      Rate and Rhythm: Normal rate.   Pulmonary:      Effort: Pulmonary effort is normal.   Abdominal:      Palpations: Abdomen is soft.   Musculoskeletal:         General: Normal range of motion.      Cervical back: Normal range of motion and neck supple.   Skin:     General: Skin is warm.      Comments: See previous visit for breast exam/photos   Neurological:      Mental Status: She is alert and oriented to person, place, and time.       Administrative Statements   I have spent a total time of 40 minutes in caring for this patient on the day of the visit/encounter including Diagnostic results, Prognosis, Risks and benefits of tx options, Instructions for management, Patient and family education, Risk factor reductions, Impressions, Counseling / Coordination of care, " Documenting in the medical record, and Obtaining or reviewing history  .

## 2025-04-24 ENCOUNTER — OFFICE VISIT (OUTPATIENT)
Dept: PHYSICAL THERAPY | Facility: CLINIC | Age: 75
End: 2025-04-24
Payer: MEDICARE

## 2025-04-24 DIAGNOSIS — Z91.81 RISK FOR FALLS: ICD-10-CM

## 2025-04-24 DIAGNOSIS — R26.9 NEUROLOGIC GAIT DYSFUNCTION: ICD-10-CM

## 2025-04-24 DIAGNOSIS — G35 MULTIPLE SCLEROSIS (HCC): Primary | ICD-10-CM

## 2025-04-24 PROCEDURE — 97140 MANUAL THERAPY 1/> REGIONS: CPT | Performed by: PHYSICAL THERAPIST

## 2025-04-24 PROCEDURE — 97112 NEUROMUSCULAR REEDUCATION: CPT | Performed by: PHYSICAL THERAPIST

## 2025-04-24 PROCEDURE — 97110 THERAPEUTIC EXERCISES: CPT | Performed by: PHYSICAL THERAPIST

## 2025-04-24 NOTE — PROGRESS NOTES
"Daily Note     Today's date: 2025  Patient name: Jacy Rodrigez  : 1950  MRN: [id number]  Referring provider: Elidia Briceno MD  Dx:   Encounter Diagnosis     ICD-10-CM    1. Multiple sclerosis (HCC)  G35       2. Risk for falls  Z91.81       3. Neurologic gait dysfunction  R26.9                      Subjective: Pt reports having cramping from the knee down last evening. She does feel better compared to last visit. However, noticed more swelling in left distal LE recently. She denies much knee clicking today but does still report giving way of the right knee on occasion during weight bearing movement.       Objective: See treatment diary below      Assessment: Tolerated treatment well. Resumed more LE strengthening exercises today as patient's legs were not as fatigued.  Patient would benefit from continued PT.       Plan: Continue per plan of care.      Precautions: MS, Gait dysfunction, Insomnia, Osteoporosis, Peripheral polyneuropathy, Hx of Breast CA, R IDALIA in - Dr. Rushing, Left femur IM nailing       Manuals 4/22 4/24  3/18 4/1 4/3 4/8 4/10 4/15 4/17   Sciatic nerve mobilizations 4' 4'  4' 4' 4' 4' 4' 4' 4'   Hip ROM: flexion, ER 8' 8'  8'  8' 8' 8' 8' 8' 8'                             Neuro Re-Ed             Slump nerve glides             Sciatic nerve glides 15x5\" w/ green strap seated 15x5\" w/ green strap seated  15x5\" w/ green strap seated 15x5\" w/ green strap seated 15x5\" w/ green strap seated 15x5\" w/ green strap seated 15x5\" w/ green strap seated 15x5\" w/ green strap seated 15x5\" w/ green strap seated   LAQ             Standing hip abduction             Standing march             Standing hip extension             EC on foam              Marching in hooklying             Hamstring curls             Bridge             Hip add isometric             TKE in standing   Ytb 20x B  Ytb x15   20x ytb       Hip abd isometric in hooklying Supine 20x5' Supine 20x5'  Supine 20x5\"  Supine 20x5\" " "Supine 20x5\" Supine 20x5\" Seated 20x5\"  Seated 20x5\"    Ther Ex             Hip flexion stretch with strap             Nustep/bike 8' lv1 10' lvl 1    8' L0  9' L1 10' L1 discomfort in R knee at 6'  10' L1 10' L1   Hamstring curl              Piriformis stretch             SB stretch              Seated ankle DF                                       Ther Activity             Standing HR/TR 30x 30x  30x         Sit to stand squat             Lateral walks             Forward walks             Leeann walks             Step up     10x on biodex foam      10x on boidex foam bilaterally.     Brandon backward pull             Self management                                       Vitals             BP                            1:1 with PT from 1031-1118am                                                                                                             "

## 2025-04-29 ENCOUNTER — OFFICE VISIT (OUTPATIENT)
Dept: PHYSICAL THERAPY | Facility: CLINIC | Age: 75
End: 2025-04-29
Payer: MEDICARE

## 2025-04-29 ENCOUNTER — TELEPHONE (OUTPATIENT)
Age: 75
End: 2025-04-29

## 2025-04-29 DIAGNOSIS — R26.9 NEUROLOGIC GAIT DYSFUNCTION: ICD-10-CM

## 2025-04-29 DIAGNOSIS — Z91.81 RISK FOR FALLS: ICD-10-CM

## 2025-04-29 DIAGNOSIS — G35 MULTIPLE SCLEROSIS (HCC): Primary | ICD-10-CM

## 2025-04-29 PROCEDURE — 97112 NEUROMUSCULAR REEDUCATION: CPT | Performed by: PHYSICAL THERAPIST

## 2025-04-29 PROCEDURE — 97110 THERAPEUTIC EXERCISES: CPT | Performed by: PHYSICAL THERAPIST

## 2025-04-29 PROCEDURE — 97140 MANUAL THERAPY 1/> REGIONS: CPT | Performed by: PHYSICAL THERAPIST

## 2025-04-29 NOTE — PROGRESS NOTES
Daily Note     Today's date: 2025  Patient name: Jacy Rodrigez  : 1950  MRN: [id number]  Referring provider: Elidia Briceno MD  Dx:   Encounter Diagnosis     ICD-10-CM    1. Multiple sclerosis (HCC)  G35       2. Risk for falls  Z91.81       3. Neurologic gait dysfunction  R26.9                      Subjective: Pt reports her legs are still very painful. She does note considerable stiffness from her hip joints which make her walking very challenging. She is rightfully concerned with her current pain in her lower extremity.       Objective: See treatment diary below      Assessment: We discussed pain phenotype as patient's pain seems to correlated with peripheral neuropathic and central sensitization pain phenotypes. She notes burning symptoms from the knees to the feet bilaterally. Sharp shocks of pain frequently experienced. Our treatment has been significantly limited secondary to her pain state and inability to move well. Discussed the importance of graded movements sofia manager her LE symptoms. Recommend she follows up with Dr. Briceno to discuss her progressive pain symptoms.  Performed more supine and hookyling activation exercises with fair tolerance today. Moderate cramping in posterior RLE with marches. Plan to gradually increase her movement and reduce the amount of stretching. Patient would benefit from continued PT. Pt is in agreement with plan of care.       Plan: Continue per plan of care. Recommend patient perform bridges, hip abd iso, nerve glides, AAROM marches daily to regain motor control and movement tolerance.     Precautions: MS, Gait dysfunction, Insomnia, Osteoporosis, Peripheral polyneuropathy, Hx of Breast CA, R IDALIA in - Dr. Rushing, Left femur IM nailing       Manuals 4/22 4/24 4/29    4/8 4/10 4/15 4/17   Sciatic nerve mobilizations 4' 4' 4'    4' 4' 4' 4'   Hip ROM: flexion, ER 8' 8' 8'     8' 8' 8' 8'                             Neuro Re-Ed             Slump nerve  "glides             Sciatic nerve glides 15x5\" w/ green strap seated 15x5\" w/ green strap seated 15x5\" w/ green strap seated    15x5\" w/ green strap seated 15x5\" w/ green strap seated 15x5\" w/ green strap seated 15x5\" w/ green strap seated   LAQ             Standing hip abduction             Standing march             Standing hip extension             EC on foam              Marching in hooklying   10x B with strap if needed          Hamstring curls             Bridge   10x          Hip add isometric             TKE in standing   Ytb 20x B     20x ytb       Hip abd isometric in hooklying Supine 20x5' Supine 20x5' Supine 20x5:    Supine 20x5\" Supine 20x5\" Seated 20x5\"  Seated 20x5\"    Ther Ex             Hip flexion stretch with strap             Nustep/bike 8' lv1 10' lvl 1 10' lvl 1    9' L1 10' L1 discomfort in R knee at 6'  10' L1 10' L1   Hamstring curl              Piriformis stretch             SB stretch              Seated ankle DF                                       Ther Activity             Standing HR/TR 30x 30x           Sit to stand squat             Lateral walks             Forward walks             Leeann walks             Step up          10x on boidex foam bilaterally.     Brandon backward pull             Self management                                       Vitals             BP                            1:1 with PT from 915-10am                                                                                                              "

## 2025-05-01 ENCOUNTER — OFFICE VISIT (OUTPATIENT)
Dept: PHYSICAL THERAPY | Facility: CLINIC | Age: 75
End: 2025-05-01
Payer: MEDICARE

## 2025-05-01 DIAGNOSIS — R26.9 NEUROLOGIC GAIT DYSFUNCTION: ICD-10-CM

## 2025-05-01 DIAGNOSIS — G35 MULTIPLE SCLEROSIS (HCC): Primary | ICD-10-CM

## 2025-05-01 DIAGNOSIS — Z91.81 RISK FOR FALLS: ICD-10-CM

## 2025-05-01 PROCEDURE — 97140 MANUAL THERAPY 1/> REGIONS: CPT | Performed by: PHYSICAL THERAPIST

## 2025-05-01 PROCEDURE — 97530 THERAPEUTIC ACTIVITIES: CPT | Performed by: PHYSICAL THERAPIST

## 2025-05-01 PROCEDURE — 97110 THERAPEUTIC EXERCISES: CPT | Performed by: PHYSICAL THERAPIST

## 2025-05-01 NOTE — PROGRESS NOTES
[id number]    Today's date: 2025  Patient name: Jacy Rodrigez  : 1950  MRN: [id number]  Referring provider: Elidia Briceno MD  Dx:   Encounter Diagnosis     ICD-10-CM    1. Multiple sclerosis (HCC)  G35       2. Neurologic gait dysfunction  R26.9       3. Risk for falls  Z91.81           Start Time: 1015  Stop Time: 1101  Total time in clinic (min): 46 minutes    Assessment  Jacy Rodrigez is a 74 y.o. female who presents with signs and symptoms consistent with progressive multiple sclerosis resulting in LE weakness, fatigue, ambulation dysfunction, and balance impairment. Unfortunately, patient has been experiencing increased neuropathic pain in her lower extremity from her knees to her feet. Pt describes the pain as burning. Over that past 6 weeks, patient has not been able to tolerate much strengthening or functional activities secondary to pain/weakness We have been focusing mainly on flexibility, ROM, and nerve glides to help manage her symptoms. Last week, we started to re-integrate functional activities and ambulation into plan of care which was tolerated fair. We discussed the peaks and valleys patient experiences with this disease process. Patient was doing very well from November through March but started to experience more pain and reduced functional capacity in March. All functional tests are similar to last re-assessment in April. She  does have decreased muscle activation throughout left LE compared to previous re-evaluations. Also, more pain is experienced in Right lower. Pt will follow up with neurology toward the end of May. I recommend continuing with PT focusing on functional strength/endurance as tolerated. Pt is in agreement with POC.     Impairments: abnormal coordination, abnormal gait, abnormal or restricted ROM, abnormal movement, impaired balance, impaired physical strength, poor posture   Understanding of Dx/Px/POC: good     Prognosis: good    Goals  Short Term  Goals: to be achieved by 4 weeks  1) Patient to be independent with basic HEP.-Partially met  2) Decrease pain to moderate at its worst.-Partially met  3) Increase SLR ROM by 5-10 degrees -Not met  4) Increase LE strength by 1/2 MMT grade in all deficient planes.-Regressed in LLE but improved in RLE    Long Term Goals: to be achieved by discharge  1) FOTO equal to or greater than expected.-Partially met  2) Ambulation to improve to maximal level of function-Partially met  3) Improve TUG by 5 seconds-Partially met  4) Sit to stand transfers will improve to maximal level of function- Partially met  5) Improve 5x sit to stand by 5 seconds-Not met    Plan  Patient would benefit from: PT eval and skilled physical therapy    Planned therapy interventions: manual therapy, neuromuscular re-education, patient education, strengthening, therapeutic activities, therapeutic exercise, transfer training, home exercise program and functional ROM exercises    Frequency: 2x per week for 6-8 weeks.  Treatment plan discussed with: patient        Subjective Evaluation    History of Present Illness  Mechanism of injury: History of Current Injury: Pt referred to PT secondary to progressive MS and allodynia in bilateral LE. She was treated by me last year through April of 2024. Her lower extremity symptoms were worsening at that time along with weakness throughout antigravity muscles. Pt notes that her strength, fatigue, and pain continue to worsen.  Due to increasing lower extremity pain, patient received US of bilateral LE and increased Gabapentin dosage. No evidence of significant lower extremity arterial occlusive disease on US of bilateral LE. XR of right knee was negative for osseous pathology. Pt reports frequent 4+ pitting edema in bilateral LE. Pt reports compression stockings don't seem to help her.     Pt reports a walking tolerance of max 5 minutes.     Pt has steps down to the basement but has single level living. Pt has 1-2,  "3\" steps to enter her house.   Pain location/Descriptors: Severe LE pain from knees to feet (anterior and posterior). + cramping. R worse than L/   Aggravating factors: Constantly present (24/7) sometimes worse than others.   Easing factors: Rest, gabapentin  24 HR pattern: Worse that night time.   XR of R knee: normal   Special Questions: Positive for peripheral neuropathy  Patient goals:  Reduce unsteadiness, improve LE strength, increase walking and endurance.   Hobbies/Interest: Volunteers as , cooking, cleaning    Occupation: Retired       Pain  Pain scale at highest: Severe.      Diagnostic Tests  Ultrasound findings: normal      Objective     Strength/Myotome Testing     Left Hip   Planes of Motion   Flexion: 3-  Abduction: 3  Adduction: 3    Right Hip   Planes of Motion   Flexion: 3+  Abduction: 3  Adduction: 3    Left Knee   Flexion: 3  Extension: 3    Right Knee   Flexion: 3  Extension: 3-    Left Ankle/Foot   Dorsiflexion: 3-  Plantar flexion: 3  Eversion: 3-  Inversion: 3    Right Ankle/Foot   Dorsiflexion: 3  Plantar flexion: 3  Eversion: 3  Inversion: 3    Tests     Additional Tests Details  Timed up and go:  Date: 33.18 seconds. Excessive use of hands on rails for assistance   10/1: 22.21 seconds. Moderate use of hands on rails. SPC. >3 second turn  11/7: 23.99 seconds. Moderate use of hands on rails. SPC. Turn in 2.8 seconds  12/19: 22.98 seconds. Moderate use of hands on rails. SPC. Turn in 3 seconds  2/3: 17.46 seconds. Moderate use of hands. SPC. Turn less than 2 seconds.  4/1: 22.59 seconds. Moderate use of hands. SPC. Turn in 3 seconds.  5/5: 23.66 seconds. Moderate use of hands SPC. Turn in 3 seconds    Sit to stand Transfers:   Date: Excessive use of hands on rails for assistance  5/5: Moderate use of hands on rails    5x sit to stand:   Date: 27.54 seconds with excessive use of hands  10/1: 19.40 seconds with moderate use of hands  11/7: 19.69 seconds with moderate use of " "hands  12/19: 14.03  seconds with moderate use of hands  2/3: 16.06 seconds with moderate use of hands  4/1: 25.39 seconds with 75% use of hands on rails  5/5: 22.26 seconds with use of hands on rails x 100%    2 minute walk test:   Date: NT  10/1: 130 feet  11/7: NT  12/19: 162 feet  2/3: 161 feet  4/1: NT d/t pain  5/5: 150 feet     MCTSIB:  EO firm: 30 seconds with mild postural sway   EC firm: 30 seconds with moderate postural sway  EO foam: 30 seconds with mild postural sway  EC foam: 17 seconds prior to LOB    6 minute walk test:   Date: 260 feet in 4:10  11/7: 423 feet in 6 minutes using SPC  12/19: 454 feet in 6 minutes using SPC  2/3: 457 feet in 6 minutes using SPC  4/1: NT d/t pain  5/5: 260 feet  in 4 minutes     *Swelling present in bilateral ankles/feet and right knee vs left knee- This has increased  *Sensitivity present in distal LE to light touch, especially over anterior tibia. -This has increased    Positive SLUMP for adverse neural tension bilaterally -This continues  Positive SLR at 55 degrees on R/55 degrees on L (Declined)      Ambulation     Observational Gait     Additional Observational Gait Details  SPC: slow sylvia, dynamic valgus in stance, small step length with femoral adduction            Precautions: MS, Gait dysfunction, Insomnia, Osteoporosis, Peripheral polyneuropathy, Hx of Breast CA, R IDALIA in 2012- Dr. Rushing, Left femur IM nailing       Manuals 4/22 4/24 4/29 5/1 5/5  4/8 4/10 4/15 4/17   Sciatic nerve mobilizations 4' 4' 4' 4'   4' 4' 4' 4'   Hip ROM: flexion, ER 8' 8' 8'  6'    8' 8' 8' 8'                             Neuro Re-Ed             Slump nerve glides             Sciatic nerve glides 15x5\" w/ green strap seated 15x5\" w/ green strap seated 15x5\" w/ green strap seated    15x5\" w/ green strap seated 15x5\" w/ green strap seated 15x5\" w/ green strap seated 15x5\" w/ green strap seated   LAQ             Standing hip abduction             Standing march             Standing " "hip extension             EC on foam              Marching in hooklying   10x B with strap if needed          Hamstring curls             Bridge   10x          Hip add isometric             TKE in standing   Ytb 20x B     20x ytb       Hip abd isometric in hooklying Supine 20x5' Supine 20x5' Supine 20x5:    Supine 20x5\" Supine 20x5\" Seated 20x5\"  Seated 20x5\"    Ther Ex             Hip flexion stretch with strap             Nustep/bike 8' lv1 10' lvl 1 10' lvl 1 10' lvl 1 10' lvl 1   9' L1 10' L1 discomfort in R knee at 6'  10' L1 10' L1   Hamstring curl              Piriformis stretch             SB stretch              Seated ankle DF                                       Ther Activity             Standing HR/TR 30x 30x           Sit to stand squat             Lateral walks    3 hurdles x5         Forward walks    3 hurdles x5         Leeann walks             Step up     10x on ea biodex foam with hand rail support      10x on boidex foam bilaterally.     Brandon backward pull             Self management                                       Vitals             BP                            1:1 with PT from 1023-1101am                                                                                                                  "

## 2025-05-01 NOTE — PROGRESS NOTES
"Daily Note     Today's date: 2025  Patient name: Jacy Rodrigez  : 1950  MRN: [id number]  Referring provider: Elidia Briceno MD  Dx:   Encounter Diagnosis     ICD-10-CM    1. Multiple sclerosis (HCC)  G35       2. Risk for falls  Z91.81       3. Neurologic gait dysfunction  R26.9           Start Time: 1030  Stop Time: 1115  Total time in clinic (min): 45 minutes    Subjective: Pt reports having significant LE pain 2 days after last session that made her not walk well and required rest.       Objective: See treatment diary below      Assessment: Pt experienced increased LE pain, peripheral neuropathic in nature, that may not be from PT intervention secondary to minimal loading during last session. Attempted standing exercises today to improve motor control throughout the LE. Pt denied much pain during or after session but was fatigued with program. Pt encouraged to perform at least 10 repetitions of standing marches, hip abduction, and hip extension at home daily. Unfortunately, we haven't been able to perform much standing LE exercises over the last 2 months due to pain and weakness.       Plan: Continue per plan of care.      Precautions: MS, Gait dysfunction, Insomnia, Osteoporosis, Peripheral polyneuropathy, Hx of Breast CA, R IDALIA in - Dr. Rushing, Left femur IM nailing       Manuals 4/22 4/24 4/29 5/1   4/8 4/10 4/15 4/17   Sciatic nerve mobilizations 4' 4' 4' 4'   4' 4' 4' 4'   Hip ROM: flexion, ER 8' 8' 8'  6'    8' 8' 8' 8'                             Neuro Re-Ed             Slump nerve glides             Sciatic nerve glides 15x5\" w/ green strap seated 15x5\" w/ green strap seated 15x5\" w/ green strap seated    15x5\" w/ green strap seated 15x5\" w/ green strap seated 15x5\" w/ green strap seated 15x5\" w/ green strap seated   LAQ             Standing hip abduction             Standing march             Standing hip extension             EC on foam              Marching in hooklying   10x B with " "strap if needed          Hamstring curls             Bridge   10x          Hip add isometric             TKE in standing   Ytb 20x B     20x ytb       Hip abd isometric in hooklying Supine 20x5' Supine 20x5' Supine 20x5:    Supine 20x5\" Supine 20x5\" Seated 20x5\"  Seated 20x5\"    Ther Ex             Hip flexion stretch with strap             Nustep/bike 8' lv1 10' lvl 1 10' lvl 1 10' lvl 1   9' L1 10' L1 discomfort in R knee at 6'  10' L1 10' L1   Hamstring curl              Piriformis stretch             SB stretch              Seated ankle DF                                       Ther Activity             Standing HR/TR 30x 30x           Sit to stand squat             Lateral walks    3 hurdles x5         Forward walks    3 hurdles x5         Leeann walks             Step up     10x on ea biodex foam with hand rail support      10x on boidex foam bilaterally.     Brandon backward pull             Self management                                       Vitals             BP                            1:1 with PT from 1030-1115am                                                                                                               "

## 2025-05-05 ENCOUNTER — PATIENT MESSAGE (OUTPATIENT)
Dept: NEUROLOGY | Facility: CLINIC | Age: 75
End: 2025-05-05

## 2025-05-05 ENCOUNTER — EVALUATION (OUTPATIENT)
Dept: PHYSICAL THERAPY | Facility: CLINIC | Age: 75
End: 2025-05-05
Payer: MEDICARE

## 2025-05-05 DIAGNOSIS — R26.9 NEUROLOGIC GAIT DYSFUNCTION: ICD-10-CM

## 2025-05-05 DIAGNOSIS — G35 MULTIPLE SCLEROSIS (HCC): Primary | ICD-10-CM

## 2025-05-05 DIAGNOSIS — Z91.81 RISK FOR FALLS: ICD-10-CM

## 2025-05-05 PROCEDURE — 97110 THERAPEUTIC EXERCISES: CPT | Performed by: PHYSICAL THERAPIST

## 2025-05-05 PROCEDURE — 97530 THERAPEUTIC ACTIVITIES: CPT | Performed by: PHYSICAL THERAPIST

## 2025-05-05 PROCEDURE — 97112 NEUROMUSCULAR REEDUCATION: CPT | Performed by: PHYSICAL THERAPIST

## 2025-05-07 ENCOUNTER — TELEPHONE (OUTPATIENT)
Dept: NEUROLOGY | Facility: CLINIC | Age: 75
End: 2025-05-07

## 2025-05-07 NOTE — PATIENT COMMUNICATION
No labs needed specifically for me.  I had recommended left maffo brace for her foot. Pt was going to discuss with PT.  We can discuss further at her appt.

## 2025-05-08 ENCOUNTER — OFFICE VISIT (OUTPATIENT)
Dept: PHYSICAL THERAPY | Facility: CLINIC | Age: 75
End: 2025-05-08
Payer: MEDICARE

## 2025-05-08 DIAGNOSIS — Z91.81 RISK FOR FALLS: ICD-10-CM

## 2025-05-08 DIAGNOSIS — R26.9 NEUROLOGIC GAIT DYSFUNCTION: ICD-10-CM

## 2025-05-08 DIAGNOSIS — G35 MULTIPLE SCLEROSIS (HCC): Primary | ICD-10-CM

## 2025-05-08 PROCEDURE — 97110 THERAPEUTIC EXERCISES: CPT | Performed by: PHYSICAL THERAPIST

## 2025-05-08 PROCEDURE — 97530 THERAPEUTIC ACTIVITIES: CPT | Performed by: PHYSICAL THERAPIST

## 2025-05-08 PROCEDURE — 97140 MANUAL THERAPY 1/> REGIONS: CPT | Performed by: PHYSICAL THERAPIST

## 2025-05-08 NOTE — PROGRESS NOTES
"Daily Note     Today's date: 2025  Patient name: Jacy Rodrigez  : 1950  MRN: [id number]  Referring provider: Elidia Briceno MD  Dx:   Encounter Diagnosis     ICD-10-CM    1. Multiple sclerosis (HCC)  G35       2. Neurologic gait dysfunction  R26.9       3. Risk for falls  Z91.81                      Subjective: Pt reports severe sensitivity on the skin of bilateral LE. Pt made rice pudding yesterday but legs felt really fatigued after standing on them for the duration of cooking.       Objective: See treatment diary below      Assessment: Pt will follow up with neurology at the end of the month. She wants to discuss her leg pain and increasing weakness/fatigue. Pt's exercise tolerance is better today. Continued with idris walks. Implemented lateral walks with foam and AA ankle ROM using wobble board.Tolerated treatment well. Patient would benefit from continued PT.       Plan: Continue per plan of care.      Precautions: MS, Gait dysfunction, Insomnia, Osteoporosis, Peripheral polyneuropathy, Hx of Breast CA, R IDALIA in - Dr. Rushing, Left femur IM nailing       Manuals    Sciatic nerve mobilizations 4' 4' 4' 4'  3'    4'   Hip ROM: flexion, ER 8' 8' 8'  6'   5'    8'                             Neuro Re-Ed             Slump nerve glides             Sciatic nerve glides 15x5\" w/ green strap seated 15x5\" w/ green strap seated 15x5\" w/ green strap seated  15x5\" w/ green strap seated 15x5\" w/ green strap seated    15x5\" w/ green strap seated   Wobble board for balance      Next visit       LAQ             Standing hip abduction             Standing march             Standing hip extension             EC on foam              Marching in hooklying   10x B with strap if needed          Hamstring curls             Bridge   10x          Hip add isometric             TKE in standing   Ytb 20x B           Hip abd isometric in hooklying Supine 20x5' Supine 20x5' Supine 20x5:  " "     Seated 20x5\"    Ther Ex             Hip flexion stretch with strap             Nustep/bike 8' lv1 10' lvl 1 10' lvl 1 10' lvl 1 10' lvl 1  7' lvl1    10' L1   Hamstring curl              Piriformis stretch             SB stretch              Seated ankle DF             Seated ankle PF/DF on wobble board      10x ea                    Ther Activity             Standing HR/TR 30x 30x    30x HR &20X TR       Sit to stand squat             Lateral walks    3 hurdles x5  3 hurdles x4       Forward walks    3 hurdles x5  3 hurdles x2       Lateral foam walks      4x blue foam        Leeann walks             Step up     10x on ea biodex foam with hand rail support          Brandon backward pull             Self management                                       Vitals             BP                            1:1 with PT from 1023-1101am                                                                                                                    "

## 2025-05-12 ENCOUNTER — OFFICE VISIT (OUTPATIENT)
Dept: PHYSICAL THERAPY | Facility: CLINIC | Age: 75
End: 2025-05-12
Payer: MEDICARE

## 2025-05-12 DIAGNOSIS — Z91.81 RISK FOR FALLS: ICD-10-CM

## 2025-05-12 DIAGNOSIS — G35 MULTIPLE SCLEROSIS (HCC): Primary | ICD-10-CM

## 2025-05-12 DIAGNOSIS — R26.9 NEUROLOGIC GAIT DYSFUNCTION: ICD-10-CM

## 2025-05-12 PROCEDURE — 97140 MANUAL THERAPY 1/> REGIONS: CPT | Performed by: PHYSICAL THERAPIST

## 2025-05-12 PROCEDURE — 97530 THERAPEUTIC ACTIVITIES: CPT | Performed by: PHYSICAL THERAPIST

## 2025-05-12 PROCEDURE — 97110 THERAPEUTIC EXERCISES: CPT | Performed by: PHYSICAL THERAPIST

## 2025-05-12 NOTE — PROGRESS NOTES
"Daily Note     Today's date: 2025  Patient name: Jacy Rodrigez  : 1950  MRN: [id number]  Referring provider: Elidia Briceno MD  Dx:   Encounter Diagnosis     ICD-10-CM    1. Multiple sclerosis (HCC)  G35       2. Risk for falls  Z91.81       3. Neurologic gait dysfunction  R26.9           Start Time: 1030  Stop Time: 1115  Total time in clinic (min): 45 minutes    Subjective: Pt offers no new complaints from standing and functional exercise progression last week. [place] but was able to complete that activity. She reports more difficulty with mat exercises vs standing. Notes reduced pulmonary function in hooklying.       Objective: See treatment diary below      Assessment: Tolerated treatment well. Pt challenged with HR/TR and lacks appropriate proprioception She reports difficulty feeling left foot activate into DF although she is able to complete this task. Likely due to reduced proprioception. Implemented wobble board to improve ankle and hip balance strategies. Patient exhibited good technique with therapeutic exercises and would benefit from continued PT      Plan: Continue per plan of care.      Precautions: MS, Gait dysfunction, Insomnia, Osteoporosis, Peripheral polyneuropathy, Hx of Breast CA, R IDALIA in - Dr. Rushing, Left femur IM nailing       Manuals    Sciatic nerve mobilizations 4' 4' 4' 4'  3' 3'   4'   Hip ROM: flexion, ER 8' 8' 8'  6'   5' 5'   8'                             Neuro Re-Ed             Slump nerve glides             Sciatic nerve glides 15x5\" w/ green strap seated 15x5\" w/ green strap seated 15x5\" w/ green strap seated  15x5\" w/ green strap seated 15x5\" w/ green strap seated    15x5\" w/ green strap seated   Wobble board for balance      Next visit 2\" ea (forward/lateral)       LAQ             Standing hip abduction             Standing march             Standing hip extension  " "           EC on foam              Marching in hooklying   10x B with strap if needed          Hamstring curls             Bridge   10x          Hip add isometric             TKE in standing   Ytb 20x B           Hip abd isometric in hooklying Supine 20x5' Supine 20x5' Supine 20x5:       Seated 20x5\"    Ther Ex             Hip flexion stretch with strap             Nustep/bike 8' lv1 10' lvl 1 10' lvl 1 10' lvl 1 10' lvl 1  7' lvl1 10' L1   10' L1   Hamstring curl              Piriformis stretch             SB stretch              Seated ankle DF             Seated ankle PF/DF on wobble board      10x ea 10x ea                   Ther Activity             Standing HR/TR 30x 30x    30x HR &20X TR 30x HR &20X TR      Sit to stand squat             Lateral walks    3 hurdles x5  3 hurdles x4 4x idris x 4      Forward walks    3 hurdles x5  3 hurdles x2 4 hurdles x 4      Lateral foam walks      4x blue foam        Idris walks             Step up     10x on ea biodex foam with hand rail support          Brandon backward pull             Self management                                       Vitals             BP                            1:1 with PT from 1033-1115am                                                                                                                     "

## 2025-05-15 ENCOUNTER — APPOINTMENT (OUTPATIENT)
Dept: PHYSICAL THERAPY | Facility: CLINIC | Age: 75
End: 2025-05-15
Payer: MEDICARE

## 2025-05-19 ENCOUNTER — TELEPHONE (OUTPATIENT)
Dept: PLASTIC SURGERY | Facility: CLINIC | Age: 75
End: 2025-05-19

## 2025-05-19 ENCOUNTER — PREP FOR PROCEDURE (OUTPATIENT)
Dept: PLASTIC SURGERY | Facility: CLINIC | Age: 75
End: 2025-05-19

## 2025-05-19 DIAGNOSIS — Z85.3 PERSONAL HISTORY OF BREAST CANCER: Primary | ICD-10-CM

## 2025-05-20 ENCOUNTER — OFFICE VISIT (OUTPATIENT)
Dept: PHYSICAL THERAPY | Facility: CLINIC | Age: 75
End: 2025-05-20
Payer: MEDICARE

## 2025-05-20 DIAGNOSIS — G35 MULTIPLE SCLEROSIS (HCC): Primary | ICD-10-CM

## 2025-05-20 DIAGNOSIS — Z91.81 RISK FOR FALLS: ICD-10-CM

## 2025-05-20 DIAGNOSIS — R26.9 NEUROLOGIC GAIT DYSFUNCTION: ICD-10-CM

## 2025-05-20 PROCEDURE — 97112 NEUROMUSCULAR REEDUCATION: CPT | Performed by: PHYSICAL THERAPIST

## 2025-05-20 PROCEDURE — 97110 THERAPEUTIC EXERCISES: CPT | Performed by: PHYSICAL THERAPIST

## 2025-05-20 PROCEDURE — 97530 THERAPEUTIC ACTIVITIES: CPT | Performed by: PHYSICAL THERAPIST

## 2025-05-20 NOTE — PROGRESS NOTES
"Daily Note     Today's date: 2025  Patient name: Jacy Rodrigez  : 1950  MRN: [id number]  Referring provider: Elidia Briceno MD  Dx:   Encounter Diagnosis     ICD-10-CM    1. Multiple sclerosis (HCC)  G35       2. Risk for falls  Z91.81       3. Neurologic gait dysfunction  R26.9                      Subjective: Pt reports being fatigued today. She or her  has had an appointment everyday this week. She also notes fatigue happens later in the day, so this is usual.       Objective: See treatment diary below      Assessment: Tolerated treatment fair considering increased fatigue from being on her feet. Decreased activation and motor control of hip flexors and ankle dorsiflexors noted. Fadia was mildly slower today. Continued with functional exercises and hip strengthening.  Moderate cramping in distal left LE with seated ankle DF/PF. Patient would benefit from continued PT. Resume all balance and proprioception next session.       Plan: Continue per plan of care.      Precautions: MS, Gait dysfunction, Insomnia, Osteoporosis, Peripheral polyneuropathy, Hx of Breast CA, R IDALIA in - Dr. Rushing, Left femur IM nailing       Manuals    Sciatic nerve mobilizations 4' 4' 4' 4'  3' 3' 3'  4'   Hip ROM: flexion, ER 8' 8' 8'  6'   5' 5' 5'  8'                             Neuro Re-Ed                          Slump nerve glides             Sciatic nerve glides 15x5\" w/ green strap seated 15x5\" w/ green strap seated 15x5\" w/ green strap seated  15x5\" w/ green strap seated 15x5\" w/ green strap seated    15x5\" w/ green strap seated   Wobble board for balance      Next visit 2\" ea (forward/lateral)       LAQ             Standing hip abduction        10x on ea     Standing march             Standing hip extension             EC on foam              Marching in hooklying   10x B with strap if needed     10x on ea     Hamstring curls             Bridge   10x        " "  Hip add isometric             TKE in standing   Ytb 20x B           Hip abd isometric in hooklying Supine 20x5' Supine 20x5' Supine 20x5:       Seated 20x5\"    Ther Ex             Hip flexion stretch with strap             Nustep/bike 8' lv1 10' lvl 1 10' lvl 1 10' lvl 1 10' lvl 1  7' lvl1 10' L1 10' L1  10' L1   Hamstring curl              Piriformis stretch             SB stretch              Seated ankle DF             Seated ankle PF/DF on wobble board      10x ea 10x ea 20x on ea                  Ther Activity             Standing HR/TR 30x 30x    30x HR &20X TR 30x HR &20X TR 20x HR &20X TR     Sit to stand squat             Lateral walks    3 hurdles x5  3 hurdles x4 4x idris x 4      Forward walks    3 hurdles x5  3 hurdles x2 4 hurdles x 4 4 hurdles x 4     Lateral foam walks      4x blue foam        Idirs walks             Step up     10x on ea biodex foam with hand rail support          Brandon backward pull             Self management                                       Vitals             BP                            1:1 with PT from 248-330pm                                                                                                                       "

## 2025-05-22 ENCOUNTER — OFFICE VISIT (OUTPATIENT)
Dept: PHYSICAL THERAPY | Facility: CLINIC | Age: 75
End: 2025-05-22
Payer: MEDICARE

## 2025-05-22 ENCOUNTER — APPOINTMENT (OUTPATIENT)
Dept: LAB | Facility: CLINIC | Age: 75
End: 2025-05-22
Payer: MEDICARE

## 2025-05-22 DIAGNOSIS — R26.9 NEUROLOGIC GAIT DYSFUNCTION: ICD-10-CM

## 2025-05-22 DIAGNOSIS — Z91.81 RISK FOR FALLS: ICD-10-CM

## 2025-05-22 DIAGNOSIS — Z85.3 PERSONAL HISTORY OF BREAST CANCER: ICD-10-CM

## 2025-05-22 DIAGNOSIS — G35 MULTIPLE SCLEROSIS (HCC): Primary | ICD-10-CM

## 2025-05-22 LAB
ANION GAP SERPL CALCULATED.3IONS-SCNC: 11 MMOL/L (ref 4–13)
BASOPHILS # BLD AUTO: 0.05 THOUSANDS/ÂΜL (ref 0–0.1)
BASOPHILS NFR BLD AUTO: 1 % (ref 0–1)
BUN SERPL-MCNC: 23 MG/DL (ref 5–25)
CALCIUM SERPL-MCNC: 9.1 MG/DL (ref 8.4–10.2)
CHLORIDE SERPL-SCNC: 111 MMOL/L (ref 96–108)
CO2 SERPL-SCNC: 21 MMOL/L (ref 21–32)
CREAT SERPL-MCNC: 0.82 MG/DL (ref 0.6–1.3)
EOSINOPHIL # BLD AUTO: 0.28 THOUSAND/ÂΜL (ref 0–0.61)
EOSINOPHIL NFR BLD AUTO: 5 % (ref 0–6)
ERYTHROCYTE [DISTWIDTH] IN BLOOD BY AUTOMATED COUNT: 12.9 % (ref 11.6–15.1)
GFR SERPL CREATININE-BSD FRML MDRD: 69 ML/MIN/1.73SQ M
GLUCOSE P FAST SERPL-MCNC: 86 MG/DL (ref 65–99)
HCT VFR BLD AUTO: 43.4 % (ref 34.8–46.1)
HGB BLD-MCNC: 13.6 G/DL (ref 11.5–15.4)
IMM GRANULOCYTES # BLD AUTO: 0.02 THOUSAND/UL (ref 0–0.2)
IMM GRANULOCYTES NFR BLD AUTO: 0 % (ref 0–2)
LYMPHOCYTES # BLD AUTO: 1.5 THOUSANDS/ÂΜL (ref 0.6–4.47)
LYMPHOCYTES NFR BLD AUTO: 29 % (ref 14–44)
MCH RBC QN AUTO: 30.6 PG (ref 26.8–34.3)
MCHC RBC AUTO-ENTMCNC: 31.3 G/DL (ref 31.4–37.4)
MCV RBC AUTO: 98 FL (ref 82–98)
MONOCYTES # BLD AUTO: 0.35 THOUSAND/ÂΜL (ref 0.17–1.22)
MONOCYTES NFR BLD AUTO: 7 % (ref 4–12)
NEUTROPHILS # BLD AUTO: 2.96 THOUSANDS/ÂΜL (ref 1.85–7.62)
NEUTS SEG NFR BLD AUTO: 58 % (ref 43–75)
NRBC BLD AUTO-RTO: 0 /100 WBCS
PLATELET # BLD AUTO: 236 THOUSANDS/UL (ref 149–390)
PMV BLD AUTO: 10.1 FL (ref 8.9–12.7)
POTASSIUM SERPL-SCNC: 4.2 MMOL/L (ref 3.5–5.3)
RBC # BLD AUTO: 4.45 MILLION/UL (ref 3.81–5.12)
SODIUM SERPL-SCNC: 143 MMOL/L (ref 135–147)
WBC # BLD AUTO: 5.16 THOUSAND/UL (ref 4.31–10.16)

## 2025-05-22 PROCEDURE — 36415 COLL VENOUS BLD VENIPUNCTURE: CPT

## 2025-05-22 PROCEDURE — 80048 BASIC METABOLIC PNL TOTAL CA: CPT

## 2025-05-22 PROCEDURE — 97530 THERAPEUTIC ACTIVITIES: CPT | Performed by: PHYSICAL THERAPIST

## 2025-05-22 PROCEDURE — 85025 COMPLETE CBC W/AUTO DIFF WBC: CPT

## 2025-05-22 PROCEDURE — 97140 MANUAL THERAPY 1/> REGIONS: CPT | Performed by: PHYSICAL THERAPIST

## 2025-05-22 PROCEDURE — 97110 THERAPEUTIC EXERCISES: CPT | Performed by: PHYSICAL THERAPIST

## 2025-05-22 NOTE — PROGRESS NOTES
"Daily Note     Today's date: 2025  Patient name: Jacy Rodrigez  : 1950  MRN: [id number]  Referring provider: Elidia Briceno MD  Dx:   Encounter Diagnosis     ICD-10-CM    1. Multiple sclerosis (HCC)  G35       2. Risk for falls  Z91.81       3. Neurologic gait dysfunction  R26.9                      Subjective: Pt doing well today. Less fatigue compared to last session scheduled in the afternoon. However, she does report burning in her distal LE.       Objective: See treatment diary below      Assessment: Tolerated treatment fair. Improved activation of LE  muscles during exercise program. Good self modification during idris walks- performing knee drive vs circumduction to clear idris initially. Patient demonstrated fatigue post treatment and would benefit from continued PT.       Plan: Continue per plan of care.      Precautions: MS, Gait dysfunction, Insomnia, Osteoporosis, Peripheral polyneuropathy, Hx of Breast CA, R IDALIA in - Dr. Rushing, Left femur IM nailing       Manuals     Sciatic nerve mobilizations 4' 4' 4' 4'  3' 3' 3' 3'    Hip ROM: flexion, ER 8' 8' 8'  6'   5' 5' 5' 5'                              Neuro Re-Ed                          Slump nerve glides             Sciatic nerve glides 15x5\" w/ green strap seated 15x5\" w/ green strap seated 15x5\" w/ green strap seated  15x5\" w/ green strap seated 15x5\" w/ green strap seated       Wobble board for balance      Next visit 2\" ea (forward/lateral)   2\" ea (forward/lateral)     LAQ             Standing hip abduction        10x on ea     Standing march             Standing hip extension             EC on foam              Marching in hooklying   10x B with strap if needed     10x on ea     Hamstring curls             Bridge   10x          Hip add isometric             TKE in standing   Ytb 20x B           Hip abd isometric in hooklying Supine 20x5' Supine 20x5' Supine 20x5:          Ther Ex       "       Hip flexion stretch with strap             Nustep/bike 8' lv1 10' lvl 1 10' lvl 1 10' lvl 1 10' lvl 1  7' lvl1 10' L1 10' L1 10' L1    Hamstring curl              Piriformis stretch             SB stretch              Seated ankle DF             Seated ankle PF/DF on wobble board      10x ea 10x ea 20x on ea 20x on ea                 Ther Activity             Standing HR/TR 30x 30x    30x HR &20X TR 30x HR &20X TR 20x HR &20X TR 20x HR &20X TR    Sit to stand squat             Lateral walks    3 hurdles x5  3 hurdles x4 4x idris x 4  5 hurdles x 4    Forward walks    3 hurdles x5  3 hurdles x2 4 hurdles x 4 4 hurdles x 4 5 hurdles x 4    Lateral foam walks      4x blue foam        Idris walks             Step up     10x on ea biodex foam with hand rail support          Brandon backward pull             Self management                                       Vitals             BP                            1:1 with PT from 1016-11am

## 2025-05-27 ENCOUNTER — ANESTHESIA EVENT (OUTPATIENT)
Dept: PERIOP | Facility: AMBULARY SURGERY CENTER | Age: 75
End: 2025-05-27
Payer: MEDICARE

## 2025-05-27 ENCOUNTER — OFFICE VISIT (OUTPATIENT)
Dept: NEUROLOGY | Facility: CLINIC | Age: 75
End: 2025-05-27
Payer: MEDICARE

## 2025-05-27 VITALS
WEIGHT: 145 LBS | SYSTOLIC BLOOD PRESSURE: 126 MMHG | DIASTOLIC BLOOD PRESSURE: 70 MMHG | BODY MASS INDEX: 24.75 KG/M2 | HEIGHT: 64 IN | HEART RATE: 80 BPM

## 2025-05-27 DIAGNOSIS — R26.9 GAIT DISTURBANCE: ICD-10-CM

## 2025-05-27 DIAGNOSIS — F51.04 CHRONIC INSOMNIA: ICD-10-CM

## 2025-05-27 DIAGNOSIS — G35 MULTIPLE SCLEROSIS (HCC): Primary | ICD-10-CM

## 2025-05-27 PROCEDURE — 99213 OFFICE O/P EST LOW 20 MIN: CPT | Performed by: PSYCHIATRY & NEUROLOGY

## 2025-05-27 RX ORDER — LISINOPRIL 5 MG/1
1 TABLET ORAL
COMMUNITY
Start: 2025-04-09

## 2025-05-27 NOTE — ASSESSMENT & PLAN NOTE
74-year-old female past medical history of MS presents to neurology clinic for follow-up.  Currently not on DMT, has been off Copaxone for almost 2 years.  Denies any new MS relapses or new symptomology.  Would like to continue off DMT  Continues to have chronic left-sided weakness and uses walker/cane for ambulation  Per patient, was recently at a conference in [place] and developed significant difficulty with ambulation after a long day.  Patient then went to ED for evaluation for possible DVT as she has a history of clots in the past.  D-dimer was elevated, however imaging was negative for DVT and PE study and patient was discharged to home.  Patient notes that she has a rollator that she will occasionally use around the house as needed  Physical therapy 2 times per week  Notes that she had a fall in February without head strike.  Did have abrasion over knee but states that she has since returned to baseline  Has upcoming breast surgery 6/9/2025    PLAN:  Attempted Maffo brace but said it didn't help with ambulation  Will continue to follow with Jonathon her physical therapist 2 times per week

## 2025-05-27 NOTE — ASSESSMENT & PLAN NOTE
Follows with PT 2 times per week and reports significant improvement in both balance and flexibility

## 2025-05-27 NOTE — ASSESSMENT & PLAN NOTE
Patient states that insomnia continues but is no worse than usual.  Continue current medication regimen for sleep.

## 2025-05-27 NOTE — PROGRESS NOTES
Name: Jacy Rodrigez      : 1950      MRN: [id number]  Encounter Provider: Elidia Briceno MD  Encounter Date: 2025   Encounter department: [facility]  :  Assessment & Plan  Multiple sclerosis (HCC)  74-year-old female past medical history of MS presents to neurology clinic for follow-up.  Currently not on DMT, has been off Copaxone for almost 2 years.  Denies any new MS relapses or new symptomology.  Would like to continue off DMT  Continues to have chronic left-sided weakness and uses walker/cane for ambulation  Per patient, was recently at a conference in [place] and developed significant difficulty with ambulation after a long day.  Patient then went to ED for evaluation for possible DVT as she has a history of clots in the past.  D-dimer was elevated, however imaging was negative for DVT and PE study and patient was discharged to home.  Patient notes that she has a rollator that she will occasionally use around the house as needed  Physical therapy 2 times per week  Notes that she had a fall in February without head strike.  Did have abrasion over knee but states that she has since returned to baseline  Has upcoming breast surgery 2025    PLAN:  Attempted Maffo brace but said it didn't help with ambulation  Will continue to follow with Jonathon her physical therapist 2 times per week       Chronic insomnia  Patient states that insomnia continues but is no worse than usual.  Continue current medication regimen for sleep.        Gait disturbance  Follows with PT 2 times per week and reports significant improvement in both balance and flexibility               History of Present Illness   74-year-old female past medical history significant for multiple sclerosis currently not on DMT. Since last visit 2025, notes that she has had one fall in 2025 without headstrike but does note knee injury. Patient also notes recent hospitalization due to concern for DVT. Workup in  ED was negative and patient was discharged to home. States she is feeling well and is currently at her baseline. Would like to continue current PT regimen.      Review of Systems   Constitutional:  Negative for appetite change, fatigue and fever.   HENT: Negative.  Negative for hearing loss, tinnitus, trouble swallowing and voice change.    Eyes: Negative.  Negative for photophobia, pain and visual disturbance.   Respiratory: Negative.  Negative for shortness of breath.    Cardiovascular: Negative.  Negative for palpitations.   Gastrointestinal: Negative.  Negative for nausea and vomiting.   Endocrine: Negative.  Negative for cold intolerance.   Genitourinary: Negative.  Negative for dysuria, frequency and urgency.   Musculoskeletal:  Negative for back pain, gait problem, myalgias, neck pain and neck stiffness.   Skin: Negative.  Negative for rash.   Allergic/Immunologic: Negative.    Neurological:  Negative for dizziness, tremors, seizures, syncope, facial asymmetry, speech difficulty, weakness, light-headedness, numbness and headaches.   Hematological: Negative.  Does not bruise/bleed easily.   Psychiatric/Behavioral: Negative.  Negative for confusion, hallucinations and sleep disturbance.     I have personally reviewed the MA's review of systems and made changes as necessary.         Objective   There were no vitals taken for this visit.    Physical Exam  Constitutional:       General: She is not in acute distress.  HENT:      Head: Normocephalic and atraumatic.      Nose: No congestion.     Eyes:      General: Lids are normal.      Extraocular Movements: Extraocular movements intact.      Pupils: Pupils are equal, round, and reactive to light.       Cardiovascular:      Rate and Rhythm: Normal rate.      Pulses: Normal pulses.   Pulmonary:      Effort: No respiratory distress.   Abdominal:      General: There is no distension.     Musculoskeletal:         General: No deformity.      Cervical back: Normal range  of motion and neck supple.      Right lower leg: No edema.      Left lower leg: No edema.     Skin:     General: Skin is warm and dry.     Neurological:      General: No focal deficit present.      Deep Tendon Reflexes:      Reflex Scores:       Tricep reflexes are 2+ on the right side and 2+ on the left side.       Bicep reflexes are 2+ on the right side and 2+ on the left side.       Patellar reflexes are 2+ on the right side and 2+ on the left side.       Achilles reflexes are 2+ on the right side and 2+ on the left side.    Psychiatric:         Speech: Speech normal.   Neurological Exam  Mental Status  Awake, alert and oriented to person, place and time. Oriented to person, place, time and situation. Speech is normal.    Cranial Nerves  CN II: Visual acuity is normal. Visual fields full to confrontation.  CN III, IV, VI: Extraocular movements intact bilaterally. Normal lids and orbits bilaterally. Pupils equal round and reactive to light bilaterally.  CN V: Facial sensation is normal.  CN VII: Full and symmetric facial movement.  CN VIII: Hearing is normal.  CN IX, X: Palate elevates symmetrically. Normal gag reflex.  CN XI: Shoulder shrug strength is normal.  CN XII: Tongue midline without atrophy or fasciculations.    Motor   Strength is 5/5 in all four extremities except as noted.                                             Right                     Left  Hip flexion                              4                          4-  Hip extension                         4                          4-  Knee flexion                           4                          4  Knee extension                      4                          4  Ankle inversion                      4-                          4  Ankle eversion                       4-                          4  Plantarflexion                         4                          4  Dorsiflexion                            4                           4    Sensory  Sensation: Hypersensitivity to light touch in b/l shins.     Reflexes                                            Right                      Left  Biceps                                 2+                         2+  Triceps                                2+                         2+  Patellar                                2+                         2+  Achilles                                2+                         2+    Right pathological reflexes: Libra's absent.  Left pathological reflexes: Libra's absent.    Coordination  Right: Finger-to-nose normal. Rapid alternating movement normal.Left: Finger-to-nose normal. Rapid alternating movement normal.    Gait    Walks with cane at baseline .

## 2025-05-29 ENCOUNTER — OFFICE VISIT (OUTPATIENT)
Dept: PHYSICAL THERAPY | Facility: CLINIC | Age: 75
End: 2025-05-29
Payer: MEDICARE

## 2025-05-29 DIAGNOSIS — G35 MULTIPLE SCLEROSIS (HCC): Primary | ICD-10-CM

## 2025-05-29 DIAGNOSIS — Z91.81 RISK FOR FALLS: ICD-10-CM

## 2025-05-29 DIAGNOSIS — R26.9 NEUROLOGIC GAIT DYSFUNCTION: ICD-10-CM

## 2025-05-29 PROCEDURE — 97112 NEUROMUSCULAR REEDUCATION: CPT | Performed by: PHYSICAL THERAPIST

## 2025-05-29 PROCEDURE — 97140 MANUAL THERAPY 1/> REGIONS: CPT | Performed by: PHYSICAL THERAPIST

## 2025-05-29 PROCEDURE — 97530 THERAPEUTIC ACTIVITIES: CPT | Performed by: PHYSICAL THERAPIST

## 2025-05-29 NOTE — PRE-PROCEDURE INSTRUCTIONS
Pre-Surgery Instructions:   Medication Instructions    ALPHA LIPOIC ACID PO Hold day of surgery.         Ampyra 10 MG TB12 Take day of surgery.    ascorbic acid (VITAMIN C) 500 mg tablet Stop taking 7 days prior to surgery.    baclofen 10 mg tablet Take day of surgery.    Biotin 5000 MCG TABS Stop taking 7 days prior to surgery.    clonazePAM (KlonoPIN) 0.5 mg tablet Take night before surgery    Cranberry 125 MG TABS Stop taking 7 days prior to surgery.         gabapentin (NEURONTIN) 600 MG tablet Take day of surgery.    lisinopril (ZESTRIL) 5 mg tablet Take night before surgery    Magnesium 500 MG TABS Stop taking 7 days prior to surgery.    zonisamide (ZONEGRAN) 100 mg capsule Take night before surgery    Medication instructions for day of surgery reviewed. Please take all instructed medications with only a sip of water.       You will receive a call one business day prior to surgery with an arrival time and hospital directions. If your surgery is scheduled on a Monday, the hospital will be calling you on the Friday prior to your surgery. If you have not heard from anyone by 8pm, please call the hospital supervisor through the hospital  at [phone]. ([place] [phone] or [place] [phone]).    Do not eat or drink anything after midnight the night before your surgery, including candy, mints, lifesavers, or chewing gum. Do not drink alcohol 24hrs before your surgery. Try not to smoke at least 24hrs before your surgery.       Follow the pre surgery showering instructions as listed in the “My Surgical Experience Booklet” or otherwise provided by your surgeon's office. Do not use a blade to shave the surgical area 1 week before surgery. It is okay to use a clean electric clippers up to 24 hours before surgery. Do not apply any lotions, creams, including makeup, cologne, deodorant, or perfumes after showering on the day of your surgery. Do not use dry shampoo, hair spray, hair gel, or any type of  hair products.     No contact lenses, eye make-up, or artificial eyelashes. Remove nail polish, including gel polish, and any artificial, gel, or acrylic nails if possible. Remove all jewelry including rings and body piercing jewelry.     Wear causal clothing that is easy to take on and off. Consider your type of surgery.    Keep any valuables, jewelry, piercings at home. Please bring any specially ordered equipment (sling, braces) if indicated.    Arrange for a responsible person to drive you to and from the hospital on the day of your surgery. Please confirm the visitor policy for the day of your procedure when you receive your phone call with an arrival time.     Call the surgeon's office with any new illnesses, exposures, or additional questions prior to surgery.    Please reference your “My Surgical Experience Booklet” for additional information to prepare for your upcoming surgery.

## 2025-05-29 NOTE — PROGRESS NOTES
"Daily Note     Today's date: 2025  Patient name: Jacy Rodrigez  : 1950  MRN: [id number]  Referring provider: Elidia Briceno MD  Dx:   Encounter Diagnosis     ICD-10-CM    1. Multiple sclerosis (HCC)  G35       2. Risk for falls  Z91.81       3. Neurologic gait dysfunction  R26.9                      Subjective: Pt reports having increased pain in LE after having to hold on her Voltaren pain medication due to her upcoming breast surgery with Dr. Canales on .      Objective: See treatment diary below      Assessment: Tolerated treatment fair. Pt able to use only 1 finger tip support with [employer] board balance activity. She was very sensitive to stretching today. Left hip weaker than right. She reports having severe nerve shocks in LE after stretching & ROM, although we have been performing nerve glides and ROM the past several months. Noticed LE trembling. Pt notes that this occurs at night time but tends to be worse than current nerve symptoms. Terminated session without performing nustep secondary to nerve related symptoms. Pt was able to transfer with mild assist and was able to walk out of the office safely. Patient would benefit from continued PT. Perform all exercises as tolerated next session.     Pt followed up with Dr. Briceno. She was pleased patient has been attending PT and recommend she continues treatment 2x per week.       Plan: Continue per plan of care.      Precautions: MS, Gait dysfunction, Insomnia, Osteoporosis, Peripheral polyneuropathy, Hx of Breast CA, R IDALIA in - Dr. Rushing, Left femur IM nailing       Manuals    Sciatic nerve mobilizations 4' 4' 4' 4'  3' 3' 3' 3' 3'                Hip ROM: flexion, ER 8' 8' 8'  6'   5' 5' 5' 5' 5'                             Neuro Re-Ed                          Slump nerve glides             Sciatic nerve glides 15x5\" w/ green strap seated 15x5\" w/ green strap seated 15x5\" w/ green strap " "seated  15x5\" w/ green strap seated 15x5\" w/ green strap seated       Wobble board for balance      Next visit 2\" ea (forward/lateral)   2\" ea (forward/lateral)  2\" ea (forward/lateral)   LAQ             Standing hip abduction        10x on ea  10x on left and 15x on right   Standing march             Standing hip extension             EC on foam              Marching in hooklying   10x B with strap if needed     10x on ea  Standing 10x left and 15x on right    Hamstring curls             Bridge   10x          Hip add isometric             TKE in standing   Ytb 20x B           Hip abd isometric in hooklying Supine 20x5' Supine 20x5' Supine 20x5:          Ther Ex             Hip flexion stretch with strap             Nustep/bike 8' lv1 10' lvl 1 10' lvl 1 10' lvl 1 10' lvl 1  7' lvl1 10' L1 10' L1 10' L1 NP   Hamstring curl              Piriformis stretch             SB stretch              Seated ankle DF             Seated ankle PF/DF on wobble board      10x ea 10x ea 20x on ea 20x on ea 20x on ea                Ther Activity             Standing HR/TR 30x 30x    30x HR &20X TR 30x HR &20X TR 20x HR &20X TR 20x HR &20X TR 20x HR &20X TR   Sit to stand squat             Lateral walks    3 hurdles x5  3 hurdles x4 4x idris x 4  5 hurdles x 4    Forward walks    3 hurdles x5  3 hurdles x2 4 hurdles x 4 4 hurdles x 4 5 hurdles x 4 5 idris x 4   Lateral foam walks      4x blue foam        Idris walks             Step up     10x on ea biodex foam with hand rail support          Brandon backward pull             Self management                                       Vitals             BP                            1:1 with PT from 1020-11am                                                                                                                            "

## 2025-06-02 ENCOUNTER — OFFICE VISIT (OUTPATIENT)
Dept: PHYSICAL THERAPY | Facility: CLINIC | Age: 75
End: 2025-06-02
Payer: MEDICARE

## 2025-06-02 DIAGNOSIS — R26.9 NEUROLOGIC GAIT DYSFUNCTION: ICD-10-CM

## 2025-06-02 DIAGNOSIS — Z91.81 RISK FOR FALLS: ICD-10-CM

## 2025-06-02 DIAGNOSIS — G35 MULTIPLE SCLEROSIS (HCC): Primary | ICD-10-CM

## 2025-06-02 PROCEDURE — 97110 THERAPEUTIC EXERCISES: CPT

## 2025-06-02 PROCEDURE — 97112 NEUROMUSCULAR REEDUCATION: CPT

## 2025-06-02 PROCEDURE — 97530 THERAPEUTIC ACTIVITIES: CPT

## 2025-06-02 NOTE — PROGRESS NOTES
"Daily Note     Today's date: 2025  Patient name: Jacy Rodrigez  : 1950  MRN: [id number]  Referring provider: Elidia Briceno MD  Dx:   Encounter Diagnosis     ICD-10-CM    1. Multiple sclerosis (HCC)  G35       2. Risk for falls  Z91.81       3. Neurologic gait dysfunction  R26.9                      Subjective: Pt states she is feeling better today than last visit.       Objective: See treatment diary below      Assessment: Tolerated treatment well. Patient exhibited good technique with therapeutic exercises and would benefit from continued PT.  Mild LE fatigue noted post tx. Good tolerance to manual stretching.  Gait belt utilized w/ standing/balance activities.      Plan: Continue per plan of care.      Precautions: MS, Gait dysfunction, Insomnia, Osteoporosis, Peripheral polyneuropathy, Hx of Breast CA, R IDALIA in - Dr. Rushing, Left femur IM nailing       Manuals    Sciatic nerve mobilizations 3' 4' 4' 4'  3' 3' 3' 3' 3'                Hip ROM: flexion, ER 5' 8' 8'  6'   5' 5' 5' 5' 5'                             Neuro Re-Ed                          Slump nerve glides             Sciatic nerve glides  15x5\" w/ green strap seated 15x5\" w/ green strap seated  15x5\" w/ green strap seated 15x5\" w/ green strap seated       Wobble board for balance 2' ea (forward/lateral)     Next visit 2\" ea (forward/lateral)   2\" ea (forward/lateral)  2\" ea (forward/lateral)   LAQ             Standing hip abduction defers       10x on ea  10x on left and 15x on right   Standing march             Standing hip extension             EC on foam              Marching in hooklying Standing 15 L ; 20 R  10x B with strap if needed     10x on ea  Standing 10x left and 15x on right    Hamstring curls             Bridge   10x          Hip add isometric             TKE in standing   Ytb 20x B           Hip abd isometric in hooklying  Supine 20x5' Supine 20x5:          Ther Ex         "     Hip flexion stretch with strap             Nustep/bike 10' lv1 10' lvl 1 10' lvl 1 10' lvl 1 10' lvl 1  7' lvl1 10' L1 10' L1 10' L1 NP   Hamstring curl              Piriformis stretch             SB stretch              Seated ankle DF             Seated ankle PF/DF on wobble board 20 ea     10x ea 10x ea 20x on ea 20x on ea 20x on ea                Ther Activity             Standing HR/TR 30x ea 30x    30x HR &20X TR 30x HR &20X TR 20x HR &20X TR 20x HR &20X TR 20x HR &20X TR   Sit to stand squat             Lateral walks 4 hurdles x5   3 hurdles x5  3 hurdles x4 4x idris x 4  5 hurdles x 4    Forward walks 4 hurdles x5   3 hurdles x5  3 hurdles x2 4 hurdles x 4 4 hurdles x 4 5 hurdles x 4 5 idris x 4   Lateral foam walks      4x blue foam        Idris walks             Step up     10x on ea biodex foam with hand rail support          [place] backward pull             Self management                                       Vitals             BP

## 2025-06-03 DIAGNOSIS — G35 MULTIPLE SCLEROSIS (HCC): ICD-10-CM

## 2025-06-04 ENCOUNTER — APPOINTMENT (OUTPATIENT)
Dept: PHYSICAL THERAPY | Facility: CLINIC | Age: 75
End: 2025-06-04
Payer: MEDICARE

## 2025-06-04 RX ORDER — BACLOFEN 10 MG/1
TABLET ORAL
Qty: 360 TABLET | Refills: 0 | Status: SHIPPED | OUTPATIENT
Start: 2025-06-04

## 2025-06-04 NOTE — H&P
:  Assessment & Plan    Please see HPI.  Jihan is prepared to undergo removal of the deflated left breast implant with replacement, similarly sized silicone gel implant.  She would prefer a round implant, with greater upper pole projection than the existing implant.  She is not interested in addressing the opposite breast at this time.  We discussed removal of the left breast implant, capsulectomy, and delayed reconstruction utilizing a silicone gel implant.  I discussed the procedure, how it is performed, as well as potential risks, complications and limitations including, but not limited to infection, bleeding, scarring, asymmetry, contour deformity, implant related problems such as migration, deflation, capsular contracture, rupture, rippling, need for MRI surveillance, she is aware that implants are not lifelong devices, etc. etc.  Her questions have been answered to her satisfaction, and consent has been obtained.  She will work with our coordinator to arrange a date for the procedure, she is aware that she is to stop her Voltaren for 2 weeks prior to the surgery, and that medical clearance will be obtained prior to her undergoing surgery.    History of Present Illness     Jacy Rodrigez is a 75 y.o. female   HPI Jihan presents today to discuss removal of the ruptured left breast implant (MRI from January 8, 2025 reveals left implant intracapsular rupture, no evidence of extracapsular rupture on the left.  The right demonstrates no intra or extracapsular rupture).  She is interested in removal of the existing implant, replacement with a silicone gel implant, similarly sized, without addressing the right breast, she is aware that she may benefit from additional surgical procedures for symmetry in the future.  Review of Systems   Constitutional:  Negative for chills and fever.   HENT:  Negative for hearing loss.    Eyes:  Positive for visual disturbance. Negative for discharge.   Respiratory:  Positive for  shortness of breath. Negative for chest tightness.         Shortness of breath with exertion   Cardiovascular:  Negative for chest pain and leg swelling.   Gastrointestinal:  Negative for constipation and diarrhea.   Endocrine: Negative for cold intolerance and heat intolerance.   Genitourinary:  Negative for dysuria.   Musculoskeletal:  Positive for gait problem.        Ambulates with cane or walker   Skin:  Negative for rash.   Allergic/Immunologic: Negative for immunocompromised state.   Neurological:  Negative for seizures and headaches.   Hematological:  Does not bruise/bleed easily.   Psychiatric/Behavioral:  Positive for dysphoric mood. The patient is nervous/anxious.      Objective   There were no vitals taken for this visit.     Physical Exam  HENT:      Head: Normocephalic and atraumatic.     Eyes:      Extraocular Movements: Extraocular movements intact.      Pupils: Pupils are equal, round, and reactive to light.       Cardiovascular:      Rate and Rhythm: Normal rate and regular rhythm.   Pulmonary:      Effort: Pulmonary effort is normal.      Breath sounds: Normal breath sounds.   Abdominal:      Palpations: Abdomen is soft.     Musculoskeletal:         General: Normal range of motion.      Cervical back: Normal range of motion and neck supple.     Skin:     General: Skin is warm.      Comments: See previous visit for breast exam/photos     Neurological:      Mental Status: She is alert and oriented to person, place, and time.       Administrative Statements   I have spent a total time of 40 minutes in caring for this patient on the day of the visit/encounter including Diagnostic results, Prognosis, Risks and benefits of tx options, Instructions for management, Patient and family education, Risk factor reductions, Impressions, Counseling / Coordination of care, Documenting in the medical record, and Obtaining or reviewing history  .

## 2025-06-05 ENCOUNTER — APPOINTMENT (OUTPATIENT)
Dept: PHYSICAL THERAPY | Facility: CLINIC | Age: 75
End: 2025-06-05
Payer: MEDICARE

## 2025-06-06 NOTE — TELEPHONE ENCOUNTER
For now will wait  Will see her if symptoms are not better  Lets wait for her to call with update  Thanks  Per prior auth info, waiting for response from payer. Deadline to respond 6/10.

## 2025-06-09 ENCOUNTER — APPOINTMENT (OUTPATIENT)
Dept: PHYSICAL THERAPY | Facility: CLINIC | Age: 75
End: 2025-06-09
Payer: MEDICARE

## 2025-06-09 ENCOUNTER — ANESTHESIA (OUTPATIENT)
Dept: PERIOP | Facility: AMBULARY SURGERY CENTER | Age: 75
End: 2025-06-09
Payer: MEDICARE

## 2025-06-09 ENCOUNTER — HOSPITAL ENCOUNTER (OUTPATIENT)
Facility: AMBULARY SURGERY CENTER | Age: 75
Setting detail: OUTPATIENT SURGERY
Discharge: HOME | End: 2025-06-09
Attending: SURGERY | Admitting: SURGERY
Payer: MEDICARE

## 2025-06-09 VITALS
TEMPERATURE: 97 F | WEIGHT: 141 LBS | OXYGEN SATURATION: 99 % | HEART RATE: 77 BPM | DIASTOLIC BLOOD PRESSURE: 58 MMHG | RESPIRATION RATE: 16 BRPM | SYSTOLIC BLOOD PRESSURE: 108 MMHG | HEIGHT: 63 IN | BODY MASS INDEX: 24.98 KG/M2

## 2025-06-09 DIAGNOSIS — Z98.82 HISTORY OF LEFT BREAST IMPLANT: Primary | ICD-10-CM

## 2025-06-09 DIAGNOSIS — Z85.3 PERSONAL HISTORY OF BREAST CANCER: ICD-10-CM

## 2025-06-09 PROCEDURE — L8600 IMPLANT BREAST SILICONE/EQ: HCPCS | Performed by: SURGERY

## 2025-06-09 PROCEDURE — 88302 TISSUE EXAM BY PATHOLOGIST: CPT | Performed by: STUDENT IN AN ORGANIZED HEALTH CARE EDUCATION/TRAINING PROGRAM

## 2025-06-09 PROCEDURE — 19371 PERI-IMPLT CAPSLC BRST COMPL: CPT | Performed by: SURGERY

## 2025-06-09 PROCEDURE — 19342 INSJ/RPLCMT BRST IMPLT SEP D: CPT | Performed by: SURGERY

## 2025-06-09 RX ORDER — MIDAZOLAM HYDROCHLORIDE 2 MG/2ML
INJECTION, SOLUTION INTRAMUSCULAR; INTRAVENOUS AS NEEDED
Status: DISCONTINUED | OUTPATIENT
Start: 2025-06-09 | End: 2025-06-09

## 2025-06-09 RX ORDER — SODIUM CHLORIDE, SODIUM LACTATE, POTASSIUM CHLORIDE, CALCIUM CHLORIDE 600; 310; 30; 20 MG/100ML; MG/100ML; MG/100ML; MG/100ML
INJECTION, SOLUTION INTRAVENOUS CONTINUOUS PRN
Status: DISCONTINUED | OUTPATIENT
Start: 2025-06-09 | End: 2025-06-09

## 2025-06-09 RX ORDER — POVIDONE-IODINE 100 MG/G
OINTMENT TOPICAL AS NEEDED
Status: DISCONTINUED | OUTPATIENT
Start: 2025-06-09 | End: 2025-06-09 | Stop reason: HOSPADM

## 2025-06-09 RX ORDER — MAGNESIUM HYDROXIDE 1200 MG/15ML
LIQUID ORAL AS NEEDED
Status: DISCONTINUED | OUTPATIENT
Start: 2025-06-09 | End: 2025-06-09 | Stop reason: HOSPADM

## 2025-06-09 RX ORDER — FENTANYL CITRATE/PF 50 MCG/ML
25 SYRINGE (ML) INJECTION
Status: DISCONTINUED | OUTPATIENT
Start: 2025-06-09 | End: 2025-06-09 | Stop reason: HOSPADM

## 2025-06-09 RX ORDER — DEXAMETHASONE SODIUM PHOSPHATE 10 MG/ML
INJECTION, SOLUTION INTRAMUSCULAR; INTRAVENOUS AS NEEDED
Status: DISCONTINUED | OUTPATIENT
Start: 2025-06-09 | End: 2025-06-09

## 2025-06-09 RX ORDER — CEFAZOLIN SODIUM 2 G/50ML
2000 SOLUTION INTRAVENOUS ONCE
Status: COMPLETED | OUTPATIENT
Start: 2025-06-09 | End: 2025-06-09

## 2025-06-09 RX ORDER — CEPHALEXIN 500 MG/1
500 CAPSULE ORAL 4 TIMES DAILY
Qty: 28 CAPSULE | Refills: 0 | Status: SHIPPED | OUTPATIENT
Start: 2025-06-09 | End: 2025-06-16

## 2025-06-09 RX ORDER — FENTANYL CITRATE 50 UG/ML
INJECTION, SOLUTION INTRAMUSCULAR; INTRAVENOUS AS NEEDED
Status: DISCONTINUED | OUTPATIENT
Start: 2025-06-09 | End: 2025-06-09

## 2025-06-09 RX ORDER — ONDANSETRON 2 MG/ML
INJECTION INTRAMUSCULAR; INTRAVENOUS AS NEEDED
Status: DISCONTINUED | OUTPATIENT
Start: 2025-06-09 | End: 2025-06-09

## 2025-06-09 RX ORDER — BUPIVACAINE HYDROCHLORIDE 2.5 MG/ML
INJECTION, SOLUTION EPIDURAL; INFILTRATION; INTRACAUDAL; PERINEURAL AS NEEDED
Status: DISCONTINUED | OUTPATIENT
Start: 2025-06-09 | End: 2025-06-09 | Stop reason: HOSPADM

## 2025-06-09 RX ORDER — ROCURONIUM BROMIDE 10 MG/ML
INJECTION, SOLUTION INTRAVENOUS AS NEEDED
Status: DISCONTINUED | OUTPATIENT
Start: 2025-06-09 | End: 2025-06-09

## 2025-06-09 RX ORDER — PROPOFOL 10 MG/ML
INJECTION, EMULSION INTRAVENOUS AS NEEDED
Status: DISCONTINUED | OUTPATIENT
Start: 2025-06-09 | End: 2025-06-09

## 2025-06-09 RX ORDER — LIDOCAINE HYDROCHLORIDE 10 MG/ML
INJECTION, SOLUTION EPIDURAL; INFILTRATION; INTRACAUDAL; PERINEURAL AS NEEDED
Status: DISCONTINUED | OUTPATIENT
Start: 2025-06-09 | End: 2025-06-09

## 2025-06-09 RX ADMIN — LIDOCAINE HYDROCHLORIDE 50 MG: 10 INJECTION, SOLUTION EPIDURAL; INFILTRATION; INTRACAUDAL at 08:41

## 2025-06-09 RX ADMIN — SUGAMMADEX 200 MG: 100 INJECTION, SOLUTION INTRAVENOUS at 10:11

## 2025-06-09 RX ADMIN — ONDANSETRON 4 MG: 2 INJECTION INTRAMUSCULAR; INTRAVENOUS at 08:54

## 2025-06-09 RX ADMIN — DEXAMETHASONE SODIUM PHOSPHATE 10 MG: 10 INJECTION INTRAMUSCULAR; INTRAVENOUS at 08:54

## 2025-06-09 RX ADMIN — SODIUM CHLORIDE, SODIUM LACTATE, POTASSIUM CHLORIDE, AND CALCIUM CHLORIDE: .6; .31; .03; .02 INJECTION, SOLUTION INTRAVENOUS at 07:59

## 2025-06-09 RX ADMIN — FENTANYL CITRATE 25 MCG: 50 INJECTION INTRAMUSCULAR; INTRAVENOUS at 09:56

## 2025-06-09 RX ADMIN — ROCURONIUM 40 MG: 50 INJECTION, SOLUTION INTRAVENOUS at 08:42

## 2025-06-09 RX ADMIN — MIDAZOLAM 2 MG: 1 INJECTION INTRAMUSCULAR; INTRAVENOUS at 08:35

## 2025-06-09 RX ADMIN — FENTANYL CITRATE 50 MCG: 50 INJECTION INTRAMUSCULAR; INTRAVENOUS at 08:41

## 2025-06-09 RX ADMIN — SODIUM CHLORIDE, SODIUM LACTATE, POTASSIUM CHLORIDE, AND CALCIUM CHLORIDE: .6; .31; .03; .02 INJECTION, SOLUTION INTRAVENOUS at 08:35

## 2025-06-09 RX ADMIN — FENTANYL CITRATE 25 MCG: 50 INJECTION INTRAMUSCULAR; INTRAVENOUS at 09:25

## 2025-06-09 RX ADMIN — CEFAZOLIN SODIUM 2000 MG: 2 SOLUTION INTRAVENOUS at 08:49

## 2025-06-09 RX ADMIN — SODIUM CHLORIDE, SODIUM LACTATE, POTASSIUM CHLORIDE, AND CALCIUM CHLORIDE: .6; .31; .03; .02 INJECTION, SOLUTION INTRAVENOUS at 10:24

## 2025-06-09 RX ADMIN — PROPOFOL 20 MG: 10 INJECTION, EMULSION INTRAVENOUS at 08:42

## 2025-06-09 RX ADMIN — PROPOFOL 130 MG: 10 INJECTION, EMULSION INTRAVENOUS at 08:41

## 2025-06-09 NOTE — ANESTHESIA POSTPROCEDURE EVALUATION
Post-Op Assessment Note    CV Status:  Stable  Pain Score: 0    Pain management: adequate       Mental Status:  Sleepy and arousable   Hydration Status:  Euvolemic and stable   PONV Controlled:  Controlled   Airway Patency:  Patent and adequate     Post Op Vitals Reviewed: Yes    No anethesia notable event occurred.    Staff: CRNA           Last Filed PACU Vitals:  Vitals Value Taken Time   Temp     Pulse 82 06/09/25 10:35   /52 06/09/25 10:33   Resp 11 06/09/25 10:35   SpO2 100 % 06/09/25 10:35   Vitals shown include unfiled device data.

## 2025-06-09 NOTE — ANESTHESIA POSTPROCEDURE EVALUATION
Post-Op Assessment Note    CV Status:  Stable  Pain Score: 0    Pain management: adequate       Mental Status:  Alert and awake   Hydration Status:  Euvolemic   PONV Controlled:  Controlled   Airway Patency:  Patent     Post Op Vitals Reviewed: Yes    No anethesia notable event occurred.    Staff: Anesthesiologist           Last Filed PACU Vitals:  Vitals Value Taken Time   Temp 97 °F (36.1 °C) 06/09/25 10:56   Pulse 80 06/09/25 10:56   /55 06/09/25 10:56   Resp 12 06/09/25 10:56   SpO2 97 % 06/09/25 10:56       Modified [place]:     Vitals Value Taken Time   Activity 2 06/09/25 10:45   Respiration 2 06/09/25 10:45   Circulation 2 06/09/25 10:45   Consciousness 2 06/09/25 10:45   Oxygen Saturation 2 06/09/25 10:45     Modified [place] Score: 10

## 2025-06-09 NOTE — OP NOTE
OPERATIVE REPORT  PATIENT NAME: Jacy Rodrigez    :  1950  MRN: [id number]  Pt Location: [facility] OR [facility]OM 05    SURGERY DATE: 2025    Surgeons and Role:     * Da Canales MD - Primary    Preop Diagnosis:  Personal history of breast cancer [Z85.3] breast asymmetry, ruptured breast left breast implant    Post-Op Diagnosis Codes:     * Personal history of breast cancer [Z85.3] as above    Procedure(s):  #1 removal of ruptured left breast implant #2 complete left breast capsulectomy #3 delayed reconstruction of left breast utilizing smooth round silicone gel implant ([place] 375 cc moderate plus profile)    Specimen(s):  ID Type Source Tests Collected by Time Destination   1 : Left Mastectomy Scar: Suture Marks Long=Lateral Short=Medial Tissue Breast, Left TISSUE EXAM Da Canales MD 2025 0923        Estimated Blood Loss:   Minimal    Drains:  * No LDAs found *    Anesthesia Type:   General    Operative Indications:  Personal history of breast cancer [Z85.3]  See above    Operative Findings:  As above       Complications:   None    Procedure and Technique:  Jihan was seen preoperatively in the holding area, the surgical site was marked with her participation.  I reviewed with her the planned procedure, potential risks, complications, and limitations.  She was taken to the operating room and underwent induction of general anesthesia by the anesthesia personnel.  The operative field was prepped and draped in sterile fashion and a proper timeout was performed.  The previous left breast mastectomy scar was excised sharply with a 15 blade, was marked with sutures for orientation and placed in formalin.  Following this and utilizing the Bovie cautery, dissection proceeded through subcutaneous tissue down to the underlying implant capsule.  The capsule was opened with Metzenbaum scissors revealing a ruptured silicone gel implant.  The silicone gel implant was removed without difficulty, this was  followed by removal of remaining silicone utilizing Betadine soaked laparotomy sponges.  Following this copious Betadine irrigation was performed followed by copious irrigation utilizing antibiotic irrigation.  Following this a complete anterior and posterior capsulectomy was performed.  The capsule was first scored in a grid like pattern, a lighted mammary retractor was used to aid in visualization.  The capsule was removed in a piecemeal fashion in order to adequately control the bleeding, bleeding was controlled throughout with the Bovie cautery.  Once the capsulectomy had been completed and the pocket dimensions were as desired wound was again copiously irrigated and hemostasis was assured.  Following this a [place] 375 cc moderate plus profile smooth round silicone gel implant sizer was selected, soaked in antibiotic solution and placed within the pocket.  The skin edges were temporarily tailor tacked with skin staples and the patient was assessed from the foot of the bed in the supine and upright position.  This gave the appearance that the patient has been scribing as her goal, breast symmetry was good.  The patient was then returned to the supine position, the staples were removed, followed by removal of the implant sizer.  Copious additional antibiotic irrigation was performed, followed by meticulous inspection for hemostasis.  Hemostasis was ensured with the Bovie cautery.  Again additional antibiotic irrigation was performed.  The skin was then reprepped and draped in the standard fashion for breast implant placement.  All instruments were wiped down with antibiotic solution, the surgical team's gloves were changed, and I, on the back table, prepared a [place] 375 cc moderate plus profile smooth round silicone gel breast implant.  It was first soaked in antibiotic solution, then placed within a Chiang funnel which had been hydrated as recommended.  The implant was then placed within the pocket utilizing a  "\"no touch\" technique with a Chiang funnel, this was then accomplished utilizing 2 and 3-0 Vicryl sutures buried at the level of the deep dermis, great care was taken to protect the implant during the suture placement, and the field was copiously irrigated multiple times utilizing antibiotic irrigation.  Prior to placing the last sutures 10 cc of quarter percent Marcaine was infiltrated into the pocket, the remainder of the closure consisted of running subcuticular STRATAFIX.  The wound margins appeared to be fully viable and the wound was dressed with benzoin, Steri-Strips and a dry sterile surgical dressing.  This is followed by application of a well-padded surgical bra.  The patient was then transferred to the recovery room under the care of the anesthesia personnel.   I was present for the entire procedure.    Patient Disposition:  PACU          SIGNATURE: Da Canales MD  DATE: June 9, 2025  TIME: 10:34 AM                "

## 2025-06-09 NOTE — INTERVAL H&P NOTE
H&P reviewed. After examining the patient I find no changes in the patients condition since the H&P had been written.    Vitals:    06/09/25 0724   BP: 151/73   Pulse: 73   Resp: 16   Temp: (!) 97 °F (36.1 °C)   SpO2: 100%

## 2025-06-09 NOTE — ANESTHESIA PREPROCEDURE EVALUATION
Procedure:  REMOVAL OF LEFT BREAST IMPLANT, LEFT BREAST CAPSULECTOMY, DELAYED RECONSTRUCTION OF LEFT BREAST WITH BREAST IMPLANT (Left: Breast)    Relevant Problems   CARDIO   (+) Essential hypertension   (+) Hyperlipidemia   (+) Refractory migraine with aura      ENDO   (+) TSH (thyroid-stimulating hormone deficiency)      /RENAL   (+) Benign hypertension with CKD (chronic kidney disease), stage II   (+) Renal cyst      NEURO/PSYCH   (+) Depression   (+) Refractory migraine with aura   (+) Unilateral occipital headache      PULMONARY   (+) MAGDI (obstructive sleep apnea)   TTE 10/2020:  LEFT VENTRICLE:  Systolic function was normal. Ejection fraction was estimated to be 65 %.  There were no regional wall motion abnormalities.     RIGHT VENTRICLE:  The size was normal.  Systolic function was normal.     MITRAL VALVE:  There was mild regurgitation.     TRICUSPID VALVE:  There was trace regurgitation.  Pulmonary artery systolic pressure was within the normal range.     Physical Exam    Airway     Mallampati score: II  TM Distance: >3 FB  Neck ROM: full  Mouth opening: >= 4 cm      Cardiovascular  Rhythm: regular, Rate: normalNo peripheral edema    Dental   No notable dental hx     Pulmonary   No stridor    Neurological    She appears awake, alert and oriented x3.      Other Findings  post-pubertal.      Anesthesia Plan  ASA Score- 3     Anesthesia Type- general with ASA Monitors.         Additional Monitors:     Airway Plan: Oral ETT.           Plan Factors-Exercise tolerance (METS): >4 METS.    Chart reviewed.   Existing labs reviewed. Patient summary reviewed.                  Induction- intravenous.    Postoperative Plan- Plan for postoperative opioid use.   Monitoring Plan - Monitoring plan - standard ASA monitoring  Post Operative Pain Plan - non-opiod analgesics and plan for postoperative opioid use        Informed Consent- Anesthetic plan and risks discussed with patient.  I personally reviewed this patient with  the CRNA. Discussed and agreed on the Anesthesia Plan with the CRNA..      NPO Status:  Vitals Value Taken Time   Date of last liquid 06/08/25 06/09/25 07:25   Time of last liquid 2100 06/09/25 07:25   Date of last solid 06/08/25 06/09/25 07:25   Time of last solid 2100 06/09/25 07:25

## 2025-06-09 NOTE — DISCHARGE INSTR - AVS FIRST PAGE
[facility]  Dr. JOSE Canales Jr.  [address] [place] [address]  Phone: [phone]     Postoperative Instructions for Outpatient Surgery     These instructions are being provided by your doctor to give you basic guidelines during your post-op recovery. Please let our office know if your contact information has changed.      Please call the office today for an appointment in 1 day for postoperative care     Dressings: Leave dressing in place, wear surgical bra/compression     Activity Restrictions: Avoid strenuous activity, apply ice to chest/breast as needed     Bathing: Please keep dressings intact and surgical site dry until seen in office for further instructions     Medications:    Resume pre-op medications.   You may take  ibuprofen for pain control, or Voltaren as previously           Keflex 500 mg 1 p.o. 4 times daily x 1 week    Other:

## 2025-06-10 ENCOUNTER — OFFICE VISIT (OUTPATIENT)
Age: 75
End: 2025-06-10

## 2025-06-10 DIAGNOSIS — Z42.1 AFTERCARE POSTMASTECTOMY FOR BREAST RECONSTRUCTION: Primary | ICD-10-CM

## 2025-06-10 PROCEDURE — 99024 POSTOP FOLLOW-UP VISIT: CPT | Performed by: SURGERY

## 2025-06-10 NOTE — PROGRESS NOTES
"Jihan Presents in follow-up, 1 day status post removal of a ruptured left breast silicone gel implant, capsulectomy, and delayed reconstruction utilizing a [place] 375 cc moderate plus profile smooth round silicone gel implant.  At today's visit the dressings were changed, the reconstructed left breast looks quite good, it is soft and supple there are no signs of unusual inflammation, infection, hematoma or seroma.  She is very pleased with the result, and reports that she has \"no pain\", has not taken any medication for pain since the procedure.  She was instructed that she remain may remove your dressings tomorrow and shower, she is to wear a bra and compressive dressing at all times, she will be seen in 2 weeks for suture removal, at that time we will initiate silicone scar care, and unless necessary, she will then be seen 1 month later.  "

## 2025-06-12 ENCOUNTER — APPOINTMENT (OUTPATIENT)
Dept: PHYSICAL THERAPY | Facility: CLINIC | Age: 75
End: 2025-06-12
Payer: MEDICARE

## 2025-06-13 PROCEDURE — 88304 TISSUE EXAM BY PATHOLOGIST: CPT | Performed by: STUDENT IN AN ORGANIZED HEALTH CARE EDUCATION/TRAINING PROGRAM

## 2025-06-16 ENCOUNTER — APPOINTMENT (OUTPATIENT)
Dept: PHYSICAL THERAPY | Facility: CLINIC | Age: 75
End: 2025-06-16
Payer: MEDICARE

## 2025-06-19 ENCOUNTER — OFFICE VISIT (OUTPATIENT)
Dept: PHYSICAL THERAPY | Facility: CLINIC | Age: 75
End: 2025-06-19
Payer: MEDICARE

## 2025-06-19 DIAGNOSIS — Z91.81 RISK FOR FALLS: ICD-10-CM

## 2025-06-19 DIAGNOSIS — R26.9 NEUROLOGIC GAIT DYSFUNCTION: ICD-10-CM

## 2025-06-19 DIAGNOSIS — G35 MULTIPLE SCLEROSIS (HCC): Primary | ICD-10-CM

## 2025-06-19 PROCEDURE — 97110 THERAPEUTIC EXERCISES: CPT | Performed by: PHYSICAL THERAPIST

## 2025-06-19 PROCEDURE — 97140 MANUAL THERAPY 1/> REGIONS: CPT | Performed by: PHYSICAL THERAPIST

## 2025-06-19 PROCEDURE — 97112 NEUROMUSCULAR REEDUCATION: CPT | Performed by: PHYSICAL THERAPIST

## 2025-06-19 NOTE — PROGRESS NOTES
"Daily Note     Today's date: 2025  Patient name: Jacy Rodrigez  : 1950  MRN: [id number]  Referring provider: Elidia Briceno MD  Dx:   Encounter Diagnosis     ICD-10-CM    1. Multiple sclerosis (HCC)  G35       2. Risk for falls  Z91.81       3. Neurologic gait dysfunction  R26.9           Start Time: 1015  Stop Time: 1100  Total time in clinic (min): 45 minutes    Subjective: Pt returns to PT following successful breast surgery on . She denies any pain in her chest or restrictions.       Objective: See treatment diary below      Assessment: Pt does have bilateral LE swelling present today. Tolerated treatment well. Continued to focus on LE motor control and muscle performance. No significant nerve symptoms present today.   Resumed hip isotonics with UE support for balance. Patient would benefit from continued PT.      Plan: Continue per plan of care.      Precautions: MS, Gait dysfunction, Insomnia, Osteoporosis, Peripheral polyneuropathy, Hx of Breast CA, R IDALIA in - Dr. Rushing, Left femur IM nailing       Manuals    Sciatic nerve mobilizations 3' 3'    3' 3' 3' 3' 3'                Hip ROM: flexion, ER 5' 5'    5' 5' 5' 5' 5'                             Neuro Re-Ed                          Slump nerve glides             Sciatic nerve glides     15x5\" w/ green strap seated 15x5\" w/ green strap seated       Wobble board for balance 2' ea (forward/lateral)     Next visit 2\" ea (forward/lateral)   2\" ea (forward/lateral)  2\" ea (forward/lateral)   LAQ             Standing hip abduction defers 15x on ea      10x on ea  10x on left and 15x on right   Standing march  15x on ea           Standing hip extension  15x on ea           EC on foam              Marching in hooklying Standing 15 L ; 20 R Standing 20x on ea       10x on ea  Standing 10x left and 15x on right    Hamstring curls             Bridge             Hip add isometric             TKE in standing   "            Hip abd isometric in hooklying             Ther Ex             Hip flexion stretch with strap             Nustep/bike 10' lv1 10' lv   10' lvl 1  7' lvl1 10' L1 10' L1 10' L1 NP   Hamstring curl              Piriformis stretch             SB stretch              Seated ankle DF             Seated ankle PF/DF on wobble board 20 ea 20x ea    10x ea 10x ea 20x on ea 20x on ea 20x on ea                Ther Activity             Standing HR/TR 30x ea 30x on ea    30x HR &20X TR 30x HR &20X TR 20x HR &20X TR 20x HR &20X TR 20x HR &20X TR   Sit to stand squat             Lateral walks 4 hurdles x5 nv    3 hurdles x4 4x idris x 4  5 hurdles x 4    Forward walks 4 hurdles x5 nv    3 hurdles x2 4 hurdles x 4 4 hurdles x 4 5 hurdles x 4 5 idris x 4   Lateral foam walks      4x blue foam        Idris walks             Step up              [place] backward pull             Self management                                       Vitals             BP                            1:1 with PT from 1020-11am

## 2025-06-20 NOTE — PROGRESS NOTES
[id number]    Today's date: 2025  Patient name: Jacy Rodrigez  : 1950  MRN: [id number]  Referring provider: Elidia Briceno MD  Dx:   Encounter Diagnosis     ICD-10-CM    1. Multiple sclerosis (HCC)  G35       2. Risk for falls  Z91.81       3. Neurologic gait dysfunction  R26.9                    Assessment  Jacy Rodrigez is a 74 y.o. female who presents with signs and symptoms consistent with progressive multiple sclerosis resulting in LE weakness, fatigue, ambulation dysfunction, and balance impairment. Since last re-evaluation, pt underwent left breast reconstructive surgery with Dr. Canales on . She did not receive PT for 1.5 weeks after the surgery. Pt has experienced increased swelling in bilateral LE, most noticeable at her ankles (left side). She continues to ambulate with a SPC, slow sylvia, and small step length. She does have appropriate cleaning of  ankle dorsiflexion during ambulation. In fact, ankle dorsiflexion has improved since last re-evaluation. Pt has been able to resume functional activities and WB exercises over the last 6 weeks of treatment. She fatigues quickly but is progressing compared to previous re-evaluations. Notes having increased left hip pain after last session due to hip abduction exercise. She continues to experience paresthesia's and nerve symptoms in distal LE that can range from numbness to sharp stabbing pain. These symptoms tend to be intermittent and fluctuate depending on movement and tolerance. All functional measures re-assessed today. Mild improvements with TUG and 5x sit to stand. Recommend patient continue with functional strength, endurance, muscle performance, and balance/proprioception. Pt is in agreement with POC. Thank you for this referral.       Impairments: abnormal coordination, abnormal gait, abnormal or restricted ROM, abnormal movement, impaired balance, impaired physical strength, poor posture   Understanding of Dx/Px/POC:  good     Prognosis: good    Goals  Short Term Goals: to be achieved by 4 weeks  1) Patient to be independent with basic HEP.-Partially met  2) Decrease pain to moderate at its worst.-Partially met  3) Increase SLR ROM by 5-10 degrees -Not met  4) Increase LE strength by 1/2 MMT grade in all deficient planes.-Regressed in LLE but improved in RLE    Long Term Goals: to be achieved by discharge  1) FOTO equal to or greater than expected.-Partially met  2) Ambulation to improve to maximal level of function-Partially met  3) Improve TUG by 5 seconds-Partially met  4) Sit to stand transfers will improve to maximal level of function- Partially met  5) Improve 5x sit to stand by 5 seconds-Not met    Plan  Patient would benefit from: PT eval and skilled physical therapy    Planned therapy interventions: manual therapy, neuromuscular re-education, patient education, strengthening, therapeutic activities, therapeutic exercise, transfer training, home exercise program and functional ROM exercises    Frequency: 2x per week for 6-8 weeks.  Treatment plan discussed with: patient        Subjective Evaluation    History of Present Illness  Mechanism of injury: History of Current Injury: Pt referred to PT secondary to progressive MS and allodynia in bilateral LE. She was treated by me last year through April of 2024. Her lower extremity symptoms were worsening at that time along with weakness throughout antigravity muscles. Pt notes that her strength, fatigue, and pain continue to worsen.  Due to increasing lower extremity pain, patient received US of bilateral LE and increased Gabapentin dosage. No evidence of significant lower extremity arterial occlusive disease on US of bilateral LE. XR of right knee was negative for osseous pathology. Pt reports frequent 4+ pitting edema in bilateral LE. Pt reports compression stockings don't seem to help her.     Pt reports a walking tolerance of max 5 minutes.     Pt has steps down to the  "basement but has single level living. Pt has 1-2, 3\" steps to enter her house.   Pain location/Descriptors: Severe LE pain from knees to feet (anterior and posterior). + cramping. R worse than L/   Aggravating factors: Constantly present (24/7) sometimes worse than others.   Easing factors: Rest, gabapentin  24 HR pattern: Worse that night time.   XR of R knee: normal   Special Questions: Positive for peripheral neuropathy  Patient goals:  Reduce unsteadiness, improve LE strength, increase walking and endurance.   Hobbies/Interest: Volunteers as , cooking, cleaning    Occupation: Retired       Pain  Pain scale at highest: Severe.      Diagnostic Tests  Ultrasound findings: normal      Objective     Strength/Myotome Testing     Left Hip   Planes of Motion   Flexion: 3-  Abduction: 3  Adduction: 3    Right Hip   Planes of Motion   Flexion: 3+  Abduction: 3  Adduction: 3    Left Knee   Flexion: 3  Extension: 3    Right Knee   Flexion: 3  Extension: 3    Left Ankle/Foot   Dorsiflexion: 3  Plantar flexion: 3  Eversion: 3  Inversion: 3    Right Ankle/Foot   Dorsiflexion: 3  Plantar flexion: 3  Eversion: 3  Inversion: 3    Tests     Additional Tests Details  Timed up and go:  Date: 33.18 seconds. Excessive use of hands on rails for assistance   10/1: 22.21 seconds. Moderate use of hands on rails. SPC. >3 second turn  11/7: 23.99 seconds. Moderate use of hands on rails. SPC. Turn in 2.8 seconds  12/19: 22.98 seconds. Moderate use of hands on rails. SPC. Turn in 3 seconds  2/3: 17.46 seconds. Moderate use of hands. SPC. Turn less than 2 seconds.  4/1: 22.59 seconds. Moderate use of hands. SPC. Turn in 3 seconds.  5/5: 23.66 seconds. Moderate use of hands SPC. Turn in 3 seconds  6/23: 21.34 seconds. Moderate use of hands SPC. Turn in 3 seconds    Sit to stand Transfers:   Date: Excessive use of hands on rails for assistance  5/5: Moderate use of hands on rails  6/23: Mild to mod use of hands on rails    5x sit to " stand:   Date: 27.54 seconds with excessive use of hands  10/1: 19.40 seconds with moderate use of hands  11/7: 19.69 seconds with moderate use of hands  12/19: 14.03  seconds with moderate use of hands  2/3: 16.06 seconds with moderate use of hands  4/1: 25.39 seconds with 75% use of hands on rails  5/5: 22.26 seconds with use of hands on rails x 100%  6/23: 17.13 seconds with use of hands on rails x 100%    2 minute walk test:   Date: NT  10/1: 130 feet  11/7: NT  12/19: 162 feet  2/3: 161 feet  4/1: NT d/t pain  5/5: 150 feet   6/23: 150 feet     MCTSIB:  EO firm: 30 seconds with mild postural sway   EC firm: 30 seconds with moderate postural sway  EO foam: 30 seconds with mild postural sway  EC foam: 17 seconds prior to LOB    6 minute walk test:   Date: 260 feet in 4:10  11/7: 423 feet in 6 minutes using SPC  12/19: 454 feet in 6 minutes using SPC  2/3: 457 feet in 6 minutes using SPC  4/1: NT d/t pain  5/5: 260 feet  in 4 minutes   6/23: 320 feet in 5:05 prior to fatigue with SPC    *Swelling present in bilateral ankles/feet and right knee vs left knee- This has increased  *Sensitivity present in distal LE to light touch, especially over anterior tibia. -This has increased    Positive SLUMP for adverse neural tension bilaterally -This continues  Positive SLR at 55 degrees on R/55 degrees on L (Declined)      Ambulation     Observational Gait     Additional Observational Gait Details  SPC: slow sylvia, dynamic valgus in stance, small step length with femoral adduction                Precautions: MS, Gait dysfunction, Insomnia, Osteoporosis, Peripheral polyneuropathy, Hx of Breast CA, R IDALIA in 2012- Dr. Rushing, Left femur IM nailing       Manuals 6/2 6/19 6/23 5/5 5/8 5/12 5/20 5/22 5/29   Sciatic nerve mobilizations 3' 3' 3'   3' 3' 3' 3' 3'                Hip ROM: flexion, ER 5' 5' 5'    5' 5' 5' 5' 5'                             Neuro Re-Ed                          Slump nerve glides             Sciatic  "nerve glides     15x5\" w/ green strap seated 15x5\" w/ green strap seated       Wobble board for balance 2' ea (forward/lateral)     Next visit 2\" ea (forward/lateral)   2\" ea (forward/lateral)  2\" ea (forward/lateral)   LAQ             Standing hip abduction defers 15x on ea Modified next visit      10x on ea  10x on left and 15x on right   Standing march  15x on ea           Standing hip extension  15x on ea           EC on foam              Marching in hooklying Standing 15 L ; 20 R Standing 20x on ea       10x on ea  Standing 10x left and 15x on right    Hamstring curls             Bridge             Hip add isometric             TKE in standing              Hip abd isometric in hooklying             Ther Ex             Hip flexion stretch with strap             Nustep/bike 10' lv1 10' lv 5' Lv1  10' lvl 1  7' lvl1 10' L1 10' L1 10' L1 NP   Hamstring curl              Piriformis stretch             SB stretch              Seated ankle DF             Seated ankle PF/DF on wobble board 20 ea 20x ea    10x ea 10x ea 20x on ea 20x on ea 20x on ea                Ther Activity             Standing HR/TR 30x ea 30x on ea    30x HR &20X TR 30x HR &20X TR 20x HR &20X TR 20x HR &20X TR 20x HR &20X TR   Sit to stand squat             Lateral walks 4 hurdles x5 nv    3 hurdles x4 4x idris x 4  5 hurdles x 4    Forward walks 4 hurdles x5 nv    3 hurdles x2 4 hurdles x 4 4 hurdles x 4 5 hurdles x 4 5 idris x 4   Lateral foam walks      4x blue foam        Idris walks             Step up              [place] backward pull             Self management                                       Vitals             BP                            1:1 with PT from 1018-1056am                                                                                                                                 "

## 2025-06-23 ENCOUNTER — EVALUATION (OUTPATIENT)
Dept: PHYSICAL THERAPY | Facility: CLINIC | Age: 75
End: 2025-06-23
Payer: MEDICARE

## 2025-06-23 DIAGNOSIS — R26.9 NEUROLOGIC GAIT DYSFUNCTION: ICD-10-CM

## 2025-06-23 DIAGNOSIS — Z91.81 RISK FOR FALLS: ICD-10-CM

## 2025-06-23 DIAGNOSIS — G35 MULTIPLE SCLEROSIS (HCC): Primary | ICD-10-CM

## 2025-06-23 PROCEDURE — 97110 THERAPEUTIC EXERCISES: CPT | Performed by: PHYSICAL THERAPIST

## 2025-06-23 PROCEDURE — 97112 NEUROMUSCULAR REEDUCATION: CPT | Performed by: PHYSICAL THERAPIST

## 2025-06-23 PROCEDURE — 97530 THERAPEUTIC ACTIVITIES: CPT | Performed by: PHYSICAL THERAPIST

## 2025-06-24 ENCOUNTER — OFFICE VISIT (OUTPATIENT)
Age: 75
End: 2025-06-24

## 2025-06-24 DIAGNOSIS — Z48.89 ENCOUNTER FOR FOLLOW-UP CARE INVOLVING PLASTIC SURGERY: ICD-10-CM

## 2025-06-24 DIAGNOSIS — Z42.1 ENCOUNTER FOR BREAST RECONSTRUCTION FOLLOWING MASTECTOMY: Primary | ICD-10-CM

## 2025-06-24 PROCEDURE — 99024 POSTOP FOLLOW-UP VISIT: CPT

## 2025-06-24 NOTE — PROGRESS NOTES
"[facility]  [address], [address], [place] [address]  [phone]    Patient Identification: Jacy Rodrigez is a 75 y.o. female     History of Present Illness: The patient is a 75 y.o.  year-old female  who presents to the office for post-operative follow up. Patient is 15 days s/p Removal Of Left Breast Implant, Left Breast Capsulectomy, Delayed Reconstruction Of Left Breast With Breast Implant - Left  on 6/9/2025 by Dr. Canales.     Patient is two weeks out from surgery. Reports she feels like she's \"being tased from the police\" due to her MS. However, denies surgical pain. Denies fever, chills, drainage, or bleeding. Taking it easy and is wearing surgical bra.     Past Medical History[1]     Current Medications[2]     Past Surgical History[3]     Review of Systems  Constitutional: Denies fevers, chills or pain.  Skin: Denies any warmth, erythema, edema, bleeding, signs of infection, or mucopurulent drainage.     Physical Exam    General: Alert and oriented, no acute distress.    Breast: Left breast is soft. Implant is in good position and intact without signs of rupture. Surgical incision is clean, dry, and intact. Expected amount of post-operative edema. There are no signs of infection, obvious hematoma, seroma or wound dehiscence. Sutures removed today.    Assessment and Plan:  The patient is an 75 y.o.  year-old female who presents to the office for post-operative follow up. Patient is 15 days s/p Removal Of Left Breast Implant, Left Breast Capsulectomy, Delayed Reconstruction Of Left Breast With Breast Implant - Left  on 6/9/2025 by Dr. Canales.       -At today's visit sutures removed. Glad to see patient is healing well.   -Exam is reassuring. No signs of inflammation, infection, hematoma, or seroma.   -Educated patient on silicone gel scar care and massage BID.  -Continue to wear compression for another 2-4 weeks.   -The patient is to return in 4 " weeks.  -The patient is to call the office or send message through [employer] with any questions or concerns. All of the patient's questions were answered at this time and they agree with the plan of care.      Jess Alexandra PA-C  [facility]            [1]   Past Medical History:  Diagnosis Date    Allergic 1967    seasonal    Allergic rhinitis     Bone pain     Breast cancer (HCC) 08/16/2012    left    Breast ptosis     Colon polyp     Depression     Fatigue     Fx. left wrist, closed, initial encounter 09/20/2014    Gait disturbance     uses cane at times    Headache     Hip fracture (HCC)     Hip pain     Hip pain, right 09/18/2012    Laterality: Right    History of transfusion 1975    placenta previa s/p work accident, no rx    Hypokalemia     Insomnia     Kidney stone     Laceration of finger     right hand    Left ankle pain     Left knee pain     Multiple sclerosis (HCC)     Muscle strain     left lower leg    Nephrolithiasis     Osteopenia     Osteoporosis     Pain of left calf     Pain of left lower leg 07/25/2017    Pneumonia     Rash     Scoliosis birth    scoliosis    Solitary pulmonary nodule     SVT (supraventricular tachycardia) (HCC)     Tingling     Urgency of urination     Urticaria     Wrist fracture, left    [2]   Current Outpatient Medications:     ALPHA LIPOIC ACID PO, Take by mouth, Disp: , Rfl:     amoxicillin (AMOXIL) 500 MG tablet, 500 mg dental, Disp: , Rfl:     Ampyra 10 MG TB12, TAKE 1 TABLET BY MOUTH 2 TIMES A DAY, Disp: 180 tablet, Rfl: 0    ascorbic acid (VITAMIN C) 500 mg tablet, Take 500 mg by mouth in the morning., Disp: , Rfl:     baclofen 10 mg tablet, TAKE ONE TABLET BY MOUTH EVERY MORNING, ONE TABLET IN THE EVENING AND TWO TABLETS AT BEDTIME, Disp: 360 tablet, Rfl: 0    Biotin 5000 MCG TABS, , Disp: , Rfl:     clonazePAM (KlonoPIN) 0.5 mg tablet, Take 1.5 tablets (0.75 mg total) by mouth daily at bedtime TAKE 1 AND 1/2 TABLETS BY MOUTH DAILY AT  BEDTIME, Disp: 135 tablet, Rfl: 1    Cranberry 125 MG TABS, Take 125 mg by mouth in the morning, Disp: , Rfl:     cycloSPORINE (RESTASIS) 0.05 % ophthalmic emulsion, Administer 1 drop to both eyes if needed (Patient not taking: Reported on 1/21/2025), Disp: , Rfl:     denosumab (PROLIA) 60 mg/mL, Inject under the skin Due July 2025, Disp: , Rfl:     diclofenac (VOLTAREN) 75 mg EC tablet, Take 1 tablet by mouth in the morning and 1 tablet before bedtime. (Patient taking differently: Take 1 tablet by mouth in the morning and 1 tablet before bedtime. On hold currently for two weeks prior to surgery .), Disp: , Rfl:     docusate sodium (COLACE) 100 mg capsule, Take 1 capsule by mouth every other day, Disp: , Rfl:     gabapentin (NEURONTIN) 600 MG tablet, TAKE 1 TABLET EVERY        MORNING, 1 TABLET IN THE   AFTERNOON, AND 2 TABLETS ATBEDTIME (Patient taking differently: Take 600 mg by mouth TAKE 1 TABLET EVERY        MORNING, 1 TABLET IN THE   AFTERNOON, AND 2 TABLETS ATBEDTIME Pt states she has started taking an additional tab I of 300mg n the afternoon), Disp: 360 tablet, Rfl: 5    lisinopril (ZESTRIL) 2.5 mg tablet, Take 2.5 mg by mouth daily, Disp: , Rfl:     lisinopril (ZESTRIL) 5 mg tablet, Take 1 tablet by mouth daily at bedtime, Disp: , Rfl:     LORazepam (Ativan) 0.5 mg tablet, Take 1 pill PO 1 hr prior to procedure, take another 20mins before if needed., Disp: 2 tablet, Rfl: 0    Magnesium 500 MG TABS, Take 500 tablets by mouth once, Disp: , Rfl:     meloxicam (MOBIC) 7.5 mg tablet, Take 7.5 mg by mouth daily, Disp: , Rfl:     zonisamide (ZONEGRAN) 100 mg capsule, TAKE 4 CAPSULES DAILY AT   BEDTIME, Disp: 360 capsule, Rfl: 3  [3]   Past Surgical History:  Procedure Laterality Date    APPENDECTOMY  11/2012    Dr. Vidales    AUGMENTATION BREAST      Enlargement procedure with prosthetic implant bilateral    AUGMENTATION MAMMAPLASTY Right 01/28/2020    implant replaced because of recall    AUGMENTATION MAMMAPLASTY  Bilateral 2012    BREAST BIOPSY Left 07/23/2012    BREAST EXCISIONAL BIOPSY Right     age 39    BREAST IMPLANT Right 01/28/2020    Procedure: BREAST IMPLANT EXCHANGE WITH CAPSULECTOMY;  Surgeon: Da Canales MD;  Location: [facility];  Service: Plastics    BREAST IMPLANT REMOVAL Right 01/28/2020    implant placement, implant revision, right mastopexy    COLONOSCOPY      CYSTOSTOMY      with basket extraction of calculus; x2    EXCISION / BIOPSY BREAST / NIPPLE / DUCT Right 05/04/2020    scar revision to resuture non healing surgical wound    EXPLORATORY LAPAROTOMY      FL INJECTION LEFT SHOULDER (ARTHROGRAM)  07/19/2023    FOOT SURGERY      5 th metarasal fracture repair    FRACTURE SURGERY  Lt wrist 9/2014 - Lt matthew femur 9/14    HIP FRACTURE SURGERY Right     Subcapital    HIP SURGERY Left     JOINT REPLACEMENT  Rt hip 10/2012    LEG SURGERY      Repair    MASTECTOMY Left 08/16/2012    WA GRAFTING OF AUTOLOGOUS FAT BY LIPO 50 CC OR LESS Bilateral 08/22/2023    Procedure: AUTOLOGOUS FAT GRAFTING TO BILATERAL BREASTS;  Surgeon: Da Canales MD;  Location: [facility];  Service: Plastics    WA MASTOPEXY Right 01/28/2020    Procedure: BREAST MASTOPEXY;  Surgeon: Da Canales MD;  [facility]: [facility];  Service: Plastics    WA REVISION OF RECONSTRUCTED BREAST Right 05/04/2020    Procedure: REVISION RIGHT BREAST MOUND;  Surgeon: Da Canales MD;  [facility];  Service: Plastics    WA RMVL RUPTURED BREAST IMPLANT W/IMPLANT CONTENTS Left 6/9/2025    Procedure: REMOVAL OF LEFT BREAST IMPLANT, LEFT BREAST CAPSULECTOMY, DELAYED RECONSTRUCTION OF LEFT BREAST WITH BREAST IMPLANT;  Surgeon: Da Canales MD;  Location: [facility];  Service: Plastics    REDUCTION MAMMAPLASTY Right 01/28/2020    reduced to match left side    SENTINEL LYMPH NODE BIOPSY Left 08/16/2012    SKIN BIOPSY      TONSILLECTOMY      TUBAL LIGATION      WRIST SURGERY Left

## 2025-06-26 ENCOUNTER — OFFICE VISIT (OUTPATIENT)
Dept: PHYSICAL THERAPY | Facility: CLINIC | Age: 75
End: 2025-06-26
Payer: MEDICARE

## 2025-06-26 DIAGNOSIS — G35 MULTIPLE SCLEROSIS (HCC): Primary | ICD-10-CM

## 2025-06-26 DIAGNOSIS — Z91.81 RISK FOR FALLS: ICD-10-CM

## 2025-06-26 DIAGNOSIS — R26.9 NEUROLOGIC GAIT DYSFUNCTION: ICD-10-CM

## 2025-06-26 PROCEDURE — 97140 MANUAL THERAPY 1/> REGIONS: CPT | Performed by: PHYSICAL THERAPIST

## 2025-06-26 PROCEDURE — 97110 THERAPEUTIC EXERCISES: CPT | Performed by: PHYSICAL THERAPIST

## 2025-06-26 PROCEDURE — 97530 THERAPEUTIC ACTIVITIES: CPT | Performed by: PHYSICAL THERAPIST

## 2025-06-26 NOTE — PROGRESS NOTES
"Daily Note     Today's date: 2025  Patient name: Jacy Rodrigez  : 1950  MRN: [id number]  Referring provider: Elidia Briceno MD  Dx: No diagnosis found.               Subjective: Pt reports legs feeling like cement blocks and really heavy.       Objective: See treatment diary below      Assessment: Resumed idris walks, forward and lateral. Pt's left ankle felt fairly restricted and blocked during Ankle DF activity. Plan to introduce SB gastroc stretching next visit. Pt demonstrated good hip clearance during activity. Tolerated treatment well. Patient would benefit from continued PT      Plan: Continue per plan of care.      Precautions: MS, Gait dysfunction, Insomnia, Osteoporosis, Peripheral polyneuropathy, Hx of Breast CA, R IDALIA in - Dr. Rushing, Left femur IM nailing       Manuals    Sciatic nerve mobilizations 3' 3' 3' 3'    3' 3' 3'                Hip ROM: flexion, ER 5' 5' 5'  5'      5' 5' 5'                             Neuro Re-Ed                          Slump nerve glides             Sciatic nerve glides             Wobble board for balance 2' ea (forward/lateral)        2\" ea (forward/lateral)  2\" ea (forward/lateral)   LAQ             Standing hip abduction defers 15x on ea Modified next visit      10x on ea  10x on left and 15x on right   Standing march  15x on ea           Standing hip extension  15x on ea           EC on foam              Marching in hooklying Standing 15 L ; 20 R Standing 20x on ea       10x on ea  Standing 10x left and 15x on right    Hamstring curls             Bridge             Hip add isometric             TKE in standing              Hip abd isometric in hooklying             Ther Ex             Hip flexion stretch with strap             Nustep/bike 10' lv1 10' lv 5' Lv1 10' L1 700 steps    10' L1 10' L1 NP   Hamstring curl              Piriformis stretch             SB stretch              Seated ankle DF             SB " gastroc stretch    Next visit          Seated ankle PF/DF on wobble board 20 ea 20x ea  20x ea    20x on ea 20x on ea 20x on ea                Ther Activity             Standing HR/TR 30x ea 30x on ea  30x on ea    20x HR &20X TR 20x HR &20X TR 20x HR &20X TR   Sit to stand squat             Lateral walks 4 hurdles x5 nv  3x on hurdles     5 hurdles x 4    Forward walks 4 hurdles x5 nv  3x on hurdles    4 hurdles x 4 5 hurdles x 4 5 idris x 4   Lateral foam walks             Idris walks             Step up              [place] backward pull             Self management                                       Vitals             BP                            1:1 with PT from  1015-11am

## 2025-06-30 ENCOUNTER — OFFICE VISIT (OUTPATIENT)
Dept: PHYSICAL THERAPY | Facility: CLINIC | Age: 75
End: 2025-06-30
Payer: MEDICARE

## 2025-06-30 DIAGNOSIS — Z91.81 RISK FOR FALLS: ICD-10-CM

## 2025-06-30 DIAGNOSIS — G35 MULTIPLE SCLEROSIS (HCC): Primary | ICD-10-CM

## 2025-06-30 DIAGNOSIS — R26.9 NEUROLOGIC GAIT DYSFUNCTION: ICD-10-CM

## 2025-06-30 DIAGNOSIS — G47.61 PLMD (PERIODIC LIMB MOVEMENT DISORDER): ICD-10-CM

## 2025-06-30 PROCEDURE — 97140 MANUAL THERAPY 1/> REGIONS: CPT | Performed by: PHYSICAL THERAPIST

## 2025-06-30 PROCEDURE — 97530 THERAPEUTIC ACTIVITIES: CPT | Performed by: PHYSICAL THERAPIST

## 2025-06-30 PROCEDURE — 97110 THERAPEUTIC EXERCISES: CPT | Performed by: PHYSICAL THERAPIST

## 2025-06-30 NOTE — PROGRESS NOTES
"Daily Note     Today's date: 2025  Patient name: Jacy Rodrigez  : 1950  MRN: [id number]  Referring provider: Elidia Briceno MD  Dx:   Encounter Diagnosis     ICD-10-CM    1. Multiple sclerosis (HCC)  G35       2. Risk for falls  Z91.81       3. Neurologic gait dysfunction  R26.9                      Subjective: Pt reports having difficulty transferring out of a chair after sitting outside yesterday (out of the sun) with a fan blowing on her. She also reports the onset of a migraine headache.       Objective: See treatment diary below      Assessment: Tolerated treatment well. Reduced swelling in bilateral ankles noted. Implemented SB gastroc stretch to improve left ankle mobility. Discussed this importance of maintaining gastroc flexibility in the setting of ankle dorsiflexion weakness and fatigue. Resumed 4 idris walks with forward and lateral movements. Patient exhibited good technique with therapeutic exercises and would benefit from continued PT      Plan: Continue per plan of care.      Precautions: MS, Gait dysfunction, Insomnia, Osteoporosis, Peripheral polyneuropathy, Hx of Breast CA, R IDALIA in - Dr. Rushing, Left femur IM nailing       Manuals    Sciatic nerve mobilizations 3' 3' 3' 3' 3'   3' 3' 3'                Hip ROM: flexion, ER 5' 5' 5'  5'   5'    5' 5' 5'                             Neuro Re-Ed                          Slump nerve glides             Sciatic nerve glides             Wobble board for balance 2' ea (forward/lateral)        2\" ea (forward/lateral)  2\" ea (forward/lateral)   LAQ             Standing hip abduction defers 15x on ea Modified next visit      10x on ea  10x on left and 15x on right   Standing march  15x on ea           Standing hip extension  15x on ea           EC on foam              Marching in hooklying Standing 15 L ; 20 R Standing 20x on ea       10x on ea  Standing 10x left and 15x on right    Hamstring curls " "            Bridge             Hip add isometric             TKE in standing              Hip abd isometric in hooklying             Ther Ex             Hip flexion stretch with strap             Nustep/bike 10' lv1 10' lv 5' Lv1 10' L1 700 steps 10' L1   10' L1 10' L1 NP   Hamstring curl              Piriformis stretch             SB stretch              Seated ankle DF             SB gastroc stretch    Next visit  4x30\"        Seated ankle PF/DF on wobble board 20 ea 20x ea  20x ea 20x on ea   20x on ea 20x on ea 20x on ea                Ther Activity             Standing HR/TR 30x ea 30x on ea  30x on ea 30x on ea   20x HR &20X TR 20x HR &20X TR 20x HR &20X TR   Sit to stand squat             Lateral walks 4 hurdles x5 nv  3x on hurdles 4 hurdles x 4    5 hurdles x 4    Forward walks 4 hurdles x5 nv  3x on hurdles 4 hurdles x 4   4 hurdles x 4 5 hurdles x 4 5 idris x 4   Lateral foam walks             Idris walks             Step up              Brandon backward pull             Self management                                       Vitals             BP                            1:1 with PT from  1015-11am                                                                                                                                      "

## 2025-07-01 RX ORDER — CLONAZEPAM 0.5 MG/1
TABLET ORAL
Qty: 135 TABLET | Refills: 1 | OUTPATIENT
Start: 2025-07-01

## 2025-07-01 NOTE — TELEPHONE ENCOUNTER
Pt called to check why her clonazepam was refused advised patient a new refill was dispensed on 6/8/2025. Pt was encourage to look at her meds again and if unable to find them I would help her call the pharmacy. Pt found her bottle and apologized for the inconvenience.

## 2025-07-01 NOTE — TELEPHONE ENCOUNTER
Refill request for clonazepam received via interface from pharmacy.  Per chart, too soon for refill as a 90-day supply was just dispensed on 6/8/25.     Dr. Naylor, please cancel if appropriate as nursing is unable to do so.  Thanks.

## 2025-07-03 ENCOUNTER — OFFICE VISIT (OUTPATIENT)
Dept: PHYSICAL THERAPY | Facility: CLINIC | Age: 75
End: 2025-07-03
Payer: MEDICARE

## 2025-07-03 DIAGNOSIS — R26.9 NEUROLOGIC GAIT DYSFUNCTION: ICD-10-CM

## 2025-07-03 DIAGNOSIS — Z91.81 RISK FOR FALLS: ICD-10-CM

## 2025-07-03 DIAGNOSIS — G35 MULTIPLE SCLEROSIS (HCC): Primary | ICD-10-CM

## 2025-07-03 PROCEDURE — 97140 MANUAL THERAPY 1/> REGIONS: CPT | Performed by: PHYSICAL THERAPIST

## 2025-07-03 PROCEDURE — 97110 THERAPEUTIC EXERCISES: CPT | Performed by: PHYSICAL THERAPIST

## 2025-07-03 PROCEDURE — 97530 THERAPEUTIC ACTIVITIES: CPT | Performed by: PHYSICAL THERAPIST

## 2025-07-03 NOTE — PROGRESS NOTES
"Daily Note     Today's date: 7/3/2025  Patient name: Jacy Rodrigez  : 1950  MRN: [id number]  Referring provider: Elidia Briceno MD  Dx:   Encounter Diagnosis     ICD-10-CM    1. Multiple sclerosis (HCC)  G35       2. Risk for falls  Z91.81       3. Neurologic gait dysfunction  R26.9                      Subjective: Pt reports having lower back today from pushing activities. She went to grocery store and cooked all day yesterday in preparation for .  She believes her lower back was irritated partially due to her chairs in her kitchen.       Objective: See treatment diary below      Assessment: Tolerated treatment fair. Lower extremities are very fatigued today. Pt only performed 2 sets of idris walks vs 4 previously last session. Pt admits that she attempted to modifying her day yesterday and rested/elevated her feet when fatigued. Left ankle consistently more swollen than right but improved. .  Patient would benefit from continued PT.       Plan: Continue per plan of care. Resume all exercise sets and reps next session.      Precautions: MS, Gait dysfunction, Insomnia, Osteoporosis, Peripheral polyneuropathy, Hx of Breast CA, R IDALIA in - Dr. Rushing, Left femur IM nailing       Manuals 6/2 6/19 6/23 6/26 6/30 7/3  5/20 5/22 5/29   Sciatic nerve mobilizations 3' 3' 3' 3' 3' 3'  3' 3' 3'                Hip ROM: flexion, ER 5' 5' 5'  5'   5'  5'   5' 5' 5'                             Neuro Re-Ed                          Slump nerve glides             Sciatic nerve glides             Wobble board for balance 2' ea (forward/lateral)        2\" ea (forward/lateral)  2\" ea (forward/lateral)   LAQ             Standing hip abduction defers 15x on ea Modified next visit      10x on ea  10x on left and 15x on right   Standing march  15x on ea           Standing hip extension  15x on ea           EC on foam              Marching in hooklying Standing 15 L ; 20 R Standing 20x on ea       10x on ea  " "Standing 10x left and 15x on right    Hamstring curls             Bridge             Hip add isometric             TKE in standing              Hip abd isometric in hooklying             Ther Ex             Hip flexion stretch with strap             Nustep/bike 10' lv1 10' lv 5' Lv1 10' L1 700 steps 10' L1 10' L1  10' L1 10' L1 NP   Hamstring curl              Piriformis stretch             SB stretch              Seated ankle DF             SB gastroc stretch    Next visit  4x30\" 4x30\"       Seated ankle PF/DF on wobble board 20 ea 20x ea  20x ea 20x on ea   20x on ea 20x on ea 20x on ea                Ther Activity             Standing HR/TR 30x ea 30x on ea  30x on ea 30x on ea 30x on ea  20x HR &20X TR 20x HR &20X TR 20x HR &20X TR   Sit to stand squat             Lateral walks 4 hurdles x5 nv  3x on hurdles 4 hurdles x 4 4 hurdles x2   5 hurdles x 4    Forward walks 4 hurdles x5 nv  3x on hurdles 4 hurdles x 4 4 hurdles x 2  4 hurdles x 4 5 hurdles x 4 5 idris x 4   Lateral foam walks             Idris walks             Step up              Brandon backward pull             Self management                                       Vitals             BP                            1:1 with PT from  1015-11am                                                                                                                                        "

## 2025-07-07 ENCOUNTER — OFFICE VISIT (OUTPATIENT)
Dept: PHYSICAL THERAPY | Facility: CLINIC | Age: 75
End: 2025-07-07
Payer: MEDICARE

## 2025-07-07 DIAGNOSIS — G35 MULTIPLE SCLEROSIS (HCC): Primary | ICD-10-CM

## 2025-07-07 DIAGNOSIS — R26.9 NEUROLOGIC GAIT DYSFUNCTION: ICD-10-CM

## 2025-07-07 DIAGNOSIS — Z91.81 RISK FOR FALLS: ICD-10-CM

## 2025-07-07 PROCEDURE — 97530 THERAPEUTIC ACTIVITIES: CPT | Performed by: PHYSICAL THERAPIST

## 2025-07-07 PROCEDURE — 97110 THERAPEUTIC EXERCISES: CPT | Performed by: PHYSICAL THERAPIST

## 2025-07-07 NOTE — PROGRESS NOTES
"Daily Note     Today's date: 2025  Patient name: Jacy Rodrigez  : 1950  MRN: [id number]  Referring provider: Elidia Briceno MD  Dx:   Encounter Diagnosis     ICD-10-CM    1. Multiple sclerosis (HCC)  G35       2. Risk for falls  Z91.81       3. Neurologic gait dysfunction  R26.9                      Subjective: Pt reports not sleeping at all last evening secondary to nerve symptoms in bilateral lower extremity.       Objective: See treatment diary below      Assessment: Tolerated treatment well. Improved control and flexibility noted in LE compared to previous session despite not sleeping well. Patient exhibited good technique with therapeutic exercises. Patient had to leave early this morning secondary to dental procedure.       Plan: Continue per plan of care.      Precautions: MS, Gait dysfunction, Insomnia, Osteoporosis, Peripheral polyneuropathy, Hx of Breast CA, R IDALIA in - Dr. Rushing, Left femur IM nailing       Manuals 6/2 6/19 6/23 6/26 6/30 7/3 7/7   5/29   Sciatic nerve mobilizations 3' 3' 3' 3'e 3' 3' 3'   3'                Hip ROM: flexion, ER 5' 5' 5'  5'   5'  5'  5'   5'                             Neuro Re-Ed                          Slump nerve glides             Sciatic nerve glides             Wobble board for balance 2' ea (forward/lateral)         2\" ea (forward/lateral)   LAQ             Standing hip abduction defers 15x on ea Modified next visit        10x on left and 15x on right   Standing march  15x on ea           Standing hip extension  15x on ea           EC on foam              Marching in hooklying Standing 15 L ; 20 R Standing 20x on ea         Standing 10x left and 15x on right    Hamstring curls             Bridge             Hip add isometric             TKE in standing              Hip abd isometric in hooklying             Ther Ex             Hip flexion stretch with strap             Nustep/bike 10' lv1 10' lv 5' Lv1 10' L1 700 steps 10' L1 10' L1 10' L1   NP " "  Hamstring curl              Piriformis stretch             SB stretch              Seated ankle DF             SB gastroc stretch    Next visit  4x30\" 4x30\" 4x30\"      Seated ankle PF/DF on wobble board 20 ea 20x ea  20x ea 20x on ea     20x on ea                Ther Activity             Standing HR/TR 30x ea 30x on ea  30x on ea 30x on ea 30x on ea 30x on ea   20x HR &20X TR   Sit to stand squat             Lateral walks 4 hurdles x5 nv  3x on hurdles 4 hurdles x 4 4 hurdles x2 4 hurdles x 4      Forward walks 4 hurdles x5 nv  3x on hurdles 4 hurdles x 4 4 hurdles x 2 4 hurdles x 4   5 idris x 4   Lateral foam walks             Idris walks             Step up              Brandon backward pull             Self management                                       Vitals             BP                            1:1 with PT from  1008-1040am                                                                                                                                          "

## 2025-07-10 ENCOUNTER — OFFICE VISIT (OUTPATIENT)
Dept: PHYSICAL THERAPY | Facility: CLINIC | Age: 75
End: 2025-07-10
Payer: MEDICARE

## 2025-07-10 DIAGNOSIS — R26.9 NEUROLOGIC GAIT DYSFUNCTION: ICD-10-CM

## 2025-07-10 DIAGNOSIS — G35 MULTIPLE SCLEROSIS (HCC): Primary | ICD-10-CM

## 2025-07-10 DIAGNOSIS — Z91.81 RISK FOR FALLS: ICD-10-CM

## 2025-07-10 PROCEDURE — 97530 THERAPEUTIC ACTIVITIES: CPT | Performed by: PHYSICAL THERAPIST

## 2025-07-10 PROCEDURE — 97110 THERAPEUTIC EXERCISES: CPT | Performed by: PHYSICAL THERAPIST

## 2025-07-10 PROCEDURE — 97140 MANUAL THERAPY 1/> REGIONS: CPT | Performed by: PHYSICAL THERAPIST

## 2025-07-10 NOTE — PROGRESS NOTES
"Daily Note     Today's date: 7/10/2025  Patient name: Jacy Rodrigez  : 1950  MRN: [id number]  Referring provider: Elidia Briceno MD  Dx:   Encounter Diagnosis     ICD-10-CM    1. Multiple sclerosis (HCC)  G35       2. Risk for falls  Z91.81       3. Neurologic gait dysfunction  R26.9                      Subjective: Pt notes that her knees are bothersome this morning, specifically the left knee. She was sitting on a less supportive chair and had a very difficult time transferring yesterday. She and her  lowered patient to the ground on her knees to transfer to support bench prior to standing. She denies any wounds or injury but her knees are painful since she had to kneel on them.      Objective: See treatment diary below      Assessment: Despite having more knee discomfort than previous session, pt did well with all exercises. She was able to demonstrate appropriate clearance during idris walks and transferred independently. Her gait was stable. She did experience a burning like sensation at anterior knees, left worse than right. Tolerated treatment well. Patient would benefit from continued PT.      Plan: Continue per plan of care.      Precautions: MS, Gait dysfunction, Insomnia, Osteoporosis, Peripheral polyneuropathy, Hx of Breast CA, R IDALIA in - Dr. Rushing, Left femur IM nailing       Manuals 6/2 6/19 6/23 6/26 6/30 7/3 7/7 7/10  5/29   Sciatic nerve mobilizations 3' 3' 3' 3'e 3' 3' 3' 3'  3'                Hip ROM: flexion, ER 5' 5' 5'  5'   5'  5'  5' 5'   5'                             Neuro Re-Ed                          Slump nerve glides             Sciatic nerve glides             Wobble board for balance 2' ea (forward/lateral)         2\" ea (forward/lateral)   LAQ             Standing hip abduction defers 15x on ea Modified next visit        10x on left and 15x on right   Standing march  15x on ea           Standing hip extension  15x on ea           EC on foam            " "  Marching in hooklying Standing 15 L ; 20 R Standing 20x on ea         Standing 10x left and 15x on right    Hamstring curls             Bridge             Hip add isometric             TKE in standing              Hip abd isometric in hooklying             Ther Ex             Hip flexion stretch with strap             Nustep/bike 10' lv1 10' lv 5' Lv1 10' L1 700 steps 10' L1 10' L1 10' L1 10' L1 >700 steps  NP   Hamstring curl              Piriformis stretch             SB stretch              Seated ankle DF             SB gastroc stretch    Next visit  4x30\" 4x30\" 4x30\" 4x30\"     Seated ankle PF/DF on wobble board 20 ea 20x ea  20x ea 20x on ea     20x on ea                Ther Activity             Standing HR/TR 30x ea 30x on ea  30x on ea 30x on ea 30x on ea 30x on ea 30x on ea  20x HR &20X TR   Sit to stand squat             Lateral walks 4 hurdles x5 nv  3x on hurdles 4 hurdles x 4 4 hurdles x2 4 hurdles x 4 4 hurdles x 4     Forward walks 4 hurdles x5 nv  3x on hurdles 4 hurdles x 4 4 hurdles x 2 4 hurdles x 4 4 hurdles x 4  5 idris x 4   Lateral foam walks             Idris walks             Step up              Joseph backward pull             Self management                                       Vitals             BP                            1:1 with PT from  1023-1101am                                                                                                                                           "

## 2025-07-14 ENCOUNTER — APPOINTMENT (OUTPATIENT)
Dept: PHYSICAL THERAPY | Facility: CLINIC | Age: 75
End: 2025-07-14
Payer: MEDICARE

## 2025-07-17 ENCOUNTER — APPOINTMENT (OUTPATIENT)
Dept: PHYSICAL THERAPY | Facility: CLINIC | Age: 75
End: 2025-07-17
Payer: MEDICARE

## 2025-07-21 ENCOUNTER — OFFICE VISIT (OUTPATIENT)
Dept: PHYSICAL THERAPY | Facility: CLINIC | Age: 75
End: 2025-07-21
Payer: MEDICARE

## 2025-07-21 DIAGNOSIS — Z91.81 RISK FOR FALLS: Primary | ICD-10-CM

## 2025-07-21 DIAGNOSIS — R26.9 NEUROLOGIC GAIT DYSFUNCTION: ICD-10-CM

## 2025-07-21 DIAGNOSIS — G35 MULTIPLE SCLEROSIS (HCC): ICD-10-CM

## 2025-07-21 PROCEDURE — 97110 THERAPEUTIC EXERCISES: CPT | Performed by: PHYSICAL THERAPIST

## 2025-07-21 PROCEDURE — 97530 THERAPEUTIC ACTIVITIES: CPT | Performed by: PHYSICAL THERAPIST

## 2025-07-21 PROCEDURE — 97140 MANUAL THERAPY 1/> REGIONS: CPT | Performed by: PHYSICAL THERAPIST

## 2025-07-21 NOTE — PROGRESS NOTES
Daily Note     Today's date: 2025  Patient name: Jacy Rodrigez  : 1950  MRN: [id number]  Referring provider: Elidia Briceno MD  Dx:   Encounter Diagnosis     ICD-10-CM    1. Risk for falls  Z91.81       2. Neurologic gait dysfunction  R26.9       3. Multiple sclerosis (HCC)  G35                      Subjective: Pt reports that her legs are feeling like lead. 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       Objective: See treatment diary below      Assessment: Tolerated treatment well. Good clearance present during idris walks today. Minimal swelling in bilateral LE. Patient exhibited good technique with therapeutic exercises and would benefit from continued PT.      Plan: Continue per plan of care.      Precautions: MS, Gait dysfunction, Insomnia, Osteoporosis, Peripheral polyneuropathy, Hx of Breast CA, R IDALIA in - Dr. Rushing, Left femur IM nailing       Manuals 6/2 6/19 6/23 6/26 6/30 7/3 7/7 7/10 7/21    Sciatic nerve mobilizations 3' 3' 3' 3'e 3' 3' 3' 3' 3'                 Hip ROM: flexion, ER 5' 5' 5'  5'   5'  5'  5' 5'  5'                              Neuro Re-Ed                                       Slump nerve glides             Sciatic nerve glides             [facility]' ea (forward/lateral)            LAQ             Standing hip abduction defers 15x on ea Modified next visit           Standing march  15x on ea           Standing hip extension  15x on ea           EC on foam              Marching in hooklying Standing 15 L ; 20 R Standing 20x on ea            Hamstring curls             Bridge             Hip add isometric             TKE in standing              Hip abd isometric in hooklying             Ther Ex             Hip flexion stretch with strap             Nustep/bike 10' lv1 10' lv 5' Lv1 10' L1 700 steps 10' L1 10' L1 10' L1 10' L1 >700 steps 10' L1    Hamstring curl              Piriformis stretch             SB stretch              Seated  "ankle DF             SB gastroc stretch    Next visit  4x30\" 4x30\" 4x30\" 4x30\" 4x30\"    Seated ankle PF/DF on wobble board 20 ea 20x ea  20x ea 20x on ea                     Ther Activity             Standing HR/TR 30x ea 30x on ea  30x on ea 30x on ea 30x on ea 30x on ea 30x on ea 30x on ea    Sit to stand squat             Lateral walks 4 hurdles x5 nv  3x on hurdles 4 hurdles x 4 4 hurdles x2 4 hurdles x 4 4 hurdles x 4 4 hurdles x 4    Forward walks 4 hurdles x5 nv  3x on hurdles 4 hurdles x 4 4 hurdles x 2 4 hurdles x 4 4 hurdles x 4 4 hurdles x 4    Lateral foam walks             Leeann walks             Step up              Brandon backward pull             Self management                                       Vitals             BP                            1:1 with PT from  1015-1053am                                                                                                                                              "

## 2025-07-22 ENCOUNTER — OFFICE VISIT (OUTPATIENT)
Age: 75
End: 2025-07-22

## 2025-07-22 DIAGNOSIS — Z86.000 PERSONAL HISTORY OF IN-SITU NEOPLASM OF BREAST: Primary | ICD-10-CM

## 2025-07-22 PROCEDURE — 99024 POSTOP FOLLOW-UP VISIT: CPT | Performed by: PHYSICIAN ASSISTANT

## 2025-07-22 NOTE — PROGRESS NOTES
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 Canales. Bilateral breasts are soft, supple & symmetric. Left breast incision is healing nicely. She will continue with scar gel. We will see her back in 6 weeks.         Subjective:      Patient ID: Jacy Rodrigez is a 75 y.o. female.    HPI    Pt is here for a post-op visit. She underwent removal of ruptured left breast implant, complete left breast capsulectomy, delayed reconstruction of left breast utilizing smooth round silicone gel implant (Dwight 375cc moderate plus profile) on 6/9 by Dr. Canales. She has been having difficulty sleeping b/c of her MS & not being able to lay on her left side.     Problem List[1]  Allergies   Allergen Reactions    Duloxetine Hcl      Rapid Heart rate      Iodinated Contrast Media Anaphylaxis     IVP dye    Acetaminophen Other (See Comments)     Heart palpitations    Cetirizine      Only occurs with generic, weakness in legs, off balance and couldn't walk.    Morphine Other (See Comments)     Migraine     Current Outpatient Medications on File Prior to Visit   Medication Sig    ALPHA LIPOIC ACID PO Take by mouth    amoxicillin (AMOXIL) 500 MG tablet 500 mg dental    Ampyra 10 MG TB12 TAKE 1 TABLET BY MOUTH 2 TIMES A DAY    ascorbic acid (VITAMIN C) 500 mg tablet Take 500 mg by mouth in the morning.    baclofen 10 mg tablet TAKE ONE TABLET BY MOUTH EVERY MORNING, ONE TABLET IN THE EVENING AND TWO TABLETS AT BEDTIME    Biotin 5000 MCG TABS     clonazePAM (KlonoPIN) 0.5 mg tablet Take 1.5 tablets (0.75 mg total) by mouth daily at bedtime TAKE 1 AND 1/2 TABLETS BY MOUTH DAILY AT BEDTIME    Cranberry 125 MG TABS Take 125 mg by mouth in the morning     cycloSPORINE (RESTASIS) 0.05 % ophthalmic emulsion Administer 1 drop to both eyes if needed (Patient not taking: Reported on 1/21/2025)    denosumab (PROLIA) 60 mg/mL Inject under the skin Due July 2025    diclofenac (VOLTAREN) 75 mg EC tablet Take 1 tablet by mouth in the morning and 1 tablet before bedtime. (Patient taking differently: Take 1 tablet by mouth in the morning and 1 tablet before bedtime. On hold currently for two weeks prior to surgery .)    docusate sodium (COLACE) 100 mg capsule Take 1 capsule by mouth every other day    gabapentin (NEURONTIN) 600 MG tablet TAKE 1 TABLET EVERY        MORNING, 1 TABLET IN THE   AFTERNOON, AND 2 TABLETS ATBEDTIME (Patient taking differently: Take 600 mg by mouth TAKE 1 TABLET EVERY        MORNING, 1 TABLET IN THE   AFTERNOON, AND 2 TABLETS ATBEDTIME  Pt states she has started taking an additional tab I of 300mg n the afternoon)    lisinopril (ZESTRIL) 2.5 mg tablet Take 2.5 mg by mouth daily    lisinopril (ZESTRIL) 5 mg tablet Take 1 tablet by mouth daily at bedtime    LORazepam (Ativan) 0.5 mg tablet Take 1 pill PO 1 hr prior to procedure, take another 20mins before if needed.    Magnesium 500 MG TABS Take 500 tablets by mouth once    meloxicam (MOBIC) 7.5 mg tablet Take 7.5 mg by mouth daily    zonisamide (ZONEGRAN) 100 mg capsule TAKE 4 CAPSULES DAILY AT   BEDTIME     No current facility-administered medications on file prior to visit.     Family History[2]  Past Medical History[3]  Social History[4]  Past Surgical History[5]      Review of Systems   All other systems reviewed and are negative.        Objective:      There were no vitals taken for this visit.         Physical Exam  Constitutional:       Appearance: Normal appearance. She is well-developed.   HENT:      Head: Normocephalic and atraumatic.     Eyes:      Conjunctiva/sclera: Conjunctivae normal.     Pulmonary:      Effort: Pulmonary effort is normal.      Comments: Bilateral breasts are soft,  supple & symmetric. Left breast incision is healing nicely. See photo.   Abdominal:      Tenderness: There is no abdominal tenderness.     Musculoskeletal:      Cervical back: Normal range of motion.      Comments: Walks with a cane     Skin:     General: Skin is warm and dry.     Neurological:      Mental Status: She is alert and oriented to person, place, and time.     Psychiatric:         Mood and Affect: Mood normal.         Behavior: Behavior normal.              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    Multiple sclerosis (HCC)    Peripheral polyneuropathy    Depression    Fatigue    Gait disturbance    Unilateral occipital headache    History of bilateral hip replacements    Chronic insomnia    Solitary pulmonary nodule    Osteopenia    Screening mammogram, encounter for    Encounter for breast reconstruction following mastectomy    Encounter for follow-up surveillance of breast cancer    Essential hypertension    Abnormal finding on breast imaging    TSH (thyroid-stimulating hormone deficiency)    B12 deficiency    Dense breast tissue    Subareolar lump of right breast    Hyperlipidemia    Pain and swelling of right knee    Open-angle glaucoma suspect of both eyes    Bilateral pseudophakia    Epiretinal membrane    H/O unilateral modified radical mastectomy, left    Open-angle glaucoma of both eyes    Posterior capsular opacification non visually significant of right eye    Refractory migraine with aura    Status post bilateral cataract extraction    Strain of left biceps muscle    Tear of left rotator cuff    Vitreomacular adhesion of both eyes    MAGDI (obstructive sleep apnea)    Excessive daytime sleepiness    Benign hypertension with CKD (chronic kidney disease), stage II    Renal cyst    OAB (overactive bladder)   [2]   Family History  Problem Relation Name Age of Onset    Coronary artery disease Mother Lucy Lipmarla     Hyperlipidemia Mother Lucy  Lippey     Rheum arthritis Mother Lucy Lippey     Basal cell carcinoma Mother Lucy Lippey     Other Father          Acute myocardial infarction    Cancer Maternal Aunt  70        bladder    Heart disease Maternal Aunt      Stroke Maternal Aunt Lizz Lr         6 CVA before death    Breast cancer Maternal Aunt Claritza     Colon cancer Maternal Uncle  65    Hodgkin's lymphoma Maternal Uncle  70    No Known Problems Paternal Aunt      No Known Problems Paternal Aunt      No Known Problems Paternal Aunt      No Known Problems Maternal Grandmother      Hodgkin's lymphoma Maternal Grandfather          age at dx unk    No Known Problems Paternal Grandmother      No Known Problems Paternal Grandfather      Stroke Son devendra 49    No Known Problems Son emile    [3]   Past Medical History:  Diagnosis Date    Allergic 1967    seasonal    Allergic rhinitis     Bone pain     Breast cancer (HCC) 08/16/2012    left    Breast ptosis     Colon polyp     Depression     Fatigue     Fx. left wrist, closed, initial encounter 09/20/2014    Gait disturbance     uses cane at times    Headache     Hip fracture (HCC)     Hip pain     Hip pain, right 09/18/2012    Laterality: Right    History of transfusion 1975    placenta previa s/p work accident, no rx    Hypokalemia     Insomnia     Kidney stone     Laceration of finger     right hand    Left ankle pain     Left knee pain     Multiple sclerosis (HCC)     Muscle strain     left lower leg    Nephrolithiasis     Osteopenia     Osteoporosis     Pain of left calf     Pain of left lower leg 07/25/2017    Pneumonia     Rash     Scoliosis birth    scoliosis    Solitary pulmonary nodule     SVT (supraventricular tachycardia) (HCC)     Tingling     Urgency of urination     Urticaria     Wrist fracture, left    [4]   Social History  Socioeconomic History    Marital status: /Civil Union    Number of children: 2    Years of education: Completed bachelor's degree   Occupational History     Occupation: RN     Comment: Retired   Tobacco Use    Smoking status: Former     Current packs/day: 0.00     Average packs/day: 1 pack/day for 35.0 years (35.0 ttl pk-yrs)     Types: Cigarettes     Start date:      Quit date:      Years since quittin.5     Passive exposure: Never    Smokeless tobacco: Never   Vaping Use    Vaping status: Never Used   Substance and Sexual Activity    Alcohol use: No    Drug use: No    Sexual activity: Yes     Partners: Male     Birth control/protection: Post-menopausal   Social History Narrative    Denied:  History of caffeine use     Social Drivers of Health     Financial Resource Strain: Patient Declined (10/19/2023)    Overall Financial Resource Strain (CARDIA)     Difficulty of Paying Living Expenses: Patient declined   Transportation Needs: Patient Declined (10/19/2023)    PRAPARE - Transportation     Lack of Transportation (Medical): Patient declined     Lack of Transportation (Non-Medical): Patient declined   [5]   Past Surgical History:  Procedure Laterality Date    APPENDECTOMY  2012    Dr. Vidales    AUGMENTATION BREAST      Enlargement procedure with prosthetic implant bilateral    AUGMENTATION MAMMAPLASTY Right 2020    implant replaced because of recall    AUGMENTATION MAMMAPLASTY Bilateral 2012    BREAST BIOPSY Left 2012    BREAST EXCISIONAL BIOPSY Right     age 39    BREAST IMPLANT Right 2020    Procedure: BREAST IMPLANT EXCHANGE WITH CAPSULECTOMY;  Surgeon: Da Canales MD;  Location: [facility];  Service: Plastics    BREAST IMPLANT REMOVAL Right 2020    implant placement, implant revision, right mastopexy    COLONOSCOPY      CYSTOSTOMY      with basket extraction of calculus; x2    EXCISION / BIOPSY BREAST / NIPPLE / DUCT Right 2020    scar revision to resuture non healing surgical wound    EXPLORATORY LAPAROTOMY      FL INJECTION LEFT SHOULDER (ARTHROGRAM)  2023    FOOT SURGERY      5 th metarasal fracture  repair    FRACTURE SURGERY  Lt wrist 9/2014 - Lt matthew femur 9/14    HIP FRACTURE SURGERY Right     Subcapital    HIP SURGERY Left     JOINT REPLACEMENT  Rt hip 10/2012    LEG SURGERY      Repair    MASTECTOMY Left 08/16/2012    FL GRAFTING OF AUTOLOGOUS FAT BY LIPO 50 CC OR LESS Bilateral 08/22/2023    Procedure: AUTOLOGOUS FAT GRAFTING TO BILATERAL BREASTS;  Surgeon: Da Canales MD;  [facility];  Service: Plastics    FL MASTOPEXY Right 01/28/2020    Procedure: BREAST MASTOPEXY;  Surgeon: Da Canales MD;  [facility];  Service: Plastics    FL REVISION OF RECONSTRUCTED BREAST Right 05/04/2020    Procedure: REVISION RIGHT BREAST MOUND;  Surgeon: Da Canales MD;  [facility];  Service: Plastics    FL RMVL RUPTURED BREAST IMPLANT W/IMPLANT CONTENTS Left 6/9/2025    Procedure: REMOVAL OF LEFT BREAST IMPLANT, LEFT BREAST CAPSULECTOMY, DELAYED RECONSTRUCTION OF LEFT BREAST WITH BREAST IMPLANT;  Surgeon: Da Canales MD;  Location: [facility];  Service: Plastics    REDUCTION MAMMAPLASTY Right 01/28/2020    reduced to match left side    SENTINEL LYMPH NODE BIOPSY Left 08/16/2012    SKIN BIOPSY      TONSILLECTOMY      TUBAL LIGATION      WRIST SURGERY Left

## 2025-07-23 ENCOUNTER — APPOINTMENT (OUTPATIENT)
Dept: LAB | Facility: CLINIC | Age: 75
End: 2025-07-23
Payer: MEDICARE

## 2025-07-23 ENCOUNTER — TRANSCRIBE ORDERS (OUTPATIENT)
Dept: LAB | Facility: CLINIC | Age: 75
End: 2025-07-23

## 2025-07-23 DIAGNOSIS — D51.9 ANEMIA DUE TO VITAMIN B12 DEFICIENCY, UNSPECIFIED B12 DEFICIENCY TYPE: Primary | ICD-10-CM

## 2025-07-23 DIAGNOSIS — D51.9 ANEMIA DUE TO VITAMIN B12 DEFICIENCY, UNSPECIFIED B12 DEFICIENCY TYPE: ICD-10-CM

## 2025-07-23 DIAGNOSIS — N18.2 BENIGN HYPERTENSION WITH CKD (CHRONIC KIDNEY DISEASE), STAGE II: ICD-10-CM

## 2025-07-23 DIAGNOSIS — I12.9 BENIGN HYPERTENSION WITH CKD (CHRONIC KIDNEY DISEASE), STAGE II: ICD-10-CM

## 2025-07-23 LAB
ALBUMIN SERPL BCG-MCNC: 3.9 G/DL (ref 3.5–5)
ALP SERPL-CCNC: 83 U/L (ref 34–104)
ALT SERPL W P-5'-P-CCNC: 42 U/L (ref 7–52)
ANION GAP SERPL CALCULATED.3IONS-SCNC: 8 MMOL/L (ref 4–13)
AST SERPL W P-5'-P-CCNC: 23 U/L (ref 13–39)
BILIRUB SERPL-MCNC: 0.56 MG/DL (ref 0.2–1)
BUN SERPL-MCNC: 19 MG/DL (ref 5–25)
CALCIUM SERPL-MCNC: 9.1 MG/DL (ref 8.4–10.2)
CHLORIDE SERPL-SCNC: 108 MMOL/L (ref 96–108)
CO2 SERPL-SCNC: 27 MMOL/L (ref 21–32)
CREAT SERPL-MCNC: 0.86 MG/DL (ref 0.6–1.3)
CREAT UR-MCNC: 46.4 MG/DL
GFR SERPL CREATININE-BSD FRML MDRD: 65 ML/MIN/1.73SQ M
GLUCOSE P FAST SERPL-MCNC: 90 MG/DL (ref 65–99)
MICROALBUMIN UR-MCNC: <7 MG/L
POTASSIUM SERPL-SCNC: 4.4 MMOL/L (ref 3.5–5.3)
PROT SERPL-MCNC: 6.6 G/DL (ref 6.4–8.4)
SODIUM SERPL-SCNC: 143 MMOL/L (ref 135–147)
VIT B12 SERPL-MCNC: 134 PG/ML (ref 180–914)

## 2025-07-23 PROCEDURE — 36415 COLL VENOUS BLD VENIPUNCTURE: CPT

## 2025-07-23 PROCEDURE — 82570 ASSAY OF URINE CREATININE: CPT

## 2025-07-23 PROCEDURE — 80053 COMPREHEN METABOLIC PANEL: CPT

## 2025-07-23 PROCEDURE — 82043 UR ALBUMIN QUANTITATIVE: CPT

## 2025-07-23 PROCEDURE — 82607 VITAMIN B-12: CPT

## 2025-07-24 ENCOUNTER — OFFICE VISIT (OUTPATIENT)
Dept: PHYSICAL THERAPY | Facility: CLINIC | Age: 75
End: 2025-07-24
Payer: MEDICARE

## 2025-07-24 DIAGNOSIS — Z91.81 RISK FOR FALLS: Primary | ICD-10-CM

## 2025-07-24 DIAGNOSIS — G35 MULTIPLE SCLEROSIS (HCC): ICD-10-CM

## 2025-07-24 DIAGNOSIS — R26.9 NEUROLOGIC GAIT DYSFUNCTION: ICD-10-CM

## 2025-07-24 PROCEDURE — 97140 MANUAL THERAPY 1/> REGIONS: CPT | Performed by: PHYSICAL THERAPIST

## 2025-07-24 PROCEDURE — 97110 THERAPEUTIC EXERCISES: CPT | Performed by: PHYSICAL THERAPIST

## 2025-07-24 PROCEDURE — 97530 THERAPEUTIC ACTIVITIES: CPT | Performed by: PHYSICAL THERAPIST

## 2025-07-24 NOTE — PROGRESS NOTES
Daily Note     Today's date: 2025  Patient name: Jacy Rodrigez  : 1950  MRN: [id number]  Referring provider: Elidia Briceno MD  Dx:   Encounter Diagnosis     ICD-10-CM    1. Risk for falls  Z91.81       2. Neurologic gait dysfunction  R26.9       3. Multiple sclerosis (HCC)  G35                      Subjective: Pt reports having a good nights sleep which has helped her legs recovery and gain more energy.       Objective: See treatment diary below      Assessment: Tolerated treatment well. Good clearance of bilateral LE during idris steps. Pt plans to purchase a light weight rolling walker to utilize during ambulation when fatigued, particularly when away for work or at a conference. Pt still ambulating safely with a SPC when not fatigue.  Improved ankle mobility present since resuming SB gastroc stretching. Patient exhibited good technique with therapeutic exercises and would benefit from continued PT.       Plan: Continue per plan of care. Pt plans to hold from PT in August when her  received spine surgery.      Precautions: MS, Gait dysfunction, Insomnia, Osteoporosis, Peripheral polyneuropathy, Hx of Breast CA, R IDALIA in - Dr. Rushing, Left femur IM nailing       Manuals 6/2 6/19 6/23 6/26 6/30 7/3 7/7 7/10 7/21 7/24   Sciatic nerve mobilizations 3' 3' 3' 3'e 3' 3' 3' 3' 3' 3'                             Hip ROM: flexion, ER 5' 5' 5'  5'   5'  5'  5' 5'  5' 5'                             Neuro Re-Ed                          Slump nerve glides             Sciatic nerve glides             Wobble board for balance 2' ea (forward/lateral)            LAQ             Standing hip abduction defers 15x on ea Modified next visit           Standing march  15x on ea           Standing hip extension  15x on ea           EC on foam              Marching in hooklying Standing 15 L ; 20 R Standing 20x on ea            Hamstring curls             Bridge             Hip add isometric             TKE in  "standing              Hip abd isometric in hooklying             Ther Ex             Hip flexion stretch with strap             Nustep/bike 10' lv1 10' lv 5' Lv1 10' L1 700 steps 10' L1 10' L1 10' L1 10' L1 >700 steps 10' L1 10' L1 ~700 steps   Hamstring curl              Piriformis stretch             SB stretch              Seated ankle DF             SB gastroc stretch    Next visit  4x30\" 4x30\" 4x30\" 4x30\" 4x30\" 4x30   Seated ankle PF/DF on wobble board 20 ea 20x ea  20x ea 20x on ea                     Ther Activity             Standing HR/TR 30x ea 30x on ea  30x on ea 30x on ea 30x on ea 30x on ea 30x on ea 30x on ea 30x on ea   Sit to stand squat             Lateral walks 4 hurdles x5 nv  3x on hurdles 4 hurdles x 4 4 hurdles x2 4 hurdles x 4 4 hurdles x 4 4 hurdles x 4 4 hurdles x 4   Forward walks 4 hurdles x5 nv  3x on hurdles 4 hurdles x 4 4 hurdles x 2 4 hurdles x 4 4 hurdles x 4 4 hurdles x 4 4 hurdles x 4   Lateral foam walks             Leeann walks             Step up              [place] backward pull             Self management                                       Vitals             BP                            1:1 with PT from  1016-11am                                                                                                                                               "

## 2025-07-25 NOTE — TELEPHONE ENCOUNTER
Patient called and stated that there was a possible suture  Scheduled an appointment with Bobby Collado RN  156

## 2025-07-28 ENCOUNTER — OFFICE VISIT (OUTPATIENT)
Dept: PHYSICAL THERAPY | Facility: CLINIC | Age: 75
End: 2025-07-28
Payer: MEDICARE

## 2025-07-28 ENCOUNTER — PATIENT MESSAGE (OUTPATIENT)
Dept: NEUROLOGY | Facility: CLINIC | Age: 75
End: 2025-07-28

## 2025-07-28 DIAGNOSIS — R26.9 NEUROLOGIC GAIT DYSFUNCTION: ICD-10-CM

## 2025-07-28 DIAGNOSIS — Z91.81 RISK FOR FALLS: Primary | ICD-10-CM

## 2025-07-28 DIAGNOSIS — G35 MULTIPLE SCLEROSIS (HCC): ICD-10-CM

## 2025-07-28 PROCEDURE — 97140 MANUAL THERAPY 1/> REGIONS: CPT | Performed by: PHYSICAL THERAPIST

## 2025-07-28 PROCEDURE — 97530 THERAPEUTIC ACTIVITIES: CPT | Performed by: PHYSICAL THERAPIST

## 2025-07-28 PROCEDURE — 97110 THERAPEUTIC EXERCISES: CPT | Performed by: PHYSICAL THERAPIST

## 2025-07-29 DIAGNOSIS — R26.9 GAIT DISTURBANCE: ICD-10-CM

## 2025-07-29 DIAGNOSIS — G35 MULTIPLE SCLEROSIS (HCC): Primary | ICD-10-CM

## 2025-07-29 DIAGNOSIS — R26.2 AMBULATORY DYSFUNCTION: ICD-10-CM

## 2025-07-29 RX ORDER — DALFAMPRIDINE 10 MG/1
TABLET, FILM COATED, EXTENDED RELEASE ORAL
Qty: 180 TABLET | Refills: 0 | Status: SHIPPED | OUTPATIENT
Start: 2025-07-29

## 2025-07-30 ENCOUNTER — TELEMEDICINE (OUTPATIENT)
Dept: NEPHROLOGY | Facility: CLINIC | Age: 75
End: 2025-07-30
Payer: MEDICARE

## 2025-07-30 VITALS — DIASTOLIC BLOOD PRESSURE: 62 MMHG | SYSTOLIC BLOOD PRESSURE: 96 MMHG

## 2025-07-30 DIAGNOSIS — N18.2 BENIGN HYPERTENSION WITH CKD (CHRONIC KIDNEY DISEASE), STAGE II: Primary | ICD-10-CM

## 2025-07-30 DIAGNOSIS — R50.9 FEVER, UNSPECIFIED FEVER CAUSE: ICD-10-CM

## 2025-07-30 DIAGNOSIS — Z86.000 PERSONAL HISTORY OF IN-SITU NEOPLASM OF BREAST: ICD-10-CM

## 2025-07-30 DIAGNOSIS — E53.8 B12 DEFICIENCY: ICD-10-CM

## 2025-07-30 DIAGNOSIS — Z90.12 H/O UNILATERAL MODIFIED RADICAL MASTECTOMY, LEFT: ICD-10-CM

## 2025-07-30 DIAGNOSIS — G47.33 OSA (OBSTRUCTIVE SLEEP APNEA): ICD-10-CM

## 2025-07-30 DIAGNOSIS — I12.9 BENIGN HYPERTENSION WITH CKD (CHRONIC KIDNEY DISEASE), STAGE II: Primary | ICD-10-CM

## 2025-07-30 PROCEDURE — 99213 OFFICE O/P EST LOW 20 MIN: CPT | Performed by: INTERNAL MEDICINE

## 2025-07-31 ENCOUNTER — APPOINTMENT (OUTPATIENT)
Dept: PHYSICAL THERAPY | Facility: CLINIC | Age: 75
End: 2025-07-31
Payer: MEDICARE

## 2025-08-01 ENCOUNTER — OFFICE VISIT (OUTPATIENT)
Dept: PLASTIC SURGERY | Facility: CLINIC | Age: 75
End: 2025-08-01

## 2025-08-01 ENCOUNTER — ANESTHESIA EVENT (OUTPATIENT)
Dept: PERIOP | Facility: AMBULARY SURGERY CENTER | Age: 75
End: 2025-08-01
Payer: MEDICARE

## 2025-08-01 ENCOUNTER — PREP FOR PROCEDURE (OUTPATIENT)
Dept: PLASTIC SURGERY | Facility: CLINIC | Age: 75
End: 2025-08-01

## 2025-08-01 ENCOUNTER — TELEPHONE (OUTPATIENT)
Dept: PLASTIC SURGERY | Facility: CLINIC | Age: 75
End: 2025-08-01

## 2025-08-01 DIAGNOSIS — T85.79XA: Primary | ICD-10-CM

## 2025-08-01 DIAGNOSIS — Z85.3 PERSONAL HISTORY OF BREAST CANCER: Primary | ICD-10-CM

## 2025-08-01 DIAGNOSIS — Z48.89 ENCOUNTER FOR FOLLOW-UP CARE INVOLVING PLASTIC SURGERY: ICD-10-CM

## 2025-08-01 PROCEDURE — 99024 POSTOP FOLLOW-UP VISIT: CPT

## 2025-08-01 RX ORDER — SULFAMETHOXAZOLE AND TRIMETHOPRIM 800; 160 MG/1; MG/1
1 TABLET ORAL EVERY 12 HOURS SCHEDULED
Qty: 14 TABLET | Refills: 0 | Status: SHIPPED | OUTPATIENT
Start: 2025-08-01 | End: 2025-08-08

## 2025-08-04 ENCOUNTER — HOSPITAL ENCOUNTER (OUTPATIENT)
Facility: AMBULARY SURGERY CENTER | Age: 75
Setting detail: OUTPATIENT SURGERY
Discharge: HOME | End: 2025-08-04
Attending: SURGERY | Admitting: SURGERY
Payer: MEDICARE

## 2025-08-04 ENCOUNTER — ANESTHESIA (OUTPATIENT)
Dept: PERIOP | Facility: AMBULARY SURGERY CENTER | Age: 75
End: 2025-08-04
Payer: MEDICARE

## 2025-08-04 VITALS
BODY MASS INDEX: 25.69 KG/M2 | RESPIRATION RATE: 16 BRPM | SYSTOLIC BLOOD PRESSURE: 148 MMHG | TEMPERATURE: 97.9 F | DIASTOLIC BLOOD PRESSURE: 77 MMHG | OXYGEN SATURATION: 97 % | HEIGHT: 63 IN | HEART RATE: 76 BPM | WEIGHT: 145 LBS

## 2025-08-04 DIAGNOSIS — Z85.3 PERSONAL HISTORY OF BREAST CANCER: ICD-10-CM

## 2025-08-04 PROCEDURE — 87077 CULTURE AEROBIC IDENTIFY: CPT | Performed by: SURGERY

## 2025-08-04 PROCEDURE — 19328 RMVL INTACT BREAST IMPLANT: CPT | Performed by: SURGERY

## 2025-08-04 PROCEDURE — 87075 CULTR BACTERIA EXCEPT BLOOD: CPT | Performed by: SURGERY

## 2025-08-04 PROCEDURE — 87070 CULTURE OTHR SPECIMN AEROBIC: CPT | Performed by: SURGERY

## 2025-08-04 PROCEDURE — 87205 SMEAR GRAM STAIN: CPT | Performed by: SURGERY

## 2025-08-04 PROCEDURE — 10140 I&D HMTMA SEROMA/FLUID COLLJ: CPT | Performed by: SURGERY

## 2025-08-04 PROCEDURE — 87186 SC STD MICRODIL/AGAR DIL: CPT | Performed by: SURGERY

## 2025-08-04 RX ORDER — DEXAMETHASONE SODIUM PHOSPHATE 10 MG/ML
INJECTION, SOLUTION INTRAMUSCULAR; INTRAVENOUS AS NEEDED
Status: DISCONTINUED | OUTPATIENT
Start: 2025-08-04 | End: 2025-08-04

## 2025-08-04 RX ORDER — ONDANSETRON 2 MG/ML
INJECTION INTRAMUSCULAR; INTRAVENOUS AS NEEDED
Status: DISCONTINUED | OUTPATIENT
Start: 2025-08-04 | End: 2025-08-04

## 2025-08-04 RX ORDER — LIDOCAINE HYDROCHLORIDE 10 MG/ML
INJECTION, SOLUTION EPIDURAL; INFILTRATION; INTRACAUDAL; PERINEURAL AS NEEDED
Status: DISCONTINUED | OUTPATIENT
Start: 2025-08-04 | End: 2025-08-04

## 2025-08-04 RX ORDER — SODIUM CHLORIDE, SODIUM LACTATE, POTASSIUM CHLORIDE, CALCIUM CHLORIDE 600; 310; 30; 20 MG/100ML; MG/100ML; MG/100ML; MG/100ML
100 INJECTION, SOLUTION INTRAVENOUS CONTINUOUS
Status: DISCONTINUED | OUTPATIENT
Start: 2025-08-04 | End: 2025-08-04 | Stop reason: HOSPADM

## 2025-08-04 RX ORDER — FENTANYL CITRATE/PF 50 MCG/ML
25 SYRINGE (ML) INJECTION
Status: DISCONTINUED | OUTPATIENT
Start: 2025-08-04 | End: 2025-08-04 | Stop reason: HOSPADM

## 2025-08-04 RX ORDER — MIDAZOLAM HYDROCHLORIDE 2 MG/2ML
INJECTION, SOLUTION INTRAMUSCULAR; INTRAVENOUS AS NEEDED
Status: DISCONTINUED | OUTPATIENT
Start: 2025-08-04 | End: 2025-08-04

## 2025-08-04 RX ORDER — PROPOFOL 10 MG/ML
INJECTION, EMULSION INTRAVENOUS AS NEEDED
Status: DISCONTINUED | OUTPATIENT
Start: 2025-08-04 | End: 2025-08-04

## 2025-08-04 RX ORDER — DIPHENHYDRAMINE HYDROCHLORIDE 50 MG/ML
12.5 INJECTION, SOLUTION INTRAMUSCULAR; INTRAVENOUS ONCE AS NEEDED
Status: DISCONTINUED | OUTPATIENT
Start: 2025-08-04 | End: 2025-08-04 | Stop reason: HOSPADM

## 2025-08-04 RX ORDER — BUPIVACAINE HYDROCHLORIDE 2.5 MG/ML
INJECTION, SOLUTION EPIDURAL; INFILTRATION; INTRACAUDAL; PERINEURAL AS NEEDED
Status: DISCONTINUED | OUTPATIENT
Start: 2025-08-04 | End: 2025-08-04 | Stop reason: HOSPADM

## 2025-08-04 RX ORDER — OXYCODONE HYDROCHLORIDE 5 MG/1
5 TABLET ORAL EVERY 4 HOURS PRN
Refills: 0 | Status: DISCONTINUED | OUTPATIENT
Start: 2025-08-04 | End: 2025-08-04 | Stop reason: HOSPADM

## 2025-08-04 RX ORDER — LIDOCAINE HYDROCHLORIDE AND EPINEPHRINE 10; 10 MG/ML; UG/ML
INJECTION, SOLUTION INFILTRATION; PERINEURAL AS NEEDED
Status: DISCONTINUED | OUTPATIENT
Start: 2025-08-04 | End: 2025-08-04 | Stop reason: HOSPADM

## 2025-08-04 RX ORDER — HYDROMORPHONE HCL/PF 1 MG/ML
0.5 SYRINGE (ML) INJECTION ONCE AS NEEDED
Status: DISCONTINUED | OUTPATIENT
Start: 2025-08-04 | End: 2025-08-04 | Stop reason: HOSPADM

## 2025-08-04 RX ORDER — FENTANYL CITRATE 50 UG/ML
INJECTION, SOLUTION INTRAMUSCULAR; INTRAVENOUS AS NEEDED
Status: DISCONTINUED | OUTPATIENT
Start: 2025-08-04 | End: 2025-08-04

## 2025-08-04 RX ORDER — OXYCODONE HYDROCHLORIDE 5 MG/1
5 TABLET ORAL EVERY 4 HOURS PRN
Qty: 10 TABLET | Refills: 0 | Status: SHIPPED | OUTPATIENT
Start: 2025-08-04 | End: 2025-08-14

## 2025-08-04 RX ORDER — PHENYLEPHRINE HCL IN 0.9% NACL 1 MG/10 ML
SYRINGE (ML) INTRAVENOUS AS NEEDED
Status: DISCONTINUED | OUTPATIENT
Start: 2025-08-04 | End: 2025-08-04

## 2025-08-04 RX ORDER — SULFAMETHOXAZOLE AND TRIMETHOPRIM 800; 160 MG/1; MG/1
1 TABLET ORAL EVERY 12 HOURS SCHEDULED
Qty: 14 TABLET | Refills: 0 | Status: SHIPPED | OUTPATIENT
Start: 2025-08-08 | End: 2025-08-15

## 2025-08-04 RX ORDER — SODIUM CHLORIDE, SODIUM LACTATE, POTASSIUM CHLORIDE, CALCIUM CHLORIDE 600; 310; 30; 20 MG/100ML; MG/100ML; MG/100ML; MG/100ML
INJECTION, SOLUTION INTRAVENOUS CONTINUOUS PRN
Status: DISCONTINUED | OUTPATIENT
Start: 2025-08-04 | End: 2025-08-04

## 2025-08-04 RX ORDER — ONDANSETRON 2 MG/ML
4 INJECTION INTRAMUSCULAR; INTRAVENOUS ONCE AS NEEDED
Status: DISCONTINUED | OUTPATIENT
Start: 2025-08-04 | End: 2025-08-04 | Stop reason: HOSPADM

## 2025-08-04 RX ORDER — CEFAZOLIN SODIUM 1 G/3ML
INJECTION, POWDER, FOR SOLUTION INTRAMUSCULAR; INTRAVENOUS AS NEEDED
Status: DISCONTINUED | OUTPATIENT
Start: 2025-08-04 | End: 2025-08-04

## 2025-08-04 RX ADMIN — Medication 50 MCG: at 09:35

## 2025-08-04 RX ADMIN — Medication 100 MCG: at 09:57

## 2025-08-04 RX ADMIN — Medication 100 MCG: at 10:08

## 2025-08-04 RX ADMIN — FENTANYL CITRATE 50 MCG: 50 INJECTION INTRAMUSCULAR; INTRAVENOUS at 09:22

## 2025-08-04 RX ADMIN — PROPOFOL 50 MG: 10 INJECTION, EMULSION INTRAVENOUS at 09:22

## 2025-08-04 RX ADMIN — MIDAZOLAM 2 MG: 1 INJECTION INTRAMUSCULAR; INTRAVENOUS at 09:13

## 2025-08-04 RX ADMIN — Medication 150 MCG: at 10:00

## 2025-08-04 RX ADMIN — DEXAMETHASONE SODIUM PHOSPHATE 10 MG: 10 INJECTION INTRAMUSCULAR; INTRAVENOUS at 09:24

## 2025-08-04 RX ADMIN — LIDOCAINE HYDROCHLORIDE 50 MG: 10 INJECTION, SOLUTION EPIDURAL; INFILTRATION; INTRACAUDAL at 09:22

## 2025-08-04 RX ADMIN — Medication 50 MCG: at 09:40

## 2025-08-04 RX ADMIN — Medication 100 MCG: at 09:47

## 2025-08-04 RX ADMIN — PROPOFOL 50 MG: 10 INJECTION, EMULSION INTRAVENOUS at 09:25

## 2025-08-04 RX ADMIN — ONDANSETRON 4 MG: 2 INJECTION INTRAMUSCULAR; INTRAVENOUS at 10:07

## 2025-08-04 RX ADMIN — CEFAZOLIN 2000 MG: 1 INJECTION, POWDER, FOR SOLUTION INTRAMUSCULAR; INTRAVENOUS at 09:45

## 2025-08-04 RX ADMIN — SODIUM CHLORIDE, SODIUM LACTATE, POTASSIUM CHLORIDE, AND CALCIUM CHLORIDE: .6; .31; .03; .02 INJECTION, SOLUTION INTRAVENOUS at 09:08

## 2025-08-04 RX ADMIN — PROPOFOL 100 MG: 10 INJECTION, EMULSION INTRAVENOUS at 09:24

## 2025-08-05 ENCOUNTER — TELEPHONE (OUTPATIENT)
Age: 75
End: 2025-08-05

## 2025-08-06 ENCOUNTER — OFFICE VISIT (OUTPATIENT)
Age: 75
End: 2025-08-06

## 2025-08-06 DIAGNOSIS — Z98.890 S/P BREAST RECONSTRUCTION, BILATERAL: Primary | ICD-10-CM

## 2025-08-06 LAB
BACTERIA SPEC ANAEROBE CULT: NORMAL
BACTERIA TISS AEROBE CULT: ABNORMAL
BACTERIA TISS AEROBE CULT: ABNORMAL
GRAM STN SPEC: ABNORMAL
GRAM STN SPEC: ABNORMAL

## 2025-08-06 PROCEDURE — 99024 POSTOP FOLLOW-UP VISIT: CPT

## 2025-08-12 ENCOUNTER — OFFICE VISIT (OUTPATIENT)
Age: 75
End: 2025-08-12

## 2025-08-12 PROBLEM — N64.89 SEROMA OF BREAST: Status: ACTIVE | Noted: 2025-08-12

## 2025-08-15 ENCOUNTER — TELEPHONE (OUTPATIENT)
Dept: OBGYN CLINIC | Facility: OTHER | Age: 75
End: 2025-08-15

## 2025-08-19 ENCOUNTER — OFFICE VISIT (OUTPATIENT)
Age: 75
End: 2025-08-19

## 2025-08-19 DIAGNOSIS — Z85.3 PERSONAL HISTORY OF BREAST CANCER: ICD-10-CM

## 2025-08-19 DIAGNOSIS — N64.89 SEROMA OF BREAST: Primary | ICD-10-CM

## 2025-08-19 PROCEDURE — 99024 POSTOP FOLLOW-UP VISIT: CPT | Performed by: PHYSICIAN ASSISTANT

## (undated) DEVICE — Device

## (undated) DEVICE — CHLORAPREP HI-LITE 26ML ORANGE

## (undated) DEVICE — IV CATH 18 G X 1.16 IN

## (undated) DEVICE — DECANTER: Brand: UNBRANDED

## (undated) DEVICE — SUT STRATAFIX SPIRAL 4-0 PGA/PCL 30 X 30 CM SXMD2B409

## (undated) DEVICE — FUNNEL E KELLER 2 DELIVERY DEVICE

## (undated) DEVICE — GAUZE SPONGES,16 PLY: Brand: CURITY

## (undated) DEVICE — IV SET INJECTION CARESITE VALVE

## (undated) DEVICE — PENCILETTE PUSH BUTTON COATED

## (undated) DEVICE — VIAL DECANTER

## (undated) DEVICE — BETHLEHEM UNIVERSAL BREAST PK: Brand: CARDINAL HEALTH

## (undated) DEVICE — GLOVE SRG BIOGEL 7

## (undated) DEVICE — BULB SYRINGE,IRRIGATION WITH PROTECTIVE CAP: Brand: DOVER

## (undated) DEVICE — Device: Brand: REVOLVE ADVANCED ADIPOSE SYSTEM

## (undated) DEVICE — TUBING INFILTRATION SOFTOUCH PUMP

## (undated) DEVICE — GLOVE INDICATOR PI UNDERGLOVE SZ 6.5 BLUE

## (undated) DEVICE — SPONGE LAP 18 X 18 IN STRL RFD

## (undated) DEVICE — NEEDLE 25G X 1 1/2

## (undated) DEVICE — SPONGE GAUZE 4 X 4 16 PLY STRL PLASTIC TRAY LF

## (undated) DEVICE — BLADE ELECTRODE: Brand: EDGE

## (undated) DEVICE — INTENDED FOR TISSUE SEPARATION, AND OTHER PROCEDURES THAT REQUIRE A SHARP SURGICAL BLADE TO PUNCTURE OR CUT.: Brand: BARD-PARKER ® CARBON RIB-BACK BLADES

## (undated) DEVICE — PROXIMATE SKIN STAPLERS (35 WIDE) CONTAINS 35 STAINLESS STEEL STAPLES (FIXED HEAD): Brand: PROXIMATE

## (undated) DEVICE — TIBURON TRANSVERSE LAPAROTOMY SHEET: Brand: CONVERTORS

## (undated) DEVICE — BETHLEHEM UNIVERSAL MINOR GEN: Brand: CARDINAL HEALTH

## (undated) DEVICE — 3M™ STERI-STRIP™ COMPOUND BENZOIN TINCTURE 40 BAGS/CARTON 4 CARTONS/CASE C1544: Brand: 3M™ STERI-STRIP™

## (undated) DEVICE — BULB SYRINGE, IRRIGATION WITH PROTECTIVE CAP, 60 CC, INDIVIDUALLY WRAPPED: Brand: DOVER

## (undated) DEVICE — INSULATED BLADE ELECTRODE: Brand: EDGE

## (undated) DEVICE — GAUZE SPONGES,USP TYPE VII GAUZE, 12 PLY: Brand: CURITY

## (undated) DEVICE — SUT VICRYL PLUS 2-0 CTB-1 36 IN VCPB945H

## (undated) DEVICE — MEDI-VAC YANKAUER SUCTION HANDLE W/BULBOUS AND CONTROL VENT: Brand: CARDINAL HEALTH

## (undated) DEVICE — TELFA ADHESIVE ISLAND DRESSING: Brand: TELFA

## (undated) DEVICE — 3M™ STERI-STRIP™ REINFORCED ADHESIVE SKIN CLOSURES, R1547, 1/2 IN X 4 IN (12 MM X 100 MM), 6 STRIPS/ENVELOPE: Brand: 3M™ STERI-STRIP™

## (undated) DEVICE — UTILITY MARKER,BLACK WITH LABELS: Brand: DEVON

## (undated) DEVICE — TELFA NON-ADHERENT ABSORBENT DRESSING: Brand: TELFA

## (undated) DEVICE — SUT VICRYL 2-0 SH 27 IN UNDYED J417H

## (undated) DEVICE — BASIC SINGLE BASIN 2-LF: Brand: MEDLINE INDUSTRIES, INC.

## (undated) DEVICE — PAD GROUNDING ADULT

## (undated) DEVICE — SUT VICRYL 3-0 SH 27 IN J416H

## (undated) DEVICE — MODERATE PLUS PROFILE, M+ 250CC GEL SIZER: Brand: MENTOR MEMORYGEL RESTERILIZABLE GEL SIZER

## (undated) DEVICE — 60 ML SYRINGE,REGULAR TIP: Brand: MONOJECT

## (undated) DEVICE — GLOVE SRG BIOGEL 6.5

## (undated) DEVICE — PACK PBDS MAJOR GENL BREAST RF

## (undated) DEVICE — IV CATH INTROCAN 18G X 1 1/4 SAFETY

## (undated) DEVICE — 3M™ TEGADERM™ TRANSPARENT FILM DRESSING FRAME STYLE, 1626W, 4 IN X 4-3/4 IN (10 CM X 12 CM), 50/CT 4CT/CASE: Brand: 3M™ TEGADERM™

## (undated) DEVICE — GLOVE SRG BIOGEL ECLIPSE 6.5

## (undated) DEVICE — ELECTRODE BLADE MOD E-Z CLEAN  2.75IN 7CM -0012AM

## (undated) DEVICE — SYRINGE 10ML LL

## (undated) DEVICE — SUT VICRYL 2-0 CTB-1 36 IN JB945

## (undated) DEVICE — ABDOMINAL PAD: Brand: DERMACEA

## (undated) DEVICE — LIGHT HANDLE COVER SLEEVE DISP BLUE STELLAR

## (undated) DEVICE — SKIN MARKER DUAL TIP WITH RULER CAP, FLEXIBLE RULER AND LABELS: Brand: DEVON

## (undated) DEVICE — ANTIBACTERIAL UNDYED BRAIDED (POLYGLACTIN 910), SYNTHETIC ABSORBABLE SUTURE: Brand: COATED VICRYL

## (undated) DEVICE — TUBING SUCTION 5MM X 12 FT

## (undated) DEVICE — SUT MONOCRYL 4-0 PS-2 18 IN Y496G

## (undated) DEVICE — CHEST/BREAST DRAPE: Brand: CONVERTORS

## (undated) DEVICE — MODERATE PLUS PROFILE, M+ 300CC GEL SIZER: Brand: MENTOR MEMORYGEL RESTERILIZABLE GEL SIZER

## (undated) DEVICE — TUBING ASPIRATION LIPOSUCTION SET 12FT

## (undated) DEVICE — DRESSING XEROFORM 5 X 9

## (undated) DEVICE — SLIM BODY SKIN STAPLER: Brand: APPOSE ULC

## (undated) DEVICE — PLUMEPEN PRO 10FT

## (undated) DEVICE — MODERATE PLUS PROFILE, M+ 375CC GEL SIZER: Brand: MENTOR MEMORYGEL RESTERILIZABLE GEL SIZER